# Patient Record
Sex: MALE | Race: WHITE | NOT HISPANIC OR LATINO | Employment: OTHER | URBAN - NONMETROPOLITAN AREA
[De-identification: names, ages, dates, MRNs, and addresses within clinical notes are randomized per-mention and may not be internally consistent; named-entity substitution may affect disease eponyms.]

---

## 2017-01-01 ENCOUNTER — HOSPITAL ENCOUNTER (EMERGENCY)
Facility: HOSPITAL | Age: 67
Discharge: HOME | End: 2017-03-30
Attending: EMERGENCY MEDICINE | Admitting: EMERGENCY MEDICINE

## 2017-01-01 ENCOUNTER — APPOINTMENT (OUTPATIENT)
Dept: GENERAL RADIOLOGY | Facility: HOSPITAL | Age: 67
End: 2017-01-01

## 2017-01-01 ENCOUNTER — APPOINTMENT (OUTPATIENT)
Dept: CT IMAGING | Facility: HOSPITAL | Age: 67
End: 2017-01-01

## 2017-01-01 ENCOUNTER — HOSPITAL ENCOUNTER (EMERGENCY)
Facility: HOSPITAL | Age: 67
Discharge: HOME | End: 2017-01-27
Attending: FAMILY MEDICINE | Admitting: FAMILY MEDICINE

## 2017-01-01 ENCOUNTER — APPOINTMENT (OUTPATIENT)
Dept: ULTRASOUND IMAGING | Facility: HOSPITAL | Age: 67
End: 2017-01-01

## 2017-01-01 ENCOUNTER — HOSPITAL ENCOUNTER (EMERGENCY)
Facility: HOSPITAL | Age: 67
Discharge: HOME | End: 2017-12-02
Attending: EMERGENCY MEDICINE | Admitting: EMERGENCY MEDICINE

## 2017-01-01 ENCOUNTER — HOSPITAL ENCOUNTER (INPATIENT)
Facility: HOSPITAL | Age: 67
LOS: 6 days | Discharge: HOME | End: 2017-12-12
Attending: EMERGENCY MEDICINE | Admitting: INTERNAL MEDICINE

## 2017-01-01 ENCOUNTER — OFFICE VISIT (OUTPATIENT)
Dept: PULMONOLOGY | Facility: CLINIC | Age: 67
End: 2017-01-01

## 2017-01-01 ENCOUNTER — HOSPITAL ENCOUNTER (EMERGENCY)
Facility: HOSPITAL | Age: 67
Discharge: HOME | End: 2017-03-21
Attending: EMERGENCY MEDICINE | Admitting: EMERGENCY MEDICINE

## 2017-01-01 ENCOUNTER — HOSPITAL ENCOUNTER (EMERGENCY)
Facility: HOSPITAL | Age: 67
Discharge: ANOTHER HOSPITAL | End: 2017-09-04
Attending: FAMILY MEDICINE | Admitting: FAMILY MEDICINE

## 2017-01-01 ENCOUNTER — HOSPITAL ENCOUNTER (EMERGENCY)
Facility: HOSPITAL | Age: 67
Discharge: HOME | End: 2017-05-30
Attending: EMERGENCY MEDICINE | Admitting: EMERGENCY MEDICINE

## 2017-01-01 ENCOUNTER — HOSPITAL ENCOUNTER (INPATIENT)
Facility: HOSPITAL | Age: 67
LOS: 24 days | Discharge: ANOTHER HOSPITAL | End: 2018-01-21
Attending: EMERGENCY MEDICINE | Admitting: INTERNAL MEDICINE

## 2017-01-01 ENCOUNTER — HOSPITAL ENCOUNTER (EMERGENCY)
Facility: HOSPITAL | Age: 67
Discharge: HOME | End: 2017-07-11
Attending: EMERGENCY MEDICINE | Admitting: EMERGENCY MEDICINE

## 2017-01-01 ENCOUNTER — HOSPITAL ENCOUNTER (EMERGENCY)
Facility: HOSPITAL | Age: 67
Discharge: HOME | End: 2017-11-15
Attending: EMERGENCY MEDICINE | Admitting: EMERGENCY MEDICINE

## 2017-01-01 ENCOUNTER — HOSPITAL ENCOUNTER (EMERGENCY)
Facility: HOSPITAL | Age: 67
Discharge: HOME | End: 2017-09-13
Attending: EMERGENCY MEDICINE | Admitting: EMERGENCY MEDICINE

## 2017-01-01 ENCOUNTER — APPOINTMENT (OUTPATIENT)
Dept: CARDIOLOGY | Facility: HOSPITAL | Age: 67
End: 2017-01-01

## 2017-01-01 ENCOUNTER — HOSPITAL ENCOUNTER (EMERGENCY)
Facility: HOSPITAL | Age: 67
Discharge: HOME | End: 2017-11-22
Attending: EMERGENCY MEDICINE | Admitting: EMERGENCY MEDICINE

## 2017-01-01 VITALS
HEART RATE: 65 BPM | RESPIRATION RATE: 20 BRPM | HEIGHT: 66 IN | DIASTOLIC BLOOD PRESSURE: 65 MMHG | BODY MASS INDEX: 43.33 KG/M2 | SYSTOLIC BLOOD PRESSURE: 158 MMHG | TEMPERATURE: 98.2 F | OXYGEN SATURATION: 97 % | WEIGHT: 269.6 LBS

## 2017-01-01 VITALS
WEIGHT: 249 LBS | OXYGEN SATURATION: 98 % | HEIGHT: 66 IN | DIASTOLIC BLOOD PRESSURE: 56 MMHG | HEART RATE: 55 BPM | BODY MASS INDEX: 40.02 KG/M2 | SYSTOLIC BLOOD PRESSURE: 130 MMHG | TEMPERATURE: 98 F | RESPIRATION RATE: 18 BRPM

## 2017-01-01 VITALS
TEMPERATURE: 98 F | WEIGHT: 242 LBS | DIASTOLIC BLOOD PRESSURE: 68 MMHG | HEIGHT: 66 IN | OXYGEN SATURATION: 91 % | SYSTOLIC BLOOD PRESSURE: 120 MMHG | BODY MASS INDEX: 38.89 KG/M2 | HEART RATE: 63 BPM

## 2017-01-01 VITALS
WEIGHT: 245 LBS | RESPIRATION RATE: 20 BRPM | DIASTOLIC BLOOD PRESSURE: 72 MMHG | BODY MASS INDEX: 39.37 KG/M2 | OXYGEN SATURATION: 100 % | SYSTOLIC BLOOD PRESSURE: 170 MMHG | HEIGHT: 66 IN | TEMPERATURE: 98.2 F | HEART RATE: 58 BPM

## 2017-01-01 VITALS
OXYGEN SATURATION: 99 % | DIASTOLIC BLOOD PRESSURE: 73 MMHG | BODY MASS INDEX: 41.46 KG/M2 | SYSTOLIC BLOOD PRESSURE: 151 MMHG | HEIGHT: 66 IN | WEIGHT: 258 LBS | HEART RATE: 64 BPM | RESPIRATION RATE: 19 BRPM | TEMPERATURE: 97.3 F

## 2017-01-01 VITALS
BODY MASS INDEX: 39.53 KG/M2 | TEMPERATURE: 97.9 F | HEIGHT: 66 IN | HEART RATE: 57 BPM | WEIGHT: 246 LBS | SYSTOLIC BLOOD PRESSURE: 183 MMHG | OXYGEN SATURATION: 99 % | RESPIRATION RATE: 22 BRPM | DIASTOLIC BLOOD PRESSURE: 68 MMHG

## 2017-01-01 VITALS
HEART RATE: 58 BPM | OXYGEN SATURATION: 94 % | SYSTOLIC BLOOD PRESSURE: 142 MMHG | HEIGHT: 67 IN | DIASTOLIC BLOOD PRESSURE: 52 MMHG | BODY MASS INDEX: 39.24 KG/M2 | TEMPERATURE: 97.9 F | WEIGHT: 250 LBS

## 2017-01-01 VITALS
HEART RATE: 64 BPM | DIASTOLIC BLOOD PRESSURE: 60 MMHG | WEIGHT: 246 LBS | HEIGHT: 66 IN | OXYGEN SATURATION: 90 % | SYSTOLIC BLOOD PRESSURE: 140 MMHG | BODY MASS INDEX: 39.53 KG/M2

## 2017-01-01 VITALS
HEART RATE: 52 BPM | BODY MASS INDEX: 41.59 KG/M2 | RESPIRATION RATE: 18 BRPM | HEIGHT: 67 IN | WEIGHT: 265 LBS | SYSTOLIC BLOOD PRESSURE: 148 MMHG | OXYGEN SATURATION: 96 % | TEMPERATURE: 98.6 F | DIASTOLIC BLOOD PRESSURE: 66 MMHG

## 2017-01-01 VITALS
RESPIRATION RATE: 20 BRPM | DIASTOLIC BLOOD PRESSURE: 68 MMHG | OXYGEN SATURATION: 91 % | WEIGHT: 268 LBS | HEART RATE: 67 BPM | TEMPERATURE: 98.1 F | SYSTOLIC BLOOD PRESSURE: 181 MMHG | BODY MASS INDEX: 42.06 KG/M2 | HEIGHT: 67 IN

## 2017-01-01 VITALS
DIASTOLIC BLOOD PRESSURE: 52 MMHG | SYSTOLIC BLOOD PRESSURE: 132 MMHG | HEART RATE: 75 BPM | BODY MASS INDEX: 40.18 KG/M2 | OXYGEN SATURATION: 98 % | HEIGHT: 66 IN | WEIGHT: 250 LBS | TEMPERATURE: 98 F | RESPIRATION RATE: 16 BRPM

## 2017-01-01 VITALS
HEIGHT: 67 IN | DIASTOLIC BLOOD PRESSURE: 44 MMHG | WEIGHT: 260 LBS | OXYGEN SATURATION: 100 % | BODY MASS INDEX: 40.81 KG/M2 | TEMPERATURE: 97.6 F | RESPIRATION RATE: 18 BRPM | HEART RATE: 68 BPM | SYSTOLIC BLOOD PRESSURE: 160 MMHG

## 2017-01-01 VITALS
BODY MASS INDEX: 41.95 KG/M2 | HEART RATE: 50 BPM | HEIGHT: 66 IN | OXYGEN SATURATION: 98 % | SYSTOLIC BLOOD PRESSURE: 132 MMHG | TEMPERATURE: 98.1 F | WEIGHT: 261 LBS | DIASTOLIC BLOOD PRESSURE: 74 MMHG | RESPIRATION RATE: 20 BRPM

## 2017-01-01 VITALS
HEART RATE: 50 BPM | SYSTOLIC BLOOD PRESSURE: 131 MMHG | BODY MASS INDEX: 40.18 KG/M2 | RESPIRATION RATE: 16 BRPM | TEMPERATURE: 97 F | WEIGHT: 250 LBS | DIASTOLIC BLOOD PRESSURE: 60 MMHG | HEIGHT: 66 IN | OXYGEN SATURATION: 97 %

## 2017-01-01 DIAGNOSIS — R73.9 HYPERGLYCEMIA: Primary | ICD-10-CM
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
## 2017-01-01 DIAGNOSIS — R09.02 HYPOXIA: ICD-10-CM

## 2017-01-01 DIAGNOSIS — J30.89 NON-SEASONAL ALLERGIC RHINITIS DUE TO OTHER ALLERGIC TRIGGER: ICD-10-CM

## 2017-01-01 DIAGNOSIS — E87.6 HYPOKALEMIA: ICD-10-CM

## 2017-01-01 DIAGNOSIS — A41.89 SEPSIS DUE TO OTHER ETIOLOGY (HCC): Primary | ICD-10-CM

## 2017-01-01 DIAGNOSIS — E66.01 MORBID OBESITY DUE TO EXCESS CALORIES (HCC): ICD-10-CM

## 2017-01-01 DIAGNOSIS — J96.22 ACUTE ON CHRONIC RESPIRATORY FAILURE WITH HYPOXIA AND HYPERCAPNIA (HCC): Primary | ICD-10-CM

## 2017-01-01 DIAGNOSIS — L97.821 VENOUS STASIS ULCER OF OTHER PART OF LEFT LOWER LEG LIMITED TO BREAKDOWN OF SKIN WITHOUT VARICOSE VEINS (HCC): Primary | ICD-10-CM

## 2017-01-01 DIAGNOSIS — J44.9 CHRONIC OBSTRUCTIVE PULMONARY DISEASE, UNSPECIFIED COPD TYPE (HCC): Primary | ICD-10-CM

## 2017-01-01 DIAGNOSIS — N17.9 ACUTE ON CHRONIC RENAL FAILURE (HCC): Primary | ICD-10-CM

## 2017-01-01 DIAGNOSIS — N18.9 ACUTE ON CHRONIC RENAL FAILURE (HCC): Primary | ICD-10-CM

## 2017-01-01 DIAGNOSIS — J18.9 PNEUMONIA OF RIGHT LOWER LOBE DUE TO INFECTIOUS ORGANISM: Primary | ICD-10-CM
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
## 2017-01-01 DIAGNOSIS — J44.9 CHRONIC OBSTRUCTIVE PULMONARY DISEASE, UNSPECIFIED COPD TYPE (HCC): ICD-10-CM

## 2017-01-01 DIAGNOSIS — R77.8 TROPONIN LEVEL ELEVATED: ICD-10-CM

## 2017-01-01 DIAGNOSIS — R60.0 EDEMA EXTREMITIES: ICD-10-CM

## 2017-01-01 DIAGNOSIS — S80.812A CAT SCRATCH OF LEFT LOWER LEG, INITIAL ENCOUNTER: Primary | ICD-10-CM

## 2017-01-01 DIAGNOSIS — G47.33 OBSTRUCTIVE SLEEP APNEA SYNDROME: ICD-10-CM

## 2017-01-01 DIAGNOSIS — E83.42 HYPOMAGNESEMIA: ICD-10-CM

## 2017-01-01 DIAGNOSIS — G47.33 OBSTRUCTIVE SLEEP APNEA HYPOPNEA, MODERATE: ICD-10-CM

## 2017-01-01 DIAGNOSIS — R53.1 GENERALIZED WEAKNESS: Primary | ICD-10-CM

## 2017-01-01 DIAGNOSIS — E66.01 MORBID OBESITY (HCC): ICD-10-CM

## 2017-01-01 DIAGNOSIS — R06.02 SHORTNESS OF BREATH: ICD-10-CM

## 2017-01-01 DIAGNOSIS — E87.3 ALKALOSIS, METABOLIC: Primary | ICD-10-CM

## 2017-01-01 DIAGNOSIS — I95.9 HYPOTENSION, UNSPECIFIED HYPOTENSION TYPE: Primary | ICD-10-CM

## 2017-01-01 DIAGNOSIS — J18.9 PNEUMONIA OF RIGHT MIDDLE LOBE DUE TO INFECTIOUS ORGANISM: Primary | ICD-10-CM

## 2017-01-01 DIAGNOSIS — I50.32 CHRONIC DIASTOLIC CONGESTIVE HEART FAILURE (HCC): Primary | ICD-10-CM

## 2017-01-01 DIAGNOSIS — J44.1 COPD EXACERBATION (HCC): ICD-10-CM

## 2017-01-01 DIAGNOSIS — L03.031 PARONYCHIA OF GREAT TOE, RIGHT: ICD-10-CM

## 2017-01-01 DIAGNOSIS — S80.811A CAT SCRATCH OF LOWER LEG, RIGHT, INITIAL ENCOUNTER: ICD-10-CM

## 2017-01-01 DIAGNOSIS — J96.21 ACUTE ON CHRONIC RESPIRATORY FAILURE WITH HYPOXIA AND HYPERCAPNIA (HCC): Primary | ICD-10-CM

## 2017-01-01 DIAGNOSIS — I87.2 VENOUS STASIS ULCER OF OTHER PART OF LEFT LOWER LEG LIMITED TO BREAKDOWN OF SKIN WITHOUT VARICOSE VEINS (HCC): Primary | ICD-10-CM

## 2017-01-01 DIAGNOSIS — R05.9 COUGH: ICD-10-CM

## 2017-01-01 LAB
25(OH)D3 SERPL-MCNC: 29 NG/ML
A-A DO2: 109.7 MMHG (ref 0–300)
A-A DO2: 128.4 MMHG (ref 0–300)
A-A DO2: 180.9 MMHG (ref 0–300)
A-A DO2: 187.9 MMHG (ref 0–300)
A-A DO2: 201.9 MMHG (ref 0–300)
A-A DO2: 203 MMHG (ref 0–300)
A-A DO2: 207 MMHG (ref 0–300)
A-A DO2: 221.9 MMHG (ref 0–300)
A-A DO2: 61.1 MMHG (ref 0–300)
A-A DO2: 72 MMHG (ref 0–300)
A-A DO2: 72.3 MMHG (ref 0–300)
A-A DO2: 76.5 MMHG (ref 0–300)
A-A DO2: 77.3 MMHG (ref 0–300)
A-A DO2: 80.8 MMHG (ref 0–300)
A-A DO2: 87.1 MMHG (ref 0–300)
A-A DO2: 89.3 MMHG (ref 0–300)
A-A DO2: 93.9 MMHG (ref 0–300)
ALBUMIN SERPL-MCNC: 3.4 G/DL (ref 3.4–4.8)
ALBUMIN SERPL-MCNC: 3.5 G/DL (ref 3.4–4.8)
ALBUMIN SERPL-MCNC: 3.6 G/DL (ref 3.4–4.8)
ALBUMIN SERPL-MCNC: 3.6 G/DL (ref 3.4–4.8)
ALBUMIN SERPL-MCNC: 3.7 G/DL (ref 3.4–4.8)
ALBUMIN SERPL-MCNC: 3.8 G/DL (ref 3.4–4.8)
ALBUMIN SERPL-MCNC: 3.8 G/DL (ref 3.4–4.8)
ALBUMIN SERPL-MCNC: 3.9 G/DL (ref 3.4–4.8)
ALBUMIN SERPL-MCNC: 3.9 G/DL (ref 3.4–4.8)
ALBUMIN SERPL-MCNC: 4 G/DL (ref 3.4–4.8)
ALBUMIN SERPL-MCNC: 4.2 G/DL (ref 3.4–4.8)
ALBUMIN SERPL-MCNC: 4.2 G/DL (ref 3.4–4.8)
ALBUMIN SERPL-MCNC: 4.3 G/DL (ref 3.4–4.8)
ALBUMIN SERPL-MCNC: 4.4 G/DL (ref 3.4–4.8)
ALBUMIN SERPL-MCNC: 4.5 G/DL (ref 3.4–4.8)
ALBUMIN SERPL-MCNC: 4.5 G/DL (ref 3.4–4.8)
ALBUMIN SERPL-MCNC: 4.7 G/DL (ref 3.4–4.8)
ALBUMIN SERPL-MCNC: 4.8 G/DL (ref 3.4–4.8)
ALBUMIN UR-MCNC: 17.3 MG/L
ALBUMIN/GLOB SERPL: 1.2 G/DL (ref 1.5–2.5)
ALBUMIN/GLOB SERPL: 1.3 G/DL (ref 1.5–2.5)
ALBUMIN/GLOB SERPL: 1.4 G/DL (ref 1.5–2.5)
ALBUMIN/GLOB SERPL: 1.5 G/DL (ref 1.5–2.5)
ALBUMIN/GLOB SERPL: 1.6 G/DL (ref 1.5–2.5)
ALBUMIN/GLOB SERPL: 1.6 G/DL (ref 1.5–2.5)
ALP SERPL-CCNC: 40 U/L (ref 40–129)
ALP SERPL-CCNC: 41 U/L (ref 40–129)
ALP SERPL-CCNC: 41 U/L (ref 40–129)
ALP SERPL-CCNC: 42 U/L (ref 40–129)
ALP SERPL-CCNC: 43 U/L (ref 40–129)
ALP SERPL-CCNC: 46 U/L (ref 40–129)
ALP SERPL-CCNC: 47 U/L (ref 40–129)
ALP SERPL-CCNC: 49 U/L (ref 40–129)
ALP SERPL-CCNC: 52 U/L (ref 40–129)
ALP SERPL-CCNC: 54 U/L (ref 46–116)
ALP SERPL-CCNC: 55 U/L (ref 40–129)
ALP SERPL-CCNC: 59 U/L (ref 40–129)
ALP SERPL-CCNC: 61 U/L (ref 40–129)
ALP SERPL-CCNC: 61 U/L (ref 40–129)
ALP SERPL-CCNC: 62 U/L (ref 40–129)
ALP SERPL-CCNC: 63 U/L (ref 40–129)
ALP SERPL-CCNC: 63 U/L (ref 40–129)
ALP SERPL-CCNC: 64 U/L (ref 40–129)
ALT SERPL W P-5'-P-CCNC: 14 U/L (ref 10–44)
ALT SERPL W P-5'-P-CCNC: 16 U/L (ref 10–44)
ALT SERPL W P-5'-P-CCNC: 17 U/L (ref 10–44)
ALT SERPL W P-5'-P-CCNC: 18 U/L (ref 10–44)
ALT SERPL W P-5'-P-CCNC: 19 U/L (ref 10–44)
ALT SERPL W P-5'-P-CCNC: 22 U/L (ref 10–44)
ALT SERPL W P-5'-P-CCNC: 23 U/L (ref 10–44)
ALT SERPL W P-5'-P-CCNC: 25 U/L (ref 10–44)
ALT SERPL W P-5'-P-CCNC: 28 U/L (ref 10–44)
ALT SERPL W P-5'-P-CCNC: 29 U/L (ref 10–44)
ALT SERPL W P-5'-P-CCNC: 51 U/L (ref 10–44)
AMPHET+METHAMPHET UR QL: NEGATIVE
AMYLASE SERPL-CCNC: 41 U/L (ref 28–100)
ANION GAP SERPL CALCULATED.3IONS-SCNC: 14.2 MMOL/L (ref 3.6–11.2)
ANION GAP SERPL CALCULATED.3IONS-SCNC: 2.6 MMOL/L (ref 3.6–11.2)
ANION GAP SERPL CALCULATED.3IONS-SCNC: 4.1 MMOL/L (ref 3.6–11.2)
ANION GAP SERPL CALCULATED.3IONS-SCNC: 5.1 MMOL/L (ref 3.6–11.2)
ANION GAP SERPL CALCULATED.3IONS-SCNC: 5.2 MMOL/L (ref 3.6–11.2)
ANION GAP SERPL CALCULATED.3IONS-SCNC: 6.6 MMOL/L (ref 3.6–11.2)
ANION GAP SERPL CALCULATED.3IONS-SCNC: 7 MMOL/L (ref 3.6–11.2)
ANION GAP SERPL CALCULATED.3IONS-SCNC: 7.2 MMOL/L (ref 3.6–11.2)
ANION GAP SERPL CALCULATED.3IONS-SCNC: 7.6 MMOL/L (ref 3.6–11.2)
ANION GAP SERPL CALCULATED.3IONS-SCNC: 7.9 MMOL/L (ref 3.6–11.2)
ANION GAP SERPL CALCULATED.3IONS-SCNC: 8 MMOL/L (ref 3.6–11.2)
ANION GAP SERPL CALCULATED.3IONS-SCNC: 8.1 MMOL/L (ref 3.6–11.2)
ANION GAP SERPL CALCULATED.3IONS-SCNC: 8.7 MMOL/L (ref 3.6–11.2)
ANION GAP SERPL CALCULATED.3IONS-SCNC: 9.1 MMOL/L (ref 3.6–11.2)
ANION GAP SERPL CALCULATED.3IONS-SCNC: 9.7 MMOL/L (ref 3.6–11.2)
ANION GAP SERPL CALCULATED.3IONS-SCNC: ABNORMAL MMOL/L (ref 3.6–11.2)
APTT PPP: 30.1 SECONDS (ref 24.4–31)
APTT PPP: 36.7 SECONDS (ref 23.8–36.1)
ARTERIAL PATENCY WRIST A: ABNORMAL
ARTERIAL PATENCY WRIST A: POSITIVE
AST SERPL-CCNC: 13 U/L (ref 10–34)
AST SERPL-CCNC: 15 U/L (ref 10–34)
AST SERPL-CCNC: 16 U/L (ref 10–34)
AST SERPL-CCNC: 17 U/L (ref 10–34)
AST SERPL-CCNC: 17 U/L (ref 10–34)
AST SERPL-CCNC: 18 U/L (ref 10–34)
AST SERPL-CCNC: 22 U/L (ref 10–34)
AST SERPL-CCNC: 22 U/L (ref 10–34)
AST SERPL-CCNC: 23 U/L (ref 10–34)
AST SERPL-CCNC: 25 U/L (ref 10–34)
AST SERPL-CCNC: 27 U/L (ref 10–34)
AST SERPL-CCNC: 27 U/L (ref 10–34)
AST SERPL-CCNC: 32 U/L (ref 10–34)
ATMOSPHERIC PRESS: 723 MMHG
ATMOSPHERIC PRESS: 725 MMHG
ATMOSPHERIC PRESS: 727 MMHG
ATMOSPHERIC PRESS: 727 MMHG
ATMOSPHERIC PRESS: 728 MMHG
ATMOSPHERIC PRESS: 729 MMHG
ATMOSPHERIC PRESS: 729 MMHG
ATMOSPHERIC PRESS: 730 MMHG
ATMOSPHERIC PRESS: 730 MMHG
ATMOSPHERIC PRESS: 735 MMHG
BACTERIA SPEC AEROBE CULT: ABNORMAL
BACTERIA SPEC AEROBE CULT: ABNORMAL
BACTERIA SPEC AEROBE CULT: NORMAL
BACTERIA UR QL AUTO: NORMAL /HPF
BARBITURATES UR QL SCN: NEGATIVE
BASE EXCESS BLDA CALC-SCNC: 11 MMOL/L
BASE EXCESS BLDA CALC-SCNC: 12.8 MMOL/L
BASE EXCESS BLDA CALC-SCNC: 13.3 MMOL/L
BASE EXCESS BLDA CALC-SCNC: 15.3 MMOL/L
BASE EXCESS BLDA CALC-SCNC: 15.3 MMOL/L
BASE EXCESS BLDA CALC-SCNC: 2.4 MMOL/L
BASE EXCESS BLDA CALC-SCNC: 3.3 MMOL/L
BASE EXCESS BLDA CALC-SCNC: 4.3 MMOL/L
BASE EXCESS BLDA CALC-SCNC: 4.3 MMOL/L
BASE EXCESS BLDA CALC-SCNC: 5.2 MMOL/L
BASE EXCESS BLDA CALC-SCNC: 5.9 MMOL/L
BASE EXCESS BLDA CALC-SCNC: 6.1 MMOL/L
BASE EXCESS BLDA CALC-SCNC: 6.9 MMOL/L
BASE EXCESS BLDA CALC-SCNC: 8.6 MMOL/L
BASE EXCESS BLDA CALC-SCNC: 8.9 MMOL/L
BASE EXCESS BLDA CALC-SCNC: 9.5 MMOL/L
BASE EXCESS BLDA CALC-SCNC: 9.7 MMOL/L
BASOPHILS # BLD AUTO: 0 10*3/MM3 (ref 0–0.3)
BASOPHILS # BLD AUTO: 0.01 10*3/MM3 (ref 0–0.3)
BASOPHILS # BLD AUTO: 0.02 10*3/MM3 (ref 0–0.3)
BASOPHILS # BLD AUTO: 0.03 10*3/MM3 (ref 0–0.3)
BASOPHILS # BLD AUTO: 0.04 10*3/MM3 (ref 0–0.3)
BASOPHILS NFR BLD AUTO: 0 % (ref 0–2)
BASOPHILS NFR BLD AUTO: 0.1 % (ref 0–2)
BASOPHILS NFR BLD AUTO: 0.2 % (ref 0–2)
BASOPHILS NFR BLD AUTO: 0.3 % (ref 0–2)
BASOPHILS NFR BLD AUTO: 0.3 % (ref 0–2)
BASOPHILS NFR BLD AUTO: 0.4 % (ref 0–2)
BASOPHILS NFR BLD AUTO: 0.5 % (ref 0–2)
BDY SITE: ABNORMAL
BENZODIAZ UR QL SCN: NEGATIVE
BILIRUB SERPL-MCNC: 0.1 MG/DL (ref 0.2–1.8)
BILIRUB SERPL-MCNC: 0.2 MG/DL (ref 0.2–1.8)
BILIRUB SERPL-MCNC: 0.2 MG/DL (ref 0.2–1.8)
BILIRUB SERPL-MCNC: 0.3 MG/DL (ref 0.2–1.8)
BILIRUB SERPL-MCNC: 0.4 MG/DL (ref 0.2–1.8)
BILIRUB SERPL-MCNC: 0.5 MG/DL (ref 0.2–1.8)
BILIRUB SERPL-MCNC: 0.6 MG/DL (ref 0.2–1.8)
BILIRUB SERPL-MCNC: 0.7 MG/DL (ref 0.2–1.8)
BILIRUB SERPL-MCNC: 0.9 MG/DL (ref 0.2–1.8)
BILIRUB SERPL-MCNC: 1 MG/DL (ref 0.2–1.8)
BILIRUB UR QL STRIP: NEGATIVE
BNP SERPL-MCNC: 127 PG/ML (ref 0–100)
BNP SERPL-MCNC: 150 PG/ML (ref 0–100)
BNP SERPL-MCNC: 307 PG/ML (ref 0–100)
BNP SERPL-MCNC: 33 PG/ML (ref 0–100)
BNP SERPL-MCNC: 37 PG/ML (ref 0–100)
BNP SERPL-MCNC: 42 PG/ML (ref 0–100)
BNP SERPL-MCNC: 58 PG/ML (ref 0–100)
BNP SERPL-MCNC: 97 PG/ML (ref 0–100)
BODY TEMPERATURE: 98.6 C
BUN BLD-MCNC: 15 MG/DL (ref 7–21)
BUN BLD-MCNC: 16 MG/DL (ref 7–21)
BUN BLD-MCNC: 20 MG/DL (ref 7–21)
BUN BLD-MCNC: 20 MG/DL (ref 7–21)
BUN BLD-MCNC: 21 MG/DL (ref 7–21)
BUN BLD-MCNC: 21 MG/DL (ref 7–21)
BUN BLD-MCNC: 22 MG/DL (ref 7–21)
BUN BLD-MCNC: 24 MG/DL (ref 7–21)
BUN BLD-MCNC: 25 MG/DL (ref 7–21)
BUN BLD-MCNC: 28 MG/DL (ref 7–21)
BUN BLD-MCNC: 30 MG/DL (ref 7–21)
BUN BLD-MCNC: 31 MG/DL (ref 7–21)
BUN BLD-MCNC: 31 MG/DL (ref 7–21)
BUN BLD-MCNC: 32 MG/DL (ref 7–21)
BUN BLD-MCNC: 33 MG/DL (ref 7–21)
BUN BLD-MCNC: 34 MG/DL (ref 7–21)
BUN BLD-MCNC: 39 MG/DL (ref 7–21)
BUN BLD-MCNC: 43 MG/DL (ref 7–21)
BUN BLD-MCNC: 44 MG/DL (ref 7–21)
BUN BLD-MCNC: 52 MG/DL (ref 7–21)
BUN/CREAT SERPL: 12 (ref 7–25)
BUN/CREAT SERPL: 12.1 (ref 7–25)
BUN/CREAT SERPL: 12.6 (ref 7–25)
BUN/CREAT SERPL: 12.8 (ref 7–25)
BUN/CREAT SERPL: 13.6 (ref 7–25)
BUN/CREAT SERPL: 13.7 (ref 7–25)
BUN/CREAT SERPL: 13.8 (ref 7–25)
BUN/CREAT SERPL: 14 (ref 7–25)
BUN/CREAT SERPL: 14.9 (ref 7–25)
BUN/CREAT SERPL: 15 (ref 7–25)
BUN/CREAT SERPL: 15 (ref 7–25)
BUN/CREAT SERPL: 15.2 (ref 7–25)
BUN/CREAT SERPL: 15.2 (ref 7–25)
BUN/CREAT SERPL: 16.7 (ref 7–25)
BUN/CREAT SERPL: 17.1 (ref 7–25)
BUN/CREAT SERPL: 17.1 (ref 7–25)
BUN/CREAT SERPL: 17.3 (ref 7–25)
BUN/CREAT SERPL: 17.5 (ref 7–25)
BUN/CREAT SERPL: 20.1 (ref 7–25)
BUN/CREAT SERPL: 22.4 (ref 7–25)
CALCIUM SPEC-SCNC: 10 MG/DL (ref 7.7–10)
CALCIUM SPEC-SCNC: 10.3 MG/DL (ref 7.7–10)
CALCIUM SPEC-SCNC: 11.3 MG/DL (ref 7.7–10)
CALCIUM SPEC-SCNC: 7.5 MG/DL (ref 7.7–10)
CALCIUM SPEC-SCNC: 7.6 MG/DL (ref 7.7–10)
CALCIUM SPEC-SCNC: 8.1 MG/DL (ref 7.7–10)
CALCIUM SPEC-SCNC: 8.2 MG/DL (ref 7.7–10)
CALCIUM SPEC-SCNC: 8.2 MG/DL (ref 7.7–10)
CALCIUM SPEC-SCNC: 8.4 MG/DL (ref 7.7–10)
CALCIUM SPEC-SCNC: 8.7 MG/DL (ref 7.7–10)
CALCIUM SPEC-SCNC: 8.8 MG/DL (ref 7.7–10)
CALCIUM SPEC-SCNC: 8.8 MG/DL (ref 7.7–10)
CALCIUM SPEC-SCNC: 9.1 MG/DL (ref 7.7–10)
CALCIUM SPEC-SCNC: 9.1 MG/DL (ref 7.7–10)
CALCIUM SPEC-SCNC: 9.2 MG/DL (ref 7.7–10)
CALCIUM SPEC-SCNC: 9.3 MG/DL (ref 7.7–10)
CALCIUM SPEC-SCNC: 9.4 MG/DL (ref 7.7–10)
CALCIUM SPEC-SCNC: 9.4 MG/DL (ref 7.7–10)
CANNABINOIDS SERPL QL: NEGATIVE
CHLORIDE SERPL-SCNC: 100 MMOL/L (ref 99–112)
CHLORIDE SERPL-SCNC: 103 MMOL/L (ref 99–112)
CHLORIDE SERPL-SCNC: 103 MMOL/L (ref 99–112)
CHLORIDE SERPL-SCNC: 104 MMOL/L (ref 99–112)
CHLORIDE SERPL-SCNC: 104 MMOL/L (ref 99–112)
CHLORIDE SERPL-SCNC: 105 MMOL/L (ref 99–112)
CHLORIDE SERPL-SCNC: 105 MMOL/L (ref 99–112)
CHLORIDE SERPL-SCNC: 108 MMOL/L (ref 99–112)
CHLORIDE SERPL-SCNC: 109 MMOL/L (ref 99–112)
CHLORIDE SERPL-SCNC: 109 MMOL/L (ref 99–112)
CHLORIDE SERPL-SCNC: 86 MMOL/L (ref 99–112)
CHLORIDE SERPL-SCNC: 88 MMOL/L (ref 99–112)
CHLORIDE SERPL-SCNC: 88 MMOL/L (ref 99–112)
CHLORIDE SERPL-SCNC: 89 MMOL/L (ref 99–112)
CHLORIDE SERPL-SCNC: 94 MMOL/L (ref 99–112)
CHLORIDE SERPL-SCNC: 94 MMOL/L (ref 99–112)
CHLORIDE SERPL-SCNC: 96 MMOL/L (ref 99–112)
CHLORIDE SERPL-SCNC: 97 MMOL/L (ref 99–112)
CHLORIDE SERPL-SCNC: 97 MMOL/L (ref 99–112)
CHLORIDE SERPL-SCNC: 98 MMOL/L (ref 99–112)
CK MB SERPL-CCNC: 0.53 NG/ML (ref 0–5)
CK MB SERPL-CCNC: 0.87 NG/ML (ref 0–5)
CK MB SERPL-CCNC: 0.97 NG/ML (ref 0–5)
CK MB SERPL-CCNC: 1.1 NG/ML (ref 0–5)
CK MB SERPL-CCNC: 1.68 NG/ML (ref 0–5)
CK MB SERPL-CCNC: 3.61 NG/ML (ref 0–5)
CK MB SERPL-CCNC: 3.88 NG/ML (ref 0–5)
CK MB SERPL-CCNC: <0.18 NG/ML (ref 0–5)
CK MB SERPL-RTO: 0.6 % (ref 0–3)
CK MB SERPL-RTO: 0.7 % (ref 0–3)
CK MB SERPL-RTO: 1.3 % (ref 0–3)
CK MB SERPL-RTO: 1.6 % (ref 0–3)
CK MB SERPL-RTO: 2.1 % (ref 0–3)
CK MB SERPL-RTO: 2.2 % (ref 0–3)
CK MB SERPL-RTO: 2.4 % (ref 0–3)
CK MB SERPL-RTO: NORMAL % (ref 0–3)
CK SERPL-CCNC: 101 U/L (ref 24–204)
CK SERPL-CCNC: 107 U/L (ref 24–204)
CK SERPL-CCNC: 130 U/L (ref 24–204)
CK SERPL-CCNC: 138 U/L (ref 24–204)
CK SERPL-CCNC: 173 U/L (ref 24–204)
CK SERPL-CCNC: 179 U/L (ref 24–204)
CK SERPL-CCNC: 39 U/L (ref 24–204)
CK SERPL-CCNC: 39 U/L (ref 24–204)
CK SERPL-CCNC: 40 U/L
CK SERPL-CCNC: 45 U/L (ref 24–204)
CLARITY UR: CLEAR
CO2 SERPL-SCNC: 27.3 MMOL/L (ref 24.3–31.9)
CO2 SERPL-SCNC: 28.3 MMOL/L (ref 24.3–31.9)
CO2 SERPL-SCNC: 28.9 MMOL/L (ref 24.3–31.9)
CO2 SERPL-SCNC: 29.1 MMOL/L (ref 24.3–31.9)
CO2 SERPL-SCNC: 29.8 MMOL/L (ref 24.3–31.9)
CO2 SERPL-SCNC: 29.8 MMOL/L (ref 24.3–31.9)
CO2 SERPL-SCNC: 29.9 MMOL/L (ref 24.3–31.9)
CO2 SERPL-SCNC: 31.9 MMOL/L (ref 24.3–31.9)
CO2 SERPL-SCNC: 32 MMOL/L (ref 24.3–31.9)
CO2 SERPL-SCNC: 32.4 MMOL/L (ref 24.3–31.9)
CO2 SERPL-SCNC: 34.4 MMOL/L (ref 24.3–31.9)
CO2 SERPL-SCNC: 35 MMOL/L (ref 24.3–31.9)
CO2 SERPL-SCNC: 35.4 MMOL/L (ref 24.3–31.9)
CO2 SERPL-SCNC: 37.8 MMOL/L (ref 24.3–31.9)
CO2 SERPL-SCNC: 39.9 MMOL/L (ref 24.3–31.9)
CO2 SERPL-SCNC: >40 MMOL/L (ref 24.3–31.9)
COCAINE UR QL: NEGATIVE
COHGB MFR BLD: 0.7 % (ref 0–5)
COHGB MFR BLD: 0.9 % (ref 0–5)
COHGB MFR BLD: 1 % (ref 0–5)
COHGB MFR BLD: 1.1 % (ref 0–5)
COHGB MFR BLD: 1.2 % (ref 0–5)
COHGB MFR BLD: 1.3 % (ref 0–5)
COHGB MFR BLD: 1.3 % (ref 0–5)
COHGB MFR BLD: 1.5 % (ref 0–5)
COHGB MFR BLD: 1.8 % (ref 0–5)
COHGB MFR BLD: 1.9 % (ref 0–5)
COHGB MFR BLD: 1.9 % (ref 0–5)
COHGB MFR BLD: 2.1 % (ref 0–5)
COHGB MFR BLD: 2.2 % (ref 0–5)
COLOR UR: YELLOW
CORTIS SERPL-MCNC: 25.1 MCG/DL
CREAT BLD-MCNC: 1.17 MG/DL (ref 0.43–1.29)
CREAT BLD-MCNC: 1.24 MG/DL (ref 0.43–1.29)
CREAT BLD-MCNC: 1.26 MG/DL (ref 0.43–1.29)
CREAT BLD-MCNC: 1.27 MG/DL (ref 0.43–1.29)
CREAT BLD-MCNC: 1.37 MG/DL (ref 0.43–1.29)
CREAT BLD-MCNC: 1.4 MG/DL (ref 0.43–1.29)
CREAT BLD-MCNC: 1.45 MG/DL (ref 0.43–1.29)
CREAT BLD-MCNC: 1.46 MG/DL (ref 0.43–1.29)
CREAT BLD-MCNC: 1.47 MG/DL (ref 0.43–1.29)
CREAT BLD-MCNC: 1.52 MG/DL (ref 0.43–1.29)
CREAT BLD-MCNC: 1.81 MG/DL (ref 0.43–1.29)
CREAT BLD-MCNC: 1.87 MG/DL (ref 0.43–1.29)
CREAT BLD-MCNC: 1.88 MG/DL (ref 0.43–1.29)
CREAT BLD-MCNC: 2.07 MG/DL (ref 0.43–1.29)
CREAT BLD-MCNC: 2.42 MG/DL (ref 0.43–1.29)
CREAT BLD-MCNC: 2.49 MG/DL (ref 0.43–1.29)
CREAT BLD-MCNC: 2.5 MG/DL (ref 0.43–1.29)
CREAT BLD-MCNC: 2.51 MG/DL (ref 0.43–1.29)
CREAT BLD-MCNC: 2.56 MG/DL (ref 0.43–1.29)
CREAT BLD-MCNC: 2.59 MG/DL (ref 0.43–1.29)
CREAT BLD-MCNC: 3.14 MG/DL (ref 0.43–1.29)
CREAT UR-MCNC: 152.6 MG/DL
CRP SERPL-MCNC: 0.55 MG/DL (ref 0–0.99)
CRP SERPL-MCNC: 1.61 MG/DL (ref 0–0.99)
CRP SERPL-MCNC: 10.08 MG/DL (ref 0–0.99)
CRP SERPL-MCNC: 11.77 MG/DL (ref 0–0.99)
CRP SERPL-MCNC: 2.16 MG/DL (ref 0–0.99)
CRP SERPL-MCNC: 3.57 MG/DL (ref 0–0.99)
CRP SERPL-MCNC: 3.6 MG/DL (ref 0–0.99)
CRP SERPL-MCNC: 3.78 MG/DL (ref 0–0.99)
CRP SERPL-MCNC: 3.94 MG/DL (ref 0–0.99)
CRP SERPL-MCNC: 5.63 MG/DL (ref 0–0.99)
CRP SERPL-MCNC: 6.38 MG/DL (ref 0–0.99)
D-LACTATE SERPL-SCNC: 1 MMOL/L (ref 0.5–2)
D-LACTATE SERPL-SCNC: 1.1 MMOL/L (ref 0.5–2)
D-LACTATE SERPL-SCNC: 1.2 MMOL/L (ref 0.5–2)
D-LACTATE SERPL-SCNC: 2.1 MMOL/L (ref 0.5–2)
D-LACTATE SERPL-SCNC: 3.5 MMOL/L (ref 0.5–2)
D-LACTATE SERPL-SCNC: 3.7 MMOL/L (ref 0.5–2)
D-LACTATE SERPL-SCNC: 4.4 MMOL/L (ref 0.5–2)
D-LACTATE SERPL-SCNC: 4.8 MMOL/L (ref 0.5–2)
D-LACTATE SERPL-SCNC: 4.9 MMOL/L (ref 0.5–2)
D-LACTATE SERPL-SCNC: 5.6 MMOL/L (ref 0.5–2)
D-LACTATE SERPL-SCNC: 6.1 MMOL/L (ref 0.5–2)
D-LACTATE SERPL-SCNC: 6.2 MMOL/L (ref 0.5–2)
D-LACTATE SERPL-SCNC: 6.8 MMOL/L (ref 0.5–2)
DEPRECATED RDW RBC AUTO: 41.8 FL (ref 37–54)
DEPRECATED RDW RBC AUTO: 42.7 FL (ref 37–54)
DEPRECATED RDW RBC AUTO: 42.8 FL (ref 37–54)
DEPRECATED RDW RBC AUTO: 43.9 FL (ref 37–54)
DEPRECATED RDW RBC AUTO: 45.2 FL (ref 37–54)
DEPRECATED RDW RBC AUTO: 46.5 FL (ref 37–54)
DEPRECATED RDW RBC AUTO: 46.7 FL (ref 37–54)
DEPRECATED RDW RBC AUTO: 47.4 FL (ref 37–54)
DEPRECATED RDW RBC AUTO: 47.5 FL (ref 37–54)
DEPRECATED RDW RBC AUTO: 48.7 FL (ref 37–54)
DEPRECATED RDW RBC AUTO: 49 FL (ref 37–54)
DEPRECATED RDW RBC AUTO: 49.2 FL (ref 37–54)
DEPRECATED RDW RBC AUTO: 50 FL (ref 37–54)
DEPRECATED RDW RBC AUTO: 50.7 FL (ref 37–54)
DEPRECATED RDW RBC AUTO: 51.5 FL (ref 37–54)
DEPRECATED RDW RBC AUTO: 51.9 FL (ref 37–54)
DEPRECATED RDW RBC AUTO: 52 FL (ref 37–54)
DEPRECATED RDW RBC AUTO: 52.6 FL (ref 37–54)
DEPRECATED RDW RBC AUTO: 53.2 FL (ref 37–54)
EOSINOPHIL # BLD AUTO: 0 10*3/MM3 (ref 0–0.7)
EOSINOPHIL # BLD AUTO: 0.01 10*3/MM3 (ref 0–0.7)
EOSINOPHIL # BLD AUTO: 0.01 10*3/MM3 (ref 0–0.7)
EOSINOPHIL # BLD AUTO: 0.03 10*3/MM3 (ref 0–0.7)
EOSINOPHIL # BLD AUTO: 0.06 10*3/MM3 (ref 0–0.7)
EOSINOPHIL # BLD AUTO: 0.08 10*3/MM3 (ref 0–0.7)
EOSINOPHIL # BLD AUTO: 0.09 10*3/MM3 (ref 0–0.7)
EOSINOPHIL # BLD AUTO: 0.13 10*3/MM3 (ref 0–0.7)
EOSINOPHIL # BLD AUTO: 0.17 10*3/MM3 (ref 0–0.7)
EOSINOPHIL # BLD AUTO: 0.2 10*3/MM3 (ref 0–0.7)
EOSINOPHIL # BLD AUTO: 0.25 10*3/MM3 (ref 0–0.7)
EOSINOPHIL # BLD AUTO: 0.26 10*3/MM3 (ref 0–0.7)
EOSINOPHIL # BLD AUTO: 0.27 10*3/MM3 (ref 0–0.7)
EOSINOPHIL # BLD AUTO: 0.28 10*3/MM3 (ref 0–0.7)
EOSINOPHIL # BLD AUTO: 0.43 10*3/MM3 (ref 0–0.7)
EOSINOPHIL # BLD AUTO: 0.45 10*3/MM3 (ref 0–0.7)
EOSINOPHIL NFR BLD AUTO: 0 % (ref 0–7)
EOSINOPHIL NFR BLD AUTO: 0.1 % (ref 0–7)
EOSINOPHIL NFR BLD AUTO: 0.1 % (ref 0–7)
EOSINOPHIL NFR BLD AUTO: 0.5 % (ref 0–7)
EOSINOPHIL NFR BLD AUTO: 0.6 % (ref 0–7)
EOSINOPHIL NFR BLD AUTO: 0.7 % (ref 0–7)
EOSINOPHIL NFR BLD AUTO: 1 % (ref 0–7)
EOSINOPHIL NFR BLD AUTO: 2 % (ref 0–7)
EOSINOPHIL NFR BLD AUTO: 2.1 % (ref 0–7)
EOSINOPHIL NFR BLD AUTO: 2.7 % (ref 0–7)
EOSINOPHIL NFR BLD AUTO: 3 % (ref 0–7)
EOSINOPHIL NFR BLD AUTO: 3.2 % (ref 0–7)
EOSINOPHIL NFR BLD AUTO: 3.5 % (ref 0–7)
EOSINOPHIL NFR BLD AUTO: 4.6 % (ref 0–7)
EOSINOPHIL NFR BLD AUTO: 5.3 % (ref 0–7)
EOSINOPHIL NFR BLD AUTO: 5.4 % (ref 0–7)
ERYTHROCYTE [DISTWIDTH] IN BLOOD BY AUTOMATED COUNT: 13.4 % (ref 11.5–14.5)
ERYTHROCYTE [DISTWIDTH] IN BLOOD BY AUTOMATED COUNT: 13.5 % (ref 11.5–14.5)
ERYTHROCYTE [DISTWIDTH] IN BLOOD BY AUTOMATED COUNT: 13.5 % (ref 11.5–14.5)
ERYTHROCYTE [DISTWIDTH] IN BLOOD BY AUTOMATED COUNT: 13.8 % (ref 11.5–14.5)
ERYTHROCYTE [DISTWIDTH] IN BLOOD BY AUTOMATED COUNT: 14.1 % (ref 11.5–14.5)
ERYTHROCYTE [DISTWIDTH] IN BLOOD BY AUTOMATED COUNT: 14.4 % (ref 11.5–14.5)
ERYTHROCYTE [DISTWIDTH] IN BLOOD BY AUTOMATED COUNT: 14.5 % (ref 11.5–14.5)
ERYTHROCYTE [DISTWIDTH] IN BLOOD BY AUTOMATED COUNT: 14.5 % (ref 11.5–14.5)
ERYTHROCYTE [DISTWIDTH] IN BLOOD BY AUTOMATED COUNT: 14.6 % (ref 11.5–14.5)
ERYTHROCYTE [DISTWIDTH] IN BLOOD BY AUTOMATED COUNT: 15.1 % (ref 11.5–14.5)
ERYTHROCYTE [DISTWIDTH] IN BLOOD BY AUTOMATED COUNT: 15.2 % (ref 11.5–14.5)
ERYTHROCYTE [DISTWIDTH] IN BLOOD BY AUTOMATED COUNT: 15.3 % (ref 11.5–14.5)
ERYTHROCYTE [DISTWIDTH] IN BLOOD BY AUTOMATED COUNT: 15.3 % (ref 11.5–14.5)
ERYTHROCYTE [DISTWIDTH] IN BLOOD BY AUTOMATED COUNT: 15.4 % (ref 11.5–14.5)
ERYTHROCYTE [DISTWIDTH] IN BLOOD BY AUTOMATED COUNT: 15.5 % (ref 11.5–14.5)
ERYTHROCYTE [DISTWIDTH] IN BLOOD BY AUTOMATED COUNT: 15.5 % (ref 11.5–14.5)
ERYTHROCYTE [DISTWIDTH] IN BLOOD BY AUTOMATED COUNT: 15.6 % (ref 11.5–14.5)
ERYTHROCYTE [SEDIMENTATION RATE] IN BLOOD: 54 MM/HR (ref 0–20)
FERRITIN SERPL-MCNC: 133 NG/ML (ref 21.9–321.7)
FERRITIN SERPL-MCNC: 74 NG/ML (ref 21.9–321.7)
FLUAV AG NPH QL: NEGATIVE
FLUBV AG NPH QL IA: NEGATIVE
FOLATE SERPL-MCNC: 16.98 NG/ML (ref 5.4–20)
FOLATE SERPL-MCNC: 9.46 NG/ML (ref 5.4–20)
GAS FLOW AIRWAY: 3 LPM
GAS FLOW AIRWAY: 45 LPM
GFR SERPL CREATININE-BSD FRML MDRD: 20 ML/MIN/1.73
GFR SERPL CREATININE-BSD FRML MDRD: 25 ML/MIN/1.73
GFR SERPL CREATININE-BSD FRML MDRD: 25 ML/MIN/1.73
GFR SERPL CREATININE-BSD FRML MDRD: 26 ML/MIN/1.73
GFR SERPL CREATININE-BSD FRML MDRD: 27 ML/MIN/1.73
GFR SERPL CREATININE-BSD FRML MDRD: 32 ML/MIN/1.73
GFR SERPL CREATININE-BSD FRML MDRD: 36 ML/MIN/1.73
GFR SERPL CREATININE-BSD FRML MDRD: 36 ML/MIN/1.73
GFR SERPL CREATININE-BSD FRML MDRD: 37 ML/MIN/1.73
GFR SERPL CREATININE-BSD FRML MDRD: 46 ML/MIN/1.73
GFR SERPL CREATININE-BSD FRML MDRD: 47 ML/MIN/1.73
GFR SERPL CREATININE-BSD FRML MDRD: 48 ML/MIN/1.73
GFR SERPL CREATININE-BSD FRML MDRD: 48 ML/MIN/1.73
GFR SERPL CREATININE-BSD FRML MDRD: 50 ML/MIN/1.73
GFR SERPL CREATININE-BSD FRML MDRD: 52 ML/MIN/1.73
GFR SERPL CREATININE-BSD FRML MDRD: 56 ML/MIN/1.73
GFR SERPL CREATININE-BSD FRML MDRD: 57 ML/MIN/1.73
GFR SERPL CREATININE-BSD FRML MDRD: 58 ML/MIN/1.73
GFR SERPL CREATININE-BSD FRML MDRD: 62 ML/MIN/1.73
GLOBULIN UR ELPH-MCNC: 2.4 GM/DL
GLOBULIN UR ELPH-MCNC: 2.5 GM/DL
GLOBULIN UR ELPH-MCNC: 2.7 GM/DL
GLOBULIN UR ELPH-MCNC: 2.7 GM/DL
GLOBULIN UR ELPH-MCNC: 2.8 GM/DL
GLOBULIN UR ELPH-MCNC: 2.9 GM/DL
GLOBULIN UR ELPH-MCNC: 2.9 GM/DL
GLOBULIN UR ELPH-MCNC: 3 GM/DL
GLOBULIN UR ELPH-MCNC: 3.1 GM/DL
GLOBULIN UR ELPH-MCNC: 3.1 GM/DL
GLOBULIN UR ELPH-MCNC: 3.3 GM/DL
GLOBULIN UR ELPH-MCNC: 3.4 GM/DL
GLOBULIN UR ELPH-MCNC: 3.7 GM/DL
GLUCOSE BLD-MCNC: 104 MG/DL (ref 70–110)
GLUCOSE BLD-MCNC: 115 MG/DL (ref 70–110)
GLUCOSE BLD-MCNC: 119 MG/DL (ref 70–110)
GLUCOSE BLD-MCNC: 124 MG/DL (ref 70–110)
GLUCOSE BLD-MCNC: 132 MG/DL (ref 70–110)
GLUCOSE BLD-MCNC: 133 MG/DL (ref 70–110)
GLUCOSE BLD-MCNC: 140 MG/DL (ref 70–110)
GLUCOSE BLD-MCNC: 178 MG/DL (ref 70–110)
GLUCOSE BLD-MCNC: 179 MG/DL (ref 70–110)
GLUCOSE BLD-MCNC: 186 MG/DL (ref 70–110)
GLUCOSE BLD-MCNC: 207 MG/DL (ref 70–110)
GLUCOSE BLD-MCNC: 236 MG/DL (ref 70–110)
GLUCOSE BLD-MCNC: 249 MG/DL (ref 70–110)
GLUCOSE BLD-MCNC: 261 MG/DL (ref 70–110)
GLUCOSE BLD-MCNC: 274 MG/DL (ref 70–110)
GLUCOSE BLD-MCNC: 276 MG/DL (ref 70–110)
GLUCOSE BLD-MCNC: 281 MG/DL (ref 70–110)
GLUCOSE BLD-MCNC: 296 MG/DL (ref 70–110)
GLUCOSE BLD-MCNC: 304 MG/DL (ref 70–110)
GLUCOSE BLD-MCNC: 457 MG/DL (ref 70–110)
GLUCOSE BLDC GLUCOMTR-MCNC: 176 MG/DL (ref 70–130)
GLUCOSE BLDC GLUCOMTR-MCNC: 176 MG/DL (ref 70–130)
GLUCOSE BLDC GLUCOMTR-MCNC: 187 MG/DL (ref 70–130)
GLUCOSE BLDC GLUCOMTR-MCNC: 190 MG/DL (ref 70–130)
GLUCOSE BLDC GLUCOMTR-MCNC: 192 MG/DL (ref 70–130)
GLUCOSE BLDC GLUCOMTR-MCNC: 195 MG/DL (ref 70–130)
GLUCOSE BLDC GLUCOMTR-MCNC: 199 MG/DL (ref 70–130)
GLUCOSE BLDC GLUCOMTR-MCNC: 206 MG/DL (ref 70–130)
GLUCOSE BLDC GLUCOMTR-MCNC: 221 MG/DL (ref 70–130)
GLUCOSE BLDC GLUCOMTR-MCNC: 227 MG/DL (ref 70–130)
GLUCOSE BLDC GLUCOMTR-MCNC: 231 MG/DL (ref 70–130)
GLUCOSE BLDC GLUCOMTR-MCNC: 234 MG/DL (ref 70–130)
GLUCOSE BLDC GLUCOMTR-MCNC: 236 MG/DL (ref 70–130)
GLUCOSE BLDC GLUCOMTR-MCNC: 236 MG/DL (ref 70–130)
GLUCOSE BLDC GLUCOMTR-MCNC: 244 MG/DL (ref 70–130)
GLUCOSE BLDC GLUCOMTR-MCNC: 247 MG/DL (ref 70–130)
GLUCOSE BLDC GLUCOMTR-MCNC: 252 MG/DL (ref 70–130)
GLUCOSE BLDC GLUCOMTR-MCNC: 254 MG/DL (ref 70–130)
GLUCOSE BLDC GLUCOMTR-MCNC: 257 MG/DL (ref 70–130)
GLUCOSE BLDC GLUCOMTR-MCNC: 261 MG/DL (ref 70–130)
GLUCOSE BLDC GLUCOMTR-MCNC: 263 MG/DL (ref 70–130)
GLUCOSE BLDC GLUCOMTR-MCNC: 267 MG/DL (ref 70–130)
GLUCOSE BLDC GLUCOMTR-MCNC: 268 MG/DL (ref 70–130)
GLUCOSE BLDC GLUCOMTR-MCNC: 272 MG/DL (ref 70–130)
GLUCOSE BLDC GLUCOMTR-MCNC: 277 MG/DL (ref 70–130)
GLUCOSE BLDC GLUCOMTR-MCNC: 282 MG/DL (ref 70–130)
GLUCOSE BLDC GLUCOMTR-MCNC: 283 MG/DL (ref 70–130)
GLUCOSE BLDC GLUCOMTR-MCNC: 288 MG/DL (ref 70–130)
GLUCOSE BLDC GLUCOMTR-MCNC: 292 MG/DL (ref 70–130)
GLUCOSE BLDC GLUCOMTR-MCNC: 294 MG/DL (ref 70–130)
GLUCOSE BLDC GLUCOMTR-MCNC: 305 MG/DL (ref 70–130)
GLUCOSE BLDC GLUCOMTR-MCNC: 313 MG/DL (ref 70–130)
GLUCOSE BLDC GLUCOMTR-MCNC: 315 MG/DL (ref 70–130)
GLUCOSE BLDC GLUCOMTR-MCNC: 315 MG/DL (ref 70–130)
GLUCOSE BLDC GLUCOMTR-MCNC: 318 MG/DL (ref 70–130)
GLUCOSE BLDC GLUCOMTR-MCNC: 320 MG/DL (ref 70–130)
GLUCOSE BLDC GLUCOMTR-MCNC: 323 MG/DL (ref 70–130)
GLUCOSE BLDC GLUCOMTR-MCNC: 328 MG/DL (ref 70–130)
GLUCOSE BLDC GLUCOMTR-MCNC: 353 MG/DL (ref 70–130)
GLUCOSE BLDC GLUCOMTR-MCNC: 367 MG/DL (ref 70–130)
GLUCOSE BLDC GLUCOMTR-MCNC: 387 MG/DL (ref 70–130)
GLUCOSE BLDC GLUCOMTR-MCNC: 395 MG/DL (ref 70–130)
GLUCOSE BLDC GLUCOMTR-MCNC: 92 MG/DL (ref 70–130)
GLUCOSE UR STRIP-MCNC: ABNORMAL MG/DL
GLUCOSE UR STRIP-MCNC: NEGATIVE MG/DL
GRAM STN SPEC: ABNORMAL
GRAM STN SPEC: ABNORMAL
HBA1C MFR BLD: 7.8 % (ref 4.5–5.7)
HCO3 BLDA-SCNC: 28.3 MMOL/L (ref 22–26)
HCO3 BLDA-SCNC: 28.7 MMOL/L (ref 22–26)
HCO3 BLDA-SCNC: 28.9 MMOL/L (ref 22–26)
HCO3 BLDA-SCNC: 30 MMOL/L (ref 22–26)
HCO3 BLDA-SCNC: 31.9 MMOL/L (ref 22–26)
HCO3 BLDA-SCNC: 32.6 MMOL/L (ref 22–26)
HCO3 BLDA-SCNC: 33 MMOL/L (ref 22–26)
HCO3 BLDA-SCNC: 33.8 MMOL/L (ref 22–26)
HCO3 BLDA-SCNC: 33.9 MMOL/L (ref 22–26)
HCO3 BLDA-SCNC: 34.3 MMOL/L (ref 22–26)
HCO3 BLDA-SCNC: 35.7 MMOL/L (ref 22–26)
HCO3 BLDA-SCNC: 36.4 MMOL/L (ref 22–26)
HCO3 BLDA-SCNC: 39.9 MMOL/L (ref 22–26)
HCO3 BLDA-SCNC: 40 MMOL/L (ref 22–26)
HCO3 BLDA-SCNC: 40.3 MMOL/L (ref 22–26)
HCT VFR BLD AUTO: 25.5 % (ref 42–52)
HCT VFR BLD AUTO: 26.3 % (ref 42–52)
HCT VFR BLD AUTO: 26.4 % (ref 42–52)
HCT VFR BLD AUTO: 26.9 % (ref 42–52)
HCT VFR BLD AUTO: 26.9 % (ref 42–52)
HCT VFR BLD AUTO: 29.6 % (ref 42–52)
HCT VFR BLD AUTO: 29.7 % (ref 42–52)
HCT VFR BLD AUTO: 30.1 % (ref 42–52)
HCT VFR BLD AUTO: 31 % (ref 42–52)
HCT VFR BLD AUTO: 31.1 % (ref 42–52)
HCT VFR BLD AUTO: 31.2 % (ref 42–52)
HCT VFR BLD AUTO: 31.6 % (ref 42–52)
HCT VFR BLD AUTO: 32.2 % (ref 42–52)
HCT VFR BLD AUTO: 33.3 % (ref 42–52)
HCT VFR BLD AUTO: 33.5 % (ref 42–52)
HCT VFR BLD AUTO: 33.8 % (ref 42–52)
HCT VFR BLD AUTO: 34.4 % (ref 42–52)
HCT VFR BLD AUTO: 35.4 % (ref 42–52)
HCT VFR BLD AUTO: 35.9 % (ref 42–52)
HCT VFR BLD CALC: 25 % (ref 42–52)
HCT VFR BLD CALC: 26 % (ref 42–52)
HCT VFR BLD CALC: 26 % (ref 42–52)
HCT VFR BLD CALC: 27 % (ref 42–52)
HCT VFR BLD CALC: 28 % (ref 42–52)
HCT VFR BLD CALC: 29 % (ref 42–52)
HCT VFR BLD CALC: 30 % (ref 42–52)
HCT VFR BLD CALC: 31 % (ref 42–52)
HCT VFR BLD CALC: 34 % (ref 42–52)
HCT VFR BLD CALC: 34 % (ref 42–52)
HCT VFR BLD CALC: 36 % (ref 42–52)
HCT VFR BLD CALC: 43 % (ref 42–52)
HGB BLD-MCNC: 10.2 G/DL (ref 14–18)
HGB BLD-MCNC: 10.2 G/DL (ref 14–18)
HGB BLD-MCNC: 10.5 G/DL (ref 14–18)
HGB BLD-MCNC: 10.5 G/DL (ref 14–18)
HGB BLD-MCNC: 10.9 G/DL (ref 14–18)
HGB BLD-MCNC: 11.1 G/DL (ref 14–18)
HGB BLD-MCNC: 11.8 G/DL (ref 14–18)
HGB BLD-MCNC: 12 G/DL (ref 14–18)
HGB BLD-MCNC: 7.6 G/DL (ref 14–18)
HGB BLD-MCNC: 8 G/DL (ref 14–18)
HGB BLD-MCNC: 8.2 G/DL (ref 14–18)
HGB BLD-MCNC: 8.2 G/DL (ref 14–18)
HGB BLD-MCNC: 8.5 G/DL (ref 14–18)
HGB BLD-MCNC: 8.7 G/DL (ref 14–18)
HGB BLD-MCNC: 9 G/DL (ref 14–18)
HGB BLD-MCNC: 9.1 G/DL (ref 14–18)
HGB BLD-MCNC: 9.6 G/DL (ref 14–18)
HGB BLD-MCNC: 9.6 G/DL (ref 14–18)
HGB BLD-MCNC: 9.8 G/DL (ref 14–18)
HGB BLDA-MCNC: 10.1 G/DL (ref 12–16)
HGB BLDA-MCNC: 10.2 G/DL (ref 12–16)
HGB BLDA-MCNC: 10.2 G/DL (ref 12–16)
HGB BLDA-MCNC: 10.3 G/DL (ref 12–16)
HGB BLDA-MCNC: 10.6 G/DL (ref 12–16)
HGB BLDA-MCNC: 11.4 G/DL (ref 12–16)
HGB BLDA-MCNC: 11.6 G/DL (ref 12–16)
HGB BLDA-MCNC: 12.2 G/DL (ref 12–16)
HGB BLDA-MCNC: 14.7 G/DL (ref 12–16)
HGB BLDA-MCNC: 8.5 G/DL (ref 12–16)
HGB BLDA-MCNC: 8.7 G/DL (ref 12–16)
HGB BLDA-MCNC: 8.8 G/DL (ref 12–16)
HGB BLDA-MCNC: 9.3 G/DL (ref 12–16)
HGB BLDA-MCNC: 9.5 G/DL (ref 12–16)
HGB BLDA-MCNC: 9.8 G/DL (ref 12–16)
HGB BLDA-MCNC: 9.8 G/DL (ref 12–16)
HGB BLDA-MCNC: 9.9 G/DL (ref 12–16)
HGB UR QL STRIP.AUTO: NEGATIVE
HOLD SPECIMEN: NORMAL
HOROWITZ INDEX BLD+IHG-RTO: 30 %
HOROWITZ INDEX BLD+IHG-RTO: 32 %
HOROWITZ INDEX BLD+IHG-RTO: 40 %
HOROWITZ INDEX BLD+IHG-RTO: 46 %
HOROWITZ INDEX BLD+IHG-RTO: 49 %
HOROWITZ INDEX BLD+IHG-RTO: 50 %
HYALINE CASTS UR QL AUTO: NORMAL /LPF
IMM GRANULOCYTES # BLD: 0.01 10*3/MM3 (ref 0–0.03)
IMM GRANULOCYTES # BLD: 0.01 10*3/MM3 (ref 0–0.03)
IMM GRANULOCYTES # BLD: 0.02 10*3/MM3 (ref 0–0.03)
IMM GRANULOCYTES # BLD: 0.03 10*3/MM3 (ref 0–0.03)
IMM GRANULOCYTES # BLD: 0.04 10*3/MM3 (ref 0–0.03)
IMM GRANULOCYTES # BLD: 0.05 10*3/MM3 (ref 0–0.03)
IMM GRANULOCYTES # BLD: 0.06 10*3/MM3 (ref 0–0.03)
IMM GRANULOCYTES # BLD: 0.07 10*3/MM3 (ref 0–0.03)
IMM GRANULOCYTES # BLD: 0.08 10*3/MM3 (ref 0–0.03)
IMM GRANULOCYTES # BLD: 0.13 10*3/MM3 (ref 0–0.03)
IMM GRANULOCYTES NFR BLD: 0.1 % (ref 0–0.5)
IMM GRANULOCYTES NFR BLD: 0.1 % (ref 0–0.5)
IMM GRANULOCYTES NFR BLD: 0.2 % (ref 0–0.5)
IMM GRANULOCYTES NFR BLD: 0.3 % (ref 0–0.5)
IMM GRANULOCYTES NFR BLD: 0.4 % (ref 0–0.5)
IMM GRANULOCYTES NFR BLD: 0.4 % (ref 0–0.5)
IMM GRANULOCYTES NFR BLD: 0.5 % (ref 0–0.5)
IMM GRANULOCYTES NFR BLD: 0.6 % (ref 0–0.5)
IMM GRANULOCYTES NFR BLD: 0.7 % (ref 0–0.5)
IMM GRANULOCYTES NFR BLD: 0.7 % (ref 0–0.5)
IMM GRANULOCYTES NFR BLD: 0.8 % (ref 0–0.5)
IMM GRANULOCYTES NFR BLD: 0.9 % (ref 0–0.5)
IMM GRANULOCYTES NFR BLD: 1.3 % (ref 0–0.5)
IMM GRANULOCYTES NFR BLD: 1.4 % (ref 0–0.5)
INR PPP: 1.01 (ref 0.8–1.1)
INR PPP: 1.12 (ref 0.9–1.1)
INR PPP: 1.18 (ref 0.9–1.1)
IRON 24H UR-MRATE: 20 MCG/DL (ref 53–167)
IRON 24H UR-MRATE: 43 MCG/DL (ref 53–167)
IRON SATN MFR SERPL: 14 % (ref 20–50)
IRON SATN MFR SERPL: 8 %
ISOLATED FROM: ABNORMAL
ISOLATED FROM: ABNORMAL
KETONES UR QL STRIP: NEGATIVE
L PNEUMO1 AG UR QL IA: NEGATIVE
L PNEUMO1 AG UR QL IA: NEGATIVE
LEUKOCYTE ESTERASE UR QL STRIP.AUTO: NEGATIVE
LIPASE SERPL-CCNC: 28 U/L (ref 13–60)
LIPASE SERPL-CCNC: 36 U/L (ref 13–60)
LYMPHOCYTES # BLD AUTO: 0.16 10*3/MM3 (ref 1–3)
LYMPHOCYTES # BLD AUTO: 0.33 10*3/MM3 (ref 1–3)
LYMPHOCYTES # BLD AUTO: 0.38 10*3/MM3 (ref 1–3)
LYMPHOCYTES # BLD AUTO: 0.44 10*3/MM3 (ref 1–3)
LYMPHOCYTES # BLD AUTO: 0.92 10*3/MM3 (ref 1–3)
LYMPHOCYTES # BLD AUTO: 0.95 10*3/MM3 (ref 1–3)
LYMPHOCYTES # BLD AUTO: 1.05 10*3/MM3 (ref 1–3)
LYMPHOCYTES # BLD AUTO: 1.17 10*3/MM3 (ref 1–3)
LYMPHOCYTES # BLD AUTO: 1.23 10*3/MM3 (ref 1–3)
LYMPHOCYTES # BLD AUTO: 1.27 10*3/MM3 (ref 1–3)
LYMPHOCYTES # BLD AUTO: 1.29 10*3/MM3 (ref 1–3)
LYMPHOCYTES # BLD AUTO: 1.33 10*3/MM3 (ref 1–3)
LYMPHOCYTES # BLD AUTO: 1.48 10*3/MM3 (ref 1–3)
LYMPHOCYTES # BLD AUTO: 1.64 10*3/MM3 (ref 1–3)
LYMPHOCYTES # BLD AUTO: 1.86 10*3/MM3 (ref 1–3)
LYMPHOCYTES # BLD AUTO: 2.07 10*3/MM3 (ref 1–3)
LYMPHOCYTES # BLD AUTO: 2.09 10*3/MM3 (ref 1–3)
LYMPHOCYTES # BLD AUTO: 2.26 10*3/MM3 (ref 1–3)
LYMPHOCYTES # BLD AUTO: 2.74 10*3/MM3 (ref 1–3)
LYMPHOCYTES NFR BLD AUTO: 1.7 % (ref 16–46)
LYMPHOCYTES NFR BLD AUTO: 11.2 % (ref 16–46)
LYMPHOCYTES NFR BLD AUTO: 14.2 % (ref 16–46)
LYMPHOCYTES NFR BLD AUTO: 14.3 % (ref 16–46)
LYMPHOCYTES NFR BLD AUTO: 15.7 % (ref 16–46)
LYMPHOCYTES NFR BLD AUTO: 15.8 % (ref 16–46)
LYMPHOCYTES NFR BLD AUTO: 17.6 % (ref 16–46)
LYMPHOCYTES NFR BLD AUTO: 19.1 % (ref 16–46)
LYMPHOCYTES NFR BLD AUTO: 22.4 % (ref 16–46)
LYMPHOCYTES NFR BLD AUTO: 22.9 % (ref 16–46)
LYMPHOCYTES NFR BLD AUTO: 22.9 % (ref 16–46)
LYMPHOCYTES NFR BLD AUTO: 25.5 % (ref 16–46)
LYMPHOCYTES NFR BLD AUTO: 27.2 % (ref 16–46)
LYMPHOCYTES NFR BLD AUTO: 27.7 % (ref 16–46)
LYMPHOCYTES NFR BLD AUTO: 29.8 % (ref 16–46)
LYMPHOCYTES NFR BLD AUTO: 3.7 % (ref 16–46)
LYMPHOCYTES NFR BLD AUTO: 4 % (ref 16–46)
LYMPHOCYTES NFR BLD AUTO: 5.1 % (ref 16–46)
LYMPHOCYTES NFR BLD AUTO: 6.6 % (ref 16–46)
M PNEUMO IGM SER QL: NEGATIVE
M PNEUMO IGM SER QL: NEGATIVE
MAGNESIUM SERPL-MCNC: 1.1 MG/DL (ref 1.7–2.6)
MAGNESIUM SERPL-MCNC: 1.1 MG/DL (ref 1.7–2.6)
MAGNESIUM SERPL-MCNC: 2.3 MG/DL (ref 1.7–2.6)
MAGNESIUM SERPL-MCNC: 2.6 MG/DL (ref 1.7–2.6)
MAGNESIUM SERPL-MCNC: 2.7 MG/DL (ref 1.7–2.6)
MAGNESIUM SERPL-MCNC: 2.7 MG/DL (ref 1.7–2.6)
MAXIMAL PREDICTED HEART RATE: 151 BPM
MCH RBC QN AUTO: 28.4 PG (ref 27–33)
MCH RBC QN AUTO: 28.7 PG (ref 27–33)
MCH RBC QN AUTO: 28.7 PG (ref 27–33)
MCH RBC QN AUTO: 28.8 PG (ref 27–33)
MCH RBC QN AUTO: 28.9 PG (ref 27–33)
MCH RBC QN AUTO: 28.9 PG (ref 27–33)
MCH RBC QN AUTO: 29 PG (ref 27–33)
MCH RBC QN AUTO: 29.1 PG (ref 27–33)
MCH RBC QN AUTO: 29.2 PG (ref 27–33)
MCH RBC QN AUTO: 29.4 PG (ref 27–33)
MCH RBC QN AUTO: 29.4 PG (ref 27–33)
MCH RBC QN AUTO: 29.6 PG (ref 27–33)
MCH RBC QN AUTO: 29.6 PG (ref 27–33)
MCH RBC QN AUTO: 29.7 PG (ref 27–33)
MCH RBC QN AUTO: 29.8 PG (ref 27–33)
MCH RBC QN AUTO: 30.4 PG (ref 27–33)
MCHC RBC AUTO-ENTMCNC: 29.4 G/DL (ref 33–37)
MCHC RBC AUTO-ENTMCNC: 29.8 G/DL (ref 33–37)
MCHC RBC AUTO-ENTMCNC: 29.9 G/DL (ref 33–37)
MCHC RBC AUTO-ENTMCNC: 30.4 G/DL (ref 33–37)
MCHC RBC AUTO-ENTMCNC: 30.5 G/DL (ref 33–37)
MCHC RBC AUTO-ENTMCNC: 30.6 G/DL (ref 33–37)
MCHC RBC AUTO-ENTMCNC: 30.8 G/DL (ref 33–37)
MCHC RBC AUTO-ENTMCNC: 31 G/DL (ref 33–37)
MCHC RBC AUTO-ENTMCNC: 31.1 G/DL (ref 33–37)
MCHC RBC AUTO-ENTMCNC: 31.1 G/DL (ref 33–37)
MCHC RBC AUTO-ENTMCNC: 31.6 G/DL (ref 33–37)
MCHC RBC AUTO-ENTMCNC: 31.7 G/DL (ref 33–37)
MCHC RBC AUTO-ENTMCNC: 32.8 G/DL (ref 33–37)
MCHC RBC AUTO-ENTMCNC: 33.2 G/DL (ref 33–37)
MCHC RBC AUTO-ENTMCNC: 33.3 G/DL (ref 33–37)
MCHC RBC AUTO-ENTMCNC: 33.3 G/DL (ref 33–37)
MCHC RBC AUTO-ENTMCNC: 33.4 G/DL (ref 33–37)
MCV RBC AUTO: 87.5 FL (ref 80–94)
MCV RBC AUTO: 87.6 FL (ref 80–94)
MCV RBC AUTO: 88.3 FL (ref 80–94)
MCV RBC AUTO: 89.1 FL (ref 80–94)
MCV RBC AUTO: 90.4 FL (ref 80–94)
MCV RBC AUTO: 91.4 FL (ref 80–94)
MCV RBC AUTO: 92.2 FL (ref 80–94)
MCV RBC AUTO: 93.7 FL (ref 80–94)
MCV RBC AUTO: 94 FL (ref 80–94)
MCV RBC AUTO: 94.2 FL (ref 80–94)
MCV RBC AUTO: 94.3 FL (ref 80–94)
MCV RBC AUTO: 95 FL (ref 80–94)
MCV RBC AUTO: 95.2 FL (ref 80–94)
MCV RBC AUTO: 96.4 FL (ref 80–94)
MCV RBC AUTO: 96.6 FL (ref 80–94)
MCV RBC AUTO: 97.1 FL (ref 80–94)
MCV RBC AUTO: 97.1 FL (ref 80–94)
MCV RBC AUTO: 97.7 FL (ref 80–94)
MCV RBC AUTO: 97.8 FL (ref 80–94)
METHADONE UR QL SCN: NEGATIVE
METHGB BLD QL: 0.2 % (ref 0–3)
METHGB BLD QL: 0.2 % (ref 0–3)
METHGB BLD QL: 0.3 % (ref 0–3)
METHGB BLD QL: 0.4 % (ref 0–3)
METHGB BLD QL: 0.5 % (ref 0–3)
METHGB BLD QL: 0.6 % (ref 0–3)
METHGB BLD QL: 0.7 % (ref 0–3)
MICROALBUMIN/CREAT UR: 11.3 MG/G
MODALITY: ABNORMAL
MONOCYTES # BLD AUTO: 0.19 10*3/MM3 (ref 0.1–0.9)
MONOCYTES # BLD AUTO: 0.36 10*3/MM3 (ref 0.1–0.9)
MONOCYTES # BLD AUTO: 0.43 10*3/MM3 (ref 0.1–0.9)
MONOCYTES # BLD AUTO: 0.54 10*3/MM3 (ref 0.1–0.9)
MONOCYTES # BLD AUTO: 0.56 10*3/MM3 (ref 0.1–0.9)
MONOCYTES # BLD AUTO: 0.57 10*3/MM3 (ref 0.1–0.9)
MONOCYTES # BLD AUTO: 0.6 10*3/MM3 (ref 0.1–0.9)
MONOCYTES # BLD AUTO: 0.6 10*3/MM3 (ref 0.1–0.9)
MONOCYTES # BLD AUTO: 0.61 10*3/MM3 (ref 0.1–0.9)
MONOCYTES # BLD AUTO: 0.64 10*3/MM3 (ref 0.1–0.9)
MONOCYTES # BLD AUTO: 0.65 10*3/MM3 (ref 0.1–0.9)
MONOCYTES # BLD AUTO: 0.66 10*3/MM3 (ref 0.1–0.9)
MONOCYTES # BLD AUTO: 0.69 10*3/MM3 (ref 0.1–0.9)
MONOCYTES # BLD AUTO: 0.71 10*3/MM3 (ref 0.1–0.9)
MONOCYTES # BLD AUTO: 0.72 10*3/MM3 (ref 0.1–0.9)
MONOCYTES # BLD AUTO: 0.73 10*3/MM3 (ref 0.1–0.9)
MONOCYTES # BLD AUTO: 0.79 10*3/MM3 (ref 0.1–0.9)
MONOCYTES # BLD AUTO: 0.8 10*3/MM3 (ref 0.1–0.9)
MONOCYTES # BLD AUTO: 0.81 10*3/MM3 (ref 0.1–0.9)
MONOCYTES NFR BLD AUTO: 1.8 % (ref 0–12)
MONOCYTES NFR BLD AUTO: 10.2 % (ref 0–12)
MONOCYTES NFR BLD AUTO: 10.9 % (ref 0–12)
MONOCYTES NFR BLD AUTO: 4.2 % (ref 0–12)
MONOCYTES NFR BLD AUTO: 4.6 % (ref 0–12)
MONOCYTES NFR BLD AUTO: 5.6 % (ref 0–12)
MONOCYTES NFR BLD AUTO: 6.5 % (ref 0–12)
MONOCYTES NFR BLD AUTO: 7.2 % (ref 0–12)
MONOCYTES NFR BLD AUTO: 7.3 % (ref 0–12)
MONOCYTES NFR BLD AUTO: 7.4 % (ref 0–12)
MONOCYTES NFR BLD AUTO: 7.5 % (ref 0–12)
MONOCYTES NFR BLD AUTO: 7.8 % (ref 0–12)
MONOCYTES NFR BLD AUTO: 8.1 % (ref 0–12)
MONOCYTES NFR BLD AUTO: 8.5 % (ref 0–12)
MONOCYTES NFR BLD AUTO: 8.7 % (ref 0–12)
MONOCYTES NFR BLD AUTO: 8.8 % (ref 0–12)
MONOCYTES NFR BLD AUTO: 8.8 % (ref 0–12)
MONOCYTES NFR BLD AUTO: 9.3 % (ref 0–12)
MONOCYTES NFR BLD AUTO: 9.6 % (ref 0–12)
MYOGLOBIN SERPL-MCNC: 212 NG/ML (ref 0–109)
MYOGLOBIN SERPL-MCNC: 262 NG/ML (ref 0–109)
NEUTROPHILS # BLD AUTO: 12.14 10*3/MM3 (ref 1.4–6.5)
NEUTROPHILS # BLD AUTO: 3.38 10*3/MM3 (ref 1.4–6.5)
NEUTROPHILS # BLD AUTO: 3.84 10*3/MM3 (ref 1.4–6.5)
NEUTROPHILS # BLD AUTO: 4.36 10*3/MM3 (ref 1.4–6.5)
NEUTROPHILS # BLD AUTO: 4.93 10*3/MM3 (ref 1.4–6.5)
NEUTROPHILS # BLD AUTO: 5.06 10*3/MM3 (ref 1.4–6.5)
NEUTROPHILS # BLD AUTO: 5.08 10*3/MM3 (ref 1.4–6.5)
NEUTROPHILS # BLD AUTO: 5.1 10*3/MM3 (ref 1.4–6.5)
NEUTROPHILS # BLD AUTO: 5.32 10*3/MM3 (ref 1.4–6.5)
NEUTROPHILS # BLD AUTO: 5.38 10*3/MM3 (ref 1.4–6.5)
NEUTROPHILS # BLD AUTO: 6.06 10*3/MM3 (ref 1.4–6.5)
NEUTROPHILS # BLD AUTO: 6.18 10*3/MM3 (ref 1.4–6.5)
NEUTROPHILS # BLD AUTO: 6.26 10*3/MM3 (ref 1.4–6.5)
NEUTROPHILS # BLD AUTO: 6.94 10*3/MM3 (ref 1.4–6.5)
NEUTROPHILS # BLD AUTO: 7.12 10*3/MM3 (ref 1.4–6.5)
NEUTROPHILS # BLD AUTO: 7.4 10*3/MM3 (ref 1.4–6.5)
NEUTROPHILS # BLD AUTO: 7.5 10*3/MM3 (ref 1.4–6.5)
NEUTROPHILS # BLD AUTO: 8.77 10*3/MM3 (ref 1.4–6.5)
NEUTROPHILS # BLD AUTO: 9.7 10*3/MM3 (ref 1.4–6.5)
NEUTROPHILS NFR BLD AUTO: 56.9 % (ref 40–75)
NEUTROPHILS NFR BLD AUTO: 57.9 % (ref 40–75)
NEUTROPHILS NFR BLD AUTO: 59.5 % (ref 40–75)
NEUTROPHILS NFR BLD AUTO: 62.2 % (ref 40–75)
NEUTROPHILS NFR BLD AUTO: 62.4 % (ref 40–75)
NEUTROPHILS NFR BLD AUTO: 64.7 % (ref 40–75)
NEUTROPHILS NFR BLD AUTO: 67.3 % (ref 40–75)
NEUTROPHILS NFR BLD AUTO: 67.7 % (ref 40–75)
NEUTROPHILS NFR BLD AUTO: 70.1 % (ref 40–75)
NEUTROPHILS NFR BLD AUTO: 72.9 % (ref 40–75)
NEUTROPHILS NFR BLD AUTO: 74.1 % (ref 40–75)
NEUTROPHILS NFR BLD AUTO: 75.5 % (ref 40–75)
NEUTROPHILS NFR BLD AUTO: 76 % (ref 40–75)
NEUTROPHILS NFR BLD AUTO: 83.6 % (ref 40–75)
NEUTROPHILS NFR BLD AUTO: 86.7 % (ref 40–75)
NEUTROPHILS NFR BLD AUTO: 86.9 % (ref 40–75)
NEUTROPHILS NFR BLD AUTO: 89.3 % (ref 40–75)
NEUTROPHILS NFR BLD AUTO: 93.5 % (ref 40–75)
NEUTROPHILS NFR BLD AUTO: 94 % (ref 40–75)
NITRITE UR QL STRIP: NEGATIVE
NRBC BLD MANUAL-RTO: 0 /100 WBC (ref 0–0)
OPIATES UR QL: POSITIVE
OSMOLALITY SERPL CALC.SUM OF ELEC: 284.3 MOSM/KG (ref 273–305)
OSMOLALITY SERPL CALC.SUM OF ELEC: 286.7 MOSM/KG (ref 273–305)
OSMOLALITY SERPL CALC.SUM OF ELEC: 287.5 MOSM/KG (ref 273–305)
OSMOLALITY SERPL CALC.SUM OF ELEC: 289.6 MOSM/KG (ref 273–305)
OSMOLALITY SERPL CALC.SUM OF ELEC: 290 MOSM/KG (ref 273–305)
OSMOLALITY SERPL CALC.SUM OF ELEC: 290.3 MOSM/KG (ref 273–305)
OSMOLALITY SERPL CALC.SUM OF ELEC: 292.7 MOSM/KG (ref 273–305)
OSMOLALITY SERPL CALC.SUM OF ELEC: 292.8 MOSM/KG (ref 273–305)
OSMOLALITY SERPL CALC.SUM OF ELEC: 293.1 MOSM/KG (ref 273–305)
OSMOLALITY SERPL CALC.SUM OF ELEC: 293.2 MOSM/KG (ref 273–305)
OSMOLALITY SERPL CALC.SUM OF ELEC: 293.6 MOSM/KG (ref 273–305)
OSMOLALITY SERPL CALC.SUM OF ELEC: 293.8 MOSM/KG (ref 273–305)
OSMOLALITY SERPL CALC.SUM OF ELEC: 294.3 MOSM/KG (ref 273–305)
OSMOLALITY SERPL CALC.SUM OF ELEC: 294.6 MOSM/KG (ref 273–305)
OSMOLALITY SERPL CALC.SUM OF ELEC: 295.2 MOSM/KG (ref 273–305)
OSMOLALITY SERPL CALC.SUM OF ELEC: 296 MOSM/KG (ref 273–305)
OSMOLALITY SERPL CALC.SUM OF ELEC: 298.4 MOSM/KG (ref 273–305)
OSMOLALITY SERPL CALC.SUM OF ELEC: 300.1 MOSM/KG (ref 273–305)
OSMOLALITY SERPL CALC.SUM OF ELEC: 301.1 MOSM/KG (ref 273–305)
OSMOLALITY SERPL CALC.SUM OF ELEC: 301.6 MOSM/KG (ref 273–305)
OXYCODONE UR QL SCN: NEGATIVE
OXYHGB MFR BLDV: 72.5 % (ref 85–100)
OXYHGB MFR BLDV: 82.5 % (ref 85–100)
OXYHGB MFR BLDV: 85.3 % (ref 85–100)
OXYHGB MFR BLDV: 88 % (ref 85–100)
OXYHGB MFR BLDV: 88.6 % (ref 85–100)
OXYHGB MFR BLDV: 88.8 % (ref 85–100)
OXYHGB MFR BLDV: 91.6 % (ref 85–100)
OXYHGB MFR BLDV: 91.7 % (ref 85–100)
OXYHGB MFR BLDV: 91.8 % (ref 85–100)
OXYHGB MFR BLDV: 92 % (ref 85–100)
OXYHGB MFR BLDV: 92.6 % (ref 85–100)
OXYHGB MFR BLDV: 93 % (ref 85–100)
OXYHGB MFR BLDV: 94 % (ref 85–100)
OXYHGB MFR BLDV: 94.2 % (ref 85–100)
OXYHGB MFR BLDV: 95.1 % (ref 85–100)
OXYHGB MFR BLDV: 95.8 % (ref 85–100)
OXYHGB MFR BLDV: 96.7 % (ref 85–100)
PCO2 BLDA: 32.1 MM HG (ref 35–45)
PCO2 BLDA: 43.3 MM HG (ref 35–45)
PCO2 BLDA: 43.5 MM HG (ref 35–45)
PCO2 BLDA: 43.9 MM HG (ref 35–45)
PCO2 BLDA: 47.7 MM HG (ref 35–45)
PCO2 BLDA: 49.7 MM HG (ref 35–45)
PCO2 BLDA: 51 MM HG (ref 35–45)
PCO2 BLDA: 51.3 MM HG (ref 35–45)
PCO2 BLDA: 53.4 MM HG (ref 35–45)
PCO2 BLDA: 53.6 MM HG (ref 35–45)
PCO2 BLDA: 63.2 MM HG (ref 35–45)
PCO2 BLDA: 63.3 MM HG (ref 35–45)
PCO2 BLDA: 64.3 MM HG (ref 35–45)
PCO2 BLDA: 65.5 MM HG (ref 35–45)
PCO2 BLDA: 66.2 MM HG (ref 35–45)
PCO2 BLDA: 68.8 MM HG (ref 35–45)
PCO2 BLDA: 70.2 MM HG (ref 35–45)
PCP UR QL SCN: NEGATIVE
PEEP RESPIRATORY: 5 CM[H2O]
PEEP RESPIRATORY: 8 CM[H2O]
PH BLDA: 7.3 PH UNITS (ref 7.35–7.45)
PH BLDA: 7.32 PH UNITS (ref 7.35–7.45)
PH BLDA: 7.33 PH UNITS (ref 7.35–7.45)
PH BLDA: 7.36 PH UNITS (ref 7.35–7.45)
PH BLDA: 7.36 PH UNITS (ref 7.35–7.45)
PH BLDA: 7.38 PH UNITS (ref 7.35–7.45)
PH BLDA: 7.38 PH UNITS (ref 7.35–7.45)
PH BLDA: 7.39 PH UNITS (ref 7.35–7.45)
PH BLDA: 7.39 PH UNITS (ref 7.35–7.45)
PH BLDA: 7.4 PH UNITS (ref 7.35–7.45)
PH BLDA: 7.42 PH UNITS (ref 7.35–7.45)
PH BLDA: 7.44 PH UNITS (ref 7.35–7.45)
PH BLDA: 7.45 PH UNITS (ref 7.35–7.45)
PH BLDA: 7.5 PH UNITS (ref 7.35–7.45)
PH BLDA: 7.51 PH UNITS (ref 7.35–7.45)
PH BLDA: 7.52 PH UNITS (ref 7.35–7.45)
PH BLDA: 7.67 PH UNITS (ref 7.35–7.45)
PH UR STRIP.AUTO: 5.5 [PH] (ref 5–8)
PH UR STRIP.AUTO: 6 [PH] (ref 5–8)
PH UR STRIP.AUTO: 6.5 [PH] (ref 5–8)
PH UR STRIP.AUTO: 7.5 [PH] (ref 5–8)
PH UR STRIP.AUTO: 7.5 [PH] (ref 5–8)
PH UR STRIP.AUTO: <=5 [PH] (ref 5–8)
PHOSPHATE SERPL-MCNC: 1.5 MG/DL (ref 2.7–4.5)
PHOSPHATE SERPL-MCNC: 2.4 MG/DL (ref 2.7–4.5)
PHOSPHATE SERPL-MCNC: 2.4 MG/DL (ref 2.7–4.5)
PHOSPHATE SERPL-MCNC: 3.3 MG/DL (ref 2.7–4.5)
PHOSPHATE SERPL-MCNC: 3.8 MG/DL (ref 2.7–4.5)
PLATELET # BLD AUTO: 103 10*3/MM3 (ref 130–400)
PLATELET # BLD AUTO: 134 10*3/MM3 (ref 130–400)
PLATELET # BLD AUTO: 163 10*3/MM3 (ref 130–400)
PLATELET # BLD AUTO: 187 10*3/MM3 (ref 130–400)
PLATELET # BLD AUTO: 188 10*3/MM3 (ref 130–400)
PLATELET # BLD AUTO: 199 10*3/MM3 (ref 130–400)
PLATELET # BLD AUTO: 206 10*3/MM3 (ref 130–400)
PLATELET # BLD AUTO: 207 10*3/MM3 (ref 130–400)
PLATELET # BLD AUTO: 213 10*3/MM3 (ref 130–400)
PLATELET # BLD AUTO: 227 10*3/MM3 (ref 130–400)
PLATELET # BLD AUTO: 227 10*3/MM3 (ref 130–400)
PLATELET # BLD AUTO: 230 10*3/MM3 (ref 130–400)
PLATELET # BLD AUTO: 247 10*3/MM3 (ref 130–400)
PLATELET # BLD AUTO: 248 10*3/MM3 (ref 130–400)
PLATELET # BLD AUTO: 249 10*3/MM3 (ref 130–400)
PLATELET # BLD AUTO: 258 10*3/MM3 (ref 130–400)
PLATELET # BLD AUTO: 261 10*3/MM3 (ref 130–400)
PLATELET # BLD AUTO: 263 10*3/MM3 (ref 130–400)
PLATELET # BLD AUTO: 268 10*3/MM3 (ref 130–400)
PMV BLD AUTO: 10 FL (ref 6–10)
PMV BLD AUTO: 8.6 FL (ref 6–10)
PMV BLD AUTO: 8.6 FL (ref 6–10)
PMV BLD AUTO: 8.7 FL (ref 6–10)
PMV BLD AUTO: 8.8 FL (ref 6–10)
PMV BLD AUTO: 8.8 FL (ref 6–10)
PMV BLD AUTO: 9 FL (ref 6–10)
PMV BLD AUTO: 9.2 FL (ref 6–10)
PMV BLD AUTO: 9.4 FL (ref 6–10)
PMV BLD AUTO: 9.5 FL (ref 6–10)
PMV BLD AUTO: 9.6 FL (ref 6–10)
PMV BLD AUTO: 9.8 FL (ref 6–10)
PO2 BLDA: 103.4 MM HG (ref 80–100)
PO2 BLDA: 45 MM HG (ref 80–100)
PO2 BLDA: 48 MM HG (ref 80–100)
PO2 BLDA: 59.1 MM HG (ref 80–100)
PO2 BLDA: 61.3 MM HG (ref 80–100)
PO2 BLDA: 62.5 MM HG (ref 80–100)
PO2 BLDA: 66.2 MM HG (ref 80–100)
PO2 BLDA: 71.1 MM HG (ref 80–100)
PO2 BLDA: 71.8 MM HG (ref 80–100)
PO2 BLDA: 72.2 MM HG (ref 80–100)
PO2 BLDA: 73.2 MM HG (ref 80–100)
PO2 BLDA: 76 MM HG (ref 80–100)
PO2 BLDA: 77 MM HG (ref 80–100)
PO2 BLDA: 80.9 MM HG (ref 80–100)
PO2 BLDA: 85 MM HG (ref 80–100)
PO2 BLDA: 85.8 MM HG (ref 80–100)
PO2 BLDA: 88.1 MM HG (ref 80–100)
POTASSIUM BLD-SCNC: 2.6 MMOL/L (ref 3.5–5.3)
POTASSIUM BLD-SCNC: 2.7 MMOL/L (ref 3.5–5.3)
POTASSIUM BLD-SCNC: 2.8 MMOL/L (ref 3.5–5.3)
POTASSIUM BLD-SCNC: 2.9 MMOL/L (ref 3.5–5.3)
POTASSIUM BLD-SCNC: 2.9 MMOL/L (ref 3.5–5.3)
POTASSIUM BLD-SCNC: 3.3 MMOL/L (ref 3.5–5.3)
POTASSIUM BLD-SCNC: 3.5 MMOL/L (ref 3.5–5.3)
POTASSIUM BLD-SCNC: 3.6 MMOL/L (ref 3.5–5.3)
POTASSIUM BLD-SCNC: 3.7 MMOL/L (ref 3.5–5.3)
POTASSIUM BLD-SCNC: 3.7 MMOL/L (ref 3.5–5.3)
POTASSIUM BLD-SCNC: 3.8 MMOL/L (ref 3.5–5.3)
POTASSIUM BLD-SCNC: 3.8 MMOL/L (ref 3.5–5.3)
POTASSIUM BLD-SCNC: 3.9 MMOL/L (ref 3.5–5.3)
POTASSIUM BLD-SCNC: 4 MMOL/L (ref 3.5–5.3)
POTASSIUM BLD-SCNC: 4 MMOL/L (ref 3.5–5.3)
POTASSIUM BLD-SCNC: 4.4 MMOL/L (ref 3.5–5.3)
POTASSIUM BLD-SCNC: 4.4 MMOL/L (ref 3.5–5.3)
POTASSIUM BLD-SCNC: 4.6 MMOL/L (ref 3.5–5.3)
POTASSIUM BLD-SCNC: 4.7 MMOL/L (ref 3.5–5.3)
POTASSIUM BLD-SCNC: 4.9 MMOL/L (ref 3.5–5.3)
PROPOXYPH UR QL: NEGATIVE
PROT SERPL-MCNC: 5.8 G/DL (ref 6–8)
PROT SERPL-MCNC: 6 G/DL (ref 6–8)
PROT SERPL-MCNC: 6.4 G/DL (ref 6–8)
PROT SERPL-MCNC: 6.5 G/DL (ref 6–8)
PROT SERPL-MCNC: 6.6 G/DL (ref 6–8)
PROT SERPL-MCNC: 6.6 G/DL (ref 6–8)
PROT SERPL-MCNC: 6.7 G/DL (ref 6–8)
PROT SERPL-MCNC: 6.9 G/DL (ref 6–8)
PROT SERPL-MCNC: 7 G/DL (ref 6–8)
PROT SERPL-MCNC: 7.1 G/DL (ref 6–8)
PROT SERPL-MCNC: 7.2 G/DL (ref 6–8)
PROT SERPL-MCNC: 7.3 G/DL (ref 6–8)
PROT SERPL-MCNC: 7.4 G/DL (ref 6–8)
PROT SERPL-MCNC: 7.5 G/DL (ref 6–8)
PROT SERPL-MCNC: 7.6 G/DL (ref 6–8)
PROT SERPL-MCNC: 7.7 G/DL (ref 6–8)
PROT SERPL-MCNC: 8.1 G/DL (ref 6–8)
PROT SERPL-MCNC: 8.4 G/DL (ref 6–8)
PROT UR QL STRIP: ABNORMAL
PROT UR QL STRIP: NEGATIVE
PROT UR-MCNC: 8.3 MG/DL
PROT/CREAT UR: 54.4 MG/G CREA (ref 0–200)
PROTHROMBIN TIME: 11.4 SECONDS (ref 9.8–11.9)
PROTHROMBIN TIME: 14.5 SECONDS (ref 11–15.4)
PROTHROMBIN TIME: 15.2 SECONDS (ref 11–15.4)
PSV: 10 CMH2O
RBC # BLD AUTO: 2.64 10*6/MM3 (ref 4.7–6.1)
RBC # BLD AUTO: 2.7 10*6/MM3 (ref 4.7–6.1)
RBC # BLD AUTO: 2.79 10*6/MM3 (ref 4.7–6.1)
RBC # BLD AUTO: 2.79 10*6/MM3 (ref 4.7–6.1)
RBC # BLD AUTO: 2.87 10*6/MM3 (ref 4.7–6.1)
RBC # BLD AUTO: 3.03 10*6/MM3 (ref 4.7–6.1)
RBC # BLD AUTO: 3.1 10*6/MM3 (ref 4.7–6.1)
RBC # BLD AUTO: 3.12 10*6/MM3 (ref 4.7–6.1)
RBC # BLD AUTO: 3.29 10*6/MM3 (ref 4.7–6.1)
RBC # BLD AUTO: 3.32 10*6/MM3 (ref 4.7–6.1)
RBC # BLD AUTO: 3.39 10*6/MM3 (ref 4.7–6.1)
RBC # BLD AUTO: 3.44 10*6/MM3 (ref 4.7–6.1)
RBC # BLD AUTO: 3.45 10*6/MM3 (ref 4.7–6.1)
RBC # BLD AUTO: 3.61 10*6/MM3 (ref 4.7–6.1)
RBC # BLD AUTO: 3.7 10*6/MM3 (ref 4.7–6.1)
RBC # BLD AUTO: 3.73 10*6/MM3 (ref 4.7–6.1)
RBC # BLD AUTO: 3.8 10*6/MM3 (ref 4.7–6.1)
RBC # BLD AUTO: 4.01 10*6/MM3 (ref 4.7–6.1)
RBC # BLD AUTO: 4.03 10*6/MM3 (ref 4.7–6.1)
RBC # UR: NORMAL /HPF
REF LAB TEST METHOD: NORMAL
RETICS #: 0.13 10*6/MM3 (ref 0.02–0.13)
RETICS/RBC NFR AUTO: 4.48 % (ref 0.5–2)
SAO2 % BLDCOA: 74.6 % (ref 90–100)
SAO2 % BLDCOA: 84.4 % (ref 90–100)
SAO2 % BLDCOA: 87.4 % (ref 90–100)
SAO2 % BLDCOA: 89.2 % (ref 90–100)
SAO2 % BLDCOA: 90.6 % (ref 90–100)
SAO2 % BLDCOA: 91.2 % (ref 90–100)
SAO2 % BLDCOA: 92.8 % (ref 90–100)
SAO2 % BLDCOA: 92.9 % (ref 90–100)
SAO2 % BLDCOA: 93.4 % (ref 90–100)
SAO2 % BLDCOA: 94.1 % (ref 90–100)
SAO2 % BLDCOA: 94.5 % (ref 90–100)
SAO2 % BLDCOA: 94.6 % (ref 90–100)
SAO2 % BLDCOA: 95.4 % (ref 90–100)
SAO2 % BLDCOA: 95.5 % (ref 90–100)
SAO2 % BLDCOA: 96.4 % (ref 90–100)
SAO2 % BLDCOA: 97.2 % (ref 90–100)
SAO2 % BLDCOA: 98.5 % (ref 90–100)
SET MECH RESP RATE: 16
SET MECH RESP RATE: 20
SODIUM BLD-SCNC: 136 MMOL/L (ref 135–153)
SODIUM BLD-SCNC: 136 MMOL/L (ref 135–153)
SODIUM BLD-SCNC: 138 MMOL/L (ref 135–153)
SODIUM BLD-SCNC: 139 MMOL/L (ref 135–153)
SODIUM BLD-SCNC: 140 MMOL/L (ref 135–153)
SODIUM BLD-SCNC: 140 MMOL/L (ref 135–153)
SODIUM BLD-SCNC: 141 MMOL/L (ref 135–153)
SODIUM BLD-SCNC: 142 MMOL/L (ref 135–153)
SODIUM BLD-SCNC: 143 MMOL/L (ref 135–153)
SODIUM BLD-SCNC: 144 MMOL/L (ref 135–153)
SODIUM BLD-SCNC: 144 MMOL/L (ref 135–153)
SODIUM BLD-SCNC: 145 MMOL/L (ref 135–153)
SODIUM BLD-SCNC: 146 MMOL/L (ref 135–153)
SODIUM UR-SCNC: <10 MMOL/L
SP GR UR STRIP: 1.01 (ref 1–1.03)
SP GR UR STRIP: 1.02 (ref 1–1.03)
SP GR UR STRIP: 1.02 (ref 1–1.03)
SQUAMOUS #/AREA URNS HPF: NORMAL /HPF
STRESS TARGET HR: 128 BPM
T3FREE SERPL-MCNC: 1.8 PG/ML (ref 2.3–4.2)
T4 FREE SERPL-MCNC: 0.65 NG/DL (ref 0.89–1.76)
T4 FREE SERPL-MCNC: 0.88 NG/DL (ref 0.89–1.76)
THEOPHYLLINE SERPL-MCNC: <0.4 MCG/ML (ref 10–20)
TIBC SERPL-MCNC: 246 MCG/DL (ref 241–421)
TIBC SERPL-MCNC: 298 MCG/DL (ref 241–421)
TROPONIN I SERPL-MCNC: 0.01 NG/ML
TROPONIN I SERPL-MCNC: 0.02 NG/ML
TROPONIN I SERPL-MCNC: 0.03 NG/ML
TROPONIN I SERPL-MCNC: 0.03 NG/ML
TROPONIN I SERPL-MCNC: 0.04 NG/ML
TROPONIN I SERPL-MCNC: 0.05 NG/ML
TROPONIN I SERPL-MCNC: 0.05 NG/ML
TROPONIN I SERPL-MCNC: 0.08 NG/ML
TROPONIN I SERPL-MCNC: 0.22 NG/ML
TROPONIN I SERPL-MCNC: 0.44 NG/ML
TSH SERPL DL<=0.05 MIU/L-ACNC: 0.35 MIU/ML (ref 0.55–4.78)
TSH SERPL DL<=0.05 MIU/L-ACNC: 0.81 MIU/ML (ref 0.55–4.78)
UROBILINOGEN UR QL STRIP: ABNORMAL
UROBILINOGEN UR QL STRIP: ABNORMAL
UROBILINOGEN UR QL STRIP: NORMAL
VANCOMYCIN TROUGH SERPL-MCNC: 16.5 MCG/ML (ref 5–15)
VENTILATOR MODE: ABNORMAL
VENTILATOR MODE: ABNORMAL
VENTILATOR MODE: AC
VIT B12 BLD-MCNC: 1263 PG/ML (ref 211–911)
VIT B12 BLD-MCNC: 902 PG/ML (ref 211–911)
VT ON VENT VENT: 450 ML
VT ON VENT VENT: 500 ML
WBC NRBC COR # BLD: 10.32 10*3/MM3 (ref 4.5–12.5)
WBC NRBC COR # BLD: 14.01 10*3/MM3 (ref 4.5–12.5)
WBC NRBC COR # BLD: 5.93 10*3/MM3 (ref 4.5–12.5)
WBC NRBC COR # BLD: 5.94 10*3/MM3 (ref 4.5–12.5)
WBC NRBC COR # BLD: 6.44 10*3/MM3 (ref 4.5–12.5)
WBC NRBC COR # BLD: 7.21 10*3/MM3 (ref 4.5–12.5)
WBC NRBC COR # BLD: 7.39 10*3/MM3 (ref 4.5–12.5)
WBC NRBC COR # BLD: 8.14 10*3/MM3 (ref 4.5–12.5)
WBC NRBC COR # BLD: 8.18 10*3/MM3 (ref 4.5–12.5)
WBC NRBC COR # BLD: 8.2 10*3/MM3 (ref 4.5–12.5)
WBC NRBC COR # BLD: 8.23 10*3/MM3 (ref 4.5–12.5)
WBC NRBC COR # BLD: 8.29 10*3/MM3 (ref 4.5–12.5)
WBC NRBC COR # BLD: 8.3 10*3/MM3 (ref 4.5–12.5)
WBC NRBC COR # BLD: 8.52 10*3/MM3 (ref 4.5–12.5)
WBC NRBC COR # BLD: 8.63 10*3/MM3 (ref 4.5–12.5)
WBC NRBC COR # BLD: 9.02 10*3/MM3 (ref 4.5–12.5)
WBC NRBC COR # BLD: 9.2 10*3/MM3 (ref 4.5–12.5)
WBC NRBC COR # BLD: 9.37 10*3/MM3 (ref 4.5–12.5)
WBC NRBC COR # BLD: 9.38 10*3/MM3 (ref 4.5–12.5)
WBC UR QL AUTO: NORMAL /HPF

## 2017-01-01 PROCEDURE — 94640 AIRWAY INHALATION TREATMENT: CPT

## 2017-01-01 PROCEDURE — 71010 XR CHEST 1 VW: CPT | Performed by: RADIOLOGY

## 2017-01-01 PROCEDURE — 36600 WITHDRAWAL OF ARTERIAL BLOOD: CPT | Performed by: NURSE PRACTITIONER

## 2017-01-01 PROCEDURE — 82375 ASSAY CARBOXYHB QUANT: CPT | Performed by: NURSE PRACTITIONER

## 2017-01-01 PROCEDURE — 80307 DRUG TEST PRSMV CHEM ANLYZR: CPT | Performed by: FAMILY MEDICINE

## 2017-01-01 PROCEDURE — 36600 WITHDRAWAL OF ARTERIAL BLOOD: CPT | Performed by: INTERNAL MEDICINE

## 2017-01-01 PROCEDURE — 63710000001 INSULIN ASPART PER 5 UNITS: Performed by: INTERNAL MEDICINE

## 2017-01-01 PROCEDURE — 87804 INFLUENZA ASSAY W/OPTIC: CPT | Performed by: PHYSICIAN ASSISTANT

## 2017-01-01 PROCEDURE — 93005 ELECTROCARDIOGRAM TRACING: CPT | Performed by: EMERGENCY MEDICINE

## 2017-01-01 PROCEDURE — 94760 N-INVAS EAR/PLS OXIMETRY 1: CPT

## 2017-01-01 PROCEDURE — 82550 ASSAY OF CK (CPK): CPT | Performed by: FAMILY MEDICINE

## 2017-01-01 PROCEDURE — 99233 SBSQ HOSP IP/OBS HIGH 50: CPT | Performed by: INTERNAL MEDICINE

## 2017-01-01 PROCEDURE — 87040 BLOOD CULTURE FOR BACTERIA: CPT | Performed by: EMERGENCY MEDICINE

## 2017-01-01 PROCEDURE — 83050 HGB METHEMOGLOBIN QUAN: CPT | Performed by: EMERGENCY MEDICINE

## 2017-01-01 PROCEDURE — 81001 URINALYSIS AUTO W/SCOPE: CPT | Performed by: PHYSICIAN ASSISTANT

## 2017-01-01 PROCEDURE — 85730 THROMBOPLASTIN TIME PARTIAL: CPT | Performed by: FAMILY MEDICINE

## 2017-01-01 PROCEDURE — 82375 ASSAY CARBOXYHB QUANT: CPT | Performed by: FAMILY MEDICINE

## 2017-01-01 PROCEDURE — 84484 ASSAY OF TROPONIN QUANT: CPT | Performed by: INTERNAL MEDICINE

## 2017-01-01 PROCEDURE — 94799 UNLISTED PULMONARY SVC/PX: CPT

## 2017-01-01 PROCEDURE — 84484 ASSAY OF TROPONIN QUANT: CPT | Performed by: EMERGENCY MEDICINE

## 2017-01-01 PROCEDURE — 86140 C-REACTIVE PROTEIN: CPT | Performed by: INTERNAL MEDICINE

## 2017-01-01 PROCEDURE — 85652 RBC SED RATE AUTOMATED: CPT | Performed by: FAMILY MEDICINE

## 2017-01-01 PROCEDURE — 25010000002 KETOROLAC TROMETHAMINE PER 15 MG: Performed by: EMERGENCY MEDICINE

## 2017-01-01 PROCEDURE — 63710000001 INSULIN DETEMIR PER 5 UNITS: Performed by: INTERNAL MEDICINE

## 2017-01-01 PROCEDURE — 83690 ASSAY OF LIPASE: CPT | Performed by: FAMILY MEDICINE

## 2017-01-01 PROCEDURE — 71010 HC CHEST PA OR AP: CPT

## 2017-01-01 PROCEDURE — 25010000002 ONDANSETRON PER 1 MG

## 2017-01-01 PROCEDURE — 82728 ASSAY OF FERRITIN: CPT | Performed by: INTERNAL MEDICINE

## 2017-01-01 PROCEDURE — 82962 GLUCOSE BLOOD TEST: CPT

## 2017-01-01 PROCEDURE — 63710000001 PREDNISONE PER 1 MG: Performed by: INTERNAL MEDICINE

## 2017-01-01 PROCEDURE — 25010000002 CEFTRIAXONE: Performed by: INTERNAL MEDICINE

## 2017-01-01 PROCEDURE — 83036 HEMOGLOBIN GLYCOSYLATED A1C: CPT | Performed by: INTERNAL MEDICINE

## 2017-01-01 PROCEDURE — 82570 ASSAY OF URINE CREATININE: CPT | Performed by: INTERNAL MEDICINE

## 2017-01-01 PROCEDURE — 83605 ASSAY OF LACTIC ACID: CPT | Performed by: FAMILY MEDICINE

## 2017-01-01 PROCEDURE — 82533 TOTAL CORTISOL: CPT | Performed by: INTERNAL MEDICINE

## 2017-01-01 PROCEDURE — 31500 INSERT EMERGENCY AIRWAY: CPT | Performed by: EMERGENCY MEDICINE

## 2017-01-01 PROCEDURE — 80198 ASSAY OF THEOPHYLLINE: CPT | Performed by: PHYSICIAN ASSISTANT

## 2017-01-01 PROCEDURE — 25010000002 MORPHINE PER 10 MG: Performed by: FAMILY MEDICINE

## 2017-01-01 PROCEDURE — 83735 ASSAY OF MAGNESIUM: CPT | Performed by: PHYSICIAN ASSISTANT

## 2017-01-01 PROCEDURE — 25010000002 METHYLPREDNISOLONE PER 40 MG: Performed by: INTERNAL MEDICINE

## 2017-01-01 PROCEDURE — 80053 COMPREHEN METABOLIC PANEL: CPT | Performed by: EMERGENCY MEDICINE

## 2017-01-01 PROCEDURE — 83050 HGB METHEMOGLOBIN QUAN: CPT | Performed by: INTERNAL MEDICINE

## 2017-01-01 PROCEDURE — 25010000002 CEFTRIAXONE: Performed by: PHYSICIAN ASSISTANT

## 2017-01-01 PROCEDURE — 82805 BLOOD GASES W/O2 SATURATION: CPT | Performed by: NURSE PRACTITIONER

## 2017-01-01 PROCEDURE — 81003 URINALYSIS AUTO W/O SCOPE: CPT | Performed by: EMERGENCY MEDICINE

## 2017-01-01 PROCEDURE — 87186 SC STD MICRODIL/AGAR DIL: CPT | Performed by: FAMILY MEDICINE

## 2017-01-01 PROCEDURE — 81003 URINALYSIS AUTO W/O SCOPE: CPT | Performed by: FAMILY MEDICINE

## 2017-01-01 PROCEDURE — 25010000002 PROPOFOL 10 MG/ML EMULSION: Performed by: EMERGENCY MEDICINE

## 2017-01-01 PROCEDURE — 83880 ASSAY OF NATRIURETIC PEPTIDE: CPT | Performed by: EMERGENCY MEDICINE

## 2017-01-01 PROCEDURE — 82607 VITAMIN B-12: CPT | Performed by: INTERNAL MEDICINE

## 2017-01-01 PROCEDURE — 25010000002 HEPARIN (PORCINE) PER 1000 UNITS: Performed by: PHYSICIAN ASSISTANT

## 2017-01-01 PROCEDURE — 80053 COMPREHEN METABOLIC PANEL: CPT | Performed by: INTERNAL MEDICINE

## 2017-01-01 PROCEDURE — 80202 ASSAY OF VANCOMYCIN: CPT | Performed by: INTERNAL MEDICINE

## 2017-01-01 PROCEDURE — 82805 BLOOD GASES W/O2 SATURATION: CPT | Performed by: INTERNAL MEDICINE

## 2017-01-01 PROCEDURE — 93005 ELECTROCARDIOGRAM TRACING: CPT | Performed by: INTERNAL MEDICINE

## 2017-01-01 PROCEDURE — 25010000002 METHYLPREDNISOLONE PER 40 MG: Performed by: PHYSICIAN ASSISTANT

## 2017-01-01 PROCEDURE — 85025 COMPLETE CBC W/AUTO DIFF WBC: CPT | Performed by: INTERNAL MEDICINE

## 2017-01-01 PROCEDURE — 96374 THER/PROPH/DIAG INJ IV PUSH: CPT

## 2017-01-01 PROCEDURE — 99232 SBSQ HOSP IP/OBS MODERATE 35: CPT | Performed by: INTERNAL MEDICINE

## 2017-01-01 PROCEDURE — 96375 TX/PRO/DX INJ NEW DRUG ADDON: CPT

## 2017-01-01 PROCEDURE — 83605 ASSAY OF LACTIC ACID: CPT | Performed by: PHYSICIAN ASSISTANT

## 2017-01-01 PROCEDURE — 83605 ASSAY OF LACTIC ACID: CPT | Performed by: HOSPITALIST

## 2017-01-01 PROCEDURE — 25010000002 HEPARIN (PORCINE) PER 1000 UNITS: Performed by: INTERNAL MEDICINE

## 2017-01-01 PROCEDURE — 96365 THER/PROPH/DIAG IV INF INIT: CPT

## 2017-01-01 PROCEDURE — 82550 ASSAY OF CK (CPK): CPT | Performed by: EMERGENCY MEDICINE

## 2017-01-01 PROCEDURE — 93005 ELECTROCARDIOGRAM TRACING: CPT | Performed by: FAMILY MEDICINE

## 2017-01-01 PROCEDURE — 96366 THER/PROPH/DIAG IV INF ADDON: CPT

## 2017-01-01 PROCEDURE — 71275 CT ANGIOGRAPHY CHEST: CPT | Performed by: RADIOLOGY

## 2017-01-01 PROCEDURE — 82565 ASSAY OF CREATININE: CPT | Performed by: INTERNAL MEDICINE

## 2017-01-01 PROCEDURE — 99285 EMERGENCY DEPT VISIT HI MDM: CPT

## 2017-01-01 PROCEDURE — 83874 ASSAY OF MYOGLOBIN: CPT | Performed by: EMERGENCY MEDICINE

## 2017-01-01 PROCEDURE — 85025 COMPLETE CBC W/AUTO DIFF WBC: CPT | Performed by: FAMILY MEDICINE

## 2017-01-01 PROCEDURE — 84100 ASSAY OF PHOSPHORUS: CPT | Performed by: INTERNAL MEDICINE

## 2017-01-01 PROCEDURE — 25010000002 ONDANSETRON PER 1 MG: Performed by: EMERGENCY MEDICINE

## 2017-01-01 PROCEDURE — 87077 CULTURE AEROBIC IDENTIFY: CPT | Performed by: FAMILY MEDICINE

## 2017-01-01 PROCEDURE — 25010000002 METHYLPREDNISOLONE PER 125 MG: Performed by: FAMILY MEDICINE

## 2017-01-01 PROCEDURE — 83050 HGB METHEMOGLOBIN QUAN: CPT | Performed by: PHYSICIAN ASSISTANT

## 2017-01-01 PROCEDURE — 87147 CULTURE TYPE IMMUNOLOGIC: CPT | Performed by: FAMILY MEDICINE

## 2017-01-01 PROCEDURE — 93010 ELECTROCARDIOGRAM REPORT: CPT | Performed by: INTERNAL MEDICINE

## 2017-01-01 PROCEDURE — 84300 ASSAY OF URINE SODIUM: CPT | Performed by: INTERNAL MEDICINE

## 2017-01-01 PROCEDURE — 25010000002 VANCOMYCIN PER 500 MG

## 2017-01-01 PROCEDURE — 84481 FREE ASSAY (FT-3): CPT | Performed by: INTERNAL MEDICINE

## 2017-01-01 PROCEDURE — 36600 WITHDRAWAL OF ARTERIAL BLOOD: CPT | Performed by: EMERGENCY MEDICINE

## 2017-01-01 PROCEDURE — 96360 HYDRATION IV INFUSION INIT: CPT

## 2017-01-01 PROCEDURE — 25010000002 PROPOFOL 1000 MG/ML EMULSION: Performed by: EMERGENCY MEDICINE

## 2017-01-01 PROCEDURE — 87804 INFLUENZA ASSAY W/OPTIC: CPT | Performed by: FAMILY MEDICINE

## 2017-01-01 PROCEDURE — 82805 BLOOD GASES W/O2 SATURATION: CPT | Performed by: EMERGENCY MEDICINE

## 2017-01-01 PROCEDURE — 99284 EMERGENCY DEPT VISIT MOD MDM: CPT

## 2017-01-01 PROCEDURE — 82746 ASSAY OF FOLIC ACID SERUM: CPT | Performed by: INTERNAL MEDICINE

## 2017-01-01 PROCEDURE — 97163 PT EVAL HIGH COMPLEX 45 MIN: CPT

## 2017-01-01 PROCEDURE — 94660 CPAP INITIATION&MGMT: CPT

## 2017-01-01 PROCEDURE — 3E0F8GC INTRODUCTION OF OTHER THERAPEUTIC SUBSTANCE INTO RESPIRATORY TRACT, VIA NATURAL OR ARTIFICIAL OPENING ENDOSCOPIC: ICD-10-PCS | Performed by: INTERNAL MEDICINE

## 2017-01-01 PROCEDURE — 99239 HOSP IP/OBS DSCHRG MGMT >30: CPT | Performed by: INTERNAL MEDICINE

## 2017-01-01 PROCEDURE — 99283 EMERGENCY DEPT VISIT LOW MDM: CPT

## 2017-01-01 PROCEDURE — 82375 ASSAY CARBOXYHB QUANT: CPT | Performed by: EMERGENCY MEDICINE

## 2017-01-01 PROCEDURE — 25010000002 HYDRALAZINE PER 20 MG: Performed by: INTERNAL MEDICINE

## 2017-01-01 PROCEDURE — 74176 CT ABD & PELVIS W/O CONTRAST: CPT

## 2017-01-01 PROCEDURE — 25010000002 PIPERACILLIN-TAZOBACTAM: Performed by: INTERNAL MEDICINE

## 2017-01-01 PROCEDURE — 85730 THROMBOPLASTIN TIME PARTIAL: CPT | Performed by: EMERGENCY MEDICINE

## 2017-01-01 PROCEDURE — 93005 ELECTROCARDIOGRAM TRACING: CPT | Performed by: PHYSICIAN ASSISTANT

## 2017-01-01 PROCEDURE — 84484 ASSAY OF TROPONIN QUANT: CPT | Performed by: PHYSICIAN ASSISTANT

## 2017-01-01 PROCEDURE — 36415 COLL VENOUS BLD VENIPUNCTURE: CPT

## 2017-01-01 PROCEDURE — 76775 US EXAM ABDO BACK WALL LIM: CPT | Performed by: RADIOLOGY

## 2017-01-01 PROCEDURE — 25010000003 POTASSIUM CHLORIDE 10 MEQ/100ML SOLUTION: Performed by: EMERGENCY MEDICINE

## 2017-01-01 PROCEDURE — 25010000002 METHYLPREDNISOLONE PER 125 MG: Performed by: INTERNAL MEDICINE

## 2017-01-01 PROCEDURE — 25010000002 MAGNESIUM SULFATE 2 GM/50ML SOLUTION: Performed by: INTERNAL MEDICINE

## 2017-01-01 PROCEDURE — 85025 COMPLETE CBC W/AUTO DIFF WBC: CPT | Performed by: EMERGENCY MEDICINE

## 2017-01-01 PROCEDURE — 84484 ASSAY OF TROPONIN QUANT: CPT | Performed by: FAMILY MEDICINE

## 2017-01-01 PROCEDURE — 36600 WITHDRAWAL OF ARTERIAL BLOOD: CPT | Performed by: PHYSICIAN ASSISTANT

## 2017-01-01 PROCEDURE — 83605 ASSAY OF LACTIC ACID: CPT | Performed by: EMERGENCY MEDICINE

## 2017-01-01 PROCEDURE — 63710000001 PREDNISONE PER 5 MG: Performed by: INTERNAL MEDICINE

## 2017-01-01 PROCEDURE — 84100 ASSAY OF PHOSPHORUS: CPT | Performed by: PHYSICIAN ASSISTANT

## 2017-01-01 PROCEDURE — 25010000002 CEFTRIAXONE: Performed by: EMERGENCY MEDICINE

## 2017-01-01 PROCEDURE — 0BH18EZ INSERTION OF ENDOTRACHEAL AIRWAY INTO TRACHEA, VIA NATURAL OR ARTIFICIAL OPENING ENDOSCOPIC: ICD-10-PCS | Performed by: INTERNAL MEDICINE

## 2017-01-01 PROCEDURE — G8979 MOBILITY GOAL STATUS: HCPCS

## 2017-01-01 PROCEDURE — 82043 UR ALBUMIN QUANTITATIVE: CPT | Performed by: INTERNAL MEDICINE

## 2017-01-01 PROCEDURE — 83735 ASSAY OF MAGNESIUM: CPT | Performed by: NURSE PRACTITIONER

## 2017-01-01 PROCEDURE — G8978 MOBILITY CURRENT STATUS: HCPCS

## 2017-01-01 PROCEDURE — 99291 CRITICAL CARE FIRST HOUR: CPT | Performed by: INTERNAL MEDICINE

## 2017-01-01 PROCEDURE — 86140 C-REACTIVE PROTEIN: CPT | Performed by: PHYSICIAN ASSISTANT

## 2017-01-01 PROCEDURE — 84439 ASSAY OF FREE THYROXINE: CPT | Performed by: INTERNAL MEDICINE

## 2017-01-01 PROCEDURE — 87040 BLOOD CULTURE FOR BACTERIA: CPT | Performed by: FAMILY MEDICINE

## 2017-01-01 PROCEDURE — 0B9F8ZX DRAINAGE OF RIGHT LOWER LUNG LOBE, VIA NATURAL OR ARTIFICIAL OPENING ENDOSCOPIC, DIAGNOSTIC: ICD-10-PCS | Performed by: INTERNAL MEDICINE

## 2017-01-01 PROCEDURE — 83605 ASSAY OF LACTIC ACID: CPT | Performed by: INTERNAL MEDICINE

## 2017-01-01 PROCEDURE — 25010000002 PROPOFOL 10 MG/ML EMULSION

## 2017-01-01 PROCEDURE — 84100 ASSAY OF PHOSPHORUS: CPT | Performed by: NURSE PRACTITIONER

## 2017-01-01 PROCEDURE — 87804 INFLUENZA ASSAY W/OPTIC: CPT | Performed by: EMERGENCY MEDICINE

## 2017-01-01 PROCEDURE — 84443 ASSAY THYROID STIM HORMONE: CPT | Performed by: INTERNAL MEDICINE

## 2017-01-01 PROCEDURE — 96367 TX/PROPH/DG ADDL SEQ IV INF: CPT

## 2017-01-01 PROCEDURE — 71010 XR CHEST AP: CPT | Performed by: RADIOLOGY

## 2017-01-01 PROCEDURE — 83550 IRON BINDING TEST: CPT | Performed by: INTERNAL MEDICINE

## 2017-01-01 PROCEDURE — 25010000002 ONDANSETRON PER 1 MG: Performed by: FAMILY MEDICINE

## 2017-01-01 PROCEDURE — 82805 BLOOD GASES W/O2 SATURATION: CPT | Performed by: FAMILY MEDICINE

## 2017-01-01 PROCEDURE — 83050 HGB METHEMOGLOBIN QUAN: CPT | Performed by: NURSE PRACTITIONER

## 2017-01-01 PROCEDURE — 82553 CREATINE MB FRACTION: CPT | Performed by: EMERGENCY MEDICINE

## 2017-01-01 PROCEDURE — 85045 AUTOMATED RETICULOCYTE COUNT: CPT | Performed by: INTERNAL MEDICINE

## 2017-01-01 PROCEDURE — 82375 ASSAY CARBOXYHB QUANT: CPT | Performed by: INTERNAL MEDICINE

## 2017-01-01 PROCEDURE — 0 IOPAMIDOL 61 % SOLUTION: Performed by: EMERGENCY MEDICINE

## 2017-01-01 PROCEDURE — 25010000002 FUROSEMIDE PER 20 MG: Performed by: EMERGENCY MEDICINE

## 2017-01-01 PROCEDURE — 82375 ASSAY CARBOXYHB QUANT: CPT | Performed by: PHYSICIAN ASSISTANT

## 2017-01-01 PROCEDURE — 5A1935Z RESPIRATORY VENTILATION, LESS THAN 24 CONSECUTIVE HOURS: ICD-10-PCS | Performed by: INTERNAL MEDICINE

## 2017-01-01 PROCEDURE — 87186 SC STD MICRODIL/AGAR DIL: CPT | Performed by: EMERGENCY MEDICINE

## 2017-01-01 PROCEDURE — 83690 ASSAY OF LIPASE: CPT | Performed by: EMERGENCY MEDICINE

## 2017-01-01 PROCEDURE — 97116 GAIT TRAINING THERAPY: CPT

## 2017-01-01 PROCEDURE — 36600 WITHDRAWAL OF ARTERIAL BLOOD: CPT | Performed by: FAMILY MEDICINE

## 2017-01-01 PROCEDURE — 63710000001 INSULIN REGULAR HUMAN PER 5 UNITS: Performed by: EMERGENCY MEDICINE

## 2017-01-01 PROCEDURE — 85610 PROTHROMBIN TIME: CPT | Performed by: FAMILY MEDICINE

## 2017-01-01 PROCEDURE — 83880 ASSAY OF NATRIURETIC PEPTIDE: CPT | Performed by: PHYSICIAN ASSISTANT

## 2017-01-01 PROCEDURE — 85025 COMPLETE CBC W/AUTO DIFF WBC: CPT | Performed by: PHYSICIAN ASSISTANT

## 2017-01-01 PROCEDURE — 94003 VENT MGMT INPAT SUBQ DAY: CPT

## 2017-01-01 PROCEDURE — 80053 COMPREHEN METABOLIC PANEL: CPT | Performed by: PHYSICIAN ASSISTANT

## 2017-01-01 PROCEDURE — 74176 CT ABD & PELVIS W/O CONTRAST: CPT | Performed by: RADIOLOGY

## 2017-01-01 PROCEDURE — 83050 HGB METHEMOGLOBIN QUAN: CPT | Performed by: FAMILY MEDICINE

## 2017-01-01 PROCEDURE — 82306 VITAMIN D 25 HYDROXY: CPT | Performed by: NURSE PRACTITIONER

## 2017-01-01 PROCEDURE — 82805 BLOOD GASES W/O2 SATURATION: CPT | Performed by: PHYSICIAN ASSISTANT

## 2017-01-01 PROCEDURE — 25010000002 PIPERACILLIN-TAZOBACTAM: Performed by: PHYSICIAN ASSISTANT

## 2017-01-01 PROCEDURE — 87040 BLOOD CULTURE FOR BACTERIA: CPT | Performed by: PHYSICIAN ASSISTANT

## 2017-01-01 PROCEDURE — 83735 ASSAY OF MAGNESIUM: CPT | Performed by: EMERGENCY MEDICINE

## 2017-01-01 PROCEDURE — 25010000002 FUROSEMIDE PER 20 MG: Performed by: NURSE PRACTITIONER

## 2017-01-01 PROCEDURE — 93306 TTE W/DOPPLER COMPLETE: CPT

## 2017-01-01 PROCEDURE — 99214 OFFICE O/P EST MOD 30 MIN: CPT | Performed by: NURSE PRACTITIONER

## 2017-01-01 PROCEDURE — 82150 ASSAY OF AMYLASE: CPT | Performed by: FAMILY MEDICINE

## 2017-01-01 PROCEDURE — 87899 AGENT NOS ASSAY W/OPTIC: CPT | Performed by: INTERNAL MEDICINE

## 2017-01-01 PROCEDURE — 83735 ASSAY OF MAGNESIUM: CPT | Performed by: INTERNAL MEDICINE

## 2017-01-01 PROCEDURE — 25010000002 SUCCINYLCHOLINE PER 20 MG: Performed by: EMERGENCY MEDICINE

## 2017-01-01 PROCEDURE — 25010000002 FONDAPARINUX PER 0.5 MG: Performed by: INTERNAL MEDICINE

## 2017-01-01 PROCEDURE — 99222 1ST HOSP IP/OBS MODERATE 55: CPT | Performed by: INTERNAL MEDICINE

## 2017-01-01 PROCEDURE — 82553 CREATINE MB FRACTION: CPT | Performed by: PHYSICIAN ASSISTANT

## 2017-01-01 PROCEDURE — 25010000002 LORAZEPAM PER 2 MG: Performed by: INTERNAL MEDICINE

## 2017-01-01 PROCEDURE — 87899 AGENT NOS ASSAY W/OPTIC: CPT | Performed by: PHYSICIAN ASSISTANT

## 2017-01-01 PROCEDURE — 25010000002 PIPERACILLIN-TAZOBACTAM: Performed by: FAMILY MEDICINE

## 2017-01-01 PROCEDURE — 0B9C8ZX DRAINAGE OF RIGHT UPPER LUNG LOBE, VIA NATURAL OR ARTIFICIAL OPENING ENDOSCOPIC, DIAGNOSTIC: ICD-10-PCS | Performed by: INTERNAL MEDICINE

## 2017-01-01 PROCEDURE — 83540 ASSAY OF IRON: CPT | Performed by: INTERNAL MEDICINE

## 2017-01-01 PROCEDURE — 80053 COMPREHEN METABOLIC PANEL: CPT | Performed by: FAMILY MEDICINE

## 2017-01-01 PROCEDURE — 83880 ASSAY OF NATRIURETIC PEPTIDE: CPT | Performed by: INTERNAL MEDICINE

## 2017-01-01 PROCEDURE — 25010000002 MAGNESIUM SULFATE 2 GM/50ML SOLUTION: Performed by: EMERGENCY MEDICINE

## 2017-01-01 PROCEDURE — 0B9D8ZX DRAINAGE OF RIGHT MIDDLE LUNG LOBE, VIA NATURAL OR ARTIFICIAL OPENING ENDOSCOPIC, DIAGNOSTIC: ICD-10-PCS | Performed by: INTERNAL MEDICINE

## 2017-01-01 PROCEDURE — 99214 OFFICE O/P EST MOD 30 MIN: CPT | Performed by: INTERNAL MEDICINE

## 2017-01-01 PROCEDURE — 80048 BASIC METABOLIC PNL TOTAL CA: CPT | Performed by: EMERGENCY MEDICINE

## 2017-01-01 PROCEDURE — 25010000002 FUROSEMIDE PER 20 MG: Performed by: FAMILY MEDICINE

## 2017-01-01 PROCEDURE — 86140 C-REACTIVE PROTEIN: CPT | Performed by: FAMILY MEDICINE

## 2017-01-01 PROCEDURE — 71275 CT ANGIOGRAPHY CHEST: CPT

## 2017-01-01 PROCEDURE — 82550 ASSAY OF CK (CPK): CPT | Performed by: PHYSICIAN ASSISTANT

## 2017-01-01 PROCEDURE — P9612 CATHETERIZE FOR URINE SPEC: HCPCS

## 2017-01-01 PROCEDURE — 84156 ASSAY OF PROTEIN URINE: CPT | Performed by: INTERNAL MEDICINE

## 2017-01-01 PROCEDURE — 63710000001 INSULIN ASPART PER 5 UNITS

## 2017-01-01 PROCEDURE — 96361 HYDRATE IV INFUSION ADD-ON: CPT

## 2017-01-01 PROCEDURE — 83880 ASSAY OF NATRIURETIC PEPTIDE: CPT | Performed by: FAMILY MEDICINE

## 2017-01-01 PROCEDURE — 25010000002 VANCOMYCIN PER 500 MG: Performed by: FAMILY MEDICINE

## 2017-01-01 PROCEDURE — 99223 1ST HOSP IP/OBS HIGH 75: CPT | Performed by: INTERNAL MEDICINE

## 2017-01-01 PROCEDURE — 25010000002 PROMETHAZINE PER 50 MG: Performed by: EMERGENCY MEDICINE

## 2017-01-01 PROCEDURE — 85610 PROTHROMBIN TIME: CPT | Performed by: PHYSICIAN ASSISTANT

## 2017-01-01 PROCEDURE — 87077 CULTURE AEROBIC IDENTIFY: CPT | Performed by: EMERGENCY MEDICINE

## 2017-01-01 PROCEDURE — 76775 US EXAM ABDO BACK WALL LIM: CPT

## 2017-01-01 PROCEDURE — 94002 VENT MGMT INPAT INIT DAY: CPT

## 2017-01-01 PROCEDURE — 71010 HC CHEST AP: CPT

## 2017-01-01 PROCEDURE — 81003 URINALYSIS AUTO W/O SCOPE: CPT | Performed by: PHYSICIAN ASSISTANT

## 2017-01-01 PROCEDURE — 87086 URINE CULTURE/COLONY COUNT: CPT | Performed by: FAMILY MEDICINE

## 2017-01-01 PROCEDURE — 82553 CREATINE MB FRACTION: CPT | Performed by: FAMILY MEDICINE

## 2017-01-01 PROCEDURE — 25010000002 VANCOMYCIN PER 500 MG: Performed by: PHYSICIAN ASSISTANT

## 2017-01-01 PROCEDURE — 85610 PROTHROMBIN TIME: CPT | Performed by: EMERGENCY MEDICINE

## 2017-01-01 PROCEDURE — 82550 ASSAY OF CK (CPK): CPT | Performed by: INTERNAL MEDICINE

## 2017-01-01 PROCEDURE — 80048 BASIC METABOLIC PNL TOTAL CA: CPT | Performed by: INTERNAL MEDICINE

## 2017-01-01 RX ORDER — ASPIRIN 81 MG/1
324 TABLET, CHEWABLE ORAL ONCE
Status: COMPLETED | OUTPATIENT
Start: 2017-01-01 | End: 2017-01-01

## 2017-01-01 RX ORDER — TRAMADOL HYDROCHLORIDE 50 MG/1
50 TABLET ORAL EVERY 12 HOURS PRN
Status: CANCELLED | OUTPATIENT
Start: 2017-01-01

## 2017-01-01 RX ORDER — LANOLIN ALCOHOL/MO/W.PET/CERES
2000 CREAM (GRAM) TOPICAL DAILY
Status: CANCELLED | OUTPATIENT
Start: 2017-01-01

## 2017-01-01 RX ORDER — DEXTROSE MONOHYDRATE 25 G/50ML
25 INJECTION, SOLUTION INTRAVENOUS
Status: DISCONTINUED | OUTPATIENT
Start: 2017-01-01 | End: 2017-01-01 | Stop reason: SDUPTHER

## 2017-01-01 RX ORDER — DEXTROSE MONOHYDRATE 25 G/50ML
25 INJECTION, SOLUTION INTRAVENOUS
Status: DISCONTINUED | OUTPATIENT
Start: 2017-01-01 | End: 2017-01-01 | Stop reason: HOSPADM

## 2017-01-01 RX ORDER — IPRATROPIUM BROMIDE AND ALBUTEROL SULFATE 2.5; .5 MG/3ML; MG/3ML
3 SOLUTION RESPIRATORY (INHALATION) ONCE
Status: COMPLETED | OUTPATIENT
Start: 2017-01-01 | End: 2017-01-01

## 2017-01-01 RX ORDER — NITROGLYCERIN 0.4 MG/1
0.4 TABLET SUBLINGUAL
Status: DISCONTINUED | OUTPATIENT
Start: 2017-01-01 | End: 2017-01-01 | Stop reason: HOSPADM

## 2017-01-01 RX ORDER — TRAMADOL HYDROCHLORIDE 50 MG/1
50 TABLET ORAL EVERY 6 HOURS PRN
Status: DISCONTINUED | OUTPATIENT
Start: 2017-01-01 | End: 2017-01-01 | Stop reason: HOSPADM

## 2017-01-01 RX ORDER — INSULIN ASPART 100 [IU]/ML
54 INJECTION, SUSPENSION SUBCUTANEOUS NIGHTLY
Status: CANCELLED | OUTPATIENT
Start: 2017-01-01

## 2017-01-01 RX ORDER — NITROGLYCERIN 0.4 MG/1
0.4 TABLET SUBLINGUAL
Status: CANCELLED | OUTPATIENT
Start: 2017-01-01

## 2017-01-01 RX ORDER — CETIRIZINE HYDROCHLORIDE 10 MG/1
10 TABLET ORAL DAILY
Status: CANCELLED | OUTPATIENT
Start: 2017-01-01

## 2017-01-01 RX ORDER — HYDRALAZINE HYDROCHLORIDE 25 MG/1
25 TABLET, FILM COATED ORAL 3 TIMES DAILY
Status: CANCELLED | OUTPATIENT
Start: 2017-01-01

## 2017-01-01 RX ORDER — SODIUM CHLORIDE 0.9 % (FLUSH) 0.9 %
1-10 SYRINGE (ML) INJECTION AS NEEDED
Status: DISCONTINUED | OUTPATIENT
Start: 2017-01-01 | End: 2018-01-01 | Stop reason: HOSPADM

## 2017-01-01 RX ORDER — ATORVASTATIN CALCIUM 40 MG/1
80 TABLET, FILM COATED ORAL DAILY
Status: CANCELLED | OUTPATIENT
Start: 2017-01-01

## 2017-01-01 RX ORDER — ASPIRIN 81 MG/1
81 TABLET ORAL DAILY
Status: CANCELLED | OUTPATIENT
Start: 2017-01-01

## 2017-01-01 RX ORDER — SODIUM CHLORIDE 0.9 % (FLUSH) 0.9 %
10 SYRINGE (ML) INJECTION AS NEEDED
Status: DISCONTINUED | OUTPATIENT
Start: 2017-01-01 | End: 2018-01-01 | Stop reason: HOSPADM

## 2017-01-01 RX ORDER — OMEGA-3S/DHA/EPA/FISH OIL/D3 300MG-1000
2000 CAPSULE ORAL DAILY
Status: CANCELLED | OUTPATIENT
Start: 2017-01-01

## 2017-01-01 RX ORDER — SODIUM CHLORIDE 0.9 % (FLUSH) 0.9 %
10 SYRINGE (ML) INJECTION AS NEEDED
Status: DISCONTINUED | OUTPATIENT
Start: 2017-01-01 | End: 2017-01-01 | Stop reason: HOSPADM

## 2017-01-01 RX ORDER — METOLAZONE 5 MG/1
5 TABLET ORAL DAILY PRN
COMMUNITY
End: 2017-01-01 | Stop reason: HOSPADM

## 2017-01-01 RX ORDER — ASPIRIN 81 MG/1
81 TABLET ORAL DAILY
Status: DISCONTINUED | OUTPATIENT
Start: 2017-01-01 | End: 2017-01-01

## 2017-01-01 RX ORDER — LANOLIN ALCOHOL/MO/W.PET/CERES
2000 CREAM (GRAM) TOPICAL DAILY
COMMUNITY
End: 2018-01-01 | Stop reason: HOSPADM

## 2017-01-01 RX ORDER — FUROSEMIDE 10 MG/ML
40 INJECTION INTRAMUSCULAR; INTRAVENOUS ONCE
Status: COMPLETED | OUTPATIENT
Start: 2017-01-01 | End: 2017-01-01

## 2017-01-01 RX ORDER — POTASSIUM CHLORIDE 750 MG/1
30 TABLET, FILM COATED, EXTENDED RELEASE ORAL DAILY
Status: CANCELLED | OUTPATIENT
Start: 2017-01-01

## 2017-01-01 RX ORDER — MAGNESIUM OXIDE 400 MG/1
400 TABLET ORAL 2 TIMES DAILY
Status: ON HOLD | COMMUNITY
End: 2017-01-01

## 2017-01-01 RX ORDER — CEFDINIR 300 MG/1
300 CAPSULE ORAL 2 TIMES DAILY
Qty: 10 CAPSULE | Refills: 0 | Status: SHIPPED | OUTPATIENT
Start: 2017-01-01 | End: 2017-01-01

## 2017-01-01 RX ORDER — BUDESONIDE AND FORMOTEROL FUMARATE DIHYDRATE 160; 4.5 UG/1; UG/1
2 AEROSOL RESPIRATORY (INHALATION)
COMMUNITY
End: 2018-01-01 | Stop reason: HOSPADM

## 2017-01-01 RX ORDER — METHYLPREDNISOLONE SODIUM SUCCINATE 125 MG/2ML
60 INJECTION, POWDER, LYOPHILIZED, FOR SOLUTION INTRAMUSCULAR; INTRAVENOUS EVERY 8 HOURS
Status: DISCONTINUED | OUTPATIENT
Start: 2017-01-01 | End: 2017-01-01

## 2017-01-01 RX ORDER — SODIUM CHLORIDE 9 MG/ML
50 INJECTION, SOLUTION INTRAVENOUS CONTINUOUS
Status: DISCONTINUED | OUTPATIENT
Start: 2017-01-01 | End: 2017-01-01

## 2017-01-01 RX ORDER — FERROUS SULFATE 325(65) MG
325 TABLET ORAL 2 TIMES DAILY
Status: ON HOLD | COMMUNITY
End: 2017-01-01

## 2017-01-01 RX ORDER — SODIUM CHLORIDE 450 MG/100ML
100 INJECTION, SOLUTION INTRAVENOUS CONTINUOUS
Status: DISCONTINUED | OUTPATIENT
Start: 2017-01-01 | End: 2017-01-01

## 2017-01-01 RX ORDER — CETIRIZINE HYDROCHLORIDE 10 MG/1
5 TABLET ORAL DAILY
Status: CANCELLED | OUTPATIENT
Start: 2017-01-01

## 2017-01-01 RX ORDER — ONDANSETRON 2 MG/ML
4 INJECTION INTRAMUSCULAR; INTRAVENOUS ONCE
Status: COMPLETED | OUTPATIENT
Start: 2017-01-01 | End: 2017-01-01

## 2017-01-01 RX ORDER — FAMOTIDINE 10 MG/ML
20 INJECTION, SOLUTION INTRAVENOUS DAILY
Status: DISCONTINUED | OUTPATIENT
Start: 2017-01-01 | End: 2017-01-01 | Stop reason: SDUPTHER

## 2017-01-01 RX ORDER — BUDESONIDE AND FORMOTEROL FUMARATE DIHYDRATE 160; 4.5 UG/1; UG/1
2 AEROSOL RESPIRATORY (INHALATION)
Status: CANCELLED | OUTPATIENT
Start: 2017-01-01

## 2017-01-01 RX ORDER — GABAPENTIN 100 MG/1
700 CAPSULE ORAL DAILY
COMMUNITY
End: 2018-01-01 | Stop reason: HOSPADM

## 2017-01-01 RX ORDER — CHOLECALCIFEROL (VITAMIN D3) 125 MCG
1 CAPSULE ORAL DAILY
COMMUNITY

## 2017-01-01 RX ORDER — LOSARTAN POTASSIUM 50 MG/1
100 TABLET ORAL DAILY
Status: ON HOLD | COMMUNITY
End: 2017-01-01

## 2017-01-01 RX ORDER — ETOMIDATE 2 MG/ML
20 INJECTION INTRAVENOUS ONCE
Status: COMPLETED | OUTPATIENT
Start: 2017-01-01 | End: 2017-01-01

## 2017-01-01 RX ORDER — CASTOR OIL AND BALSAM, PERU 788; 87 MG/G; MG/G
OINTMENT TOPICAL 2 TIMES DAILY
Status: DISCONTINUED | OUTPATIENT
Start: 2017-01-01 | End: 2017-01-01 | Stop reason: HOSPADM

## 2017-01-01 RX ORDER — AMLODIPINE BESYLATE 10 MG/1
10 TABLET ORAL DAILY
Status: CANCELLED | OUTPATIENT
Start: 2017-01-01

## 2017-01-01 RX ORDER — NICOTINE POLACRILEX 4 MG
15 LOZENGE BUCCAL
Status: DISCONTINUED | OUTPATIENT
Start: 2017-01-01 | End: 2017-01-01 | Stop reason: HOSPADM

## 2017-01-01 RX ORDER — MORPHINE SULFATE 2 MG/ML
2 INJECTION, SOLUTION INTRAMUSCULAR; INTRAVENOUS ONCE
Status: COMPLETED | OUTPATIENT
Start: 2017-01-01 | End: 2017-01-01

## 2017-01-01 RX ORDER — FERROUS SULFATE 325(65) MG
325 TABLET ORAL
Status: DISCONTINUED | OUTPATIENT
Start: 2017-01-01 | End: 2018-01-01

## 2017-01-01 RX ORDER — CETIRIZINE HYDROCHLORIDE 10 MG/1
10 TABLET ORAL DAILY
Status: DISCONTINUED | OUTPATIENT
Start: 2017-01-01 | End: 2018-01-01

## 2017-01-01 RX ORDER — CARVEDILOL 6.25 MG/1
12.5 TABLET ORAL 2 TIMES DAILY WITH MEALS
Status: DISCONTINUED | OUTPATIENT
Start: 2017-01-01 | End: 2017-01-01 | Stop reason: HOSPADM

## 2017-01-01 RX ORDER — POTASSIUM CHLORIDE 750 MG/1
40 CAPSULE, EXTENDED RELEASE ORAL AS NEEDED
Status: DISCONTINUED | OUTPATIENT
Start: 2017-01-01 | End: 2018-01-01 | Stop reason: HOSPADM

## 2017-01-01 RX ORDER — LEVOTHYROXINE SODIUM 0.03 MG/1
25 TABLET ORAL DAILY
Status: CANCELLED | OUTPATIENT
Start: 2017-01-01

## 2017-01-01 RX ORDER — DOXYCYCLINE HYCLATE 100 MG
100 TABLET ORAL 2 TIMES DAILY
Qty: 10 TABLET | Refills: 0 | Status: SHIPPED | OUTPATIENT
Start: 2017-01-01 | End: 2017-01-01

## 2017-01-01 RX ORDER — ALBUTEROL SULFATE 2.5 MG/3ML
2.5 SOLUTION RESPIRATORY (INHALATION) ONCE
Status: COMPLETED | OUTPATIENT
Start: 2017-01-01 | End: 2017-01-01

## 2017-01-01 RX ORDER — ALBUTEROL SULFATE 2.5 MG/3ML
2.5 SOLUTION RESPIRATORY (INHALATION) EVERY 4 HOURS PRN
COMMUNITY
End: 2018-01-01 | Stop reason: HOSPADM

## 2017-01-01 RX ORDER — POTASSIUM CHLORIDE 20 MEQ/1
40 TABLET, EXTENDED RELEASE ORAL ONCE
Status: COMPLETED | OUTPATIENT
Start: 2017-01-01 | End: 2017-01-01

## 2017-01-01 RX ORDER — PANTOPRAZOLE SODIUM 40 MG/1
40 TABLET, DELAYED RELEASE ORAL DAILY
COMMUNITY

## 2017-01-01 RX ORDER — HEPARIN SODIUM 5000 [USP'U]/ML
5000 INJECTION, SOLUTION INTRAVENOUS; SUBCUTANEOUS EVERY 12 HOURS SCHEDULED
Status: DISCONTINUED | OUTPATIENT
Start: 2017-01-01 | End: 2017-01-01 | Stop reason: HOSPADM

## 2017-01-01 RX ORDER — METOLAZONE 2.5 MG/1
5 TABLET ORAL DAILY PRN
Status: CANCELLED | OUTPATIENT
Start: 2017-01-01

## 2017-01-01 RX ORDER — NICOTINE POLACRILEX 4 MG
15 LOZENGE BUCCAL
Status: DISCONTINUED | OUTPATIENT
Start: 2017-01-01 | End: 2017-01-01 | Stop reason: SDUPTHER

## 2017-01-01 RX ORDER — ALBUTEROL SULFATE 2.5 MG/3ML
2.5 SOLUTION RESPIRATORY (INHALATION) EVERY 4 HOURS PRN
Status: CANCELLED | OUTPATIENT
Start: 2017-01-01

## 2017-01-01 RX ORDER — HEPARIN SODIUM 5000 [USP'U]/ML
5000 INJECTION, SOLUTION INTRAVENOUS; SUBCUTANEOUS EVERY 12 HOURS SCHEDULED
Status: DISCONTINUED | OUTPATIENT
Start: 2017-01-01 | End: 2017-01-01

## 2017-01-01 RX ORDER — ATORVASTATIN CALCIUM 40 MG/1
80 TABLET, FILM COATED ORAL DAILY
Status: DISCONTINUED | OUTPATIENT
Start: 2017-01-01 | End: 2018-01-01 | Stop reason: HOSPADM

## 2017-01-01 RX ORDER — FUROSEMIDE 10 MG/ML
20 INJECTION INTRAMUSCULAR; INTRAVENOUS ONCE
Status: COMPLETED | OUTPATIENT
Start: 2017-01-01 | End: 2017-01-01

## 2017-01-01 RX ORDER — PREDNISONE 10 MG/1
TABLET ORAL
Qty: 3 TABLET | Refills: 0 | Status: ON HOLD | OUTPATIENT
Start: 2017-01-01 | End: 2017-01-01

## 2017-01-01 RX ORDER — METHYLPREDNISOLONE SODIUM SUCCINATE 40 MG/ML
40 INJECTION, POWDER, LYOPHILIZED, FOR SOLUTION INTRAMUSCULAR; INTRAVENOUS EVERY 12 HOURS SCHEDULED
Status: DISCONTINUED | OUTPATIENT
Start: 2017-01-01 | End: 2017-01-01

## 2017-01-01 RX ORDER — FAMOTIDINE 20 MG/1
20 TABLET, FILM COATED ORAL DAILY
Status: DISCONTINUED | OUTPATIENT
Start: 2017-01-01 | End: 2017-01-01 | Stop reason: HOSPADM

## 2017-01-01 RX ORDER — MAGNESIUM SULFATE HEPTAHYDRATE 40 MG/ML
2 INJECTION, SOLUTION INTRAVENOUS AS NEEDED
Status: DISCONTINUED | OUTPATIENT
Start: 2017-01-01 | End: 2018-01-01 | Stop reason: HOSPADM

## 2017-01-01 RX ORDER — HYDRALAZINE HYDROCHLORIDE 25 MG/1
75 TABLET, FILM COATED ORAL 3 TIMES DAILY
Qty: 180 TABLET | Refills: 0 | Status: SHIPPED | OUTPATIENT
Start: 2017-01-01

## 2017-01-01 RX ORDER — ASPIRIN 81 MG/1
81 TABLET ORAL DAILY
COMMUNITY

## 2017-01-01 RX ORDER — DOXYCYCLINE 100 MG/1
100 CAPSULE ORAL 2 TIMES DAILY
Qty: 20 CAPSULE | Refills: 0 | Status: ON HOLD | OUTPATIENT
Start: 2017-01-01 | End: 2017-01-01

## 2017-01-01 RX ORDER — GABAPENTIN 300 MG/1
300 CAPSULE ORAL 2 TIMES DAILY
Status: ON HOLD | COMMUNITY
End: 2017-01-01

## 2017-01-01 RX ORDER — CARVEDILOL 6.25 MG/1
12.5 TABLET ORAL 2 TIMES DAILY WITH MEALS
Status: CANCELLED | OUTPATIENT
Start: 2017-01-01

## 2017-01-01 RX ORDER — POTASSIUM CHLORIDE 1.5 G/1.77G
40 POWDER, FOR SOLUTION ORAL AS NEEDED
Status: DISCONTINUED | OUTPATIENT
Start: 2017-01-01 | End: 2018-01-01 | Stop reason: HOSPADM

## 2017-01-01 RX ORDER — GABAPENTIN 300 MG/1
300 CAPSULE ORAL NIGHTLY
Status: DISCONTINUED | OUTPATIENT
Start: 2017-01-01 | End: 2018-01-01

## 2017-01-01 RX ORDER — METHYLPREDNISOLONE SODIUM SUCCINATE 125 MG/2ML
125 INJECTION, POWDER, LYOPHILIZED, FOR SOLUTION INTRAMUSCULAR; INTRAVENOUS ONCE
Status: COMPLETED | OUTPATIENT
Start: 2017-01-01 | End: 2017-01-01

## 2017-01-01 RX ORDER — ONDANSETRON 2 MG/ML
INJECTION INTRAMUSCULAR; INTRAVENOUS
Status: COMPLETED
Start: 2017-01-01 | End: 2017-01-01

## 2017-01-01 RX ORDER — LEVOTHYROXINE SODIUM 0.03 MG/1
25 TABLET ORAL DAILY
Status: DISCONTINUED | OUTPATIENT
Start: 2017-01-01 | End: 2017-01-01 | Stop reason: HOSPADM

## 2017-01-01 RX ORDER — PROPOFOL 10 MG/ML
70 VIAL (ML) INTRAVENOUS ONCE
Status: COMPLETED | OUTPATIENT
Start: 2017-01-01 | End: 2017-01-01

## 2017-01-01 RX ORDER — HYDRALAZINE HYDROCHLORIDE 25 MG/1
25 TABLET, FILM COATED ORAL 3 TIMES DAILY
Status: DISCONTINUED | OUTPATIENT
Start: 2017-01-01 | End: 2017-01-01

## 2017-01-01 RX ORDER — ALBUTEROL SULFATE 90 UG/1
2-4 AEROSOL, METERED RESPIRATORY (INHALATION) EVERY 4 HOURS PRN
Qty: 1 INHALER | Refills: 0 | Status: ON HOLD | OUTPATIENT
Start: 2017-01-01 | End: 2017-01-01

## 2017-01-01 RX ORDER — INSULIN ASPART 100 [IU]/ML
52 INJECTION, SUSPENSION SUBCUTANEOUS
Status: CANCELLED | OUTPATIENT
Start: 2017-01-01

## 2017-01-01 RX ORDER — PREDNISONE 20 MG/1
20 TABLET ORAL
Status: DISCONTINUED | OUTPATIENT
Start: 2017-01-01 | End: 2017-01-01

## 2017-01-01 RX ORDER — LORATADINE 10 MG/1
CAPSULE, LIQUID FILLED ORAL
Status: ON HOLD | COMMUNITY
End: 2017-01-01

## 2017-01-01 RX ORDER — POTASSIUM CHLORIDE 7.45 MG/ML
10 INJECTION INTRAVENOUS
Status: DISCONTINUED | OUTPATIENT
Start: 2017-01-01 | End: 2018-01-01 | Stop reason: HOSPADM

## 2017-01-01 RX ORDER — LANOLIN ALCOHOL/MO/W.PET/CERES
2000 CREAM (GRAM) TOPICAL DAILY
Status: DISCONTINUED | OUTPATIENT
Start: 2017-01-01 | End: 2018-01-01 | Stop reason: HOSPADM

## 2017-01-01 RX ORDER — BUMETANIDE 1 MG/1
1 TABLET ORAL DAILY
Qty: 30 TABLET | Refills: 0 | Status: SHIPPED | OUTPATIENT
Start: 2017-01-01 | End: 2018-01-01 | Stop reason: HOSPADM

## 2017-01-01 RX ORDER — LEVOTHYROXINE SODIUM 0.03 MG/1
25 TABLET ORAL DAILY
COMMUNITY

## 2017-01-01 RX ORDER — LORAZEPAM 2 MG/ML
1 INJECTION INTRAMUSCULAR ONCE
Status: COMPLETED | OUTPATIENT
Start: 2017-01-01 | End: 2017-01-01

## 2017-01-01 RX ORDER — POTASSIUM CHLORIDE 750 MG/1
10 TABLET, FILM COATED, EXTENDED RELEASE ORAL 2 TIMES DAILY
Qty: 15 TABLET | Refills: 0 | Status: SHIPPED | OUTPATIENT
Start: 2017-01-01 | End: 2017-01-01 | Stop reason: HOSPADM

## 2017-01-01 RX ORDER — POTASSIUM CHLORIDE 20 MEQ/1
40 TABLET, EXTENDED RELEASE ORAL EVERY 4 HOURS
Status: COMPLETED | OUTPATIENT
Start: 2017-01-01 | End: 2017-01-01

## 2017-01-01 RX ORDER — TRIAMCINOLONE ACETONIDE 1 MG/G
CREAM TOPICAL 2 TIMES DAILY
Status: ON HOLD | COMMUNITY
End: 2017-01-01

## 2017-01-01 RX ORDER — PREDNISONE 10 MG/1
10 TABLET ORAL
Status: DISCONTINUED | OUTPATIENT
Start: 2017-01-01 | End: 2017-01-01 | Stop reason: HOSPADM

## 2017-01-01 RX ORDER — HYDRALAZINE HYDROCHLORIDE 50 MG/1
100 TABLET, FILM COATED ORAL EVERY 8 HOURS SCHEDULED
Status: DISCONTINUED | OUTPATIENT
Start: 2017-01-01 | End: 2018-01-01 | Stop reason: HOSPADM

## 2017-01-01 RX ORDER — HYDRALAZINE HYDROCHLORIDE 50 MG/1
50 TABLET, FILM COATED ORAL 3 TIMES DAILY
Status: DISCONTINUED | OUTPATIENT
Start: 2017-01-01 | End: 2017-01-01

## 2017-01-01 RX ORDER — BUDESONIDE AND FORMOTEROL FUMARATE DIHYDRATE 160; 4.5 UG/1; UG/1
2 AEROSOL RESPIRATORY (INHALATION)
Status: DISCONTINUED | OUTPATIENT
Start: 2017-01-01 | End: 2018-01-01

## 2017-01-01 RX ORDER — METHYLPREDNISOLONE SODIUM SUCCINATE 40 MG/ML
20 INJECTION, POWDER, LYOPHILIZED, FOR SOLUTION INTRAMUSCULAR; INTRAVENOUS EVERY 12 HOURS SCHEDULED
Status: DISCONTINUED | OUTPATIENT
Start: 2017-01-01 | End: 2018-01-01

## 2017-01-01 RX ORDER — L.ACID,PARA/B.BIFIDUM/S.THERM 8B CELL
1 CAPSULE ORAL DAILY
Status: COMPLETED | OUTPATIENT
Start: 2017-01-01 | End: 2018-01-01

## 2017-01-01 RX ORDER — ASCORBIC ACID 500 MG
1000 TABLET ORAL 2 TIMES DAILY
Status: DISCONTINUED | OUTPATIENT
Start: 2017-01-01 | End: 2017-01-01

## 2017-01-01 RX ORDER — INSULIN ASPART 100 [IU]/ML
52 INJECTION, SUSPENSION SUBCUTANEOUS EVERY MORNING
Status: CANCELLED | OUTPATIENT
Start: 2017-01-01

## 2017-01-01 RX ORDER — ISOSORBIDE MONONITRATE 60 MG/1
180 TABLET, EXTENDED RELEASE ORAL DAILY
Status: DISCONTINUED | OUTPATIENT
Start: 2017-01-01 | End: 2018-01-01

## 2017-01-01 RX ORDER — SULFAMETHOXAZOLE AND TRIMETHOPRIM 800; 160 MG/1; MG/1
2 TABLET ORAL 2 TIMES DAILY
Qty: 56 TABLET | Refills: 0 | Status: ON HOLD | OUTPATIENT
Start: 2017-01-01 | End: 2017-01-01

## 2017-01-01 RX ORDER — PANTOPRAZOLE SODIUM 40 MG/1
40 TABLET, DELAYED RELEASE ORAL DAILY
Status: CANCELLED | OUTPATIENT
Start: 2017-01-01

## 2017-01-01 RX ORDER — AMLODIPINE BESYLATE 10 MG/1
10 TABLET ORAL DAILY
Status: DISCONTINUED | OUTPATIENT
Start: 2017-01-01 | End: 2018-01-01 | Stop reason: HOSPADM

## 2017-01-01 RX ORDER — ONDANSETRON 2 MG/ML
4 INJECTION INTRAMUSCULAR; INTRAVENOUS EVERY 6 HOURS PRN
Status: DISCONTINUED | OUTPATIENT
Start: 2017-01-01 | End: 2017-01-01 | Stop reason: HOSPADM

## 2017-01-01 RX ORDER — BUMETANIDE 1 MG/1
1 TABLET ORAL DAILY
Status: CANCELLED | OUTPATIENT
Start: 2017-01-01

## 2017-01-01 RX ORDER — PREDNISONE 20 MG/1
20 TABLET ORAL 3 TIMES DAILY
Qty: 15 TABLET | Refills: 0 | Status: ON HOLD | OUTPATIENT
Start: 2017-01-01 | End: 2017-01-01

## 2017-01-01 RX ORDER — FONDAPARINUX SODIUM 2.5 MG/.5ML
2.5 INJECTION SUBCUTANEOUS
Status: DISCONTINUED | OUTPATIENT
Start: 2017-01-01 | End: 2018-01-01

## 2017-01-01 RX ORDER — OMEGA-3S/DHA/EPA/FISH OIL/D3 300MG-1000
800 CAPSULE ORAL DAILY
Status: DISCONTINUED | OUTPATIENT
Start: 2017-01-01 | End: 2017-01-01 | Stop reason: HOSPADM

## 2017-01-01 RX ORDER — SODIUM CHLORIDE 450 MG/100ML
50 INJECTION, SOLUTION INTRAVENOUS CONTINUOUS
Status: DISCONTINUED | OUTPATIENT
Start: 2017-01-01 | End: 2017-01-01

## 2017-01-01 RX ORDER — HYDRALAZINE HYDROCHLORIDE 20 MG/ML
10 INJECTION INTRAMUSCULAR; INTRAVENOUS EVERY 6 HOURS PRN
Status: DISCONTINUED | OUTPATIENT
Start: 2017-01-01 | End: 2017-01-01 | Stop reason: HOSPADM

## 2017-01-01 RX ORDER — BUMETANIDE 1 MG/1
1 TABLET ORAL DAILY
Status: DISCONTINUED | OUTPATIENT
Start: 2017-01-01 | End: 2017-01-01

## 2017-01-01 RX ORDER — SERTRALINE HYDROCHLORIDE 100 MG/1
50 TABLET, FILM COATED ORAL EVERY EVENING
COMMUNITY

## 2017-01-01 RX ORDER — IPRATROPIUM BROMIDE AND ALBUTEROL SULFATE 2.5; .5 MG/3ML; MG/3ML
3 SOLUTION RESPIRATORY (INHALATION) EVERY 4 HOURS PRN
Status: DISCONTINUED | OUTPATIENT
Start: 2017-01-01 | End: 2017-01-01

## 2017-01-01 RX ORDER — POTASSIUM CHLORIDE 1.5 G/1.77G
40 POWDER, FOR SOLUTION ORAL ONCE
Status: COMPLETED | OUTPATIENT
Start: 2017-01-01 | End: 2017-01-01

## 2017-01-01 RX ORDER — ASCORBIC ACID 500 MG
500 TABLET ORAL DAILY
Status: DISCONTINUED | OUTPATIENT
Start: 2017-01-01 | End: 2017-01-01 | Stop reason: HOSPADM

## 2017-01-01 RX ORDER — MAGNESIUM SULFATE HEPTAHYDRATE 40 MG/ML
2 INJECTION, SOLUTION INTRAVENOUS ONCE
Status: COMPLETED | OUTPATIENT
Start: 2017-01-01 | End: 2017-01-01

## 2017-01-01 RX ORDER — ISOSORBIDE MONONITRATE 60 MG/1
180 TABLET, EXTENDED RELEASE ORAL DAILY
Status: CANCELLED | OUTPATIENT
Start: 2017-01-01

## 2017-01-01 RX ORDER — ISOSORBIDE MONONITRATE 60 MG/1
180 TABLET, EXTENDED RELEASE ORAL DAILY
Status: DISCONTINUED | OUTPATIENT
Start: 2017-01-01 | End: 2017-01-01 | Stop reason: HOSPADM

## 2017-01-01 RX ORDER — MAGNESIUM SULFATE HEPTAHYDRATE 40 MG/ML
2 INJECTION, SOLUTION INTRAVENOUS
Status: COMPLETED | OUTPATIENT
Start: 2017-01-01 | End: 2017-01-01

## 2017-01-01 RX ORDER — METHYLPREDNISOLONE SODIUM SUCCINATE 40 MG/ML
80 INJECTION, POWDER, LYOPHILIZED, FOR SOLUTION INTRAMUSCULAR; INTRAVENOUS ONCE
Status: COMPLETED | OUTPATIENT
Start: 2017-01-01 | End: 2017-01-01

## 2017-01-01 RX ORDER — NICOTINE POLACRILEX 4 MG
15 LOZENGE BUCCAL
Status: DISCONTINUED | OUTPATIENT
Start: 2017-01-01 | End: 2018-01-01 | Stop reason: HOSPADM

## 2017-01-01 RX ORDER — BUMETANIDE 1 MG/1
1 TABLET ORAL DAILY
Status: DISCONTINUED | OUTPATIENT
Start: 2017-01-01 | End: 2017-01-01 | Stop reason: HOSPADM

## 2017-01-01 RX ORDER — PROPOFOL 10 MG/ML
VIAL (ML) INTRAVENOUS
Status: COMPLETED
Start: 2017-01-01 | End: 2017-01-01

## 2017-01-01 RX ORDER — NITROGLYCERIN 0.4 MG/1
0.4 TABLET SUBLINGUAL
COMMUNITY

## 2017-01-01 RX ORDER — ASPIRIN 81 MG/1
81 TABLET ORAL DAILY
Status: DISCONTINUED | OUTPATIENT
Start: 2017-01-01 | End: 2018-01-01

## 2017-01-01 RX ORDER — OMEGA-3S/DHA/EPA/FISH OIL/D3 300MG-1000
2000 CAPSULE ORAL DAILY
Status: DISCONTINUED | OUTPATIENT
Start: 2017-01-01 | End: 2018-01-01 | Stop reason: HOSPADM

## 2017-01-01 RX ORDER — ISOSORBIDE MONONITRATE 60 MG/1
180 TABLET, EXTENDED RELEASE ORAL DAILY
Status: ON HOLD | COMMUNITY
End: 2018-01-01

## 2017-01-01 RX ORDER — CARVEDILOL 25 MG/1
12.5 TABLET ORAL 2 TIMES DAILY WITH MEALS
COMMUNITY
End: 2018-01-01 | Stop reason: HOSPADM

## 2017-01-01 RX ORDER — PROPOFOL 10 MG/ML
110 VIAL (ML) INTRAVENOUS ONCE
Status: COMPLETED | OUTPATIENT
Start: 2017-01-01 | End: 2017-01-01

## 2017-01-01 RX ORDER — SODIUM CHLORIDE 9 MG/ML
75 INJECTION, SOLUTION INTRAVENOUS CONTINUOUS
Status: DISCONTINUED | OUTPATIENT
Start: 2017-01-01 | End: 2017-01-01 | Stop reason: HOSPADM

## 2017-01-01 RX ORDER — KETOROLAC TROMETHAMINE 30 MG/ML
30 INJECTION, SOLUTION INTRAMUSCULAR; INTRAVENOUS ONCE
Status: COMPLETED | OUTPATIENT
Start: 2017-01-01 | End: 2017-01-01

## 2017-01-01 RX ORDER — TRAMADOL HYDROCHLORIDE 50 MG/1
50 TABLET ORAL EVERY 12 HOURS PRN
COMMUNITY
End: 2018-01-01 | Stop reason: HOSPADM

## 2017-01-01 RX ORDER — SULFAMETHOXAZOLE AND TRIMETHOPRIM 800; 160 MG/1; MG/1
2 TABLET ORAL ONCE
Status: COMPLETED | OUTPATIENT
Start: 2017-01-01 | End: 2017-01-01

## 2017-01-01 RX ORDER — AMOXICILLIN AND CLAVULANATE POTASSIUM 875; 125 MG/1; MG/1
1 TABLET, FILM COATED ORAL 2 TIMES DAILY
Qty: 20 TABLET | Refills: 0 | Status: ON HOLD | OUTPATIENT
Start: 2017-01-01 | End: 2017-01-01

## 2017-01-01 RX ORDER — MAGNESIUM SULFATE HEPTAHYDRATE 40 MG/ML
4 INJECTION, SOLUTION INTRAVENOUS AS NEEDED
Status: DISCONTINUED | OUTPATIENT
Start: 2017-01-01 | End: 2018-01-01 | Stop reason: HOSPADM

## 2017-01-01 RX ORDER — L.ACID,CASEI/B.ANIMAL/S.THERMO 16B CELL
1 CAPSULE ORAL DAILY
Status: DISCONTINUED | OUTPATIENT
Start: 2017-01-01 | End: 2017-01-01 | Stop reason: HOSPADM

## 2017-01-01 RX ORDER — INSULIN ASPART 100 [IU]/ML
18 INJECTION, SUSPENSION SUBCUTANEOUS
Status: CANCELLED | OUTPATIENT
Start: 2017-01-01

## 2017-01-01 RX ORDER — DEXTROSE MONOHYDRATE 25 G/50ML
25 INJECTION, SOLUTION INTRAVENOUS
Status: DISCONTINUED | OUTPATIENT
Start: 2017-01-01 | End: 2018-01-01 | Stop reason: HOSPADM

## 2017-01-01 RX ORDER — HYDRALAZINE HYDROCHLORIDE 20 MG/ML
10 INJECTION INTRAMUSCULAR; INTRAVENOUS ONCE
Status: COMPLETED | OUTPATIENT
Start: 2017-01-01 | End: 2017-01-01

## 2017-01-01 RX ORDER — TORSEMIDE 20 MG/1
20 TABLET ORAL 2 TIMES DAILY
Status: CANCELLED | OUTPATIENT
Start: 2017-01-01

## 2017-01-01 RX ORDER — PANTOPRAZOLE SODIUM 40 MG/1
40 TABLET, DELAYED RELEASE ORAL DAILY
Status: DISCONTINUED | OUTPATIENT
Start: 2017-01-01 | End: 2018-01-01 | Stop reason: CLARIF

## 2017-01-01 RX ORDER — FAMOTIDINE 10 MG/ML
20 INJECTION, SOLUTION INTRAVENOUS ONCE
Status: COMPLETED | OUTPATIENT
Start: 2017-01-01 | End: 2017-01-01

## 2017-01-01 RX ORDER — TORSEMIDE 20 MG/1
20 TABLET ORAL 2 TIMES DAILY
COMMUNITY
End: 2017-01-01 | Stop reason: HOSPADM

## 2017-01-01 RX ORDER — MAGNESIUM OXIDE 420 MG/1
420 TABLET ORAL 2 TIMES DAILY
COMMUNITY
End: 2017-01-01 | Stop reason: HOSPADM

## 2017-01-01 RX ORDER — HYDRALAZINE HYDROCHLORIDE 25 MG/1
25 TABLET, FILM COATED ORAL 3 TIMES DAILY
COMMUNITY
End: 2017-01-01 | Stop reason: HOSPADM

## 2017-01-01 RX ORDER — CARVEDILOL 6.25 MG/1
18.75 TABLET ORAL 2 TIMES DAILY WITH MEALS
Status: DISCONTINUED | OUTPATIENT
Start: 2017-01-01 | End: 2018-01-01

## 2017-01-01 RX ORDER — CARVEDILOL 6.25 MG/1
12.5 TABLET ORAL 2 TIMES DAILY WITH MEALS
Status: DISCONTINUED | OUTPATIENT
Start: 2017-01-01 | End: 2017-01-01

## 2017-01-01 RX ORDER — LORATADINE 10 MG/1
10 TABLET ORAL DAILY
COMMUNITY
End: 2018-01-01 | Stop reason: HOSPADM

## 2017-01-01 RX ORDER — POTASSIUM CHLORIDE 7.45 MG/ML
10 INJECTION INTRAVENOUS ONCE
Status: COMPLETED | OUTPATIENT
Start: 2017-01-01 | End: 2017-01-01

## 2017-01-01 RX ORDER — SUCCINYLCHOLINE CHLORIDE 20 MG/ML
180 INJECTION INTRAMUSCULAR; INTRAVENOUS ONCE
Status: COMPLETED | OUTPATIENT
Start: 2017-01-01 | End: 2017-01-01

## 2017-01-01 RX ORDER — NITROGLYCERIN 0.4 MG/1
0.4 TABLET SUBLINGUAL
Status: DISCONTINUED | OUTPATIENT
Start: 2017-01-01 | End: 2018-01-01 | Stop reason: HOSPADM

## 2017-01-01 RX ORDER — AMLODIPINE BESYLATE 10 MG/1
10 TABLET ORAL DAILY
Status: DISCONTINUED | OUTPATIENT
Start: 2017-01-01 | End: 2017-01-01 | Stop reason: HOSPADM

## 2017-01-01 RX ORDER — LEVOTHYROXINE SODIUM 0.03 MG/1
25 TABLET ORAL DAILY
Status: DISCONTINUED | OUTPATIENT
Start: 2017-01-01 | End: 2018-01-01 | Stop reason: HOSPADM

## 2017-01-01 RX ORDER — METHYLPREDNISOLONE SODIUM SUCCINATE 40 MG/ML
40 INJECTION, POWDER, LYOPHILIZED, FOR SOLUTION INTRAMUSCULAR; INTRAVENOUS EVERY 12 HOURS
Status: DISCONTINUED | OUTPATIENT
Start: 2017-01-01 | End: 2017-01-01

## 2017-01-01 RX ORDER — SODIUM CHLORIDE 0.9 % (FLUSH) 0.9 %
1-10 SYRINGE (ML) INJECTION AS NEEDED
Status: DISCONTINUED | OUTPATIENT
Start: 2017-01-01 | End: 2017-01-01 | Stop reason: HOSPADM

## 2017-01-01 RX ORDER — SULFAMETHOXAZOLE AND TRIMETHOPRIM 800; 160 MG/1; MG/1
1 TABLET ORAL 2 TIMES DAILY
Qty: 14 TABLET | Refills: 0 | Status: SHIPPED | OUTPATIENT
Start: 2017-01-01 | End: 2017-01-01

## 2017-01-01 RX ADMIN — PREDNISONE 20 MG: 20 TABLET ORAL at 08:57

## 2017-01-01 RX ADMIN — Medication 2000 MCG: at 19:33

## 2017-01-01 RX ADMIN — INSULIN ASPART 8 UNITS: 100 INJECTION, SOLUTION INTRAVENOUS; SUBCUTANEOUS at 17:47

## 2017-01-01 RX ADMIN — INSULIN DETEMIR 30 UNITS: 100 INJECTION, SOLUTION SUBCUTANEOUS at 09:10

## 2017-01-01 RX ADMIN — MAGNESIUM SULFATE IN WATER 2 G: 40 INJECTION, SOLUTION INTRAVENOUS at 06:42

## 2017-01-01 RX ADMIN — ISOSORBIDE MONONITRATE 180 MG: 60 TABLET, EXTENDED RELEASE ORAL at 19:34

## 2017-01-01 RX ADMIN — CHLORASEPTIC 2 SPRAY: 1.5 LIQUID ORAL at 17:35

## 2017-01-01 RX ADMIN — PREDNISONE 20 MG: 20 TABLET ORAL at 09:09

## 2017-01-01 RX ADMIN — IPRATROPIUM BROMIDE AND ALBUTEROL SULFATE 3 ML: .5; 3 SOLUTION RESPIRATORY (INHALATION) at 01:09

## 2017-01-01 RX ADMIN — CETIRIZINE HYDROCHLORIDE 10 MG: 10 TABLET ORAL at 08:15

## 2017-01-01 RX ADMIN — PROPOFOL 20 MCG/KG/MIN: 10 INJECTION, EMULSION INTRAVENOUS at 01:41

## 2017-01-01 RX ADMIN — INSULIN DETEMIR 30 UNITS: 100 INJECTION, SOLUTION SUBCUTANEOUS at 08:32

## 2017-01-01 RX ADMIN — SODIUM CHLORIDE 75 ML/HR: 9 INJECTION, SOLUTION INTRAVENOUS at 17:07

## 2017-01-01 RX ADMIN — OXYCODONE HYDROCHLORIDE AND ACETAMINOPHEN 1000 MG: 500 TABLET ORAL at 18:57

## 2017-01-01 RX ADMIN — POTASSIUM CHLORIDE 40 MEQ: 1500 TABLET, EXTENDED RELEASE ORAL at 19:33

## 2017-01-01 RX ADMIN — HUMAN INSULIN 12 UNITS: 100 INJECTION, SOLUTION SUBCUTANEOUS at 18:58

## 2017-01-01 RX ADMIN — FAMOTIDINE 20 MG: 10 INJECTION INTRAVENOUS at 08:19

## 2017-01-01 RX ADMIN — FUROSEMIDE 20 MG: 10 INJECTION, SOLUTION INTRAMUSCULAR; INTRAVENOUS at 10:47

## 2017-01-01 RX ADMIN — TAZOBACTAM SODIUM AND PIPERACILLIN SODIUM 4.5 G: .5; 4 INJECTION, POWDER, LYOPHILIZED, FOR SOLUTION INTRAVENOUS at 17:11

## 2017-01-01 RX ADMIN — SODIUM CHLORIDE 50 ML/HR: 4.5 INJECTION, SOLUTION INTRAVENOUS at 02:37

## 2017-01-01 RX ADMIN — CASTOR OIL AND BALSAM, PERU: 788; 87 OINTMENT TOPICAL at 11:41

## 2017-01-01 RX ADMIN — BUDESONIDE AND FORMOTEROL FUMARATE DIHYDRATE 2 PUFF: 160; 4.5 AEROSOL RESPIRATORY (INHALATION) at 06:49

## 2017-01-01 RX ADMIN — IPRATROPIUM BROMIDE 0.5 MG: 0.5 SOLUTION RESPIRATORY (INHALATION) at 12:18

## 2017-01-01 RX ADMIN — PROPOFOL 5 MCG/KG/MIN: 10 INJECTION, EMULSION INTRAVENOUS at 03:05

## 2017-01-01 RX ADMIN — ONDANSETRON 4 MG: 2 INJECTION, SOLUTION INTRAMUSCULAR; INTRAVENOUS at 14:39

## 2017-01-01 RX ADMIN — INSULIN ASPART 12 UNITS: 100 INJECTION, SOLUTION INTRAVENOUS; SUBCUTANEOUS at 10:07

## 2017-01-01 RX ADMIN — INSULIN DETEMIR 30 UNITS: 100 INJECTION, SOLUTION SUBCUTANEOUS at 09:23

## 2017-01-01 RX ADMIN — FAMOTIDINE 20 MG: 10 INJECTION INTRAVENOUS at 14:37

## 2017-01-01 RX ADMIN — SODIUM CHLORIDE 100 ML/HR: 4.5 INJECTION, SOLUTION INTRAVENOUS at 16:38

## 2017-01-01 RX ADMIN — CASTOR OIL AND BALSAM, PERU: 788; 87 OINTMENT TOPICAL at 17:46

## 2017-01-01 RX ADMIN — HYDRALAZINE HYDROCHLORIDE 75 MG: 50 TABLET ORAL at 22:21

## 2017-01-01 RX ADMIN — Medication 1 CAPSULE: at 09:09

## 2017-01-01 RX ADMIN — SODIUM CHLORIDE 100 ML/HR: 9 INJECTION, SOLUTION INTRAVENOUS at 05:23

## 2017-01-01 RX ADMIN — INSULIN ASPART 5 UNITS: 100 INJECTION, SOLUTION INTRAVENOUS; SUBCUTANEOUS at 12:54

## 2017-01-01 RX ADMIN — Medication 1 CAPSULE: at 09:26

## 2017-01-01 RX ADMIN — CASTOR OIL AND BALSAM, PERU: 788; 87 OINTMENT TOPICAL at 08:32

## 2017-01-01 RX ADMIN — ATORVASTATIN CALCIUM 80 MG: 40 TABLET, FILM COATED ORAL at 19:35

## 2017-01-01 RX ADMIN — INSULIN ASPART 10 UNITS: 100 INJECTION, SOLUTION INTRAVENOUS; SUBCUTANEOUS at 16:57

## 2017-01-01 RX ADMIN — INSULIN ASPART 10 UNITS: 100 INJECTION, SOLUTION INTRAVENOUS; SUBCUTANEOUS at 15:25

## 2017-01-01 RX ADMIN — HEPARIN SODIUM 5000 UNITS: 5000 INJECTION, SOLUTION INTRAVENOUS; SUBCUTANEOUS at 09:10

## 2017-01-01 RX ADMIN — BUDESONIDE AND FORMOTEROL FUMARATE DIHYDRATE 2 PUFF: 160; 4.5 AEROSOL RESPIRATORY (INHALATION) at 18:57

## 2017-01-01 RX ADMIN — VANCOMYCIN HYDROCHLORIDE 2250 MG: 5 INJECTION, POWDER, LYOPHILIZED, FOR SOLUTION INTRAVENOUS at 16:26

## 2017-01-01 RX ADMIN — DOXYCYCLINE 100 MG: 100 INJECTION, POWDER, LYOPHILIZED, FOR SOLUTION INTRAVENOUS at 05:25

## 2017-01-01 RX ADMIN — ASPIRIN 324 MG: 81 TABLET, CHEWABLE ORAL at 15:48

## 2017-01-01 RX ADMIN — TAZOBACTAM SODIUM AND PIPERACILLIN SODIUM 4.5 G: .5; 4 INJECTION, POWDER, LYOPHILIZED, FOR SOLUTION INTRAVENOUS at 05:14

## 2017-01-01 RX ADMIN — OXYCODONE HYDROCHLORIDE AND ACETAMINOPHEN 1000 MG: 500 TABLET ORAL at 08:31

## 2017-01-01 RX ADMIN — PROPOFOL 20 MCG/KG/MIN: 10 INJECTION, EMULSION INTRAVENOUS at 06:43

## 2017-01-01 RX ADMIN — TAZOBACTAM SODIUM AND PIPERACILLIN SODIUM 4.5 G: .5; 4 INJECTION, POWDER, LYOPHILIZED, FOR SOLUTION INTRAVENOUS at 23:44

## 2017-01-01 RX ADMIN — CETIRIZINE HYDROCHLORIDE 10 MG: 10 TABLET ORAL at 08:16

## 2017-01-01 RX ADMIN — PROPOFOL 50 MCG/KG/MIN: 10 INJECTION, EMULSION INTRAVENOUS at 05:47

## 2017-01-01 RX ADMIN — HYDRALAZINE HYDROCHLORIDE 75 MG: 50 TABLET ORAL at 05:14

## 2017-01-01 RX ADMIN — INSULIN ASPART 8 UNITS: 100 INJECTION, SOLUTION INTRAVENOUS; SUBCUTANEOUS at 20:59

## 2017-01-01 RX ADMIN — METHYLPREDNISOLONE SODIUM SUCCINATE 20 MG: 40 INJECTION, POWDER, FOR SOLUTION INTRAMUSCULAR; INTRAVENOUS at 21:53

## 2017-01-01 RX ADMIN — FUROSEMIDE 40 MG: 10 INJECTION, SOLUTION INTRAMUSCULAR; INTRAVENOUS at 15:19

## 2017-01-01 RX ADMIN — Medication: at 05:33

## 2017-01-01 RX ADMIN — CARVEDILOL 12.5 MG: 6.25 TABLET, FILM COATED ORAL at 18:57

## 2017-01-01 RX ADMIN — GABAPENTIN 300 MG: 300 CAPSULE ORAL at 22:38

## 2017-01-01 RX ADMIN — ASPIRIN 324 MG: 81 TABLET, CHEWABLE ORAL at 12:03

## 2017-01-01 RX ADMIN — SODIUM CHLORIDE 100 ML/HR: 9 INJECTION, SOLUTION INTRAVENOUS at 12:54

## 2017-01-01 RX ADMIN — POTASSIUM CHLORIDE 40 MEQ: 1.5 POWDER, FOR SOLUTION ORAL at 18:55

## 2017-01-01 RX ADMIN — OXYCODONE HYDROCHLORIDE AND ACETAMINOPHEN 1000 MG: 500 TABLET ORAL at 09:09

## 2017-01-01 RX ADMIN — ISOSORBIDE MONONITRATE 180 MG: 60 TABLET, EXTENDED RELEASE ORAL at 08:16

## 2017-01-01 RX ADMIN — BUDESONIDE AND FORMOTEROL FUMARATE DIHYDRATE 2 PUFF: 160; 4.5 AEROSOL RESPIRATORY (INHALATION) at 18:59

## 2017-01-01 RX ADMIN — AMLODIPINE BESYLATE 10 MG: 10 TABLET ORAL at 09:24

## 2017-01-01 RX ADMIN — INSULIN ASPART 5 UNITS: 100 INJECTION, SOLUTION INTRAVENOUS; SUBCUTANEOUS at 17:54

## 2017-01-01 RX ADMIN — Medication 1 CAPSULE: at 08:15

## 2017-01-01 RX ADMIN — DOXYCYCLINE 100 MG: 100 INJECTION, POWDER, LYOPHILIZED, FOR SOLUTION INTRAVENOUS at 05:38

## 2017-01-01 RX ADMIN — HEPARIN SODIUM 5000 UNITS: 5000 INJECTION, SOLUTION INTRAVENOUS; SUBCUTANEOUS at 08:22

## 2017-01-01 RX ADMIN — SODIUM CHLORIDE 100 ML/HR: 9 INJECTION, SOLUTION INTRAVENOUS at 01:45

## 2017-01-01 RX ADMIN — IPRATROPIUM BROMIDE 0.5 MG: 0.5 SOLUTION RESPIRATORY (INHALATION) at 13:00

## 2017-01-01 RX ADMIN — DOXYCYCLINE 100 MG: 100 INJECTION, POWDER, LYOPHILIZED, FOR SOLUTION INTRAVENOUS at 17:00

## 2017-01-01 RX ADMIN — SODIUM CHLORIDE 100 ML/HR: 9 INJECTION, SOLUTION INTRAVENOUS at 08:13

## 2017-01-01 RX ADMIN — KETOROLAC TROMETHAMINE 30 MG: 30 INJECTION, SOLUTION INTRAMUSCULAR; INTRAVENOUS at 18:20

## 2017-01-01 RX ADMIN — HEPARIN SODIUM 5000 UNITS: 5000 INJECTION, SOLUTION INTRAVENOUS; SUBCUTANEOUS at 20:28

## 2017-01-01 RX ADMIN — INSULIN ASPART 4 UNITS: 100 INJECTION, SOLUTION INTRAVENOUS; SUBCUTANEOUS at 12:21

## 2017-01-01 RX ADMIN — INSULIN ASPART 10 UNITS: 100 INJECTION, SOLUTION INTRAVENOUS; SUBCUTANEOUS at 21:47

## 2017-01-01 RX ADMIN — Medication 1 CAPSULE: at 08:50

## 2017-01-01 RX ADMIN — SODIUM CHLORIDE 100 ML/HR: 9 INJECTION, SOLUTION INTRAVENOUS at 09:25

## 2017-01-01 RX ADMIN — INSULIN ASPART 5 UNITS: 100 INJECTION, SOLUTION INTRAVENOUS; SUBCUTANEOUS at 17:37

## 2017-01-01 RX ADMIN — METHYLPREDNISOLONE SODIUM SUCCINATE 20 MG: 40 INJECTION, POWDER, FOR SOLUTION INTRAMUSCULAR; INTRAVENOUS at 22:37

## 2017-01-01 RX ADMIN — POTASSIUM CHLORIDE 40 MEQ: 1500 TABLET, EXTENDED RELEASE ORAL at 09:26

## 2017-01-01 RX ADMIN — CASTOR OIL AND BALSAM, PERU: 788; 87 OINTMENT TOPICAL at 17:35

## 2017-01-01 RX ADMIN — TAZOBACTAM SODIUM AND PIPERACILLIN SODIUM 4.5 G: .5; 4 INJECTION, POWDER, LYOPHILIZED, FOR SOLUTION INTRAVENOUS at 18:59

## 2017-01-01 RX ADMIN — TRAMADOL HYDROCHLORIDE 50 MG: 50 TABLET, COATED ORAL at 06:10

## 2017-01-01 RX ADMIN — INSULIN DETEMIR 30 UNITS: 100 INJECTION, SOLUTION SUBCUTANEOUS at 08:20

## 2017-01-01 RX ADMIN — HEPARIN SODIUM 5000 UNITS: 5000 INJECTION, SOLUTION INTRAVENOUS; SUBCUTANEOUS at 20:31

## 2017-01-01 RX ADMIN — INSULIN DETEMIR 20 UNITS: 100 INJECTION, SOLUTION SUBCUTANEOUS at 21:48

## 2017-01-01 RX ADMIN — IPRATROPIUM BROMIDE 0.5 MG: 0.5 SOLUTION RESPIRATORY (INHALATION) at 06:52

## 2017-01-01 RX ADMIN — PANTOPRAZOLE SODIUM 40 MG: 40 TABLET, DELAYED RELEASE ORAL at 08:15

## 2017-01-01 RX ADMIN — HYDRALAZINE HYDROCHLORIDE 100 MG: 50 TABLET ORAL at 13:34

## 2017-01-01 RX ADMIN — FAMOTIDINE 20 MG: 10 INJECTION INTRAVENOUS at 09:22

## 2017-01-01 RX ADMIN — SODIUM CHLORIDE 500 ML: 9 INJECTION, SOLUTION INTRAVENOUS at 04:03

## 2017-01-01 RX ADMIN — AMLODIPINE BESYLATE 10 MG: 10 TABLET ORAL at 08:14

## 2017-01-01 RX ADMIN — Medication 1 CAPSULE: at 09:22

## 2017-01-01 RX ADMIN — Medication 2000 MCG: at 08:15

## 2017-01-01 RX ADMIN — BUMETANIDE 1 MG: 1 TABLET ORAL at 09:23

## 2017-01-01 RX ADMIN — KETOROLAC TROMETHAMINE 30 MG: 30 INJECTION, SOLUTION INTRAMUSCULAR at 00:01

## 2017-01-01 RX ADMIN — NITROGLYCERIN 0.4 MG: 0.4 TABLET SUBLINGUAL at 12:08

## 2017-01-01 RX ADMIN — DOXYCYCLINE 100 MG: 100 INJECTION, POWDER, LYOPHILIZED, FOR SOLUTION INTRAVENOUS at 17:48

## 2017-01-01 RX ADMIN — MAGNESIUM SULFATE IN WATER 2 G: 40 INJECTION, SOLUTION INTRAVENOUS at 11:11

## 2017-01-01 RX ADMIN — HYDRALAZINE HYDROCHLORIDE 100 MG: 50 TABLET ORAL at 22:37

## 2017-01-01 RX ADMIN — SODIUM PHOSPHATE, MONOBASIC, MONOHYDRATE 15 MMOL: 276; 142 INJECTION, SOLUTION INTRAVENOUS at 12:25

## 2017-01-01 RX ADMIN — GABAPENTIN 300 MG: 300 CAPSULE ORAL at 21:47

## 2017-01-01 RX ADMIN — Medication: at 18:32

## 2017-01-01 RX ADMIN — LEVOTHYROXINE SODIUM 25 MCG: 25 TABLET ORAL at 19:36

## 2017-01-01 RX ADMIN — PROPOFOL 30 MCG/KG/MIN: 10 INJECTION, EMULSION INTRAVENOUS at 00:51

## 2017-01-01 RX ADMIN — CHOLECALCIFEROL TAB 10 MCG (400 UNIT) 800 UNITS: 10 TAB at 08:20

## 2017-01-01 RX ADMIN — HEPARIN SODIUM 5000 UNITS: 5000 INJECTION, SOLUTION INTRAVENOUS; SUBCUTANEOUS at 09:24

## 2017-01-01 RX ADMIN — SODIUM PHOSPHATE, MONOBASIC, MONOHYDRATE 15 MMOL: 276; 142 INJECTION, SOLUTION INTRAVENOUS at 13:32

## 2017-01-01 RX ADMIN — OXYCODONE HYDROCHLORIDE AND ACETAMINOPHEN 1000 MG: 500 TABLET ORAL at 08:04

## 2017-01-01 RX ADMIN — PANTOPRAZOLE SODIUM 40 MG: 40 TABLET, DELAYED RELEASE ORAL at 09:27

## 2017-01-01 RX ADMIN — CEFTRIAXONE 1 G: 1 INJECTION, POWDER, FOR SOLUTION INTRAMUSCULAR; INTRAVENOUS at 03:00

## 2017-01-01 RX ADMIN — TRAMADOL HYDROCHLORIDE 50 MG: 50 TABLET, COATED ORAL at 21:45

## 2017-01-01 RX ADMIN — TRAMADOL HYDROCHLORIDE 50 MG: 50 TABLET, COATED ORAL at 05:49

## 2017-01-01 RX ADMIN — SODIUM CHLORIDE 500 ML: 9 INJECTION, SOLUTION INTRAVENOUS at 18:20

## 2017-01-01 RX ADMIN — HEPARIN SODIUM 5000 UNITS: 5000 INJECTION, SOLUTION INTRAVENOUS; SUBCUTANEOUS at 21:45

## 2017-01-01 RX ADMIN — CASTOR OIL AND BALSAM, PERU: 788; 87 OINTMENT TOPICAL at 18:05

## 2017-01-01 RX ADMIN — INSULIN ASPART 6 UNITS: 100 INJECTION, SOLUTION INTRAVENOUS; SUBCUTANEOUS at 17:02

## 2017-01-01 RX ADMIN — LEVOTHYROXINE SODIUM 25 MCG: 25 TABLET ORAL at 08:16

## 2017-01-01 RX ADMIN — PROPOFOL 50 MCG/KG/MIN: 10 INJECTION, EMULSION INTRAVENOUS at 13:09

## 2017-01-01 RX ADMIN — METHYLPREDNISOLONE SODIUM SUCCINATE 40 MG: 40 INJECTION, POWDER, FOR SOLUTION INTRAMUSCULAR; INTRAVENOUS at 05:12

## 2017-01-01 RX ADMIN — SODIUM CHLORIDE 3630 ML: 9 INJECTION, SOLUTION INTRAVENOUS at 17:38

## 2017-01-01 RX ADMIN — SODIUM CHLORIDE 100 ML/HR: 9 INJECTION, SOLUTION INTRAVENOUS at 17:33

## 2017-01-01 RX ADMIN — VANCOMYCIN HYDROCHLORIDE 1000 MG: 5 INJECTION, POWDER, LYOPHILIZED, FOR SOLUTION INTRAVENOUS at 02:29

## 2017-01-01 RX ADMIN — OXYCODONE HYDROCHLORIDE AND ACETAMINOPHEN 1000 MG: 500 TABLET ORAL at 08:20

## 2017-01-01 RX ADMIN — IPRATROPIUM BROMIDE AND ALBUTEROL SULFATE 3 ML: .5; 3 SOLUTION RESPIRATORY (INHALATION) at 17:02

## 2017-01-01 RX ADMIN — ASPIRIN 324 MG: 81 TABLET, CHEWABLE ORAL at 01:57

## 2017-01-01 RX ADMIN — CASTOR OIL AND BALSAM, PERU: 788; 87 OINTMENT TOPICAL at 08:22

## 2017-01-01 RX ADMIN — CASTOR OIL AND BALSAM, PERU: 788; 87 OINTMENT TOPICAL at 17:48

## 2017-01-01 RX ADMIN — PROPOFOL 110 MG: 10 INJECTION, EMULSION INTRAVENOUS at 02:48

## 2017-01-01 RX ADMIN — METHYLPREDNISOLONE SODIUM SUCCINATE 80 MG: 40 INJECTION, POWDER, FOR SOLUTION INTRAMUSCULAR; INTRAVENOUS at 09:33

## 2017-01-01 RX ADMIN — Medication 2000 UNITS: at 08:15

## 2017-01-01 RX ADMIN — ETOMIDATE 20 MG: 2 INJECTION, SOLUTION INTRAVENOUS at 02:56

## 2017-01-01 RX ADMIN — IPRATROPIUM BROMIDE 0.5 MG: 0.5 SOLUTION RESPIRATORY (INHALATION) at 18:58

## 2017-01-01 RX ADMIN — CARVEDILOL 12.5 MG: 6.25 TABLET, FILM COATED ORAL at 17:54

## 2017-01-01 RX ADMIN — IPRATROPIUM BROMIDE 0.5 MG: 0.5 SOLUTION RESPIRATORY (INHALATION) at 06:49

## 2017-01-01 RX ADMIN — METHYLPREDNISOLONE SODIUM SUCCINATE 60 MG: 125 INJECTION, POWDER, FOR SOLUTION INTRAMUSCULAR; INTRAVENOUS at 05:23

## 2017-01-01 RX ADMIN — CEFTRIAXONE 1 G: 1 INJECTION, POWDER, FOR SOLUTION INTRAMUSCULAR; INTRAVENOUS at 04:28

## 2017-01-01 RX ADMIN — IPRATROPIUM BROMIDE AND ALBUTEROL SULFATE 3 ML: .5; 3 SOLUTION RESPIRATORY (INHALATION) at 09:34

## 2017-01-01 RX ADMIN — ONDANSETRON 4 MG: 2 INJECTION INTRAMUSCULAR; INTRAVENOUS at 16:02

## 2017-01-01 RX ADMIN — CEFTRIAXONE 1 G: 1 INJECTION, POWDER, FOR SOLUTION INTRAMUSCULAR; INTRAVENOUS at 03:25

## 2017-01-01 RX ADMIN — HYDRALAZINE HYDROCHLORIDE 50 MG: 50 TABLET ORAL at 15:33

## 2017-01-01 RX ADMIN — OXYCODONE HYDROCHLORIDE AND ACETAMINOPHEN 1000 MG: 500 TABLET ORAL at 18:19

## 2017-01-01 RX ADMIN — CASTOR OIL AND BALSAM, PERU: 788; 87 OINTMENT TOPICAL at 18:56

## 2017-01-01 RX ADMIN — POTASSIUM CHLORIDE 10 MEQ: 7.46 INJECTION, SOLUTION INTRAVENOUS at 18:52

## 2017-01-01 RX ADMIN — POTASSIUM CHLORIDE 40 MEQ: 1500 TABLET, EXTENDED RELEASE ORAL at 13:34

## 2017-01-01 RX ADMIN — PANTOPRAZOLE SODIUM 40 MG: 40 TABLET, DELAYED RELEASE ORAL at 19:34

## 2017-01-01 RX ADMIN — IPRATROPIUM BROMIDE 0.5 MG: 0.5 SOLUTION RESPIRATORY (INHALATION) at 00:35

## 2017-01-01 RX ADMIN — CARVEDILOL 12.5 MG: 6.25 TABLET, FILM COATED ORAL at 09:22

## 2017-01-01 RX ADMIN — FERROUS SULFATE TAB 325 MG (65 MG ELEMENTAL FE) 325 MG: 325 (65 FE) TAB at 09:29

## 2017-01-01 RX ADMIN — PROPOFOL 40 MCG/KG/MIN: 10 INJECTION, EMULSION INTRAVENOUS at 15:10

## 2017-01-01 RX ADMIN — INSULIN ASPART 5 UNITS: 100 INJECTION, SOLUTION INTRAVENOUS; SUBCUTANEOUS at 18:58

## 2017-01-01 RX ADMIN — METHYLPREDNISOLONE SODIUM SUCCINATE 125 MG: 125 INJECTION, POWDER, FOR SOLUTION INTRAMUSCULAR; INTRAVENOUS at 14:37

## 2017-01-01 RX ADMIN — INSULIN ASPART 10 UNITS: 100 INJECTION, SOLUTION INTRAVENOUS; SUBCUTANEOUS at 13:34

## 2017-01-01 RX ADMIN — HYDRALAZINE HYDROCHLORIDE 75 MG: 50 TABLET ORAL at 09:23

## 2017-01-01 RX ADMIN — VANCOMYCIN HYDROCHLORIDE 1000 MG: 5 INJECTION, POWDER, LYOPHILIZED, FOR SOLUTION INTRAVENOUS at 02:57

## 2017-01-01 RX ADMIN — INSULIN ASPART 12 UNITS: 100 INJECTION, SOLUTION INTRAVENOUS; SUBCUTANEOUS at 18:58

## 2017-01-01 RX ADMIN — TAZOBACTAM SODIUM AND PIPERACILLIN SODIUM 4.5 G: .5; 4 INJECTION, POWDER, LYOPHILIZED, FOR SOLUTION INTRAVENOUS at 15:49

## 2017-01-01 RX ADMIN — IPRATROPIUM BROMIDE 0.5 MG: 0.5 SOLUTION RESPIRATORY (INHALATION) at 00:52

## 2017-01-01 RX ADMIN — INSULIN ASPART 10 UNITS: 100 INJECTION, SOLUTION INTRAVENOUS; SUBCUTANEOUS at 08:14

## 2017-01-01 RX ADMIN — IPRATROPIUM BROMIDE AND ALBUTEROL SULFATE 3 ML: .5; 3 SOLUTION RESPIRATORY (INHALATION) at 16:00

## 2017-01-01 RX ADMIN — HEPARIN SODIUM 5000 UNITS: 5000 INJECTION, SOLUTION INTRAVENOUS; SUBCUTANEOUS at 21:47

## 2017-01-01 RX ADMIN — FAMOTIDINE 20 MG: 10 INJECTION INTRAVENOUS at 08:22

## 2017-01-01 RX ADMIN — ONDANSETRON 4 MG: 2 INJECTION, SOLUTION INTRAMUSCULAR; INTRAVENOUS at 02:12

## 2017-01-01 RX ADMIN — INSULIN ASPART 10 UNITS: 100 INJECTION, SOLUTION INTRAVENOUS; SUBCUTANEOUS at 17:10

## 2017-01-01 RX ADMIN — LEVOTHYROXINE SODIUM 25 MCG: 25 TABLET ORAL at 09:23

## 2017-01-01 RX ADMIN — HYDRALAZINE HYDROCHLORIDE 75 MG: 50 TABLET ORAL at 12:55

## 2017-01-01 RX ADMIN — POTASSIUM CHLORIDE 40 MEQ: 1500 TABLET, EXTENDED RELEASE ORAL at 10:49

## 2017-01-01 RX ADMIN — INSULIN ASPART 5 UNITS: 100 INJECTION, SOLUTION INTRAVENOUS; SUBCUTANEOUS at 02:30

## 2017-01-01 RX ADMIN — AMLODIPINE BESYLATE 10 MG: 10 TABLET ORAL at 08:15

## 2017-01-01 RX ADMIN — FAMOTIDINE 20 MG: 10 INJECTION INTRAVENOUS at 08:31

## 2017-01-01 RX ADMIN — HYDRALAZINE HYDROCHLORIDE 25 MG: 25 TABLET, FILM COATED ORAL at 09:09

## 2017-01-01 RX ADMIN — POTASSIUM CHLORIDE 40 MEQ: 1500 TABLET, EXTENDED RELEASE ORAL at 02:17

## 2017-01-01 RX ADMIN — HYDRALAZINE HYDROCHLORIDE 10 MG: 20 INJECTION INTRAMUSCULAR; INTRAVENOUS at 03:23

## 2017-01-01 RX ADMIN — DOXYCYCLINE 100 MG: 100 INJECTION, POWDER, LYOPHILIZED, FOR SOLUTION INTRAVENOUS at 09:27

## 2017-01-01 RX ADMIN — ATORVASTATIN CALCIUM 80 MG: 40 TABLET, FILM COATED ORAL at 08:15

## 2017-01-01 RX ADMIN — INSULIN ASPART 10 UNITS: 100 INJECTION, SOLUTION INTRAVENOUS; SUBCUTANEOUS at 13:33

## 2017-01-01 RX ADMIN — HYDRALAZINE HYDROCHLORIDE 50 MG: 50 TABLET ORAL at 08:20

## 2017-01-01 RX ADMIN — CASTOR OIL AND BALSAM, PERU: 788; 87 OINTMENT TOPICAL at 08:04

## 2017-01-01 RX ADMIN — HEPARIN SODIUM 5000 UNITS: 5000 INJECTION, SOLUTION INTRAVENOUS; SUBCUTANEOUS at 08:31

## 2017-01-01 RX ADMIN — Medication: at 13:08

## 2017-01-01 RX ADMIN — CASTOR OIL AND BALSAM, PERU: 788; 87 OINTMENT TOPICAL at 09:27

## 2017-01-01 RX ADMIN — INSULIN ASPART 3 UNITS: 100 INJECTION, SOLUTION INTRAVENOUS; SUBCUTANEOUS at 20:36

## 2017-01-01 RX ADMIN — FAMOTIDINE 20 MG: 10 INJECTION INTRAVENOUS at 09:10

## 2017-01-01 RX ADMIN — INSULIN ASPART 5 UNITS: 100 INJECTION, SOLUTION INTRAVENOUS; SUBCUTANEOUS at 12:25

## 2017-01-01 RX ADMIN — HEPARIN SODIUM 5000 UNITS: 5000 INJECTION, SOLUTION INTRAVENOUS; SUBCUTANEOUS at 08:49

## 2017-01-01 RX ADMIN — CEFTRIAXONE 1 G: 1 INJECTION, POWDER, FOR SOLUTION INTRAMUSCULAR; INTRAVENOUS at 03:29

## 2017-01-01 RX ADMIN — INSULIN ASPART 3 UNITS: 100 INJECTION, SOLUTION INTRAVENOUS; SUBCUTANEOUS at 08:17

## 2017-01-01 RX ADMIN — ASPIRIN 81 MG: 81 TABLET ORAL at 08:16

## 2017-01-01 RX ADMIN — INSULIN ASPART 8 UNITS: 100 INJECTION, SOLUTION INTRAVENOUS; SUBCUTANEOUS at 01:40

## 2017-01-01 RX ADMIN — HYDRALAZINE HYDROCHLORIDE 75 MG: 50 TABLET ORAL at 16:09

## 2017-01-01 RX ADMIN — BUDESONIDE AND FORMOTEROL FUMARATE DIHYDRATE 2 PUFF: 160; 4.5 AEROSOL RESPIRATORY (INHALATION) at 19:29

## 2017-01-01 RX ADMIN — DOXYCYCLINE 100 MG: 100 INJECTION, POWDER, LYOPHILIZED, FOR SOLUTION INTRAVENOUS at 17:35

## 2017-01-01 RX ADMIN — CETIRIZINE HYDROCHLORIDE 10 MG: 10 TABLET ORAL at 09:27

## 2017-01-01 RX ADMIN — Medication 1 CAPSULE: at 08:31

## 2017-01-01 RX ADMIN — LORAZEPAM 1 MG: 2 INJECTION, SOLUTION INTRAMUSCULAR; INTRAVENOUS at 02:24

## 2017-01-01 RX ADMIN — METHYLPREDNISOLONE SODIUM SUCCINATE 40 MG: 40 INJECTION, POWDER, FOR SOLUTION INTRAMUSCULAR; INTRAVENOUS at 08:14

## 2017-01-01 RX ADMIN — IPRATROPIUM BROMIDE AND ALBUTEROL SULFATE 3 ML: 2.5; .5 SOLUTION RESPIRATORY (INHALATION) at 14:40

## 2017-01-01 RX ADMIN — INSULIN ASPART 2 UNITS: 100 INJECTION, SOLUTION INTRAVENOUS; SUBCUTANEOUS at 10:47

## 2017-01-01 RX ADMIN — HYDRALAZINE HYDROCHLORIDE 10 MG: 20 INJECTION INTRAMUSCULAR; INTRAVENOUS at 13:09

## 2017-01-01 RX ADMIN — DOXYCYCLINE 100 MG: 100 INJECTION, POWDER, LYOPHILIZED, FOR SOLUTION INTRAVENOUS at 16:33

## 2017-01-01 RX ADMIN — HEPARIN SODIUM 5000 UNITS: 5000 INJECTION, SOLUTION INTRAVENOUS; SUBCUTANEOUS at 20:39

## 2017-01-01 RX ADMIN — HEPARIN SODIUM 5000 UNITS: 5000 INJECTION, SOLUTION INTRAVENOUS; SUBCUTANEOUS at 20:38

## 2017-01-01 RX ADMIN — CARVEDILOL 18.75 MG: 6.25 TABLET, FILM COATED ORAL at 17:44

## 2017-01-01 RX ADMIN — FAMOTIDINE 20 MG: 10 INJECTION INTRAVENOUS at 08:49

## 2017-01-01 RX ADMIN — INSULIN DETEMIR 25 UNITS: 100 INJECTION, SOLUTION SUBCUTANEOUS at 21:54

## 2017-01-01 RX ADMIN — INSULIN ASPART 3 UNITS: 100 INJECTION, SOLUTION INTRAVENOUS; SUBCUTANEOUS at 12:39

## 2017-01-01 RX ADMIN — HEPARIN SODIUM 5000 UNITS: 5000 INJECTION, SOLUTION INTRAVENOUS; SUBCUTANEOUS at 08:14

## 2017-01-01 RX ADMIN — INSULIN ASPART 8 UNITS: 100 INJECTION, SOLUTION INTRAVENOUS; SUBCUTANEOUS at 09:44

## 2017-01-01 RX ADMIN — IPRATROPIUM BROMIDE 0.5 MG: 0.5 SOLUTION RESPIRATORY (INHALATION) at 19:29

## 2017-01-01 RX ADMIN — OXYCODONE HYDROCHLORIDE AND ACETAMINOPHEN 500 MG: 500 TABLET ORAL at 09:22

## 2017-01-01 RX ADMIN — TRAMADOL HYDROCHLORIDE 50 MG: 50 TABLET, COATED ORAL at 22:23

## 2017-01-01 RX ADMIN — INSULIN ASPART 3 UNITS: 100 INJECTION, SOLUTION INTRAVENOUS; SUBCUTANEOUS at 09:23

## 2017-01-01 RX ADMIN — BUDESONIDE AND FORMOTEROL FUMARATE DIHYDRATE 2 PUFF: 160; 4.5 AEROSOL RESPIRATORY (INHALATION) at 07:20

## 2017-01-01 RX ADMIN — Medication 1 CAPSULE: at 15:38

## 2017-01-01 RX ADMIN — LEVOTHYROXINE SODIUM 25 MCG: 25 TABLET ORAL at 09:21

## 2017-01-01 RX ADMIN — PREDNISONE 10 MG: 10 TABLET ORAL at 09:23

## 2017-01-01 RX ADMIN — FONDAPARINUX SODIUM 2.5 MG: 2.5 INJECTION, SOLUTION SUBCUTANEOUS at 08:17

## 2017-01-01 RX ADMIN — OXYCODONE HYDROCHLORIDE AND ACETAMINOPHEN 1000 MG: 500 TABLET ORAL at 17:47

## 2017-01-01 RX ADMIN — SODIUM CHLORIDE 100 ML/HR: 9 INJECTION, SOLUTION INTRAVENOUS at 22:48

## 2017-01-01 RX ADMIN — SERTRALINE 50 MG: 50 TABLET, FILM COATED ORAL at 17:47

## 2017-01-01 RX ADMIN — INSULIN ASPART 10 UNITS: 100 INJECTION, SOLUTION INTRAVENOUS; SUBCUTANEOUS at 12:25

## 2017-01-01 RX ADMIN — HYDRALAZINE HYDROCHLORIDE 75 MG: 50 TABLET ORAL at 21:45

## 2017-01-01 RX ADMIN — Medication 2000 UNITS: at 19:34

## 2017-01-01 RX ADMIN — CEFTRIAXONE 1 G: 1 INJECTION, POWDER, FOR SOLUTION INTRAMUSCULAR; INTRAVENOUS at 02:35

## 2017-01-01 RX ADMIN — OXYCODONE HYDROCHLORIDE AND ACETAMINOPHEN 1000 MG: 500 TABLET ORAL at 08:50

## 2017-01-01 RX ADMIN — DOXYCYCLINE 100 MG: 100 INJECTION, POWDER, LYOPHILIZED, FOR SOLUTION INTRAVENOUS at 04:57

## 2017-01-01 RX ADMIN — CHOLECALCIFEROL TAB 10 MCG (400 UNIT) 800 UNITS: 10 TAB at 08:31

## 2017-01-01 RX ADMIN — BUDESONIDE AND FORMOTEROL FUMARATE DIHYDRATE 2 PUFF: 160; 4.5 AEROSOL RESPIRATORY (INHALATION) at 18:45

## 2017-01-01 RX ADMIN — INSULIN ASPART 5 UNITS: 100 INJECTION, SOLUTION INTRAVENOUS; SUBCUTANEOUS at 17:44

## 2017-01-01 RX ADMIN — AMLODIPINE BESYLATE 10 MG: 10 TABLET ORAL at 08:20

## 2017-01-01 RX ADMIN — VANCOMYCIN HYDROCHLORIDE 1000 MG: 5 INJECTION, POWDER, LYOPHILIZED, FOR SOLUTION INTRAVENOUS at 16:56

## 2017-01-01 RX ADMIN — CHOLECALCIFEROL TAB 10 MCG (400 UNIT) 800 UNITS: 10 TAB at 10:26

## 2017-01-01 RX ADMIN — CARVEDILOL 12.5 MG: 6.25 TABLET, FILM COATED ORAL at 09:09

## 2017-01-01 RX ADMIN — PROMETHAZINE HYDROCHLORIDE 12.5 MG: 25 INJECTION INTRAMUSCULAR; INTRAVENOUS at 02:42

## 2017-01-01 RX ADMIN — MORPHINE SULFATE 2 MG: 2 INJECTION, SOLUTION INTRAMUSCULAR; INTRAVENOUS at 14:39

## 2017-01-01 RX ADMIN — GABAPENTIN 300 MG: 300 CAPSULE ORAL at 21:31

## 2017-01-01 RX ADMIN — IPRATROPIUM BROMIDE 0.5 MG: 0.5 SOLUTION RESPIRATORY (INHALATION) at 18:59

## 2017-01-01 RX ADMIN — ATORVASTATIN CALCIUM 80 MG: 40 TABLET, FILM COATED ORAL at 08:16

## 2017-01-01 RX ADMIN — METHYLPREDNISOLONE SODIUM SUCCINATE 20 MG: 40 INJECTION, POWDER, FOR SOLUTION INTRAMUSCULAR; INTRAVENOUS at 21:28

## 2017-01-01 RX ADMIN — VANCOMYCIN HYDROCHLORIDE 2000 MG: 5 INJECTION, POWDER, LYOPHILIZED, FOR SOLUTION INTRAVENOUS at 14:11

## 2017-01-01 RX ADMIN — CARVEDILOL 12.5 MG: 6.25 TABLET, FILM COATED ORAL at 08:15

## 2017-01-01 RX ADMIN — IPRATROPIUM BROMIDE AND ALBUTEROL SULFATE 3 ML: .5; 3 SOLUTION RESPIRATORY (INHALATION) at 00:12

## 2017-01-01 RX ADMIN — Medication 1 CAPSULE: at 08:04

## 2017-01-01 RX ADMIN — CASTOR OIL AND BALSAM, PERU: 788; 87 OINTMENT TOPICAL at 08:50

## 2017-01-01 RX ADMIN — INSULIN ASPART 5 UNITS: 100 INJECTION, SOLUTION INTRAVENOUS; SUBCUTANEOUS at 15:08

## 2017-01-01 RX ADMIN — TAZOBACTAM SODIUM AND PIPERACILLIN SODIUM 4.5 G: .5; 4 INJECTION, POWDER, LYOPHILIZED, FOR SOLUTION INTRAVENOUS at 06:06

## 2017-01-01 RX ADMIN — INSULIN ASPART 7 UNITS: 100 INJECTION, SOLUTION INTRAVENOUS; SUBCUTANEOUS at 20:38

## 2017-01-01 RX ADMIN — SERTRALINE 50 MG: 50 TABLET, FILM COATED ORAL at 19:36

## 2017-01-01 RX ADMIN — INSULIN ASPART 8 UNITS: 100 INJECTION, SOLUTION INTRAVENOUS; SUBCUTANEOUS at 16:57

## 2017-01-01 RX ADMIN — INSULIN ASPART 8 UNITS: 100 INJECTION, SOLUTION INTRAVENOUS; SUBCUTANEOUS at 20:55

## 2017-01-01 RX ADMIN — SODIUM CHLORIDE 1000 ML: 9 INJECTION, SOLUTION INTRAVENOUS at 20:27

## 2017-01-01 RX ADMIN — INSULIN ASPART 4 UNITS: 100 INJECTION, SOLUTION INTRAVENOUS; SUBCUTANEOUS at 06:31

## 2017-01-01 RX ADMIN — ISOSORBIDE MONONITRATE 180 MG: 60 TABLET, EXTENDED RELEASE ORAL at 09:08

## 2017-01-01 RX ADMIN — SODIUM CHLORIDE 500 ML: 9 INJECTION, SOLUTION INTRAVENOUS at 02:42

## 2017-01-01 RX ADMIN — TAZOBACTAM SODIUM AND PIPERACILLIN SODIUM 4.5 G: .5; 4 INJECTION, POWDER, LYOPHILIZED, FOR SOLUTION INTRAVENOUS at 17:44

## 2017-01-01 RX ADMIN — CEFTRIAXONE 1 G: 1 INJECTION, POWDER, FOR SOLUTION INTRAMUSCULAR; INTRAVENOUS at 04:13

## 2017-01-01 RX ADMIN — METHYLPREDNISOLONE SODIUM SUCCINATE 40 MG: 40 INJECTION, POWDER, FOR SOLUTION INTRAMUSCULAR; INTRAVENOUS at 21:46

## 2017-01-01 RX ADMIN — FERROUS SULFATE TAB 325 MG (65 MG ELEMENTAL FE) 325 MG: 325 (65 FE) TAB at 08:16

## 2017-01-01 RX ADMIN — INSULIN ASPART 3 UNITS: 100 INJECTION, SOLUTION INTRAVENOUS; SUBCUTANEOUS at 21:31

## 2017-01-01 RX ADMIN — CARVEDILOL 12.5 MG: 6.25 TABLET, FILM COATED ORAL at 08:20

## 2017-01-01 RX ADMIN — ISOSORBIDE MONONITRATE 180 MG: 60 TABLET, EXTENDED RELEASE ORAL at 09:21

## 2017-01-01 RX ADMIN — HYDRALAZINE HYDROCHLORIDE 75 MG: 50 TABLET ORAL at 21:53

## 2017-01-01 RX ADMIN — Medication 2000 UNITS: at 09:26

## 2017-01-01 RX ADMIN — Medication 2000 MCG: at 09:26

## 2017-01-01 RX ADMIN — INSULIN ASPART 2 UNITS: 100 INJECTION, SOLUTION INTRAVENOUS; SUBCUTANEOUS at 16:34

## 2017-01-01 RX ADMIN — IPRATROPIUM BROMIDE 0.5 MG: 0.5 SOLUTION RESPIRATORY (INHALATION) at 18:44

## 2017-01-01 RX ADMIN — CARVEDILOL 12.5 MG: 6.25 TABLET, FILM COATED ORAL at 09:27

## 2017-01-01 RX ADMIN — DOXYCYCLINE 100 MG: 100 INJECTION, POWDER, LYOPHILIZED, FOR SOLUTION INTRAVENOUS at 18:52

## 2017-01-01 RX ADMIN — ISOSORBIDE MONONITRATE 180 MG: 60 TABLET, EXTENDED RELEASE ORAL at 08:20

## 2017-01-01 RX ADMIN — TAZOBACTAM SODIUM AND PIPERACILLIN SODIUM 4.5 G: .5; 4 INJECTION, POWDER, LYOPHILIZED, FOR SOLUTION INTRAVENOUS at 12:53

## 2017-01-01 RX ADMIN — AMLODIPINE BESYLATE 10 MG: 10 TABLET ORAL at 09:09

## 2017-01-01 RX ADMIN — HEPARIN SODIUM 5000 UNITS: 5000 INJECTION, SOLUTION INTRAVENOUS; SUBCUTANEOUS at 20:36

## 2017-01-01 RX ADMIN — INSULIN ASPART 6 UNITS: 100 INJECTION, SOLUTION INTRAVENOUS; SUBCUTANEOUS at 20:28

## 2017-01-01 RX ADMIN — CHOLECALCIFEROL TAB 10 MCG (400 UNIT) 800 UNITS: 10 TAB at 09:09

## 2017-01-01 RX ADMIN — AMLODIPINE BESYLATE 10 MG: 10 TABLET ORAL at 09:28

## 2017-01-01 RX ADMIN — TAZOBACTAM SODIUM AND PIPERACILLIN SODIUM 4.5 G: .5; 4 INJECTION, POWDER, LYOPHILIZED, FOR SOLUTION INTRAVENOUS at 23:17

## 2017-01-01 RX ADMIN — HEPARIN SODIUM 5000 UNITS: 5000 INJECTION, SOLUTION INTRAVENOUS; SUBCUTANEOUS at 08:03

## 2017-01-01 RX ADMIN — SODIUM CHLORIDE 500 ML: 9 INJECTION, SOLUTION INTRAVENOUS at 15:49

## 2017-01-01 RX ADMIN — HYDRALAZINE HYDROCHLORIDE 100 MG: 50 TABLET ORAL at 21:31

## 2017-01-01 RX ADMIN — PROPOFOL 20 MCG/KG/MIN: 10 INJECTION, EMULSION INTRAVENOUS at 19:32

## 2017-01-01 RX ADMIN — FERROUS SULFATE TAB 325 MG (65 MG ELEMENTAL FE) 325 MG: 325 (65 FE) TAB at 15:28

## 2017-01-01 RX ADMIN — VANCOMYCIN HYDROCHLORIDE 1000 MG: 5 INJECTION, POWDER, LYOPHILIZED, FOR SOLUTION INTRAVENOUS at 15:30

## 2017-01-01 RX ADMIN — IPRATROPIUM BROMIDE 0.5 MG: 0.5 SOLUTION RESPIRATORY (INHALATION) at 01:02

## 2017-01-01 RX ADMIN — PROPOFOL 70 MG: 10 INJECTION, EMULSION INTRAVENOUS at 02:52

## 2017-01-01 RX ADMIN — INSULIN DETEMIR 35 UNITS: 100 INJECTION, SOLUTION SUBCUTANEOUS at 21:58

## 2017-01-01 RX ADMIN — GABAPENTIN 300 MG: 300 CAPSULE ORAL at 21:53

## 2017-01-01 RX ADMIN — VANCOMYCIN HYDROCHLORIDE 1000 MG: 5 INJECTION, POWDER, LYOPHILIZED, FOR SOLUTION INTRAVENOUS at 15:38

## 2017-01-01 RX ADMIN — HYDRALAZINE HYDROCHLORIDE 100 MG: 50 TABLET ORAL at 06:07

## 2017-01-01 RX ADMIN — PROPOFOL 50 MCG/KG/MIN: 10 INJECTION, EMULSION INTRAVENOUS at 08:21

## 2017-01-01 RX ADMIN — PREDNISONE 20 MG: 20 TABLET ORAL at 08:31

## 2017-01-01 RX ADMIN — MAGNESIUM SULFATE IN WATER 2 G: 40 INJECTION, SOLUTION INTRAVENOUS at 03:22

## 2017-01-01 RX ADMIN — INSULIN ASPART 5 UNITS: 100 INJECTION, SOLUTION INTRAVENOUS; SUBCUTANEOUS at 22:38

## 2017-01-01 RX ADMIN — DOXYCYCLINE 100 MG: 100 INJECTION, POWDER, LYOPHILIZED, FOR SOLUTION INTRAVENOUS at 15:08

## 2017-01-01 RX ADMIN — TAZOBACTAM SODIUM AND PIPERACILLIN SODIUM 4.5 G: .5; 4 INJECTION, POWDER, LYOPHILIZED, FOR SOLUTION INTRAVENOUS at 13:00

## 2017-01-01 RX ADMIN — CASTOR OIL AND BALSAM, PERU: 788; 87 OINTMENT TOPICAL at 18:20

## 2017-01-01 RX ADMIN — CARVEDILOL 12.5 MG: 6.25 TABLET, FILM COATED ORAL at 17:10

## 2017-01-01 RX ADMIN — OXYCODONE HYDROCHLORIDE AND ACETAMINOPHEN 1000 MG: 500 TABLET ORAL at 17:35

## 2017-01-01 RX ADMIN — MAGNESIUM SULFATE IN WATER 2 G: 40 INJECTION, SOLUTION INTRAVENOUS at 08:13

## 2017-01-01 RX ADMIN — CARVEDILOL 12.5 MG: 6.25 TABLET, FILM COATED ORAL at 08:16

## 2017-01-01 RX ADMIN — INSULIN ASPART 8 UNITS: 100 INJECTION, SOLUTION INTRAVENOUS; SUBCUTANEOUS at 05:12

## 2017-01-01 RX ADMIN — IPRATROPIUM BROMIDE AND ALBUTEROL SULFATE 3 ML: .5; 3 SOLUTION RESPIRATORY (INHALATION) at 11:15

## 2017-01-01 RX ADMIN — BUDESONIDE AND FORMOTEROL FUMARATE DIHYDRATE 2 PUFF: 160; 4.5 AEROSOL RESPIRATORY (INHALATION) at 06:52

## 2017-01-01 RX ADMIN — CARVEDILOL 12.5 MG: 6.25 TABLET, FILM COATED ORAL at 19:35

## 2017-01-01 RX ADMIN — INSULIN ASPART 12 UNITS: 100 INJECTION, SOLUTION INTRAVENOUS; SUBCUTANEOUS at 21:53

## 2017-01-01 RX ADMIN — FONDAPARINUX SODIUM 2.5 MG: 2.5 INJECTION, SOLUTION SUBCUTANEOUS at 13:34

## 2017-01-01 RX ADMIN — BUMETANIDE 1 MG: 1 TABLET ORAL at 18:33

## 2017-01-01 RX ADMIN — CARVEDILOL 12.5 MG: 6.25 TABLET, FILM COATED ORAL at 18:19

## 2017-01-01 RX ADMIN — METHYLPREDNISOLONE SODIUM SUCCINATE 40 MG: 40 INJECTION, POWDER, FOR SOLUTION INTRAMUSCULAR; INTRAVENOUS at 17:47

## 2017-01-01 RX ADMIN — HYDRALAZINE HYDROCHLORIDE 75 MG: 50 TABLET ORAL at 15:08

## 2017-01-01 RX ADMIN — ATORVASTATIN CALCIUM 80 MG: 40 TABLET, FILM COATED ORAL at 09:28

## 2017-01-01 RX ADMIN — Medication 1 CAPSULE: at 10:07

## 2017-01-01 RX ADMIN — INSULIN ASPART 6 UNITS: 100 INJECTION, SOLUTION INTRAVENOUS; SUBCUTANEOUS at 11:20

## 2017-01-01 RX ADMIN — ISOSORBIDE MONONITRATE 180 MG: 60 TABLET, EXTENDED RELEASE ORAL at 09:22

## 2017-01-01 RX ADMIN — ISOSORBIDE MONONITRATE 180 MG: 60 TABLET, EXTENDED RELEASE ORAL at 09:29

## 2017-01-01 RX ADMIN — DOXYCYCLINE 100 MG: 100 INJECTION, POWDER, LYOPHILIZED, FOR SOLUTION INTRAVENOUS at 05:54

## 2017-01-01 RX ADMIN — ASPIRIN 81 MG: 81 TABLET ORAL at 09:27

## 2017-01-01 RX ADMIN — HYDRALAZINE HYDROCHLORIDE 50 MG: 50 TABLET ORAL at 20:38

## 2017-01-01 RX ADMIN — PROPOFOL 30 MCG/KG/MIN: 10 INJECTION, EMULSION INTRAVENOUS at 20:32

## 2017-01-01 RX ADMIN — AMLODIPINE BESYLATE 10 MG: 10 TABLET ORAL at 09:21

## 2017-01-01 RX ADMIN — INSULIN ASPART 8 UNITS: 100 INJECTION, SOLUTION INTRAVENOUS; SUBCUTANEOUS at 09:25

## 2017-01-01 RX ADMIN — INSULIN ASPART 5 UNITS: 100 INJECTION, SOLUTION INTRAVENOUS; SUBCUTANEOUS at 08:14

## 2017-01-01 RX ADMIN — CARVEDILOL 12.5 MG: 6.25 TABLET, FILM COATED ORAL at 17:00

## 2017-01-01 RX ADMIN — VANCOMYCIN HYDROCHLORIDE 2000 MG: 5 INJECTION, POWDER, LYOPHILIZED, FOR SOLUTION INTRAVENOUS at 14:15

## 2017-01-01 RX ADMIN — INSULIN ASPART 8 UNITS: 100 INJECTION, SOLUTION INTRAVENOUS; SUBCUTANEOUS at 15:00

## 2017-01-01 RX ADMIN — Medication 1 CAPSULE: at 08:20

## 2017-01-01 RX ADMIN — SUCCINYLCHOLINE CHLORIDE 180 MG: 20 INJECTION, SOLUTION INTRAMUSCULAR; INTRAVENOUS at 02:57

## 2017-01-01 RX ADMIN — PREDNISONE 20 MG: 20 TABLET ORAL at 08:20

## 2017-01-01 RX ADMIN — LEVOTHYROXINE SODIUM 25 MCG: 25 TABLET ORAL at 09:29

## 2017-01-01 RX ADMIN — INSULIN ASPART 7 UNITS: 100 INJECTION, SOLUTION INTRAVENOUS; SUBCUTANEOUS at 21:45

## 2017-01-01 RX ADMIN — CASTOR OIL AND BALSAM, PERU: 788; 87 OINTMENT TOPICAL at 09:10

## 2017-01-01 RX ADMIN — HYDRALAZINE HYDROCHLORIDE 75 MG: 50 TABLET ORAL at 05:39

## 2017-01-01 RX ADMIN — SODIUM CHLORIDE 1000 ML: 9 INJECTION, SOLUTION INTRAVENOUS at 19:18

## 2017-01-01 RX ADMIN — PROPOFOL 40 MCG/KG/MIN: 10 INJECTION, EMULSION INTRAVENOUS at 18:31

## 2017-01-01 RX ADMIN — INSULIN DETEMIR 30 UNITS: 100 INJECTION, SOLUTION SUBCUTANEOUS at 08:18

## 2017-01-01 RX ADMIN — SODIUM CHLORIDE 1000 ML: 9 INJECTION, SOLUTION INTRAVENOUS at 15:01

## 2017-01-01 RX ADMIN — HYDRALAZINE HYDROCHLORIDE 10 MG: 20 INJECTION INTRAMUSCULAR; INTRAVENOUS at 21:47

## 2017-01-01 RX ADMIN — TAZOBACTAM SODIUM AND PIPERACILLIN SODIUM 4.5 G: .5; 4 INJECTION, POWDER, LYOPHILIZED, FOR SOLUTION INTRAVENOUS at 01:55

## 2017-01-01 RX ADMIN — INSULIN ASPART 8 UNITS: 100 INJECTION, SOLUTION INTRAVENOUS; SUBCUTANEOUS at 05:35

## 2017-01-01 RX ADMIN — INSULIN ASPART 10 UNITS: 100 INJECTION, SOLUTION INTRAVENOUS; SUBCUTANEOUS at 08:31

## 2017-01-01 RX ADMIN — FUROSEMIDE 40 MG: 10 INJECTION, SOLUTION INTRAMUSCULAR; INTRAVENOUS at 14:05

## 2017-01-01 RX ADMIN — TAZOBACTAM SODIUM AND PIPERACILLIN SODIUM 4.5 G: .5; 4 INJECTION, POWDER, LYOPHILIZED, FOR SOLUTION INTRAVENOUS at 13:32

## 2017-01-01 RX ADMIN — INSULIN DETEMIR 10 UNITS: 100 INJECTION, SOLUTION SUBCUTANEOUS at 12:36

## 2017-01-01 RX ADMIN — INSULIN DETEMIR 15 UNITS: 100 INJECTION, SOLUTION SUBCUTANEOUS at 12:48

## 2017-01-01 RX ADMIN — ALBUTEROL SULFATE 2.5 MG: 2.5 SOLUTION RESPIRATORY (INHALATION) at 03:56

## 2017-01-01 RX ADMIN — VANCOMYCIN HYDROCHLORIDE 1000 MG: 5 INJECTION, POWDER, LYOPHILIZED, FOR SOLUTION INTRAVENOUS at 02:36

## 2017-01-01 RX ADMIN — FAMOTIDINE 20 MG: 10 INJECTION INTRAVENOUS at 08:03

## 2017-01-01 RX ADMIN — SODIUM CHLORIDE 1000 ML: 9 INJECTION, SOLUTION INTRAVENOUS at 13:49

## 2017-01-01 RX ADMIN — INSULIN ASPART 2 UNITS: 100 INJECTION, SOLUTION INTRAVENOUS; SUBCUTANEOUS at 08:19

## 2017-01-01 RX ADMIN — POTASSIUM CHLORIDE 40 MEQ: 1500 TABLET, EXTENDED RELEASE ORAL at 11:52

## 2017-01-01 RX ADMIN — PROPOFOL 50 MCG/KG/MIN: 10 INJECTION, EMULSION INTRAVENOUS at 11:41

## 2017-01-01 RX ADMIN — SODIUM CHLORIDE 100 ML/HR: 9 INJECTION, SOLUTION INTRAVENOUS at 03:19

## 2017-01-01 RX ADMIN — SERTRALINE 50 MG: 50 TABLET, FILM COATED ORAL at 18:57

## 2017-01-01 RX ADMIN — OXYCODONE HYDROCHLORIDE AND ACETAMINOPHEN 1000 MG: 500 TABLET ORAL at 10:07

## 2017-01-01 RX ADMIN — AMLODIPINE BESYLATE 10 MG: 10 TABLET ORAL at 19:35

## 2017-01-01 RX ADMIN — SODIUM PHOSPHATE, MONOBASIC, MONOHYDRATE 15 MMOL: 276; 142 INJECTION, SOLUTION INTRAVENOUS at 12:53

## 2017-01-01 RX ADMIN — HYDRALAZINE HYDROCHLORIDE 25 MG: 25 TABLET, FILM COATED ORAL at 16:33

## 2017-01-01 RX ADMIN — HEPARIN SODIUM 5000 UNITS: 5000 INJECTION, SOLUTION INTRAVENOUS; SUBCUTANEOUS at 08:21

## 2017-01-01 RX ADMIN — LEVOTHYROXINE SODIUM 25 MCG: 25 TABLET ORAL at 09:09

## 2017-01-01 RX ADMIN — CHOLECALCIFEROL TAB 10 MCG (400 UNIT) 800 UNITS: 10 TAB at 09:24

## 2017-01-01 RX ADMIN — INSULIN ASPART 5 UNITS: 100 INJECTION, SOLUTION INTRAVENOUS; SUBCUTANEOUS at 08:59

## 2017-01-01 RX ADMIN — INSULIN ASPART 12 UNITS: 100 INJECTION, SOLUTION INTRAVENOUS; SUBCUTANEOUS at 12:54

## 2017-01-01 RX ADMIN — IPRATROPIUM BROMIDE 0.5 MG: 0.5 SOLUTION RESPIRATORY (INHALATION) at 07:12

## 2017-01-01 RX ADMIN — IOPAMIDOL 100 ML: 612 INJECTION, SOLUTION INTRAVENOUS at 12:12

## 2017-01-01 RX ADMIN — PANTOPRAZOLE SODIUM 40 MG: 40 TABLET, DELAYED RELEASE ORAL at 08:16

## 2017-01-01 RX ADMIN — OXYCODONE HYDROCHLORIDE AND ACETAMINOPHEN 1000 MG: 500 TABLET ORAL at 17:48

## 2017-01-01 RX ADMIN — METHYLPREDNISOLONE SODIUM SUCCINATE 20 MG: 40 INJECTION, POWDER, FOR SOLUTION INTRAMUSCULAR; INTRAVENOUS at 08:16

## 2017-01-01 RX ADMIN — HYDRALAZINE HYDROCHLORIDE 100 MG: 50 TABLET ORAL at 15:58

## 2017-01-01 RX ADMIN — DOXYCYCLINE 100 MG: 100 INJECTION, POWDER, LYOPHILIZED, FOR SOLUTION INTRAVENOUS at 18:55

## 2017-01-01 RX ADMIN — LEVOTHYROXINE SODIUM 25 MCG: 25 TABLET ORAL at 08:20

## 2017-01-01 RX ADMIN — IPRATROPIUM BROMIDE AND ALBUTEROL SULFATE 3 ML: .5; 3 SOLUTION RESPIRATORY (INHALATION) at 00:50

## 2017-01-01 RX ADMIN — INSULIN DETEMIR 30 UNITS: 100 INJECTION, SOLUTION SUBCUTANEOUS at 10:26

## 2017-01-01 RX ADMIN — HYDRALAZINE HYDROCHLORIDE 25 MG: 25 TABLET, FILM COATED ORAL at 20:28

## 2017-01-01 RX ADMIN — SERTRALINE 50 MG: 50 TABLET, FILM COATED ORAL at 17:10

## 2017-01-01 RX ADMIN — HYDRALAZINE HYDROCHLORIDE 25 MG: 25 TABLET, FILM COATED ORAL at 09:21

## 2017-01-01 RX ADMIN — DOXYCYCLINE 100 MG: 100 INJECTION, POWDER, LYOPHILIZED, FOR SOLUTION INTRAVENOUS at 20:19

## 2017-01-01 RX ADMIN — INSULIN DETEMIR 30 UNITS: 100 INJECTION, SOLUTION SUBCUTANEOUS at 22:39

## 2017-01-01 RX ADMIN — IPRATROPIUM BROMIDE 0.5 MG: 0.5 SOLUTION RESPIRATORY (INHALATION) at 13:11

## 2017-01-01 RX ADMIN — SODIUM CHLORIDE 100 ML/HR: 9 INJECTION, SOLUTION INTRAVENOUS at 15:40

## 2017-01-01 RX ADMIN — METHYLPREDNISOLONE SODIUM SUCCINATE 20 MG: 40 INJECTION, POWDER, FOR SOLUTION INTRAMUSCULAR; INTRAVENOUS at 09:28

## 2017-01-01 RX ADMIN — CETIRIZINE HYDROCHLORIDE 10 MG: 10 TABLET ORAL at 19:36

## 2017-01-01 RX ADMIN — TAZOBACTAM SODIUM AND PIPERACILLIN SODIUM 4.5 G: .5; 4 INJECTION, POWDER, LYOPHILIZED, FOR SOLUTION INTRAVENOUS at 05:39

## 2017-01-01 RX ADMIN — TAZOBACTAM SODIUM AND PIPERACILLIN SODIUM 4.5 G: .5; 4 INJECTION, POWDER, LYOPHILIZED, FOR SOLUTION INTRAVENOUS at 12:24

## 2017-01-01 RX ADMIN — SULFAMETHOXAZOLE AND TRIMETHOPRIM 320 MG: 800; 160 TABLET ORAL at 01:23

## 2017-01-01 RX ADMIN — CEFTRIAXONE 1 G: 1 INJECTION, POWDER, FOR SOLUTION INTRAMUSCULAR; INTRAVENOUS at 02:24

## 2017-01-01 RX ADMIN — HYDRALAZINE HYDROCHLORIDE 10 MG: 20 INJECTION INTRAMUSCULAR; INTRAVENOUS at 22:24

## 2017-01-03 ENCOUNTER — HOSPITAL ENCOUNTER (OUTPATIENT)
Dept: RESPIRATORY THERAPY | Facility: HOSPITAL | Age: 67
Discharge: HOME | End: 2017-01-03
Attending: INTERNAL MEDICINE | Admitting: INTERNAL MEDICINE

## 2017-01-03 DIAGNOSIS — R06.02 SOB (SHORTNESS OF BREATH): ICD-10-CM

## 2017-01-03 PROCEDURE — 63710000001 ALBUTEROL PER 1 MG

## 2017-01-03 PROCEDURE — 94729 DIFFUSING CAPACITY: CPT

## 2017-01-03 PROCEDURE — 94060 EVALUATION OF WHEEZING: CPT

## 2017-01-03 PROCEDURE — A9270 NON-COVERED ITEM OR SERVICE: HCPCS

## 2017-01-03 PROCEDURE — 94727 GAS DIL/WSHOT DETER LNG VOL: CPT

## 2017-01-03 PROCEDURE — 94727 GAS DIL/WSHOT DETER LNG VOL: CPT | Performed by: INTERNAL MEDICINE

## 2017-01-03 PROCEDURE — 94729 DIFFUSING CAPACITY: CPT | Performed by: INTERNAL MEDICINE

## 2017-01-03 PROCEDURE — 94060 EVALUATION OF WHEEZING: CPT | Performed by: INTERNAL MEDICINE

## 2017-01-22 ENCOUNTER — HOSPITAL ENCOUNTER (EMERGENCY)
Facility: HOSPITAL | Age: 67
Discharge: HOME | End: 2017-01-22
Admitting: FAMILY MEDICINE

## 2017-01-22 ENCOUNTER — APPOINTMENT (OUTPATIENT)
Dept: GENERAL RADIOLOGY | Facility: HOSPITAL | Age: 67
End: 2017-01-22

## 2017-01-22 VITALS
SYSTOLIC BLOOD PRESSURE: 177 MMHG | TEMPERATURE: 98 F | RESPIRATION RATE: 20 BRPM | DIASTOLIC BLOOD PRESSURE: 77 MMHG | WEIGHT: 244 LBS | HEIGHT: 67 IN | BODY MASS INDEX: 38.3 KG/M2 | HEART RATE: 58 BPM | OXYGEN SATURATION: 95 %

## 2017-01-22 DIAGNOSIS — M79.675 GREAT TOE PAIN, LEFT: Primary | ICD-10-CM

## 2017-01-22 DIAGNOSIS — IMO0001 NAIL AVULSION, INITIAL ENCOUNTER: ICD-10-CM

## 2017-01-22 PROCEDURE — 73630 X-RAY EXAM OF FOOT: CPT | Performed by: RADIOLOGY

## 2017-01-22 PROCEDURE — 73630 X-RAY EXAM OF FOOT: CPT

## 2017-01-22 PROCEDURE — 99284 EMERGENCY DEPT VISIT MOD MDM: CPT

## 2017-01-22 RX ORDER — HYDROCODONE BITARTRATE AND ACETAMINOPHEN 5; 325 MG/1; MG/1
1 TABLET ORAL EVERY 6 HOURS PRN
Qty: 12 TABLET | Refills: 0 | Status: ON HOLD | OUTPATIENT
Start: 2017-01-22 | End: 2017-01-01

## 2017-01-22 RX ORDER — AMLODIPINE BESYLATE 5 MG/1
2.5 TABLET ORAL
Status: DISCONTINUED | OUTPATIENT
Start: 2017-01-22 | End: 2017-01-22 | Stop reason: HOSPADM

## 2017-01-22 RX ORDER — HYDROCODONE BITARTRATE AND ACETAMINOPHEN 7.5; 325 MG/1; MG/1
1 TABLET ORAL ONCE
Status: COMPLETED | OUTPATIENT
Start: 2017-01-22 | End: 2017-01-22

## 2017-01-22 RX ADMIN — HYDROCODONE BITARTRATE AND ACETAMINOPHEN 1 TABLET: 7.5; 325 TABLET ORAL at 17:08

## 2017-01-22 RX ADMIN — AMLODIPINE BESYLATE 2.5 MG: 5 TABLET ORAL at 18:01

## 2017-01-22 NOTE — ED PROVIDER NOTES
Subjective   Patient is a 66 y.o. male presenting with lower extremity pain.   History provided by:  Patient   used: No    Lower Extremity Issue   Location:  Toe  Time since incident:  3 days  Injury: yes    Mechanism of injury comment:  Patient stated he stubbed his toe on a base board   Toe location:  R great toe  Chronicity:  New  Dislocation: no    Foreign body present:  No foreign bodies  Prior injury to area:  No  Relieved by:  Nothing  Worsened by:  Nothing  Ineffective treatments:  None tried      Review of Systems   Constitutional: Negative.    HENT: Negative.    Eyes: Negative.    Respiratory: Negative.    Cardiovascular: Negative.    Gastrointestinal: Negative.    Endocrine: Negative.    Genitourinary: Negative.    Musculoskeletal: Negative.    Skin: Negative.    Allergic/Immunologic: Negative.    Neurological: Negative.    Hematological: Negative.    Psychiatric/Behavioral: Negative.    All other systems reviewed and are negative.      Past Medical History   Diagnosis Date   • Agent orange exposure    • Arthritis    • CHF (congestive heart failure)    • COPD (chronic obstructive pulmonary disease)    • Diabetes mellitus        Allergies   Allergen Reactions   • Iodine        Past Surgical History   Procedure Laterality Date   • Cataract extraction     • Knee surgery         Family History   Problem Relation Age of Onset   • Heart disease Mother    • Cancer Father    • Heart attack Sister    • Heart attack Brother    • Cancer Paternal Grandfather        Social History     Social History   • Marital status:      Spouse name: N/A   • Number of children: N/A   • Years of education: N/A     Social History Main Topics   • Smoking status: Former Smoker     Years: 40.00   • Smokeless tobacco: None   • Alcohol use No   • Drug use: No   • Sexual activity: Defer     Other Topics Concern   • None     Social History Narrative   • None           Objective   Physical Exam   Constitutional: He  is oriented to person, place, and time. He appears well-developed and well-nourished. No distress.   HENT:   Head: Normocephalic and atraumatic.   Right Ear: External ear normal.   Left Ear: External ear normal.   Nose: Nose normal.   Mouth/Throat: Oropharynx is clear and moist. No oropharyngeal exudate.   Eyes: Conjunctivae and EOM are normal. Pupils are equal, round, and reactive to light. Right eye exhibits no discharge. Left eye exhibits no discharge. No scleral icterus.   Neck: Normal range of motion. Neck supple. No JVD present. No tracheal deviation present. No thyromegaly present.   Cardiovascular: Normal rate, regular rhythm, normal heart sounds and intact distal pulses.  Exam reveals no gallop and no friction rub.    No murmur heard.  Pulmonary/Chest: Effort normal and breath sounds normal. No stridor. No respiratory distress. He has no wheezes. He has no rales. He exhibits no tenderness.   Abdominal: Soft. Bowel sounds are normal. He exhibits no distension and no mass. There is no tenderness. There is no rebound and no guarding. No hernia.   Musculoskeletal: Normal range of motion. He exhibits no edema, tenderness or deformity.        Lymphadenopathy:     He has no cervical adenopathy.   Neurological: He is alert and oriented to person, place, and time. No cranial nerve deficit. Coordination normal.   Skin: Skin is warm and dry. No rash noted. He is not diaphoretic. No erythema. No pallor.   Psychiatric: He has a normal mood and affect. His behavior is normal. Judgment and thought content normal.   Nursing note and vitals reviewed.      Procedures         ED Course  ED Course   Comment By Time   Discussed with Dr. Fields- Recommends f/u with Dr. Ac.   LASHAY Romero 01/22 1643                  [facility]    Final diagnoses:   Great toe pain, left   Nail avulsion, initial encounter            LSAHAY Romero  01/22/17 [id number]

## 2017-03-16 NOTE — PROGRESS NOTES
Interval history since last visit:    Recent hospitalizations: Yes     Investigations: PFT done, Echo not complete.     Have you had the Influenza Vaccine? yes    Would you like to receive this Vaccine today? no    Have you had the Pneumonia Vaccine?  yes   Would you like to receive this Vaccine today? no    Subjective    Se Anne presents for the following Shortness of Breath and COPD  .    History of Present Illness     Mr Anne is here today to follow up for shortness of breath and COPD.  He states that he has had a recent visit to the Emergency Departments but they were unrelated to his breathing.  He states that he is having to problems with his breathing and that it is at his baseline.  He is saturating 90% on 3 liters nasal cannula.  He states that he is very complaint with his CPAP machine.  Review of Systems   Constitutional: Negative for activity change, fatigue and unexpected weight change.   HENT: Negative for congestion, postnasal drip and rhinorrhea.    Respiratory: Positive for cough and shortness of breath. Negative for apnea, chest tightness and wheezing.    Cardiovascular: Negative for chest pain and palpitations.   Gastrointestinal: Negative for nausea.   Allergic/Immunologic: Negative for environmental allergies.   Psychiatric/Behavioral: Negative for agitation and confusion.       Active Problems:  Problem List Items Addressed This Visit     COPD (chronic obstructive pulmonary disease) - Primary    Shortness of breath    Morbid obesity    Hypoxia    Sleep apnea    Allergic rhinitis          Past Medical History:  Past Medical History:   Diagnosis Date   • Agent orange exposure    • Arthritis    • CHF (congestive heart failure)    • COPD (chronic obstructive pulmonary disease)    • Coronary artery disease    • Diabetes mellitus    • Hyperlipidemia    • Hypertension        Family History:  Family History   Problem Relation Age of Onset   • Heart disease Mother    • Cancer Father    • Heart  attack Sister    • Heart attack Brother    • Cancer Paternal Grandfather        Social History:  Social History   Substance Use Topics   • Smoking status: Former Smoker     Years: 40.00     Quit date: 3/21/2010   • Smokeless tobacco: Never Used   • Alcohol use No       Current Medications:  Current Outpatient Prescriptions   Medication Sig Dispense Refill   • albuterol (PROVENTIL HFA;VENTOLIN HFA) 108 (90 BASE) MCG/ACT inhaler Albuterol 90 MCG/ACT AERS; Patient Sig: Albuterol 90 MCG/ACT AERS ; 0; 16-Feb-2016; Active     • amLODIPine (NORVASC) 10 MG tablet Take  by mouth.     • aspirin 81 MG EC tablet Take 81 mg by mouth Daily.     • atorvastatin (LIPITOR) 80 MG tablet Take  by mouth.     • benazepril (LOTENSIN) 40 MG tablet Take  by mouth.     • budesonide (PULMICORT) 1 MG/2ML nebulizer solution Budesonide SUSP; Patient Sig: Budesonide SUSP ; 0; 16-Feb-2016; Active     • carvedilol (COREG) 25 MG tablet Take 12.5 mg by mouth 2 (Two) Times a Day With Meals.     • Cholecalciferol (VITAMIN D3) 2000 UNITS tablet Take  by mouth.     • colestipol (COLESTID) 1 G tablet Take  by mouth.     • ferrous sulfate 325 (65 FE) MG tablet Take 325 mg by mouth 2 (Two) Times a Day.     • fluticasone (FLONASE) 50 MCG/ACT nasal spray 2 sprays into each nostril daily. Administer 2 sprays in each nostril for each dose. 1 bottle 2   • furosemide (LASIX) 20 MG tablet Take 20 mg by mouth 2 (Two) Times a Day.     • gabapentin (NEURONTIN) 300 MG capsule Take 300 mg by mouth 2 (Two) Times a Day.     • HYDROcodone-acetaminophen (NORCO) 5-325 MG per tablet Take 1 tablet by mouth Every 6 (Six) Hours As Needed for mild pain (1-3). 12 tablet 0   • insulin NPH-insulin regular (novoLIN 70/30) (70-30) 100 UNIT/ML injection Inject 35 Units under the skin 2 (Two) Times a Day With Meals.     • isosorbide mononitrate (IMDUR) 60 MG 24 hr tablet Take 180 mg by mouth Daily.     • Loratadine 10 MG capsule Take  by mouth.     • losartan (COZAAR) 50 MG tablet Take  "100 mg by mouth Daily.     • magnesium oxide (MAG-OX) 400 MG tablet Take 400 mg by mouth 2 (Two) Times a Day.     • metFORMIN (GLUCOPHAGE) 1000 MG tablet Take 1,000 mg by mouth 2 (Two) Times a Day With Meals.     • metOLazone (ZAROXOLYN) 5 MG tablet Take 5 mg by mouth Daily.     • nitroglycerin (NITROSTAT) 0.4 MG SL tablet Place 0.4 mg under the tongue Every 5 (Five) Minutes As Needed for chest pain. Take no more than 3 doses in 15 minutes.     • pantoprazole (PROTONIX) 40 MG EC tablet Take 40 mg by mouth Daily.     • POLYETHYLENE GLYCOL 3350 PO Take  by mouth.     • predniSONE (DELTASONE) 10 MG tablet Take 4 tabs daily x 3 days, then take 3 tabs daily x 3 days, then take 2 tabs daily x 3 days, then take 1 tab daily x 3 days 31 tablet 0   • sertraline (ZOLOFT) 100 MG tablet Take 50 mg by mouth Daily.     • tiotropium (SPIRIVA) 18 MCG per inhalation capsule Place 1 capsule into inhaler and inhale Daily.     • torsemide (DEMADEX) 20 MG tablet Take 20 mg by mouth 2 (Two) Times a Day.     • traMADol (ULTRAM) 50 MG tablet Take 50 mg by mouth Every 8 (Eight) Hours As Needed for moderate pain (4-6).     • triamcinolone (KENALOG) 0.1 % cream Apply  topically 2 (Two) Times a Day.     • potassium chloride (K-DUR) 10 MEQ CR tablet Take 1 tablet by mouth 2 (Two) Times a Day. 15 tablet 0   • vitamin B-12 (CYANOCOBALAMIN) 1000 MCG tablet Take 2,000 mcg by mouth Daily.       No current facility-administered medications for this visit.        Allergies:  Allergies   Allergen Reactions   • Iodine        Vitals:  /60 (BP Location: Left arm, Patient Position: Sitting, Cuff Size: Adult)  Pulse 64  Ht 66\" (167.6 cm)  Wt 246 lb (112 kg)  SpO2 90%  BMI 39.71 kg/m2    Imaging:    Imaging Results (most recent)     None          Pulmonary Functions Testing Results:    No results found for: FEV1, FVC, EWJ4VGD, TLC, DLCO    Results for orders placed or performed during the hospital encounter of 01/27/17   Urine Culture   Result " Value Ref Range    Urine Culture <10,000 CFU/mL Normal Urogenital Faith    Blood Culture   Result Value Ref Range    Blood Culture No growth at 5 days    Blood Culture   Result Value Ref Range    Blood Culture No growth at 5 days    Influenza Antigen   Result Value Ref Range    Influenza A Ag, EIA Negative Negative    Influenza B Ag, EIA Negative Negative   Comprehensive Metabolic Panel   Result Value Ref Range    Glucose 104 70 - 110 mg/dL    BUN 15 7 - 21 mg/dL    Creatinine 1.24 0.43 - 1.29 mg/dL    Sodium 142 135 - 153 mmol/L    Potassium 2.9 (C) 3.5 - 5.3 mmol/L    Chloride 94 (L) 99 - 112 mmol/L    CO2 >40.0 (H) 24.3 - 31.9 mmol/L    Calcium 9.4 7.7 - 10.0 mg/dL    Total Protein 7.7 6.0 - 8.0 g/dL    Albumin 4.30 3.40 - 4.80 g/dL    ALT (SGPT) 16 10 - 44 U/L    AST (SGOT) 23 10 - 34 U/L    Alkaline Phosphatase 54 46 - 116 U/L    Total Bilirubin 0.9 0.2 - 1.8 mg/dL    eGFR Non African Amer 58 (L) >60 mL/min/1.73    Globulin 3.4 gm/dL    A/G Ratio 1.3 (L) 1.5 - 2.5 g/dL    BUN/Creatinine Ratio 12.1 7.0 - 25.0    Anion Gap  3.6 - 11.2 mmol/L   Protime-INR   Result Value Ref Range    Protime 11.4 9.8 - 11.9 Seconds    INR 1.01 0.80 - 1.10   aPTT   Result Value Ref Range    PTT 30.1 24.4 - 31.0 seconds   Amylase   Result Value Ref Range    Amylase 41 28 - 100 U/L   Lipase   Result Value Ref Range    Lipase 28 13 - 60 U/L   Urinalysis With / Culture If Indicated   Result Value Ref Range    Color, UA Yellow Yellow, Straw    Appearance, UA Clear Clear    pH, UA 5.5 5.0 - 8.0    Specific Gravity, UA 1.009 1.005 - 1.030    Glucose, UA Negative Negative    Ketones, UA Negative Negative    Bilirubin, UA Negative Negative    Blood, UA Negative Negative    Protein, UA Negative Negative    Leuk Esterase, UA Negative Negative    Nitrite, UA Negative Negative    Urobilinogen, UA 1.0 E.U./dL 0.2 - 1.0 E.U./dL   Urine Drug Screen   Result Value Ref Range    Amphetamine Screen, Urine Negative Negative    Barbiturates Screen,  Urine Negative Negative    Benzodiazepine Screen, Urine Negative Negative    Cocaine Screen, Urine Negative Negative    Methadone Screen, Urine Negative Negative    Opiate Screen Positive (A) Negative    Phencyclidine (PCP), Urine Negative Negative    Propoxyphene Screen Negative Negative    THC, Screen, Urine Negative Negative   Troponin   Result Value Ref Range    Troponin I 0.008 <=0.040 ng/mL   CK-MB   Result Value Ref Range    CKMB 1.68 0.00 - 5.00 ng/mL   CK   Result Value Ref Range    Creatine Kinase 107 24 - 204 U/L   Lactic Acid, Plasma   Result Value Ref Range    Lactate 1.1 0.5 - 2.0 mmol/L   Blood Gas, Arterial   Result Value Ref Range    Site Arterial: left brachial     Travis's Test N/A     pH, Arterial 7.386 7.350 - 7.450 pH units    pCO2, Arterial 68.8 (C) 35.0 - 45.0 mm Hg    pO2, Arterial 76.0 (L) 80.0 - 100.0 mm Hg    HCO3, Arterial 40.3 (C) 22.0 - 26.0 mmol/L    Base Excess, Arterial 12.8 mmol/L    O2 Saturation, Arterial 94.6 90.0 - 100.0 %    Hemoglobin, Blood Gas 11.4 (L) 12 - 16 g/dL    Hematocrit, Blood Gas 34.0 (L) 42.0 - 52.0 %    Oxyhemoglobin 92.6 85 - 100 %    Methemoglobin 0.3 0 - 3 %    Carboxyhemoglobin 1.8 0 - 5 %    A-a Gradiant 61.1 0.0 - 300.0 mmHg    Temperature 98.6 C    Barometric Pressure for Blood Gas 727 mmHg    Modality Cannula - Nasal     FIO2 32 %    Flow Rate 3 lpm   CBC Auto Differential   Result Value Ref Range    WBC 8.18 4.50 - 12.50 10*3/mm3    RBC 3.70 (L) 4.70 - 6.10 10*6/mm3    Hemoglobin 10.5 (L) 14.0 - 18.0 g/dL    Hematocrit 33.8 (L) 42.0 - 52.0 %    MCV 91.4 80.0 - 94.0 fL    MCH 28.4 27.0 - 33.0 pg    MCHC 31.1 (L) 33.0 - 37.0 g/dL    RDW 14.5 11.5 - 14.5 %    RDW-SD 46.7 37.0 - 54.0 fl    MPV 9.2 6.0 - 10.0 fL    Platelets 227 130 - 400 10*3/mm3    Neutrophil % 75.5 (H) 40.0 - 75.0 %    Lymphocyte % 14.3 (L) 16.0 - 46.0 %    Monocyte % 7.5 0.0 - 12.0 %    Eosinophil % 2.1 0.0 - 7.0 %    Basophil % 0.4 0.0 - 2.0 %    Immature Grans % 0.2 0.0 - 0.5 %     Neutrophils, Absolute 6.18 1.40 - 6.50 10*3/mm3    Lymphocytes, Absolute 1.17 1.00 - 3.00 10*3/mm3    Monocytes, Absolute 0.61 0.10 - 0.90 10*3/mm3    Eosinophils, Absolute 0.17 0.00 - 0.70 10*3/mm3    Basophils, Absolute 0.03 0.00 - 0.30 10*3/mm3    Immature Grans, Absolute 0.02 0.00 - 0.03 10*3/mm3   Oxycodone, Urine   Result Value Ref Range    Oxycodone Screen, Urine Negative Negative   Osmolality, Calculated   Result Value Ref Range    Osmolality Calc 284.3 273.0 - 305.0 mOsm/kg   CK-MB Index   Result Value Ref Range    CK-MB Index 1.6 0.0 - 3.0 %   POC Glucose Fingerstick   Result Value Ref Range    Glucose 92 70 - 130 mg/dL       Objective   Physical Exam  GENERAL APPEARANCE: Well developed, well nourished, alert and cooperative, and appears to be in no acute distress.    HEAD: normocephalic.    EYES: PERRL, EOMI. Fundi normal, vision is grossly intact.    NECK: Neck supple.     CARDIAC: Normal S1 and S2. No S3, S4 or murmurs. Rhythm is regular.     LUNGS: Clear to auscultation without rales, rhonchi, wheezing or diminished breath sounds.    ABDOMEN: Positive bowel sounds. Soft, nondistended, nontender.     EXTREMITIES: No significant deformity or joint abnormality. No edema. Peripheral pulses intact. No varicosities.    NEUROLOGICAL: Strength and sensation symmetric and intact throughout.     PSYCHIATRIC: The mental examination revealed the patient was oriented to person, place, and time.     Assessment/Plan      1) COPD:  PFT shows severe obstructive lung disease.  Will continue Symbicort and rescue inhaler.            2) Hypoxia: 90% on 3 liters nasal cannula.  Continue oxygen for hypoxia.  Educated the patient on the importance of wearing oxygen as prescribed, otherwise he could become hypoxic, faint, fall, hit his head, cause a brain hemmorhage and potentially die.        4) Allergic Rhinitis:  Continue Flonase.     5) Hypertensive: blood pressure is 140/60 today.  Continue home medications.        6)  Obesity: diet and exercise counseling done.  Educated him about how his weight contributes to his chronic medical problems including sleep apnea and shortness of breath.         7 ) obstructive sleep apnea:  uses CPAP machine every night.  Will check his compliance on next visit.    Educated the patient on the complications associated with untreated sleep apnea.  These include increased risk of MI, stroke, depression, hypertension. Heart failure, arrhythmias, coronary artery disease and potential death due to these complications.       8) lower extremity edema and hypertension: patient states that he saw a cardiologist in [place] about 3 months ago, he did not remember what they told him.  Patient did not have ECHO done.         ICD-10-CM ICD-9-CM   1. Chronic obstructive pulmonary disease, unspecified COPD type J44.9 496   2. Shortness of breath R06.02 786.05   3. Hypoxia R09.02 799.02   4. Non-seasonal allergic rhinitis due to other allergic trigger J30.89    5. Morbid obesity due to excess calories E66.01 278.01   6. Obstructive sleep apnea syndrome G47.33 327.23       Return in about 3 months (around 6/16/2017).

## 2017-03-21 NOTE — ED NOTES
"PT STATES THAT HE STARTED HAVE CHEST PAIN SOB AND DIZZINESS FOR THE PAST 3 NITRO PT DENIES DENIES TAKING NITRO PT STATES HE TOOK ASPRIN AT HOME AND EMS GAVE HIM 4 BABY ASPRIN ON THE WAY TO THE ER PT STATES THAT HE WAS SITTING UP IN HIS CHAIR AT HOME AND HE WHEN HE STARTED EXPERIENCING SYMPTOMS PT STATES THAT HE BEEN HAVING \"SPELLS\" OF ALMOST BLACKING OUT AND SHAKING FOR THE PAST COUPLE OF WEEKS PT STATES THAT HE HAS NOT SEEN HIS PRIMARY CARE DR WITH THIS NEW PROBLEM BECAUSE HE [facility] PT STATES THAT HE HAS NOT HAD NAUSEA OR VOMITING. PT STATES THAT HE FEELS A LITTLE SWEATY AND CLAMMY ON HIS FOREHEAD     Julia Johnson RN  03/21/17 2946    "

## 2017-03-21 NOTE — ED PROVIDER NOTES
Subjective   Patient is a 66 y.o. male presenting with chest pain.   Chest Pain   Pain location:  Unable to specify  Pain quality: sharp    Pain radiates to:  Does not radiate  Pain severity:  Mild  Onset quality:  Gradual  Timing:  Intermittent  Progression:  Resolved  Chronicity:  Recurrent  Context: breathing and movement    Relieved by:  Nothing  Worsened by:  Certain positions and deep breathing  Associated symptoms: anxiety and fatigue    Associated symptoms: no abdominal pain and no fever        Review of Systems   Constitutional: Positive for fatigue. Negative for fever.   HENT: Negative.    Respiratory: Negative.    Cardiovascular: Positive for chest pain and leg swelling.   Gastrointestinal: Negative.  Negative for abdominal pain.   Endocrine: Negative.    Genitourinary: Negative.  Negative for dysuria.   Skin: Negative.    Neurological: Negative.    Psychiatric/Behavioral: Negative.    All other systems reviewed and are negative.      Past Medical History   Diagnosis Date   • Agent orange exposure    • Arthritis    • CHF (congestive heart failure)    • COPD (chronic obstructive pulmonary disease)    • Coronary artery disease    • Diabetes mellitus    • Hyperlipidemia    • Hypertension        Allergies   Allergen Reactions   • Iodine        Past Surgical History   Procedure Laterality Date   • Cataract extraction     • Knee surgery     • Cardiac surgery       stent placement   • Joint replacement       right knee       Family History   Problem Relation Age of Onset   • Heart disease Mother    • Cancer Father    • Heart attack Sister    • Heart attack Brother    • Cancer Paternal Grandfather        Social History     Social History   • Marital status:      Spouse name: N/A   • Number of children: N/A   • Years of education: N/A     Social History Main Topics   • Smoking status: Former Smoker     Years: 40.00     Quit date: 3/21/2010   • Smokeless tobacco: Never Used   • Alcohol use No   • Drug use:  No   • Sexual activity: Defer     Other Topics Concern   • None     Social History Narrative   • None           Objective   Physical Exam   Constitutional: He is oriented to person, place, and time. He appears well-developed and well-nourished. No distress.   HENT:   Head: Normocephalic and atraumatic.   Right Ear: External ear normal.   Left Ear: External ear normal.   Nose: Nose normal.   Eyes: Conjunctivae and EOM are normal. Pupils are equal, round, and reactive to light.   Neck: Normal range of motion. Neck supple. No JVD present. No tracheal deviation present.   Cardiovascular: Normal rate, regular rhythm and normal heart sounds.    No murmur heard.  Pulmonary/Chest: Effort normal and breath sounds normal. No respiratory distress. He has no wheezes.   Abdominal: Soft. Bowel sounds are normal. There is no tenderness.   Musculoskeletal: Normal range of motion. He exhibits edema. He exhibits no deformity.   Some edema lower extremities   Neurological: He is alert and oriented to person, place, and time. No cranial nerve deficit.   Skin: Skin is warm and dry. No rash noted. He is not diaphoretic. No erythema. No pallor.   Psychiatric: He has a normal mood and affect. His behavior is normal. Thought content normal.   Nursing note and vitals reviewed.      Procedures         ED Course  ED Course                  MDM    Final diagnoses:   Hyperglycemia   Hypokalemia            Gil Houser MD  03/21/17 7885

## 2017-06-02 NOTE — ED PROVIDER NOTES
Subjective   Patient is a 66 y.o. male presenting with skin laceration.   History provided by:  Patient   used: No    Laceration   Location:  Leg  Leg laceration location:  L lower leg and R lower leg  Depth:  Cutaneous  Time since incident:  1 day  Pain details:     Severity:  Mild    Timing:  Constant    Progression:  Worsening  Relieved by:  Nothing  Worsened by:  Nothing  Ineffective treatments:  None tried  Tetanus status:  Out of date  Associated symptoms: redness, swelling and streaking    Associated symptoms: no fever        Review of Systems   Constitutional: Negative for chills and fever.   HENT: Negative for congestion, ear pain and sore throat.    Respiratory: Negative for cough, shortness of breath and wheezing.    Cardiovascular: Negative for chest pain.   Gastrointestinal: Negative for diarrhea, nausea and vomiting.   Genitourinary: Negative for dysuria and flank pain.   Skin: Positive for wound.   Neurological: Negative for headaches.   Psychiatric/Behavioral: The patient is not nervous/anxious.    All other systems reviewed and are negative.      Past Medical History:   Diagnosis Date   • Agent orange exposure    • Arthritis    • CHF (congestive heart failure)    • COPD (chronic obstructive pulmonary disease)    • Coronary artery disease    • Diabetes mellitus    • Hyperlipidemia    • Hypertension        Allergies   Allergen Reactions   • Iodine    • Other      Seafood       Past Surgical History:   Procedure Laterality Date   • CARDIAC SURGERY      stent placement   • CATARACT EXTRACTION     • JOINT REPLACEMENT      right knee   • KNEE SURGERY         Family History   Problem Relation Age of Onset   • Heart disease Mother    • Cancer Father    • Heart attack Sister    • Heart attack Brother    • Cancer Paternal Grandfather        Social History     Social History   • Marital status:      Spouse name: N/A   • Number of children: N/A   • Years of education: N/A     Social  History Main Topics   • Smoking status: Former Smoker     Years: 40.00     Quit date: 3/21/2010   • Smokeless tobacco: Never Used   • Alcohol use No   • Drug use: No   • Sexual activity: Defer     Other Topics Concern   • None     Social History Narrative           Objective   Physical Exam   Constitutional: He is oriented to person, place, and time. He appears well-developed and well-nourished.   HENT:   Head: Normocephalic.   Mouth/Throat: Oropharynx is clear and moist.   Neck: Neck supple.   Cardiovascular: Normal rate and regular rhythm.    Pulmonary/Chest: Effort normal and breath sounds normal.   Abdominal: Soft. Bowel sounds are normal. There is no tenderness.   Musculoskeletal: Normal range of motion.   Neurological: He is alert and oriented to person, place, and time.   Skin: Skin is warm and dry. Abrasion and laceration noted.        Psychiatric: He has a normal mood and affect. His behavior is normal. Judgment and thought content normal.   Nursing note and vitals reviewed.      Procedures         ED Course  ED Course                  MDM    Final diagnoses:   Cat scratch of left lower leg, initial encounter   Cat scratch of lower leg, right, initial encounter            LASHAY Perrin  06/02/17 [id number]

## 2017-06-23 NOTE — PROGRESS NOTES
Interval history since last visit: None    Recent hospitalizations: None    Investigations (imaging, PFT's, labs, sleep study, record requests, etc.) None    Have you had the Influenza Vaccine? yes    Would you like to receive this Vaccine today? no    Have you had the Pneumonia Vaccine?  yes   Would you like to receive this Vaccine today? no    Subjective    Se Anne presents for the following Shortness of Breath      History of Present Illness     Interval history since last visit:     Mr. Anne is doing well, he has not had any hospitalizations and reports he has not had any worsening of his breathing. He is alone today, oxygen is in use.     Recent hospitalizations: None    Investigations (imaging, PFT's, labs, sleep study, record requests, etc.) None    Have you had the Influenza Vaccine? yes    Would you like to receive this Vaccine today? no    Have you had the Pneumonia Vaccine?  yes   Would you like to receive this Vaccine today? no      Review of Systems   Constitutional: Negative for activity change, fatigue and unexpected weight change.   HENT: Negative for congestion, postnasal drip and rhinorrhea.    Respiratory: Positive for cough, shortness of breath and wheezing. Negative for apnea and chest tightness.    Cardiovascular: Negative for chest pain and palpitations.   Gastrointestinal: Negative for nausea.   Allergic/Immunologic: Negative for environmental allergies.   Psychiatric/Behavioral: Negative for agitation and confusion.       Active Problems:  Problem List Items Addressed This Visit     COPD (chronic obstructive pulmonary disease) - Primary    Morbid obesity    Hypoxia    Edema extremities    Sleep apnea    Allergic rhinitis    Cough          Past Medical History:  Past Medical History:   Diagnosis Date   • Agent orange exposure    • Arthritis    • CHF (congestive heart failure)    • COPD (chronic obstructive pulmonary disease)    • Coronary artery disease    • Diabetes mellitus    •  Hyperlipidemia    • Hypertension        Family History:  Family History   Problem Relation Age of Onset   • Heart disease Mother    • Cancer Father    • Heart attack Sister    • Heart attack Brother    • Cancer Paternal Grandfather        Social History:  Social History   Substance Use Topics   • Smoking status: Former Smoker     Years: 40.00     Quit date: 3/21/2010   • Smokeless tobacco: Never Used   • Alcohol use No       Current Medications:  Current Outpatient Prescriptions   Medication Sig Dispense Refill   • albuterol (PROVENTIL HFA;VENTOLIN HFA) 108 (90 BASE) MCG/ACT inhaler Albuterol 90 MCG/ACT AERS; Patient Sig: Albuterol 90 MCG/ACT AERS ; 0; 16-Feb-2016; Active     • amLODIPine (NORVASC) 10 MG tablet Take  by mouth.     • aspirin 81 MG EC tablet Take 81 mg by mouth Daily.     • atorvastatin (LIPITOR) 80 MG tablet Take  by mouth.     • benazepril (LOTENSIN) 40 MG tablet Take  by mouth.     • budesonide (PULMICORT) 1 MG/2ML nebulizer solution Budesonide SUSP; Patient Sig: Budesonide SUSP ; 0; 16-Feb-2016; Active     • carvedilol (COREG) 25 MG tablet Take 12.5 mg by mouth 2 (Two) Times a Day With Meals.     • Cholecalciferol (VITAMIN D3) 2000 UNITS tablet Take  by mouth.     • colestipol (COLESTID) 1 G tablet Take  by mouth.     • doxycycline (MONODOX) 100 MG capsule Take 1 capsule by mouth 2 (Two) Times a Day. 20 capsule 0   • ferrous sulfate 325 (65 FE) MG tablet Take 325 mg by mouth 2 (Two) Times a Day.     • fluticasone (FLONASE) 50 MCG/ACT nasal spray 2 sprays into each nostril daily. Administer 2 sprays in each nostril for each dose. 1 bottle 2   • furosemide (LASIX) 20 MG tablet Take 20 mg by mouth 2 (Two) Times a Day.     • gabapentin (NEURONTIN) 300 MG capsule Take 300 mg by mouth 2 (Two) Times a Day.     • HYDROcodone-acetaminophen (NORCO) 5-325 MG per tablet Take 1 tablet by mouth Every 6 (Six) Hours As Needed for mild pain (1-3). 12 tablet 0   • insulin NPH-insulin regular (novoLIN 70/30) (70-30)  "100 UNIT/ML injection Inject 35 Units under the skin 2 (Two) Times a Day With Meals.     • isosorbide mononitrate (IMDUR) 60 MG 24 hr tablet Take 180 mg by mouth Daily.     • Loratadine 10 MG capsule Take  by mouth.     • losartan (COZAAR) 50 MG tablet Take 100 mg by mouth Daily.     • magnesium oxide (MAG-OX) 400 MG tablet Take 400 mg by mouth 2 (Two) Times a Day.     • metFORMIN (GLUCOPHAGE) 1000 MG tablet Take 1,000 mg by mouth 2 (Two) Times a Day With Meals.     • metOLazone (ZAROXOLYN) 5 MG tablet Take 5 mg by mouth Daily.     • nitroglycerin (NITROSTAT) 0.4 MG SL tablet Place 0.4 mg under the tongue Every 5 (Five) Minutes As Needed for chest pain. Take no more than 3 doses in 15 minutes.     • pantoprazole (PROTONIX) 40 MG EC tablet Take 40 mg by mouth Daily.     • POLYETHYLENE GLYCOL 3350 PO Take  by mouth.     • potassium chloride (K-DUR) 10 MEQ CR tablet Take 1 tablet by mouth 2 (Two) Times a Day. 15 tablet 0   • predniSONE (DELTASONE) 10 MG tablet Take 4 tabs daily x 3 days, then take 3 tabs daily x 3 days, then take 2 tabs daily x 3 days, then take 1 tab daily x 3 days 31 tablet 0   • sertraline (ZOLOFT) 100 MG tablet Take 50 mg by mouth Daily.     • tiotropium (SPIRIVA) 18 MCG per inhalation capsule Place 1 capsule into inhaler and inhale Daily.     • torsemide (DEMADEX) 20 MG tablet Take 20 mg by mouth 2 (Two) Times a Day.     • traMADol (ULTRAM) 50 MG tablet Take 50 mg by mouth Every 8 (Eight) Hours As Needed for moderate pain (4-6).     • triamcinolone (KENALOG) 0.1 % cream Apply  topically 2 (Two) Times a Day.     • vitamin B-12 (CYANOCOBALAMIN) 1000 MCG tablet Take 2,000 mcg by mouth Daily.       No current facility-administered medications for this visit.        Allergies:  Allergies   Allergen Reactions   • Iodine    • Other      Seafood       Vitals:  /68 (BP Location: Left arm, Patient Position: Sitting, Cuff Size: Adult)  Pulse 63  Temp 98 °F (36.7 °C) (Oral)   Ht 66\" (167.6 cm)  Wt " 242 lb (110 kg)  SpO2 91%  BMI 39.06 kg/m2    Imaging:    Imaging Results (most recent)     None          Pulmonary Functions Testing Results:    No results found for: FEV1, FVC, RXD0YXC, TLC, DLCO    Results for orders placed or performed during the hospital encounter of 03/30/17   Comprehensive Metabolic Panel   Result Value Ref Range    Glucose 186 (H) 70 - 110 mg/dL    BUN 43 (H) 7 - 21 mg/dL    Creatinine 2.49 (H) 0.43 - 1.29 mg/dL    Sodium 139 135 - 153 mmol/L    Potassium 2.9 (C) 3.5 - 5.3 mmol/L    Chloride 88 (L) 99 - 112 mmol/L    CO2 >40.0 (H) 24.3 - 31.9 mmol/L    Calcium 9.3 7.7 - 10.0 mg/dL    Total Protein 7.5 6.0 - 8.0 g/dL    Albumin 4.20 3.40 - 4.80 g/dL    ALT (SGPT) 22 10 - 44 U/L    AST (SGOT) 22 10 - 34 U/L    Alkaline Phosphatase 63 40 - 129 U/L    Total Bilirubin 0.4 0.2 - 1.8 mg/dL    eGFR Non African Amer 26 (L) >60 mL/min/1.73    Globulin 3.3 gm/dL    A/G Ratio 1.3 (L) 1.5 - 2.5 g/dL    BUN/Creatinine Ratio 17.3 7.0 - 25.0    Anion Gap  3.6 - 11.2 mmol/L   Troponin   Result Value Ref Range    Troponin I 0.015 <=0.040 ng/mL   CBC Auto Differential   Result Value Ref Range    WBC 8.30 4.50 - 12.50 10*3/mm3    RBC 3.80 (L) 4.70 - 6.10 10*6/mm3    Hemoglobin 11.1 (L) 14.0 - 18.0 g/dL    Hematocrit 33.3 (L) 42.0 - 52.0 %    MCV 87.6 80.0 - 94.0 fL    MCH 29.2 27.0 - 33.0 pg    MCHC 33.3 33.0 - 37.0 g/dL    RDW 13.5 11.5 - 14.5 %    RDW-SD 41.8 37.0 - 54.0 fl    MPV 8.7 6.0 - 10.0 fL    Platelets 207 130 - 400 10*3/mm3    Neutrophil % 59.5 40.0 - 75.0 %    Lymphocyte % 27.2 16.0 - 46.0 %    Monocyte % 7.2 0.0 - 12.0 %    Eosinophil % 5.4 0.0 - 7.0 %    Basophil % 0.5 0.0 - 2.0 %    Immature Grans % 0.2 0.0 - 0.5 %    Neutrophils, Absolute 4.93 1.40 - 6.50 10*3/mm3    Lymphocytes, Absolute 2.26 1.00 - 3.00 10*3/mm3    Monocytes, Absolute 0.60 0.10 - 0.90 10*3/mm3    Eosinophils, Absolute 0.45 0.00 - 0.70 10*3/mm3    Basophils, Absolute 0.04 0.00 - 0.30 10*3/mm3    Immature Grans, Absolute 0.02  0.00 - 0.03 10*3/mm3   Osmolality, Calculated   Result Value Ref Range    Osmolality Calc 293.2 273.0 - 305.0 mOsm/kg       Objective   Physical Exam   Constitutional: He is oriented to person, place, and time. He appears well-developed and well-nourished.   HENT:   Head: Normocephalic and atraumatic.   Eyes: EOM are normal. Pupils are equal, round, and reactive to light.   Neck: Normal range of motion. Neck supple.   Cardiovascular: Normal rate, regular rhythm and normal pulses.    Bilateral lower extremity edema   Pulmonary/Chest: Effort normal and breath sounds normal.   Abdominal: Soft. Bowel sounds are normal.   Musculoskeletal: Normal range of motion.   Neurological: He is alert and oriented to person, place, and time.   Skin: Skin is warm and dry.   Psychiatric: He has a normal mood and affect. His behavior is normal. Judgment and thought content normal.   Vitals reviewed.      Assessment/Plan    COPD:  Well controlled, continue current inhalers, he is rinsing his mouth after use. Technique was not assessed today, we will plan for next visit to do so.  Inhalational technique was not checked today.    Hypoxia: Continue oxygen for hypoxia, in use today. Educated the patient on the importance of wearing oxygen as prescribed, otherwise he could become hypoxic, faint, fall, hit his head, cause a brain hemmorhage and potentially die.     Obesity: diet and exercise counseling done.  Educated him about how his weight contributes to his chronic medical problems including sleep apnea and shortness of breath.       Obstructive sleep apnea:  He is using his CPAP machine, he reports he is replacing his mask every 3-6 months.     Educated patient on the complications associated with untreated sleep apnea -- that it increases risk of MI, stroke, depression, hypertension, heart failure, arrhythmias, coronary artery disease, and potential death due to complication of that mentioned previously.    Also patient was educated   about the hazards of driving if there sleep deprived and has sleep apnea.  Was instructed not to involving driving or any activity which involves higher level of mental function- like operating a machine.      Lower extremity edema: he reports he is compliant with his lasix, daily weights discussed, weight is down since last visit, denies worsening shortness of breath.          ICD-10-CM ICD-9-CM   1. Chronic obstructive pulmonary disease, unspecified COPD type J44.9 496   2. Obstructive sleep apnea syndrome G47.33 327.23   3. Morbid obesity due to excess calories E66.01 278.01   4. Non-seasonal allergic rhinitis due to other allergic trigger J30.89    5. Cough R05 786.2   6. Edema extremities R60.0 782.3   7. Hypoxia R09.02 799.02       Return in about 6 months (around 12/23/2017) for COPD.             IJeremi M.D. attest that the above note accurately reflects the work and decisions made  by me.  Patient was seen and evaluated by Dr. Gomez, including history of present illness, physical exam, assessment, and treatment plan.  The above note was reviewed and edited by Dr. Gomez. The patient was  discussed with Miss Terrazas   and I agree with her history physical assessment and plan.  Scribed for Dr. Gomez by YANG Cassidy.

## 2017-07-11 NOTE — ED PROVIDER NOTES
Subjective   HPI Comments: Patient comes in with complaint of dry cough and shortness of breath.  He is also felt weak and dizzy when he stands up.  He has had swelling in his legs.  [facility]  Patient has CHF and COPD.    Patient is a 66 y.o. male presenting with shortness of breath.   History provided by:  Patient and relative  Shortness of Breath   Severity:  Moderate  Onset quality:  Unable to specify  Timing:  Intermittent  Chronicity:  Recurrent  Context: activity    Context: not animal exposure, not emotional upset, not fumes and not strong odors    Relieved by:  Nothing  Worsened by:  Coughing and activity  Ineffective treatments:  None tried  Associated symptoms: cough and wheezing    Associated symptoms: no abdominal pain, no chest pain, no claudication, no diaphoresis, no ear pain, no fever, no headaches, no hemoptysis, no neck pain, no PND, no rash, no sore throat, no sputum production, no syncope, no swollen glands and no vomiting    Risk factors: no recent alcohol use, no family hx of DVT, no hx of cancer, no hx of PE/DVT, no oral contraceptive use, no prolonged immobilization and no recent surgery        Review of Systems   Constitutional: Positive for activity change. Negative for chills, diaphoresis and fever.   HENT: Negative.  Negative for congestion, ear pain, nosebleeds, sore throat, trouble swallowing and voice change.    Eyes: Negative.    Respiratory: Positive for cough, shortness of breath and wheezing. Negative for hemoptysis and sputum production.    Cardiovascular: Positive for leg swelling. Negative for chest pain, claudication, syncope and PND.   Gastrointestinal: Negative.  Negative for abdominal pain and vomiting.   Endocrine: Negative.    Genitourinary: Negative.    Musculoskeletal: Positive for arthralgias and myalgias. Negative for gait problem and neck pain.   Skin: Negative.  Negative for rash.   Allergic/Immunologic: Negative.     Neurological: Negative.  Negative for headaches.   Hematological: Bruises/bleeds easily.   Psychiatric/Behavioral: Negative.        Past Medical History:   Diagnosis Date   • Agent orange exposure    • Arthritis    • CHF (congestive heart failure)    • COPD (chronic obstructive pulmonary disease)    • Coronary artery disease    • Diabetes mellitus    • Hyperlipidemia    • Hypertension        Allergies   Allergen Reactions   • Iodine    • Other      Seafood       Past Surgical History:   Procedure Laterality Date   • CARDIAC SURGERY      stent placement   • CATARACT EXTRACTION     • JOINT REPLACEMENT      right knee   • KNEE SURGERY         Family History   Problem Relation Age of Onset   • Heart disease Mother    • Cancer Father    • Heart attack Sister    • Heart attack Brother    • Cancer Paternal Grandfather        Social History     Social History   • Marital status:      Spouse name: N/A   • Number of children: N/A   • Years of education: N/A     Social History Main Topics   • Smoking status: Former Smoker     Years: 40.00     Quit date: 3/21/2010   • Smokeless tobacco: Never Used   • Alcohol use No   • Drug use: No   • Sexual activity: Defer     Other Topics Concern   • None     Social History Narrative           Objective   Physical Exam   Constitutional: He is oriented to person, place, and time. He appears well-developed and well-nourished. He appears distressed.   Short of breath, hyperventilating   HENT:   Head: Normocephalic and atraumatic.   Nose: Nose normal.   Mucous membranes tachycardia   Eyes: Conjunctivae and EOM are normal. Right eye exhibits no discharge. No scleral icterus.   Neck: Normal range of motion. Neck supple. No JVD present. No tracheal deviation present. No thyromegaly present.   Cardiovascular: Normal heart sounds and intact distal pulses.  Exam reveals no gallop and no friction rub.    No murmur heard.  Borderline bradycardia   Pulmonary/Chest: No stridor. He has wheezes.  He has no rales.   Tachypnea/hyperventilating.  Scattered faint wheezing.   Abdominal: Soft. He exhibits no distension and no mass. There is no tenderness. There is no guarding.   Musculoskeletal: Normal range of motion. He exhibits edema. He exhibits no tenderness.   Bilateral 2+ pitting pretibial edema   Neurological: He is alert and oriented to person, place, and time. He exhibits normal muscle tone. Coordination normal.   Skin: Skin is warm and dry.   Psychiatric: He has a normal mood and affect. His behavior is normal. Judgment and thought content normal.   Nursing note and vitals reviewed.      Procedures         ED Course  ED Course      Patient feeling better and gradually improving.  Gentle fluid resuscitation and respiratory care.  Electrolyte replacement.       XR Chest 1 View   ED Interpretation   Single portable AP chest   My read   No apparent focal  infiltrate        Labs Reviewed   BLOOD GAS, ARTERIAL - Abnormal; Notable for the following:        Result Value    pH, Arterial 7.673 (*)     pCO2, Arterial 32.1 (*)     pO2, Arterial 103.4 (*)     HCO3, Arterial 36.4 (*)     Hematocrit, Blood Gas 36.0 (*)     All other components within normal limits   COMPREHENSIVE METABOLIC PANEL - Abnormal; Notable for the following:     Glucose 207 (*)     BUN 28 (*)     Creatinine 1.88 (*)     Potassium 2.6 (*)     Chloride 89 (*)     CO2 >40.0 (*)     Calcium 10.3 (*)     Total Protein 8.1 (*)     eGFR Non  Amer 36 (*)     All other components within normal limits   MYOGLOBIN, SERUM - Abnormal; Notable for the following:     Myoglobin 212.0 (*)     All other components within normal limits   LACTIC ACID, PLASMA - Abnormal; Notable for the following:     Lactate 4.4 (*)     All other components within normal limits   CBC WITH AUTO DIFFERENTIAL - Abnormal; Notable for the following:     RBC 4.01 (*)     Hemoglobin 11.8 (*)     Hematocrit 35.4 (*)     All other components within normal limits   LACTATE ACID,  REFLEX - Abnormal; Notable for the following:     Lactate 2.1 (*)     All other components within normal limits   MAGNESIUM - Abnormal; Notable for the following:     Magnesium 1.1 (*)     All other components within normal limits   MYOGLOBIN, SERUM - Abnormal; Notable for the following:     Myoglobin 262.0 (*)     All other components within normal limits   LACTIC ACID, PLASMA - Abnormal; Notable for the following:     Lactate 3.7 (*)     All other components within normal limits   BLOOD GAS, ARTERIAL - Abnormal; Notable for the following:     pH, Arterial 7.524 (*)     pCO2, Arterial 49.7 (*)     HCO3, Arterial 40.0 (*)     Hemoglobin, Blood Gas 11.6 (*)     Hematocrit, Blood Gas 34.0 (*)     All other components within normal limits   URINALYSIS W/ CULTURE IF INDICATED - Normal    Narrative:     Urine microscopic not indicated.   BNP (IN-HOUSE) - Normal   CK - Normal   CK MB - Normal   TROPONIN (IN-HOUSE) - Normal    Narrative:     Ultra Troponin I Reference Range:         <=0.039 ng/mL: Negative    0.04-0.779 ng/mL: Indeterminate Range. Suspicious of MI.  Clinical correlation required.       >=0.78  ng/mL: Consistent with myocardial injury.  Clinical correlation required.   OSMOLALITY, CALCULATED - Normal   CKMB INDEX CALCULATION - Normal   CK - Normal   CK MB - Normal   TROPONIN (IN-HOUSE) - Normal    Narrative:     Ultra Troponin I Reference Range:         <=0.039 ng/mL: Negative    0.04-0.779 ng/mL: Indeterminate Range. Suspicious of MI.  Clinical correlation required.       >=0.78  ng/mL: Consistent with myocardial injury.  Clinical correlation required.   CKMB INDEX CALCULATION - Normal   BLOOD CULTURE   BLOOD CULTURE   CBC AND DIFFERENTIAL    Narrative:     The following orders were created for panel order CBC & Differential.  Procedure                               Abnormality         Status                     ---------                               -----------         ------                     CBC Auto  Differential[[id number]]        Abnormal            Final result                 Please view results for these tests on the individual orders.        Medication List      CONTINUE taking these medications          albuterol 108 (90 BASE) MCG/ACT inhaler   Commonly known as:  PROVENTIL HFA;VENTOLIN HFA       amLODIPine 10 MG tablet   Commonly known as:  NORVASC       aspirin 81 MG EC tablet       atorvastatin 80 MG tablet   Commonly known as:  LIPITOR       benazepril 40 MG tablet   Commonly known as:  LOTENSIN       budesonide 1 MG/2ML nebulizer solution   Commonly known as:  PULMICORT       carvedilol 25 MG tablet   Commonly known as:  COREG       colestipol 1 G tablet   Commonly known as:  COLESTID       doxycycline 100 MG capsule   Commonly known as:  MONODOX   Take 1 capsule by mouth 2 (Two) Times a Day.       ferrous sulfate 325 (65 FE) MG tablet       fluticasone 50 MCG/ACT nasal spray   Commonly known as:  FLONASE   2 sprays into each nostril daily. Administer 2 sprays in each nostril for   each dose.       furosemide 20 MG tablet   Commonly known as:  LASIX       gabapentin 300 MG capsule   Commonly known as:  NEURONTIN       HYDROcodone-acetaminophen 5-325 MG per tablet   Commonly known as:  NORCO   Take 1 tablet by mouth Every 6 (Six) Hours As Needed for mild pain (1-3).       insulin NPH-insulin regular (70-30) 100 UNIT/ML injection   Commonly known as:  novoLIN 70/30       isosorbide mononitrate 60 MG 24 hr tablet   Commonly known as:  IMDUR       Loratadine 10 MG capsule       losartan 50 MG tablet   Commonly known as:  COZAAR       magnesium oxide 400 MG tablet   Commonly known as:  MAG-OX       metFORMIN 1000 MG tablet   Commonly known as:  GLUCOPHAGE       metOLazone 5 MG tablet   Commonly known as:  ZAROXOLYN       nitroglycerin 0.4 MG SL tablet   Commonly known as:  NITROSTAT       pantoprazole 40 MG EC tablet   Commonly known as:  PROTONIX       POLYETHYLENE GLYCOL 3350 PO       potassium chloride  10 MEQ CR tablet   Commonly known as:  K-DUR   Take 1 tablet by mouth 2 (Two) Times a Day.       predniSONE 10 MG tablet   Commonly known as:  DELTASONE   Take 4 tabs daily x 3 days, then take 3 tabs daily x 3 days, then take 2   tabs daily x 3 days, then take 1 tab daily x 3 days       sertraline 100 MG tablet   Commonly known as:  ZOLOFT       tiotropium 18 MCG per inhalation capsule   Commonly known as:  SPIRIVA       torsemide 20 MG tablet   Commonly known as:  DEMADEX       traMADol 50 MG tablet   Commonly known as:  ULTRAM       triamcinolone 0.1 % cream   Commonly known as:  KENALOG       vitamin B-12 1000 MCG tablet   Commonly known as:  CYANOCOBALAMIN       Vitamin D3 2000 UNITS tablet             XR Chest 1 View   ED Interpretation   Single portable AP chest   My read   No apparent focal  infiltrate        Labs Reviewed   BLOOD GAS, ARTERIAL - Abnormal; Notable for the following:        Result Value    pH, Arterial 7.673 (*)     pCO2, Arterial 32.1 (*)     pO2, Arterial 103.4 (*)     HCO3, Arterial 36.4 (*)     Hematocrit, Blood Gas 36.0 (*)     All other components within normal limits   COMPREHENSIVE METABOLIC PANEL - Abnormal; Notable for the following:     Glucose 207 (*)     BUN 28 (*)     Creatinine 1.88 (*)     Potassium 2.6 (*)     Chloride 89 (*)     CO2 >40.0 (*)     Calcium 10.3 (*)     Total Protein 8.1 (*)     eGFR Non  Amer 36 (*)     All other components within normal limits   MYOGLOBIN, SERUM - Abnormal; Notable for the following:     Myoglobin 212.0 (*)     All other components within normal limits   LACTIC ACID, PLASMA - Abnormal; Notable for the following:     Lactate 4.4 (*)     All other components within normal limits   CBC WITH AUTO DIFFERENTIAL - Abnormal; Notable for the following:     RBC 4.01 (*)     Hemoglobin 11.8 (*)     Hematocrit 35.4 (*)     All other components within normal limits   LACTATE ACID, REFLEX - Abnormal; Notable for the following:     Lactate 2.1 (*)      All other components within normal limits   MAGNESIUM - Abnormal; Notable for the following:     Magnesium 1.1 (*)     All other components within normal limits   MYOGLOBIN, SERUM - Abnormal; Notable for the following:     Myoglobin 262.0 (*)     All other components within normal limits   LACTIC ACID, PLASMA - Abnormal; Notable for the following:     Lactate 3.7 (*)     All other components within normal limits   BLOOD GAS, ARTERIAL - Abnormal; Notable for the following:     pH, Arterial 7.524 (*)     pCO2, Arterial 49.7 (*)     HCO3, Arterial 40.0 (*)     Hemoglobin, Blood Gas 11.6 (*)     Hematocrit, Blood Gas 34.0 (*)     All other components within normal limits   URINALYSIS W/ CULTURE IF INDICATED - Normal    Narrative:     Urine microscopic not indicated.   BNP (IN-HOUSE) - Normal   CK - Normal   CK MB - Normal   TROPONIN (IN-HOUSE) - Normal    Narrative:     Ultra Troponin I Reference Range:         <=0.039 ng/mL: Negative    0.04-0.779 ng/mL: Indeterminate Range. Suspicious of MI.  Clinical correlation required.       >=0.78  ng/mL: Consistent with myocardial injury.  Clinical correlation required.   OSMOLALITY, CALCULATED - Normal   CKMB INDEX CALCULATION - Normal   CK - Normal   CK MB - Normal   TROPONIN (IN-HOUSE) - Normal    Narrative:     Ultra Troponin I Reference Range:         <=0.039 ng/mL: Negative    0.04-0.779 ng/mL: Indeterminate Range. Suspicious of MI.  Clinical correlation required.       >=0.78  ng/mL: Consistent with myocardial injury.  Clinical correlation required.   CKMB INDEX CALCULATION - Normal   BLOOD CULTURE   BLOOD CULTURE   CBC AND DIFFERENTIAL    Narrative:     The following orders were created for panel order CBC & Differential.  Procedure                               Abnormality         Status                     ---------                               -----------         ------                     CBC Auto Differential[[id number]]        Abnormal            Final result                  Please view results for these tests on the individual orders.     .  Discussed case with hospitalist Dr. Cisneros.  Upon further review of his records it appears that most of his problems have been chronic and waxing and waning in nature.  Many of his acute on chronic issues can be attributed to poorly matched medication choices.  These can be managed by his primary care provider.                 MDM  Number of Diagnoses or Management Options  Alkalosis, metabolic: new and requires workup  Hypokalemia: new and requires workup  Hypomagnesemia: new and requires workup     Amount and/or Complexity of Data Reviewed  Clinical lab tests: ordered and reviewed  Tests in the radiology section of CPT®: ordered and reviewed  Decide to obtain previous medical records or to obtain history from someone other than the patient: yes  Discuss the patient with other providers: yes  Independent visualization of images, tracings, or specimens: yes    Risk of Complications, Morbidity, and/or Mortality  Presenting problems: moderate  Diagnostic procedures: high  Management options: moderate    Patient Progress  Patient progress: improved      Final diagnoses:   Alkalosis, metabolic   Hypokalemia   Hypomagnesemia            Rajendra Durbin MD  07/11/17 0738

## 2017-09-03 NOTE — ED NOTES
Pt resting quietly on stretcher with no complaints.  Pt AAOx4 with no resp distress noted, respirations even and unlabored.  Pt denies any needs at this time.  Skin PWD.  Pt family at bedside. Will continue to monitor and follow plan of care.  Bed rails up x2, bed in lowest position, call light in reach.       Naomi Anne RN  09/03/17 3671

## 2017-09-03 NOTE — ED PROVIDER NOTES
Subjective   History of Present Illness  67 y/o M w/h/o COPD and CHF p/w 2 days of worsening cough and SOA w/ assoc malaise/fatigue and body aches. +dec appetite, +body aches, +malaise/fatigue. No fevers/chills. No emesis or diarrhea, +nausea. Pt denies any CP or h/o MI, has had previous stent. Pt states compliance with supplemental O2 and CPAP. Pt has h/o polypharmacy and has [facility] Pt is also c/o worsening of his chronic low back pain. No dysuria or frequency/urgency.  Review of Systems   Constitutional: Positive for appetite change and fatigue. Negative for activity change, chills and fever.   Respiratory: Positive for cough, shortness of breath and wheezing. Negative for choking and chest tightness.    Cardiovascular: Positive for leg swelling. Negative for chest pain and palpitations.   Gastrointestinal: Positive for nausea. Negative for abdominal distention, abdominal pain, diarrhea and vomiting.   Genitourinary: Negative for difficulty urinating, dysuria, frequency and urgency.   Musculoskeletal: Positive for arthralgias, back pain and myalgias.   Skin: Negative for color change and pallor.   Neurological: Negative for dizziness, syncope and headaches.   All other systems reviewed and are negative.      Past Medical History:   Diagnosis Date   • Agent orange exposure    • Arthritis    • CHF (congestive heart failure)    • COPD (chronic obstructive pulmonary disease)    • Coronary artery disease    • Diabetes mellitus    • Hyperlipidemia    • Hypertension        Allergies   Allergen Reactions   • Iodine    • Other      Seafood       Past Surgical History:   Procedure Laterality Date   • CARDIAC SURGERY      stent placement   • CATARACT EXTRACTION     • JOINT REPLACEMENT      right knee   • KNEE SURGERY         Family History   Problem Relation Age of Onset   • Heart disease Mother    • Cancer Father    • Heart attack Sister    • Heart attack Brother    • Cancer  Paternal Grandfather        Social History     Social History   • Marital status:      Spouse name: N/A   • Number of children: N/A   • Years of education: N/A     Social History Main Topics   • Smoking status: Former Smoker     Years: 40.00     Quit date: 3/21/2010   • Smokeless tobacco: Never Used   • Alcohol use No   • Drug use: No   • Sexual activity: Defer     Other Topics Concern   • None     Social History Narrative           Objective   Physical Exam   Constitutional: He is oriented to person, place, and time. He appears well-developed and well-nourished. He is active.   HENT:   Head: Normocephalic and atraumatic.   Right Ear: Hearing and external ear normal.   Left Ear: Hearing and external ear normal.   Nose: Nose normal.   Mouth/Throat: Uvula is midline, oropharynx is clear and moist and mucous membranes are normal.   Eyes: Conjunctivae, EOM and lids are normal. Pupils are equal, round, and reactive to light.   Neck: Trachea normal, normal range of motion, full passive range of motion without pain and phonation normal. Neck supple.   Cardiovascular: Normal rate, regular rhythm and normal heart sounds.    Pulmonary/Chest: Effort normal. No respiratory distress. He has decreased breath sounds. He has no wheezes. He has no rhonchi.   Abdominal: Soft. Normal appearance. He exhibits no distension. There is no tenderness. There is no CVA tenderness.   Musculoskeletal:        Thoracic back: Normal.        Lumbar back: Normal.   Neurological: He is alert and oriented to person, place, and time. GCS eye subscore is 4. GCS verbal subscore is 5. GCS motor subscore is 6.   Skin: Skin is warm, dry and intact.   Psychiatric: He has a normal mood and affect. His speech is normal and behavior is normal.   Nursing note and vitals reviewed.      Procedures         ED Course  ED Course   Value Comment By Time   ECG 12 Lead NSR, 70 bpm. QTc 468ms. No ST segment abnormalities concerning for STEMI. No dysrhythmias.  Nigel Augustin MD 09/03 8370     IVF began prior to transfer to [facility]. Pt able to give urine sample so catheter was not placed. Pt given abx after blood cx drawn.             MDM  Number of Diagnoses or Management Options  Chronic obstructive pulmonary disease, unspecified COPD type: established and worsening  Sepsis due to other etiology: new and requires workup  Troponin level elevated: new and requires workup     Amount and/or Complexity of Data Reviewed  Clinical lab tests: reviewed and ordered  Tests in the radiology section of CPT®: reviewed and ordered  Tests in the medicine section of CPT®: reviewed and ordered  Decide to obtain previous medical records or to obtain history from someone other than the patient: yes  Review and summarize past medical records: yes  Discuss the patient with other providers: yes  Independent visualization of images, tracings, or specimens: yes    Risk of Complications, Morbidity, and/or Mortality  Presenting problems: high  Diagnostic procedures: high  Management options: high    Patient Progress  Patient progress: stable      Final diagnoses:   Sepsis due to other etiology   Troponin level elevated   Chronic obstructive pulmonary disease, unspecified COPD type            Nigel Augustin MD  09/03/17 1575

## 2017-09-03 NOTE — ED NOTES
Pt resting quietly on stretcher with no complaints.  Pt AAOx4 with no resp distress noted, respirations even and unlabored.  Pt denies any needs at this time.  Skin PWD.  Pt family at bedside. Will continue to monitor and follow plan of care.  Bed rails up x2, bed in lowest position, call light in reach.       Naomi Anne RN  09/03/17 3305

## 2017-09-03 NOTE — ED NOTES
Contacted [facility] to see if they have a bed at this time      Lore Kidd  09/03/17 4350

## 2017-09-03 NOTE — ED NOTES
Pt reports that he has been having shortness of breath, swelling and not feeling well the past week or so.  Pt reports that he was recently admitted to the hospital in [place] for similar symptoms.  Pt family at bedside.     Naomi Anne RN  09/03/17 3512

## 2017-09-03 NOTE — ED NOTES
Pt resting quietly on stretcher with no complaints.  Pt AAOx4 with no resp distress noted, respirations even and unlabored.  Pt denies any needs at this time.  Skin PWD.  Pt family at bedside. Will continue to monitor and follow plan of care.  Bed rails up x2, bed in lowest position, call light in reach.       Naomi Anne RN  09/03/17 0875

## 2017-09-03 NOTE — ED NOTES
Upon walking back in room at this time, pt appears to be pallor in color and reports that he is having increased difficulty breathing.  Dr. Augustin notified.     Naomi Anne RN  09/03/17 5214

## 2017-09-03 NOTE — ED NOTES
Pt resting quietly on stretcher with no complaints.  Pt AAOx4 with no resp distress noted, respirations even and unlabored.  Pt denies any needs at this time.  Skin PWD.  Pt family at bedside. Will continue to monitor and follow plan of care.  Bed rails up x2, bed in lowest position, call light in reach.       Naomi Anne RN  09/03/17 5986

## 2017-09-03 NOTE — ED NOTES
Spoke with LINNEA from bed coordinator we have a bed , awaiting a accepting Dr would call back with one      Lore Kidd  09/03/17 6580

## 2017-09-04 NOTE — ED NOTES
Called [facility] [place]. No ETA.     Vicky Guerrero  09/03/17 2003

## 2017-09-13 NOTE — ED PROVIDER NOTES
Subjective   HPI Comments: [facility] acute on chronic renal injury and discharged week ago saw his primary care provider yesterday and had labs done and was called today and told come to the ED for his renal failure she denies symptoms    Patient is a 66 y.o. male presenting with general illness.   History provided by:  Patient   used: No    Illness   Location:  Kidneys  Severity:  Unable to specify  Onset quality:  Unable to specify  Duration:  1 day  Timing:  Unable to specify  Progression:  Worsening  Chronicity:  Recurrent  Context:  Had labs done yesterday called today to come to the ED  Relieved by:  Nothing  Worsened by:  Nothing  Ineffective treatments:  None tried  Associated symptoms: no abdominal pain, no chest pain and no fever    Associated symptoms comment:  No symptoms      Review of Systems   Constitutional: Negative.  Negative for fever.   HENT: Negative.    Respiratory: Negative.    Cardiovascular: Positive for leg swelling. Negative for chest pain.   Gastrointestinal: Negative.  Negative for abdominal pain.   Endocrine: Negative.    Genitourinary: Negative.  Negative for dysuria.   Skin: Negative.    Neurological: Negative.    Psychiatric/Behavioral: Negative.    All other systems reviewed and are negative.      Past Medical History:   Diagnosis Date   • Agent orange exposure    • Arthritis    • CHF (congestive heart failure)    • COPD (chronic obstructive pulmonary disease)    • Coronary artery disease    • Diabetes mellitus    • Hyperlipidemia    • Hypertension        Allergies   Allergen Reactions   • Iodine    • Other      Seafood       Past Surgical History:   Procedure Laterality Date   • CARDIAC SURGERY      stent placement   • CATARACT EXTRACTION     • JOINT REPLACEMENT      right knee   • KNEE SURGERY         Family History   Problem Relation Age of Onset   • Heart disease Mother    • Cancer Father    • Heart attack Sister     • Heart attack Brother    • Cancer Paternal Grandfather        Social History     Social History   • Marital status:      Spouse name: N/A   • Number of children: N/A   • Years of education: N/A     Social History Main Topics   • Smoking status: Former Smoker     Years: 40.00     Quit date: 3/21/2010   • Smokeless tobacco: Never Used   • Alcohol use No   • Drug use: No   • Sexual activity: Defer     Other Topics Concern   • None     Social History Narrative           Objective   Physical Exam   Constitutional: He is oriented to person, place, and time. He appears well-developed and well-nourished. No distress.   HENT:   Head: Normocephalic and atraumatic.   Right Ear: External ear normal.   Left Ear: External ear normal.   Nose: Nose normal.   Eyes: Conjunctivae and EOM are normal. Pupils are equal, round, and reactive to light.   Neck: Normal range of motion. Neck supple. No JVD present. No tracheal deviation present.   Cardiovascular: Normal rate, regular rhythm and normal heart sounds.    No murmur heard.  Pulmonary/Chest: Effort normal and breath sounds normal. No respiratory distress. He has no wheezes.   Abdominal: Soft. Bowel sounds are normal. There is no tenderness.   Musculoskeletal: Normal range of motion. He exhibits no edema or deformity.   Neurological: He is alert and oriented to person, place, and time. No cranial nerve deficit.   Skin: Skin is warm and dry. No rash noted. He is not diaphoretic. No erythema. No pallor.   Bilateral pedal edema   Psychiatric: He has a normal mood and affect. His behavior is normal. Thought content normal.   Nursing note and vitals reviewed.      Procedures        Lab Results (last 24 hours)     Procedure Component Value Units Date/Time    CBC & Differential [[id number]] Collected:  09/13/17 1210    Specimen:  Blood Updated:  09/13/17 1230    Narrative:       The following orders were created for panel order CBC & Differential.  Procedure                                Abnormality         Status                     ---------                               -----------         ------                     CBC Auto Differential[[id number]]        Abnormal            Final result                 Please view results for these tests on the individual orders.    Basic Metabolic Panel [[id number]]  (Abnormal) Collected:  09/13/17 1210    Specimen:  Blood from Arm, Right Updated:  09/13/17 1303     Glucose 296 (H) mg/dL      BUN 39 (H) mg/dL      Creatinine 2.56 (H) mg/dL      Sodium 141 mmol/L      Potassium 3.7 mmol/L      Chloride 104 mmol/L      CO2 29.8 mmol/L      Calcium 8.7 mg/dL      eGFR Non African Amer 25 (L) mL/min/1.73      BUN/Creatinine Ratio 15.2     Anion Gap 7.2 mmol/L     Narrative:       GFR Normal >60  Chronic Kidney Disease <60  Kidney Failure <15    CBC Auto Differential [[id number]]  (Abnormal) Collected:  09/13/17 1210    Specimen:  Blood from Arm, Right Updated:  09/13/17 1230     WBC 7.21 10*3/mm3      RBC 3.44 (L) 10*6/mm3      Hemoglobin 10.2 (L) g/dL      Hematocrit 31.1 (L) %      MCV 90.4 fL      MCH 29.7 pg      MCHC 32.8 (L) g/dL      RDW 14.1 %      RDW-SD 45.2 fl      MPV 8.6 fL      Platelets 268 10*3/mm3      Neutrophil % 70.1 %      Lymphocyte % 17.6 %      Monocyte % 7.8 %      Eosinophil % 3.5 %      Basophil % 0.3 %      Immature Grans % 0.7 (H) %      Neutrophils, Absolute 5.06 10*3/mm3      Lymphocytes, Absolute 1.27 10*3/mm3      Monocytes, Absolute 0.56 10*3/mm3      Eosinophils, Absolute 0.25 10*3/mm3      Basophils, Absolute 0.02 10*3/mm3      Immature Grans, Absolute 0.05 (H) 10*3/mm3         Yesterday's creatinine was approximately 3.5    ED Course  ED Course      [facility]            MDM    Final diagnoses:   Acute on chronic renal failure            Jeremie Bermudez MD  09/13/17 9159

## 2017-09-13 NOTE — DISCHARGE INSTRUCTIONS

## 2017-11-15 NOTE — ED PROVIDER NOTES
Subjective   Patient is a 67 y.o. male presenting with shortness of breath.   History provided by:  Patient  Shortness of Breath   Severity:  Mild  Onset quality:  Gradual  Timing:  Intermittent  Progression:  Waxing and waning  Chronicity:  Chronic  Context: activity and weather changes    Relieved by:  Diuretics  Worsened by:  Exertion and movement  Ineffective treatments:  None tried  Associated symptoms: no abdominal pain, no chest pain, no fever, no hemoptysis and no syncope        Review of Systems   Constitutional: Negative.  Negative for fever.   HENT: Negative.    Respiratory: Positive for shortness of breath. Negative for hemoptysis.    Cardiovascular: Negative.  Negative for chest pain and syncope.   Gastrointestinal: Negative.  Negative for abdominal pain.   Endocrine: Negative.    Genitourinary: Negative.  Negative for dysuria.   Skin: Negative.    Neurological: Negative.    Psychiatric/Behavioral: Negative.    All other systems reviewed and are negative.      Past Medical History:   Diagnosis Date   • Agent orange exposure    • Arthritis    • CHF (congestive heart failure)    • COPD (chronic obstructive pulmonary disease)    • Coronary artery disease    • Diabetes mellitus    • Hyperlipidemia    • Hypertension        Allergies   Allergen Reactions   • Iodine    • Other      Seafood       Past Surgical History:   Procedure Laterality Date   • CARDIAC SURGERY      stent placement   • CATARACT EXTRACTION     • JOINT REPLACEMENT      right knee   • KNEE SURGERY         Family History   Problem Relation Age of Onset   • Heart disease Mother    • Cancer Father    • Heart attack Sister    • Heart attack Brother    • Cancer Paternal Grandfather        Social History     Social History   • Marital status:      Spouse name: N/A   • Number of children: N/A   • Years of education: N/A     Social History Main Topics   • Smoking status: Former Smoker     Years: 40.00     Quit date: 3/21/2010   • Smokeless  tobacco: Never Used   • Alcohol use No   • Drug use: No   • Sexual activity: Defer     Other Topics Concern   • None     Social History Narrative           Objective   Physical Exam   Constitutional: He is oriented to person, place, and time. He appears well-developed and well-nourished. No distress.   HENT:   Head: Normocephalic and atraumatic.   Right Ear: External ear normal.   Left Ear: External ear normal.   Nose: Nose normal.   Eyes: Conjunctivae and EOM are normal. Pupils are equal, round, and reactive to light.   Neck: Normal range of motion. Neck supple. No JVD present. No tracheal deviation present.   Cardiovascular: Normal rate, regular rhythm and normal heart sounds.    No murmur heard.  Pulmonary/Chest: Effort normal and breath sounds normal. No respiratory distress. He has no wheezes.   Abdominal: Soft. Bowel sounds are normal. There is no tenderness.   Musculoskeletal: Normal range of motion. He exhibits no edema or deformity.   Neurological: He is alert and oriented to person, place, and time. No cranial nerve deficit.   Skin: Skin is warm and dry. No rash noted. He is not diaphoretic. No erythema. No pallor.   Psychiatric: He has a normal mood and affect. His behavior is normal. Thought content normal.   Nursing note and vitals reviewed.      Procedures         ED Course  ED Course                  MDM  Number of Diagnoses or Management Options  Chronic diastolic congestive heart failure:      Amount and/or Complexity of Data Reviewed  Clinical lab tests: ordered and reviewed  Tests in the radiology section of CPT®: ordered and reviewed  Tests in the medicine section of CPT®: ordered and reviewed    Risk of Complications, Morbidity, and/or Mortality  Presenting problems: high  Diagnostic procedures: high  Management options: high        Final diagnoses:   Chronic diastolic congestive heart failure            Gil Houser MD  11/15/17 4332

## 2017-11-22 NOTE — ED NOTES
Open blister on left leg cleansed with hibiclens and NS. Applied non-adhesive dressing and wrapped with gauze.     Shae Bose RN  11/22/17 0154

## 2017-11-22 NOTE — ED PROVIDER NOTES
Subjective   HPI Comments: Patient presents to the ED with blister to left lower extremity and ruptured blister to left lower extremity x3days.  He denies any fevers, no new swelling or edema.     Patient is a 67 y.o. male presenting with lower extremity pain.   History provided by:  Patient   used: No    Lower Extremity Issue   Location:  Leg  Time since incident:  3 days  Injury: no    Leg location:  L leg  Chronicity:  New  Dislocation: no    Foreign body present:  No foreign bodies  Tetanus status:  Up to date  Prior injury to area:  No      Review of Systems   Constitutional: Negative.    HENT: Negative.    Eyes: Negative.    Respiratory: Negative.    Cardiovascular: Negative.    Gastrointestinal: Negative.    Endocrine: Negative.    Genitourinary: Negative.    Musculoskeletal: Negative.    Skin: Negative.    Allergic/Immunologic: Negative.    Neurological: Negative.    Hematological: Negative.    Psychiatric/Behavioral: Negative.    All other systems reviewed and are negative.      Past Medical History:   Diagnosis Date   • Agent orange exposure    • Arthritis    • CHF (congestive heart failure)    • COPD (chronic obstructive pulmonary disease)    • Coronary artery disease    • Diabetes mellitus    • Hyperlipidemia    • Hypertension        Allergies   Allergen Reactions   • Iodine    • Other      Seafood       Past Surgical History:   Procedure Laterality Date   • CARDIAC SURGERY      stent placement   • CATARACT EXTRACTION     • JOINT REPLACEMENT      right knee   • KNEE SURGERY         Family History   Problem Relation Age of Onset   • Heart disease Mother    • Cancer Father    • Heart attack Sister    • Heart attack Brother    • Cancer Paternal Grandfather        Social History     Social History   • Marital status:      Spouse name: N/A   • Number of children: N/A   • Years of education: N/A     Social History Main Topics   • Smoking status: Former Smoker     Years: 40.00      Quit date: 3/21/2010   • Smokeless tobacco: Never Used   • Alcohol use No   • Drug use: No   • Sexual activity: Defer     Other Topics Concern   • Not on file     Social History Narrative           Objective   Physical Exam   Constitutional: He is oriented to person, place, and time. He appears well-developed and well-nourished.   HENT:   Head: Normocephalic and atraumatic.   Right Ear: External ear normal.   Left Ear: External ear normal.   Nose: Nose normal.   Mouth/Throat: Oropharynx is clear and moist.   Eyes: Conjunctivae and EOM are normal. Pupils are equal, round, and reactive to light.   Neck: Normal range of motion. Neck supple.   Cardiovascular: Normal rate, regular rhythm, normal heart sounds and intact distal pulses.    Pulmonary/Chest: Effort normal and breath sounds normal.   Abdominal: Soft. Bowel sounds are normal.   Musculoskeletal: Normal range of motion.   Neurological: He is alert and oriented to person, place, and time.   Skin: Skin is warm and dry.        Patient has chronic bilateral lymphedema.  Patient states his legs are not any bigger and redness is chronic.     Psychiatric: He has a normal mood and affect. His behavior is normal. Judgment and thought content normal.   Nursing note and vitals reviewed.      Procedures         ED Course  ED Course   Comment By Time   Discussed with Dr. Durbin, recommends dressing with dc home for wound care. LASHAY Romero 11/22 0115   Wound extensively cleaned and dressed. Patient given instructions on wound care and instructed to follow up with PCP for wound recheck tomorrow.  Discussed sx that would warrant return to the ED. LASHAY Romero 11/22 0116                  [facility]    Final diagnoses:   Venous stasis ulcer of other part of left lower leg limited to breakdown of skin without varicose veins            LASHAY Romero  11/22/17 0147

## 2017-12-02 NOTE — ED PROVIDER NOTES
Subjective   HPI Comments: Patient comes in with primary concern for aching all over.  He's been hurting for the last couple days.  He notes she's had worsening shortness of breath today.  He has had productive cough of white phlegm.  Patient denies fever.  He denies chest pain.  No nausea vomiting diarrhea.    Patient is a 67 y.o. male presenting with shortness of breath.   History provided by:  Patient and EMS personnel  Shortness of Breath   Severity:  Moderate  Onset quality:  Unable to specify  Duration:  2 days  Progression:  Worsening  Chronicity:  Chronic  Context: activity    Context: not animal exposure, not emotional upset, not fumes, not known allergens, not occupational exposure, not pollens, not smoke exposure and not strong odors    Relieved by:  Nothing  Worsened by:  Activity, coughing, exertion and movement  Ineffective treatments:  None tried  Associated symptoms: cough, sputum production and wheezing    Associated symptoms: no abdominal pain, no chest pain, no claudication, no diaphoresis, no ear pain, no fever, no headaches, no hemoptysis, no neck pain, no PND, no rash, no sore throat, no syncope, no swollen glands and no vomiting    Risk factors: obesity    Risk factors: no recent alcohol use, no family hx of DVT, no hx of cancer, no hx of PE/DVT, no oral contraceptive use, no prolonged immobilization and no recent surgery        Review of Systems   Constitutional: Negative.  Negative for diaphoresis and fever.   HENT: Positive for congestion. Negative for ear pain and sore throat.    Eyes: Negative.    Respiratory: Positive for cough, sputum production, shortness of breath and wheezing. Negative for hemoptysis and chest tightness.    Cardiovascular: Positive for leg swelling. Negative for chest pain, palpitations, claudication, syncope and PND.   Gastrointestinal: Negative.  Negative for abdominal pain, diarrhea, nausea and vomiting.   Endocrine: Negative.    Genitourinary: Negative.     Musculoskeletal: Positive for arthralgias and myalgias. Negative for neck pain.   Skin: Negative.  Negative for rash.   Allergic/Immunologic: Negative.    Neurological: Negative.  Negative for headaches.   Hematological: Negative.    Psychiatric/Behavioral: Negative.    All other systems reviewed and are negative.      Past Medical History:   Diagnosis Date   • Agent orange exposure    • Arthritis    • CHF (congestive heart failure)    • COPD (chronic obstructive pulmonary disease)    • Coronary artery disease    • Diabetes mellitus    • Hyperlipidemia    • Hypertension        Allergies   Allergen Reactions   • Iodine    • Other      Seafood       Past Surgical History:   Procedure Laterality Date   • CARDIAC SURGERY      stent placement   • CATARACT EXTRACTION     • JOINT REPLACEMENT      right knee   • KNEE SURGERY         Family History   Problem Relation Age of Onset   • Heart disease Mother    • Cancer Father    • Heart attack Sister    • Heart attack Brother    • Cancer Paternal Grandfather        Social History     Social History   • Marital status:      Spouse name: N/A   • Number of children: N/A   • Years of education: N/A     Social History Main Topics   • Smoking status: Former Smoker     Years: 40.00     Quit date: 3/21/2010   • Smokeless tobacco: Never Used   • Alcohol use No   • Drug use: No   • Sexual activity: Defer     Other Topics Concern   • None     Social History Narrative           Objective   Physical Exam   Constitutional: He appears well-developed and well-nourished. No distress.   HENT:   Head: Normocephalic.   Mouth/Throat: Oropharynx is clear and moist.   Nasal congestion   Eyes: Conjunctivae and EOM are normal. Pupils are equal, round, and reactive to light. Right eye exhibits no discharge. Left eye exhibits no discharge. No scleral icterus.   Neck: Normal range of motion. No JVD present. No tracheal deviation present.   Cardiovascular: Normal rate, regular rhythm, normal heart  sounds and intact distal pulses.  Exam reveals no gallop and no friction rub.    No murmur heard.  Pulmonary/Chest: Effort normal. No stridor. No respiratory distress. He has wheezes. He has rales. He exhibits no tenderness.   Diffuse bilateral expiratory wheezing  Bibasilar fines rales   Abdominal: Soft. He exhibits no distension and no mass. There is no tenderness. There is no guarding.   Musculoskeletal: Normal range of motion. He exhibits edema. He exhibits no deformity.   2+ Tibial edema, non-pitting   Skin: Skin is warm and dry.   Psychiatric: He has a normal mood and affect. His behavior is normal. Judgment and thought content normal.   Nursing note and vitals reviewed.      Procedures         ED Course  ED Course   Value Comment By Time   ECG 12 Lead 12-lead EKG performed at 2326 hrs.  Interpreted by me at 2328 hrs.  Normal sinus rhythm.  Normal EKG.  Ventricular rate 65.  MA interval 182.  QRS duration 88.  .  QTc 436.  No pathologic blocks.  No sustained ectopy or dysrhythmia.  No acute ischemic ST segment deviation.  No criteria for STEMI. Rajendra Durbin MD 12/02 0420     XR Chest 1 View   ED Interpretation   Single portable AP Chest   Cardiomegaly, possible early RLL consolidation   My Read      Final Result   Stable chest. No acute changes identified.       This report was finalized on 12/2/2017 8:55 AM by Dr. Kirk Kellogg MD.            Labs Reviewed   COMPREHENSIVE METABOLIC PANEL - Abnormal; Notable for the following:        Result Value    Glucose 119 (*)     BUN 34 (*)     Creatinine 2.50 (*)     Chloride 98 (*)     CO2 35.0 (*)     Total Bilirubin 0.1 (*)     eGFR Non  Amer 26 (*)     All other components within normal limits   PROTIME-INR - Abnormal; Notable for the following:     INR 1.12 (*)     All other components within normal limits    Narrative:     Suggested INR therapeutic range for stable oral anticoagulant therapy:    Low Intensity therapy:   1.5-2.0  Moderate  Intensity therapy:   2.0-3.0  High Intensity therapy:   2.5-4.0   APTT - Abnormal; Notable for the following:     PTT 36.7 (*)     All other components within normal limits    Narrative:     PTT Heparin Therapeutic Range:  59 - 95 seconds   BLOOD GAS, ARTERIAL - Abnormal; Notable for the following:     pCO2, Arterial 63.3 (*)     pO2, Arterial 72.2 (*)     HCO3, Arterial 36.4 (*)     Hemoglobin, Blood Gas 10.2 (*)     Hematocrit, Blood Gas 30.0 (*)     All other components within normal limits   CBC WITH AUTO DIFFERENTIAL - Abnormal; Notable for the following:     RBC 3.29 (*)     Hemoglobin 9.6 (*)     Hematocrit 31.0 (*)     MCV 94.2 (*)     MCHC 31.0 (*)     RDW 14.6 (*)     Immature Grans % 0.7 (*)     Immature Grans, Absolute 0.04 (*)     All other components within normal limits   BLOOD GAS, ARTERIAL - Abnormal; Notable for the following:     pCO2, Arterial 64.3 (*)     pO2, Arterial 62.5 (*)     HCO3, Arterial 35.7 (*)     O2 Saturation, Arterial 89.2 (*)     Hemoglobin, Blood Gas 10.2 (*)     Hematocrit, Blood Gas 30.0 (*)     All other components within normal limits   INFLUENZA ANTIGEN, RAPID - Normal   BLOOD CULTURE - Normal   BLOOD CULTURE - Normal   BNP (IN-HOUSE) - Normal   URINALYSIS W/ CULTURE IF INDICATED - Normal    Narrative:     Urine microscopic not indicated.   CK - Normal   CK MB - Normal   TROPONIN (IN-HOUSE) - Normal    Narrative:     Ultra Troponin I Reference Range:         <=0.039 ng/mL: Negative    0.04-0.779 ng/mL: Indeterminate Range. Suspicious of MI.  Clinical correlation required.       >=0.78  ng/mL: Consistent with myocardial injury.  Clinical correlation required.   OSMOLALITY, CALCULATED - Normal   CKMB INDEX CALCULATION - Normal   LACTIC ACID, PLASMA - Normal   CK - Normal   CK MB - Normal   TROPONIN (IN-HOUSE) - Normal    Narrative:     Ultra Troponin I Reference Range:         <=0.039 ng/mL: Negative    0.04-0.779 ng/mL: Indeterminate Range. Suspicious of MI.  Clinical  correlation required.       >=0.78  ng/mL: Consistent with myocardial injury.  Clinical correlation required.   CKMB INDEX CALCULATION - Normal   CBC AND DIFFERENTIAL    Narrative:     The following orders were created for panel order CBC & Differential.  Procedure                               Abnormality         Status                     ---------                               -----------         ------                     CBC Auto Differential[[id number]]        Abnormal            Final result                 Please view results for these tests on the individual orders.        Medication List      START taking these medications          amoxicillin-clavulanate 875-125 MG per tablet   Commonly known as:  AUGMENTIN   Take 1 tablet by mouth 2 (Two) Times a Day.         CHANGE how you take these medications          * albuterol 108 (90 Base) MCG/ACT inhaler   Commonly known as:  PROVENTIL HFA;VENTOLIN HFA   What changed:  Another medication with the same name was added. Make sure   you understand how and when to take each.       * albuterol 108 (90 Base) MCG/ACT inhaler   Commonly known as:  PROVENTIL HFA;VENTOLIN HFA   Inhale 2-4 puffs Every 4 (Four) Hours As Needed for Wheezing or Shortness   of Air.   What changed:  You were already taking a medication with the same name,   and this prescription was added. Make sure you understand how and when to   take each.       * predniSONE 10 MG tablet   Commonly known as:  DELTASONE   Take 4 tabs daily x 3 days, then take 3 tabs daily x 3 days, then take 2   tabs daily x 3 days, then take 1 tab daily x 3 days   What changed:  Another medication with the same name was added. Make sure   you understand how and when to take each.       * predniSONE 20 MG tablet   Commonly known as:  DELTASONE   Take 1 tablet by mouth 3 (Three) Times a Day for 5 days.   What changed:  You were already taking a medication with the same name,   and this prescription was added. Make sure you  understand how and when to   take each.       * Notice:  This list has 4 medication(s) that are the same as other   medications prescribed for you. Read the directions carefully, and ask   your doctor or other care provider to review them with you.      CONTINUE taking these medications          amLODIPine 10 MG tablet   Commonly known as:  NORVASC       aspirin 81 MG EC tablet       atorvastatin 80 MG tablet   Commonly known as:  LIPITOR       benazepril 40 MG tablet   Commonly known as:  LOTENSIN       budesonide 1 MG/2ML nebulizer solution   Commonly known as:  PULMICORT       carvedilol 25 MG tablet   Commonly known as:  COREG       colestipol 1 g tablet   Commonly known as:  COLESTID       doxycycline 100 MG capsule   Commonly known as:  MONODOX   Take 1 capsule by mouth 2 (Two) Times a Day.       ferrous sulfate 325 (65 FE) MG tablet       fluticasone 50 MCG/ACT nasal spray   Commonly known as:  FLONASE   2 sprays into each nostril daily. Administer 2 sprays in each nostril for   each dose.       furosemide 20 MG tablet   Commonly known as:  LASIX       gabapentin 300 MG capsule   Commonly known as:  NEURONTIN       HYDROcodone-acetaminophen 5-325 MG per tablet   Commonly known as:  NORCO   Take 1 tablet by mouth Every 6 (Six) Hours As Needed for mild pain (1-3).       insulin NPH-insulin regular (70-30) 100 UNIT/ML injection   Commonly known as:  humuLIN 70/30,novoLIN 70/30       isosorbide mononitrate 60 MG 24 hr tablet   Commonly known as:  IMDUR       Loratadine 10 MG capsule       losartan 50 MG tablet   Commonly known as:  COZAAR       magnesium oxide 400 MG tablet   Commonly known as:  MAG-OX       metFORMIN 1000 MG tablet   Commonly known as:  GLUCOPHAGE       metOLazone 5 MG tablet   Commonly known as:  ZAROXOLYN       nitroglycerin 0.4 MG SL tablet   Commonly known as:  NITROSTAT       pantoprazole 40 MG EC tablet   Commonly known as:  PROTONIX       POLYETHYLENE GLYCOL 3350 PO       potassium  chloride 10 MEQ CR tablet   Commonly known as:  K-DUR   Take 1 tablet by mouth 2 (Two) Times a Day.       sertraline 100 MG tablet   Commonly known as:  ZOLOFT       sulfamethoxazole-trimethoprim 800-160 MG per tablet   Commonly known as:  BACTRIM DS,SEPTRA DS   Take 2 tablets by mouth 2 (Two) Times a Day for 14 days.       tiotropium 18 MCG per inhalation capsule   Commonly known as:  SPIRIVA       torsemide 20 MG tablet   Commonly known as:  DEMADEX       traMADol 50 MG tablet   Commonly known as:  ULTRAM       triamcinolone 0.1 % cream   Commonly known as:  KENALOG       vitamin B-12 1000 MCG tablet   Commonly known as:  CYANOCOBALAMIN       Vitamin D3 2000 units tablet                        MDM  Number of Diagnoses or Management Options  COPD exacerbation: established and worsening  Obstructive sleep apnea hypopnea, moderate: established and worsening  Pneumonia of right lower lobe due to infectious organism: new and requires workup     Amount and/or Complexity of Data Reviewed  Clinical lab tests: ordered and reviewed  Tests in the radiology section of CPT®: ordered and reviewed  Decide to obtain previous medical records or to obtain history from someone other than the patient: yes  Independent visualization of images, tracings, or specimens: yes    Risk of Complications, Morbidity, and/or Mortality  Presenting problems: high  Diagnostic procedures: high  Management options: moderate    Patient Progress  Patient progress: stable      Final diagnoses:   Pneumonia of right lower lobe due to infectious organism   Obstructive sleep apnea hypopnea, moderate   COPD exacerbation            Rajendra Durbin MD  12/04/17 0251

## 2017-12-02 NOTE — ED NOTES
Patient has a urinal. Advised that we do need a urine. He states he will try now.      Heather Symes  12/02/17 0044

## 2017-12-02 NOTE — ED NOTES
EKG performed by [employer] at 9081 and was shown to Dr. Durbin.     Stacia Seals  12/01/17 6533

## 2017-12-03 NOTE — ED NOTES
Pt sleeping at this time when his O2 sat noticed to be decreasing. Woke pt up, pt awoke easily with verbal stimuli. After deep breathing and repositioning, pts O2 sat increased to 93%. Provider made aware     Stacey Burns RN  12/03/17 0448

## 2017-12-06 PROBLEM — J18.9 PNEUMONIA: Status: ACTIVE | Noted: 2017-01-01

## 2017-12-06 NOTE — SIGNIFICANT NOTE
Patient skin is dry and very flaky.  BLE are edematous.  There are two burst blisters on lower left shin.  Bases are pink and dry.  A scar is noted down right knee.  He has multiple scabbed areas including a large scab below right elbow.  He has a small purple area like a blood blister near the right corner of his lower lip.  Small scab noted in scalp above right ear.

## 2017-12-06 NOTE — ED NOTES
Patient's family called EMS because patient was complaining of chest pain. Upon arrival EMS reports that patient's O2 sats were in the 60s. Patient presents with a decreased level of consciousness. Patient does arouse to voice. Cyanosis present in patient's fingertips. Patient reports shortness of breath. Patient denies any chest pain at this time.     Shae Bose RN  12/06/17 041

## 2017-12-06 NOTE — CONSULTS
Referring Provider: Dr. Velez   Reason for Consultation: Critical care     Chief complaint respiratory failure       History of present illness:      Mr. Anne is a 67 year old male who was admitted on 12/6/17. Mr. Anne presented to the ER on the day of admission via EMS with shortness of breath and chest pain, EMS reported Sp02 to be 56%, increased to 92% after duoneb, medrol and placed on non-re breather mask. In the ER the patient was placed on BiPap, he failed bipap, continued to have worsening shortness of breath and no improvements in ABG, he was intubated in the ER at that time and admitted to CCU for further evaluation and treatment. Pulmonary/Critical care is consulted for his respiratory failure.     His medical history consist of COPD, agent orange exposure, arthritis, CAD, DM, hyperlipidemia, HTN and a former smoker, he quit smoking in 2010 and had smoked for 40 years. He is a full code. No family is present during rounds. History is obtained from notes and staff.     Review Of Systems:   Review of Systems   Reason unable to perform ROS: unable to assess due to intubation and sedation.          History  Past Medical History:   Diagnosis Date   • Agent orange exposure    • Arthritis    • CHF (congestive heart failure)    • COPD (chronic obstructive pulmonary disease)    • Coronary artery disease    • Diabetes mellitus    • Hyperlipidemia    • Hypertension    , Past Surgical History:   Procedure Laterality Date   • CARDIAC SURGERY      stent placement   • CATARACT EXTRACTION     • JOINT REPLACEMENT      right knee   • KNEE SURGERY     , Family History   Problem Relation Age of Onset   • Heart disease Mother    • Cancer Father    • Heart attack Sister    • Heart attack Brother    • Cancer Paternal Grandfather    , Social History   Substance Use Topics   • Smoking status: Former Smoker     Years: 40.00     Quit date: 3/21/2010   • Smokeless tobacco: Never Used   • Alcohol use No   , Prescriptions Prior  to Admission   Medication Sig Dispense Refill Last Dose   • albuterol (PROVENTIL HFA;VENTOLIN HFA) 108 (90 BASE) MCG/ACT inhaler Albuterol 90 MCG/ACT AERS; Patient Sig: Albuterol 90 MCG/ACT AERS ; 0; 16-Feb-2016; Active   Taking   • albuterol (PROVENTIL HFA;VENTOLIN HFA) 108 (90 Base) MCG/ACT inhaler Inhale 2-4 puffs Every 4 (Four) Hours As Needed for Wheezing or Shortness of Air. 1 inhaler 0    • amLODIPine (NORVASC) 10 MG tablet Take  by mouth.   Taking   • amoxicillin-clavulanate (AUGMENTIN) 875-125 MG per tablet Take 1 tablet by mouth 2 (Two) Times a Day. 20 tablet 0    • aspirin 81 MG EC tablet Take 81 mg by mouth Daily.   Taking   • atorvastatin (LIPITOR) 80 MG tablet Take  by mouth.   Taking   • benazepril (LOTENSIN) 40 MG tablet Take  by mouth.   Taking   • budesonide (PULMICORT) 1 MG/2ML nebulizer solution Budesonide SUSP; Patient Sig: Budesonide SUSP ; 0; 16-Feb-2016; Active   Taking   • carvedilol (COREG) 25 MG tablet Take 12.5 mg by mouth 2 (Two) Times a Day With Meals.   Taking   • Cholecalciferol (VITAMIN D3) 2000 UNITS tablet Take  by mouth.   Taking   • colestipol (COLESTID) 1 G tablet Take  by mouth.   Taking   • doxycycline (MONODOX) 100 MG capsule Take 1 capsule by mouth 2 (Two) Times a Day. 20 capsule 0 Taking   • ferrous sulfate 325 (65 FE) MG tablet Take 325 mg by mouth 2 (Two) Times a Day.   Taking   • fluticasone (FLONASE) 50 MCG/ACT nasal spray 2 sprays into each nostril daily. Administer 2 sprays in each nostril for each dose. 1 bottle 2 Taking   • furosemide (LASIX) 20 MG tablet Take 20 mg by mouth 2 (Two) Times a Day.   Taking   • gabapentin (NEURONTIN) 300 MG capsule Take 300 mg by mouth 2 (Two) Times a Day.   Taking   • HYDROcodone-acetaminophen (NORCO) 5-325 MG per tablet Take 1 tablet by mouth Every 6 (Six) Hours As Needed for mild pain (1-3). 12 tablet 0 Taking   • insulin NPH-insulin regular (novoLIN 70/30) (70-30) 100 UNIT/ML injection Inject 35 Units under the skin 2 (Two) Times a  Day With Meals.   Taking   • isosorbide mononitrate (IMDUR) 60 MG 24 hr tablet Take 180 mg by mouth Daily.   Taking   • Loratadine 10 MG capsule Take  by mouth.   Taking   • losartan (COZAAR) 50 MG tablet Take 100 mg by mouth Daily.   Taking   • magnesium oxide (MAG-OX) 400 MG tablet Take 400 mg by mouth 2 (Two) Times a Day.   Taking   • metFORMIN (GLUCOPHAGE) 1000 MG tablet Take 1,000 mg by mouth 2 (Two) Times a Day With Meals.   Taking   • metOLazone (ZAROXOLYN) 5 MG tablet Take 5 mg by mouth Daily.   Taking   • nitroglycerin (NITROSTAT) 0.4 MG SL tablet Place 0.4 mg under the tongue Every 5 (Five) Minutes As Needed for chest pain. Take no more than 3 doses in 15 minutes.   Taking   • pantoprazole (PROTONIX) 40 MG EC tablet Take 40 mg by mouth Daily.   Taking   • POLYETHYLENE GLYCOL 3350 PO Take  by mouth.   Taking   • potassium chloride (K-DUR) 10 MEQ CR tablet Take 1 tablet by mouth 2 (Two) Times a Day. 15 tablet 0 Taking   • predniSONE (DELTASONE) 10 MG tablet Take 4 tabs daily x 3 days, then take 3 tabs daily x 3 days, then take 2 tabs daily x 3 days, then take 1 tab daily x 3 days 31 tablet 0 Taking   • predniSONE (DELTASONE) 20 MG tablet Take 1 tablet by mouth 3 (Three) Times a Day for 5 days. 15 tablet 0    • sertraline (ZOLOFT) 100 MG tablet Take 50 mg by mouth Daily.   Taking   • sulfamethoxazole-trimethoprim (BACTRIM DS,SEPTRA DS) 800-160 MG per tablet Take 2 tablets by mouth 2 (Two) Times a Day for 14 days. 56 tablet 0    • tiotropium (SPIRIVA) 18 MCG per inhalation capsule Place 1 capsule into inhaler and inhale Daily.   Taking   • torsemide (DEMADEX) 20 MG tablet Take 20 mg by mouth 2 (Two) Times a Day.   Taking   • traMADol (ULTRAM) 50 MG tablet Take 50 mg by mouth Every 8 (Eight) Hours As Needed for moderate pain (4-6).   Taking   • triamcinolone (KENALOG) 0.1 % cream Apply  topically 2 (Two) Times a Day.   Taking   • vitamin B-12 (CYANOCOBALAMIN) 1000 MCG tablet Take 2,000 mcg by mouth Daily.       , Scheduled Meds:    [START ON 12/7/2017] ceftriaxone 1 g Intravenous Q24H   doxycycline 100 mg Intravenous Once   [START ON 12/7/2017] doxycycline 100 mg Intravenous Q12H   famotidine 20 mg Intravenous Daily   heparin (porcine) 5,000 Units Subcutaneous Q12H   insulin aspart 0-14 Units Subcutaneous Q4H   methylPREDNISolone sodium succinate 40 mg Intravenous Q12H   Risaquad-2 1 capsule Oral Daily   vitamin C 1,000 mg Oral BID   , Continuous Infusions:    fentaNYL (SUBLIMAZE) PCA 1500 mcg/30 mL syringe     propofol 5-50 mcg/kg/min Last Rate: 50 mcg/kg/min (12/06/17 0821)   sodium chloride 100 mL/hr Last Rate: 100 mL/hr (12/06/17 0523)    and Allergies:  Iodine and Other    Objective     Vital Signs   Vitals:    12/06/17 0836   BP:    Pulse: 56   Resp: 20   Temp:    SpO2: 96%       Physical Exam:               GENERAL APPEARANCE: intubated, sedated, appears to be in no distress.     HEAD: normocephalic.    EYES: PERRL, EOMI. Fundi normal, vision is grossly intact.    THROAT: Oral cavity and pharynx normal. No inflammation, swelling, exudate, or lesions.     NECK: Neck supple.     CARDIAC: Normal S1 and S2. No S3, S4 or murmurs. Rhythm is regular. There is no peripheral edema, cyanosis or pallor. Extremities are warm and well perfused. Capillary refill is less than 2 seconds. No carotid bruits.    Respiratory: Clear to auscultation without rales, rhonchi, wheezing or diminished breath sounds.    GI: Positive bowel sounds. Soft, nondistended, nontender.     Musculoskeletal: No significant deformity or joint abnormality. No edema. Peripheral pulses intact. No varicosities.    NEUROLOGICAL: unable to assess, intubated and sedated.     PSYCHIATRIC: unable to assess, intubated and sedated.                Results Review:    LABS:    Lab Results   Component Value Date    GLUCOSE 304 (H) 12/06/2017    BUN 30 (H) 12/06/2017    CREATININE 2.51 (H) 12/06/2017    EGFRIFNONA 26 (L) 12/06/2017    BCR 12.0 12/06/2017    CO2 32.4 (H)  12/06/2017    CALCIUM 8.4 12/06/2017    ALBUMIN 3.80 12/06/2017    LABIL2 1.2 (L) 12/06/2017    AST 15 12/06/2017    ALT 14 12/06/2017    WBC 10.32 12/06/2017    HGB 8.2 (L) 12/06/2017    HCT 26.9 (L) 12/06/2017    MCV 96.4 (H) 12/06/2017     12/06/2017     12/06/2017    K 4.9 12/06/2017    CL 97 (L) 12/06/2017    ANIONGAP 6.6 12/06/2017       Lab Results   Component Value Date    INR 1.18 (H) 12/06/2017    INR 1.12 (H) 12/02/2017    INR 1.01 01/27/2017    PROTIME 15.2 12/06/2017    PROTIME 14.5 12/02/2017    PROTIME 11.4 01/27/2017         Results from last 7 days  Lab Units 12/06/17  0121 12/02/17  0000   INR  1.18* 1.12*   APTT seconds  --  36.7*                     I reviewed the patient's new clinical results.  I reviewed the patient's new imaging results and agree with the interpretation.      Assessment/Plan      Neuro: patient is intubated and sedated per protocol.     Respiratory Failure-hypercarbic and hypoxic: likely related to underlying lung disease, COPD, pneumonia and fluid overload. Continue vent support. ABG reviewed, Respiratory acidosis noted, respiratory rate increased to 20 and TV decreased to 450, PEEP increased to 8. Continue scheduled inhalants and nebulizer's. ET tube advanced 2 cm, will repeat chest xray.  Chest xray shows fluid overload. Atypical's ordered. Started doxycyline for coverage. Solu-medrol decreased to 40mg IV BID.     Cardiac: HR 57, /46, continue continuous ECG and v/s monitoring, maintain MAP >65.     Low diastolic BP: will check echo for aortic regurgitation and cortisol level.     Acute on Chronic Renal Failure: Creatinine 2.51, 24 hour urine 445, continue strict I&O, electrolytes WNL. Magnesium and phosphorus ordered will replace accordingly.     Holding off on lasix for now until echo completed, renal function elevated, continue IV fluids.     Endo: monitor blood glucose, target range 120-140.     GI: continue GI prophylaxis, nutrition consulted to  start tube feedings.     Vitamin D: level ordered, will treat accordingly.     DVT prophylaxis: heparin BID, SCDs in place.     ID: WBC 10.32, decreased from admission which was 14.01, neutrophils are 94, CRP is 11.7.               Active Problems:    Pneumonia      I discussed the patients findings and my recommendations with patient, nursing staff and primary care team    YANG Mathews  12/06/17  10:04 AM    Scribed for Dr. Gomez by YANG Cassidy.       I, Jeremi Gomez M.D. attest that the above note accurately reflects the work and decisions made  by me.  Patient was seen and evaluated by Dr. Gomez, including history of present illness, physical exam, assessment, and treatment plan.  The above note was reviewed and edited by Dr. Gomez.  Critical Care time spent in direct patient care: 35 minutes (excluding procedure time, if applicable) including high complexity decision making to assess, manipulate, and support vital organ system failure in this individual who has impairment of one or more vital organ systems such that there is a high probability of imminent or life threatening deterioration in the patient’s condition.

## 2017-12-06 NOTE — PLAN OF CARE
Problem: Respiratory Insufficiency (Adult)  Goal: Identify Related Risk Factors and Signs and Symptoms  Outcome: Ongoing (interventions implemented as appropriate)  Goal: Acid/Base Balance  Outcome: Ongoing (interventions implemented as appropriate)  Goal: Effective Ventilation  Outcome: Ongoing (interventions implemented as appropriate)    Problem: Skin Integrity Impairment, Risk/Actual (Adult)  Goal: Identify Related Risk Factors and Signs and Symptoms  Outcome: Ongoing (interventions implemented as appropriate)  Goal: Skin Integrity/Wound Healing  Outcome: Ongoing (interventions implemented as appropriate)    Problem: Fall Risk (Adult)  Goal: Identify Related Risk Factors and Signs and Symptoms  Outcome: Ongoing (interventions implemented as appropriate)  Goal: Absence of Falls  Outcome: Ongoing (interventions implemented as appropriate)    Problem: Patient Care Overview (Adult)  Goal: Plan of Care Review  Outcome: Ongoing (interventions implemented as appropriate)  Goal: Adult Individualization and Mutuality  Outcome: Ongoing (interventions implemented as appropriate)  Goal: Discharge Needs Assessment  Outcome: Ongoing (interventions implemented as appropriate)    Problem: Pressure Ulcer Risk (Karthikeyan Scale) (Adult,Obstetrics,Pediatric)  Goal: Identify Related Risk Factors and Signs and Symptoms  Outcome: Ongoing (interventions implemented as appropriate)  Goal: Skin Integrity  Outcome: Ongoing (interventions implemented as appropriate)    Problem: Diabetes, Type 2 (Adult)  Goal: Signs and Symptoms of Listed Potential Problems Will be Absent or Manageable (Diabetes, Type 2)  Outcome: Ongoing (interventions implemented as appropriate)

## 2017-12-06 NOTE — NURSING NOTE
Ruptured blisters noted to left anterior calf  See flow sheet  Treatment ordered  Purple blood filled blister noted to right lower lip corner on admission to unit.

## 2017-12-06 NOTE — H&P
[facility]    Patient Identification:  Name:  Se Anne  Age:  67 y.o.  Sex:  male  :  1950  MRN:  [id number]   Visit Number:  [id number]  Primary Care Physician:  No Known Provider     Chief complaint: Shortness of breath.    History of presenting illness:  67 y.o. male was brought to the emergency room by EMS with shortness of breath and chest pain.  EMS reported that patient was saturating about 56% on room air upon arrival at his home.  Patient was started on DuoNeb, IV steroids and placed on non-rebreather.  Oxygen saturation improved to 92% and patient was brought to the emergency room.  In the ER patient appeared to be in obvious respiratory distress and was wheezing.  She was started on BiPAP and ABG at that time showed pH of 7.3 with PCO2 of 66.2.  Oxygen saturation was documented as 74%.  Repeat ABG after about 1 hour on BiPAP showed worsening hypercarbia of 70.6 and pH of 7.3.  It was decided that patient should be intubated and mechanically ventilated.  Meanwhile, chest x-ray shows possible right-sided infiltrates consistent with pneumonia.  Please note that I obtained history from emergency room documentation.  Patient is currently intubated and mechanically ventilated.  No family members around.  IV doxycycline and Rocephin have been initiated.    ---------------------------------------------------------------------------------------------------------------------   Review of Systems   Unable to perform ROS: Intubated      ---------------------------------------------------------------------------------------------------------------------   Past Medical History:   Diagnosis Date   • Agent orange exposure    • Arthritis    • CHF (congestive heart failure)    • COPD (chronic obstructive pulmonary disease)    • Coronary artery disease    • Diabetes mellitus    • Hyperlipidemia    • Hypertension      Past Surgical History:   Procedure Laterality  Date   • CARDIAC SURGERY      stent placement   • CATARACT EXTRACTION     • JOINT REPLACEMENT      right knee   • KNEE SURGERY       Family History   Problem Relation Age of Onset   • Heart disease Mother    • Cancer Father    • Heart attack Sister    • Heart attack Brother    • Cancer Paternal Grandfather      Social History     Social History   • Marital status:      Spouse name: N/A   • Number of children: N/A   • Years of education: N/A     Social History Main Topics   • Smoking status: Former Smoker     Years: 40.00     Quit date: 3/21/2010   • Smokeless tobacco: Never Used   • Alcohol use No   • Drug use: No   • Sexual activity: Defer     Other Topics Concern   • Not on file     Social History Narrative     ---------------------------------------------------------------------------------------------------------------------   Allergies:  Iodine and Other  ---------------------------------------------------------------------------------------------------------------------   Prior to Admission Medications     Prescriptions Last Dose Informant Patient Reported? Taking?    albuterol (PROVENTIL HFA;VENTOLIN HFA) 108 (90 Base) MCG/ACT inhaler   No No    Inhale 2-4 puffs Every 4 (Four) Hours As Needed for Wheezing or Shortness of Air.    albuterol (PROVENTIL HFA;VENTOLIN HFA) 108 (90 BASE) MCG/ACT inhaler   Yes No    Albuterol 90 MCG/ACT AERS; Patient Sig: Albuterol 90 MCG/ACT AERS ; 0; 16-Feb-2016; Active    amLODIPine (NORVASC) 10 MG tablet   Yes No    Take  by mouth.    amoxicillin-clavulanate (AUGMENTIN) 875-125 MG per tablet   No No    Take 1 tablet by mouth 2 (Two) Times a Day.    aspirin 81 MG EC tablet   Yes No    Take 81 mg by mouth Daily.    atorvastatin (LIPITOR) 80 MG tablet   Yes No    Take  by mouth.    benazepril (LOTENSIN) 40 MG tablet   Yes No    Take  by mouth.    budesonide (PULMICORT) 1 MG/2ML nebulizer solution   Yes No    Budesonide SUSP; Patient Sig: Budesonide SUSP ; 0; 16-Feb-2016;  Active    carvedilol (COREG) 25 MG tablet   Yes No    Take 12.5 mg by mouth 2 (Two) Times a Day With Meals.    Cholecalciferol (VITAMIN D3) 2000 UNITS tablet   Yes No    Take  by mouth.    colestipol (COLESTID) 1 G tablet   Yes No    Take  by mouth.    doxycycline (MONODOX) 100 MG capsule   No No    Take 1 capsule by mouth 2 (Two) Times a Day.    ferrous sulfate 325 (65 FE) MG tablet   Yes No    Take 325 mg by mouth 2 (Two) Times a Day.    fluticasone (FLONASE) 50 MCG/ACT nasal spray   No No    2 sprays into each nostril daily. Administer 2 sprays in each nostril for each dose.    furosemide (LASIX) 20 MG tablet   Yes No    Take 20 mg by mouth 2 (Two) Times a Day.    gabapentin (NEURONTIN) 300 MG capsule   Yes No    Take 300 mg by mouth 2 (Two) Times a Day.    HYDROcodone-acetaminophen (NORCO) 5-325 MG per tablet   No No    Take 1 tablet by mouth Every 6 (Six) Hours As Needed for mild pain (1-3).    insulin NPH-insulin regular (novoLIN 70/30) (70-30) 100 UNIT/ML injection   Yes No    Inject 35 Units under the skin 2 (Two) Times a Day With Meals.    isosorbide mononitrate (IMDUR) 60 MG 24 hr tablet   Yes No    Take 180 mg by mouth Daily.    Loratadine 10 MG capsule   Yes No    Take  by mouth.    losartan (COZAAR) 50 MG tablet   Yes No    Take 100 mg by mouth Daily.    magnesium oxide (MAG-OX) 400 MG tablet   Yes No    Take 400 mg by mouth 2 (Two) Times a Day.    metFORMIN (GLUCOPHAGE) 1000 MG tablet   Yes No    Take 1,000 mg by mouth 2 (Two) Times a Day With Meals.    metOLazone (ZAROXOLYN) 5 MG tablet   Yes No    Take 5 mg by mouth Daily.    nitroglycerin (NITROSTAT) 0.4 MG SL tablet   Yes No    Place 0.4 mg under the tongue Every 5 (Five) Minutes As Needed for chest pain. Take no more than 3 doses in 15 minutes.    pantoprazole (PROTONIX) 40 MG EC tablet   Yes No    Take 40 mg by mouth Daily.    POLYETHYLENE GLYCOL 3350 PO   Yes No    Take  by mouth.    potassium chloride (K-DUR) 10 MEQ CR tablet   No No    Take 1  tablet by mouth 2 (Two) Times a Day.    predniSONE (DELTASONE) 10 MG tablet   No No    Take 4 tabs daily x 3 days, then take 3 tabs daily x 3 days, then take 2 tabs daily x 3 days, then take 1 tab daily x 3 days    predniSONE (DELTASONE) 20 MG tablet   No No    Take 1 tablet by mouth 3 (Three) Times a Day for 5 days.    sertraline (ZOLOFT) 100 MG tablet   Yes No    Take 50 mg by mouth Daily.    sulfamethoxazole-trimethoprim (BACTRIM DS,SEPTRA DS) 800-160 MG per tablet   No No    Take 2 tablets by mouth 2 (Two) Times a Day for 14 days.    tiotropium (SPIRIVA) 18 MCG per inhalation capsule   Yes No    Place 1 capsule into inhaler and inhale Daily.    torsemide (DEMADEX) 20 MG tablet   Yes No    Take 20 mg by mouth 2 (Two) Times a Day.    traMADol (ULTRAM) 50 MG tablet   Yes No    Take 50 mg by mouth Every 8 (Eight) Hours As Needed for moderate pain (4-6).    triamcinolone (KENALOG) 0.1 % cream   Yes No    Apply  topically 2 (Two) Times a Day.    vitamin B-12 (CYANOCOBALAMIN) 1000 MCG tablet   Yes No    Take 2,000 mcg by mouth Daily.        Hospital Scheduled Meds:    ceftriaxone 1 g Intravenous Once   ceftriaxone 1 g Intravenous Q24H   doxycycline 100 mg Intravenous Once   doxycycline 100 mg Intravenous Q12H   famotidine 20 mg Intravenous Daily       propofol 5-50 mcg/kg/min Last Rate: 10 mcg/kg/min (12/06/17 0325)     ---------------------------------------------------------------------------------------------------------------------   Vital Signs:  Temp:  [98.6 °F (37 °C)] 98.6 °F (37 °C)  Heart Rate:  [80-86] 80  Resp:  [22-24] 22  BP: (173-174)/(69-81) 173/69  FiO2 (%):  [50 %] 50 %  Last 3 weights    12/06/17  0051   Weight: 121 kg (266 lb)     Body mass index is 42.29 kg/(m^2).  ---------------------------------------------------------------------------------------------------------------------   Physical Exam:  Constitutional:  Well-developed and well-nourished.  Intubated and mechanically ventilated.     HENT:   Head: Normocephalic and atraumatic.   Eyes:  Conjunctivae and EOM are normal.  Pupils are equal, round, and reactive to light.  No scleral icterus.  Neck:  No JVD present.  No masses.  Cardiovascular:  Normal rate, regular rhythm and normal heart sounds with no murmur.  Pulmonary/Chest:  No respiratory distress, no wheezes, no crackles, with normal breath sounds and good air movement.  Abdominal:  Soft.  Bowel sounds are normal.  No distension and no tenderness.   Musculoskeletal:  No edema, no tenderness, and no deformity.  No red or swollen joints anywhere.    Neurological:  Unable to assess.  Patient is currently intubated and mechanically ventilated.   Skin:  Skin is warm and dry.  No rash noted.  No pallor.   Psychiatric: Unable to assess.  Peripheral vascular:  No edema and strong pulses on all 4 extremities.  Genitourinary: Deferred.  ---------------------------------------------------------------------------------------------------------------------  EKG:   I have personally looked at both the EKG and the telemetry strips.  ---------------------------------------------------------------------------------------------------------------------     Results from last 7 days  Lab Units 12/06/17  0121 12/02/17  0138 12/02/17  0000   LACTATE mmol/L 1.0 1.2  --    WBC 10*3/mm3 14.01*  --  5.93   HEMOGLOBIN g/dL 9.1*  --  9.6*   HEMATOCRIT % 29.7*  --  31.0*   MCV fL 95.2*  --  94.2*   MCHC g/dL 30.6*  --  31.0*   PLATELETS 10*3/mm3 230  --  261   INR  1.18*  --  1.12*       Results from last 7 days  Lab Units 12/06/17  0204   PH, ARTERIAL pH units 7.301*   PO2 ART mm Hg 71.1*   PCO2, ARTERIAL mm Hg 70.2*   HCO3 ART mmol/L 33.8*       Results from last 7 days  Lab Units 12/06/17  0121 12/02/17  0000   SODIUM mmol/L 139 141   POTASSIUM mmol/L 4.6 4.4   CHLORIDE mmol/L 96* 98*   CO2 mmol/L 35.4* 35.0*   BUN mg/dL 31* 34*   CREATININE mg/dL 2.42* 2.50*   EGFR IF NONAFRICN AM mL/min/1.73 27* 26*   CALCIUM mg/dL 9.2 9.1    GLUCOSE mg/dL 261* 119*   ALBUMIN g/dL 4.50 4.20   BILIRUBIN mg/dL 0.7 0.1*   ALK PHOS U/L 61 59   AST (SGOT) U/L 16 22   ALT (SGPT) U/L 17 23   Estimated Creatinine Clearance: 35.6 mL/min (by C-G formula based on Cr of 2.42).  No results found for: AMMONIA    Results from last 7 days  Lab Units 12/06/17  0121 12/02/17  0347 12/02/17  0000   CK TOTAL U/L  --  173 179   TROPONIN I ng/mL 0.078* 0.023 0.014   CK MB INDEX %  --  2.1 2.2         Lab Results   Component Value Date    HGBA1C 6.7 (H) 05/07/2016     Lab Results   Component Value Date    TSH 1.040 08/06/2015    FREET4 0.95 08/06/2015     No results found for: PREGTESTUR, PREGSERUM, HCG, HCGQUANT  Pain Management Panel     Pain Management Panel Latest Ref Rng & Units 1/27/2017 11/21/2016    CREATININE UR mg/dL - -    AMPHETAMINES SCREEN, URINE Negative Negative Negative    BARBITURATES SCREEN Negative Negative Negative    BENZODIAZEPINE SCREEN, URINE Negative Negative Negative    COCAINE SCREEN, URINE Negative Negative Negative    METHADONE SCREEN, URINE Negative Negative Negative        Blood Culture   Date Value Ref Range Status   12/02/2017 No growth at 4 days  Preliminary   12/02/2017 No growth at 4 days  Preliminary                  ---------------------------------------------------------------------------------------------------------------------  Imaging Results (last 7 days)     Procedure Component Value Units Date/Time    XR Chest 1 View [[id number]] Resulted:  12/06/17 0204     Updated:  12/06/17 0205    XR Chest 1 View [[id number]] Updated:  12/06/17 0318          I have personally reviewed the radiology images and read the final radiology report.  ---------------------------------------------------------------------------------------------------------------------  Assessment and Plan:  1.  Acute hypercapnic and hypoxemic respiratory failure.  2.  Right sided pneumonia.  3.  History of COPD.  4.  Elevated troponin.  5.  Chronic kidney disease stage  III.  6.  Chronic anemia.  7.  Hypertension.  8.  Diabetes mellitus type 2.  9.  Possible congestive heart failure.    Patient is admitted to critical care unit and will continue with mechanical ventilation.  We'll monitor blood gases and adjust vent setting appropriately.  We'll continue IV antibiotics as well as IV Solu-Medrol.  We'll trend cardiac enzymes and consult cardiology for further recommendations.  We will requests 2-D echocardiogram for evaluation of cardiac structures and function.  We will renally dose his antibiotics and appropriate medications.  We'll continue finger stick glucose monitoring and sliding scale insulin protocol.  Patient is started on subcutaneous heparin for DVT prophylaxis and also on IV famotidine for gastric protection.  We will monitor hemoglobin and hematocrit closely.  We will obtain more history from family when they're available.      Jerrell Velez MD  12/06/17  3:54 AM

## 2017-12-06 NOTE — PROGRESS NOTES
[facility] CCU Note    Patient Identification:  Name:  Se Anne  Age:  67 y.o.  Sex:  male  :  1950  MRN:  [id number]  Visit number:  [id number]  Primary Care Provider:  No Known Provider    0    Ventilator Day:  Ventilator Settings:  Ventilator/Non-Invasive Ventilation Settings     Start     Ordered    17 0737  Ventilator - AC/VC; 20; 50; 90%; 8; 450  Continuous     Question Answer Comment   Vent Mode AC/VC    Breath rate 20    FiO2 50    FiO2 titrate for Sp02% =/> 90%    PEEP 8    Tidal Volume 450        17 0737    17 0413  Ventilator - AC/VC; (16); 50; 90%; 5; 500  Continuous,   Status:  Canceled     Comments:  A/C per Dr. Bermudez   Question Answer Comment   Vent Mode AC/VC    Breath rate  16   FiO2 50    FiO2 titrate for Sp02% =/> 90%    PEEP 5    Tidal Volume 500        17 0413    17 0330  Ventilator - SIMV/VC; (16); 50; 90%; 5; 500; 8  Continuous,   Status:  Canceled     Question Answer Comment   Vent Mode SIMV/VC    Breath rate  16   FiO2 50    FiO2 titrate for Sp02% =/> 90%    PEEP 5    Tidal Volume 500    Pressure Support 8        17 0329    17 0126  . BIPAP  Until Discontinued,   Status:  Canceled     Question:  .  Answer:  BIPAP    17          Drips: Propofol, fentanyl  Antibiotics: Rocephin, doxycycline  Lines: Peripheral ×2  Worley catheter: Yes    Procedures:  -Intubation in the emergency department    HPI:  The patient is a 67-year-old male who presented with shortness of breath and chest pain    Subjective      The patient is seen this morning and is sedated and on mechanical ventilation.  He had some agitation overnight requiring the addition of fentanyl.  Otherwise, he has had no acute events overnight.  Ventilator adjustments have already been made by pulmonology.  He is noted to have a negative fluid balance.    History taken from: chart RN   Patient unable to give history due to: Sedated on mechanical  ventilation  Present during visit: REGGIE Cruz    Objective     Vital Signs  Temp:  [98.6 °F (37 °C)-98.9 °F (37.2 °C)] 98.9 °F (37.2 °C)  Heart Rate:  [56-86] 56  Resp:  [16-24] 20  BP: (124-174)/(49-81) 124/49  FiO2 (%):  [50 %] 50 %  Body mass index is 41.95 kg/(m^2).    Intake/Output Summary (Last 24 hours) at 12/06/17 0902  Last data filed at 12/06/17 0600   Gross per 24 hour   Intake               71 ml   Output              445 ml   Net             -374 ml       Physical Exam:    Physical Exam   Constitutional: He appears well-developed and well-nourished. No distress. He is sedated and intubated.   Obese   HENT:   Head: Normocephalic and atraumatic.   Nose: Nose normal.   Mouth/Throat: Oropharynx is clear and moist and mucous membranes are normal.   ET tube and OG tube in place   Eyes: Conjunctivae and EOM are normal. Pupils are equal, round, and reactive to light. No scleral icterus.   Neck: Trachea normal. Neck supple. No JVD present. Carotid bruit is not present. No thyromegaly present.   Cardiovascular: Normal rate, regular rhythm and normal pulses.  Exam reveals no gallop and no friction rub.    Murmur heard.   Systolic murmur is present with a grade of 1/6   1+ pitting edema bilateral lower extremities   Pulmonary/Chest: Effort normal. He is intubated. No respiratory distress. He has decreased breath sounds in the right lower field. He has wheezes. He has no rales.   Occasional end expiratory wheezes   Abdominal: Soft. Bowel sounds are normal. He exhibits no distension. There is no tenderness. There is no guarding.   Genitourinary:   Genitourinary Comments: Worley catheter is in place   Neurological:   Unable to assess as patient is sedated and on the ventilator   Skin: Skin is warm and dry. Abrasion and ecchymosis noted. No rash noted. There is erythema.   Some mild erythema and rounded skin tears are noted on the left lower extremity; the patient has a few scattered ecchymoses      Results Review:      Telemetry: Sinus bradycardia, 56 bpm    I reviewed the patient's new imaging results and agree with the interpretation.  I reviewed the patient's other test results and agree with the interpretation.      Results from last 7 days  Lab Units 12/06/17  0541 12/06/17  0121 12/02/17  0000   WBC 10*3/mm3 10.32 14.01* 5.93   HEMOGLOBIN g/dL 8.2* 9.1* 9.6*   PLATELETS 10*3/mm3 199 230 261       Results from last 7 days  Lab Units 12/06/17  0541 12/06/17  0121 12/02/17  0000   SODIUM mmol/L 136 139 141   POTASSIUM mmol/L 4.9 4.6 4.4   CHLORIDE mmol/L 97* 96* 98*   CO2 mmol/L 32.4* 35.4* 35.0*   BUN mg/dL 30* 31* 34*   CREATININE mg/dL 2.51* 2.42* 2.50*   CALCIUM mg/dL 8.4 9.2 9.1   GLUCOSE mg/dL 304* 261* 119*       Results from last 7 days  Lab Units 12/06/17  0541 12/06/17  0121 12/02/17  0000   BILIRUBIN mg/dL 0.5 0.7 0.1*   ALK PHOS U/L 52 61 59   AST (SGOT) U/L 15 16 22   ALT (SGPT) U/L 14 17 23       Results from last 7 days  Lab Units 12/06/17  0121 12/02/17  0000   INR  1.18* 1.12*       Cultures:  Blood Culture   Date Value Ref Range Status   12/02/2017 No growth at 4 days  Preliminary   12/02/2017 No growth at 4 days  Preliminary        Current Hospital Medications:    [START ON 12/7/2017] ceftriaxone 1 g Intravenous Q24H   doxycycline 100 mg Intravenous Once   [START ON 12/7/2017] doxycycline 100 mg Intravenous Q12H   famotidine 20 mg Intravenous Daily   heparin (porcine) 5,000 Units Subcutaneous Q12H   insulin aspart 0-9 Units Subcutaneous 4x Daily AC & at Bedtime   methylPREDNISolone sodium succinate 40 mg Intravenous Q12H   Risaquad-2 1 capsule Oral Daily   vitamin C 1,000 mg Oral BID       fentaNYL (SUBLIMAZE) PCA 1500 mcg/30 mL syringe     propofol 5-50 mcg/kg/min Last Rate: 50 mcg/kg/min (12/06/17 0821)   sodium chloride 100 mL/hr Last Rate: 100 mL/hr (12/06/17 0523)       Assessment/Plan      -Acute hypercapnic and hypoxic respiratory failure secondary to a bibasilar community-acquired  pneumonia  -Bibasilar community-acquired pneumonia  -Acute kidney injury in the setting of stage III chronic kidney disease  -Oxygen dependent COPD in an acute exacerbation  -Insulin-dependent diabetes mellitus type 2 with hyperglycemia that is likely exacerbated by IV steroids  -Mild hypochloremia  -Mildly elevated troponin level in the setting of acute on chronic kidney disease; may also be related to respiratory failure   -Chronic anemia with macrocytosis  -Mild ST depression in lead II, V5 and V6    Continue with mechanical ventilation.  A repeat ABG has been ordered for in the morning.  I will also update his chest x-ray at that time as well.  Continue with IV antibiotics, IV Solu-Medrol and scheduled inhalants.  Continue with gentle IV fluid hydration and follow his urine output closely.  I will repeat his CMP in the morning and if his worsening renal dysfunction persists, then we will consult nephrology.  In the meantime, I have ordered for a renal ultrasound.  I have increased his sliding scale insulin coverage for tighter glycemic control.    DVT/GI ppx: Subcutaneous heparin/H2 blocker    Patient is considered high risk due to: respiratory failure, kidney disease, mildly elevated troponin     I discussed the patients findings and my recommendations with nursing staff.    Teresa Sandhu DO  12/06/17  9:02 AM

## 2017-12-06 NOTE — PLAN OF CARE
Problem: Mechanical Ventilation, Invasive (Adult)  Goal: Signs and Symptoms of Listed Potential Problems Will be Absent or Manageable (Mechanical Ventilation, Invasive)    12/06/17 1544   Mechanical Ventilation, Invasive   Problems Assessed (Mechanical Ventilation, Invasive) artificial airway induced skin/tissue breakdown;mechanical dysfunction;situational response;ventilator- induced lung injury   Problems Present (Mechanical Ventilation, Invasive) none

## 2017-12-06 NOTE — PROGRESS NOTES
Discharge Planning Assessment   [facility] [place]     Patient Name: Se Anne  MRN: [id number]  Today's Date: 12/6/2017    Admit Date: 12/6/2017          Discharge Needs Assessment       12/06/17 1340    Living Environment    Lives With spouse;child(esther), adult   Ss spoke with pt's daughter Malka on this date. Pt lives at home with spouse and daughter. Pt plans to return home at discharge.     Transportation Available car;family or friend will provide    Living Environment    Quality Of Family Relationships supportive    Able to Return to Prior Living Arrangements yes    Discharge Needs Assessment    Equipment Currently Used at Home oxygen;power chair, (recliner lift);wheelchair;nebulizer;bipap/ cpap;respiratory supplies   [facility]             Discharge Plan       12/06/17 1351    Case Management/Social Work Plan    Patient/Family In Agreement With Plan yes      12/06/17 1348    Case Management/Social Work Plan    Plan SS spoke with pt's daughter on this date. Pt lives at home and plans to return home at discharge. Pt does not have home health services at this time. Pt has had [facility] from [facility] come out in the past. [facility] utilizes [facility] for Medic[facility]. Pt does not have issues at this time.         Discharge Placement     No information found                Demographic Summary     None            Functional Status     None            Psychosocial     None            Abuse/Neglect     None            Legal       12/06/17 1350    Legal    Legal Comments Pt's daughter states that pt has a living will. Pt's living will is not on file at this time.             Substance Abuse     None            Patient Forms     None          Jaylene Gracia

## 2017-12-06 NOTE — NURSING NOTE
Patient heart rate dropping into the upper 40's.  Diprivan was decreased from 50 to 40.  Dr. Sandhu notified and made aware of having decreased sedation related to heart rate.  She stated to continue monitoring that possibly some other meds such as coreg may still be in his system and affecting his heart rate.  Renal function is impaired, and patient may not be eliminating the med from his system easily.

## 2017-12-06 NOTE — ED PROVIDER NOTES
Subjective   HPI Comments: Patient presents to the ED by EMS with SOB and chest pain. EMS reported pt O2 sat 56%, patient given duoneb, solumedrol and placed on 15% nonrebreather.  Patient O2 sat up to 92%.      Patient is a 67 y.o. male presenting with shortness of breath.   History provided by:  Patient   used: No    Shortness of Breath   Severity:  Severe  Relieved by:  Nothing  Worsened by:  Nothing  Ineffective treatments:  None tried  Associated symptoms: chest pain and wheezing    Risk factors: obesity    Risk factors: no recent alcohol use, no family hx of DVT, no hx of cancer, no hx of PE/DVT, no oral contraceptive use, no prolonged immobilization, no recent surgery and no tobacco use        Review of Systems   Constitutional: Negative.    HENT: Negative.    Eyes: Negative.    Respiratory: Positive for shortness of breath and wheezing.    Cardiovascular: Positive for chest pain.   Gastrointestinal: Negative.    Endocrine: Negative.    Genitourinary: Negative.    Musculoskeletal: Negative.    Skin: Negative.    Allergic/Immunologic: Negative.    Neurological: Negative.    Hematological: Negative.    Psychiatric/Behavioral: Negative.    All other systems reviewed and are negative.      Past Medical History:   Diagnosis Date   • Agent orange exposure    • Arthritis    • CHF (congestive heart failure)    • COPD (chronic obstructive pulmonary disease)    • Coronary artery disease    • Diabetes mellitus    • Hyperlipidemia    • Hypertension        Allergies   Allergen Reactions   • Iodine    • Other      Seafood       Past Surgical History:   Procedure Laterality Date   • CARDIAC SURGERY      stent placement   • CATARACT EXTRACTION     • JOINT REPLACEMENT      right knee   • KNEE SURGERY         Family History   Problem Relation Age of Onset   • Heart disease Mother    • Cancer Father    • Heart attack Sister    • Heart attack Brother    • Cancer Paternal Grandfather        Social History      Social History   • Marital status:      Spouse name: N/A   • Number of children: N/A   • Years of education: N/A     Social History Main Topics   • Smoking status: Former Smoker     Years: 40.00     Quit date: 3/21/2010   • Smokeless tobacco: Never Used   • Alcohol use No   • Drug use: No   • Sexual activity: Defer     Other Topics Concern   • Not on file     Social History Narrative           Objective   Physical Exam   Constitutional: He is oriented to person, place, and time. He appears well-developed and well-nourished. He appears distressed.   HENT:   Head: Normocephalic and atraumatic.   Right Ear: External ear normal.   Left Ear: External ear normal.   Nose: Nose normal.   Mouth/Throat: Oropharynx is clear and moist.   Eyes: Conjunctivae are normal. Pupils are equal, round, and reactive to light.   Neck: Normal range of motion. Neck supple.   Cardiovascular: Normal rate, regular rhythm, normal heart sounds and intact distal pulses.    Pulmonary/Chest: He is in respiratory distress. He has wheezes.   Abdominal: Soft. Bowel sounds are normal.   Musculoskeletal: Normal range of motion.   Neurological: He is alert and oriented to person, place, and time.   Skin: Skin is warm and dry.   Nursing note and vitals reviewed.      Intubation  Date/Time: 12/6/2017 3:02 AM  Performed by: CHITO SIMS  Authorized by: CHITO SIMS     Consent:     Consent obtained:  Verbal    Consent given by:  Patient    Risks discussed:  Aspiration, hypoxia and pneumothorax    Alternatives discussed:  No treatment and delayed treatment  Pre-procedure details:     Patient status:  Awake    Mallampati score:  III    Pretreatment medications:  Midazolam    Paralytics:  Succinylcholine  Procedure details:     Preoxygenation:  Bag valve mask    CPR in progress: no      Intubation method:  Oral    Oral intubation technique:  Video-assisted    Laryngoscope blade:  Mac 3 and Mac 4    Tube size (mm):  7.5    Tube type:   Cuffed    Number of attempts:  2    Ventilation between attempts: yes      Cricoid pressure: yes      Tube visualized through cords: yes    Placement assessment:     ETT to lip:  23    Tube secured with:  ETT apple    Breath sounds:  Equal    Placement verification: chest rise and ETCO2 detector      CXR findings:  ETT in proper place  Post-procedure details:     Patient tolerance of procedure:  Tolerated well, no immediate complications  Comments:      First attempted with a no ET tube and was unsuccessful and switched 7.5             ED Course  ED Course   Value Comment By Time    Pt received 125 Solumedrol and duoneb in route per EMS LASHAY Romero 12/06 0100    Pt seen in ED 4 days ago- given dose of Rocephin in ED and sent home with Augmentin. LASHAY Romero 12/06 0206   ECG 12 Lead 0051- Reviewed by Dr. Bermudez, rate 85, NSR, no ischemia LASHAY Romero 12/06 0216    D/W Aliu- will admit to CCU; rocephin and doxy LASHAY Romero 12/06 0239    Intubation per LASHAY Mcneill 12/06 0303                  MDM    Final diagnoses:   Pneumonia of right middle lobe due to infectious organism            LASHAY Romero  12/06/17 0344       LASHAY Romero  12/06/17 0177

## 2017-12-07 NOTE — PROGRESS NOTES
LOS: 1 day   Patient Care Team:  No Known Provider as PCP - General  No Known Provider as PCP - Family Medicine    Chief Complaint:  Pulmonology is following for respiratory failure     Subjective     Interval History: patient is intubated and sedated.        History taken from: chart RN Patient unable to give history due to patient intubated.    Review of Systems:   Review of Systems - History obtained from chart review and unobtainable from patient due to mental status and Intubated      Objective     Vital Signs  Temp:  [98.1 °F (36.7 °C)-98.9 °F (37.2 °C)] 98.9 °F (37.2 °C)  Heart Rate:  [43-58] 52  Resp:  [19-21] 20  BP: (103-139)/(40-89) 109/89  FiO2 (%):  [40 %-50 %] 40 %  Body mass index is 41.95 kg/(m^2).    Intake/Output Summary (Last 24 hours) at 12/07/17 0922  Last data filed at 12/07/17 0517   Gross per 24 hour   Intake          2562.52 ml   Output              535 ml   Net          2027.52 ml          Vent Settings  Vent Mode: VC/AC    Vt (Set, L): 0.45 L  Resp Rate (Set): 20     FiO2 (%): 40 %  PEEP/CPAP (cm H2O): 8 cm H20    Minute Ventilation (L/min) (Obs): 9.38 L/min  Resp Rate (Observed) Vent: 20     I:E Ratio (Obs): 1:2.70    PIP Observed (cm H2O): 39 cm H2O     Physical Exam:  GENERAL APPEARANCE: Intubated and sedated.  Appears to be resting comfortably in bed.     HEAD: normocephalic.    EYES: PERRLA.    THROAT: ET tube in place. Oral cavity and pharynx normal. No inflammation, swelling, exudate, or lesions.     NECK: Neck supple.     CARDIAC: Normal S1 and S2. No S3, S4 or murmurs. Rhythm is regular. There is no peripheral edema, cyanosis or pallor. Extremities are warm and well perfused. Capillary refill is less than 2 seconds. No carotid bruits.    Respiratory: Clear to auscultation and percussion without rales, rhonchi, wheezing or diminished breath sounds.    GI: Positive bowel sounds. Soft, nondistended, nontender.     Musculoskeletal: No significant deformity or joint abnormality. No  edema. Peripheral pulses intact.    NEUROLOGICAL: Unable to assess due to sedation status.     PSYCHIATRIC: Unable to assess due to sedation status.     Results Review:     I reviewed the patient's new clinical results.  I reviewed the patient's new imaging results and agree with the interpretation.    Results from last 7 days  Lab Units 12/07/17 0459 12/06/17  0541 12/06/17 0121   WBC 10*3/mm3 8.52 10.32 14.01*   HEMOGLOBIN g/dL 8.2* 8.2* 9.1*   PLATELETS 10*3/mm3 206 199 230       Results from last 7 days  Lab Units 12/07/17 0459 12/06/17  0853 12/06/17  0541 12/06/17 0121   SODIUM mmol/L 138  --  136 139   POTASSIUM mmol/L 4.7  --  4.9 4.6   CHLORIDE mmol/L 100  --  97* 96*   CO2 mmol/L 28.9  --  32.4* 35.4*   BUN mg/dL 44*  --  30* 31*   CREATININE mg/dL 3.14*  --  2.51* 2.42*   CALCIUM mg/dL 8.2  --  8.4 9.2   GLUCOSE mg/dL 249*  --  304* 261*   MAGNESIUM mg/dL  --  2.3  --   --      Lab Results   Component Value Date    INR 1.18 (H) 12/06/2017    INR 1.12 (H) 12/02/2017    INR 1.01 01/27/2017    PROTIME 15.2 12/06/2017    PROTIME 14.5 12/02/2017    PROTIME 11.4 01/27/2017       Results from last 7 days  Lab Units 12/07/17  0459 12/06/17 0541 12/06/17 0121   ALK PHOS U/L 46 52 61   BILIRUBIN mg/dL 0.2 0.5 0.7   ALT (SGPT) U/L 16 14 17   AST (SGOT) U/L 13 15 16       Results from last 7 days  Lab Units 12/07/17 0457   PH, ARTERIAL pH units 7.385   PO2 ART mm Hg 80.9   PCO2, ARTERIAL mm Hg 51.3*   HCO3 ART mmol/L 30.0*     Imaging Results (last 24 hours)     Procedure Component Value Units Date/Time    XR Chest 1 View [[id number]] Collected:  12/07/17 0709     Updated:  12/07/17 0711    Narrative:       EXAMINATION: XR CHEST 1 VW-      CLINICAL INDICATION:     follow up pneumonia; line placement;  J18.1-Lobar pneumonia, unspecified organism     TECHNIQUE: Single AP view of chest.      COMPARISON: NONE      FINDINGS:   There is bibasilar atelectasis.   Lungs are otherwise aerated.  Support tube and lines are  in unchanged position.   Heart size is stable.  No pneumothorax.   Tiny left pleural effusion.        Impression:       Stable appearance of the chest.     This report was finalized on 12/7/2017 7:09 AM by Dr. Miko Coto MD.                Medication Review:   Scheduled Medications:    castor oil-balsam peru  Topical BID   ceftriaxone 1 g Intravenous Q24H   doxycycline 100 mg Intravenous Once   doxycycline 100 mg Intravenous Q12H   famotidine 20 mg Intravenous Daily   heparin (porcine) 5,000 Units Subcutaneous Q12H   insulin aspart 0-14 Units Subcutaneous Q4H   [START ON 12/8/2017] predniSONE 20 mg Oral Daily With Breakfast   Risaquad-2 1 capsule Oral Daily   vitamin C 1,000 mg Oral BID     Continuous infusions:    fentaNYL (SUBLIMAZE) PCA 1500 mcg/30 mL syringe     propofol 5-50 mcg/kg/min Last Rate: 20 mcg/kg/min (12/07/17 0141)   sodium chloride 100 mL/hr Last Rate: 100 mL/hr (12/07/17 0145)       Assessment/Plan     Neuro: patient is intubated and sedated per protocol. Sedation vacation today.      Respiratory Failure-hypercarbic and hypoxic: likely related to underlying lung disease, COPD, pneumonia and fluid overload. Continue vent support. ABG reviewed, Fi02 decreased to 40%. Chest xray reviewed improvement noted after increasing PEEP to 8 yesterday. Continue scheduled inhalants and nebulizer's. ET tube advanced 1 cm.  Atypical's are negative.  Started doxycyline for coverage. Solu-medrol changed to prednisone PO, continue to taper as tolerated.      Cardiac: HR 51, /55, continue continuous ECG and v/s monitoring, maintain MAP >65.          Low diastolic BP: cortisol level was 25.10, no aortic regurgitation noted on echo. Will continue to monitor, diastolic BP was 55 today on exam.      Acute on Chronic Renal Failure: Creatinine worsened today to  3.14, 24 hour urine 535, continue strict I&O, electrolytes WNL. Nephrology consulted.       Endo: monitor blood glucose, target range 120-140.      GI:  continue GI prophylaxis, continue tube feedings.       Vitamin D:  level ordered, will treat accordingly.      DVT prophylaxis: heparin BID, SCDs in place.      ID: CBC trending down, no fevers, WBC 8.52,  neutrophils are 83.6, CRP is 10.08, continue rocephin, doxy, continue to monitor lab trends and clinical changes.     Patient Active Problem List   Diagnosis Code   • COPD (chronic obstructive pulmonary disease) J44.9   • Shortness of breath R06.02   • Morbid obesity E66.01   • Hypoxia R09.02   • Edema extremities R60.0   • Sleep apnea G47.30   • Wheezing R06.2   • Allergic rhinitis J30.9   • Essential hypertension I10   • Sore throat J02.9   • Cough R05   • Pneumonia J18.9     Bedside rounds were completed with RN and RRT, discussed case and plan in great detail.    Spoke with family member this morning.  Discussed the case in detail- explained all the labs results and images. Discussed plan for the upcoming days and answered questions to their satisfaction.    YANG Mathews  12/07/17  9:22 AM    Scribed for Dr. Gomez by YANG Cassidy.       IJeremi M.D. attest that the above note accurately reflects the work and decisions made  by me.  Patient was seen and evaluated by Dr. Gomez, including history of present illness, physical exam, assessment, and treatment plan.  The above note was reviewed and edited by Dr. Gomez.  Critical Care time spent in direct patient care: 33 minutes (excluding procedure time, if applicable) including high complexity decision making to assess, manipulate, and support vital organ system failure in this individual who has impairment of one or more vital organ systems such that there is a high probability of imminent or life threatening deterioration in the patient’s condition.

## 2017-12-07 NOTE — CONSULTS
Nephrology Consult Note    Referring Provider: Dr Gomez  Reason for Consultation: SHEELA    Subjective       History of present illness:  Se Anne is a 67 y.o. male who was brought by EMS to [facility] emergency department with chief complaint of shortness of breath . He was hypoxic and oxygen saturations were 56% on room air. He failed BIPAP tria and  was intubated for respiratory failure. ABG shows improvement in pco2 from 66 to 51 and hypoxia has resolved post intubation. He is seen in ICU. He is intubated and sedated and can not provide any history. No family is present at bedside. He had 270 cc urine output in night shift and had 200 cc so far in last 3-4 hours. His baseline creatinine is 1.8 and it was 2.5 on 12/6/17 and is worse at 3.1 today. He was on diuretics as mentioned below.     Acute Kidney Injury Risk Factors :    Medications : torsemide and metolazone  Contrast : no  NSAIDS : no  Volume depletion : no  Hemodynamic Instability : no    History  Past Medical History:   Diagnosis Date   • Agent orange exposure    • Arthritis    • CHF (congestive heart failure)    • COPD (chronic obstructive pulmonary disease)    • Coronary artery disease    • Diabetes mellitus    • Hyperlipidemia    • Hypertension    , Past Surgical History:   Procedure Laterality Date   • CARDIAC SURGERY      stent placement   • CATARACT EXTRACTION     • JOINT REPLACEMENT      right knee   • KNEE SURGERY     , Family History   Problem Relation Age of Onset   • Heart disease Mother    • Cancer Father    • Heart attack Sister    • Heart attack Brother    • Cancer Paternal Grandfather    , Social History   Substance Use Topics   • Smoking status: Former Smoker     Years: 40.00     Quit date: 3/21/2010   • Smokeless tobacco: Never Used   • Alcohol use No   , Prescriptions Prior to Admission   Medication Sig Dispense Refill Last Dose   • albuterol (PROVENTIL) (2.5 MG/3ML) 0.083% nebulizer solution Take 2.5 mg by nebulization  Every 4 (Four) Hours As Needed for Wheezing or Shortness of Air.   Unknown at Unknown time   • amLODIPine (NORVASC) 10 MG tablet Take 10 mg by mouth Daily.   Unknown at Unknown time   • aspirin 81 MG EC tablet Take 81 mg by mouth Daily.   Unknown at Unknown time   • atorvastatin (LIPITOR) 80 MG tablet Take 80 mg by mouth Daily.   Unknown at Unknown time   • budesonide-formoterol (SYMBICORT) 160-4.5 MCG/ACT inhaler Inhale 2 puffs 2 (Two) Times a Day.   Unknown at Unknown time   • carvedilol (COREG) 25 MG tablet Take 12.5 mg by mouth 2 (Two) Times a Day With Meals.   Unknown at Unknown time   • Cholecalciferol (VITAMIN D3) 2000 UNITS tablet Take 1 tablet by mouth Daily.   Unknown at Unknown time   • gabapentin (NEURONTIN) 100 MG capsule Take 700 mg by mouth Daily.   Unknown at Unknown time   • hydrALAZINE (APRESOLINE) 25 MG tablet Take 25 mg by mouth 3 (Three) Times a Day.   Unknown at Unknown time   • insulin NPH-insulin regular (humuLIN 70/30,novoLIN 70/30) (70-30) 100 UNIT/ML injection Inject 52 Units under the skin Every Morning.   Unknown at Unknown time   • insulin NPH-insulin regular (humuLIN 70/30,novoLIN 70/30) (70-30) 100 UNIT/ML injection Inject 54 Units under the skin Every Night.   Unknown at Unknown time   • isosorbide mononitrate (IMDUR) 60 MG 24 hr tablet Take 180 mg by mouth Daily.   Unknown at Unknown time   • levothyroxine (SYNTHROID, LEVOTHROID) 25 MCG tablet Take 25 mcg by mouth Daily.   Unknown at Unknown time   • loratadine (CLARITIN) 10 MG tablet Take 10 mg by mouth Daily.   Unknown at Unknown time   • Magnesium Oxide 420 MG tablet Take 420 mg by mouth 2 (Two) Times a Day.   Unknown at Unknown time   • metFORMIN (GLUCOPHAGE) 500 MG tablet Take 500 mg by mouth 2 (Two) Times a Day With Meals.   Unknown at Unknown time   • metOLazone (ZAROXOLYN) 5 MG tablet Take 5 mg by mouth Daily As Needed (swelling).   Unknown at Unknown time   • nitroglycerin (NITROSTAT) 0.4 MG SL tablet Place 0.4 mg under the  tongue Every 5 (Five) Minutes As Needed for chest pain. Take no more than 3 doses in 15 minutes.   Unknown at Unknown time   • pantoprazole (PROTONIX) 40 MG EC tablet Take 40 mg by mouth Daily.   Unknown at Unknown time   • potassium chloride (K-DUR) 10 MEQ CR tablet Take 1 tablet by mouth 2 (Two) Times a Day. (Patient taking differently: Take 30 mEq by mouth Daily.) 15 tablet 0 Unknown at Unknown time   • sertraline (ZOLOFT) 100 MG tablet Take 50 mg by mouth Every Evening.   Unknown at Unknown time   • tiotropium (SPIRIVA) 18 MCG per inhalation capsule Place 1 capsule into inhaler and inhale Daily.   Unknown at Unknown time   • torsemide (DEMADEX) 20 MG tablet Take 20 mg by mouth 2 (Two) Times a Day.   Unknown at Unknown time   • traMADol (ULTRAM) 50 MG tablet Take 50 mg by mouth Every 12 (Twelve) Hours As Needed for Moderate Pain .   Unknown at Unknown time   • vitamin B-12 (CYANOCOBALAMIN) 1000 MCG tablet Take 2,000 mcg by mouth Daily.   Unknown at Unknown time   , Scheduled Meds:    castor oil-balsam peru  Topical BID   ceftriaxone 1 g Intravenous Q24H   doxycycline 100 mg Intravenous Once   doxycycline 100 mg Intravenous Q12H   famotidine 20 mg Intravenous Daily   heparin (porcine) 5,000 Units Subcutaneous Q12H   insulin aspart 0-14 Units Subcutaneous Q4H   [START ON 12/8/2017] predniSONE 20 mg Oral Daily With Breakfast   Risaquad-2 1 capsule Oral Daily   vitamin C 1,000 mg Oral BID   , Continuous Infusions:    fentaNYL (SUBLIMAZE) PCA 1500 mcg/30 mL syringe     propofol 5-50 mcg/kg/min Last Rate: 20 mcg/kg/min (12/07/17 0141)   sodium chloride 100 mL/hr Last Rate: 100 mL/hr (12/07/17 0145)   , PRN Meds:  dextrose  •  dextrose  •  glucagon (human recombinant)  •  nitroglycerin  •  sodium chloride  •  Insert peripheral IV **AND** sodium chloride and Allergies:  Iodine; Lisinopril; and Other    Review of Systems  Unable to obtain    Objective     Vital Signs  Temp:  [98.1 °F (36.7 °C)-98.9 °F (37.2 °C)] 98.9 °F  (37.2 °C)  Heart Rate:  [43-58] 52  Resp:  [19-21] 20  BP: (103-139)/(40-89) 109/89  FiO2 (%):  [40 %-50 %] 40 %       I/O last 3 completed shifts:  In: 2633.5 [I.V.:2390.5; Other:24; NG/GT:119; IV Piggyback:100]  Out: 980 [Urine:980]    Physical Examination:    General Appearance : intubated and sedtaed  Head : normocephalic, without obvious abnormality and atraumatic  Eyes : conjunctivae and sclerae normal, no icterus, + pallor and PERRLA  Throat :ETT  Neck: no adenopathy, suppple, no carotid bruit and no JVD  Lungs : clear to auscultation, respirations regular and unlabored  Heart : regular rhythm & normal rate, normal S1, S2, no murmur, no yovani, no rub   Abdomen :  normal bowel sounds, no masses and soft non-tender  Rectal : Deferred  Extremities : no redness and 1+ edema  Pulses :  palpable and equal bilaterally  Skin : few ecchymosis on lower egs  Neurologic : sedated    Laboratory Data :      WBC WBC   Date Value Ref Range Status   12/07/2017 8.52 4.50 - 12.50 10*3/mm3 Final   12/06/2017 10.32 4.50 - 12.50 10*3/mm3 Final   12/06/2017 14.01 (H) 4.50 - 12.50 10*3/mm3 Final      HGB Hemoglobin   Date Value Ref Range Status   12/07/2017 8.2 (L) 14.0 - 18.0 g/dL Final   12/06/2017 8.2 (L) 14.0 - 18.0 g/dL Final   12/06/2017 9.1 (L) 14.0 - 18.0 g/dL Final      HCT Hematocrit   Date Value Ref Range Status   12/07/2017 26.4 (L) 42.0 - 52.0 % Final   12/06/2017 26.9 (L) 42.0 - 52.0 % Final   12/06/2017 29.7 (L) 42.0 - 52.0 % Final      Platlets No results found for: LABPLAT   MCV MCV   Date Value Ref Range Status   12/07/2017 97.8 (H) 80.0 - 94.0 fL Final   12/06/2017 96.4 (H) 80.0 - 94.0 fL Final   12/06/2017 95.2 (H) 80.0 - 94.0 fL Final          Sodium Sodium   Date Value Ref Range Status   12/07/2017 138 135 - 153 mmol/L Final   12/06/2017 136 135 - 153 mmol/L Final   12/06/2017 139 135 - 153 mmol/L Final      Potassium Potassium   Date Value Ref Range Status   12/07/2017 4.7 3.5 - 5.3 mmol/L Final   12/06/2017  4.9 3.5 - 5.3 mmol/L Final   12/06/2017 4.6 3.5 - 5.3 mmol/L Final      Chloride Chloride   Date Value Ref Range Status   12/07/2017 100 99 - 112 mmol/L Final   12/06/2017 97 (L) 99 - 112 mmol/L Final   12/06/2017 96 (L) 99 - 112 mmol/L Final      CO2 CO2   Date Value Ref Range Status   12/07/2017 28.9 24.3 - 31.9 mmol/L Final   12/06/2017 32.4 (H) 24.3 - 31.9 mmol/L Final   12/06/2017 35.4 (H) 24.3 - 31.9 mmol/L Final      BUN BUN   Date Value Ref Range Status   12/07/2017 44 (H) 7 - 21 mg/dL Final   12/06/2017 30 (H) 7 - 21 mg/dL Final   12/06/2017 31 (H) 7 - 21 mg/dL Final      Creatinine Creatinine   Date Value Ref Range Status   12/07/2017 3.14 (H) 0.43 - 1.29 mg/dL Final   12/06/2017 2.51 (H) 0.43 - 1.29 mg/dL Final   12/06/2017 2.42 (H) 0.43 - 1.29 mg/dL Final      Calcium Calcium   Date Value Ref Range Status   12/07/2017 8.2 7.7 - 10.0 mg/dL Final   12/06/2017 8.4 7.7 - 10.0 mg/dL Final   12/06/2017 9.2 7.7 - 10.0 mg/dL Final      PO4 No results found for: CAPO4   Albumin Albumin   Date Value Ref Range Status   12/07/2017 3.70 3.40 - 4.80 g/dL Final   12/06/2017 3.80 3.40 - 4.80 g/dL Final   12/06/2017 4.50 3.40 - 4.80 g/dL Final      Magnesium Magnesium   Date Value Ref Range Status   12/06/2017 2.3 1.7 - 2.6 mg/dL Final      Uric Acid No results found for: URICACID     Radiology results :     Imaging Results (last 72 hours)     Procedure Component Value Units Date/Time    XR Chest 1 View [[id number]] Collected:  12/06/17 0649     Updated:  12/06/17 0652    Narrative:       EXAMINATION:  XR CHEST 1 VW-      CLINICAL INDICATION:     placement     TECHNIQUE:  XR CHEST 1 VW-       COMPARISON: 12/6/2017      FINDINGS:   Patchy bilateral airspace opacities are again noted. ET tube below level  of clavicles. NG tube in the stomach.   Heart size is stable.   No pneumothorax.   No pleural effusion.   Bony and soft tissue structures are unremarkable.            Impression:       As above.     This report was  finalized on 12/6/2017 6:50 AM by Dr. Miko Coto MD.       XR Chest 1 View [[id number]] Collected:  12/06/17 0650     Updated:  12/06/17 0652    Narrative:       EXAMINATION: XR CHEST 1 VW-      CLINICAL INDICATION:     SOB     TECHNIQUE:  XR CHEST 1 VW-      COMPARISON: NONE      FINDINGS:   CARDIOMEGALY AND COARSENED INTERSTITIAL MARKINGS NOTED THROUGHOUT BOTH  LUNGS.       No pleural effusion.  No pneumothorax.   Bony and soft tissue structures are unremarkable.       Impression:       AS ABOVE.     This report was finalized on 12/6/2017 6:50 AM by Dr. Miko Coto MD.       XR Chest 1 View [[id number]] Collected:  12/07/17 0709     Updated:  12/07/17 0711    Narrative:       EXAMINATION: XR CHEST 1 VW-      CLINICAL INDICATION:     follow up pneumonia; line placement;  J18.1-Lobar pneumonia, unspecified organism     TECHNIQUE: Single AP view of chest.      COMPARISON: NONE      FINDINGS:   There is bibasilar atelectasis.   Lungs are otherwise aerated.  Support tube and lines are in unchanged position.   Heart size is stable.  No pneumothorax.   Tiny left pleural effusion.        Impression:       Stable appearance of the chest.     This report was finalized on 12/7/2017 7:09 AM by Dr. Miko Coto MD.               Medications:        castor oil-balsam peru  Topical BID   ceftriaxone 1 g Intravenous Q24H   doxycycline 100 mg Intravenous Once   doxycycline 100 mg Intravenous Q12H   famotidine 20 mg Intravenous Daily   heparin (porcine) 5,000 Units Subcutaneous Q12H   insulin aspart 0-14 Units Subcutaneous Q4H   [START ON 12/8/2017] predniSONE 20 mg Oral Daily With Breakfast   Risaquad-2 1 capsule Oral Daily   vitamin C 1,000 mg Oral BID       fentaNYL (SUBLIMAZE) PCA 1500 mcg/30 mL syringe     propofol 5-50 mcg/kg/min Last Rate: 20 mcg/kg/min (12/07/17 0141)   sodium chloride 100 mL/hr Last Rate: 100 mL/hr (12/07/17 0145)       Assessment/Plan     Active Problems:    Pneumonia      1. SHEELA on CKD 3 : SHEELA likely  from torsemide, metolazone and due to severe hypoxia. Obtained and reviewed urine sodium < 10 and urine protein/creatinine is 54 mg/gm. UA is bland. Renal US is pending. Since his urine output is marginal I will give him 1 L NS bolus and continue NS at 100 cc/hr. He is also on tube feeds. Diuretics have been held .     2. Hypoxic hypercarbic respiratory failure : Dr Gomez managing    3. Type 2 DM     4. Pneumonia    5. Oxygen dependant COPD    Prognosis is guarded  Thanks Dr Gomez for the consult. We will follow with you.  I discussed the patients findings and my recommendations with nursing staff    Fahad Card MD  12/07/17  8:28 AM

## 2017-12-07 NOTE — PROGRESS NOTES
[facility] CCU Note    Patient Identification:  Name:  Se Anne  Age:  67 y.o.  Sex:  male  :  1950  MRN:  [id number]  Visit number:  [id number]  Primary Care Provider:  No Known Provider    1    Ventilator Day:  Ventilator Settings:  Ventilator/Non-Invasive Ventilation Settings     Start     Ordered    17 0737  Ventilator - AC/VC; 20; 50; 90%; 8; 450  Continuous     Question Answer Comment   Vent Mode AC/VC    Breath rate 20    FiO2 50    FiO2 titrate for Sp02% =/> 90%    PEEP 8    Tidal Volume 450        17 0737    17 0413  Ventilator - AC/VC; (16); 50; 90%; 5; 500  Continuous,   Status:  Canceled     Comments:  A/C per Dr. Bermudez   Question Answer Comment   Vent Mode AC/VC    Breath rate  16   FiO2 50    FiO2 titrate for Sp02% =/> 90%    PEEP 5    Tidal Volume 500        17 0413    17 0330  Ventilator - SIMV/VC; (16); 50; 90%; 5; 500; 8  Continuous,   Status:  Canceled     Question Answer Comment   Vent Mode SIMV/VC    Breath rate  16   FiO2 50    FiO2 titrate for Sp02% =/> 90%    PEEP 5    Tidal Volume 500    Pressure Support 8        17 0329    17 0126  . BIPAP  Until Discontinued,   Status:  Canceled     Question:  .  Answer:  BIPAP    17          Drips: Propofol, fentanyl  Antibiotics: Rocephin, doxycycline  Lines: Peripheral ×2  Worley catheter: Yes    Procedures:  -Intubation in the emergency department    HPI:  The patient is a 67-year-old male who presented with shortness of breath and chest pain    Subjective      The patient continued to have some bradycardia overnight.  His sedation has been decreased and his heart rate is currently in the 50s.  The patient awakens to verbal and tactile stimuli.  He does follow commands.  We are planning for a sedation vacation today.    History taken from: chart RN   Patient unable to give history due to: Sedated on mechanical ventilation  Present during visit: Nancy  RN    Objective     Vital Signs  Temp:  [98.1 °F (36.7 °C)-98.9 °F (37.2 °C)] 98.9 °F (37.2 °C)  Heart Rate:  [43-59] 52  Resp:  [19-21] 20  BP: (103-139)/(40-89) 109/89  FiO2 (%):  [40 %-50 %] 40 %  Body mass index is 41.95 kg/(m^2).    Intake/Output Summary (Last 24 hours) at 12/07/17 0816  Last data filed at 12/07/17 0517   Gross per 24 hour   Intake          2562.52 ml   Output              535 ml   Net          2027.52 ml       Physical Exam:    Physical Exam   Constitutional: He appears well-developed and well-nourished. No distress. He is sedated and intubated.   Obese; awakens easily today and does follow commands   HENT:   Head: Normocephalic and atraumatic.   Nose: Nose normal.   Mouth/Throat: Oropharynx is clear and moist and mucous membranes are normal.   ET tube and OG tube in place   Eyes: Conjunctivae and EOM are normal. Pupils are equal, round, and reactive to light. No scleral icterus.   Neck: Trachea normal. Neck supple. No JVD present. Carotid bruit is not present. No thyromegaly present.   Cardiovascular: Regular rhythm and normal pulses.  Bradycardia present.  Exam reveals no gallop and no friction rub.    Murmur heard.   Systolic murmur is present with a grade of 1/6   1-2+ pitting edema bilateral lower extremities   Pulmonary/Chest: Effort normal. He is intubated. No respiratory distress. He has decreased breath sounds in the right lower field and the left lower field. He has no rales.   Occasional end expiratory wheezes   Abdominal: Soft. Bowel sounds are normal. He exhibits no distension. There is no tenderness. There is no guarding.   Genitourinary:   Genitourinary Comments: Worley catheter is in place   Neurological:   Sedated but easily arousable; will squeeze hands and move feet upon command   Skin: Skin is warm and dry. Abrasion and ecchymosis noted. No rash noted. There is erythema.   Some mild erythema and rounded skin tears are noted on the left lower extremity; the patient has a few  scattered ecchymoses      Results Review:     Telemetry: Sinus bradycardia    I reviewed the patient's new imaging results and agree with the interpretation.  I reviewed the patient's other test results and agree with the interpretation.      Results from last 7 days  Lab Units 12/07/17  0459 12/06/17  0541 12/06/17  0121 12/02/17  0000   WBC 10*3/mm3 8.52 10.32 14.01* 5.93   HEMOGLOBIN g/dL 8.2* 8.2* 9.1* 9.6*   PLATELETS 10*3/mm3 206 199 230 261       Results from last 7 days  Lab Units 12/07/17  0459 12/06/17  0541 12/06/17  0121 12/02/17  0000   SODIUM mmol/L 138 136 139 141   POTASSIUM mmol/L 4.7 4.9 4.6 4.4   CHLORIDE mmol/L 100 97* 96* 98*   CO2 mmol/L 28.9 32.4* 35.4* 35.0*   BUN mg/dL 44* 30* 31* 34*   CREATININE mg/dL 3.14* 2.51* 2.42* 2.50*   CALCIUM mg/dL 8.2 8.4 9.2 9.1   GLUCOSE mg/dL 249* 304* 261* 119*       Results from last 7 days  Lab Units 12/07/17  0459 12/06/17  0541 12/06/17  0121 12/02/17  0000   BILIRUBIN mg/dL 0.2 0.5 0.7 0.1*   ALK PHOS U/L 46 52 61 59   AST (SGOT) U/L 13 15 16 22   ALT (SGPT) U/L 16 14 17 23       Results from last 7 days  Lab Units 12/06/17  0121 12/02/17  0000   INR  1.18* 1.12*       Portable CXR, 12/7/17:  FINDINGS:   There is bibasilar atelectasis.   Lungs are otherwise aerated.  Support tube and lines are in unchanged position.   Heart size is stable.  No pneumothorax.   Tiny left pleural effusion.       IMPRESSION:  Stable appearance of the chest.    Cultures:  Blood Culture   Date Value Ref Range Status   12/02/2017 No growth at 4 days  Preliminary   12/02/2017 No growth at 4 days  Preliminary        Current Hospital Medications:    castor oil-balsam peru  Topical BID   ceftriaxone 1 g Intravenous Q24H   doxycycline 100 mg Intravenous Once   doxycycline 100 mg Intravenous Q12H   famotidine 20 mg Intravenous Daily   heparin (porcine) 5,000 Units Subcutaneous Q12H   insulin aspart 0-14 Units Subcutaneous Q4H   [START ON 12/8/2017] predniSONE 20 mg Oral Daily With  Breakfast   Risaquad-2 1 capsule Oral Daily   vitamin C 1,000 mg Oral BID       fentaNYL (SUBLIMAZE) PCA 1500 mcg/30 mL syringe     propofol 5-50 mcg/kg/min Last Rate: 20 mcg/kg/min (12/07/17 0141)   sodium chloride 100 mL/hr Last Rate: 100 mL/hr (12/07/17 0145)       Assessment/Plan      -Acute hypercapnic and hypoxic respiratory failure secondary to a bibasilar community-acquired pneumonia  -Bibasilar community-acquired pneumonia  -Acute kidney injury in the setting of stage III chronic kidney disease  -Oxygen dependent COPD in an acute exacerbation  -Insulin-dependent diabetes mellitus type 2 with hyperglycemia that is likely exacerbated by IV steroids  -Mild hypochloremia; resolved  -Mildly elevated troponin level in the setting of acute on chronic kidney disease; may also be related to respiratory failure   -Chronic anemia with macrocytosis  -Mild ST depression in lead II, V5 and V6    Continue with mechanical ventilation for now; pulmonology has requested a sedation vacation today.  A repeat chest x-ray reveals some bibasilar-sided atelectasis but otherwise is stable. Continue with IV antibiotics, IV Solu-Medrol and scheduled inhalants.  Continue with gentle IV fluid hydration and follow his urine output closely.  A renal ultrasound was performed and was overall unremarkable.  Due to his worsening renal function, a nephrology consultation has been placed.  I have added Levemir in addition to his sliding scale insulin.  Continue to adjust based on his blood glucose levels. Check a B12 and Folate level in the morning due to macrocytosis. Repeat his CBC, CMP and CRP in the moring.    DVT/GI ppx: Subcutaneous heparin/H2 blocker    Patient is considered high risk due to: respiratory failure, kidney disease, mildly elevated troponin     I discussed the patients findings and my recommendations with nursing staff.    Teresa Sandhu,   12/07/17  8:16 AM

## 2017-12-07 NOTE — PLAN OF CARE
Problem: Pressure Ulcer Risk (Karthikeyan Scale) (Adult,Obstetrics,Pediatric)  Goal: Identify Related Risk Factors and Signs and Symptoms  Outcome: Ongoing (interventions implemented as appropriate)  Goal: Skin Integrity  Outcome: Ongoing (interventions implemented as appropriate)

## 2017-12-07 NOTE — PLAN OF CARE
Problem: Respiratory Insufficiency (Adult)  Goal: Identify Related Risk Factors and Signs and Symptoms  Outcome: Ongoing (interventions implemented as appropriate)  Goal: Acid/Base Balance  Outcome: Ongoing (interventions implemented as appropriate)  Goal: Effective Ventilation  Outcome: Ongoing (interventions implemented as appropriate)    Problem: Skin Integrity Impairment, Risk/Actual (Adult)  Goal: Identify Related Risk Factors and Signs and Symptoms  Outcome: Ongoing (interventions implemented as appropriate)  Goal: Skin Integrity/Wound Healing  Outcome: Ongoing (interventions implemented as appropriate)    Problem: Fall Risk (Adult)  Goal: Identify Related Risk Factors and Signs and Symptoms  Outcome: Ongoing (interventions implemented as appropriate)  Goal: Absence of Falls  Outcome: Ongoing (interventions implemented as appropriate)    Problem: Mechanical Ventilation, Invasive (Adult)  Goal: Signs and Symptoms of Listed Potential Problems Will be Absent or Manageable (Mechanical Ventilation, Invasive)  Outcome: Ongoing (interventions implemented as appropriate)    Problem: Patient Care Overview (Adult)  Goal: Plan of Care Review  Outcome: Ongoing (interventions implemented as appropriate)  Goal: Adult Individualization and Mutuality  Outcome: Ongoing (interventions implemented as appropriate)  Goal: Discharge Needs Assessment  Outcome: Ongoing (interventions implemented as appropriate)    Problem: Pressure Ulcer Risk (Karthikeyan Scale) (Adult,Obstetrics,Pediatric)  Goal: Identify Related Risk Factors and Signs and Symptoms  Outcome: Ongoing (interventions implemented as appropriate)  Goal: Skin Integrity  Outcome: Ongoing (interventions implemented as appropriate)    Problem: Diabetes, Type 2 (Adult)  Goal: Signs and Symptoms of Listed Potential Problems Will be Absent or Manageable (Diabetes, Type 2)  Outcome: Ongoing (interventions implemented as appropriate)

## 2017-12-08 NOTE — NURSING NOTE
"Patient appeared to be growing tired.  RN asked if he was tired and wanted to go back on ventilator support; patient nodded head \"yes.\"  RN asked if patient wanted sedation restarted due to wanting to rest while on ventilator and patient shook head \"yes.\"    "

## 2017-12-08 NOTE — CONSULTS
"Diabetes Education  Assessment/Teaching    Patient Name:  Se Anne  YOB: 1950  MRN: [id number]  Admit Date:  12/6/2017      Assessment Date:  12/8/2017       Most Recent Value    General Information      Height  168.9 cm (66.5\")    Height Method  Stated    Weight  122 kg (269 lb 11.2 oz)    Weight Method  Bed scale    Pregnancy Assessment     Diabetes History     Education Preferences     Nutrition Information     Assessment Topics     DM Goals                 Other Comments:  Ventilator A1c 7.8 will see closer to discharge        Electronically signed by:  Keyona Skaggs RN  12/08/17 8:52 AM  "

## 2017-12-08 NOTE — PROGRESS NOTES
LOS: 2 days   Patient Care Team:  No Known Provider as PCP - General  No Known Provider as PCP - Family Medicine    Chief Complaint:  Pulmonology is following for respiratory failure     Subjective     Interval History: patient is intubated and sedated.       History taken from: chart RN Patient unable to give history due to patient intubated.    Review of Systems:   Review of Systems - History obtained from chart review and unobtainable from patient due to mental status and Intubated      Objective     Vital Signs  Temp:  [98.1 °F (36.7 °C)-99.1 °F (37.3 °C)] 98.4 °F (36.9 °C)  Heart Rate:  [39-74] 47  Resp:  [13-21] 13  BP: (114-166)/(42-76) 138/70  FiO2 (%):  [30 %-40 %] 30 %  Body mass index is 42.88 kg/(m^2).    Intake/Output Summary (Last 24 hours) at 12/08/17 0937  Last data filed at 12/08/17 0641   Gross per 24 hour   Intake          3902.35 ml   Output             1694 ml   Net          2208.35 ml          Vent Settings  Vent Mode: VC/AC    Vt (Set, L): 0.45 L  Resp Rate (Set): 20  Pressure Support (cm H2O): 10 cm H20  FiO2 (%): 30 %  PEEP/CPAP (cm H2O): 8 cm H20    Minute Ventilation (L/min) (Obs): 9.78 L/min  Resp Rate (Observed) Vent: 12     I:E Ratio (Obs): 1:2.20    PIP Observed (cm H2O): 18 cm H2O     Physical Exam:  GENERAL APPEARANCE: Intubated and sedated.  Appears to be resting comfortably in bed.     HEAD: normocephalic.    EYES: PERRLA.    THROAT: ET tube in place. Oral cavity and pharynx normal. No inflammation, swelling, exudate, or lesions.     NECK: Neck supple.     CARDIAC: Normal S1 and S2. No S3, S4 or murmurs. Rhythm is regular. There is no peripheral edema, cyanosis or pallor. Extremities are warm and well perfused. Capillary refill is less than 2 seconds. No carotid bruits.    Respiratory: Clear to auscultation and percussion without rales, rhonchi, wheezing or diminished breath sounds.    GI: Positive bowel sounds. Soft, nondistended, nontender.     Musculoskeletal: No significant  deformity or joint abnormality. No edema. Peripheral pulses intact.    NEUROLOGICAL: Unable to assess due to sedation status.     PSYCHIATRIC: Unable to assess due to sedation status.     Results Review:     I reviewed the patient's new clinical results.  I reviewed the patient's new imaging results and agree with the interpretation.    Results from last 7 days  Lab Units 12/08/17 0052 12/07/17 0459 12/06/17  0541   WBC 10*3/mm3 8.23 8.52 10.32   HEMOGLOBIN g/dL 7.6* 8.2* 8.2*   PLATELETS 10*3/mm3 227 206 199       Results from last 7 days  Lab Units 12/08/17 0052 12/07/17 0459 12/06/17 0853 12/06/17  0541   SODIUM mmol/L 140 138  --  136   POTASSIUM mmol/L 4.4 4.7  --  4.9   CHLORIDE mmol/L 105 100  --  97*   CO2 mmol/L 29.8 28.9  --  32.4*   BUN mg/dL 52* 44*  --  30*   CREATININE mg/dL 2.59* 3.14*  --  2.51*   CALCIUM mg/dL 8.1 8.2  --  8.4   GLUCOSE mg/dL 236* 249*  --  304*   MAGNESIUM mg/dL 2.7*  --  2.3  --      Lab Results   Component Value Date    INR 1.18 (H) 12/06/2017    INR 1.12 (H) 12/02/2017    INR 1.01 01/27/2017    PROTIME 15.2 12/06/2017    PROTIME 14.5 12/02/2017    PROTIME 11.4 01/27/2017       Results from last 7 days  Lab Units 12/08/17 0052 12/07/17 0459 12/06/17  0541   ALK PHOS U/L 42 46 52   BILIRUBIN mg/dL 0.2 0.2 0.5   ALT (SGPT) U/L 17 16 14   AST (SGOT) U/L 18 13 15       Results from last 7 days  Lab Units 12/08/17  0516   PH, ARTERIAL pH units 7.362   PO2 ART mm Hg 85.0   PCO2, ARTERIAL mm Hg 51.0*   HCO3 ART mmol/L 28.3*     Imaging Results (last 24 hours)     Procedure Component Value Units Date/Time    US Renal Limited [[id number]] Collected:  12/07/17 1630     Updated:  12/07/17 1638    Narrative:       EXAMINATION: US RENAL LIMITED-      CLINICAL INDICATION:     acute on chronic renal failure; J18.1-Lobar  pneumonia, unspecified organism     TECHNIQUE: Multiplanar gray scale sonographic imaging of the kidneys.      COMPARISON: NONE      FINDINGS: Both kidneys are normal in  size and echogenicity. There is no  renal mass, stone, or hydronephrosis seen in either kidney. The right  kidney measures 9.8    in length; the left kidney measures 10.1 cm      in length.        Impression:       Unremarkable bilateral renal ultrasound.      This report was finalized on 12/7/2017 4:32 PM by Dr. Miko Coto MD.                Medication Review:   Scheduled Medications:    castor oil-balsam peru  Topical BID   ceftriaxone 1 g Intravenous Q24H   doxycycline 100 mg Intravenous Once   doxycycline 100 mg Intravenous Q12H   famotidine 20 mg Intravenous Daily   heparin (porcine) 5,000 Units Subcutaneous Q12H   insulin aspart 0-14 Units Subcutaneous Q4H   insulin detemir 30 Units Subcutaneous Daily   predniSONE 20 mg Oral Daily With Breakfast   Risaquad-2 1 capsule Oral Daily   vitamin C 1,000 mg Oral BID     Continuous infusions:    fentaNYL (SUBLIMAZE) PCA 1500 mcg/30 mL syringe     propofol 5-50 mcg/kg/min Last Rate: 20 mcg/kg/min (12/08/17 0643)   sodium chloride 100 mL/hr Last Rate: 100 mL/hr (12/08/17 0319)       Assessment/Plan     Neuro: patient is intubated and sedated per protocol. Sedation vacation today, did well off sedation yesterday.       Respiratory Failure-hypercarbic and hypoxic: likely related to underlying lung disease, COPD, pneumonia and fluid overload. Continue vent support. ABG reviewed, Fi02 decreased to 30%. Continue scheduled inhalants and nebulizer's.  Atypical's are negative. Continue to taper prednisone po as tolerated. Will do SBT today and extubate if appropriate.       Cardiac: HR 46, /57, continue continuous ECG and v/s monitoring, maintain MAP >65.     Low diastolic BP: cortisol level was 25.10, no aortic regurgitation noted on echo. Will continue to monitor, diastolic BP was 57 today on exam.       Acute on Chronic Renal Failure: Creatinine down to 2.59 today, 24 hour urine 1.7, continue strict I&O, electrolytes WNL. Nephrology on board they gave 1 liter bolus  yesterday and started NS @ 100ml/hr, input appreciated.       Endo: monitor blood glucose, target range 120-140.       GI: continue GI prophylaxis, continue tube feedings.        Vitamin D Defiency:  level 29, will replace with  800 units a day.       DVT prophylaxis: heparin BID, SCDs in place.       ID: No fevers, WBC 8.23,  neutrophils are 76, CRP is 5.6, continue rocephin, doxy, continue to monitor lab trends and clinical changes.             Patient Active Problem List   Diagnosis Code   • COPD (chronic obstructive pulmonary disease) J44.9   • Shortness of breath R06.02   • Morbid obesity E66.01   • Hypoxia R09.02   • Edema extremities R60.0   • Sleep apnea G47.30   • Wheezing R06.2   • Allergic rhinitis J30.9   • Essential hypertension I10   • Sore throat J02.9   • Cough R05   • Pneumonia J18.9     Bedside rounds were completed with RN and RRT, discussed case and plan in great detail.      YANG Mathews  12/08/17  9:37 AM    Scribed for Dr. Gomez by YANG Cassidy.       I, Jeremi Gomez M.D. attest that the above note accurately reflects the work and decisions made  by me.  Patient was seen and evaluated by Dr. Gomez, including history of present illness, physical exam, assessment, and treatment plan.  The above note was reviewed and edited by Dr. Gomez.  Critical Care time spent in direct patient care: 35 minutes (excluding procedure time, if applicable) including high complexity decision making to assess, manipulate, and support vital organ system failure in this individual who has impairment of one or more vital organ systems such that there is a high probability of imminent or life threatening deterioration in the patient’s condition.

## 2017-12-08 NOTE — PLAN OF CARE
Problem: Pressure Ulcer Risk (Karthikeyan Scale) (Adult,Obstetrics,Pediatric)  Goal: Skin Integrity  Outcome: Ongoing (interventions implemented as appropriate)

## 2017-12-08 NOTE — PROGRESS NOTES
Discharge Planning Assessment   Roberto     Patient Name: Se Anne  MRN: [id number]  Today's Date: 12/8/2017    Admit Date: 12/6/2017       Discharge Plan       12/08/17 1129    Case Management/Social Work Plan    Plan Pt remains on the vent. Pt is on a weaning trial on this date.  Pt lives at home and plans to return home at discharge. Pt does not have home health services at this time. [facility]. [facility]. Pt does not have issues at this time.         Discharge Placement     No information found                Demographic Summary     None            Functional Status     None            Psychosocial     None            Abuse/Neglect     None            Legal     None            Substance Abuse     None            Patient Forms     None          Daniella Wilburn

## 2017-12-08 NOTE — PROGRESS NOTES
Nephrology  Note      Subjective       He is improving . Urine output significantly better. He is off sedation and following commands  Objective     Vital Signs  Temp:  [98.1 °F (36.7 °C)-99.1 °F (37.3 °C)] 98.4 °F (36.9 °C)  Heart Rate:  [39-74] 44  Resp:  [12-21] 12  BP: (114-166)/(42-70) 153/69  FiO2 (%):  [30 %-40 %] 30 %    I/O this shift:  In: -   Out: 500 [Urine:500]  I/O last 3 completed shifts:  In: 5012.4 [I.V.:3101.4; Other:115; NG/GT:396; IV Piggyback:1400]  Out: 2129 [Urine:2010; Other:119]    Physical Examination:    General Appearance : intubated, awake  Head : normocephalic  Eyes :  + pallor   Throat :ETT  Neck:  no JVD  Lungs : clear to auscultation, respirations regular and unlabored  Heart : regular rhythm & normal rate, normal S1, S2, no murmur  Abdomen :  normal bowel sounds,  soft non-tender  Extremities : trace edema  Pulses :  palpable and equal bilaterally  Neurologic : following commands    Laboratory Data :      WBC WBC   Date Value Ref Range Status   12/08/2017 8.23 4.50 - 12.50 10*3/mm3 Final   12/07/2017 8.52 4.50 - 12.50 10*3/mm3 Final   12/06/2017 10.32 4.50 - 12.50 10*3/mm3 Final   12/06/2017 14.01 (H) 4.50 - 12.50 10*3/mm3 Final      HGB Hemoglobin   Date Value Ref Range Status   12/08/2017 7.6 (L) 14.0 - 18.0 g/dL Final   12/07/2017 8.2 (L) 14.0 - 18.0 g/dL Final   12/06/2017 8.2 (L) 14.0 - 18.0 g/dL Final   12/06/2017 9.1 (L) 14.0 - 18.0 g/dL Final      HCT Hematocrit   Date Value Ref Range Status   12/08/2017 25.5 (L) 42.0 - 52.0 % Final   12/07/2017 26.4 (L) 42.0 - 52.0 % Final   12/06/2017 26.9 (L) 42.0 - 52.0 % Final   12/06/2017 29.7 (L) 42.0 - 52.0 % Final      Platlets No results found for: LABPLAT   MCV MCV   Date Value Ref Range Status   12/08/2017 96.6 (H) 80.0 - 94.0 fL Final   12/07/2017 97.8 (H) 80.0 - 94.0 fL Final   12/06/2017 96.4 (H) 80.0 - 94.0 fL Final   12/06/2017 95.2 (H) 80.0 - 94.0 fL Final          Sodium Sodium   Date Value Ref Range Status   12/08/2017  140 135 - 153 mmol/L Final   12/07/2017 138 135 - 153 mmol/L Final   12/06/2017 136 135 - 153 mmol/L Final   12/06/2017 139 135 - 153 mmol/L Final      Potassium Potassium   Date Value Ref Range Status   12/08/2017 4.4 3.5 - 5.3 mmol/L Final   12/07/2017 4.7 3.5 - 5.3 mmol/L Final   12/06/2017 4.9 3.5 - 5.3 mmol/L Final   12/06/2017 4.6 3.5 - 5.3 mmol/L Final      Chloride Chloride   Date Value Ref Range Status   12/08/2017 105 99 - 112 mmol/L Final   12/07/2017 100 99 - 112 mmol/L Final   12/06/2017 97 (L) 99 - 112 mmol/L Final   12/06/2017 96 (L) 99 - 112 mmol/L Final      CO2 CO2   Date Value Ref Range Status   12/08/2017 29.8 24.3 - 31.9 mmol/L Final   12/07/2017 28.9 24.3 - 31.9 mmol/L Final   12/06/2017 32.4 (H) 24.3 - 31.9 mmol/L Final   12/06/2017 35.4 (H) 24.3 - 31.9 mmol/L Final      BUN BUN   Date Value Ref Range Status   12/08/2017 52 (H) 7 - 21 mg/dL Final   12/07/2017 44 (H) 7 - 21 mg/dL Final   12/06/2017 30 (H) 7 - 21 mg/dL Final   12/06/2017 31 (H) 7 - 21 mg/dL Final      Creatinine Creatinine   Date Value Ref Range Status   12/08/2017 2.59 (H) 0.43 - 1.29 mg/dL Final   12/07/2017 3.14 (H) 0.43 - 1.29 mg/dL Final   12/06/2017 2.51 (H) 0.43 - 1.29 mg/dL Final   12/06/2017 2.42 (H) 0.43 - 1.29 mg/dL Final      Calcium Calcium   Date Value Ref Range Status   12/08/2017 8.1 7.7 - 10.0 mg/dL Final   12/07/2017 8.2 7.7 - 10.0 mg/dL Final   12/06/2017 8.4 7.7 - 10.0 mg/dL Final   12/06/2017 9.2 7.7 - 10.0 mg/dL Final      PO4 No results found for: CAPO4   Albumin Albumin   Date Value Ref Range Status   12/08/2017 3.60 3.40 - 4.80 g/dL Final   12/07/2017 3.70 3.40 - 4.80 g/dL Final   12/06/2017 3.80 3.40 - 4.80 g/dL Final   12/06/2017 4.50 3.40 - 4.80 g/dL Final      Magnesium Magnesium   Date Value Ref Range Status   12/08/2017 2.7 (H) 1.7 - 2.6 mg/dL Final   12/06/2017 2.3 1.7 - 2.6 mg/dL Final      Uric Acid No results found for: URICACID     Radiology results :   Imaging Results (last 24 hours)      Procedure Component Value Units Date/Time    US Renal Limited [[id number]] Collected:  12/07/17 1630     Updated:  12/07/17 1638    Narrative:       EXAMINATION: US RENAL LIMITED-      CLINICAL INDICATION:     acute on chronic renal failure; J18.1-Lobar  pneumonia, unspecified organism     TECHNIQUE: Multiplanar gray scale sonographic imaging of the kidneys.      COMPARISON: NONE      FINDINGS: Both kidneys are normal in size and echogenicity. There is no  renal mass, stone, or hydronephrosis seen in either kidney. The right  kidney measures 9.8    in length; the left kidney measures 10.1 cm      in length.        Impression:       Unremarkable bilateral renal ultrasound.      This report was finalized on 12/7/2017 4:32 PM by Dr. Miko Coto MD.                 Medications:        castor oil-balsam peru  Topical BID   ceftriaxone 1 g Intravenous Q24H   cholecalciferol 800 Units Oral Daily   doxycycline 100 mg Intravenous Once   doxycycline 100 mg Intravenous Q12H   famotidine 20 mg Intravenous Daily   heparin (porcine) 5,000 Units Subcutaneous Q12H   insulin aspart 0-14 Units Subcutaneous Q4H   insulin detemir 30 Units Subcutaneous Daily   predniSONE 20 mg Oral Daily With Breakfast   Risaquad-2 1 capsule Oral Daily   vitamin C 1,000 mg Oral BID       fentaNYL (SUBLIMAZE) PCA 1500 mcg/30 mL syringe  Last Rate: Stopped (12/08/17 0730)   propofol 5-50 mcg/kg/min Last Rate: Stopped (12/08/17 1108)   sodium chloride 100 mL/hr Last Rate: 100 mL/hr (12/08/17 0319)       Assessment/Plan     Active Problems:    Pneumonia      1. SHEELA on CKD 3 : His baseline creatinine is 1.8. It is better at 2.5 from 3.1 today  SHEELA likely from torsemide, metolazone and  severe hypoxia.   urine sodium < 10 and urine protein/creatinine is 54 mg/gm. UA is bland. Renal US is normal  continue NS at 100 cc/hr    2. Hypoxic hypercarbic respiratory failure : Dr Gomez managing    3. Type 2 DM     4. Pneumonia    5. Oxygen dependant COPD    6. Anemia  : check iron profile    Prognosis is guarded  I discussed the patients findings and my recommendations with nursing staff, Dr Phoebe Card MD  12/08/17  11:43 AM

## 2017-12-08 NOTE — PLAN OF CARE
Detail Level: Zone Problem: Diabetes, Type 2 (Adult)  Goal: Signs and Symptoms of Listed Potential Problems Will be Absent or Manageable (Diabetes, Type 2)  Outcome: Ongoing (interventions implemented as appropriate)

## 2017-12-08 NOTE — PROGRESS NOTES
[facility] CCU Note    Patient Identification:  Name:  Se Anne  Age:  67 y.o.  Sex:  male  :  1950  MRN:  [id number]  Visit number:  [id number]  Primary Care Provider:  No Known Provider    2    Ventilator Day:  Ventilator Settings:  Ventilator/Non-Invasive Ventilation Settings     Start     Ordered    17 0737  Ventilator - AC/VC; 20; 50; 90%; 8; 450  Continuous     Question Answer Comment   Vent Mode AC/VC    Breath rate 20    FiO2 50    FiO2 titrate for Sp02% =/> 90%    PEEP 8    Tidal Volume 450        17 0737    17 0413  Ventilator - AC/VC; (16); 50; 90%; 5; 500  Continuous,   Status:  Canceled     Comments:  A/C per Dr. Bermudez   Question Answer Comment   Vent Mode AC/VC    Breath rate  16   FiO2 50    FiO2 titrate for Sp02% =/> 90%    PEEP 5    Tidal Volume 500        17 0413    17 0330  Ventilator - SIMV/VC; (16); 50; 90%; 5; 500; 8  Continuous,   Status:  Canceled     Question Answer Comment   Vent Mode SIMV/VC    Breath rate  16   FiO2 50    FiO2 titrate for Sp02% =/> 90%    PEEP 5    Tidal Volume 500    Pressure Support 8        17 0329    17 0126  . BIPAP  Until Discontinued,   Status:  Canceled     Question:  .  Answer:  BIPAP    17          Drips: Propofol, fentanyl  Antibiotics: Rocephin, doxycycline  Lines: Peripheral ×2  Worley catheter: Yes    Procedures:  -Intubation in the emergency department    HPI:  The patient is a 67-year-old male who presented with shortness of breath and chest pain    Subjective      The patient is currently undergoing a sedation vacation and spontaneous breathing trial.  His fentanyl was initially discontinued yesterday evening due to ongoing bradycardia that had to be restarted sometime last night.  Otherwise the patient has been doing very well and his urine output has picked up.  He is awake on mechanical ventilation and following all commands.    History taken from: chart RN    Patient unable to give history due to: Sedated on mechanical ventilation  Present during visit: REGGIE Freeman    Objective     Vital Signs  Temp:  [98.1 °F (36.7 °C)-99.1 °F (37.3 °C)] 98.6 °F (37 °C)  Heart Rate:  [39-74] 39  Resp:  [19-21] 20  BP: (111-166)/(42-81) 147/57  FiO2 (%):  [30 %-40 %] 30 %  Body mass index is 42.88 kg/(m^2).    Intake/Output Summary (Last 24 hours) at 12/08/17 0820  Last data filed at 12/08/17 0641   Gross per 24 hour   Intake          3902.35 ml   Output             1819 ml   Net          2083.35 ml       Physical Exam:    Physical Exam   Constitutional: He appears well-developed and well-nourished. No distress. He is intubated.   Obese; awakens easily today and does follow commands   HENT:   Head: Normocephalic and atraumatic.   Nose: Nose normal.   Mouth/Throat: Oropharynx is clear and moist and mucous membranes are normal.   ET tube and OG tube in place   Eyes: Conjunctivae and EOM are normal. Pupils are equal, round, and reactive to light. No scleral icterus.   Neck: Trachea normal. Neck supple. No JVD present. Carotid bruit is not present. No thyromegaly present.   Cardiovascular: Regular rhythm and normal pulses.  Bradycardia present.  Exam reveals no gallop and no friction rub.    Murmur heard.   Systolic murmur is present with a grade of 1/6   1+ pitting edema bilateral lower extremities   Pulmonary/Chest: Effort normal. He is intubated. No respiratory distress. He has decreased breath sounds in the right lower field. He has rhonchi in the right upper field and the left upper field. He has no rales.   Abdominal: Soft. Bowel sounds are normal. He exhibits no distension. There is no tenderness. There is no guarding.   Genitourinary:   Genitourinary Comments: Worely catheter is in place   Neurological:   Sedated but easily arousable; will squeeze hands and move feet upon command   Skin: Skin is warm and dry. Abrasion and ecchymosis noted. No rash noted. There is erythema.   Mepiplex  noted on lower extremity       Results Review:     Telemetry: Sinus bradycardia With occasional PACs    I reviewed the patient's new imaging results and agree with the interpretation.  I reviewed the patient's other test results and agree with the interpretation.      Results from last 7 days  Lab Units 12/08/17 0052 12/07/17 0459 12/06/17  0541 12/06/17  0121 12/02/17  0000   WBC 10*3/mm3 8.23 8.52 10.32 14.01* 5.93   HEMOGLOBIN g/dL 7.6* 8.2* 8.2* 9.1* 9.6*   PLATELETS 10*3/mm3 227 206 199 230 261       Results from last 7 days  Lab Units 12/08/17 0052 12/07/17 0459 12/06/17  0541 12/06/17  0121 12/02/17  0000   SODIUM mmol/L 140 138 136 139 141   POTASSIUM mmol/L 4.4 4.7 4.9 4.6 4.4   CHLORIDE mmol/L 105 100 97* 96* 98*   CO2 mmol/L 29.8 28.9 32.4* 35.4* 35.0*   BUN mg/dL 52* 44* 30* 31* 34*   CREATININE mg/dL 2.59* 3.14* 2.51* 2.42* 2.50*   CALCIUM mg/dL 8.1 8.2 8.4 9.2 9.1   GLUCOSE mg/dL 236* 249* 304* 261* 119*       Results from last 7 days  Lab Units 12/08/17 0052 12/07/17 0459 12/06/17  0541 12/06/17  0121 12/02/17  0000   BILIRUBIN mg/dL 0.2 0.2 0.5 0.7 0.1*   ALK PHOS U/L 42 46 52 61 59   AST (SGOT) U/L 18 13 15 16 22   ALT (SGPT) U/L 17 16 14 17 23       Results from last 7 days  Lab Units 12/06/17  0121 12/02/17  0000   INR  1.18* 1.12*             Renal US:      FINDINGS: Both kidneys are normal in size and echogenicity. There is no  renal mass, stone, or hydronephrosis seen in either kidney. The right  kidney measures 9.8    in length; the left kidney measures 10.1 cm      in length.       IMPRESSION:  Unremarkable bilateral renal ultrasound.     Portable CXR, 12/7/17:  FINDINGS:   There is bibasilar atelectasis.   Lungs are otherwise aerated.  Support tube and lines are in unchanged position.   Heart size is stable.  No pneumothorax.   Tiny left pleural effusion.       IMPRESSION:  Stable appearance of the chest.    Cultures:  Blood Culture   Date Value Ref Range Status   12/02/2017 No growth  at 4 days  Preliminary   12/02/2017 No growth at 4 days  Preliminary        Current Hospital Medications:    castor oil-balsam peru  Topical BID   ceftriaxone 1 g Intravenous Q24H   doxycycline 100 mg Intravenous Once   doxycycline 100 mg Intravenous Q12H   famotidine 20 mg Intravenous Daily   heparin (porcine) 5,000 Units Subcutaneous Q12H   insulin aspart 0-14 Units Subcutaneous Q4H   insulin detemir 10 Units Subcutaneous Daily   predniSONE 20 mg Oral Daily With Breakfast   Risaquad-2 1 capsule Oral Daily   vitamin C 1,000 mg Oral BID       fentaNYL (SUBLIMAZE) PCA 1500 mcg/30 mL syringe     propofol 5-50 mcg/kg/min Last Rate: 20 mcg/kg/min (12/08/17 0643)   sodium chloride 100 mL/hr Last Rate: 100 mL/hr (12/08/17 0319)       Assessment/Plan      -Acute hypercapnic and hypoxic respiratory failure secondary to a bibasilar community-acquired pneumonia  -Bibasilar community-acquired pneumonia  -Acute kidney injury in the setting of stage III chronic kidney disease; improving  -Oxygen dependent COPD in an acute exacerbation  -Insulin-dependent diabetes mellitus type 2 with hyperglycemia that is likely exacerbated by IV steroids  -Mild hypochloremia; resolved  -Thyroid abnormalities; low TSH and low FT4  -Mildly elevated troponin level in the setting of acute on chronic kidney disease; may also be related to respiratory failure   -Acute on chronic anemia with macrocytosis  -Mild ST depression in lead II, V5 and V6; resolved - repeat EKG with Sinus Bradycardia  -Obese; probably obesity hypoventilation syndrome    Hopefully the patient will be extubated today. Continue with IV antibiotics. Steroids have been converted to PO prednisone. CRP improving. Continue with gentle IV fluid hydration and follow his urine output closely.  A renal ultrasound was performed and was overall unremarkable.  Nephrology is now following; creatinine is improved. He received a 1L bolus yesterday.    Increase his Levemir today. Continue SSI as  needed. B12 and Folate levels are WNL.     Will check a T3 level. Repeat his CBC, CMP and CRP in the moring.    DVT/GI ppx: Subcutaneous heparin/H2 blocker    Patient is considered high risk due to: respiratory failure, kidney disease, mildly elevated troponin     I discussed the patients findings and my recommendations with nursing staff.    Teresa Sandhu,   12/08/17  8:20 AM

## 2017-12-09 NOTE — PROGRESS NOTES
[facility] CCU Note    Patient Identification:  Name:  Se Anne  Age:  67 y.o.  Sex:  male  :  1950  MRN:  [id number]  Visit number:  [id number]  Primary Care Provider:  No Known Provider    3    Drips: None  Antibiotics: Rocephin, doxycycline  Lines: Peripheral ×2  Worley catheter: Yes    Procedures:  -Intubation in the emergency department  -Extubation 17    HPI:  The patient is a 67-year-old male who presented with shortness of breath and chest pain    Subjective      The patient is currently on BiPAP. He was able to come off BIPAP earlier this morning to eat and take his medications. He denies any complaints. He denies pain and denies dyspnea. History is difficult as patient is on BIPAP.    History taken from: chart RN   Patient unable to give history due to: Sedated on mechanical ventilation  Present during visit: REGGIE Freeman    Objective     Vital Signs  Temp:  [98 °F (36.7 °C)-98.7 °F (37.1 °C)] 98.6 °F (37 °C)  Heart Rate:  [] 82  Resp:  [16-30] 22  BP: (143-220)/() 164/63  Body mass index is 42.88 kg/(m^2).    Intake/Output Summary (Last 24 hours) at 17 0912  Last data filed at 17 0554   Gross per 24 hour   Intake             3585 ml   Output             5600 ml   Net            -2015 ml       Physical Exam:    Physical Exam   Constitutional: He appears well-developed and well-nourished. No distress. Face mask in place.   Obese   HENT:   Head: Normocephalic and atraumatic.   Nose: Nose normal.   Mouth/Throat: Oropharynx is clear and moist and mucous membranes are normal.   ET tube and OG tube in place   Eyes: Conjunctivae and EOM are normal. Pupils are equal, round, and reactive to light. No scleral icterus.   Neck: Trachea normal. Neck supple. No JVD present. Carotid bruit is not present. No thyromegaly present.   Cardiovascular: Normal rate, regular rhythm and normal pulses.  Exam reveals no gallop and no friction rub.    Murmur heard.    Systolic murmur is present with a grade of 1/6   Trace-1+ pitting edema bilateral lower extremities   Pulmonary/Chest: Effort normal. No respiratory distress. He has decreased breath sounds in the right lower field and the left lower field. He has no rhonchi. He has no rales.   Abdominal: Soft. Bowel sounds are normal. He exhibits no distension. There is no tenderness. There is no guarding.   Genitourinary:   Genitourinary Comments: Worley catheter is in place   Neurological: He is alert.   Follows commands   Skin: Skin is warm and dry. Abrasion and ecchymosis noted. No rash noted. There is erythema.   Mepiplex noted on his left lower extremity; wound is covered      Results Review:     Telemetry: Sinus 71    I reviewed the patient's new imaging results and agree with the interpretation.  I reviewed the patient's other test results and agree with the interpretation.      Results from last 7 days  Lab Units 12/09/17 0029 12/08/17 0052 12/07/17 0459 12/06/17 0541 12/06/17  0121   WBC 10*3/mm3 9.37 8.23 8.52 10.32 14.01*   HEMOGLOBIN g/dL 10.2* 7.6* 8.2* 8.2* 9.1*   PLATELETS 10*3/mm3 258 227 206 199 230       Results from last 7 days  Lab Units 12/09/17  0029 12/08/17 0052 12/07/17 0459 12/06/17  0541 12/06/17  0121   SODIUM mmol/L 146 140 138 136 139   POTASSIUM mmol/L 4.0 4.4 4.7 4.9 4.6   CHLORIDE mmol/L 109 105 100 97* 96*   CO2 mmol/L 29.1 29.8 28.9 32.4* 35.4*   BUN mg/dL 33* 52* 44* 30* 31*   CREATININE mg/dL 1.47* 2.59* 3.14* 2.51* 2.42*   CALCIUM mg/dL 8.8 8.1 8.2 8.4 9.2   GLUCOSE mg/dL 140* 236* 249* 304* 261*       Results from last 7 days  Lab Units 12/09/17  0029 12/08/17 0052 12/07/17 0459 12/06/17  0541 12/06/17  0121   BILIRUBIN mg/dL 0.4 0.2 0.2 0.5 0.7   ALK PHOS U/L 49 42 46 52 61   AST (SGOT) U/L 17 18 13 15 16   ALT (SGPT) U/L 16 17 16 14 17       Results from last 7 days  Lab Units 12/06/17  0121   INR  1.18*             Renal US:      FINDINGS: Both kidneys are normal in size and  echogenicity. There is no  renal mass, stone, or hydronephrosis seen in either kidney. The right  kidney measures 9.8    in length; the left kidney measures 10.1 cm      in length.       IMPRESSION:  Unremarkable bilateral renal ultrasound.     Portable CXR, 12/7/17:  FINDINGS:   There is bibasilar atelectasis.   Lungs are otherwise aerated.  Support tube and lines are in unchanged position.   Heart size is stable.  No pneumothorax.   Tiny left pleural effusion.       IMPRESSION:  Stable appearance of the chest.    Cultures:  Blood Culture   Date Value Ref Range Status   12/02/2017 No growth at 4 days  Preliminary   12/02/2017 No growth at 4 days  Preliminary        Current Hospital Medications:    amLODIPine 10 mg Oral Daily   carvedilol 12.5 mg Oral BID With Meals   castor oil-balsam peru  Topical BID   ceftriaxone 1 g Intravenous Q24H   cholecalciferol 800 Units Oral Daily   doxycycline 100 mg Intravenous Once   doxycycline 100 mg Intravenous Q12H   famotidine 20 mg Intravenous Daily   heparin (porcine) 5,000 Units Subcutaneous Q12H   hydrALAZINE 25 mg Oral TID   insulin aspart 0-9 Units Subcutaneous 4x Daily AC & at Bedtime   insulin detemir 30 Units Subcutaneous Daily   isosorbide mononitrate 180 mg Oral Daily   levothyroxine 25 mcg Oral Daily   predniSONE 20 mg Oral Daily With Breakfast   Risaquad-2 1 capsule Oral Daily   vitamin C 1,000 mg Oral BID       fentaNYL (SUBLIMAZE) PCA 1500 mcg/30 mL syringe  Last Rate: Stopped (12/08/17 0730)   niCARdipine 5-15 mg/hr    propofol 5-50 mcg/kg/min Last Rate: Stopped (12/08/17 1108)   sodium chloride 100 mL/hr Last Rate: 100 mL/hr (12/08/17 1733)       Assessment/Plan      -Uncontrolled essential hypertension  -Acute hypercapnic and hypoxic respiratory failure secondary to a bibasilar community-acquired pneumonia status post extubation  -Bibasilar community-acquired pneumonia  -Acute kidney injury in the setting of stage III chronic kidney disease; improving  -Oxygen  dependent COPD in an acute exacerbation  -Insulin-dependent diabetes mellitus type 2 with hyperglycemia that is likely exacerbated by IV steroids; improving  -Mild hypochloremia; resolved  -Thyroid abnormalities; low TSH and low FT4  -Mildly elevated troponin level in the setting of acute on chronic kidney disease; may also be related to respiratory failure   -Acute on chronic anemia with macrocytosis; improved  -Mild ST depression in lead II, V5 and V6; resolved - repeat EKG with Sinus Bradycardia  -Obese; probably obesity hypoventilation syndrome    Continue with BIPAP as needed. Continue with IV antibiotics and oral steroids. CRP improving. Continue with gentle IV fluid hydration but decrease the rate of infusion.  A renal ultrasound was performed and was overall unremarkable. Nephrology is following. Will resume his home blood pressure medications with holding parameters.    Continue his current dose of Levemir. Continue SSI as needed. B12 and Folate levels are WNL.     Will check a T3 level. Repeat his CBC, CMP and CRP in the moring.    DVT/GI ppx: Subcutaneous heparin/H2 blocker    Patient is considered high risk due to: respiratory failure, kidney disease, mildly elevated troponin     I discussed the patients findings and my recommendations with patient and nursing staff.    Teresa Sandhu DO  12/09/17  9:12 AM

## 2017-12-09 NOTE — PROGRESS NOTES
Interval History:     Patient Complaints: No complaints.  Somnolent with BiPAP.  Tolerating feeds.  Hemodynamically stable.  Still on normal saline at 50 mL per hour.  Good urine output.  Improving labs.  No diarrhea.  Nurses report no acute issues.          Vital Signs  Temp:  [98.3 °F (36.8 °C)-98.9 °F (37.2 °C)] 98.9 °F (37.2 °C)  Heart Rate:  [] 68  Resp:  [18-30] 20  BP: (164-220)/() 214/75    Physical Exam:    General:             Obese under BiPAP      HEENT:  No pallor                Neck:   No JVD        Lungs:     CTA     Heart:   RRR,  no rub       Abdomen:     Normal bowel sounds, soft non-tender, non-distended, no guarding       Extremities:   O edema        Skin:   No petechiae, no rash       Neurologic:  Cr Ns grossly intact, sensation N, moves all extremities.        Results Review:     I reviewed the patient's new clinical results.    Lab Results (last 24 hours)     Procedure Component Value Units Date/Time    POC Glucose Fingerstick [[id number]]  (Abnormal) Collected:  12/08/17 2027    Specimen:  Blood Updated:  12/08/17 2041     Glucose 195 (H) mg/dL     Narrative:       Meter: [id number] : [id number] CHINA HEADLEY    CBC & Differential [[id number]] Collected:  12/09/17 0029    Specimen:  Blood Updated:  12/09/17 0110    Narrative:       The following orders were created for panel order CBC & Differential.  Procedure                               Abnormality         Status                     ---------                               -----------         ------                     CBC Auto Differential[[id number]]        Abnormal            Final result                 Please view results for these tests on the individual orders.    CBC Auto Differential [[id number]]  (Abnormal) Collected:  12/09/17 0029    Specimen:  Blood Updated:  12/09/17 0110     WBC 9.37 10*3/mm3      RBC 3.45 (L) 10*6/mm3      Hemoglobin 10.2 (L) g/dL      Hematocrit 33.5 (L) %      MCV 97.1 (H) fL      MCH  29.6 pg      MCHC 30.4 (L) g/dL      RDW 15.6 (H) %      RDW-SD 50.7 fl      MPV 8.8 fL      Platelets 258 10*3/mm3      Neutrophil % 74.1 %      Lymphocyte % 15.8 (L) %      Monocyte % 8.5 %      Eosinophil % 0.1 %      Basophil % 0.1 %      Immature Grans % 1.4 (H) %      Neutrophils, Absolute 6.94 (H) 10*3/mm3      Lymphocytes, Absolute 1.48 10*3/mm3      Monocytes, Absolute 0.80 10*3/mm3      Eosinophils, Absolute 0.01 10*3/mm3      Basophils, Absolute 0.01 10*3/mm3      Immature Grans, Absolute 0.13 (H) 10*3/mm3     Comprehensive Metabolic Panel [[id number]]  (Abnormal) Collected:  12/09/17 0029    Specimen:  Blood Updated:  12/09/17 0123     Glucose 140 (H) mg/dL      BUN 33 (H) mg/dL      Creatinine 1.47 (H) mg/dL      Sodium 146 mmol/L      Potassium 4.0 mmol/L      Chloride 109 mmol/L      CO2 29.1 mmol/L      Calcium 8.8 mg/dL      Total Protein 7.3 g/dL      Albumin 4.00 g/dL      ALT (SGPT) 16 U/L      AST (SGOT) 17 U/L      Alkaline Phosphatase 49 U/L       Note New Reference Ranges        Total Bilirubin 0.4 mg/dL      eGFR Non African Amer 47 (L) mL/min/1.73      Globulin 3.3 gm/dL      A/G Ratio 1.2 (L) g/dL      BUN/Creatinine Ratio 22.4     Anion Gap 7.9 mmol/L     Osmolality, Calculated [[id number]]  (Normal) Collected:  12/09/17 0029    Specimen:  Blood Updated:  12/09/17 0123     Osmolality Calc 300.1 mOsm/kg     Iron Profile [[id number]]  (Abnormal) Collected:  12/09/17 0029    Specimen:  Blood Updated:  12/09/17 0124     Iron 43 (L) mcg/dL      TIBC 298 mcg/dL      Iron Saturation 14 (L) %     C-reactive Protein [[id number]]  (Abnormal) Collected:  12/09/17 0029    Specimen:  Blood Updated:  12/09/17 0125     C-Reactive Protein 3.57 (H) mg/dL     Blood Culture - Blood, [[id number]]  (Normal) Collected:  12/06/17 0121    Specimen:  Blood from Arm, Left Updated:  12/09/17 0131     Blood Culture No growth at 3 days    Ferritin [[id number]]  (Normal) Collected:  12/09/17 0029    Specimen:  Blood  Updated:  12/09/17 0132     Ferritin 133.00 ng/mL     Blood Culture - Blood, [[id number]]  (Normal) Collected:  12/06/17 0212    Specimen:  Blood from Arm, Left Updated:  12/09/17 0316     Blood Culture No growth at 3 days    POC Glucose Fingerstick [[id number]]  (Abnormal) Collected:  12/09/17 0829    Specimen:  Blood Updated:  12/09/17 0853     Glucose 176 (H) mg/dL     Narrative:       Meter: [id number] : [id number] YAÑEZ CANDY    POC Glucose Fingerstick [[id number]]  (Abnormal) Collected:  12/09/17 1118    Specimen:  Blood Updated:  12/09/17 1144     Glucose 252 (H) mg/dL     Narrative:       Meter: [id number] : [id number] YAÑEZ CANDY    T3, Free [[id number]]  (Abnormal) Collected:  12/09/17 0032    Specimen:  Blood Updated:  12/09/17 1231     T3, Free 1.80 (L) pg/mL     POC Glucose Fingerstick [[id number]]  (Abnormal) Collected:  12/09/17 1632    Specimen:  Blood Updated:  12/09/17 1701     Glucose 192 (H) mg/dL     Narrative:       Meter: [id number] : [id number] YAÑEZ CANDY          Imaging Results (last 24 hours)     ** No results found for the last 24 hours. **          Assessment and Plan:    1. SHEELA on CKD 3 : His baseline creatinine is 1.8 and his creatinine is better than his best estimated baseline  SHEELA likely from torsemide, metolazone   urine sodium < 10 and urine protein/creatinine is 54 mg/gm. UA is bland. Renal US is normal    2. Hypoxic hypercarbic respiratory failure : Dr Gomez managing     3. Type 2 DM      4. Pneumonia     5. Oxygen dependant COPD     6. Anemia : check iron profile and it suggests iron deficiency    7.  Hypernatremia: Change IV fluids from normal saline to half normal saline    Coleman Lai MD  12/09/17  6:48 PM

## 2017-12-09 NOTE — PLAN OF CARE
Problem: Respiratory Insufficiency (Adult)  Goal: Identify Related Risk Factors and Signs and Symptoms  Outcome: Ongoing (interventions implemented as appropriate)    12/09/17 0758   Respiratory Insufficiency   Related Risk Factors (Respiratory Insufficiency) activity intolerance   Signs and Symptoms (Respiratory Insufficiency) abnormal breath sounds       Goal: Acid/Base Balance  Outcome: Ongoing (interventions implemented as appropriate)    12/09/17 0758   Respiratory Insufficiency (Adult)   Acid/Base Balance making progress toward outcome       Goal: Effective Ventilation  Outcome: Ongoing (interventions implemented as appropriate)    Problem: Skin Integrity Impairment, Risk/Actual (Adult)  Goal: Identify Related Risk Factors and Signs and Symptoms  Outcome: Ongoing (interventions implemented as appropriate)    12/09/17 0758   Skin Integrity Impairment, Risk/Actual   Skin Integrity Impairment, Risk/Actual: Related Risk Factors medication effects   Signs and Symptoms (Skin Integrity Impairment) edema       Goal: Skin Integrity/Wound Healing  Outcome: Ongoing (interventions implemented as appropriate)    12/09/17 0758   Skin Integrity Impairment, Risk/Actual (Adult)   Skin Integrity/Wound Healing making progress toward outcome         Problem: Fall Risk (Adult)  Goal: Identify Related Risk Factors and Signs and Symptoms  Outcome: Ongoing (interventions implemented as appropriate)    12/09/17 0758   Fall Risk   Fall Risk: Related Risk Factors fear of falling;inadequate lighting;history of falls   Fall Risk: Signs and Symptoms presence of risk factors       Goal: Absence of Falls  Outcome: Ongoing (interventions implemented as appropriate)    12/09/17 0758   Fall Risk (Adult)   Absence of Falls making progress toward outcome         Problem: Patient Care Overview (Adult)  Goal: Plan of Care Review  Outcome: Ongoing (interventions implemented as appropriate)    12/09/17 0758   Coping/Psychosocial Response  Interventions   Plan Of Care Reviewed With patient   Patient Care Overview   Progress progress toward functional goals as expected       Goal: Discharge Needs Assessment  Outcome: Ongoing (interventions implemented as appropriate)    12/09/17 0758   Discharge Needs Assessment   Discharge Disposition still a patient         Problem: Pressure Ulcer Risk (Karthikeyan Scale) (Adult,Obstetrics,Pediatric)  Goal: Skin Integrity  Outcome: Ongoing (interventions implemented as appropriate)    12/09/17 0758   Pressure Ulcer Risk (Karthikeyan Scale) (Adult,Obstetrics,Pediatric)   Skin Integrity making progress toward outcome         Problem: Diabetes, Type 2 (Adult)  Goal: Signs and Symptoms of Listed Potential Problems Will be Absent or Manageable (Diabetes, Type 2)  Outcome: Ongoing (interventions implemented as appropriate)    12/09/17 0758   Diabetes, Type 2   Problems Assessed (Type 2 Diabetes) all   Problems Present (Type 2 Diabetes) none         Problem: NPPV/CPAP (Adult)  Goal: Signs and Symptoms of Listed Potential Problems Will be Absent or Manageable (NPPV/CPAP)  Outcome: Ongoing (interventions implemented as appropriate)    12/09/17 0758   NPPV/CPAP   Problems Assessed (NPPV/CPAP) all   Problems Present (NPPV/CPAP) none

## 2017-12-09 NOTE — PLAN OF CARE
Problem: Respiratory Insufficiency (Adult)  Goal: Identify Related Risk Factors and Signs and Symptoms  Outcome: Ongoing (interventions implemented as appropriate)  Goal: Acid/Base Balance  Outcome: Ongoing (interventions implemented as appropriate)  Goal: Effective Ventilation  Outcome: Ongoing (interventions implemented as appropriate)    Problem: Skin Integrity Impairment, Risk/Actual (Adult)  Goal: Identify Related Risk Factors and Signs and Symptoms  Outcome: Ongoing (interventions implemented as appropriate)  Goal: Skin Integrity/Wound Healing  Outcome: Ongoing (interventions implemented as appropriate)    Problem: Fall Risk (Adult)  Goal: Identify Related Risk Factors and Signs and Symptoms  Outcome: Ongoing (interventions implemented as appropriate)  Goal: Absence of Falls  Outcome: Ongoing (interventions implemented as appropriate)    Problem: Mechanical Ventilation, Invasive (Adult)  Goal: Signs and Symptoms of Listed Potential Problems Will be Absent or Manageable (Mechanical Ventilation, Invasive)  Outcome: Outcome(s) achieved Date Met:  12/09/17    Problem: Patient Care Overview (Adult)  Goal: Plan of Care Review  Outcome: Ongoing (interventions implemented as appropriate)    12/08/17 0247 12/08/17 2000 12/09/17 0610   Coping/Psychosocial Response Interventions   Plan Of Care Reviewed With --  patient --    Patient Care Overview   Progress progress toward functional goals as expected --  --    Outcome Evaluation   Outcome Summary/Follow up Plan --  --  Patient remains off ventilator. Afebrile. Patient hypertensive through shift.          Problem: Pressure Ulcer Risk (Karthikeyan Scale) (Adult,Obstetrics,Pediatric)  Goal: Identify Related Risk Factors and Signs and Symptoms  Outcome: Outcome(s) achieved Date Met:  12/09/17  Goal: Skin Integrity  Outcome: Ongoing (interventions implemented as appropriate)    Problem: Diabetes, Type 2 (Adult)  Goal: Signs and Symptoms of Listed Potential Problems Will be Absent  or Manageable (Diabetes, Type 2)  Outcome: Ongoing (interventions implemented as appropriate)    Problem: NPPV/CPAP (Adult)  Goal: Signs and Symptoms of Listed Potential Problems Will be Absent or Manageable (NPPV/CPAP)  Outcome: Ongoing (interventions implemented as appropriate)

## 2017-12-10 NOTE — PROGRESS NOTES
Interval History:     Patient Complaints: Doing better.  Off BiPAP.  Sitting in a chair.  Says he is drinking a lot.  Still has Worley's.  No chest pain.  No abdominal pain or nausea or vomiting.        Vital Signs  Temp:  [98.3 °F (36.8 °C)-98.5 °F (36.9 °C)] 98.4 °F (36.9 °C)  Heart Rate:  [58-79] 58  Resp:  [15-26] 20  BP: (122-214)/() 196/108    Physical Exam:    General:             No distress on nasal cannula      HEENT:  No pallor                Neck:   No JVD        Lungs:     cTA     Heart:   RRR,  no rub       Abdomen:     Normal bowel sounds, soft non-tender, non-distended, no guarding       Extremities:   Trace edema       Skin:   No petechiae, no rash       Neurologic:  Cr Ns grossly intact, sensation N, moves all extremities.        Results Review:     I reviewed the patient's new clinical results.    Lab Results (last 24 hours)     Procedure Component Value Units Date/Time    POC Glucose Fingerstick [[id number]]  (Abnormal) Collected:  12/09/17 1943    Specimen:  Blood Updated:  12/09/17 1958     Glucose 254 (H) mg/dL     Narrative:       Meter: [id number] : [id number] CHINA HEADLEY    CBC & Differential [[id number]] Collected:  12/10/17 0032    Specimen:  Blood Updated:  12/10/17 0104    Narrative:       The following orders were created for panel order CBC & Differential.  Procedure                               Abnormality         Status                     ---------                               -----------         ------                     CBC Auto Differential[[id number]]        Abnormal            Final result                 Please view results for these tests on the individual orders.    CBC Auto Differential [[id number]]  (Abnormal) Collected:  12/10/17 0032    Specimen:  Blood Updated:  12/10/17 0104     WBC 5.94 10*3/mm3      RBC 3.03 (L) 10*6/mm3      Hemoglobin 8.7 (L) g/dL      Hematocrit 29.6 (L) %      MCV 97.7 (H) fL      MCH 28.7 pg      MCHC 29.4 (L) g/dL      RDW 15.2  (H) %      RDW-SD 49.0 fl      MPV 8.6 fL      Platelets 249 10*3/mm3      Neutrophil % 64.7 %      Lymphocyte % 22.4 %      Monocyte % 10.9 %      Eosinophil % 0.5 %      Basophil % 0.2 %      Immature Grans % 1.3 (H) %      Neutrophils, Absolute 3.84 10*3/mm3      Lymphocytes, Absolute 1.33 10*3/mm3      Monocytes, Absolute 0.65 10*3/mm3      Eosinophils, Absolute 0.03 10*3/mm3      Basophils, Absolute 0.01 10*3/mm3      Immature Grans, Absolute 0.08 (H) 10*3/mm3     Comprehensive Metabolic Panel [[id number]]  (Abnormal) Collected:  12/10/17 0032    Specimen:  Blood Updated:  12/10/17 0122     Glucose 132 (H) mg/dL      BUN 24 (H) mg/dL      Creatinine 1.37 (H) mg/dL      Sodium 144 mmol/L      Potassium 3.8 mmol/L      Chloride 109 mmol/L      CO2 29.9 mmol/L      Calcium 8.7 mg/dL      Total Protein 6.6 g/dL      Albumin 3.60 g/dL      ALT (SGPT) 16 U/L      AST (SGOT) 16 U/L      Alkaline Phosphatase 43 U/L       Note New Reference Ranges        Total Bilirubin 0.5 mg/dL      eGFR Non African Amer 52 (L) mL/min/1.73      Globulin 3.0 gm/dL      A/G Ratio 1.2 (L) g/dL      BUN/Creatinine Ratio 17.5     Anion Gap 5.1 mmol/L     C-reactive Protein [[id number]]  (Abnormal) Collected:  12/10/17 0032    Specimen:  Blood Updated:  12/10/17 0122     C-Reactive Protein 3.60 (H) mg/dL     Osmolality, Calculated [[id number]]  (Normal) Collected:  12/10/17 0032    Specimen:  Blood Updated:  12/10/17 0122     Osmolality Calc 292.7 mOsm/kg     Blood Culture - Blood, [[id number]]  (Normal) Collected:  12/06/17 0121    Specimen:  Blood from Arm, Left Updated:  12/10/17 0131     Blood Culture No growth at 4 days    Blood Culture - Blood, [[id number]]  (Normal) Collected:  12/06/17 0212    Specimen:  Blood from Arm, Left Updated:  12/10/17 0316     Blood Culture No growth at 4 days    POC Glucose Fingerstick [[id number]]  (Abnormal) Collected:  12/10/17 0907    Specimen:  Blood Updated:  12/10/17 0931     Glucose 192 (H) mg/dL      Narrative:       Meter: [id number] : [id number] YAÑEZ CANDY    POC Glucose Fingerstick [[id number]]  (Abnormal) Collected:  12/10/17 1220    Specimen:  Blood Updated:  12/10/17 1239     Glucose 234 (H) mg/dL     Narrative:       Meter: [id number] : [id number] PARI STALLINGS          Imaging Results (last 24 hours)     Procedure Component Value Units Date/Time    XR Chest 1 View [[id number]] Collected:  12/10/17 0917     Updated:  12/10/17 0920    Narrative:       Technique: Frontal view the chest.     COMPARISON:  12/7/2017     INDICATION:     cough; J18.1-Lobar pneumonia, unspecified organism      FINDINGS:    Bibasilar atelectasis noted. Cardiomegaly is noted.  Prominent interstitial markings are noted. There are small bilateral  pleural effusions.        Impression:       As above.     This report was finalized on 12/10/2017 9:17 AM by Dr. Miko Coto MD.             Assessment and Plan:    1. SHEELA on CKD 3 : His baseline creatinine is 1.8 and his creatinine is better than his best estimated baseline  SHEELA likely from torsemide, metolazone   urine sodium < 10 and urine protein/creatinine is 54 mg/gm. UA is bland. Renal US is normal  Agree with stopping IV fluids.  We will discontinue his Worley's.     2. Hypoxic hypercarbic respiratory failure : Dr Gomez managing      3. Type 2 DM       4. Pneumonia      5. Oxygen dependant COPD      6. Anemia : check iron profile and it suggests iron deficiency     7.  Hypernatremia: Encourage by mouth liquids    Coleman Lai MD  12/10/17  5:13 PM

## 2017-12-10 NOTE — PROGRESS NOTES
[facility] CCU Note    Patient Identification:  Name:  Se Anne  Age:  67 y.o.  Sex:  male  :  1950  MRN:  [id number]  Visit number:  [id number]  Primary Care Provider:  No Known Provider    4    Drips: None  Antibiotics: Rocephin, doxycycline  Lines: Peripheral ×2  Worley catheter: Yes    Procedures:  -Intubation in the emergency department  -Extubation 17    HPI:  The patient is a 67-year-old male who presented with shortness of breath and chest pain    Subjective      The patient is currently on 3L nasal cannula and is eating breakfast this morning. He reports cough but denies shortness of breath. He denies chest pain. He denies abdominal pain and/or vomiting.    History taken from: chart RN   Patient unable to give history due to: Sedated on mechanical ventilation  Present during visit: REGGIE Freeman    Objective     Vital Signs  Temp:  [98.3 °F (36.8 °C)-98.9 °F (37.2 °C)] 98.4 °F (36.9 °C)  Heart Rate:  [58-79] 67  Resp:  [15-26] 21  BP: (147-214)/(53-91) 183/83  Body mass index is 42.88 kg/(m^2).    Intake/Output Summary (Last 24 hours) at 12/10/17 0814  Last data filed at 12/10/17 0600   Gross per 24 hour   Intake          2969.17 ml   Output             3825 ml   Net          -855.83 ml       Physical Exam:    Physical Exam   Constitutional: He appears well-developed and well-nourished. No distress. Nasal cannula in place.   Obese   HENT:   Head: Normocephalic and atraumatic.   Nose: Nose normal.   Mouth/Throat: Oropharynx is clear and moist and mucous membranes are normal.   ET tube and OG tube in place   Eyes: Conjunctivae and EOM are normal. Pupils are equal, round, and reactive to light. No scleral icterus.   Neck: Trachea normal. Neck supple. No JVD present. Carotid bruit is not present. No thyromegaly present.   Cardiovascular: Normal rate, regular rhythm and normal pulses.  Exam reveals no gallop and no friction rub.    Murmur heard.   Systolic murmur is present  with a grade of 1/6   Trace edema bilateral lower extremities   Pulmonary/Chest: Effort normal. No respiratory distress. He has decreased breath sounds. He has no rhonchi. He has no rales.   Abdominal: Soft. Bowel sounds are normal. He exhibits no distension. There is no tenderness. There is no guarding.   Genitourinary:   Genitourinary Comments: Worley catheter is in place   Neurological: He is alert.   Follows commands   Skin: Skin is warm and dry. Abrasion and ecchymosis noted. No rash noted. There is erythema.   Mepiplex noted on his left lower extremity; wound is noted to have a thick amount of ointment; appear consistent with ruptured blisters      Results Review:     Telemetry: Sinus rhty    I reviewed the patient's new imaging results and agree with the interpretation.  I reviewed the patient's other test results and agree with the interpretation.      Results from last 7 days  Lab Units 12/10/17  0032 12/09/17  0029 12/08/17  0052 12/07/17  0459 12/06/17  0541 12/06/17  0121   WBC 10*3/mm3 5.94 9.37 8.23 8.52 10.32 14.01*   HEMOGLOBIN g/dL 8.7* 10.2* 7.6* 8.2* 8.2* 9.1*   PLATELETS 10*3/mm3 249 258 227 206 199 230       Results from last 7 days  Lab Units 12/10/17  0032 12/09/17  0029 12/08/17  0052 12/07/17  0459 12/06/17  0541 12/06/17  0121   SODIUM mmol/L 144 146 140 138 136 139   POTASSIUM mmol/L 3.8 4.0 4.4 4.7 4.9 4.6   CHLORIDE mmol/L 109 109 105 100 97* 96*   CO2 mmol/L 29.9 29.1 29.8 28.9 32.4* 35.4*   BUN mg/dL 24* 33* 52* 44* 30* 31*   CREATININE mg/dL 1.37* 1.47* 2.59* 3.14* 2.51* 2.42*   CALCIUM mg/dL 8.7 8.8 8.1 8.2 8.4 9.2   GLUCOSE mg/dL 132* 140* 236* 249* 304* 261*       Results from last 7 days  Lab Units 12/10/17  0032 12/09/17  0029 12/08/17  0052 12/07/17  0459 12/06/17  0541 12/06/17  0121   BILIRUBIN mg/dL 0.5 0.4 0.2 0.2 0.5 0.7   ALK PHOS U/L 43 49 42 46 52 61   AST (SGOT) U/L 16 17 18 13 15 16   ALT (SGPT) U/L 16 16 17 16 14 17       Results from last 7 days  Lab Units  12/06/17  0121   INR  1.18*                 Renal US:      FINDINGS: Both kidneys are normal in size and echogenicity. There is no  renal mass, stone, or hydronephrosis seen in either kidney. The right  kidney measures 9.8    in length; the left kidney measures 10.1 cm      in length.       IMPRESSION:  Unremarkable bilateral renal ultrasound.     Portable CXR, 12/10/17:  FINDINGS:    Bibasilar atelectasis noted. Cardiomegaly is noted.  Prominent interstitial markings are noted. There are small bilateral  pleural effusions.       IMPRESSION:  As above.    Cultures:  Blood Culture   Date Value Ref Range Status   12/02/2017 No growth at 4 days  Preliminary   12/02/2017 No growth at 4 days  Preliminary        Current Hospital Medications:    amLODIPine 10 mg Oral Daily   carvedilol 12.5 mg Oral BID With Meals   castor oil-balsam peru  Topical BID   ceftriaxone 1 g Intravenous Q24H   cholecalciferol 800 Units Oral Daily   doxycycline 100 mg Intravenous Once   doxycycline 100 mg Intravenous Q12H   famotidine 20 mg Intravenous Daily   heparin (porcine) 5,000 Units Subcutaneous Q12H   hydrALAZINE 25 mg Oral TID   insulin aspart 0-9 Units Subcutaneous 4x Daily AC & at Bedtime   insulin detemir 30 Units Subcutaneous Daily   isosorbide mononitrate 180 mg Oral Daily   levothyroxine 25 mcg Oral Daily   predniSONE 20 mg Oral Daily With Breakfast   Risaquad-2 1 capsule Oral Daily   vitamin C 1,000 mg Oral BID       sodium chloride 50 mL/hr Last Rate: 50 mL/hr (12/10/17 0237)       Assessment/Plan      -Uncontrolled essential hypertension  -Acute hypercapnic and hypoxic respiratory failure secondary to a bibasilar community-acquired pneumonia status post extubation  -Bibasilar community-acquired pneumonia; resolving   -Acute kidney injury in the setting of stage III chronic kidney disease; improving  -Oxygen dependent COPD in an acute exacerbation  -Insulin-dependent diabetes mellitus type 2 with  -Mild hypochloremia;  resolved  -Thyroid abnormalities; low TSH, low FT4 and low FT3 in the setting of hypothyroidism  -Mildly elevated troponin level in the setting of acute on chronic kidney disease; may also be related to respiratory failure   -Acute on chronic anemia with macrocytosis; iron deficient  -Mild ST depression in lead II, V5 and V6; resolved - repeat EKG with Sinus Bradycardia  -Obese; probably obesity hypoventilation syndrome    Continue with BIPAP as needed; titrate O2 as tolerated. Continue with IV antibiotics and oral steroids. CRP continues to improve. Continue with gentle IV fluid hydration but decrease the rate of infusion; nephrology changed to 1/2 NS.  Blood pressure remains elevated; will increase the dose of his Hydralazine and continue to monitor closely. Blood glucose levels much improved. Continue SSI as needed.    TSH/FT4 and FT3 levels all low - patient on low dose synthroid. May suggest central hypothyroidism v subclinical hyperthyroidism. May consider repeating levels in 4-6 weeks.      Repeat his CBC, CMP and CRP in the moring.    DVT/GI ppx: Subcutaneous heparin/H2 blocker    Patient is considered high risk due to: uncontrolled hypertension, respiratory failure, kidney disease, mildly elevated troponin     I discussed the patients findings and my recommendations with patient and nursing staff.    Disposition:  Would consider transfer to PCU if bed available    Teresa Sandhu DO  12/10/17  8:14 AM

## 2017-12-11 NOTE — PROGRESS NOTES
Nephrology  Note      Subjective       He is sitting up in chair . Breathing well on nasal canula. Hydralazine increased by primary team    He is drinking well    Objective     Vital Signs  Temp:  [98.2 °F (36.8 °C)-98.9 °F (37.2 °C)] 98.4 °F (36.9 °C)  Heart Rate:  [54-73] 63  Resp:  [14-21] 14  BP: (122-212)/() 173/66    I/O this shift:  In: -   Out: 150 [Urine:150]  I/O last 3 completed shifts:  In: 1719.2 [P.O.:1050; I.V.:169.2; IV Piggyback:500]  Out: 2900 [Urine:2900]    Physical Examination:    General Appearance : awake, no distress  Head : normocephalic  Eyes :  + pallor   Throat : mm moist  Neck:  no JVD  Lungs : few wheezes at bases  Heart : regular rhythm & normal rate, normal S1, S2, no murmur  Abdomen :  normal bowel sounds,  soft non-tender  Extremities : 1+ edema  Pulses :  palpable and equal bilaterally  Neurologic : following commands    Laboratory Data :      WBC WBC   Date Value Ref Range Status   12/10/2017 5.94 4.50 - 12.50 10*3/mm3 Final   12/09/2017 9.37 4.50 - 12.50 10*3/mm3 Final      HGB Hemoglobin   Date Value Ref Range Status   12/10/2017 8.7 (L) 14.0 - 18.0 g/dL Final   12/09/2017 10.2 (L) 14.0 - 18.0 g/dL Final      HCT Hematocrit   Date Value Ref Range Status   12/10/2017 29.6 (L) 42.0 - 52.0 % Final   12/09/2017 33.5 (L) 42.0 - 52.0 % Final      Platlets No results found for: LABPLAT   MCV MCV   Date Value Ref Range Status   12/10/2017 97.7 (H) 80.0 - 94.0 fL Final   12/09/2017 97.1 (H) 80.0 - 94.0 fL Final          Sodium Sodium   Date Value Ref Range Status   12/11/2017 142 135 - 153 mmol/L Final   12/10/2017 144 135 - 153 mmol/L Final   12/09/2017 146 135 - 153 mmol/L Final      Potassium Potassium   Date Value Ref Range Status   12/11/2017 3.7 3.5 - 5.3 mmol/L Final   12/10/2017 3.8 3.5 - 5.3 mmol/L Final   12/09/2017 4.0 3.5 - 5.3 mmol/L Final      Chloride Chloride   Date Value Ref Range Status   12/11/2017 105 99 - 112 mmol/L Final   12/10/2017 109 99 - 112 mmol/L Final    12/09/2017 109 99 - 112 mmol/L Final      CO2 CO2   Date Value Ref Range Status   12/11/2017 34.4 (H) 24.3 - 31.9 mmol/L Final   12/10/2017 29.9 24.3 - 31.9 mmol/L Final   12/09/2017 29.1 24.3 - 31.9 mmol/L Final      BUN BUN   Date Value Ref Range Status   12/11/2017 20 7 - 21 mg/dL Final   12/10/2017 24 (H) 7 - 21 mg/dL Final   12/09/2017 33 (H) 7 - 21 mg/dL Final      Creatinine Creatinine   Date Value Ref Range Status   12/11/2017 1.17 0.43 - 1.29 mg/dL Final   12/10/2017 1.37 (H) 0.43 - 1.29 mg/dL Final   12/09/2017 1.47 (H) 0.43 - 1.29 mg/dL Final      Calcium Calcium   Date Value Ref Range Status   12/11/2017 9.1 7.7 - 10.0 mg/dL Final   12/10/2017 8.7 7.7 - 10.0 mg/dL Final   12/09/2017 8.8 7.7 - 10.0 mg/dL Final      PO4 No results found for: CAPO4   Albumin Albumin   Date Value Ref Range Status   12/10/2017 3.60 3.40 - 4.80 g/dL Final   12/09/2017 4.00 3.40 - 4.80 g/dL Final      Magnesium No results found for: MG   Uric Acid No results found for: URICACID     Radiology results :   Imaging Results (last 24 hours)     ** No results found for the last 24 hours. **        Imaging Results (last 24 hours)     ** No results found for the last 24 hours. **              Medications:        amLODIPine 10 mg Oral Daily   carvedilol 12.5 mg Oral BID With Meals   castor oil-balsam peru  Topical BID   ceftriaxone 1 g Intravenous Q24H   cholecalciferol 800 Units Oral Daily   doxycycline 100 mg Intravenous Once   doxycycline 100 mg Intravenous Q12H   famotidine 20 mg Intravenous Daily   heparin (porcine) 5,000 Units Subcutaneous Q12H   hydrALAZINE 75 mg Oral TID   insulin aspart 0-9 Units Subcutaneous 4x Daily AC & at Bedtime   insulin detemir 30 Units Subcutaneous Daily   isosorbide mononitrate 180 mg Oral Daily   levothyroxine 25 mcg Oral Daily   potassium chloride 40 mEq Oral Once   [START ON 12/12/2017] predniSONE 10 mg Oral Daily With Breakfast   Risaquad-2 1 capsule Oral Daily   [START ON 12/12/2017] vitamin C  500 mg Oral Daily          Assessment/Plan     Principal Problem:    Pneumonia      1. SHEELA on CKD 3 : His baseline creatinine was estimated to be around 1.8 but it is better today at 1.1. Elevated creatinine for past several months could be from torsemide, metolazone  urine sodium < 10 and urine protein/creatinine is 54 mg/gm. UA is bland. Renal US is normal    2. HTN : BP elevated and hydralazine increased by Dr Gamez. Continue to monitor    3. Chronic edema : he may have underlying pulmonary HTN . 2 d echo was not of good quality. He has no proteinuria and creatinine is much better so not renal in origin. Will start bumex today    4. Pneumonia    5. Oxygen dependant COPD    6. Iron deficiency Anemia : check stool occult    If stable will sign off in AM  I discussed the patients findings and my recommendations with nursing staff, patient    Fahad Card MD  12/11/17  11:40 AM

## 2017-12-11 NOTE — PLAN OF CARE
Problem: Respiratory Insufficiency (Adult)  Goal: Identify Related Risk Factors and Signs and Symptoms  Outcome: Ongoing (interventions implemented as appropriate)  Goal: Acid/Base Balance  Outcome: Ongoing (interventions implemented as appropriate)    Problem: Skin Integrity Impairment, Risk/Actual (Adult)  Goal: Identify Related Risk Factors and Signs and Symptoms  Outcome: Ongoing (interventions implemented as appropriate)  Goal: Skin Integrity/Wound Healing  Outcome: Ongoing (interventions implemented as appropriate)    Problem: Fall Risk (Adult)  Goal: Identify Related Risk Factors and Signs and Symptoms  Outcome: Ongoing (interventions implemented as appropriate)  Goal: Absence of Falls  Outcome: Ongoing (interventions implemented as appropriate)    Problem: Patient Care Overview (Adult)  Goal: Plan of Care Review  Outcome: Ongoing (interventions implemented as appropriate)    12/09/17 0758 12/10/17 1400 12/11/17 0637   Coping/Psychosocial Response Interventions   Plan Of Care Reviewed With --  patient --    Patient Care Overview   Progress progress toward functional goals as expected --  --    Outcome Evaluation   Outcome Summary/Follow up Plan --  --  Patient respiratory status stable through shift. Afebrile. Good I&O.         Problem: Pressure Ulcer Risk (Karthikeyan Scale) (Adult,Obstetrics,Pediatric)  Goal: Skin Integrity  Outcome: Ongoing (interventions implemented as appropriate)    Problem: Diabetes, Type 2 (Adult)  Goal: Signs and Symptoms of Listed Potential Problems Will be Absent or Manageable (Diabetes, Type 2)  Outcome: Ongoing (interventions implemented as appropriate)    Problem: NPPV/CPAP (Adult)  Goal: Signs and Symptoms of Listed Potential Problems Will be Absent or Manageable (NPPV/CPAP)  Outcome: Ongoing (interventions implemented as appropriate)

## 2017-12-11 NOTE — PROGRESS NOTES
LOS: 5 days     Chief Complaint:  Pulmonology is following for respiratory failure    Subjective     Interval History: Mr. Anne is resting in a chair this morning. He states that he is feeling much better.  He can be transferred to the medical floor today.    History taken from: patient chart RN    Review of Systems:     Review of Systems - History obtained from chart review and the patient  General ROS: negative for - chills, fatigue or fever  Psychological ROS: negative for - anxiety or depression  ENT ROS: negative for - headaches, visual changes or vocal changes  Allergy and Immunology ROS: negative for - nasal congestion, postnasal drip or seasonal allergies  Endocrine ROS: negative for - polydipsia/polyuria  Respiratory ROS: no cough, shortness of breath, or wheezing  Cardiovascular ROS: no chest pain or dyspnea on exertion  Gastrointestinal ROS: no abdominal pain, change in bowel habits, or black or bloody stools  Musculoskeletal ROS: negative for - joint pain, joint stiffness or joint swelling  Neurological ROS: no TIA or stroke symptoms                      Objective     Vital Signs  Temp:  [98.2 °F (36.8 °C)-98.9 °F (37.2 °C)] 98.4 °F (36.9 °C)  Heart Rate:  [54-73] 63  Resp:  [14-21] 14  BP: (122-212)/() 173/66  Body mass index is 42.88 kg/(m^2).    Intake/Output Summary (Last 24 hours) at 12/11/17 1046  Last data filed at 12/11/17 0900   Gross per 24 hour   Intake              850 ml   Output             1250 ml   Net             -400 ml     I/O this shift:  In: -   Out: 150 [Urine:150]    Physical Exam:  GENERAL APPEARANCE: Well developed, well nourished, alert and cooperative, and appears to be in no acute distress.    HEAD: normocephalic.    EYES: PERRL    NECK: Neck supple.     CARDIAC: Normal S1 and S2. No S3, S4 or murmurs. Rhythm is regular. There is no peripheral edema, cyanosis or pallor. Extremities are warm and well perfused. Capillary refill is less than 2 seconds. No carotid  bruits.  No jvd pulses equal bilaterally   RESPIRATORY: diminished breath sounds.    GI: Positive bowel sounds. Soft, nondistended, nontender.     MUSCULOSKELTAL: No significant deformity or joint abnormality. No edema. Peripheral pulses intact.     NEUROLOGICAL: Strength and sensation symmetric and intact throughout.     PSYCHIATRIC: The mental examination revealed the patient was oriented to person, place, and time.                 Results Review:                I reviewed the patient's new clinical results.  I reviewed the patient's new imaging results and agree with the interpretation.    Results from last 7 days  Lab Units 12/10/17  0032 12/09/17 0029 12/08/17 0052   WBC 10*3/mm3 5.94 9.37 8.23   HEMOGLOBIN g/dL 8.7* 10.2* 7.6*   PLATELETS 10*3/mm3 249 258 227       Results from last 7 days  Lab Units 12/11/17  0716 12/10/17  0032 12/09/17 0029 12/08/17 0052 12/06/17  0853   SODIUM mmol/L 142 144 146 140  < >  --    POTASSIUM mmol/L 3.7 3.8 4.0 4.4  < >  --    CHLORIDE mmol/L 105 109 109 105  < >  --    CO2 mmol/L 34.4* 29.9 29.1 29.8  < >  --    BUN mg/dL 20 24* 33* 52*  < >  --    CREATININE mg/dL 1.17 1.37* 1.47* 2.59*  < >  --    CALCIUM mg/dL 9.1 8.7 8.8 8.1  < >  --    GLUCOSE mg/dL 115* 132* 140* 236*  < >  --    MAGNESIUM mg/dL  --   --   --  2.7*  --  2.3   < > = values in this interval not displayed.  Lab Results   Component Value Date    INR 1.18 (H) 12/06/2017    INR 1.12 (H) 12/02/2017    INR 1.01 01/27/2017    PROTIME 15.2 12/06/2017    PROTIME 14.5 12/02/2017    PROTIME 11.4 01/27/2017       Results from last 7 days  Lab Units 12/10/17  0032 12/09/17 0029 12/08/17 0052   ALK PHOS U/L 43 49 42   BILIRUBIN mg/dL 0.5 0.4 0.2   ALT (SGPT) U/L 16 16 17   AST (SGOT) U/L 16 17 18       Results from last 7 days  Lab Units 12/08/17  1014   PH, ARTERIAL pH units 7.397   PO2 ART mm Hg 71.8*   PCO2, ARTERIAL mm Hg 47.7*   HCO3 ART mmol/L 28.7*     Imaging Results (last 24 hours)     ** No results  found for the last 24 hours. **             Medication Review:   Scheduled Medications:    amLODIPine 10 mg Oral Daily   carvedilol 12.5 mg Oral BID With Meals   castor oil-balsam peru  Topical BID   ceftriaxone 1 g Intravenous Q24H   cholecalciferol 800 Units Oral Daily   doxycycline 100 mg Intravenous Once   doxycycline 100 mg Intravenous Q12H   famotidine 20 mg Intravenous Daily   furosemide 20 mg Intravenous Once   heparin (porcine) 5,000 Units Subcutaneous Q12H   hydrALAZINE 75 mg Oral TID   insulin aspart 0-9 Units Subcutaneous 4x Daily AC & at Bedtime   insulin detemir 30 Units Subcutaneous Daily   isosorbide mononitrate 180 mg Oral Daily   levothyroxine 25 mcg Oral Daily   potassium chloride 40 mEq Oral Once   [START ON 12/12/2017] predniSONE 10 mg Oral Daily With Breakfast   Risaquad-2 1 capsule Oral Daily   [START ON 12/12/2017] vitamin C 500 mg Oral Daily     Continuous infusions:       Assessment/Plan      Neuro: Patient is alert and awake. No acute mental status changes.  Will continue to monitor mental status.      Respiratory Failure-hypercarbic and hypoxic: likely related to underlying lung disease, COPD and pneumonia and volume overload. Patient was extubated over the weekend.  He is saturating 97% on 3 liters which he wears at home.  Decreased oxygen to 2 liters. Decreased prednisone to 10 mg PO.    Continue BIPAP at night and PRN for shortness of breath.    Will give lasix 20 mg IV and potassium 40 mEq PO for volume overload.    Decreased vitamin C to 500 mg.    Continue scheduled inhalants and PRN neb treatments.    Cardiac: Hypertensive.  Continue antihypertensives. NSR.  Continuous ECG, BP and pulse ox monitoring. Maintain MAP > 65.     Low diastolic BP: cortisol level was 25.10, no aortic regurgitation noted on echo. Diastolic BP 73, continue to monitor.      Acute on Chronic Renal Failure: Creatinine is back to normal at 1.17.  Urine output is good.  Will give diuretics and monitor renal  function.  Nephrology is following.    Endo: monitor glucose targeting 140-180.      GI:  Will have speech and swallow evaluation today.        Vitamin D Defiency:  continue replacement.      DVT prophylaxis: heparin BID and SCDs.      ID: Continue current IV antibiotics.    Will consult PT/OT.  Patient can be transferred to the medical floor.    Patient Active Problem List   Diagnosis Code   • COPD (chronic obstructive pulmonary disease) J44.9   • Shortness of breath R06.02   • Morbid obesity E66.01   • Hypoxia R09.02   • Edema extremities R60.0   • Sleep apnea G47.30   • Wheezing R06.2   • Allergic rhinitis J30.9   • Essential hypertension I10   • Sore throat J02.9   • Cough R05   • Pneumonia J18.9             YANG Doss  12/11/17  10:46 AM        Attestation: Scribed for Dr. Gomez, by YANG Martin      I, Jeremi Gomez M.D. attest that the above note accurately reflects the work and decisions made  by me.  Patient was seen and evaluated by Dr. Gomez, including history of present illness, physical exam, assessment, and treatment plan.  The above note was reviewed and edited by Dr. Gomez.

## 2017-12-11 NOTE — PROGRESS NOTES
Subjective     History:   Se Anne is a 67 y.o. male admitted on 12/6/2017 secondary to Pneumonia     Procedures:   12/6/17: Intubation in the ED  12/8/17: Extubation    Patient seen and examined with REGGIE Nathan. Awake and alert, currently sitting in bedside chair. States he feels overall improved from upon admission with improvement in his dyspnea. Reports cough with white sputum production. Denies CP. Tolerated BiPAP overnight. No acute events overnight per RN.     History taken from: patient, chart, and RN.      Objective     Vital Signs  Temp:  [98.2 °F (36.8 °C)-98.9 °F (37.2 °C)] 98.2 °F (36.8 °C)  Heart Rate:  [54-73] 70  Resp:  [14-21] 14  BP: (122-212)/() 212/73    Intake/Output Summary (Last 24 hours) at 12/11/17 0830  Last data filed at 12/11/17 0600   Gross per 24 hour   Intake              850 ml   Output             1100 ml   Net             -250 ml         Physical Exam:  General:    Awake, alert, in no acute distress   Heart:      Normal S1 and S2. Regular rate and rhythm. No significant murmur, rubs or gallops appreciated.   Lungs:     Respirations regular, even and unlabored. Decreased bibasilar breath sounds. No wheezes, rales or rhonchi.   Abdomen:   Soft and nontender. No guarding, rebound tenderness or  organomegaly noted. Bowel sounds present x 4.   Extremities:  Tr LE edema noted. Moves UE and LE equally B/L.     Results Review:      Results from last 7 days  Lab Units 12/10/17  0032 12/09/17  0029 12/08/17  0052 12/07/17  0459 12/06/17  0541 12/06/17  0121   WBC 10*3/mm3 5.94 9.37 8.23 8.52 10.32 14.01*   HEMOGLOBIN g/dL 8.7* 10.2* 7.6* 8.2* 8.2* 9.1*   PLATELETS 10*3/mm3 249 258 227 206 199 230       Results from last 7 days  Lab Units 12/11/17  0716 12/10/17  0032 12/09/17  0029 12/08/17  0052 12/07/17  0459 12/06/17  0541 12/06/17  0121   SODIUM mmol/L 142 144 146 140 138 136 139   POTASSIUM mmol/L 3.7 3.8 4.0 4.4 4.7 4.9 4.6   CHLORIDE mmol/L 105 109 109 105 100 97* 96*   CO2  mmol/L 34.4* 29.9 29.1 29.8 28.9 32.4* 35.4*   BUN mg/dL 20 24* 33* 52* 44* 30* 31*   CREATININE mg/dL 1.17 1.37* 1.47* 2.59* 3.14* 2.51* 2.42*   CALCIUM mg/dL 9.1 8.7 8.8 8.1 8.2 8.4 9.2   GLUCOSE mg/dL 115* 132* 140* 236* 249* 304* 261*       Results from last 7 days  Lab Units 12/10/17  0032 12/09/17  0029 12/08/17  0052 12/07/17  0459 12/06/17  0541 12/06/17  0121   BILIRUBIN mg/dL 0.5 0.4 0.2 0.2 0.5 0.7   ALK PHOS U/L 43 49 42 46 52 61   AST (SGOT) U/L 16 17 18 13 15 16   ALT (SGPT) U/L 16 16 17 16 14 17       Results from last 7 days  Lab Units 12/08/17  0052 12/06/17  0853   MAGNESIUM mg/dL 2.7* 2.3       Results from last 7 days  Lab Units 12/06/17  0121   INR  1.18*       Results from last 7 days  Lab Units 12/07/17  0459 12/06/17  0541 12/06/17  0121   CK TOTAL U/L  --  101  --    TROPONIN I ng/mL 0.439* 0.220* 0.078*       Imaging Results (last 24 hours)     Procedure Component Value Units Date/Time    XR Chest 1 View [[id number]] Collected:  12/10/17 0917     Updated:  12/10/17 0920    Narrative:       Technique: Frontal view the chest.     COMPARISON:  12/7/2017     INDICATION:     cough; J18.1-Lobar pneumonia, unspecified organism      FINDINGS:    Bibasilar atelectasis noted. Cardiomegaly is noted.  Prominent interstitial markings are noted. There are small bilateral  pleural effusions.        Impression:       As above.     This report was finalized on 12/10/2017 9:17 AM by Dr. Miko Coto MD.               Medications:    amLODIPine 10 mg Oral Daily   carvedilol 12.5 mg Oral BID With Meals   castor oil-balsam peru  Topical BID   ceftriaxone 1 g Intravenous Q24H   cholecalciferol 800 Units Oral Daily   doxycycline 100 mg Intravenous Once   doxycycline 100 mg Intravenous Q12H   famotidine 20 mg Intravenous Daily   heparin (porcine) 5,000 Units Subcutaneous Q12H   hydrALAZINE 50 mg Oral TID   insulin aspart 0-9 Units Subcutaneous 4x Daily AC & at Bedtime   insulin detemir 30 Units Subcutaneous Daily    isosorbide mononitrate 180 mg Oral Daily   levothyroxine 25 mcg Oral Daily   predniSONE 20 mg Oral Daily With Breakfast   Risaquad-2 1 capsule Oral Daily   vitamin C 1,000 mg Oral BID              Assessment/Plan   Acute on chronic hypoxic and acute hypercapnic respiratory failure: Likely 2/2 bibasilar pneumonia and COPD exacerbation. S/P extubation. Cont antibiotics, steroid taper, nebs and supplemental O2. Cont BiPAP PRN and at HS. Pulm following with input appreciated.     Severe sepsis: Likely 2/2 bibasilar pneumonia. Leukocytosis has resolved. CRP improving. Cultures remain negative. Clinically improving. Cont Doxycycline and Rocephin. Repeat labs in the AM.     Acute exacerbation of COPD: Cont steroid taper and treatment as outlined above.     SHEELA on CKD III: Cr improved. Nephrology has stopped IVF's and D/C'd [place]. Repeat labs in the AM. Nephrology input appreciated.     Essential HTN: BP has been uncontrolled and medication regimen has been adjusted. Will increase hydralazine as BP remains elevated. Cont to monitor.     Macrocytic anemia: B12 and folate are normal. Appears iron deficient. Cont to monitor. Repeat labs in the AM.     DM II, insulin dependent: Pt has been intermittently hyperglycemic. Will cont current insulin regimen today. May need to make further Rx adjustments.     Hypothyroidism: TSH, free T4 and free T3 are low. Cont current dose of Synthroid for now. Will need repeat thyroid studies once acute illness has resolved.     DVT PPX: SQ heparin    Discussed with Dr. Gomez.    Pt is at high risk 2/2 respiratory failure, pneumonia, COPD exacerbation, SHEELA on CKD III, uncontrolled HTN, DM and multiple comorbidities.       Kirill Gamez,   12/11/17  8:30 AM

## 2017-12-11 NOTE — PROGRESS NOTES
Discharge Planning Assessment   Roberto     Patient Name: Se Anne  MRN: [id number]  Today's Date: 12/11/2017    Admit Date: 12/6/2017          Discharge Needs Assessment     None            Discharge Plan       12/11/17 1510    Case Management/Social Work Plan    Plan Pt lives at home with his spouse and child and plans to return home at discharge. Pt does not have home health at this time. Pt may need home health at discharge. Pt has a powerchair and [facility] oxygen [facility].  SS will continue to follow and will assist as needed.     Patient/Family In Agreement With Plan yes        Discharge Placement     No information found        Expected Discharge Date and Time     Expected Discharge Date Expected Discharge Time    Dec 13, 2017               Demographic Summary     None            Functional Status     None            Psychosocial     None            Abuse/Neglect     None            Legal     None            Substance Abuse     None            Patient Forms     None          Daniella Wilburn

## 2017-12-11 NOTE — CONSULTS
"Diabetes Education  Assessment/Teaching    Patient Name:  Se Anne  YOB: 1950  MRN: [id number]  Admit Date:  12/6/2017      Assessment Date:  12/11/2017       Most Recent Value    General Information      Height  168.9 cm (66.5\")    Height Method  Stated    Weight  122 kg (269 lb 11.2 oz)    Weight Method  Bed scale    Pregnancy Assessment     Diabetes History     What type of diabetes do you have?  Type 2    Length of Diabetes Diagnosis  -- [\"long time\"]    Have you had diabetes education/teaching in the past?  yes    When and where was your diabetes education?  have a nurse and housekeeping that come to my house and [facility] time    What makes it difficult for you to take care of your diabetes or yourself?  recently on vent    Education Preferences     Nutrition Information     Assessment Topics     Healthy Eating - Assessment  Competent    Being Active - Assessment  Competent    Taking Medication - Assessment  Competent    Problem Solving - Assessment  Competent    Reducing Risk - Assessment  Competent    Healthy Coping - Assessment  Competent    Monitoring - Assessment  Competent    DM Goals                Most Recent Value    DM Education Needs     Meter  Has own    Frequency of Testing  Daily    Reducing Risks  -- [7.80]    Healthy Coping  Appropriate    Discharge Plan  Home    Motivation  Moderate    Teaching Method  Explanation, Discussion, Teach back    Patient Response  Verbalized understanding            Other Comments:          Electronically signed by:  Keyona Skaggs RN  12/11/17 4:25 PM  "

## 2017-12-12 NOTE — PROGRESS NOTES
Nephrology  Note      Subjective       He is sitting up in chair comfortably. No distress or SOB    Objective     Vital Signs  Temp:  [97.9 °F (36.6 °C)-98.9 °F (37.2 °C)] 97.9 °F (36.6 °C)  Heart Rate:  [] 63  Resp:  [16-26] 20  BP: (127-202)/(52-87) 142/70    I/O this shift:  In: 400 [P.O.:400]  Out: -   I/O last 3 completed shifts:  In: 1210 [P.O.:910; IV Piggyback:300]  Out: 2250 [Urine:2250]    Physical Examination:    General Appearance : awake, no distress  Head : normocephalic  Eyes :  + pallor   Throat : mm moist  Neck:  no JVD  Lungs : reduced breath sounds all lung fields  Heart : regular rhythm & normal rate, normal S1, S2, no murmur  Abdomen :  normal bowel sounds,  soft non-tender  Extremities : 1+ edema  Pulses :  palpable and equal bilaterally  Neurologic : following commands    Laboratory Data :      WBC WBC   Date Value Ref Range Status   12/12/2017 6.44 4.50 - 12.50 10*3/mm3 Final   12/10/2017 5.94 4.50 - 12.50 10*3/mm3 Final      HGB Hemoglobin   Date Value Ref Range Status   12/12/2017 9.6 (L) 14.0 - 18.0 g/dL Final   12/10/2017 8.7 (L) 14.0 - 18.0 g/dL Final      HCT Hematocrit   Date Value Ref Range Status   12/12/2017 31.2 (L) 42.0 - 52.0 % Final   12/10/2017 29.6 (L) 42.0 - 52.0 % Final      Platlets No results found for: LABPLAT   MCV MCV   Date Value Ref Range Status   12/12/2017 94.0 80.0 - 94.0 fL Final   12/10/2017 97.7 (H) 80.0 - 94.0 fL Final          Sodium Sodium   Date Value Ref Range Status   12/12/2017 142 135 - 153 mmol/L Final   12/11/2017 142 135 - 153 mmol/L Final   12/10/2017 144 135 - 153 mmol/L Final      Potassium Potassium   Date Value Ref Range Status   12/12/2017 3.9 3.5 - 5.3 mmol/L Final   12/11/2017 3.7 3.5 - 5.3 mmol/L Final   12/10/2017 3.8 3.5 - 5.3 mmol/L Final      Chloride Chloride   Date Value Ref Range Status   12/12/2017 104 99 - 112 mmol/L Final   12/11/2017 105 99 - 112 mmol/L Final   12/10/2017 109 99 - 112 mmol/L Final      CO2 CO2   Date Value  Ref Range Status   12/12/2017 28.3 24.3 - 31.9 mmol/L Final   12/11/2017 34.4 (H) 24.3 - 31.9 mmol/L Final   12/10/2017 29.9 24.3 - 31.9 mmol/L Final      BUN BUN   Date Value Ref Range Status   12/12/2017 21 7 - 21 mg/dL Final   12/11/2017 20 7 - 21 mg/dL Final   12/10/2017 24 (H) 7 - 21 mg/dL Final      Creatinine Creatinine   Date Value Ref Range Status   12/12/2017 1.26 0.43 - 1.29 mg/dL Final   12/11/2017 1.17 0.43 - 1.29 mg/dL Final   12/10/2017 1.37 (H) 0.43 - 1.29 mg/dL Final      Calcium Calcium   Date Value Ref Range Status   12/12/2017 9.4 7.7 - 10.0 mg/dL Final   12/11/2017 9.1 7.7 - 10.0 mg/dL Final   12/10/2017 8.7 7.7 - 10.0 mg/dL Final      PO4 No results found for: CAPO4   Albumin Albumin   Date Value Ref Range Status   12/12/2017 3.80 3.40 - 4.80 g/dL Final   12/10/2017 3.60 3.40 - 4.80 g/dL Final      Magnesium No results found for: MG   Uric Acid No results found for: URICACID     Radiology results :   Imaging Results (last 24 hours)     ** No results found for the last 24 hours. **        Imaging Results (last 24 hours)     ** No results found for the last 24 hours. **              Medications:        amLODIPine 10 mg Oral Daily   bumetanide 1 mg Oral Daily   carvedilol 12.5 mg Oral BID With Meals   castor oil-balsam peru  Topical BID   ceftriaxone 1 g Intravenous Q24H   cholecalciferol 800 Units Oral Daily   doxycycline 100 mg Intravenous Q12H   famotidine 20 mg Intravenous Daily   heparin (porcine) 5,000 Units Subcutaneous Q12H   hydrALAZINE 75 mg Oral TID   insulin aspart 0-9 Units Subcutaneous 4x Daily AC & at Bedtime   insulin detemir 30 Units Subcutaneous Daily   isosorbide mononitrate 180 mg Oral Daily   levothyroxine 25 mcg Oral Daily   predniSONE 10 mg Oral Daily With Breakfast   Risaquad-2 1 capsule Oral Daily   vitamin C 500 mg Oral Daily          Assessment/Plan     Principal Problem:    Pneumonia      1. SHEELA on CKD 3 : His baseline creatinine was estimated to be around 1.8 but it is  around 1.1-1.2 now  Elevated creatinine for past several months could be from torsemide, metolazone  urine sodium < 10 and urine protein/creatinine is 54 mg/gm. UA is bland. Renal US is normal    2. HTN : BP good today    3. Chronic edema : he may have underlying pulmonary HTN . 2 d echo was not of good quality. He has no proteinuria and creatinine is much better so not renal in origin. continue bumex 1 mg po QD    4. Pneumonia    5. Oxygen dependant COPD    6. Iron deficiency Anemia : stool occult pending and primary team to follow on it     will sign off . Fu 6 weeks  I discussed the patients findings and my recommendations with nursing staff, patient, Dr Vera Card MD  12/12/17  11:17 AM

## 2017-12-12 NOTE — PROGRESS NOTES
LOS: 6 days     Chief Complaint:  Pulmonology is following for respiratory failure     Subjective     Interval History:     Mr. Anne is doing well today, sitting up in the chair for breakfast, no acute distress noted on exam.     History taken from: patient chart RN    Review of Systems:   Review of Systems   Constitutional: Negative for chills, fatigue and fever.   HENT: Negative for congestion and rhinorrhea.    Eyes: Negative for photophobia and visual disturbance.   Respiratory: Negative for cough, shortness of breath and wheezing.    Cardiovascular: Negative for chest pain and leg swelling.   Gastrointestinal: Negative for abdominal distention and abdominal pain.   Endocrine: Negative for cold intolerance and heat intolerance.   Genitourinary: Negative for difficulty urinating.   Musculoskeletal: Negative for arthralgias and myalgias.   Skin: Negative for color change and pallor.   Allergic/Immunologic: Negative for environmental allergies.   Neurological: Negative for dizziness, weakness and light-headedness.   Psychiatric/Behavioral: Negative for agitation, behavioral problems and confusion.                     Objective     Vital Signs  Temp:  [97.9 °F (36.6 °C)-98.9 °F (37.2 °C)] 97.9 °F (36.6 °C)  Heart Rate:  [] 66  Resp:  [16-26] 18  BP: (127-202)/(52-87) 147/66  Body mass index is 42.87 kg/(m^2).    Intake/Output Summary (Last 24 hours) at 12/12/17 0952  Last data filed at 12/12/17 0929   Gross per 24 hour   Intake              860 ml   Output             1000 ml   Net             -140 ml     I/O this shift:  In: 400 [P.O.:400]  Out: -     Physical Exam:  GENERAL APPEARANCE: Well developed, well nourished, alert and cooperative, and appears to be in no acute distress.    HEAD: normocephalic.    EYES: PERRL    NECK: Neck supple.     CARDIAC: Normal S1 and S2. No S3, S4 or murmurs. Rhythm is regular. There is no peripheral edema, cyanosis or pallor. Extremities are warm and well perfused.  Capillary refill is less than 2 seconds. No carotid bruits.    Respiratory: Clear to auscultation and percussion without rales, rhonchi, wheezing or diminished breath sounds.    GI: Positive bowel sounds. Soft, nondistended, nontender.     Musculoskeletal: No significant deformity or joint abnormality. No edema. Peripheral pulses intact.     NEUROLOGICAL: Strength and sensation symmetric and intact throughout.     PSYCHIATRIC: The mental examination revealed the patient was oriented to person, place, and time.                 Results Review:                I reviewed the patient's new clinical results.  I reviewed the patient's new imaging results and agree with the interpretation.    Results from last 7 days  Lab Units 12/12/17  0101 12/10/17  0032 12/09/17  0029   WBC 10*3/mm3 6.44 5.94 9.37   HEMOGLOBIN g/dL 9.6* 8.7* 10.2*   PLATELETS 10*3/mm3 263 249 258       Results from last 7 days  Lab Units 12/12/17  0102 12/11/17  0716 12/10/17  0032  12/08/17  0052  12/06/17  0853   SODIUM mmol/L 142 142 144  < > 140  < >  --    POTASSIUM mmol/L 3.9 3.7 3.8  < > 4.4  < >  --    CHLORIDE mmol/L 104 105 109  < > 105  < >  --    CO2 mmol/L 28.3 34.4* 29.9  < > 29.8  < >  --    BUN mg/dL 21 20 24*  < > 52*  < >  --    CREATININE mg/dL 1.26 1.17 1.37*  < > 2.59*  < >  --    CALCIUM mg/dL 9.4 9.1 8.7  < > 8.1  < >  --    GLUCOSE mg/dL 124* 115* 132*  < > 236*  < >  --    MAGNESIUM mg/dL  --   --   --   --  2.7*  --  2.3   < > = values in this interval not displayed.  Lab Results   Component Value Date    INR 1.18 (H) 12/06/2017    INR 1.12 (H) 12/02/2017    INR 1.01 01/27/2017    PROTIME 15.2 12/06/2017    PROTIME 14.5 12/02/2017    PROTIME 11.4 01/27/2017       Results from last 7 days  Lab Units 12/12/17  0102 12/10/17  0032 12/09/17  0029   ALK PHOS U/L 40 43 49   BILIRUBIN mg/dL 0.4 0.5 0.4   ALT (SGPT) U/L 17 16 16   AST (SGOT) U/L 16 16 17       Results from last 7 days  Lab Units 12/08/17  1014   PH, ARTERIAL pH units  7.397   PO2 ART mm Hg 71.8*   PCO2, ARTERIAL mm Hg 47.7*   HCO3 ART mmol/L 28.7*     Imaging Results (last 24 hours)     ** No results found for the last 24 hours. **             Medication Review:   Scheduled Medications:    amLODIPine 10 mg Oral Daily   bumetanide 1 mg Oral Daily   carvedilol 12.5 mg Oral BID With Meals   castor oil-balsam peru  Topical BID   ceftriaxone 1 g Intravenous Q24H   cholecalciferol 800 Units Oral Daily   doxycycline 100 mg Intravenous Q12H   famotidine 20 mg Intravenous Daily   heparin (porcine) 5,000 Units Subcutaneous Q12H   hydrALAZINE 75 mg Oral TID   insulin aspart 0-9 Units Subcutaneous 4x Daily AC & at Bedtime   insulin detemir 30 Units Subcutaneous Daily   isosorbide mononitrate 180 mg Oral Daily   levothyroxine 25 mcg Oral Daily   predniSONE 10 mg Oral Daily With Breakfast   Risaquad-2 1 capsule Oral Daily   vitamin C 500 mg Oral Daily     Continuous infusions:       Assessment/Plan      Respiratory Failure-hypercarbic and hypoxic: likely related to underlying lung disease, COPD and pneumonia and volume overload. Continue scheduled inhalants and nebulizer's. Continue supplemental oxygen to maintain Sp02 >92-94%. Incentive spirometer 10 times a hour while awake. Continue BiPap at HS, he did use for a portion of the night last night.     Acute on Chronic Renal Failure: Creatinine is back to normal at 1.26. nephrology on board and managing.     PT is following.     ID: WBC is 6.44, neutrophils are 67.7, no fevers, continue to monitor lab trends and clinical changes.       Patient Active Problem List   Diagnosis Code   • COPD (chronic obstructive pulmonary disease) J44.9   • Shortness of breath R06.02   • Morbid obesity E66.01   • Hypoxia R09.02   • Edema extremities R60.0   • Sleep apnea G47.30   • Wheezing R06.2   • Allergic rhinitis J30.9   • Essential hypertension I10   • Sore throat J02.9   • Cough R05   • Pneumonia J18.9     Case discussed with patient and nurse, all  questions answered to their satisfaction.     YANG Mathews  12/12/17  9:52 AM      Scribed for Dr. Gomez by YANG Cassidy.       I, Jeremi Gomez M.D. attest that the above note accurately reflects the work and decisions made  by me.  Patient was seen and evaluated by Dr. Gomez, including history of present illness, physical exam, assessment, and treatment plan.  The above note was reviewed and edited by Dr. Gomez.

## 2017-12-12 NOTE — PROGRESS NOTES
Se Anne is a 67 y.o. male who qualifies for IV to PO therapy conversion.    Pepcid has been changed from IV to PO per System Policy.       Mariela Oliver [facility]

## 2017-12-12 NOTE — PLAN OF CARE
Problem: NPPV/CPAP (Adult)  Goal: Signs and Symptoms of Listed Potential Problems Will be Absent or Manageable (NPPV/CPAP)  Outcome: Ongoing (interventions implemented as appropriate)    12/11/17 2359   NPPV/CPAP   Problems Assessed (NPPV/CPAP) all

## 2017-12-12 NOTE — DISCHARGE SUMMARY
Date of Admission: 12/6/2017    Date of Discharge:  12/12/2017    PCP: No Known Provider    Admission Diagnosis:   Please see admission H&P    Discharge Diagnosis:   Acute on chronic hypoxic and acute hypercapnic respiratory failure  Severe sepsis  Bibasilar pneumonia  Acute exacerbation of COPD  SHEELA on CKD III  Indeterminate range troponin elevation   Essential HTN  Macrocytic anemia  DM II, insulin dependent  Hypothyroidism    Procedures Performed:  12/6/17: Intubation in the ED  12/8/17: Extubation     Consults:   Consults     Date and Time Order Name Status Description    12/7/2017 0736 Inpatient Consult to Nephrology Completed     12/6/2017 0405 Inpatient Consult to Pulmonology Completed     12/6/2017 0240 Hospitalist (on-call MD unless specified)              History of Present Illness:  Se Anne is a 67 y.o. male who presented to [facility] ED with CC of chest pain and shortness of breath. Please see admission H&P for complete details.     In the ED, he was in respiratory distress and hypoxic. He was placed on BiPAP but his respiratory status did not improve with worsening hypercapnia on repeat ABG. He was then intubated and placed on mechanical ventilation. CXR revealed pneumonia and he was given IV antibiotics once cultures were obtained. He was given systemic steroids and nebs as well.        Hospital Course  Se Anne was admitted to the CCU for further evaluation and treatment. He was continued on IV antibiotics, systemic steroids, nebs and vent support. Pulm was consulted for further input.    Serial CE's were followed and remained in the indeterminate range but was thought to be related to his hypoxia and SHEELA. Nephrology was consulted as his renal function worsened. Any potential nephrotoxic agents were held and IVF's were initiated. A villalobos catheter had been inserted once he was intubated.     His respiratory status improved with improvement in his hypoxia and hypercapnia. His sepsis began resolving  as well and he was extubated on 12/8/17. BiPAP was ordered for PRN and at HS. He tolerated this well and remained stable off the vent. His steroids were tapered and antibiotics deescalated as he clinically improved. His SHEELA resolved and nephrology D/C'd his IVF's while also D/C'ing his villalobos catheter. They later added PO daily Bumex and he tolerated this well.     He was transferred out of the CCU to the telemetry floor. PT was consulted and he was able to ambulate with them with minimal difficulty. He did experience some dyspnea on exertion but stated this was chronic for him. Mr. Anne was seen and examined on 12/12 with REGGIE Kaye. His BP had improved with medication adjustments and routine AM labs were stable. He felt much improved and near his baseline. He was deemed medically stable for discharge after discussion with nephrology and pulmonology. He was prescribed a course of antibiotics and steroid taper. [facility] was [facility] in addition to pulmonology and nephrology in 6 weeks.     Condition on Discharge:  Stable    Vital Signs  Vitals:    12/12/17 1430   BP: 158/65   Pulse: 65   Resp: 20   Temp: 98.2 °F (36.8 °C)   SpO2: 97%       Physical Exam:  General:    Awake, alert, in no acute distress   Heart:      Normal S1 and S2. Regular rate and rhythm. No significant murmur, rubs or gallops appreciated.   Lungs:     Respirations regular, even and unlabored. Lungs clear to auscultation B/L. No wheezes, rales or rhonchi.   Abdomen:   Soft and nontender. No guarding, rebound tenderness or  organomegaly noted. Bowel sounds present x 4.   Extremities:  Tr edema noted. Moves UE and LE equally B/L.     Discharge Disposition:   home      Discharge Medications:   Se Anne   Home Medication Instructions SANDHYA:[id number]    Printed on:12/12/17 8965   Medication Information                      albuterol (PROVENTIL) (2.5 MG/3ML) 0.083% nebulizer solution  Take 2.5 mg by  nebulization Every 4 (Four) Hours As Needed for Wheezing or Shortness of Air.             amLODIPine (NORVASC) 10 MG tablet  Take 10 mg by mouth Daily.             aspirin 81 MG EC tablet  Take 81 mg by mouth Daily.             atorvastatin (LIPITOR) 80 MG tablet  Take 80 mg by mouth Daily.             budesonide-formoterol (SYMBICORT) 160-4.5 MCG/ACT inhaler  Inhale 2 puffs 2 (Two) Times a Day.             bumetanide (BUMEX) 1 MG tablet  Take 1 tablet by mouth Daily.             carvedilol (COREG) 25 MG tablet  Take 12.5 mg by mouth 2 (Two) Times a Day With Meals.             cefdinir (OMNICEF) 300 MG capsule  Take 1 capsule by mouth 2 (Two) Times a Day for 5 days.             Cholecalciferol (VITAMIN D3) 2000 UNITS tablet  Take 1 tablet by mouth Daily.             doxycycline (VIBRAMYICN) 100 MG tablet  Take 1 tablet by mouth 2 (Two) Times a Day for 5 days.             gabapentin (NEURONTIN) 100 MG capsule  Take 700 mg by mouth Daily.             hydrALAZINE (APRESOLINE) 25 MG tablet  Take 3 tablets by mouth 3 (Three) Times a Day.             insulin NPH-insulin regular (humuLIN 70/30,novoLIN 70/30) (70-30) 100 UNIT/ML injection  Inject 52 Units under the skin Every Morning.             insulin NPH-insulin regular (humuLIN 70/30,novoLIN 70/30) (70-30) 100 UNIT/ML injection  Inject 54 Units under the skin Every Night.             isosorbide mononitrate (IMDUR) 60 MG 24 hr tablet  Take 180 mg by mouth Daily.             levothyroxine (SYNTHROID, LEVOTHROID) 25 MCG tablet  Take 25 mcg by mouth Daily.             loratadine (CLARITIN) 10 MG tablet  Take 10 mg by mouth Daily.             metFORMIN (GLUCOPHAGE) 500 MG tablet  Take 500 mg by mouth 2 (Two) Times a Day With Meals.             nitroglycerin (NITROSTAT) 0.4 MG SL tablet  Place 0.4 mg under the tongue Every 5 (Five) Minutes As Needed for chest pain. Take no more than 3 doses in 15 minutes.             pantoprazole (PROTONIX) 40 MG EC tablet  Take 40 mg by  mouth Daily.             predniSONE (DELTASONE) 10 MG tablet  10mg x 2 days, 5mg x 2 days, then stop.             sertraline (ZOLOFT) 100 MG tablet  Take 50 mg by mouth Every Evening.             tiotropium (SPIRIVA) 18 MCG per inhalation capsule  Place 1 capsule into inhaler and inhale Daily.             traMADol (ULTRAM) 50 MG tablet  Take 50 mg by mouth Every 12 (Twelve) Hours As Needed for Moderate Pain .             vitamin B-12 (CYANOCOBALAMIN) 1000 MCG tablet  Take 2,000 mcg by mouth Daily.                   Discharge Diet:        Dietary Orders            Start     Ordered    12/08/17 1423  Diet Soft Texture; Chopped; Thin; GI Soft/[place], Consistent Carbohydrate  Diet Effective Now     Question Answer Comment   Diet Texture / Consistency Soft Texture    Select Texture: Chopped    Fluid Consistency Thin    Common Modifiers GI Soft/[place]    Common Modifiers Consistent Carbohydrate        12/08/17 1423          Activity at Discharge:  activity as tolerated    Follow-up Appointments:  Additional Instructions for the Follow-ups that You Need to Schedule     Discharge Follow-up with PCP    As directed    Follow [facility] Details:  [facility]           Discharge Follow-up with Specified Provider: Follow up with Dr. Card in 6 weeks.    As directed    To:  Follow up with Dr. Card in 6 weeks.           Discharge Follow-up with Specified Provider: Follow up with Dr. Gomez in 6 weeks.    As directed    To:  Follow up with Dr. Gomez in 6 weeks.                 Follow-up Information     Follow up with No Known Provider .    Why:  [facility].    Contact information:    [facility] [id number]          Follow up with No Known Provider .    Why:  [facility].    Contact information:    [place] [id number] [phone]          Your Scheduled Appointments     Dec 13, 2017  2:00 PM EST   Follow Up with Jeremi Gomez MD    [facility] PULMONOLOG[facility] (--)    [address]  [place] [id number] [phone]           Arrive 15 minutes prior to appointment.                    Test Results Pending at Discharge:  None     Kirill Gamez DO  12/12/17  4:43 PM      Time: Greater than 30 minutes spent on this discharge.

## 2017-12-12 NOTE — PLAN OF CARE
Problem: Respiratory Insufficiency (Adult)  Goal: Acid/Base Balance  Outcome: Ongoing (interventions implemented as appropriate)  Goal: Effective Ventilation  Outcome: Ongoing (interventions implemented as appropriate)    Problem: Skin Integrity Impairment, Risk/Actual (Adult)  Goal: Skin Integrity/Wound Healing  Outcome: Ongoing (interventions implemented as appropriate)    Problem: Fall Risk (Adult)  Goal: Absence of Falls  Outcome: Ongoing (interventions implemented as appropriate)    Problem: Patient Care Overview (Adult)  Goal: Plan of Care Review  Outcome: Ongoing (interventions implemented as appropriate)    Problem: Pressure Ulcer Risk (Karthikeyan Scale) (Adult,Obstetrics,Pediatric)  Goal: Skin Integrity  Outcome: Ongoing (interventions implemented as appropriate)    Problem: Diabetes, Type 2 (Adult)  Goal: Signs and Symptoms of Listed Potential Problems Will be Absent or Manageable (Diabetes, Type 2)  Outcome: Ongoing (interventions implemented as appropriate)

## 2017-12-12 NOTE — PLAN OF CARE
Problem: Inpatient Physical Therapy  Goal: Transfer Training Goal 1 LTG- PT    12/12/17 1550   Transfer Training PT LTG   Transfer Training PT LTG, Date Established 12/12/17   Transfer Training PT LTG, Time to Achieve by discharge   Transfer Training PT LTG, Activity Type bed to chair /chair to bed;sit to stand/stand to sit   Transfer Training PT LTG, [place] Level conditional independence;supervision required   Transfer Training PT LTG, Assist Device other (see comments)  (with appropriate AD)       Goal: Gait Training Goal LTG- PT    12/12/17 1550   Gait Training PT LTG   Gait Training Goal PT LTG, Date Established 12/12/17   Gait Training Goal PT LTG, Time to Achieve by discharge   Gait Training Goal PT LTG, [place] Level conditional independence;supervision required   Gait Training Goal PT LTG, Assist Device other (see comments)  (with appropriate AD)   Gait Training Goal PT LTG, Distance to Achieve 150

## 2017-12-12 NOTE — PLAN OF CARE
Problem: Respiratory Insufficiency (Adult)  Goal: Identify Related Risk Factors and Signs and Symptoms  Outcome: Outcome(s) achieved Date Met:  12/12/17  Goal: Acid/Base Balance  Outcome: Ongoing (interventions implemented as appropriate)  Goal: Effective Ventilation  Outcome: Ongoing (interventions implemented as appropriate)    Problem: Skin Integrity Impairment, Risk/Actual (Adult)  Goal: Identify Related Risk Factors and Signs and Symptoms  Outcome: Outcome(s) achieved Date Met:  12/12/17  Goal: Skin Integrity/Wound Healing  Outcome: Ongoing (interventions implemented as appropriate)    Problem: Fall Risk (Adult)  Goal: Identify Related Risk Factors and Signs and Symptoms  Outcome: Outcome(s) achieved Date Met:  12/12/17  Goal: Absence of Falls  Outcome: Ongoing (interventions implemented as appropriate)    Problem: Patient Care Overview (Adult)  Goal: Plan of Care Review  Outcome: Ongoing (interventions implemented as appropriate)    Problem: Pressure Ulcer Risk (Karthikeyan Scale) (Adult,Obstetrics,Pediatric)  Goal: Skin Integrity  Outcome: Ongoing (interventions implemented as appropriate)    Problem: Diabetes, Type 2 (Adult)  Goal: Signs and Symptoms of Listed Potential Problems Will be Absent or Manageable (Diabetes, Type 2)  Outcome: Ongoing (interventions implemented as appropriate)

## 2017-12-12 NOTE — THERAPY EVALUATION
Acute Care - Physical Therapy Initial Evaluation/Treatment Note  SHAHZAD Mejia     Patient Name: Se Anne  : 1950  MRN: [id number]  Today's Date: 2017   Onset of Illness/Injury or Date of Surgery Date: 17  Date of Referral to PT: 17  Referring Physician: Vera      Admit Date: 2017     Visit Dx:    ICD-10-CM ICD-9-CM   1. Pneumonia of right middle lobe due to infectious organism J18.1 486     Patient Active Problem List   Diagnosis   • COPD (chronic obstructive pulmonary disease)   • Shortness of breath   • Morbid obesity   • Hypoxia   • Edema extremities   • Sleep apnea   • Wheezing   • Allergic rhinitis   • Essential hypertension   • Sore throat   • Cough   • Pneumonia     Past Medical History:   Diagnosis Date   • Agent orange exposure    • Arthritis    • CHF (congestive heart failure)    • COPD (chronic obstructive pulmonary disease)    • Coronary artery disease    • Diabetes mellitus    • Hyperlipidemia    • Hypertension      Past Surgical History:   Procedure Laterality Date   • CARDIAC SURGERY      stent placement   • CATARACT EXTRACTION     • JOINT REPLACEMENT      right knee   • KNEE SURGERY            PT ASSESSMENT (last 72 hours)      PT Evaluation       17 1539 17 0929    Rehab Evaluation    Document Type evaluation;therapy note (daily note)  -CT     Subjective Information agree to therapy;complains of;dyspnea  -CT     Patient Effort, Rehab Treatment good  -CT     Symptoms Noted During/After Treatment shortness of breath  -CT     Symptoms Noted Comment Pt tolerated todays evaluation and treatment session fair with rest breaks provided as needed. Pt able to ambulate today with little assist.   -CT     General Information    Patient Profile Review yes  -CT     Onset of Illness/Injury or Date of Surgery Date 17  -CT     Referring Physician Vera  -CT     Precautions/Limitations fall precautions;oxygen therapy device and L/min  -CT     Prior Level of  Function independent:;all household mobility;community mobility  -CT     Equipment Currently Used at Home oxygen;power chair, (recliner lift);wheelchair;nebulizer;bipap/ cpap;respiratory supplies  -CT     Plans/Goals Discussed With patient;agreed upon  -CT     Risks Reviewed patient:;LOB;nausea/vomiting;dizziness;increased discomfort;change in vital signs;increased drainage;lines disloged  -CT     Benefits Reviewed patient:;improve function;increase independence;increase strength;increase balance;decrease pain;decrease risk of DVT;improve skin integrity;increase knowledge  -CT     Barriers to Rehab medically complex  -CT     Living Environment    Lives With spouse;child(esther), adult   dependent spouse  -CT     Living Arrangements mobile home  -CT     Home Accessibility ramps present at home  -CT     Clinical Impression    Date of Referral to PT 12/11/17  -CT     Functional Level At Time Of Evaluation CGA   -CT     Patient/Family Goals Statement Pt goals are to return to PLOF  -CT     Criteria for Skilled Therapeutic Interventions Met yes;treatment indicated  -CT     Pathology/Pathophysiology Noted (Describe Specifically for Each System) musculoskeletal;neuromuscular  -CT     Impairments Found (describe specific impairments) aerobic capacity/endurance;gait, locomotion, and balance  -CT     Functional Limitations in Following Categories (Describe Specific Limitations) self-care;home management  -CT     Rehab Potential good, to achieve stated therapy goals  -CT     Predicted Duration of Therapy Intervention (days/wks) length of stay  -CT     Vital Signs    Pre SpO2 (%) 94  -CT     O2 Delivery Pre Treatment supplemental O2  -CT     Intra SpO2 (%) 86  -CT     O2 Delivery Intra Treatment supplemental O2  -CT     Post SpO2 (%) 95  -CT     O2 Delivery Post Treatment supplemental O2  -CT     Cognitive Assessment/Intervention    Current Cognitive/Communication Assessment functional  -CT     Orientation Status oriented x 4  -CT      Follows Commands/Answers Questions able to follow multi-step instructions;100% of the time  -CT     Personal Safety WNL/WFL  -CT     Personal Safety Interventions fall prevention program maintained;gait belt;muscle strengthening facilitated;nonskid shoes/slippers when out of bed  -CT     ROM (Range of Motion)    General ROM Detail BLE grossly WFL  -CT     MMT (Manual Muscle Testing)    General MMT Assessment Detail BLE grossly 4/5  -CT     Muscle Tone Assessment    Muscle Tone Assessment  Bilateral Upper Extremities;Bilateral Lower Extremities  -EB    Bilateral Upper Extremities Muscle Tone Assessment  mildly decreased tone  -EB    Bilateral Lower Extremities Muscle Tone Assessment  mildly decreased tone  -EB    Bed Mobility, Assessment/Treatment    Bed Mobility, Comment deferred pt up in chair  -CT     Transfer Assessment/Treatment    Transfers, Sit-Stand [place] contact guard assist  -CT     Transfers, Stand-Sit [place] contact guard assist  -CT     Transfers, Sit-Stand-Sit, Assist Device rolling walker  -CT     Gait Assessment/Treatment    Gait, [place] Level contact guard assist  -CT     Gait, Assistive Device rolling walker  -CT     Gait, Distance (Feet) 100  -CT     Gait, Gait Pattern Analysis swing-through gait  -CT     Gait, Gait Deviations anupama decreased  -CT     Therapy Exercises    Bilateral Lower Extremities AROM:;15 reps;sitting  -CT     Positioning and Restraints    Pre-Treatment Position sitting in chair/recliner  -CT     Post Treatment Position chair  -CT     In Chair sitting;call light within reach;encouraged to call for assist  -CT       12/11/17 1610 12/11/17 1400    Muscle Tone Assessment    Muscle Tone Assessment Bilateral Upper Extremities;Bilateral Lower Extremities  -BT Bilateral Upper Extremities;Bilateral Lower Extremities  -DJ    Bilateral Upper Extremities Muscle Tone Assessment mildly decreased tone  -BT mildly decreased tone  -DJ    Bilateral Lower Extremities  Muscle Tone Assessment mildly decreased tone  -BT mildly decreased tone  -DJ      12/11/17 0800       Muscle Tone Assessment    Muscle Tone Assessment Bilateral Upper Extremities;Bilateral Lower Extremities  -DJ     Bilateral Upper Extremities Muscle Tone Assessment mildly decreased tone  -DJ     Bilateral Lower Extremities Muscle Tone Assessment mildly decreased tone  -DJ       User Key  (r) = Recorded By, (t) = Taken By, (c) = Cosigned By    Initials Name Provider Type    DJ Venita Saez, RN Registered Nurse    EB Leslie Sinclair, RN Registered Nurse    CT Listete Dowell, PT Physical Therapist    PITO Navarro RN Registered Nurse          Physical Therapy Education     Title: PT OT SLP Therapies (Done)     Topic: Physical Therapy (Done)     Point: Mobility training (Done)    Learning Progress Summary    Learner Readiness Method Response Comment Documented by Status   Patient Acceptance E [facility] 12/12/17 1552 Done               Point: Home exercise program (Done)    Learning Progress Summary    Learner Readiness Method Response Comment Documented by Status   Patient Acceptance E [facility] 12/12/17 1552 Done               Point: Body mechanics (Done)    Learning Progress Summary    Learner Readiness Method Response Comment Documented by Status   Patient Acceptance E [facility] 12/12/17 1552 Done               Point: Precautions (Done)    Learning Progress Summary    Learner Readiness Method Response Comment Documented by Status   Patient Acceptance E [facility] 12/12/17 1552 Done                      User Key     Initials Effective Dates Name Provider Type Discipline    CT 03/14/16 -  Lisette Dowell PT Physical Therapist PT                PT Recommendation and Plan  Anticipated Equipment Needs At Discharge:  (tbd)  Anticipated Discharge Disposition: home with assist  Planned Therapy Interventions: balance training, bed mobility training, gait training, home exercise program, manual therapy techniques,  motor coordination training, neuromuscular re-education, patient/family education, stair training, strengthening, transfer training  PT Frequency: 3-5 times/wk, per priority policy             IP PT Goals       12/12/17 1550          Transfer Training PT LTG    Transfer Training PT LTG, Date Established 12/12/17  -CT      Transfer Training PT LTG, Time to Achieve by discharge  -CT      Transfer Training PT LTG, Activity Type bed to chair /chair to bed;sit to stand/stand to sit  -CT      Transfer Training PT LTG, [place] Level conditional independence;supervision required  -CT      Transfer Training PT LTG, Assist Device other (see comments)   with appropriate AD  -CT      Gait Training PT LTG    Gait Training Goal PT LTG, Date Established 12/12/17  -CT      Gait Training Goal PT LTG, Time to Achieve by discharge  -CT      Gait Training Goal PT LTG, [place] Level conditional independence;supervision required  -CT      Gait Training Goal PT LTG, Assist Device other (see comments)   with appropriate AD  -CT      Gait Training Goal PT LTG, Distance to Achieve 150  -CT        User Key  (r) = Recorded By, (t) = Taken By, (c) = Cosigned By    Initials Name Provider Type    CT Lisette Dowell PT Physical Therapist                Outcome Measures       12/12/17 1500          How much help from another person do you currently need...    Turning from your back to your side while in flat bed without using bedrails? 4  -CT      Moving from lying on back to sitting on the side of a flat bed without bedrails? 3  -CT      Moving to and from a bed to a chair (including a wheelchair)? 3  -CT      Standing up from a chair using your arms (e.g., wheelchair, bedside chair)? 3  -CT      Climbing 3-5 steps with a railing? 3  -CT      To walk in hospital room? 3  -CT      AM-PAC 6 Clicks Score 19  -CT      Functional Assessment    Outcome Measure Options AM-PAC 6 Clicks Basic Mobility (PT)  -CT        User Key  (r) = Recorded By,  (t) = Taken By, (c) = Cosigned By    Initials Name Provider Type    CT Lisette Dowell, PT Physical Therapist           Time Calculation:         PT Charges       12/12/17 1553          Time Calculation    PT Received On 12/12/17  -CT      PT - Next Appointment 12/13/17  -CT      PT Goal Re-Cert Due Date 12/26/17  -CT      Time Calculation- PT    Total Timed Code Minutes- PT 45 minute(s)  -CT        User Key  (r) = Recorded By, (t) = Taken By, (c) = Cosigned By    Initials Name Provider Type    CT Lisette Dowell, PT Physical Therapist          Therapy Charges for Today     Code Description Service Date Service Provider Modifiers Qty    70945790158 HC PT MOBILITY CURRENT 12/12/2017 Lisette Dowell, PT GP, CJ 1    28227004080 HC PT MOBILITY PROJECTED 12/12/2017 Lisette Dowell, PT GP, CK 1    23814057955 HC PT EVAL HIGH COMPLEXITY 2 12/12/2017 Lisette Dowell, PT GP 1    82799671288 HC GAIT TRAINING EA 15 MIN 12/12/2017 Lisette Dowell, PT GP 1    13360155870 HC PT THER SUPP EA 15 MIN 12/12/2017 Lisette Dowell, PT GP 3          PT G-Codes  Outcome Measure Options: AM-PAC 6 Clicks Basic Mobility (PT)  Score: 19  Functional Limitation: Mobility: Walking and moving around  Mobility: Walking and Moving Around Current Status (): At least 20 percent but less than 40 percent impaired, limited or restricted  Mobility: Walking and Moving Around Goal Status (): At least 40 percent but less than 60 percent impaired, limited or restricted      Lisette Dowell PT  12/12/2017

## 2017-12-13 NOTE — PROGRESS NOTES
Subjective    Se Anne presents for the following COPD    History of Present Illness     Interval history since last visit:    Mr. Anne is a 67 year old male who is a known patient to the clinic who is here today to follow up on his recent hospitalization and COPD. He was admitted for respiratory failure due to fluid overload and COPD. He was on the ventilator for several days and had done well coming off and staying off. He is accompianed by dave today. He is in no distress on exam.     Recent hospitalizations: Yes    Investigations (imaging, PFT's, labs, sleep study, record requests, etc.) None    Have you had the Influenza Vaccine? yes    Would you like to receive this Vaccine today? no    Have you had the Pneumonia Vaccine?  yes   Would you like to receive this Vaccine today? no      Review of Systems   Constitutional: Negative for activity change, fatigue and unexpected weight change.   HENT: Negative for congestion, postnasal drip and rhinorrhea.    Respiratory: Positive for shortness of breath. Negative for apnea, cough, chest tightness and wheezing.    Cardiovascular: Negative for chest pain and palpitations.   Gastrointestinal: Negative for nausea.   Allergic/Immunologic: Negative for environmental allergies.   Psychiatric/Behavioral: Negative for agitation and confusion.       Active Problems:  Problem List Items Addressed This Visit     COPD (chronic obstructive pulmonary disease) - Primary    Shortness of breath    Morbid obesity    Hypoxia    Edema extremities    Sleep apnea          Past Medical History:  Past Medical History:   Diagnosis Date   • Agent orange exposure    • Arthritis    • CHF (congestive heart failure)    • COPD (chronic obstructive pulmonary disease)    • Coronary artery disease    • Diabetes mellitus    • Hyperlipidemia    • Hypertension        Family History:  Family History   Problem Relation Age of Onset   • Heart disease Mother    • Cancer Father    • Heart attack Sister     • Heart attack Brother    • Cancer Paternal Grandfather        Social History:  Social History   Substance Use Topics   • Smoking status: Former Smoker     Years: 40.00     Quit date: 3/21/2010   • Smokeless tobacco: Never Used   • Alcohol use No       Current Medications:  Current Outpatient Prescriptions   Medication Sig Dispense Refill   • albuterol (PROVENTIL) (2.5 MG/3ML) 0.083% nebulizer solution Take 2.5 mg by nebulization Every 4 (Four) Hours As Needed for Wheezing or Shortness of Air.     • amLODIPine (NORVASC) 10 MG tablet Take 10 mg by mouth Daily.     • aspirin 81 MG EC tablet Take 81 mg by mouth Daily.     • atorvastatin (LIPITOR) 80 MG tablet Take 80 mg by mouth Daily.     • budesonide-formoterol (SYMBICORT) 160-4.5 MCG/ACT inhaler Inhale 2 puffs 2 (Two) Times a Day.     • bumetanide (BUMEX) 1 MG tablet Take 1 tablet by mouth Daily. 30 tablet 0   • carvedilol (COREG) 25 MG tablet Take 12.5 mg by mouth 2 (Two) Times a Day With Meals.     • cefdinir (OMNICEF) 300 MG capsule Take 1 capsule by mouth 2 (Two) Times a Day for 5 days. 10 capsule 0   • Cholecalciferol (VITAMIN D3) 2000 UNITS tablet Take 1 tablet by mouth Daily.     • doxycycline (VIBRAMYICN) 100 MG tablet Take 1 tablet by mouth 2 (Two) Times a Day for 5 days. 10 tablet 0   • gabapentin (NEURONTIN) 100 MG capsule Take 700 mg by mouth Daily.     • hydrALAZINE (APRESOLINE) 25 MG tablet Take 3 tablets by mouth 3 (Three) Times a Day. 180 tablet 0   • insulin NPH-insulin regular (humuLIN 70/30,novoLIN 70/30) (70-30) 100 UNIT/ML injection Inject 52 Units under the skin Every Morning.     • insulin NPH-insulin regular (humuLIN 70/30,novoLIN 70/30) (70-30) 100 UNIT/ML injection Inject 54 Units under the skin Every Night.     • isosorbide mononitrate (IMDUR) 60 MG 24 hr tablet Take 180 mg by mouth Daily.     • levothyroxine (SYNTHROID, LEVOTHROID) 25 MCG tablet Take 25 mcg by mouth Daily.     • loratadine (CLARITIN) 10 MG tablet Take 10 mg by mouth  "Daily.     • metFORMIN (GLUCOPHAGE) 500 MG tablet Take 500 mg by mouth 2 (Two) Times a Day With Meals.     • nitroglycerin (NITROSTAT) 0.4 MG SL tablet Place 0.4 mg under the tongue Every 5 (Five) Minutes As Needed for chest pain. Take no more than 3 doses in 15 minutes.     • pantoprazole (PROTONIX) 40 MG EC tablet Take 40 mg by mouth Daily.     • predniSONE (DELTASONE) 10 MG tablet 10mg x 2 days, 5mg x 2 days, then stop. 3 tablet 0   • sertraline (ZOLOFT) 100 MG tablet Take 50 mg by mouth Every Evening.     • tiotropium (SPIRIVA) 18 MCG per inhalation capsule Place 1 capsule into inhaler and inhale Daily.     • traMADol (ULTRAM) 50 MG tablet Take 50 mg by mouth Every 12 (Twelve) Hours As Needed for Moderate Pain .     • vitamin B-12 (CYANOCOBALAMIN) 1000 MCG tablet Take 2,000 mcg by mouth Daily.       No current facility-administered medications for this visit.        Allergies:  Allergies   Allergen Reactions   • Iodine    • Lisinopril    • Other      Seafood       Vitals:  /52  Pulse 58  Temp 97.9 °F (36.6 °C) (Oral)   Ht 168.9 cm (66.5\")  Wt 113 kg (250 lb)  SpO2 94%  BMI 39.75 kg/m2    Imaging:    Imaging Results (most recent)     None          Pulmonary Functions Testing Results:    No results found for: FEV1, FVC, LPQ8TFG, TLC, DLCO    Results for orders placed or performed during the hospital encounter of 12/06/17   Blood Culture - Blood,   Result Value Ref Range    Blood Culture No growth at 5 days    Blood Culture - Blood,   Result Value Ref Range    Blood Culture No growth at 5 days    Influenza Antigen, Rapid - Swab, Nasopharynx   Result Value Ref Range    Influenza A Ag, EIA Negative Negative    Influenza B Ag, EIA Negative Negative   Comprehensive Metabolic Panel   Result Value Ref Range    Glucose 261 (H) 70 - 110 mg/dL    BUN 31 (H) 7 - 21 mg/dL    Creatinine 2.42 (H) 0.43 - 1.29 mg/dL    Sodium 139 135 - 153 mmol/L    Potassium 4.6 3.5 - 5.3 mmol/L    Chloride 96 (L) 99 - 112 mmol/L    " CO2 35.4 (H) 24.3 - 31.9 mmol/L    Calcium 9.2 7.7 - 10.0 mg/dL    Total Protein 7.6 6.0 - 8.0 g/dL    Albumin 4.50 3.40 - 4.80 g/dL    ALT (SGPT) 17 10 - 44 U/L    AST (SGOT) 16 10 - 34 U/L    Alkaline Phosphatase 61 40 - 129 U/L    Total Bilirubin 0.7 0.2 - 1.8 mg/dL    eGFR Non African Amer 27 (L) >60 mL/min/1.73    Globulin 3.1 gm/dL    A/G Ratio 1.5 1.5 - 2.5 g/dL    BUN/Creatinine Ratio 12.8 7.0 - 25.0    Anion Gap 7.6 3.6 - 11.2 mmol/L   Lactic Acid, Plasma   Result Value Ref Range    Lactate 1.0 0.5 - 2.0 mmol/L   Troponin   Result Value Ref Range    Troponin I 0.078 (H) <=0.040 ng/mL   BNP   Result Value Ref Range    .0 (H) 0.0 - 100.0 pg/mL   Blood Gas, Arterial   Result Value Ref Range    Site Arterial: left radial     Travis's Test Positive     pH, Arterial 7.332 (L) 7.350 - 7.450 pH units    pCO2, Arterial 66.2 (C) 35.0 - 45.0 mm Hg    pO2, Arterial 45.0 (C) 80.0 - 100.0 mm Hg    HCO3, Arterial 34.3 (C) 22.0 - 26.0 mmol/L    Base Excess, Arterial 6.9 mmol/L    O2 Saturation, Arterial 74.6 (C) 90.0 - 100.0 %    Hemoglobin, Blood Gas 9.8 (L) 12 - 16 g/dL    Hematocrit, Blood Gas 29.0 (L) 42.0 - 52.0 %    Oxyhemoglobin 72.5 (L) 85 - 100 %    Methemoglobin 0.60 0.00 - 3.00 %    Carboxyhemoglobin 2.2 0 - 5 %    A-a Gradiant 93.9 0.0 - 300.0 mmHg    Temperature 98.6 C    Barometric Pressure for Blood Gas 723 mmHg    Modality Nasal Cannula     FIO2 32 %   Urinalysis With / Culture If Indicated - Urine, Catheter   Result Value Ref Range    Color, UA Yellow Yellow, Straw    Appearance, UA Clear Clear    pH, UA <=5.0 5.0 - 8.0    Specific Gravity, UA 1.016 1.005 - 1.030    Glucose, UA Negative Negative    Ketones, UA Negative Negative    Bilirubin, UA Negative Negative    Blood, UA Negative Negative    Protein, UA Negative Negative    Leuk Esterase, UA Negative Negative    Nitrite, UA Negative Negative    Urobilinogen, UA 0.2 E.U./dL 0.2 - 1.0 E.U./dL   Protime-INR   Result Value Ref Range    Protime 15.2  11.0 - 15.4 Seconds    INR 1.18 (H) 0.90 - 1.10   CBC Auto Differential   Result Value Ref Range    WBC 14.01 (H) 4.50 - 12.50 10*3/mm3    RBC 3.12 (L) 4.70 - 6.10 10*6/mm3    Hemoglobin 9.1 (L) 14.0 - 18.0 g/dL    Hematocrit 29.7 (L) 42.0 - 52.0 %    MCV 95.2 (H) 80.0 - 94.0 fL    MCH 29.2 27.0 - 33.0 pg    MCHC 30.6 (L) 33.0 - 37.0 g/dL    RDW 15.3 (H) 11.5 - 14.5 %    RDW-SD 48.7 37.0 - 54.0 fl    MPV 8.7 6.0 - 10.0 fL    Platelets 230 130 - 400 10*3/mm3    Neutrophil % 86.7 (H) 40.0 - 75.0 %    Lymphocyte % 6.6 (L) 16.0 - 46.0 %    Monocyte % 5.6 0.0 - 12.0 %    Eosinophil % 0.6 0.0 - 7.0 %    Basophil % 0.1 0.0 - 2.0 %    Immature Grans % 0.4 0.0 - 0.5 %    Neutrophils, Absolute 12.14 (H) 1.40 - 6.50 10*3/mm3    Lymphocytes, Absolute 0.92 (L) 1.00 - 3.00 10*3/mm3    Monocytes, Absolute 0.79 0.10 - 0.90 10*3/mm3    Eosinophils, Absolute 0.08 0.00 - 0.70 10*3/mm3    Basophils, Absolute 0.02 0.00 - 0.30 10*3/mm3    Immature Grans, Absolute 0.06 (H) 0.00 - 0.03 10*3/mm3   Blood Gas, Arterial   Result Value Ref Range    Site Arterial: right radial     Travis's Test Positive     pH, Arterial 7.301 (L) 7.350 - 7.450 pH units    pCO2, Arterial 70.2 (C) 35.0 - 45.0 mm Hg    pO2, Arterial 71.1 (L) 80.0 - 100.0 mm Hg    HCO3, Arterial 33.8 (C) 22.0 - 26.0 mmol/L    Base Excess, Arterial 5.9 mmol/L    O2 Saturation, Arterial 91.2 90.0 - 100.0 %    Hemoglobin, Blood Gas 9.9 (L) 12 - 16 g/dL    Hematocrit, Blood Gas 29.0 (L) 42.0 - 52.0 %    Oxyhemoglobin 88.8 85 - 100 %    Methemoglobin 0.50 0.00 - 3.00 %    Carboxyhemoglobin 2.1 0 - 5 %    A-a Gradiant 187.9 0.0 - 300.0 mmHg    Temperature 98.6 C    Barometric Pressure for Blood Gas 723 mmHg    Modality BiPap     FIO2 50 %   Osmolality, Calculated   Result Value Ref Range    Osmolality Calc 293.1 273.0 - 305.0 mOsm/kg   CK   Result Value Ref Range    Creatine Kinase 101 24 - 204 U/L   Blood Gas, Arterial   Result Value Ref Range    Site Arterial: right radial     Travis's  Test Positive     pH, Arterial 7.315 (L) 7.350 - 7.450 pH units    pCO2, Arterial 65.5 (C) 35.0 - 45.0 mm Hg    pO2, Arterial 61.3 (L) 80.0 - 100.0 mm Hg    HCO3, Arterial 32.6 (C) 22.0 - 26.0 mmol/L    Base Excess, Arterial 5.2 mmol/L    O2 Saturation, Arterial 87.4 (L) 90.0 - 100.0 %    Hemoglobin, Blood Gas 9.5 (L) 12 - 16 g/dL    Hematocrit, Blood Gas 28.0 (L) 42.0 - 52.0 %    Oxyhemoglobin 85.3 85 - 100 %    Methemoglobin 0.50 0.00 - 3.00 %    Carboxyhemoglobin 1.9 0 - 5 %    A-a Gradiant 203.0 0.0 - 300.0 mmHg    Temperature 98.6 C    Barometric Pressure for Blood Gas 723 mmHg    Modality Adult Vent     FIO2 50 %    Ventilator Mode ac     Set Tidal Volume 500     Set Mech Resp Rate 16     PEEP 5    CBC Auto Differential   Result Value Ref Range    WBC 10.32 4.50 - 12.50 10*3/mm3    RBC 2.79 (L) 4.70 - 6.10 10*6/mm3    Hemoglobin 8.2 (L) 14.0 - 18.0 g/dL    Hematocrit 26.9 (L) 42.0 - 52.0 %    MCV 96.4 (H) 80.0 - 94.0 fL    MCH 29.4 27.0 - 33.0 pg    MCHC 30.5 (L) 33.0 - 37.0 g/dL    RDW 15.1 (H) 11.5 - 14.5 %    RDW-SD 49.2 37.0 - 54.0 fl    MPV 9.0 6.0 - 10.0 fL    Platelets 199 130 - 400 10*3/mm3    Neutrophil % 94.0 (H) 40.0 - 75.0 %    Lymphocyte % 3.7 (L) 16.0 - 46.0 %    Monocyte % 1.8 0.0 - 12.0 %    Eosinophil % 0.1 0.0 - 7.0 %    Basophil % 0.1 0.0 - 2.0 %    Immature Grans % 0.3 0.0 - 0.5 %    Neutrophils, Absolute 9.70 (H) 1.40 - 6.50 10*3/mm3    Lymphocytes, Absolute 0.38 (L) 1.00 - 3.00 10*3/mm3    Monocytes, Absolute 0.19 0.10 - 0.90 10*3/mm3    Eosinophils, Absolute 0.01 0.00 - 0.70 10*3/mm3    Basophils, Absolute 0.01 0.00 - 0.30 10*3/mm3    Immature Grans, Absolute 0.03 0.00 - 0.03 10*3/mm3   Comprehensive Metabolic Panel   Result Value Ref Range    Glucose 304 (H) 70 - 110 mg/dL    BUN 30 (H) 7 - 21 mg/dL    Creatinine 2.51 (H) 0.43 - 1.29 mg/dL    Sodium 136 135 - 153 mmol/L    Potassium 4.9 3.5 - 5.3 mmol/L    Chloride 97 (L) 99 - 112 mmol/L    CO2 32.4 (H) 24.3 - 31.9 mmol/L    Calcium 8.4  7.7 - 10.0 mg/dL    Total Protein 6.9 6.0 - 8.0 g/dL    Albumin 3.80 3.40 - 4.80 g/dL    ALT (SGPT) 14 10 - 44 U/L    AST (SGOT) 15 10 - 34 U/L    Alkaline Phosphatase 52 40 - 129 U/L    Total Bilirubin 0.5 0.2 - 1.8 mg/dL    eGFR Non African Amer 26 (L) >60 mL/min/1.73    Globulin 3.1 gm/dL    A/G Ratio 1.2 (L) 1.5 - 2.5 g/dL    BUN/Creatinine Ratio 12.0 7.0 - 25.0    Anion Gap 6.6 3.6 - 11.2 mmol/L   Troponin   Result Value Ref Range    Troponin I 0.220 (H) <=0.040 ng/mL   Hemoglobin A1c   Result Value Ref Range    Hemoglobin A1C 7.80 (H) 4.50 - 5.70 %   Osmolality, Calculated   Result Value Ref Range    Osmolality Calc 289.6 273.0 - 305.0 mOsm/kg   Legionella Antigen, Urine - Urine, Clean Catch   Result Value Ref Range    LEGIONELLA ANTIGEN, URINE Negative Negative   Mycoplasma Pneumoniae Antibody, IgM   Result Value Ref Range    Mycoplasma pneumo IgM Negative Negative   Cortisol   Result Value Ref Range    Cortisol 25.10 mcg/dL   C-reactive Protein   Result Value Ref Range    C-Reactive Protein 11.77 (H) 0.00 - 0.99 mg/dL   Magnesium   Result Value Ref Range    Magnesium 2.3 1.7 - 2.6 mg/dL   Phosphorus   Result Value Ref Range    Phosphorus 3.3 2.7 - 4.5 mg/dL   Blood Gas, Arterial   Result Value Ref Range    Site Arterial: right radial     Travis's Test N/A     pH, Arterial 7.392 7.350 - 7.450 pH units    pCO2, Arterial 53.6 (C) 35.0 - 45.0 mm Hg    pO2, Arterial 73.2 (L) 80.0 - 100.0 mm Hg    HCO3, Arterial 31.9 (C) 22.0 - 26.0 mmol/L    Base Excess, Arterial 6.1 mmol/L    O2 Saturation, Arterial 93.4 90.0 - 100.0 %    Hemoglobin, Blood Gas 8.8 (L) 12 - 16 g/dL    Hematocrit, Blood Gas 26.0 (L) 42.0 - 52.0 %    Oxyhemoglobin 91.7 85 - 100 %    Methemoglobin 0.30 0.00 - 3.00 %    Carboxyhemoglobin 1.5 0 - 5 %    A-a Gradiant 207.0 0.0 - 300.0 mmHg    Temperature 98.6 C    Barometric Pressure for Blood Gas 728 mmHg    Modality Ventilator     FIO2 50 %    Ventilator Mode AC     Set Tidal Volume 450     Set Mech  Resp Rate 20     PEEP 8    C-reactive Protein   Result Value Ref Range    C-Reactive Protein 10.08 (H) 0.00 - 0.99 mg/dL   Blood Gas, Arterial   Result Value Ref Range    Site Arterial: right radial     Travis's Test N/A     pH, Arterial 7.385 7.350 - 7.450 pH units    pCO2, Arterial 51.3 (C) 35.0 - 45.0 mm Hg    pO2, Arterial 80.9 80.0 - 100.0 mm Hg    HCO3, Arterial 30.0 (H) 22.0 - 26.0 mmol/L    Base Excess, Arterial 4.3 mmol/L    O2 Saturation, Arterial 94.5 90.0 - 100.0 %    Hemoglobin, Blood Gas 8.5 (L) 12 - 16 g/dL    Hematocrit, Blood Gas 25.0 (L) 42.0 - 52.0 %    Oxyhemoglobin 93.0 85 - 100 %    Methemoglobin 0.40 0.00 - 3.00 %    Carboxyhemoglobin 1.2 0 - 5 %    A-a Gradiant 201.9 0.0 - 300.0 mmHg    Temperature 98.6 C    Barometric Pressure for Blood Gas 728 mmHg    Modality Ventilator     FIO2 50 %    Ventilator Mode a/c     Set Tidal Volume 450     Set Mech Resp Rate 20     PEEP 8    Comprehensive Metabolic Panel   Result Value Ref Range    Glucose 249 (H) 70 - 110 mg/dL    BUN 44 (H) 7 - 21 mg/dL    Creatinine 3.14 (H) 0.43 - 1.29 mg/dL    Sodium 138 135 - 153 mmol/L    Potassium 4.7 3.5 - 5.3 mmol/L    Chloride 100 99 - 112 mmol/L    CO2 28.9 24.3 - 31.9 mmol/L    Calcium 8.2 7.7 - 10.0 mg/dL    Total Protein 6.5 6.0 - 8.0 g/dL    Albumin 3.70 3.40 - 4.80 g/dL    ALT (SGPT) 16 10 - 44 U/L    AST (SGOT) 13 10 - 34 U/L    Alkaline Phosphatase 46 40 - 129 U/L    Total Bilirubin 0.2 0.2 - 1.8 mg/dL    eGFR Non African Amer 20 (L) >60 mL/min/1.73    Globulin 2.8 gm/dL    A/G Ratio 1.3 (L) 1.5 - 2.5 g/dL    BUN/Creatinine Ratio 14.0 7.0 - 25.0    Anion Gap 9.1 3.6 - 11.2 mmol/L   Troponin   Result Value Ref Range    Troponin I 0.439 (H) <=0.040 ng/mL   CBC Auto Differential   Result Value Ref Range    WBC 8.52 4.50 - 12.50 10*3/mm3    RBC 2.70 (L) 4.70 - 6.10 10*6/mm3    Hemoglobin 8.2 (L) 14.0 - 18.0 g/dL    Hematocrit 26.4 (L) 42.0 - 52.0 %    MCV 97.8 (H) 80.0 - 94.0 fL    MCH 30.4 27.0 - 33.0 pg    MCHC  31.1 (L) 33.0 - 37.0 g/dL    RDW 14.5 11.5 - 14.5 %    RDW-SD 47.5 37.0 - 54.0 fl    MPV 9.6 6.0 - 10.0 fL    Platelets 206 130 - 400 10*3/mm3    Neutrophil % 83.6 (H) 40.0 - 75.0 %    Lymphocyte % 11.2 (L) 16.0 - 46.0 %    Monocyte % 4.2 0.0 - 12.0 %    Eosinophil % 0.7 0.0 - 7.0 %    Basophil % 0.1 0.0 - 2.0 %    Immature Grans % 0.2 0.0 - 0.5 %    Neutrophils, Absolute 7.12 (H) 1.40 - 6.50 10*3/mm3    Lymphocytes, Absolute 0.95 (L) 1.00 - 3.00 10*3/mm3    Monocytes, Absolute 0.36 0.10 - 0.90 10*3/mm3    Eosinophils, Absolute 0.06 0.00 - 0.70 10*3/mm3    Basophils, Absolute 0.01 0.00 - 0.30 10*3/mm3    Immature Grans, Absolute 0.02 0.00 - 0.03 10*3/mm3   Osmolality, Calculated   Result Value Ref Range    Osmolality Calc 295.2 273.0 - 305.0 mOsm/kg   TSH   Result Value Ref Range    TSH 0.350 (L) 0.550 - 4.780 mIU/mL   Protein / Creatinine Ratio, Urine - Urine, Clean Catch   Result Value Ref Range    Protein/Creatinine Ratio, Urine 54.4 0.0 - 200.0 mg/G Crea    Creatinine, Urine 152.6 mg/dL    Total Protein, Urine 8.3 mg/dL   Sodium, Urine, Random - Urine, Clean Catch   Result Value Ref Range    Sodium, Urine <10 mmol/L   Microalbumin / Creatinine Urine Ratio - Urine, Clean Catch   Result Value Ref Range    Microalbumin/Creatinine Ratio 11.3 mg/g    Creatinine, Urine 152.6 mg/dL    Microalbumin, Urine 17.3 mg/L   Creatinine, Urine, Random - Urine, Clean Catch   Result Value Ref Range    Creatinine, Urine 152.6 mg/dL   Vitamin D 25 Hydroxy   Result Value Ref Range    25 Hydroxy, Vitamin D 29.0 ng/ml   Comprehensive Metabolic Panel   Result Value Ref Range    Glucose 236 (H) 70 - 110 mg/dL    BUN 52 (H) 7 - 21 mg/dL    Creatinine 2.59 (H) 0.43 - 1.29 mg/dL    Sodium 140 135 - 153 mmol/L    Potassium 4.4 3.5 - 5.3 mmol/L    Chloride 105 99 - 112 mmol/L    CO2 29.8 24.3 - 31.9 mmol/L    Calcium 8.1 7.7 - 10.0 mg/dL    Total Protein 6.4 6.0 - 8.0 g/dL    Albumin 3.60 3.40 - 4.80 g/dL    ALT (SGPT) 17 10 - 44 U/L    AST  (SGOT) 18 10 - 34 U/L    Alkaline Phosphatase 42 40 - 129 U/L    Total Bilirubin 0.2 0.2 - 1.8 mg/dL    eGFR Non African Amer 25 (L) >60 mL/min/1.73    Globulin 2.8 gm/dL    A/G Ratio 1.3 (L) 1.5 - 2.5 g/dL    BUN/Creatinine Ratio 20.1 7.0 - 25.0    Anion Gap 5.2 3.6 - 11.2 mmol/L   C-reactive Protein   Result Value Ref Range    C-Reactive Protein 5.63 (H) 0.00 - 0.99 mg/dL   Magnesium   Result Value Ref Range    Magnesium 2.7 (H) 1.7 - 2.6 mg/dL   Phosphorus   Result Value Ref Range    Phosphorus 3.8 2.7 - 4.5 mg/dL   Blood Gas, Arterial   Result Value Ref Range    Site Arterial: right radial     Travis's Test Positive     pH, Arterial 7.362 7.350 - 7.450 pH units    pCO2, Arterial 51.0 (C) 35.0 - 45.0 mm Hg    pO2, Arterial 85.0 80.0 - 100.0 mm Hg    HCO3, Arterial 28.3 (H) 22.0 - 26.0 mmol/L    Base Excess, Arterial 2.4 mmol/L    O2 Saturation, Arterial 95.4 90.0 - 100.0 %    Hemoglobin, Blood Gas 8.7 (L) 12 - 16 g/dL    Hematocrit, Blood Gas 26.0 (L) 42.0 - 52.0 %    Oxyhemoglobin 94.0 85 - 100 %    Methemoglobin 0.20 0.00 - 3.00 %    Carboxyhemoglobin 1.3 0 - 5 %    A-a Gradiant 128.4 0.0 - 300.0 mmHg    Temperature 98.6 C    Barometric Pressure for Blood Gas 727 mmHg    Modality Ventilator     FIO2 40 %    Ventilator Mode AC     Set Tidal Volume 450     Set Mech Resp Rate 20     PEEP 8    Vitamin B12   Result Value Ref Range    Vitamin B-12 902 211 - 911 pg/mL   Folate   Result Value Ref Range    Folate 16.98 5.40 - 20.00 ng/mL   T4, Free   Result Value Ref Range    Free T4 0.65 (L) 0.89 - 1.76 ng/dL   CBC Auto Differential   Result Value Ref Range    WBC 8.23 4.50 - 12.50 10*3/mm3    RBC 2.64 (L) 4.70 - 6.10 10*6/mm3    Hemoglobin 7.6 (L) 14.0 - 18.0 g/dL    Hematocrit 25.5 (L) 42.0 - 52.0 %    MCV 96.6 (H) 80.0 - 94.0 fL    MCH 28.8 27.0 - 33.0 pg    MCHC 29.8 (L) 33.0 - 37.0 g/dL    RDW 15.5 (H) 11.5 - 14.5 %    RDW-SD 53.2 37.0 - 54.0 fl    MPV 9.6 6.0 - 10.0 fL    Platelets 227 130 - 400 10*3/mm3     Neutrophil % 76.0 (H) 40.0 - 75.0 %    Lymphocyte % 15.7 (L) 16.0 - 46.0 %    Monocyte % 7.3 0.0 - 12.0 %    Eosinophil % 0.0 0.0 - 7.0 %    Basophil % 0.1 0.0 - 2.0 %    Immature Grans % 0.9 (H) 0.0 - 0.5 %    Neutrophils, Absolute 6.26 1.40 - 6.50 10*3/mm3    Lymphocytes, Absolute 1.29 1.00 - 3.00 10*3/mm3    Monocytes, Absolute 0.60 0.10 - 0.90 10*3/mm3    Eosinophils, Absolute 0.00 0.00 - 0.70 10*3/mm3    Basophils, Absolute 0.01 0.00 - 0.30 10*3/mm3    Immature Grans, Absolute 0.07 (H) 0.00 - 0.03 10*3/mm3   Osmolality, Calculated   Result Value Ref Range    Osmolality Calc 301.1 273.0 - 305.0 mOsm/kg   Blood Gas, Arterial   Result Value Ref Range    Site Arterial: right radial     Travis's Test Positive     pH, Arterial 7.397 7.350 - 7.450 pH units    pCO2, Arterial 47.7 (H) 35.0 - 45.0 mm Hg    pO2, Arterial 71.8 (L) 80.0 - 100.0 mm Hg    HCO3, Arterial 28.7 (H) 22.0 - 26.0 mmol/L    Base Excess, Arterial 3.3 mmol/L    O2 Saturation, Arterial 92.8 90.0 - 100.0 %    Hemoglobin, Blood Gas 9.8 (L) 12 - 16 g/dL    Hematocrit, Blood Gas 29.0 (L) 42.0 - 52.0 %    Oxyhemoglobin 91.8 85 - 100 %    Methemoglobin 0.20 0.00 - 3.00 %    Carboxyhemoglobin 0.9 0 - 5 %    A-a Gradiant 76.5 0.0 - 300.0 mmHg    Temperature 98.6 C    Barometric Pressure for Blood Gas 728 mmHg    Modality Ventilator     FIO2 30 %    Ventilator Mode ps     PEEP 8     PSV 10 cmH2O   Comprehensive Metabolic Panel   Result Value Ref Range    Glucose 140 (H) 70 - 110 mg/dL    BUN 33 (H) 7 - 21 mg/dL    Creatinine 1.47 (H) 0.43 - 1.29 mg/dL    Sodium 146 135 - 153 mmol/L    Potassium 4.0 3.5 - 5.3 mmol/L    Chloride 109 99 - 112 mmol/L    CO2 29.1 24.3 - 31.9 mmol/L    Calcium 8.8 7.7 - 10.0 mg/dL    Total Protein 7.3 6.0 - 8.0 g/dL    Albumin 4.00 3.40 - 4.80 g/dL    ALT (SGPT) 16 10 - 44 U/L    AST (SGOT) 17 10 - 34 U/L    Alkaline Phosphatase 49 40 - 129 U/L    Total Bilirubin 0.4 0.2 - 1.8 mg/dL    eGFR Non African Amer 47 (L) >60 mL/min/1.73     Globulin 3.3 gm/dL    A/G Ratio 1.2 (L) 1.5 - 2.5 g/dL    BUN/Creatinine Ratio 22.4 7.0 - 25.0    Anion Gap 7.9 3.6 - 11.2 mmol/L   C-reactive Protein   Result Value Ref Range    C-Reactive Protein 3.57 (H) 0.00 - 0.99 mg/dL   Iron Profile   Result Value Ref Range    Iron 43 (L) 53 - 167 mcg/dL    TIBC 298 241 - 421 mcg/dL    Iron Saturation 14 (L) 20 - 50 %   Ferritin   Result Value Ref Range    Ferritin 133.00 21.90 - 321.70 ng/mL   CBC Auto Differential   Result Value Ref Range    WBC 9.37 4.50 - 12.50 10*3/mm3    RBC 3.45 (L) 4.70 - 6.10 10*6/mm3    Hemoglobin 10.2 (L) 14.0 - 18.0 g/dL    Hematocrit 33.5 (L) 42.0 - 52.0 %    MCV 97.1 (H) 80.0 - 94.0 fL    MCH 29.6 27.0 - 33.0 pg    MCHC 30.4 (L) 33.0 - 37.0 g/dL    RDW 15.6 (H) 11.5 - 14.5 %    RDW-SD 50.7 37.0 - 54.0 fl    MPV 8.8 6.0 - 10.0 fL    Platelets 258 130 - 400 10*3/mm3    Neutrophil % 74.1 40.0 - 75.0 %    Lymphocyte % 15.8 (L) 16.0 - 46.0 %    Monocyte % 8.5 0.0 - 12.0 %    Eosinophil % 0.1 0.0 - 7.0 %    Basophil % 0.1 0.0 - 2.0 %    Immature Grans % 1.4 (H) 0.0 - 0.5 %    Neutrophils, Absolute 6.94 (H) 1.40 - 6.50 10*3/mm3    Lymphocytes, Absolute 1.48 1.00 - 3.00 10*3/mm3    Monocytes, Absolute 0.80 0.10 - 0.90 10*3/mm3    Eosinophils, Absolute 0.01 0.00 - 0.70 10*3/mm3    Basophils, Absolute 0.01 0.00 - 0.30 10*3/mm3    Immature Grans, Absolute 0.13 (H) 0.00 - 0.03 10*3/mm3   Osmolality, Calculated   Result Value Ref Range    Osmolality Calc 300.1 273.0 - 305.0 mOsm/kg   T3, Free   Result Value Ref Range    T3, Free 1.80 (L) 2.30 - 4.20 pg/mL   Comprehensive Metabolic Panel   Result Value Ref Range    Glucose 132 (H) 70 - 110 mg/dL    BUN 24 (H) 7 - 21 mg/dL    Creatinine 1.37 (H) 0.43 - 1.29 mg/dL    Sodium 144 135 - 153 mmol/L    Potassium 3.8 3.5 - 5.3 mmol/L    Chloride 109 99 - 112 mmol/L    CO2 29.9 24.3 - 31.9 mmol/L    Calcium 8.7 7.7 - 10.0 mg/dL    Total Protein 6.6 6.0 - 8.0 g/dL    Albumin 3.60 3.40 - 4.80 g/dL    ALT (SGPT) 16 10 -  44 U/L    AST (SGOT) 16 10 - 34 U/L    Alkaline Phosphatase 43 40 - 129 U/L    Total Bilirubin 0.5 0.2 - 1.8 mg/dL    eGFR Non African Amer 52 (L) >60 mL/min/1.73    Globulin 3.0 gm/dL    A/G Ratio 1.2 (L) 1.5 - 2.5 g/dL    BUN/Creatinine Ratio 17.5 7.0 - 25.0    Anion Gap 5.1 3.6 - 11.2 mmol/L   C-reactive Protein   Result Value Ref Range    C-Reactive Protein 3.60 (H) 0.00 - 0.99 mg/dL   CBC Auto Differential   Result Value Ref Range    WBC 5.94 4.50 - 12.50 10*3/mm3    RBC 3.03 (L) 4.70 - 6.10 10*6/mm3    Hemoglobin 8.7 (L) 14.0 - 18.0 g/dL    Hematocrit 29.6 (L) 42.0 - 52.0 %    MCV 97.7 (H) 80.0 - 94.0 fL    MCH 28.7 27.0 - 33.0 pg    MCHC 29.4 (L) 33.0 - 37.0 g/dL    RDW 15.2 (H) 11.5 - 14.5 %    RDW-SD 49.0 37.0 - 54.0 fl    MPV 8.6 6.0 - 10.0 fL    Platelets 249 130 - 400 10*3/mm3    Neutrophil % 64.7 40.0 - 75.0 %    Lymphocyte % 22.4 16.0 - 46.0 %    Monocyte % 10.9 0.0 - 12.0 %    Eosinophil % 0.5 0.0 - 7.0 %    Basophil % 0.2 0.0 - 2.0 %    Immature Grans % 1.3 (H) 0.0 - 0.5 %    Neutrophils, Absolute 3.84 1.40 - 6.50 10*3/mm3    Lymphocytes, Absolute 1.33 1.00 - 3.00 10*3/mm3    Monocytes, Absolute 0.65 0.10 - 0.90 10*3/mm3    Eosinophils, Absolute 0.03 0.00 - 0.70 10*3/mm3    Basophils, Absolute 0.01 0.00 - 0.30 10*3/mm3    Immature Grans, Absolute 0.08 (H) 0.00 - 0.03 10*3/mm3   Osmolality, Calculated   Result Value Ref Range    Osmolality Calc 292.7 273.0 - 305.0 mOsm/kg   Basic Metabolic Panel   Result Value Ref Range    Glucose 115 (H) 70 - 110 mg/dL    BUN 20 7 - 21 mg/dL    Creatinine 1.17 0.43 - 1.29 mg/dL    Sodium 142 135 - 153 mmol/L    Potassium 3.7 3.5 - 5.3 mmol/L    Chloride 105 99 - 112 mmol/L    CO2 34.4 (H) 24.3 - 31.9 mmol/L    Calcium 9.1 7.7 - 10.0 mg/dL    eGFR Non African Amer 62 >60 mL/min/1.73    BUN/Creatinine Ratio 17.1 7.0 - 25.0    Anion Gap 2.6 (L) 3.6 - 11.2 mmol/L   Osmolality, Calculated   Result Value Ref Range    Osmolality Calc 286.7 273.0 - 305.0 mOsm/kg    Comprehensive Metabolic Panel   Result Value Ref Range    Glucose 124 (H) 70 - 110 mg/dL    BUN 21 7 - 21 mg/dL    Creatinine 1.26 0.43 - 1.29 mg/dL    Sodium 142 135 - 153 mmol/L    Potassium 3.9 3.5 - 5.3 mmol/L    Chloride 104 99 - 112 mmol/L    CO2 28.3 24.3 - 31.9 mmol/L    Calcium 9.4 7.7 - 10.0 mg/dL    Total Protein 6.7 6.0 - 8.0 g/dL    Albumin 3.80 3.40 - 4.80 g/dL    ALT (SGPT) 17 10 - 44 U/L    AST (SGOT) 16 10 - 34 U/L    Alkaline Phosphatase 40 40 - 129 U/L    Total Bilirubin 0.4 0.2 - 1.8 mg/dL    eGFR Non African Amer 57 (L) >60 mL/min/1.73    Globulin 2.9 gm/dL    A/G Ratio 1.3 (L) 1.5 - 2.5 g/dL    BUN/Creatinine Ratio 16.7 7.0 - 25.0    Anion Gap 9.7 3.6 - 11.2 mmol/L   C-reactive Protein   Result Value Ref Range    C-Reactive Protein 1.61 (H) 0.00 - 0.99 mg/dL   CBC Auto Differential   Result Value Ref Range    WBC 6.44 4.50 - 12.50 10*3/mm3    RBC 3.32 (L) 4.70 - 6.10 10*6/mm3    Hemoglobin 9.6 (L) 14.0 - 18.0 g/dL    Hematocrit 31.2 (L) 42.0 - 52.0 %    MCV 94.0 80.0 - 94.0 fL    MCH 28.9 27.0 - 33.0 pg    MCHC 30.8 (L) 33.0 - 37.0 g/dL    RDW 15.5 (H) 11.5 - 14.5 %    RDW-SD 50.0 37.0 - 54.0 fl    MPV 8.7 6.0 - 10.0 fL    Platelets 263 130 - 400 10*3/mm3    Neutrophil % 67.7 40.0 - 75.0 %    Lymphocyte % 19.1 16.0 - 46.0 %    Monocyte % 10.2 0.0 - 12.0 %    Eosinophil % 2.0 0.0 - 7.0 %    Basophil % 0.2 0.0 - 2.0 %    Immature Grans % 0.8 (H) 0.0 - 0.5 %    Neutrophils, Absolute 4.36 1.40 - 6.50 10*3/mm3    Lymphocytes, Absolute 1.23 1.00 - 3.00 10*3/mm3    Monocytes, Absolute 0.66 0.10 - 0.90 10*3/mm3    Eosinophils, Absolute 0.13 0.00 - 0.70 10*3/mm3    Basophils, Absolute 0.01 0.00 - 0.30 10*3/mm3    Immature Grans, Absolute 0.05 (H) 0.00 - 0.03 10*3/mm3   Osmolality, Calculated   Result Value Ref Range    Osmolality Calc 287.5 273.0 - 305.0 mOsm/kg   POC Glucose Fingerstick   Result Value Ref Range    Glucose 305 (H) 70 - 130 mg/dL   POC Glucose Fingerstick   Result Value Ref Range     Glucose 353 (H) 70 - 130 mg/dL   POC Glucose Fingerstick   Result Value Ref Range    Glucose 323 (H) 70 - 130 mg/dL   POC Glucose Fingerstick   Result Value Ref Range    Glucose 292 (H) 70 - 130 mg/dL   POC Glucose Fingerstick   Result Value Ref Range    Glucose 268 (H) 70 - 130 mg/dL   POC Glucose Fingerstick   Result Value Ref Range    Glucose 257 (H) 70 - 130 mg/dL   POC Glucose Fingerstick   Result Value Ref Range    Glucose 267 (H) 70 - 130 mg/dL   POC Glucose Fingerstick   Result Value Ref Range    Glucose 288 (H) 70 - 130 mg/dL   POC Glucose Fingerstick   Result Value Ref Range    Glucose 283 (H) 70 - 130 mg/dL   POC Glucose Fingerstick   Result Value Ref Range    Glucose 277 (H) 70 - 130 mg/dL   POC Glucose Fingerstick   Result Value Ref Range    Glucose 236 (H) 70 - 130 mg/dL   POC Glucose Fingerstick   Result Value Ref Range    Glucose 231 (H) 70 - 130 mg/dL   POC Glucose Fingerstick   Result Value Ref Range    Glucose 261 (H) 70 - 130 mg/dL   POC Glucose Fingerstick   Result Value Ref Range    Glucose 221 (H) 70 - 130 mg/dL   POC Glucose Fingerstick   Result Value Ref Range    Glucose 190 (H) 70 - 130 mg/dL   POC Glucose Fingerstick   Result Value Ref Range    Glucose 206 (H) 70 - 130 mg/dL   POC Glucose Fingerstick   Result Value Ref Range    Glucose 195 (H) 70 - 130 mg/dL   POC Glucose Fingerstick   Result Value Ref Range    Glucose 176 (H) 70 - 130 mg/dL   POC Glucose Fingerstick   Result Value Ref Range    Glucose 252 (H) 70 - 130 mg/dL   POC Glucose Fingerstick   Result Value Ref Range    Glucose 192 (H) 70 - 130 mg/dL   POC Glucose Fingerstick   Result Value Ref Range    Glucose 254 (H) 70 - 130 mg/dL   POC Glucose Fingerstick   Result Value Ref Range    Glucose 192 (H) 70 - 130 mg/dL   POC Glucose Fingerstick   Result Value Ref Range    Glucose 234 (H) 70 - 130 mg/dL   POC Glucose Fingerstick   Result Value Ref Range    Glucose 320 (H) 70 - 130 mg/dL   POC Glucose Fingerstick   Result Value Ref  Range    Glucose 199 (H) 70 - 130 mg/dL   POC Glucose Fingerstick   Result Value Ref Range    Glucose 176 (H) 70 - 130 mg/dL   POC Glucose Fingerstick   Result Value Ref Range    Glucose 263 (H) 70 - 130 mg/dL   POC Glucose Fingerstick   Result Value Ref Range    Glucose 318 (H) 70 - 130 mg/dL   POC Glucose Fingerstick   Result Value Ref Range    Glucose 187 (H) 70 - 130 mg/dL   POC Glucose Once   Result Value Ref Range    Glucose 227 (H) 70 - 130 mg/dL   POC Glucose Once   Result Value Ref Range    Glucose 282 (H) 70 - 130 mg/dL   Adult Transthoracic Echo Complete W/ Cont if Necessary Per Protocol   Result Value Ref Range    Target HR (85%) 128 bpm    Max. Pred. HR (100%) 151 bpm       Objective   Physical Exam   Constitutional: He is oriented to person, place, and time. He appears well-developed and well-nourished.   HENT:   Head: Normocephalic.   Eyes: Pupils are equal, round, and reactive to light.   Neck: Normal range of motion.   Cardiovascular: Normal rate, regular rhythm and normal heart sounds.    Pulmonary/Chest: Effort normal and breath sounds normal.   Abdominal: Soft.   Musculoskeletal: Normal range of motion.   Neurological: He is alert and oriented to person, place, and time.   Skin: Skin is warm.   Psychiatric: He has a normal mood and affect. His behavior is normal. Judgment and thought content normal.   Vitals reviewed.      Assessment/Plan      COPD:  Breathing is back at baseline after hospital stay, encouraged him to continue current inhalers and monitor fluid status closely. He is doing pulmonary rehab at [facility], continue that.     Hypoxia: compliant with home oxygen, no new issues.     Educated the patient on the importance of wearing oxygen as prescribed, otherwise he could become hypoxic, faint, fall, hit his head, cause a brain hemmorhage and potentially die.     Obesity: educated patient on weight loss with diet and exercise, >5 minutes spent.     Obstructive sleep apnea:  Compliant  with CPAP.     Educated patient on the complications associated with untreated sleep apnea -- that it increases risk of MI, stroke, depression, hypertension, heart failure, arrhythmias, coronary artery disease, and potential death due to complication of that mentioned previously.    Also patient was educated  about the hazards of driving if there sleep deprived and has sleep apnea.  Was instructed not to involving driving or any activity which involves higher level of mental function- like operating a machine.    Lower extremity edema: daily weights discussed, continue following with nephrology.       ICD-10-CM ICD-9-CM   1. Chronic obstructive pulmonary disease, unspecified COPD type J44.9 496   2. Shortness of breath R06.02 786.05   3. Hypoxia R09.02 799.02   4. Morbid obesity E66.01 278.01   5. Obstructive sleep apnea syndrome G47.33 327.23   6. Edema extremities R60.0 782.3       Return in about 3 months (around 3/13/2018).      Scribed for Dr. Gomez by YANG Cassidy.     I, Jeremi Gomez M.D. attest that the above note accurately reflects the work and decisions made  by me.  Patient was seen and evaluated by Dr. Gomez, including history of present illness, physical exam, assessment, and treatment plan.  The above note was reviewed and edited by Dr. Gomez.

## 2017-12-28 PROBLEM — J96.90 RESPIRATORY FAILURE (HCC): Status: ACTIVE | Noted: 2017-01-01

## 2018-01-01 ENCOUNTER — APPOINTMENT (OUTPATIENT)
Dept: GENERAL RADIOLOGY | Facility: HOSPITAL | Age: 68
End: 2018-01-01

## 2018-01-01 ENCOUNTER — APPOINTMENT (OUTPATIENT)
Dept: CT IMAGING | Facility: HOSPITAL | Age: 68
End: 2018-01-01

## 2018-01-01 ENCOUNTER — APPOINTMENT (OUTPATIENT)
Dept: CARDIOLOGY | Facility: HOSPITAL | Age: 68
End: 2018-01-01
Attending: INTERNAL MEDICINE

## 2018-01-01 VITALS
TEMPERATURE: 99.8 F | WEIGHT: 295.4 LBS | DIASTOLIC BLOOD PRESSURE: 85 MMHG | SYSTOLIC BLOOD PRESSURE: 183 MMHG | RESPIRATION RATE: 24 BRPM | OXYGEN SATURATION: 100 % | BODY MASS INDEX: 47.47 KG/M2 | HEIGHT: 66 IN | HEART RATE: 106 BPM

## 2018-01-01 LAB
A-A DO2: 143.4 MMHG (ref 0–300)
A-A DO2: 167.2 MMHG (ref 0–300)
A-A DO2: 177.9 MMHG (ref 0–300)
A-A DO2: 266.1 MMHG (ref 0–300)
A-A DO2: >300 MMHG (ref 0–300)
ABO + RH BLD: NORMAL
ABO GROUP BLD: NORMAL
ALBUMIN SERPL-MCNC: 3.1 G/DL (ref 3.4–4.8)
ALBUMIN SERPL-MCNC: 3.2 G/DL (ref 3.4–4.8)
ALBUMIN SERPL-MCNC: 3.6 G/DL (ref 3.4–4.8)
ALBUMIN SERPL-MCNC: 3.7 G/DL (ref 3.4–4.8)
ALBUMIN SERPL-MCNC: 3.7 G/DL (ref 3.4–4.8)
ALBUMIN SERPL-MCNC: 3.8 G/DL (ref 3.4–4.8)
ALBUMIN SERPL-MCNC: 3.9 G/DL (ref 3.4–4.8)
ALBUMIN SERPL-MCNC: 3.9 G/DL (ref 3.4–4.8)
ALBUMIN SERPL-MCNC: 4 G/DL (ref 2.9–4.4)
ALBUMIN SERPL-MCNC: 4 G/DL (ref 3.4–4.8)
ALBUMIN SERPL-MCNC: 4 G/DL (ref 3.4–4.8)
ALBUMIN SERPL-MCNC: 4.1 G/DL (ref 3.4–4.8)
ALBUMIN SERPL-MCNC: 4.1 G/DL (ref 3.4–4.8)
ALBUMIN UR-MCNC: 26.7 MG/L
ALBUMIN/GLOB SERPL: 1.5 G/DL (ref 1.5–2.5)
ALBUMIN/GLOB SERPL: 1.6 G/DL (ref 1.5–2.5)
ALBUMIN/GLOB SERPL: 1.6 G/DL (ref 1.5–2.5)
ALBUMIN/GLOB SERPL: 1.7 G/DL (ref 1.5–2.5)
ALBUMIN/GLOB SERPL: 1.8 G/DL (ref 1.5–2.5)
ALBUMIN/GLOB SERPL: 1.8 G/DL (ref 1.5–2.5)
ALBUMIN/GLOB SERPL: 1.9 G/DL (ref 1.5–2.5)
ALBUMIN/GLOB SERPL: 1.9 {RATIO} (ref 0.7–1.7)
ALBUMIN/GLOB SERPL: 2.1 G/DL (ref 1.5–2.5)
ALBUMIN/GLOB SERPL: 2.2 G/DL (ref 1.5–2.5)
ALP SERPL-CCNC: 27 U/L (ref 40–129)
ALP SERPL-CCNC: 28 U/L (ref 40–129)
ALP SERPL-CCNC: 29 U/L (ref 40–129)
ALP SERPL-CCNC: 30 U/L (ref 40–129)
ALP SERPL-CCNC: 32 U/L (ref 40–129)
ALP SERPL-CCNC: 33 U/L (ref 40–129)
ALP SERPL-CCNC: 35 U/L (ref 40–129)
ALP SERPL-CCNC: 35 U/L (ref 40–129)
ALP SERPL-CCNC: 38 U/L (ref 40–129)
ALP SERPL-CCNC: 41 U/L (ref 40–129)
ALP SERPL-CCNC: 42 U/L (ref 40–129)
ALP SERPL-CCNC: 45 U/L (ref 40–129)
ALPHA1 GLOB FLD ELPH-MCNC: 0.3 G/DL (ref 0–0.4)
ALPHA2 GLOB SERPL ELPH-MCNC: 0.7 G/DL (ref 0.4–1)
ALT SERPL W P-5'-P-CCNC: 13 U/L (ref 10–44)
ALT SERPL W P-5'-P-CCNC: 16 U/L (ref 10–44)
ALT SERPL W P-5'-P-CCNC: 16 U/L (ref 10–44)
ALT SERPL W P-5'-P-CCNC: 17 U/L (ref 10–44)
ALT SERPL W P-5'-P-CCNC: 17 U/L (ref 10–44)
ALT SERPL W P-5'-P-CCNC: 18 U/L (ref 10–44)
ALT SERPL W P-5'-P-CCNC: 19 U/L (ref 10–44)
ALT SERPL W P-5'-P-CCNC: 21 U/L (ref 10–44)
ALT SERPL W P-5'-P-CCNC: 23 U/L (ref 10–44)
ALT SERPL W P-5'-P-CCNC: 23 U/L (ref 10–44)
ALT SERPL W P-5'-P-CCNC: 32 U/L (ref 10–44)
ALT SERPL W P-5'-P-CCNC: 52 U/L (ref 10–44)
ANA SER QL: NEGATIVE
ANION GAP SERPL CALCULATED.3IONS-SCNC: 10.1 MMOL/L (ref 3.6–11.2)
ANION GAP SERPL CALCULATED.3IONS-SCNC: 10.3 MMOL/L (ref 3.6–11.2)
ANION GAP SERPL CALCULATED.3IONS-SCNC: 2.9 MMOL/L (ref 3.6–11.2)
ANION GAP SERPL CALCULATED.3IONS-SCNC: 3.7 MMOL/L (ref 3.6–11.2)
ANION GAP SERPL CALCULATED.3IONS-SCNC: 3.8 MMOL/L (ref 3.6–11.2)
ANION GAP SERPL CALCULATED.3IONS-SCNC: 4.8 MMOL/L (ref 3.6–11.2)
ANION GAP SERPL CALCULATED.3IONS-SCNC: 5.3 MMOL/L (ref 3.6–11.2)
ANION GAP SERPL CALCULATED.3IONS-SCNC: 5.9 MMOL/L (ref 3.6–11.2)
ANION GAP SERPL CALCULATED.3IONS-SCNC: 6 MMOL/L (ref 3.6–11.2)
ANION GAP SERPL CALCULATED.3IONS-SCNC: 6.1 MMOL/L (ref 3.6–11.2)
ANION GAP SERPL CALCULATED.3IONS-SCNC: 6.2 MMOL/L (ref 3.6–11.2)
ANION GAP SERPL CALCULATED.3IONS-SCNC: 6.5 MMOL/L (ref 3.6–11.2)
ANION GAP SERPL CALCULATED.3IONS-SCNC: 7.1 MMOL/L (ref 3.6–11.2)
ANION GAP SERPL CALCULATED.3IONS-SCNC: 7.2 MMOL/L (ref 3.6–11.2)
ANION GAP SERPL CALCULATED.3IONS-SCNC: 7.4 MMOL/L (ref 3.6–11.2)
ANION GAP SERPL CALCULATED.3IONS-SCNC: 8.1 MMOL/L (ref 3.6–11.2)
ANION GAP SERPL CALCULATED.3IONS-SCNC: 8.6 MMOL/L (ref 3.6–11.2)
ANION GAP SERPL CALCULATED.3IONS-SCNC: 8.7 MMOL/L (ref 3.6–11.2)
ANION GAP SERPL CALCULATED.3IONS-SCNC: 8.8 MMOL/L (ref 3.6–11.2)
ANION GAP SERPL CALCULATED.3IONS-SCNC: 8.9 MMOL/L (ref 3.6–11.2)
ANION GAP SERPL CALCULATED.3IONS-SCNC: ABNORMAL MMOL/L (ref 3.6–11.2)
APTT PPP: 28.8 SECONDS (ref 23.8–36.1)
APTT PPP: 31.2 SECONDS (ref 23.8–36.1)
ARTERIAL PATENCY WRIST A: ABNORMAL
ARTERIAL PATENCY WRIST A: POSITIVE
AST SERPL-CCNC: 13 U/L (ref 10–34)
AST SERPL-CCNC: 14 U/L (ref 10–34)
AST SERPL-CCNC: 15 U/L (ref 10–34)
AST SERPL-CCNC: 15 U/L (ref 10–34)
AST SERPL-CCNC: 17 U/L (ref 10–34)
AST SERPL-CCNC: 17 U/L (ref 10–34)
AST SERPL-CCNC: 19 U/L (ref 10–34)
AST SERPL-CCNC: 19 U/L (ref 10–34)
AST SERPL-CCNC: 21 U/L (ref 10–34)
AST SERPL-CCNC: 24 U/L (ref 10–34)
AST SERPL-CCNC: 25 U/L (ref 10–34)
AST SERPL-CCNC: 28 U/L (ref 10–34)
AST SERPL-CCNC: 31 U/L (ref 10–34)
AST SERPL-CCNC: 33 U/L (ref 10–34)
ATMOSPHERIC PRESS: 716 MMHG
ATMOSPHERIC PRESS: 726 MMHG
ATMOSPHERIC PRESS: 728 MMHG
ATMOSPHERIC PRESS: 728 MMHG
ATMOSPHERIC PRESS: 729 MMHG
ATMOSPHERIC PRESS: 730 MMHG
ATMOSPHERIC PRESS: 730 MMHG
ATMOSPHERIC PRESS: 732 MMHG
ATMOSPHERIC PRESS: 735 MMHG
ATMOSPHERIC PRESS: 737 MMHG
B-GLOBULIN SERPL ELPH-MCNC: 0.7 G/DL (ref 0.7–1.3)
BACTERIA SPEC AEROBE CULT: NORMAL
BACTERIA SPEC RESP CULT: ABNORMAL
BACTERIA SPEC RESP CULT: NO GROWTH
BASE EXCESS BLDA CALC-SCNC: 1.8 MMOL/L
BASE EXCESS BLDA CALC-SCNC: 10 MMOL/L
BASE EXCESS BLDA CALC-SCNC: 14.7 MMOL/L
BASE EXCESS BLDA CALC-SCNC: 16.5 MMOL/L
BASE EXCESS BLDA CALC-SCNC: 2.1 MMOL/L
BASE EXCESS BLDA CALC-SCNC: 2.4 MMOL/L
BASE EXCESS BLDA CALC-SCNC: 3.7 MMOL/L
BASE EXCESS BLDA CALC-SCNC: 4.4 MMOL/L
BASE EXCESS BLDA CALC-SCNC: 5.7 MMOL/L
BASE EXCESS BLDA CALC-SCNC: 5.9 MMOL/L
BASE EXCESS BLDA CALC-SCNC: 6.1 MMOL/L
BASE EXCESS BLDA CALC-SCNC: 6.3 MMOL/L
BASE EXCESS BLDA CALC-SCNC: 6.7 MMOL/L
BASE EXCESS BLDA CALC-SCNC: 7.3 MMOL/L
BASE EXCESS BLDA CALC-SCNC: 7.4 MMOL/L
BASE EXCESS BLDA CALC-SCNC: 8.6 MMOL/L
BASE EXCESS BLDA CALC-SCNC: 9.8 MMOL/L
BASOPHILS # BLD AUTO: 0 10*3/MM3 (ref 0–0.3)
BASOPHILS # BLD AUTO: 0.01 10*3/MM3 (ref 0–0.3)
BASOPHILS NFR BLD AUTO: 0 % (ref 0–2)
BASOPHILS NFR BLD AUTO: 0.1 % (ref 0–2)
BASOPHILS NFR BLD AUTO: 0.2 % (ref 0–2)
BASOPHILS NFR BLD AUTO: 0.2 % (ref 0–2)
BDY SITE: ABNORMAL
BH BB BLOOD EXPIRATION DATE: NORMAL
BH BB BLOOD TYPE BARCODE: 6200
BH BB DISPENSE STATUS: NORMAL
BH BB PRODUCT CODE: NORMAL
BH BB UNIT NUMBER: NORMAL
BH CV ECHO MEAS - % IVS THICK: 48.1 %
BH CV ECHO MEAS - % LVPW THICK: 64 %
BH CV ECHO MEAS - ACS: 1.5 CM
BH CV ECHO MEAS - AO MAX PG (FULL): 22.8 MMHG
BH CV ECHO MEAS - AO MAX PG: 43.8 MMHG
BH CV ECHO MEAS - AO MEAN PG (FULL): 8.3 MMHG
BH CV ECHO MEAS - AO MEAN PG: 18 MMHG
BH CV ECHO MEAS - AO ROOT AREA (BSA CORRECTED): 1.5
BH CV ECHO MEAS - AO ROOT AREA: 8.6 CM^2
BH CV ECHO MEAS - AO ROOT DIAM: 3.3 CM
BH CV ECHO MEAS - AO V2 MAX: 330.8 CM/SEC
BH CV ECHO MEAS - AO V2 MEAN: 193.1 CM/SEC
BH CV ECHO MEAS - AO V2 VTI: 74.1 CM
BH CV ECHO MEAS - AVA(I,A): 2.1 CM^2
BH CV ECHO MEAS - AVA(I,D): 2.1 CM^2
BH CV ECHO MEAS - AVA(V,A): 2.1 CM^2
BH CV ECHO MEAS - AVA(V,D): 2.1 CM^2
BH CV ECHO MEAS - BSA(HAYCOCK): 2.4 M^2
BH CV ECHO MEAS - BSA: 2.2 M^2
BH CV ECHO MEAS - BZI_BMI: 43.3 KILOGRAMS/M^2
BH CV ECHO MEAS - BZI_METRIC_HEIGHT: 165.1 CM
BH CV ECHO MEAS - BZI_METRIC_WEIGHT: 117.9 KG
BH CV ECHO MEAS - CONTRAST EF 4CH: 77.2 ML/M^2
BH CV ECHO MEAS - EDV(CUBED): 216.5 ML
BH CV ECHO MEAS - EDV(MOD-SP4): 79 ML
BH CV ECHO MEAS - EDV(TEICH): 180.3 ML
BH CV ECHO MEAS - EF(CUBED): 83.8 %
BH CV ECHO MEAS - EF(TEICH): 76 %
BH CV ECHO MEAS - ESV(CUBED): 35 ML
BH CV ECHO MEAS - ESV(MOD-SP4): 18 ML
BH CV ECHO MEAS - ESV(TEICH): 43.2 ML
BH CV ECHO MEAS - FS: 45.5 %
BH CV ECHO MEAS - IVS/LVPW: 1.1
BH CV ECHO MEAS - IVSD: 1.2 CM
BH CV ECHO MEAS - IVSS: 1.8 CM
BH CV ECHO MEAS - LA DIMENSION: 4 CM
BH CV ECHO MEAS - LA/AO: 1.2
BH CV ECHO MEAS - LV DIASTOLIC VOL/BSA (35-75): 35.7 ML/M^2
BH CV ECHO MEAS - LV MASS(C)D: 302.2 GRAMS
BH CV ECHO MEAS - LV MASS(C)DI: 136.7 GRAMS/M^2
BH CV ECHO MEAS - LV MASS(C)S: 244.9 GRAMS
BH CV ECHO MEAS - LV MASS(C)SI: 110.8 GRAMS/M^2
BH CV ECHO MEAS - LV MAX PG: 21 MMHG
BH CV ECHO MEAS - LV MEAN PG: 9.7 MMHG
BH CV ECHO MEAS - LV SYSTOLIC VOL/BSA (12-30): 8.1 ML/M^2
BH CV ECHO MEAS - LV V1 MAX: 225.2 CM/SEC
BH CV ECHO MEAS - LV V1 MEAN: 140.5 CM/SEC
BH CV ECHO MEAS - LV V1 VTI: 49.8 CM
BH CV ECHO MEAS - LVIDD: 5.7 CM
BH CV ECHO MEAS - LVIDS: 3.3 CM
BH CV ECHO MEAS - LVLD AP4: 6.9 CM
BH CV ECHO MEAS - LVLS AP4: 6.6 CM
BH CV ECHO MEAS - LVOT AREA (M): 3.1 CM^2
BH CV ECHO MEAS - LVOT AREA: 3.2 CM^2
BH CV ECHO MEAS - LVOT DIAM: 2 CM
BH CV ECHO MEAS - LVPWD: 1.1 CM
BH CV ECHO MEAS - LVPWS: 1.8 CM
BH CV ECHO MEAS - MV A MAX VEL: 58 CM/SEC
BH CV ECHO MEAS - MV E MAX VEL: 111.1 CM/SEC
BH CV ECHO MEAS - MV E/A: 1.9
BH CV ECHO MEAS - RAP SYSTOLE: 10 MMHG
BH CV ECHO MEAS - RVDD: 1.1 CM
BH CV ECHO MEAS - RVSP: 35.1 MMHG
BH CV ECHO MEAS - SI(AO): 289.4 ML/M^2
BH CV ECHO MEAS - SI(CUBED): 82.1 ML/M^2
BH CV ECHO MEAS - SI(LVOT): 71 ML/M^2
BH CV ECHO MEAS - SI(MOD-SP4): 27.6 ML/M^2
BH CV ECHO MEAS - SI(TEICH): 62 ML/M^2
BH CV ECHO MEAS - SV(AO): 640 ML
BH CV ECHO MEAS - SV(CUBED): 181.5 ML
BH CV ECHO MEAS - SV(LVOT): 157 ML
BH CV ECHO MEAS - SV(MOD-SP4): 61 ML
BH CV ECHO MEAS - SV(TEICH): 137.1 ML
BH CV ECHO MEAS - TR MAX VEL: 250.6 CM/SEC
BILIRUB SERPL-MCNC: 0.6 MG/DL (ref 0.2–1.8)
BILIRUB SERPL-MCNC: 0.7 MG/DL (ref 0.2–1.8)
BILIRUB SERPL-MCNC: 0.8 MG/DL (ref 0.2–1.8)
BILIRUB SERPL-MCNC: 1.1 MG/DL (ref 0.2–1.8)
BILIRUB SERPL-MCNC: 1.2 MG/DL (ref 0.2–1.8)
BILIRUB SERPL-MCNC: 1.5 MG/DL (ref 0.2–1.8)
BILIRUB SERPL-MCNC: 1.7 MG/DL (ref 0.2–1.8)
BILIRUB UR QL STRIP: NEGATIVE
BLD GP AB SCN SERPL QL: NEGATIVE
BNP SERPL-MCNC: 186 PG/ML (ref 0–100)
BODY TEMPERATURE: 98.6 C
BUN BLD-MCNC: 103 MG/DL (ref 7–21)
BUN BLD-MCNC: 22 MG/DL (ref 7–21)
BUN BLD-MCNC: 24 MG/DL (ref 7–21)
BUN BLD-MCNC: 24 MG/DL (ref 7–21)
BUN BLD-MCNC: 26 MG/DL (ref 7–21)
BUN BLD-MCNC: 28 MG/DL (ref 7–21)
BUN BLD-MCNC: 29 MG/DL (ref 7–21)
BUN BLD-MCNC: 30 MG/DL (ref 7–21)
BUN BLD-MCNC: 30 MG/DL (ref 7–21)
BUN BLD-MCNC: 35 MG/DL (ref 7–21)
BUN BLD-MCNC: 41 MG/DL (ref 7–21)
BUN BLD-MCNC: 49 MG/DL (ref 7–21)
BUN BLD-MCNC: 72 MG/DL (ref 7–21)
BUN BLD-MCNC: 76 MG/DL (ref 7–21)
BUN BLD-MCNC: 78 MG/DL (ref 7–21)
BUN BLD-MCNC: 79 MG/DL (ref 7–21)
BUN BLD-MCNC: 85 MG/DL (ref 7–21)
BUN BLD-MCNC: 88 MG/DL (ref 7–21)
BUN BLD-MCNC: 89 MG/DL (ref 7–21)
BUN BLD-MCNC: 95 MG/DL (ref 7–21)
BUN BLD-MCNC: 97 MG/DL (ref 7–21)
BUN/CREAT SERPL: 16.7 (ref 7–25)
BUN/CREAT SERPL: 17.4 (ref 7–25)
BUN/CREAT SERPL: 18.3 (ref 7–25)
BUN/CREAT SERPL: 18.8 (ref 7–25)
BUN/CREAT SERPL: 20.3 (ref 7–25)
BUN/CREAT SERPL: 21 (ref 7–25)
BUN/CREAT SERPL: 21.3 (ref 7–25)
BUN/CREAT SERPL: 22.4 (ref 7–25)
BUN/CREAT SERPL: 23 (ref 7–25)
BUN/CREAT SERPL: 23.6 (ref 7–25)
BUN/CREAT SERPL: 25 (ref 7–25)
BUN/CREAT SERPL: 32.1 (ref 7–25)
BUN/CREAT SERPL: 39.2 (ref 7–25)
BUN/CREAT SERPL: 44.3 (ref 7–25)
BUN/CREAT SERPL: 48.4 (ref 7–25)
BUN/CREAT SERPL: 50 (ref 7–25)
BUN/CREAT SERPL: 54.1 (ref 7–25)
BUN/CREAT SERPL: 55.5 (ref 7–25)
BUN/CREAT SERPL: 56.3 (ref 7–25)
BUN/CREAT SERPL: 56.5 (ref 7–25)
BUN/CREAT SERPL: 60.3 (ref 7–25)
C-ANCA TITR SER IF: NORMAL TITER
C-ANCA TITR SER IF: NORMAL TITER
C3 SERPL-MCNC: 86 MG/DL (ref 82–167)
C3 SERPL-MCNC: 90.2 MG/DL (ref 90–170)
C3 SERPL-MCNC: 94.6 MG/DL (ref 90–170)
C4 SERPL-MCNC: 14 MG/DL (ref 14–44)
C4 SERPL-MCNC: 17.8 MG/DL (ref 12–36)
C4 SERPL-MCNC: 22.6 MG/DL (ref 12–36)
CALCIUM SPEC-SCNC: 7.9 MG/DL (ref 7.7–10)
CALCIUM SPEC-SCNC: 8 MG/DL (ref 7.7–10)
CALCIUM SPEC-SCNC: 8.1 MG/DL (ref 7.7–10)
CALCIUM SPEC-SCNC: 8.2 MG/DL (ref 7.7–10)
CALCIUM SPEC-SCNC: 8.3 MG/DL (ref 7.7–10)
CALCIUM SPEC-SCNC: 8.3 MG/DL (ref 7.7–10)
CALCIUM SPEC-SCNC: 8.4 MG/DL (ref 7.7–10)
CALCIUM SPEC-SCNC: 8.6 MG/DL (ref 7.7–10)
CALCIUM SPEC-SCNC: 8.7 MG/DL (ref 7.7–10)
CALCIUM SPEC-SCNC: 8.7 MG/DL (ref 7.7–10)
CALCIUM SPEC-SCNC: 8.8 MG/DL (ref 7.7–10)
CALCIUM SPEC-SCNC: 8.8 MG/DL (ref 7.7–10)
CALCIUM SPEC-SCNC: 9 MG/DL (ref 7.7–10)
CALCIUM SPEC-SCNC: 9 MG/DL (ref 7.7–10)
CALCIUM SPEC-SCNC: 9.1 MG/DL (ref 7.7–10)
CALCIUM SPEC-SCNC: 9.2 MG/DL (ref 7.7–10)
CHLORIDE SERPL-SCNC: 100 MMOL/L (ref 99–112)
CHLORIDE SERPL-SCNC: 100 MMOL/L (ref 99–112)
CHLORIDE SERPL-SCNC: 102 MMOL/L (ref 99–112)
CHLORIDE SERPL-SCNC: 102 MMOL/L (ref 99–112)
CHLORIDE SERPL-SCNC: 103 MMOL/L (ref 99–112)
CHLORIDE SERPL-SCNC: 103 MMOL/L (ref 99–112)
CHLORIDE SERPL-SCNC: 104 MMOL/L (ref 99–112)
CHLORIDE SERPL-SCNC: 105 MMOL/L (ref 99–112)
CHLORIDE SERPL-SCNC: 105 MMOL/L (ref 99–112)
CHLORIDE SERPL-SCNC: 106 MMOL/L (ref 99–112)
CHLORIDE SERPL-SCNC: 107 MMOL/L (ref 99–112)
CHLORIDE SERPL-SCNC: 108 MMOL/L (ref 99–112)
CHLORIDE SERPL-SCNC: 109 MMOL/L (ref 99–112)
CHLORIDE SERPL-SCNC: 109 MMOL/L (ref 99–112)
CHLORIDE SERPL-SCNC: 110 MMOL/L (ref 99–112)
CHLORIDE SERPL-SCNC: 110 MMOL/L (ref 99–112)
CHROMATIN AB SERPL-ACNC: 9 IU/ML (ref 0–14)
CHROMATIN AB SERPL-ACNC: <0.2 AI (ref 0–0.9)
CHROMATIN AB SERPL-ACNC: <0.2 AI (ref 0–0.9)
CLARITY UR: ABNORMAL
CO2 SERPL-SCNC: 27.7 MMOL/L (ref 24.3–31.9)
CO2 SERPL-SCNC: 28.9 MMOL/L (ref 24.3–31.9)
CO2 SERPL-SCNC: 30 MMOL/L (ref 24.3–31.9)
CO2 SERPL-SCNC: 30.8 MMOL/L (ref 24.3–31.9)
CO2 SERPL-SCNC: 31.1 MMOL/L (ref 24.3–31.9)
CO2 SERPL-SCNC: 31.3 MMOL/L (ref 24.3–31.9)
CO2 SERPL-SCNC: 31.5 MMOL/L (ref 24.3–31.9)
CO2 SERPL-SCNC: 31.6 MMOL/L (ref 24.3–31.9)
CO2 SERPL-SCNC: 31.8 MMOL/L (ref 24.3–31.9)
CO2 SERPL-SCNC: 31.9 MMOL/L (ref 24.3–31.9)
CO2 SERPL-SCNC: 32.2 MMOL/L (ref 24.3–31.9)
CO2 SERPL-SCNC: 32.9 MMOL/L (ref 24.3–31.9)
CO2 SERPL-SCNC: 33.1 MMOL/L (ref 24.3–31.9)
CO2 SERPL-SCNC: 33.7 MMOL/L (ref 24.3–31.9)
CO2 SERPL-SCNC: 34.1 MMOL/L (ref 24.3–31.9)
CO2 SERPL-SCNC: 34.2 MMOL/L (ref 24.3–31.9)
CO2 SERPL-SCNC: 34.3 MMOL/L (ref 24.3–31.9)
CO2 SERPL-SCNC: 34.4 MMOL/L (ref 24.3–31.9)
CO2 SERPL-SCNC: 37.9 MMOL/L (ref 24.3–31.9)
CO2 SERPL-SCNC: 39.2 MMOL/L (ref 24.3–31.9)
CO2 SERPL-SCNC: >40 MMOL/L (ref 24.3–31.9)
COHGB MFR BLD: 0.9 % (ref 0–5)
COHGB MFR BLD: 1.3 % (ref 0–5)
COHGB MFR BLD: 1.3 % (ref 0–5)
COHGB MFR BLD: 1.4 % (ref 0–5)
COHGB MFR BLD: 1.7 % (ref 0–5)
COHGB MFR BLD: 1.7 % (ref 0–5)
COHGB MFR BLD: 1.8 % (ref 0–5)
COHGB MFR BLD: 1.9 % (ref 0–5)
COHGB MFR BLD: 2 % (ref 0–5)
COHGB MFR BLD: 2 % (ref 0–5)
COHGB MFR BLD: 2.1 % (ref 0–5)
COHGB MFR BLD: 2.1 % (ref 0–5)
COHGB MFR BLD: 2.2 % (ref 0–5)
COHGB MFR BLD: 2.4 % (ref 0–5)
COHGB MFR BLD: 2.5 % (ref 0–5)
COLOR UR: YELLOW
CREAT BLD-MCNC: 1.28 MG/DL (ref 0.43–1.29)
CREAT BLD-MCNC: 1.32 MG/DL (ref 0.43–1.29)
CREAT BLD-MCNC: 1.34 MG/DL (ref 0.43–1.29)
CREAT BLD-MCNC: 1.37 MG/DL (ref 0.43–1.29)
CREAT BLD-MCNC: 1.38 MG/DL (ref 0.43–1.29)
CREAT BLD-MCNC: 1.4 MG/DL (ref 0.43–1.29)
CREAT BLD-MCNC: 1.41 MG/DL (ref 0.43–1.29)
CREAT BLD-MCNC: 1.42 MG/DL (ref 0.43–1.29)
CREAT BLD-MCNC: 1.46 MG/DL (ref 0.43–1.29)
CREAT BLD-MCNC: 1.46 MG/DL (ref 0.43–1.29)
CREAT BLD-MCNC: 1.51 MG/DL (ref 0.43–1.29)
CREAT BLD-MCNC: 1.78 MG/DL (ref 0.43–1.29)
CREAT BLD-MCNC: 1.9 MG/DL (ref 0.43–1.29)
CREAT BLD-MCNC: 2.08 MG/DL (ref 0.43–1.29)
CREAT BLD-MCNC: 2.13 MG/DL (ref 0.43–1.29)
CREAT BLD-MCNC: 2.19 MG/DL (ref 0.43–1.29)
CREAT BLD-MCNC: 2.24 MG/DL (ref 0.43–1.29)
CREAT BLD-MCNC: 2.27 MG/DL (ref 0.43–1.29)
CREAT UR-MCNC: 44.6 MG/DL
CREAT UR-MCNC: 44.6 MG/DL
CREAT UR-MCNC: 45 MG/DL
CROSSMATCH INTERPRETATION: NORMAL
CRP SERPL-MCNC: 0.88 MG/DL (ref 0–0.99)
CRP SERPL-MCNC: 1.32 MG/DL (ref 0–0.99)
CRP SERPL-MCNC: 1.37 MG/DL (ref 0–0.99)
CRP SERPL-MCNC: 2.06 MG/DL (ref 0–0.99)
CRP SERPL-MCNC: 3.11 MG/DL (ref 0–0.99)
CRP SERPL-MCNC: 3.72 MG/DL (ref 0–0.99)
CRP SERPL-MCNC: 4.03 MG/DL (ref 0–0.99)
CRP SERPL-MCNC: 4.52 MG/DL (ref 0–0.99)
CRP SERPL-MCNC: 4.6 MG/DL (ref 0–0.99)
CRP SERPL-MCNC: 6.15 MG/DL (ref 0–0.99)
CRP SERPL-MCNC: 6.62 MG/DL (ref 0–0.99)
CRP SERPL-MCNC: 7.13 MG/DL (ref 0–0.99)
CRP SERPL-MCNC: 7.61 MG/DL (ref 0–0.99)
CRP SERPL-MCNC: 8.18 MG/DL (ref 0–0.99)
CRP SERPL-MCNC: <0.5 MG/DL (ref 0–0.99)
CYTOLOGIST CVX/VAG CYTO: NORMAL
D-LACTATE SERPL-SCNC: 1.1 MMOL/L (ref 0.5–2)
DEPRECATED RDW RBC AUTO: 47.5 FL (ref 37–54)
DEPRECATED RDW RBC AUTO: 47.7 FL (ref 37–54)
DEPRECATED RDW RBC AUTO: 47.8 FL (ref 37–54)
DEPRECATED RDW RBC AUTO: 48 FL (ref 37–54)
DEPRECATED RDW RBC AUTO: 48.2 FL (ref 37–54)
DEPRECATED RDW RBC AUTO: 48.2 FL (ref 37–54)
DEPRECATED RDW RBC AUTO: 48.4 FL (ref 37–54)
DEPRECATED RDW RBC AUTO: 48.5 FL (ref 37–54)
DEPRECATED RDW RBC AUTO: 48.8 FL (ref 37–54)
DEPRECATED RDW RBC AUTO: 49.1 FL (ref 37–54)
DEPRECATED RDW RBC AUTO: 49.2 FL (ref 37–54)
DEPRECATED RDW RBC AUTO: 49.8 FL (ref 37–54)
DEPRECATED RDW RBC AUTO: 50 FL (ref 37–54)
DEPRECATED RDW RBC AUTO: 50.4 FL (ref 37–54)
DEPRECATED RDW RBC AUTO: 51.1 FL (ref 37–54)
DEPRECATED RDW RBC AUTO: 51.2 FL (ref 37–54)
DEPRECATED RDW RBC AUTO: 51.2 FL (ref 37–54)
DEPRECATED RDW RBC AUTO: 52 FL (ref 37–54)
DEPRECATED RDW RBC AUTO: 52.8 FL (ref 37–54)
DEPRECATED RDW RBC AUTO: 53.9 FL (ref 37–54)
DEPRECATED RDW RBC AUTO: 54.8 FL (ref 37–54)
DEPRECATED RDW RBC AUTO: 54.9 FL (ref 37–54)
DSDNA AB SER-ACNC: <1 IU/ML (ref 0–9)
ENA RNP AB SER-ACNC: 0.2 AI (ref 0–0.9)
ENA SCL70 AB SER-ACNC: <0.2 AI (ref 0–0.9)
ENA SM AB SER-ACNC: <0.2 AI (ref 0–0.9)
ENA SS-A AB SER-ACNC: <0.2 AI (ref 0–0.9)
ENA SS-B AB SER-ACNC: <0.2 AI (ref 0–0.9)
EOSINOPHIL # BLD AUTO: 0 10*3/MM3 (ref 0–0.7)
EOSINOPHIL # BLD AUTO: 0.01 10*3/MM3 (ref 0–0.7)
EOSINOPHIL # BLD AUTO: 0.03 10*3/MM3 (ref 0–0.7)
EOSINOPHIL # BLD AUTO: 0.1 10*3/MM3 (ref 0–0.7)
EOSINOPHIL NFR BLD AUTO: 0 % (ref 0–7)
EOSINOPHIL NFR BLD AUTO: 0.1 % (ref 0–7)
EOSINOPHIL NFR BLD AUTO: 0.2 % (ref 0–7)
EOSINOPHIL NFR BLD AUTO: 0.2 % (ref 0–7)
EOSINOPHIL NFR BLD AUTO: 0.3 % (ref 0–7)
EOSINOPHIL NFR BLD AUTO: 2 % (ref 0–7)
EPAP: 8
ERYTHROCYTE [DISTWIDTH] IN BLOOD BY AUTOMATED COUNT: 14.7 % (ref 11.5–14.5)
ERYTHROCYTE [DISTWIDTH] IN BLOOD BY AUTOMATED COUNT: 14.7 % (ref 11.5–14.5)
ERYTHROCYTE [DISTWIDTH] IN BLOOD BY AUTOMATED COUNT: 14.8 % (ref 11.5–14.5)
ERYTHROCYTE [DISTWIDTH] IN BLOOD BY AUTOMATED COUNT: 14.9 % (ref 11.5–14.5)
ERYTHROCYTE [DISTWIDTH] IN BLOOD BY AUTOMATED COUNT: 15.1 % (ref 11.5–14.5)
ERYTHROCYTE [DISTWIDTH] IN BLOOD BY AUTOMATED COUNT: 15.2 % (ref 11.5–14.5)
ERYTHROCYTE [DISTWIDTH] IN BLOOD BY AUTOMATED COUNT: 15.2 % (ref 11.5–14.5)
ERYTHROCYTE [DISTWIDTH] IN BLOOD BY AUTOMATED COUNT: 15.3 % (ref 11.5–14.5)
ERYTHROCYTE [DISTWIDTH] IN BLOOD BY AUTOMATED COUNT: 15.4 % (ref 11.5–14.5)
ERYTHROCYTE [DISTWIDTH] IN BLOOD BY AUTOMATED COUNT: 15.4 % (ref 11.5–14.5)
ERYTHROCYTE [DISTWIDTH] IN BLOOD BY AUTOMATED COUNT: 15.6 % (ref 11.5–14.5)
ERYTHROCYTE [DISTWIDTH] IN BLOOD BY AUTOMATED COUNT: 15.7 % (ref 11.5–14.5)
ERYTHROCYTE [DISTWIDTH] IN BLOOD BY AUTOMATED COUNT: 16.3 % (ref 11.5–14.5)
ERYTHROCYTE [DISTWIDTH] IN BLOOD BY AUTOMATED COUNT: 16.7 % (ref 11.5–14.5)
ERYTHROCYTE [DISTWIDTH] IN BLOOD BY AUTOMATED COUNT: 16.9 % (ref 11.5–14.5)
FERRITIN SERPL-MCNC: 190 NG/ML (ref 21.9–321.7)
FIBRINOGEN PPP-MCNC: 303 MG/DL (ref 173–524)
FIBRINOGEN PPP-MCNC: 364 MG/DL (ref 173–524)
FOLATE SERPL-MCNC: 7.02 NG/ML (ref 5.4–20)
GAMMA GLOB SERPL ELPH-MCNC: 0.6 G/DL (ref 0.4–1.8)
GBM IGG SER-ACNC: 4 UNITS (ref 0–20)
GBM IGG SER-ACNC: 4 UNITS (ref 0–20)
GFR SERPL CREATININE-BSD FRML MDRD: 29 ML/MIN/1.73
GFR SERPL CREATININE-BSD FRML MDRD: 29 ML/MIN/1.73
GFR SERPL CREATININE-BSD FRML MDRD: 30 ML/MIN/1.73
GFR SERPL CREATININE-BSD FRML MDRD: 31 ML/MIN/1.73
GFR SERPL CREATININE-BSD FRML MDRD: 32 ML/MIN/1.73
GFR SERPL CREATININE-BSD FRML MDRD: 35 ML/MIN/1.73
GFR SERPL CREATININE-BSD FRML MDRD: 38 ML/MIN/1.73
GFR SERPL CREATININE-BSD FRML MDRD: 46 ML/MIN/1.73
GFR SERPL CREATININE-BSD FRML MDRD: 48 ML/MIN/1.73
GFR SERPL CREATININE-BSD FRML MDRD: 48 ML/MIN/1.73
GFR SERPL CREATININE-BSD FRML MDRD: 49 ML/MIN/1.73
GFR SERPL CREATININE-BSD FRML MDRD: 50 ML/MIN/1.73
GFR SERPL CREATININE-BSD FRML MDRD: 50 ML/MIN/1.73
GFR SERPL CREATININE-BSD FRML MDRD: 51 ML/MIN/1.73
GFR SERPL CREATININE-BSD FRML MDRD: 52 ML/MIN/1.73
GFR SERPL CREATININE-BSD FRML MDRD: 53 ML/MIN/1.73
GFR SERPL CREATININE-BSD FRML MDRD: 54 ML/MIN/1.73
GFR SERPL CREATININE-BSD FRML MDRD: 56 ML/MIN/1.73
GLOBULIN SER CALC-MCNC: 2.2 G/DL (ref 2.2–3.9)
GLOBULIN UR ELPH-MCNC: 1.9 GM/DL
GLOBULIN UR ELPH-MCNC: 2 GM/DL
GLOBULIN UR ELPH-MCNC: 2.1 GM/DL
GLOBULIN UR ELPH-MCNC: 2.2 GM/DL
GLOBULIN UR ELPH-MCNC: 2.6 GM/DL
GLUCOSE BLD-MCNC: 102 MG/DL (ref 70–110)
GLUCOSE BLD-MCNC: 106 MG/DL (ref 70–110)
GLUCOSE BLD-MCNC: 116 MG/DL (ref 70–110)
GLUCOSE BLD-MCNC: 118 MG/DL (ref 70–110)
GLUCOSE BLD-MCNC: 131 MG/DL (ref 70–110)
GLUCOSE BLD-MCNC: 136 MG/DL (ref 70–110)
GLUCOSE BLD-MCNC: 138 MG/DL (ref 70–110)
GLUCOSE BLD-MCNC: 139 MG/DL (ref 70–110)
GLUCOSE BLD-MCNC: 141 MG/DL (ref 70–110)
GLUCOSE BLD-MCNC: 145 MG/DL (ref 70–110)
GLUCOSE BLD-MCNC: 146 MG/DL (ref 70–110)
GLUCOSE BLD-MCNC: 160 MG/DL (ref 70–110)
GLUCOSE BLD-MCNC: 182 MG/DL (ref 70–110)
GLUCOSE BLD-MCNC: 184 MG/DL (ref 70–110)
GLUCOSE BLD-MCNC: 187 MG/DL (ref 70–110)
GLUCOSE BLD-MCNC: 202 MG/DL (ref 70–110)
GLUCOSE BLD-MCNC: 271 MG/DL (ref 70–110)
GLUCOSE BLD-MCNC: 307 MG/DL (ref 70–110)
GLUCOSE BLD-MCNC: 95 MG/DL (ref 70–110)
GLUCOSE BLDC GLUCOMTR-MCNC: 105 MG/DL (ref 70–130)
GLUCOSE BLDC GLUCOMTR-MCNC: 105 MG/DL (ref 70–130)
GLUCOSE BLDC GLUCOMTR-MCNC: 106 MG/DL (ref 70–130)
GLUCOSE BLDC GLUCOMTR-MCNC: 106 MG/DL (ref 70–130)
GLUCOSE BLDC GLUCOMTR-MCNC: 107 MG/DL (ref 70–130)
GLUCOSE BLDC GLUCOMTR-MCNC: 108 MG/DL (ref 70–130)
GLUCOSE BLDC GLUCOMTR-MCNC: 108 MG/DL (ref 70–130)
GLUCOSE BLDC GLUCOMTR-MCNC: 109 MG/DL (ref 70–130)
GLUCOSE BLDC GLUCOMTR-MCNC: 110 MG/DL (ref 70–130)
GLUCOSE BLDC GLUCOMTR-MCNC: 110 MG/DL (ref 70–130)
GLUCOSE BLDC GLUCOMTR-MCNC: 112 MG/DL (ref 70–130)
GLUCOSE BLDC GLUCOMTR-MCNC: 113 MG/DL (ref 70–130)
GLUCOSE BLDC GLUCOMTR-MCNC: 113 MG/DL (ref 70–130)
GLUCOSE BLDC GLUCOMTR-MCNC: 118 MG/DL (ref 70–130)
GLUCOSE BLDC GLUCOMTR-MCNC: 119 MG/DL (ref 70–130)
GLUCOSE BLDC GLUCOMTR-MCNC: 120 MG/DL (ref 70–130)
GLUCOSE BLDC GLUCOMTR-MCNC: 121 MG/DL (ref 70–130)
GLUCOSE BLDC GLUCOMTR-MCNC: 122 MG/DL (ref 70–130)
GLUCOSE BLDC GLUCOMTR-MCNC: 122 MG/DL (ref 70–130)
GLUCOSE BLDC GLUCOMTR-MCNC: 124 MG/DL (ref 70–130)
GLUCOSE BLDC GLUCOMTR-MCNC: 125 MG/DL (ref 70–130)
GLUCOSE BLDC GLUCOMTR-MCNC: 126 MG/DL (ref 70–130)
GLUCOSE BLDC GLUCOMTR-MCNC: 127 MG/DL (ref 70–130)
GLUCOSE BLDC GLUCOMTR-MCNC: 129 MG/DL (ref 70–130)
GLUCOSE BLDC GLUCOMTR-MCNC: 130 MG/DL (ref 70–130)
GLUCOSE BLDC GLUCOMTR-MCNC: 131 MG/DL (ref 70–130)
GLUCOSE BLDC GLUCOMTR-MCNC: 132 MG/DL (ref 70–130)
GLUCOSE BLDC GLUCOMTR-MCNC: 134 MG/DL (ref 70–130)
GLUCOSE BLDC GLUCOMTR-MCNC: 134 MG/DL (ref 70–130)
GLUCOSE BLDC GLUCOMTR-MCNC: 135 MG/DL (ref 70–130)
GLUCOSE BLDC GLUCOMTR-MCNC: 136 MG/DL (ref 70–130)
GLUCOSE BLDC GLUCOMTR-MCNC: 137 MG/DL (ref 70–130)
GLUCOSE BLDC GLUCOMTR-MCNC: 138 MG/DL (ref 70–130)
GLUCOSE BLDC GLUCOMTR-MCNC: 139 MG/DL (ref 70–130)
GLUCOSE BLDC GLUCOMTR-MCNC: 139 MG/DL (ref 70–130)
GLUCOSE BLDC GLUCOMTR-MCNC: 140 MG/DL (ref 70–130)
GLUCOSE BLDC GLUCOMTR-MCNC: 140 MG/DL (ref 70–130)
GLUCOSE BLDC GLUCOMTR-MCNC: 142 MG/DL (ref 70–130)
GLUCOSE BLDC GLUCOMTR-MCNC: 144 MG/DL (ref 70–130)
GLUCOSE BLDC GLUCOMTR-MCNC: 145 MG/DL (ref 70–130)
GLUCOSE BLDC GLUCOMTR-MCNC: 145 MG/DL (ref 70–130)
GLUCOSE BLDC GLUCOMTR-MCNC: 148 MG/DL (ref 70–130)
GLUCOSE BLDC GLUCOMTR-MCNC: 149 MG/DL (ref 70–130)
GLUCOSE BLDC GLUCOMTR-MCNC: 149 MG/DL (ref 70–130)
GLUCOSE BLDC GLUCOMTR-MCNC: 150 MG/DL (ref 70–130)
GLUCOSE BLDC GLUCOMTR-MCNC: 151 MG/DL (ref 70–130)
GLUCOSE BLDC GLUCOMTR-MCNC: 151 MG/DL (ref 70–130)
GLUCOSE BLDC GLUCOMTR-MCNC: 152 MG/DL (ref 70–130)
GLUCOSE BLDC GLUCOMTR-MCNC: 152 MG/DL (ref 70–130)
GLUCOSE BLDC GLUCOMTR-MCNC: 153 MG/DL (ref 70–130)
GLUCOSE BLDC GLUCOMTR-MCNC: 155 MG/DL (ref 70–130)
GLUCOSE BLDC GLUCOMTR-MCNC: 156 MG/DL (ref 70–130)
GLUCOSE BLDC GLUCOMTR-MCNC: 156 MG/DL (ref 70–130)
GLUCOSE BLDC GLUCOMTR-MCNC: 157 MG/DL (ref 70–130)
GLUCOSE BLDC GLUCOMTR-MCNC: 158 MG/DL (ref 70–130)
GLUCOSE BLDC GLUCOMTR-MCNC: 158 MG/DL (ref 70–130)
GLUCOSE BLDC GLUCOMTR-MCNC: 159 MG/DL (ref 70–130)
GLUCOSE BLDC GLUCOMTR-MCNC: 161 MG/DL (ref 70–130)
GLUCOSE BLDC GLUCOMTR-MCNC: 162 MG/DL (ref 70–130)
GLUCOSE BLDC GLUCOMTR-MCNC: 163 MG/DL (ref 70–130)
GLUCOSE BLDC GLUCOMTR-MCNC: 163 MG/DL (ref 70–130)
GLUCOSE BLDC GLUCOMTR-MCNC: 164 MG/DL (ref 70–130)
GLUCOSE BLDC GLUCOMTR-MCNC: 165 MG/DL (ref 70–130)
GLUCOSE BLDC GLUCOMTR-MCNC: 167 MG/DL (ref 70–130)
GLUCOSE BLDC GLUCOMTR-MCNC: 169 MG/DL (ref 70–130)
GLUCOSE BLDC GLUCOMTR-MCNC: 170 MG/DL (ref 70–130)
GLUCOSE BLDC GLUCOMTR-MCNC: 171 MG/DL (ref 70–130)
GLUCOSE BLDC GLUCOMTR-MCNC: 172 MG/DL (ref 70–130)
GLUCOSE BLDC GLUCOMTR-MCNC: 172 MG/DL (ref 70–130)
GLUCOSE BLDC GLUCOMTR-MCNC: 173 MG/DL (ref 70–130)
GLUCOSE BLDC GLUCOMTR-MCNC: 173 MG/DL (ref 70–130)
GLUCOSE BLDC GLUCOMTR-MCNC: 174 MG/DL (ref 70–130)
GLUCOSE BLDC GLUCOMTR-MCNC: 175 MG/DL (ref 70–130)
GLUCOSE BLDC GLUCOMTR-MCNC: 176 MG/DL (ref 70–130)
GLUCOSE BLDC GLUCOMTR-MCNC: 176 MG/DL (ref 70–130)
GLUCOSE BLDC GLUCOMTR-MCNC: 177 MG/DL (ref 70–130)
GLUCOSE BLDC GLUCOMTR-MCNC: 178 MG/DL (ref 70–130)
GLUCOSE BLDC GLUCOMTR-MCNC: 179 MG/DL (ref 70–130)
GLUCOSE BLDC GLUCOMTR-MCNC: 179 MG/DL (ref 70–130)
GLUCOSE BLDC GLUCOMTR-MCNC: 180 MG/DL (ref 70–130)
GLUCOSE BLDC GLUCOMTR-MCNC: 180 MG/DL (ref 70–130)
GLUCOSE BLDC GLUCOMTR-MCNC: 181 MG/DL (ref 70–130)
GLUCOSE BLDC GLUCOMTR-MCNC: 181 MG/DL (ref 70–130)
GLUCOSE BLDC GLUCOMTR-MCNC: 183 MG/DL (ref 70–130)
GLUCOSE BLDC GLUCOMTR-MCNC: 184 MG/DL (ref 70–130)
GLUCOSE BLDC GLUCOMTR-MCNC: 185 MG/DL (ref 70–130)
GLUCOSE BLDC GLUCOMTR-MCNC: 186 MG/DL (ref 70–130)
GLUCOSE BLDC GLUCOMTR-MCNC: 186 MG/DL (ref 70–130)
GLUCOSE BLDC GLUCOMTR-MCNC: 187 MG/DL (ref 70–130)
GLUCOSE BLDC GLUCOMTR-MCNC: 189 MG/DL (ref 70–130)
GLUCOSE BLDC GLUCOMTR-MCNC: 189 MG/DL (ref 70–130)
GLUCOSE BLDC GLUCOMTR-MCNC: 190 MG/DL (ref 70–130)
GLUCOSE BLDC GLUCOMTR-MCNC: 192 MG/DL (ref 70–130)
GLUCOSE BLDC GLUCOMTR-MCNC: 193 MG/DL (ref 70–130)
GLUCOSE BLDC GLUCOMTR-MCNC: 193 MG/DL (ref 70–130)
GLUCOSE BLDC GLUCOMTR-MCNC: 194 MG/DL (ref 70–130)
GLUCOSE BLDC GLUCOMTR-MCNC: 197 MG/DL (ref 70–130)
GLUCOSE BLDC GLUCOMTR-MCNC: 199 MG/DL (ref 70–130)
GLUCOSE BLDC GLUCOMTR-MCNC: 200 MG/DL (ref 70–130)
GLUCOSE BLDC GLUCOMTR-MCNC: 200 MG/DL (ref 70–130)
GLUCOSE BLDC GLUCOMTR-MCNC: 202 MG/DL (ref 70–130)
GLUCOSE BLDC GLUCOMTR-MCNC: 203 MG/DL (ref 70–130)
GLUCOSE BLDC GLUCOMTR-MCNC: 208 MG/DL (ref 70–130)
GLUCOSE BLDC GLUCOMTR-MCNC: 210 MG/DL (ref 70–130)
GLUCOSE BLDC GLUCOMTR-MCNC: 213 MG/DL (ref 70–130)
GLUCOSE BLDC GLUCOMTR-MCNC: 216 MG/DL (ref 70–130)
GLUCOSE BLDC GLUCOMTR-MCNC: 218 MG/DL (ref 70–130)
GLUCOSE BLDC GLUCOMTR-MCNC: 221 MG/DL (ref 70–130)
GLUCOSE BLDC GLUCOMTR-MCNC: 225 MG/DL (ref 70–130)
GLUCOSE BLDC GLUCOMTR-MCNC: 226 MG/DL (ref 70–130)
GLUCOSE BLDC GLUCOMTR-MCNC: 229 MG/DL (ref 70–130)
GLUCOSE BLDC GLUCOMTR-MCNC: 232 MG/DL (ref 70–130)
GLUCOSE BLDC GLUCOMTR-MCNC: 240 MG/DL (ref 70–130)
GLUCOSE BLDC GLUCOMTR-MCNC: 241 MG/DL (ref 70–130)
GLUCOSE BLDC GLUCOMTR-MCNC: 244 MG/DL (ref 70–130)
GLUCOSE BLDC GLUCOMTR-MCNC: 245 MG/DL (ref 70–130)
GLUCOSE BLDC GLUCOMTR-MCNC: 245 MG/DL (ref 70–130)
GLUCOSE BLDC GLUCOMTR-MCNC: 249 MG/DL (ref 70–130)
GLUCOSE BLDC GLUCOMTR-MCNC: 250 MG/DL (ref 70–130)
GLUCOSE BLDC GLUCOMTR-MCNC: 263 MG/DL (ref 70–130)
GLUCOSE BLDC GLUCOMTR-MCNC: 263 MG/DL (ref 70–130)
GLUCOSE BLDC GLUCOMTR-MCNC: 272 MG/DL (ref 70–130)
GLUCOSE BLDC GLUCOMTR-MCNC: 274 MG/DL (ref 70–130)
GLUCOSE BLDC GLUCOMTR-MCNC: 275 MG/DL (ref 70–130)
GLUCOSE BLDC GLUCOMTR-MCNC: 297 MG/DL (ref 70–130)
GLUCOSE BLDC GLUCOMTR-MCNC: 314 MG/DL (ref 70–130)
GLUCOSE BLDC GLUCOMTR-MCNC: 331 MG/DL (ref 70–130)
GLUCOSE BLDC GLUCOMTR-MCNC: 337 MG/DL (ref 70–130)
GLUCOSE BLDC GLUCOMTR-MCNC: 337 MG/DL (ref 70–130)
GLUCOSE BLDC GLUCOMTR-MCNC: 345 MG/DL (ref 70–130)
GLUCOSE BLDC GLUCOMTR-MCNC: 355 MG/DL (ref 70–130)
GLUCOSE BLDC GLUCOMTR-MCNC: 355 MG/DL (ref 70–130)
GLUCOSE BLDC GLUCOMTR-MCNC: 364 MG/DL (ref 70–130)
GLUCOSE BLDC GLUCOMTR-MCNC: 369 MG/DL (ref 70–130)
GLUCOSE BLDC GLUCOMTR-MCNC: 374 MG/DL (ref 70–130)
GLUCOSE BLDC GLUCOMTR-MCNC: 379 MG/DL (ref 70–130)
GLUCOSE BLDC GLUCOMTR-MCNC: 388 MG/DL (ref 70–130)
GLUCOSE BLDC GLUCOMTR-MCNC: 50 MG/DL (ref 70–130)
GLUCOSE BLDC GLUCOMTR-MCNC: 52 MG/DL (ref 70–130)
GLUCOSE BLDC GLUCOMTR-MCNC: 63 MG/DL (ref 70–130)
GLUCOSE BLDC GLUCOMTR-MCNC: 63 MG/DL (ref 70–130)
GLUCOSE BLDC GLUCOMTR-MCNC: 73 MG/DL (ref 70–130)
GLUCOSE BLDC GLUCOMTR-MCNC: 80 MG/DL (ref 70–130)
GLUCOSE BLDC GLUCOMTR-MCNC: 80 MG/DL (ref 70–130)
GLUCOSE BLDC GLUCOMTR-MCNC: 95 MG/DL (ref 70–130)
GLUCOSE BLDC GLUCOMTR-MCNC: 98 MG/DL (ref 70–130)
GLUCOSE BLDC GLUCOMTR-MCNC: 99 MG/DL (ref 70–130)
GLUCOSE UR STRIP-MCNC: NEGATIVE MG/DL
GRAM STN SPEC: ABNORMAL
GRAM STN SPEC: NORMAL
HAV IGM SERPL QL IA: NORMAL
HBV CORE IGM SERPL QL IA: NORMAL
HBV SURFACE AG SERPL QL IA: NORMAL
HCO3 BLDA-SCNC: 28.1 MMOL/L (ref 22–26)
HCO3 BLDA-SCNC: 28.3 MMOL/L (ref 22–26)
HCO3 BLDA-SCNC: 28.4 MMOL/L (ref 22–26)
HCO3 BLDA-SCNC: 30.4 MMOL/L (ref 22–26)
HCO3 BLDA-SCNC: 30.8 MMOL/L (ref 22–26)
HCO3 BLDA-SCNC: 31.4 MMOL/L (ref 22–26)
HCO3 BLDA-SCNC: 31.6 MMOL/L (ref 22–26)
HCO3 BLDA-SCNC: 31.7 MMOL/L (ref 22–26)
HCO3 BLDA-SCNC: 31.9 MMOL/L (ref 22–26)
HCO3 BLDA-SCNC: 32.2 MMOL/L (ref 22–26)
HCO3 BLDA-SCNC: 32.9 MMOL/L (ref 22–26)
HCO3 BLDA-SCNC: 33.1 MMOL/L (ref 22–26)
HCO3 BLDA-SCNC: 33.5 MMOL/L (ref 22–26)
HCO3 BLDA-SCNC: 33.7 MMOL/L (ref 22–26)
HCO3 BLDA-SCNC: 35.4 MMOL/L (ref 22–26)
HCO3 BLDA-SCNC: 35.4 MMOL/L (ref 22–26)
HCO3 BLDA-SCNC: 35.8 MMOL/L (ref 22–26)
HCO3 BLDA-SCNC: 41.2 MMOL/L (ref 22–26)
HCO3 BLDA-SCNC: 42.8 MMOL/L (ref 22–26)
HCT VFR BLD AUTO: 21.6 % (ref 42–52)
HCT VFR BLD AUTO: 21.9 % (ref 42–52)
HCT VFR BLD AUTO: 22.2 % (ref 42–52)
HCT VFR BLD AUTO: 22.5 % (ref 42–52)
HCT VFR BLD AUTO: 23 % (ref 42–52)
HCT VFR BLD AUTO: 23.1 % (ref 42–52)
HCT VFR BLD AUTO: 23.2 % (ref 42–52)
HCT VFR BLD AUTO: 23.3 % (ref 42–52)
HCT VFR BLD AUTO: 23.7 % (ref 42–52)
HCT VFR BLD AUTO: 23.9 % (ref 42–52)
HCT VFR BLD AUTO: 23.9 % (ref 42–52)
HCT VFR BLD AUTO: 24 % (ref 42–52)
HCT VFR BLD AUTO: 24 % (ref 42–52)
HCT VFR BLD AUTO: 24.1 % (ref 42–52)
HCT VFR BLD AUTO: 24.2 % (ref 42–52)
HCT VFR BLD AUTO: 24.3 % (ref 42–52)
HCT VFR BLD AUTO: 24.3 % (ref 42–52)
HCT VFR BLD AUTO: 24.8 % (ref 42–52)
HCT VFR BLD AUTO: 24.8 % (ref 42–52)
HCT VFR BLD AUTO: 25.4 % (ref 42–52)
HCT VFR BLD AUTO: 25.4 % (ref 42–52)
HCT VFR BLD AUTO: 25.7 % (ref 42–52)
HCT VFR BLD AUTO: 26.2 % (ref 42–52)
HCT VFR BLD AUTO: 26.3 % (ref 42–52)
HCT VFR BLD AUTO: 26.4 % (ref 42–52)
HCT VFR BLD AUTO: 27 % (ref 42–52)
HCT VFR BLD AUTO: 27.1 % (ref 42–52)
HCT VFR BLD AUTO: 28.7 % (ref 42–52)
HCT VFR BLD CALC: 21 % (ref 42–52)
HCT VFR BLD CALC: 22 % (ref 42–52)
HCT VFR BLD CALC: 22 % (ref 42–52)
HCT VFR BLD CALC: 23 % (ref 42–52)
HCT VFR BLD CALC: 24 % (ref 42–52)
HCT VFR BLD CALC: 25 % (ref 42–52)
HCT VFR BLD CALC: 25 % (ref 42–52)
HCT VFR BLD CALC: 26 % (ref 42–52)
HCT VFR BLD CALC: 28 % (ref 42–52)
HCV AB SER DONR QL: NORMAL
HGB BLD-MCNC: 6.4 G/DL (ref 14–18)
HGB BLD-MCNC: 6.6 G/DL (ref 14–18)
HGB BLD-MCNC: 6.7 G/DL (ref 14–18)
HGB BLD-MCNC: 6.8 G/DL (ref 14–18)
HGB BLD-MCNC: 7 G/DL (ref 14–18)
HGB BLD-MCNC: 7.2 G/DL (ref 14–18)
HGB BLD-MCNC: 7.2 G/DL (ref 14–18)
HGB BLD-MCNC: 7.3 G/DL (ref 14–18)
HGB BLD-MCNC: 7.5 G/DL (ref 14–18)
HGB BLD-MCNC: 7.6 G/DL (ref 14–18)
HGB BLD-MCNC: 7.8 G/DL (ref 14–18)
HGB BLD-MCNC: 8 G/DL (ref 14–18)
HGB BLD-MCNC: 8.1 G/DL (ref 14–18)
HGB BLD-MCNC: 8.2 G/DL (ref 14–18)
HGB BLD-MCNC: 8.3 G/DL (ref 14–18)
HGB BLD-MCNC: 8.4 G/DL (ref 14–18)
HGB BLD-MCNC: 8.4 G/DL (ref 14–18)
HGB BLD-MCNC: 8.8 G/DL (ref 14–18)
HGB BLDA-MCNC: 7.2 G/DL (ref 12–16)
HGB BLDA-MCNC: 7.4 G/DL (ref 12–16)
HGB BLDA-MCNC: 7.6 G/DL (ref 12–16)
HGB BLDA-MCNC: 7.7 G/DL (ref 12–16)
HGB BLDA-MCNC: 7.7 G/DL (ref 12–16)
HGB BLDA-MCNC: 7.8 G/DL (ref 12–16)
HGB BLDA-MCNC: 7.9 G/DL (ref 12–16)
HGB BLDA-MCNC: 8 G/DL (ref 12–16)
HGB BLDA-MCNC: 8.1 G/DL (ref 12–16)
HGB BLDA-MCNC: 8.2 G/DL (ref 12–16)
HGB BLDA-MCNC: 8.3 G/DL (ref 12–16)
HGB BLDA-MCNC: 8.5 G/DL (ref 12–16)
HGB BLDA-MCNC: 8.5 G/DL (ref 12–16)
HGB BLDA-MCNC: 8.7 G/DL (ref 12–16)
HGB BLDA-MCNC: 9.6 G/DL (ref 12–16)
HGB UR QL STRIP.AUTO: NEGATIVE
HISTONE IGG SER IA-ACNC: 0.2 UNITS (ref 0–0.9)
HOROWITZ INDEX BLD+IHG-RTO: 100 %
HOROWITZ INDEX BLD+IHG-RTO: 40 %
HOROWITZ INDEX BLD+IHG-RTO: 45 %
HOROWITZ INDEX BLD+IHG-RTO: 50 %
HOROWITZ INDEX BLD+IHG-RTO: 60 %
HOROWITZ INDEX BLD+IHG-RTO: 65 %
HOROWITZ INDEX BLD+IHG-RTO: 70 %
HOROWITZ INDEX BLD+IHG-RTO: 70 %
HOROWITZ INDEX BLD+IHG-RTO: 75 %
HOROWITZ INDEX BLD+IHG-RTO: 80 %
HOROWITZ INDEX BLD+IHG-RTO: 85 %
HOROWITZ INDEX BLD+IHG-RTO: 90 %
IGA SERPL-MCNC: 214 MG/DL (ref 61–437)
IGG SERPL-MCNC: 480 MG/DL (ref 700–1600)
IGM SERPL-MCNC: 134 MG/DL (ref 20–172)
IMM GRANULOCYTES # BLD: 0.01 10*3/MM3 (ref 0–0.03)
IMM GRANULOCYTES # BLD: 0.02 10*3/MM3 (ref 0–0.03)
IMM GRANULOCYTES # BLD: 0.03 10*3/MM3 (ref 0–0.03)
IMM GRANULOCYTES # BLD: 0.03 10*3/MM3 (ref 0–0.03)
IMM GRANULOCYTES # BLD: 0.04 10*3/MM3 (ref 0–0.03)
IMM GRANULOCYTES # BLD: 0.04 10*3/MM3 (ref 0–0.03)
IMM GRANULOCYTES # BLD: 0.06 10*3/MM3 (ref 0–0.03)
IMM GRANULOCYTES # BLD: 0.07 10*3/MM3 (ref 0–0.03)
IMM GRANULOCYTES # BLD: 0.07 10*3/MM3 (ref 0–0.03)
IMM GRANULOCYTES # BLD: 0.1 10*3/MM3 (ref 0–0.03)
IMM GRANULOCYTES # BLD: 0.11 10*3/MM3 (ref 0–0.03)
IMM GRANULOCYTES # BLD: 0.12 10*3/MM3 (ref 0–0.03)
IMM GRANULOCYTES # BLD: 0.12 10*3/MM3 (ref 0–0.03)
IMM GRANULOCYTES # BLD: 0.2 10*3/MM3 (ref 0–0.03)
IMM GRANULOCYTES NFR BLD: 0.1 % (ref 0–0.5)
IMM GRANULOCYTES NFR BLD: 0.2 % (ref 0–0.5)
IMM GRANULOCYTES NFR BLD: 0.3 % (ref 0–0.5)
IMM GRANULOCYTES NFR BLD: 0.5 % (ref 0–0.5)
IMM GRANULOCYTES NFR BLD: 0.5 % (ref 0–0.5)
IMM GRANULOCYTES NFR BLD: 0.8 % (ref 0–0.5)
IMM GRANULOCYTES NFR BLD: 1.1 % (ref 0–0.5)
IMM GRANULOCYTES NFR BLD: 1.2 % (ref 0–0.5)
IMM GRANULOCYTES NFR BLD: 1.4 % (ref 0–0.5)
IMM GRANULOCYTES NFR BLD: 1.5 % (ref 0–0.5)
IMM GRANULOCYTES NFR BLD: 1.6 % (ref 0–0.5)
IMM GRANULOCYTES NFR BLD: 1.8 % (ref 0–0.5)
IMM GRANULOCYTES NFR BLD: 2.2 % (ref 0–0.5)
INR PPP: 1.11 (ref 0.9–1.1)
INR PPP: 1.11 (ref 0.9–1.1)
INR PPP: 1.18 (ref 0.9–1.1)
INR PPP: 1.2 (ref 0.9–1.1)
INR PPP: 1.25 (ref 0.9–1.1)
INR PPP: 1.27 (ref 0.9–1.1)
INTERPRETATION SERPL IEP-IMP: ABNORMAL
IPAP: 20
IRON 24H UR-MRATE: 28 MCG/DL (ref 53–167)
IRON SATN MFR SERPL: 12 % (ref 20–50)
KAPPA LC SERPL-MCNC: 32.7 MG/L (ref 3.3–19.4)
KAPPA LC/LAMBDA SER: 0.87 {RATIO} (ref 0.26–1.65)
KETONES UR QL STRIP: NEGATIVE
KOH PREP NAIL: NORMAL
LAMBDA LC FREE SERPL-MCNC: 37.7 MG/L (ref 5.7–26.3)
LEUKOCYTE ESTERASE UR QL STRIP.AUTO: NEGATIVE
LYMPHOCYTES # BLD AUTO: 0.11 10*3/MM3 (ref 1–3)
LYMPHOCYTES # BLD AUTO: 0.12 10*3/MM3 (ref 1–3)
LYMPHOCYTES # BLD AUTO: 0.13 10*3/MM3 (ref 1–3)
LYMPHOCYTES # BLD AUTO: 0.15 10*3/MM3 (ref 1–3)
LYMPHOCYTES # BLD AUTO: 0.17 10*3/MM3 (ref 1–3)
LYMPHOCYTES # BLD AUTO: 0.2 10*3/MM3 (ref 1–3)
LYMPHOCYTES # BLD AUTO: 0.21 10*3/MM3 (ref 1–3)
LYMPHOCYTES # BLD AUTO: 0.23 10*3/MM3 (ref 1–3)
LYMPHOCYTES # BLD AUTO: 0.24 10*3/MM3 (ref 1–3)
LYMPHOCYTES # BLD AUTO: 0.26 10*3/MM3 (ref 1–3)
LYMPHOCYTES # BLD AUTO: 0.36 10*3/MM3 (ref 1–3)
LYMPHOCYTES # BLD AUTO: 0.43 10*3/MM3 (ref 1–3)
LYMPHOCYTES # BLD AUTO: 0.44 10*3/MM3 (ref 1–3)
LYMPHOCYTES # BLD AUTO: 0.48 10*3/MM3 (ref 1–3)
LYMPHOCYTES # BLD AUTO: 0.51 10*3/MM3 (ref 1–3)
LYMPHOCYTES # BLD AUTO: 0.76 10*3/MM3 (ref 1–3)
LYMPHOCYTES # BLD AUTO: 0.93 10*3/MM3 (ref 1–3)
LYMPHOCYTES # BLD AUTO: 0.97 10*3/MM3 (ref 1–3)
LYMPHOCYTES # BLD AUTO: 0.99 10*3/MM3 (ref 1–3)
LYMPHOCYTES # BLD AUTO: 1.08 10*3/MM3 (ref 1–3)
LYMPHOCYTES # BLD AUTO: 1.26 10*3/MM3 (ref 1–3)
LYMPHOCYTES NFR BLD AUTO: 0.8 % (ref 16–46)
LYMPHOCYTES NFR BLD AUTO: 10.4 % (ref 16–46)
LYMPHOCYTES NFR BLD AUTO: 13.9 % (ref 16–46)
LYMPHOCYTES NFR BLD AUTO: 14.6 % (ref 16–46)
LYMPHOCYTES NFR BLD AUTO: 14.8 % (ref 16–46)
LYMPHOCYTES NFR BLD AUTO: 15.3 % (ref 16–46)
LYMPHOCYTES NFR BLD AUTO: 2.5 % (ref 16–46)
LYMPHOCYTES NFR BLD AUTO: 2.7 % (ref 16–46)
LYMPHOCYTES NFR BLD AUTO: 2.8 % (ref 16–46)
LYMPHOCYTES NFR BLD AUTO: 2.9 % (ref 16–46)
LYMPHOCYTES NFR BLD AUTO: 3 % (ref 16–46)
LYMPHOCYTES NFR BLD AUTO: 3.1 % (ref 16–46)
LYMPHOCYTES NFR BLD AUTO: 3.5 % (ref 16–46)
LYMPHOCYTES NFR BLD AUTO: 3.5 % (ref 16–46)
LYMPHOCYTES NFR BLD AUTO: 3.6 % (ref 16–46)
LYMPHOCYTES NFR BLD AUTO: 5.6 % (ref 16–46)
LYMPHOCYTES NFR BLD AUTO: 6.3 % (ref 16–46)
LYMPHOCYTES NFR BLD AUTO: 7.1 % (ref 16–46)
LYMPHOCYTES NFR BLD AUTO: 7.3 % (ref 16–46)
LYMPHOCYTES NFR BLD AUTO: 8.9 % (ref 16–46)
LYMPHOCYTES NFR BLD AUTO: 9.4 % (ref 16–46)
Lab: ABNORMAL
Lab: NORMAL
Lab: NORMAL
M-SPIKE: ABNORMAL G/DL
MAGNESIUM SERPL-MCNC: 2.2 MG/DL (ref 1.7–2.6)
MAGNESIUM SERPL-MCNC: 2.5 MG/DL (ref 1.7–2.6)
MAGNESIUM SERPL-MCNC: 2.7 MG/DL (ref 1.7–2.6)
MAGNESIUM SERPL-MCNC: 2.9 MG/DL (ref 1.7–2.6)
MAGNESIUM SERPL-MCNC: 3 MG/DL (ref 1.7–2.6)
MAGNESIUM SERPL-MCNC: 3 MG/DL (ref 1.7–2.6)
MAGNESIUM SERPL-MCNC: 3.1 MG/DL (ref 1.7–2.6)
MAGNESIUM SERPL-MCNC: 3.2 MG/DL (ref 1.7–2.6)
MAGNESIUM SERPL-MCNC: 3.2 MG/DL (ref 1.7–2.6)
MAGNESIUM SERPL-MCNC: 3.3 MG/DL (ref 1.7–2.6)
MAGNESIUM SERPL-MCNC: 3.4 MG/DL (ref 1.7–2.6)
MAXIMAL PREDICTED HEART RATE: 151 BPM
MCH RBC QN AUTO: 28.3 PG (ref 27–33)
MCH RBC QN AUTO: 28.4 PG (ref 27–33)
MCH RBC QN AUTO: 28.6 PG (ref 27–33)
MCH RBC QN AUTO: 28.6 PG (ref 27–33)
MCH RBC QN AUTO: 28.7 PG (ref 27–33)
MCH RBC QN AUTO: 28.8 PG (ref 27–33)
MCH RBC QN AUTO: 28.8 PG (ref 27–33)
MCH RBC QN AUTO: 28.9 PG (ref 27–33)
MCH RBC QN AUTO: 29 PG (ref 27–33)
MCH RBC QN AUTO: 29.1 PG (ref 27–33)
MCH RBC QN AUTO: 29.2 PG (ref 27–33)
MCH RBC QN AUTO: 29.3 PG (ref 27–33)
MCH RBC QN AUTO: 29.5 PG (ref 27–33)
MCH RBC QN AUTO: 29.6 PG (ref 27–33)
MCHC RBC AUTO-ENTMCNC: 29.5 G/DL (ref 33–37)
MCHC RBC AUTO-ENTMCNC: 29.6 G/DL (ref 33–37)
MCHC RBC AUTO-ENTMCNC: 29.9 G/DL (ref 33–37)
MCHC RBC AUTO-ENTMCNC: 30 G/DL (ref 33–37)
MCHC RBC AUTO-ENTMCNC: 30 G/DL (ref 33–37)
MCHC RBC AUTO-ENTMCNC: 30.1 G/DL (ref 33–37)
MCHC RBC AUTO-ENTMCNC: 30.2 G/DL (ref 33–37)
MCHC RBC AUTO-ENTMCNC: 30.3 G/DL (ref 33–37)
MCHC RBC AUTO-ENTMCNC: 30.3 G/DL (ref 33–37)
MCHC RBC AUTO-ENTMCNC: 30.4 G/DL (ref 33–37)
MCHC RBC AUTO-ENTMCNC: 30.5 G/DL (ref 33–37)
MCHC RBC AUTO-ENTMCNC: 30.7 G/DL (ref 33–37)
MCHC RBC AUTO-ENTMCNC: 30.7 G/DL (ref 33–37)
MCHC RBC AUTO-ENTMCNC: 30.9 G/DL (ref 33–37)
MCHC RBC AUTO-ENTMCNC: 31 G/DL (ref 33–37)
MCHC RBC AUTO-ENTMCNC: 31.1 G/DL (ref 33–37)
MCHC RBC AUTO-ENTMCNC: 31.1 G/DL (ref 33–37)
MCHC RBC AUTO-ENTMCNC: 31.3 G/DL (ref 33–37)
MCHC RBC AUTO-ENTMCNC: 31.3 G/DL (ref 33–37)
MCHC RBC AUTO-ENTMCNC: 31.5 G/DL (ref 33–37)
MCV RBC AUTO: 92.5 FL (ref 80–94)
MCV RBC AUTO: 92.9 FL (ref 80–94)
MCV RBC AUTO: 93.3 FL (ref 80–94)
MCV RBC AUTO: 93.8 FL (ref 80–94)
MCV RBC AUTO: 94.1 FL (ref 80–94)
MCV RBC AUTO: 94.1 FL (ref 80–94)
MCV RBC AUTO: 94.5 FL (ref 80–94)
MCV RBC AUTO: 94.7 FL (ref 80–94)
MCV RBC AUTO: 94.7 FL (ref 80–94)
MCV RBC AUTO: 94.8 FL (ref 80–94)
MCV RBC AUTO: 94.8 FL (ref 80–94)
MCV RBC AUTO: 95 FL (ref 80–94)
MCV RBC AUTO: 95.7 FL (ref 80–94)
MCV RBC AUTO: 95.9 FL (ref 80–94)
MCV RBC AUTO: 96 FL (ref 80–94)
MCV RBC AUTO: 96 FL (ref 80–94)
MCV RBC AUTO: 96.1 FL (ref 80–94)
MCV RBC AUTO: 96.1 FL (ref 80–94)
MCV RBC AUTO: 96.9 FL (ref 80–94)
MCV RBC AUTO: 97.1 FL (ref 80–94)
METHGB BLD QL: 0.2 % (ref 0–3)
METHGB BLD QL: 0.2 % (ref 0–3)
METHGB BLD QL: 0.3 % (ref 0–3)
METHGB BLD QL: 0.3 % (ref 0–3)
METHGB BLD QL: 0.4 % (ref 0–3)
METHGB BLD QL: 0.5 % (ref 0–3)
METHGB BLD QL: 0.6 % (ref 0–3)
METHGB BLD QL: 0.6 % (ref 0–3)
METHGB BLD QL: 0.7 % (ref 0–3)
METHGB BLD QL: 0.8 % (ref 0–3)
MICROALBUMIN/CREAT UR: 59.3 MG/G
MODALITY: ABNORMAL
MONOCYTES # BLD AUTO: 0.05 10*3/MM3 (ref 0.1–0.9)
MONOCYTES # BLD AUTO: 0.12 10*3/MM3 (ref 0.1–0.9)
MONOCYTES # BLD AUTO: 0.16 10*3/MM3 (ref 0.1–0.9)
MONOCYTES # BLD AUTO: 0.21 10*3/MM3 (ref 0.1–0.9)
MONOCYTES # BLD AUTO: 0.26 10*3/MM3 (ref 0.1–0.9)
MONOCYTES # BLD AUTO: 0.27 10*3/MM3 (ref 0.1–0.9)
MONOCYTES # BLD AUTO: 0.27 10*3/MM3 (ref 0.1–0.9)
MONOCYTES # BLD AUTO: 0.28 10*3/MM3 (ref 0.1–0.9)
MONOCYTES # BLD AUTO: 0.28 10*3/MM3 (ref 0.1–0.9)
MONOCYTES # BLD AUTO: 0.29 10*3/MM3 (ref 0.1–0.9)
MONOCYTES # BLD AUTO: 0.33 10*3/MM3 (ref 0.1–0.9)
MONOCYTES # BLD AUTO: 0.38 10*3/MM3 (ref 0.1–0.9)
MONOCYTES # BLD AUTO: 0.44 10*3/MM3 (ref 0.1–0.9)
MONOCYTES # BLD AUTO: 0.52 10*3/MM3 (ref 0.1–0.9)
MONOCYTES # BLD AUTO: 0.53 10*3/MM3 (ref 0.1–0.9)
MONOCYTES # BLD AUTO: 0.58 10*3/MM3 (ref 0.1–0.9)
MONOCYTES # BLD AUTO: 0.68 10*3/MM3 (ref 0.1–0.9)
MONOCYTES # BLD AUTO: 0.75 10*3/MM3 (ref 0.1–0.9)
MONOCYTES # BLD AUTO: 0.76 10*3/MM3 (ref 0.1–0.9)
MONOCYTES # BLD AUTO: 0.78 10*3/MM3 (ref 0.1–0.9)
MONOCYTES # BLD AUTO: 0.88 10*3/MM3 (ref 0.1–0.9)
MONOCYTES NFR BLD AUTO: 0.9 % (ref 0–12)
MONOCYTES NFR BLD AUTO: 11.3 % (ref 0–12)
MONOCYTES NFR BLD AUTO: 2 % (ref 0–12)
MONOCYTES NFR BLD AUTO: 2 % (ref 0–12)
MONOCYTES NFR BLD AUTO: 2.7 % (ref 0–12)
MONOCYTES NFR BLD AUTO: 3.2 % (ref 0–12)
MONOCYTES NFR BLD AUTO: 3.4 % (ref 0–12)
MONOCYTES NFR BLD AUTO: 3.8 % (ref 0–12)
MONOCYTES NFR BLD AUTO: 5 % (ref 0–12)
MONOCYTES NFR BLD AUTO: 5.3 % (ref 0–12)
MONOCYTES NFR BLD AUTO: 5.3 % (ref 0–12)
MONOCYTES NFR BLD AUTO: 5.6 % (ref 0–12)
MONOCYTES NFR BLD AUTO: 6 % (ref 0–12)
MONOCYTES NFR BLD AUTO: 6.6 % (ref 0–12)
MONOCYTES NFR BLD AUTO: 7.5 % (ref 0–12)
MONOCYTES NFR BLD AUTO: 8.7 % (ref 0–12)
MONOCYTES NFR BLD AUTO: 8.9 % (ref 0–12)
MONOCYTES NFR BLD AUTO: 9 % (ref 0–12)
MONOCYTES NFR BLD AUTO: 9.4 % (ref 0–12)
MONOCYTES NFR BLD AUTO: 9.4 % (ref 0–12)
MONOCYTES NFR BLD AUTO: 9.7 % (ref 0–12)
MYELOPEROXIDASE AB SER-ACNC: <9 U/ML (ref 0–9)
NEUTROPHILS # BLD AUTO: 14.06 10*3/MM3 (ref 1.4–6.5)
NEUTROPHILS # BLD AUTO: 3.96 10*3/MM3 (ref 1.4–6.5)
NEUTROPHILS # BLD AUTO: 4.19 10*3/MM3 (ref 1.4–6.5)
NEUTROPHILS # BLD AUTO: 4.37 10*3/MM3 (ref 1.4–6.5)
NEUTROPHILS # BLD AUTO: 4.66 10*3/MM3 (ref 1.4–6.5)
NEUTROPHILS # BLD AUTO: 4.78 10*3/MM3 (ref 1.4–6.5)
NEUTROPHILS # BLD AUTO: 4.84 10*3/MM3 (ref 1.4–6.5)
NEUTROPHILS # BLD AUTO: 5 10*3/MM3 (ref 1.4–6.5)
NEUTROPHILS # BLD AUTO: 5.09 10*3/MM3 (ref 1.4–6.5)
NEUTROPHILS # BLD AUTO: 5.16 10*3/MM3 (ref 1.4–6.5)
NEUTROPHILS # BLD AUTO: 5.25 10*3/MM3 (ref 1.4–6.5)
NEUTROPHILS # BLD AUTO: 5.33 10*3/MM3 (ref 1.4–6.5)
NEUTROPHILS # BLD AUTO: 5.64 10*3/MM3 (ref 1.4–6.5)
NEUTROPHILS # BLD AUTO: 6.44 10*3/MM3 (ref 1.4–6.5)
NEUTROPHILS # BLD AUTO: 6.5 10*3/MM3 (ref 1.4–6.5)
NEUTROPHILS # BLD AUTO: 6.69 10*3/MM3 (ref 1.4–6.5)
NEUTROPHILS # BLD AUTO: 6.79 10*3/MM3 (ref 1.4–6.5)
NEUTROPHILS # BLD AUTO: 7.2 10*3/MM3 (ref 1.4–6.5)
NEUTROPHILS # BLD AUTO: 7.3 10*3/MM3 (ref 1.4–6.5)
NEUTROPHILS # BLD AUTO: 7.76 10*3/MM3 (ref 1.4–6.5)
NEUTROPHILS # BLD AUTO: 7.79 10*3/MM3 (ref 1.4–6.5)
NEUTROPHILS NFR BLD AUTO: 71.9 % (ref 40–75)
NEUTROPHILS NFR BLD AUTO: 73.8 % (ref 40–75)
NEUTROPHILS NFR BLD AUTO: 75.6 % (ref 40–75)
NEUTROPHILS NFR BLD AUTO: 76.9 % (ref 40–75)
NEUTROPHILS NFR BLD AUTO: 79.7 % (ref 40–75)
NEUTROPHILS NFR BLD AUTO: 80.5 % (ref 40–75)
NEUTROPHILS NFR BLD AUTO: 80.9 % (ref 40–75)
NEUTROPHILS NFR BLD AUTO: 82.2 % (ref 40–75)
NEUTROPHILS NFR BLD AUTO: 84.6 % (ref 40–75)
NEUTROPHILS NFR BLD AUTO: 84.6 % (ref 40–75)
NEUTROPHILS NFR BLD AUTO: 88.4 % (ref 40–75)
NEUTROPHILS NFR BLD AUTO: 90.2 % (ref 40–75)
NEUTROPHILS NFR BLD AUTO: 90.7 % (ref 40–75)
NEUTROPHILS NFR BLD AUTO: 91.4 % (ref 40–75)
NEUTROPHILS NFR BLD AUTO: 92.9 % (ref 40–75)
NEUTROPHILS NFR BLD AUTO: 93.5 % (ref 40–75)
NEUTROPHILS NFR BLD AUTO: 93.5 % (ref 40–75)
NEUTROPHILS NFR BLD AUTO: 94 % (ref 40–75)
NEUTROPHILS NFR BLD AUTO: 94.2 % (ref 40–75)
NEUTROPHILS NFR BLD AUTO: 95.7 % (ref 40–75)
NEUTROPHILS NFR BLD AUTO: 97 % (ref 40–75)
NITRITE UR QL STRIP: NEGATIVE
OSMOLALITY SERPL CALC.SUM OF ELEC: 290 MOSM/KG (ref 273–305)
OSMOLALITY SERPL CALC.SUM OF ELEC: 292.5 MOSM/KG (ref 273–305)
OSMOLALITY SERPL CALC.SUM OF ELEC: 293.9 MOSM/KG (ref 273–305)
OSMOLALITY SERPL CALC.SUM OF ELEC: 294.3 MOSM/KG (ref 273–305)
OSMOLALITY SERPL CALC.SUM OF ELEC: 295.1 MOSM/KG (ref 273–305)
OSMOLALITY SERPL CALC.SUM OF ELEC: 296.9 MOSM/KG (ref 273–305)
OSMOLALITY SERPL CALC.SUM OF ELEC: 297.9 MOSM/KG (ref 273–305)
OSMOLALITY SERPL CALC.SUM OF ELEC: 301.8 MOSM/KG (ref 273–305)
OSMOLALITY SERPL CALC.SUM OF ELEC: 304.6 MOSM/KG (ref 273–305)
OSMOLALITY SERPL CALC.SUM OF ELEC: 305.6 MOSM/KG (ref 273–305)
OSMOLALITY SERPL CALC.SUM OF ELEC: 311.3 MOSM/KG (ref 273–305)
OSMOLALITY SERPL CALC.SUM OF ELEC: 314 MOSM/KG (ref 273–305)
OSMOLALITY SERPL CALC.SUM OF ELEC: 315.4 MOSM/KG (ref 273–305)
OSMOLALITY SERPL CALC.SUM OF ELEC: 316.9 MOSM/KG (ref 273–305)
OSMOLALITY SERPL CALC.SUM OF ELEC: 318.1 MOSM/KG (ref 273–305)
OSMOLALITY SERPL CALC.SUM OF ELEC: 318.3 MOSM/KG (ref 273–305)
OSMOLALITY SERPL CALC.SUM OF ELEC: 318.5 MOSM/KG (ref 273–305)
OSMOLALITY SERPL CALC.SUM OF ELEC: 320.5 MOSM/KG (ref 273–305)
OSMOLALITY SERPL CALC.SUM OF ELEC: 322.9 MOSM/KG (ref 273–305)
OSMOLALITY SERPL CALC.SUM OF ELEC: 326.6 MOSM/KG (ref 273–305)
OSMOLALITY SERPL CALC.SUM OF ELEC: 326.9 MOSM/KG (ref 273–305)
OXYHGB MFR BLDV: 81.5 % (ref 85–100)
OXYHGB MFR BLDV: 84.5 % (ref 85–100)
OXYHGB MFR BLDV: 84.9 % (ref 85–100)
OXYHGB MFR BLDV: 86.9 % (ref 85–100)
OXYHGB MFR BLDV: 87.8 % (ref 85–100)
OXYHGB MFR BLDV: 88.4 % (ref 85–100)
OXYHGB MFR BLDV: 88.8 % (ref 85–100)
OXYHGB MFR BLDV: 91.4 % (ref 85–100)
OXYHGB MFR BLDV: 91.6 % (ref 85–100)
OXYHGB MFR BLDV: 91.7 % (ref 85–100)
OXYHGB MFR BLDV: 91.8 % (ref 85–100)
OXYHGB MFR BLDV: 92.4 % (ref 85–100)
OXYHGB MFR BLDV: 92.9 % (ref 85–100)
OXYHGB MFR BLDV: 93 % (ref 85–100)
OXYHGB MFR BLDV: 93.8 % (ref 85–100)
OXYHGB MFR BLDV: 94 % (ref 85–100)
OXYHGB MFR BLDV: 94.4 % (ref 85–100)
OXYHGB MFR BLDV: 96.9 % (ref 85–100)
OXYHGB MFR BLDV: 97.4 % (ref 85–100)
P-ANCA ATYPICAL TITR SER IF: NORMAL TITER
P-ANCA ATYPICAL TITR SER IF: NORMAL TITER
P-ANCA TITR SER IF: NORMAL TITER
P-ANCA TITR SER IF: NORMAL TITER
PATH INTERP BLD-IMP: NORMAL
PCO2 BLDA: 51.3 MM HG (ref 35–45)
PCO2 BLDA: 52.9 MM HG (ref 35–45)
PCO2 BLDA: 53 MM HG (ref 35–45)
PCO2 BLDA: 53.2 MM HG (ref 35–45)
PCO2 BLDA: 53.5 MM HG (ref 35–45)
PCO2 BLDA: 53.7 MM HG (ref 35–45)
PCO2 BLDA: 53.9 MM HG (ref 35–45)
PCO2 BLDA: 54.6 MM HG (ref 35–45)
PCO2 BLDA: 54.9 MM HG (ref 35–45)
PCO2 BLDA: 56 MM HG (ref 35–45)
PCO2 BLDA: 57.2 MM HG (ref 35–45)
PCO2 BLDA: 57.6 MM HG (ref 35–45)
PCO2 BLDA: 57.7 MM HG (ref 35–45)
PCO2 BLDA: 58.4 MM HG (ref 35–45)
PCO2 BLDA: 59.5 MM HG (ref 35–45)
PCO2 BLDA: 64.7 MM HG (ref 35–45)
PCO2 BLDA: 66.3 MM HG (ref 35–45)
PCO2 BLDA: 67.4 MM HG (ref 35–45)
PCO2 BLDA: 71.7 MM HG (ref 35–45)
PEEP RESPIRATORY: 10 CM[H2O]
PEEP RESPIRATORY: 12 CM[H2O]
PEEP RESPIRATORY: 12 CM[H2O]
PEEP RESPIRATORY: 13 CM[H2O]
PH BLDA: 7.29 PH UNITS (ref 7.35–7.45)
PH BLDA: 7.33 PH UNITS (ref 7.35–7.45)
PH BLDA: 7.34 PH UNITS (ref 7.35–7.45)
PH BLDA: 7.34 PH UNITS (ref 7.35–7.45)
PH BLDA: 7.35 PH UNITS (ref 7.35–7.45)
PH BLDA: 7.36 PH UNITS (ref 7.35–7.45)
PH BLDA: 7.36 PH UNITS (ref 7.35–7.45)
PH BLDA: 7.37 PH UNITS (ref 7.35–7.45)
PH BLDA: 7.38 PH UNITS (ref 7.35–7.45)
PH BLDA: 7.38 PH UNITS (ref 7.35–7.45)
PH BLDA: 7.39 PH UNITS (ref 7.35–7.45)
PH BLDA: 7.4 PH UNITS (ref 7.35–7.45)
PH BLDA: 7.4 PH UNITS (ref 7.35–7.45)
PH BLDA: 7.41 PH UNITS (ref 7.35–7.45)
PH BLDA: 7.41 PH UNITS (ref 7.35–7.45)
PH BLDA: 7.42 PH UNITS (ref 7.35–7.45)
PH BLDA: 7.43 PH UNITS (ref 7.35–7.45)
PH UR STRIP.AUTO: <=5 [PH] (ref 5–8)
PHOSPHATE SERPL-MCNC: 3.2 MG/DL (ref 2.7–4.5)
PHOSPHATE SERPL-MCNC: 3.5 MG/DL (ref 2.7–4.5)
PHOSPHATE SERPL-MCNC: 3.8 MG/DL (ref 2.7–4.5)
PHOSPHATE SERPL-MCNC: 4 MG/DL (ref 2.7–4.5)
PHOSPHATE SERPL-MCNC: 4.1 MG/DL (ref 2.7–4.5)
PHOSPHATE SERPL-MCNC: 4.2 MG/DL (ref 2.7–4.5)
PHOSPHATE SERPL-MCNC: 4.3 MG/DL (ref 2.7–4.5)
PHOSPHATE SERPL-MCNC: 4.3 MG/DL (ref 2.7–4.5)
PHOSPHATE SERPL-MCNC: 4.5 MG/DL (ref 2.7–4.5)
PHOSPHATE SERPL-MCNC: 5 MG/DL (ref 2.7–4.5)
PHOSPHATE SERPL-MCNC: 5.5 MG/DL (ref 2.7–4.5)
PLATELET # BLD AUTO: 105 10*3/MM3 (ref 130–400)
PLATELET # BLD AUTO: 123 10*3/MM3 (ref 130–400)
PLATELET # BLD AUTO: 142 10*3/MM3 (ref 130–400)
PLATELET # BLD AUTO: 144 10*3/MM3 (ref 130–400)
PLATELET # BLD AUTO: 146 10*3/MM3 (ref 130–400)
PLATELET # BLD AUTO: 148 10*3/MM3 (ref 130–400)
PLATELET # BLD AUTO: 149 10*3/MM3 (ref 130–400)
PLATELET # BLD AUTO: 153 10*3/MM3 (ref 130–400)
PLATELET # BLD AUTO: 163 10*3/MM3 (ref 130–400)
PLATELET # BLD AUTO: 166 10*3/MM3 (ref 130–400)
PLATELET # BLD AUTO: 167 10*3/MM3 (ref 130–400)
PLATELET # BLD AUTO: 168 10*3/MM3 (ref 130–400)
PLATELET # BLD AUTO: 172 10*3/MM3 (ref 130–400)
PLATELET # BLD AUTO: 173 10*3/MM3 (ref 130–400)
PLATELET # BLD AUTO: 182 10*3/MM3 (ref 130–400)
PLATELET # BLD AUTO: 203 10*3/MM3 (ref 130–400)
PLATELET # BLD AUTO: 220 10*3/MM3 (ref 130–400)
PLATELET # BLD AUTO: 72 10*3/MM3 (ref 130–400)
PLATELET # BLD AUTO: 84 10*3/MM3 (ref 130–400)
PLATELET # BLD AUTO: 85 10*3/MM3 (ref 130–400)
PLATELET # BLD AUTO: 87 10*3/MM3 (ref 130–400)
PLATELET # BLD AUTO: 88 10*3/MM3 (ref 130–400)
PMV BLD AUTO: 10 FL (ref 6–10)
PMV BLD AUTO: 10.6 FL (ref 6–10)
PMV BLD AUTO: 10.8 FL (ref 6–10)
PMV BLD AUTO: 10.9 FL (ref 6–10)
PMV BLD AUTO: 11.1 FL (ref 6–10)
PMV BLD AUTO: 11.4 FL (ref 6–10)
PMV BLD AUTO: 11.5 FL (ref 6–10)
PMV BLD AUTO: 11.7 FL (ref 6–10)
PMV BLD AUTO: 11.9 FL (ref 6–10)
PMV BLD AUTO: 12.2 FL (ref 6–10)
PMV BLD AUTO: 8.3 FL (ref 6–10)
PMV BLD AUTO: 8.9 FL (ref 6–10)
PMV BLD AUTO: 8.9 FL (ref 6–10)
PMV BLD AUTO: 9 FL (ref 6–10)
PMV BLD AUTO: 9.1 FL (ref 6–10)
PMV BLD AUTO: 9.2 FL (ref 6–10)
PMV BLD AUTO: 9.2 FL (ref 6–10)
PMV BLD AUTO: 9.5 FL (ref 6–10)
PO2 BLDA: 127.2 MM HG (ref 80–100)
PO2 BLDA: 247.3 MM HG (ref 80–100)
PO2 BLDA: 53.1 MM HG (ref 80–100)
PO2 BLDA: 58.1 MM HG (ref 80–100)
PO2 BLDA: 60.7 MM HG (ref 80–100)
PO2 BLDA: 61.5 MM HG (ref 80–100)
PO2 BLDA: 64.4 MM HG (ref 80–100)
PO2 BLDA: 65 MM HG (ref 80–100)
PO2 BLDA: 68.9 MM HG (ref 80–100)
PO2 BLDA: 73.9 MM HG (ref 80–100)
PO2 BLDA: 74.2 MM HG (ref 80–100)
PO2 BLDA: 74.9 MM HG (ref 80–100)
PO2 BLDA: 76.7 MM HG (ref 80–100)
PO2 BLDA: 81.5 MM HG (ref 80–100)
PO2 BLDA: 83.7 MM HG (ref 80–100)
PO2 BLDA: 85.7 MM HG (ref 80–100)
PO2 BLDA: 88 MM HG (ref 80–100)
PO2 BLDA: 89.7 MM HG (ref 80–100)
PO2 BLDA: 96.1 MM HG (ref 80–100)
POTASSIUM BLD-SCNC: 3.4 MMOL/L (ref 3.5–5.3)
POTASSIUM BLD-SCNC: 3.5 MMOL/L (ref 3.5–5.3)
POTASSIUM BLD-SCNC: 3.5 MMOL/L (ref 3.5–5.3)
POTASSIUM BLD-SCNC: 3.6 MMOL/L (ref 3.5–5.3)
POTASSIUM BLD-SCNC: 3.8 MMOL/L (ref 3.5–5.3)
POTASSIUM BLD-SCNC: 3.9 MMOL/L (ref 3.5–5.3)
POTASSIUM BLD-SCNC: 4 MMOL/L (ref 3.5–5.3)
POTASSIUM BLD-SCNC: 4.1 MMOL/L (ref 3.5–5.3)
POTASSIUM BLD-SCNC: 4.1 MMOL/L (ref 3.5–5.3)
POTASSIUM BLD-SCNC: 4.2 MMOL/L (ref 3.5–5.3)
POTASSIUM BLD-SCNC: 4.4 MMOL/L (ref 3.5–5.3)
POTASSIUM BLD-SCNC: 4.5 MMOL/L (ref 3.5–5.3)
PROT SERPL-MCNC: 5.1 G/DL (ref 6–8)
PROT SERPL-MCNC: 5.1 G/DL (ref 6–8)
PROT SERPL-MCNC: 5.2 G/DL (ref 6–8)
PROT SERPL-MCNC: 5.4 G/DL (ref 6–8)
PROT SERPL-MCNC: 5.5 G/DL (ref 6–8)
PROT SERPL-MCNC: 5.8 G/DL (ref 6–8)
PROT SERPL-MCNC: 5.9 G/DL (ref 6–8)
PROT SERPL-MCNC: 6 G/DL (ref 6–8)
PROT SERPL-MCNC: 6 G/DL (ref 6–8)
PROT SERPL-MCNC: 6.2 G/DL (ref 6–8)
PROT SERPL-MCNC: 6.2 G/DL (ref 6–8.5)
PROT SERPL-MCNC: 6.3 G/DL (ref 6–8)
PROT SERPL-MCNC: 6.6 G/DL (ref 6–8)
PROT UR QL STRIP: NEGATIVE
PROT UR-MCNC: 10 MG/DL
PROT/CREAT UR: 224.2 MG/G CREA (ref 0–200)
PROTEINASE3 AB SER IA-ACNC: <3.5 U/ML (ref 0–3.5)
PROTHROMBIN TIME: 14.4 SECONDS (ref 11–15.4)
PROTHROMBIN TIME: 14.4 SECONDS (ref 11–15.4)
PROTHROMBIN TIME: 15.2 SECONDS (ref 11–15.4)
PROTHROMBIN TIME: 15.3 SECONDS (ref 11–15.4)
PROTHROMBIN TIME: 15.8 SECONDS (ref 11–15.4)
PROTHROMBIN TIME: 16.1 SECONDS (ref 11–15.4)
RA LATEX TURBID: 11.5 IU/ML (ref 0–13.9)
RBC # BLD AUTO: 2.23 10*6/MM3 (ref 4.7–6.1)
RBC # BLD AUTO: 2.28 10*6/MM3 (ref 4.7–6.1)
RBC # BLD AUTO: 2.32 10*6/MM3 (ref 4.7–6.1)
RBC # BLD AUTO: 2.41 10*6/MM3 (ref 4.7–6.1)
RBC # BLD AUTO: 2.43 10*6/MM3 (ref 4.7–6.1)
RBC # BLD AUTO: 2.44 10*6/MM3 (ref 4.7–6.1)
RBC # BLD AUTO: 2.5 10*6/MM3 (ref 4.7–6.1)
RBC # BLD AUTO: 2.52 10*6/MM3 (ref 4.7–6.1)
RBC # BLD AUTO: 2.53 10*6/MM3 (ref 4.7–6.1)
RBC # BLD AUTO: 2.54 10*6/MM3 (ref 4.7–6.1)
RBC # BLD AUTO: 2.54 10*6/MM3 (ref 4.7–6.1)
RBC # BLD AUTO: 2.56 10*6/MM3 (ref 4.7–6.1)
RBC # BLD AUTO: 2.58 10*6/MM3 (ref 4.7–6.1)
RBC # BLD AUTO: 2.58 10*6/MM3 (ref 4.7–6.1)
RBC # BLD AUTO: 2.62 10*6/MM3 (ref 4.7–6.1)
RBC # BLD AUTO: 2.68 10*6/MM3 (ref 4.7–6.1)
RBC # BLD AUTO: 2.68 10*6/MM3 (ref 4.7–6.1)
RBC # BLD AUTO: 2.74 10*6/MM3 (ref 4.7–6.1)
RBC # BLD AUTO: 2.82 10*6/MM3 (ref 4.7–6.1)
RBC # BLD AUTO: 2.83 10*6/MM3 (ref 4.7–6.1)
RBC # BLD AUTO: 2.88 10*6/MM3 (ref 4.7–6.1)
RBC # BLD AUTO: 3.02 10*6/MM3 (ref 4.7–6.1)
RH BLD: POSITIVE
SAO2 % BLDCOA: 83.4 % (ref 90–100)
SAO2 % BLDCOA: 86.8 % (ref 90–100)
SAO2 % BLDCOA: 87.1 % (ref 90–100)
SAO2 % BLDCOA: 88.4 % (ref 90–100)
SAO2 % BLDCOA: 89.9 % (ref 90–100)
SAO2 % BLDCOA: 90.5 % (ref 90–100)
SAO2 % BLDCOA: 90.6 % (ref 90–100)
SAO2 % BLDCOA: 93.2 % (ref 90–100)
SAO2 % BLDCOA: 93.2 % (ref 90–100)
SAO2 % BLDCOA: 93.9 % (ref 90–100)
SAO2 % BLDCOA: 94.1 % (ref 90–100)
SAO2 % BLDCOA: 95.1 % (ref 90–100)
SAO2 % BLDCOA: 95.1 % (ref 90–100)
SAO2 % BLDCOA: 95.8 % (ref 90–100)
SAO2 % BLDCOA: 95.9 % (ref 90–100)
SAO2 % BLDCOA: 95.9 % (ref 90–100)
SAO2 % BLDCOA: 96 % (ref 90–100)
SAO2 % BLDCOA: 98.9 % (ref 90–100)
SAO2 % BLDCOA: 99.5 % (ref 90–100)
SET MECH RESP RATE: 16
SET MECH RESP RATE: 20
SET MECH RESP RATE: 22
SET MECH RESP RATE: 22
SET MECH RESP RATE: 24
SET MECH RESP RATE: 26
SODIUM BLD-SCNC: 141 MMOL/L (ref 135–153)
SODIUM BLD-SCNC: 142 MMOL/L (ref 135–153)
SODIUM BLD-SCNC: 143 MMOL/L (ref 135–153)
SODIUM BLD-SCNC: 143 MMOL/L (ref 135–153)
SODIUM BLD-SCNC: 144 MMOL/L (ref 135–153)
SODIUM BLD-SCNC: 145 MMOL/L (ref 135–153)
SODIUM BLD-SCNC: 146 MMOL/L (ref 135–153)
SODIUM BLD-SCNC: 147 MMOL/L (ref 135–153)
SODIUM BLD-SCNC: 148 MMOL/L (ref 135–153)
SODIUM BLD-SCNC: 148 MMOL/L (ref 135–153)
SODIUM BLD-SCNC: 149 MMOL/L (ref 135–153)
SODIUM BLD-SCNC: 150 MMOL/L (ref 135–153)
SODIUM UR-SCNC: 16 MMOL/L
SP GR UR STRIP: 1.02 (ref 1–1.03)
SPECIMEN STATUS: NORMAL
STRESS TARGET HR: 128 BPM
TIBC SERPL-MCNC: 229 MCG/DL (ref 241–421)
TOTAL RATE: 24 BREATHS/MINUTE
UNIT  ABO: NORMAL
UNIT  RH: NORMAL
UROBILINOGEN UR QL STRIP: ABNORMAL
VANCOMYCIN TROUGH SERPL-MCNC: 21.4 MCG/ML (ref 5–15)
VANCOMYCIN TROUGH SERPL-MCNC: 22.4 MCG/ML (ref 5–15)
VENTILATOR MODE: ABNORMAL
VENTILATOR MODE: AC
VIT B12 BLD-MCNC: >2000 PG/ML (ref 211–911)
VT ON VENT VENT: 430 ML
VT ON VENT VENT: 450 ML
WBC NRBC COR # BLD: 14.48 10*3/MM3 (ref 4.5–12.5)
WBC NRBC COR # BLD: 4.26 10*3/MM3 (ref 4.5–12.5)
WBC NRBC COR # BLD: 4.82 10*3/MM3 (ref 4.5–12.5)
WBC NRBC COR # BLD: 4.95 10*3/MM3 (ref 4.5–12.5)
WBC NRBC COR # BLD: 5.29 10*3/MM3 (ref 4.5–12.5)
WBC NRBC COR # BLD: 5.49 10*3/MM3 (ref 4.5–12.5)
WBC NRBC COR # BLD: 5.54 10*3/MM3 (ref 4.5–12.5)
WBC NRBC COR # BLD: 5.76 10*3/MM3 (ref 4.5–12.5)
WBC NRBC COR # BLD: 5.99 10*3/MM3 (ref 4.5–12.5)
WBC NRBC COR # BLD: 6.19 10*3/MM3 (ref 4.5–12.5)
WBC NRBC COR # BLD: 6.64 10*3/MM3 (ref 4.5–12.5)
WBC NRBC COR # BLD: 6.71 10*3/MM3 (ref 4.5–12.5)
WBC NRBC COR # BLD: 7.06 10*3/MM3 (ref 4.5–12.5)
WBC NRBC COR # BLD: 7.59 10*3/MM3 (ref 4.5–12.5)
WBC NRBC COR # BLD: 7.67 10*3/MM3 (ref 4.5–12.5)
WBC NRBC COR # BLD: 7.68 10*3/MM3 (ref 4.5–12.5)
WBC NRBC COR # BLD: 7.76 10*3/MM3 (ref 4.5–12.5)
WBC NRBC COR # BLD: 8.08 10*3/MM3 (ref 4.5–12.5)
WBC NRBC COR # BLD: 8.3 10*3/MM3 (ref 4.5–12.5)
WBC NRBC COR # BLD: 8.34 10*3/MM3 (ref 4.5–12.5)
WBC NRBC COR # BLD: 8.95 10*3/MM3 (ref 4.5–12.5)
WBC NRBC COR # BLD: 9.07 10*3/MM3 (ref 4.5–12.5)

## 2018-01-01 PROCEDURE — 63710000001 INSULIN DETEMIR PER 5 UNITS: Performed by: INTERNAL MEDICINE

## 2018-01-01 PROCEDURE — 63710000001 PREDNISONE PER 1 MG: Performed by: INTERNAL MEDICINE

## 2018-01-01 PROCEDURE — 80053 COMPREHEN METABOLIC PANEL: CPT | Performed by: INTERNAL MEDICINE

## 2018-01-01 PROCEDURE — 99291 CRITICAL CARE FIRST HOUR: CPT | Performed by: INTERNAL MEDICINE

## 2018-01-01 PROCEDURE — 99233 SBSQ HOSP IP/OBS HIGH 50: CPT | Performed by: INTERNAL MEDICINE

## 2018-01-01 PROCEDURE — 94660 CPAP INITIATION&MGMT: CPT

## 2018-01-01 PROCEDURE — 25010000002 ALBUMIN HUMAN 25% PER 50 ML: Performed by: INTERNAL MEDICINE

## 2018-01-01 PROCEDURE — 25010000002 PROPOFOL 1000 MG/ML EMULSION: Performed by: INTERNAL MEDICINE

## 2018-01-01 PROCEDURE — 94799 UNLISTED PULMONARY SVC/PX: CPT

## 2018-01-01 PROCEDURE — 86256 FLUORESCENT ANTIBODY TITER: CPT | Performed by: INTERNAL MEDICINE

## 2018-01-01 PROCEDURE — 86901 BLOOD TYPING SEROLOGIC RH(D): CPT | Performed by: INTERNAL MEDICINE

## 2018-01-01 PROCEDURE — 83050 HGB METHEMOGLOBIN QUAN: CPT | Performed by: INTERNAL MEDICINE

## 2018-01-01 PROCEDURE — 82962 GLUCOSE BLOOD TEST: CPT

## 2018-01-01 PROCEDURE — 82375 ASSAY CARBOXYHB QUANT: CPT | Performed by: INTERNAL MEDICINE

## 2018-01-01 PROCEDURE — 87205 SMEAR GRAM STAIN: CPT | Performed by: INTERNAL MEDICINE

## 2018-01-01 PROCEDURE — 25010000002 METHYLPREDNISOLONE PER 40 MG: Performed by: INTERNAL MEDICINE

## 2018-01-01 PROCEDURE — 3E0F8GC INTRODUCTION OF OTHER THERAPEUTIC SUBSTANCE INTO RESPIRATORY TRACT, VIA NATURAL OR ARTIFICIAL OPENING ENDOSCOPIC: ICD-10-PCS | Performed by: INTERNAL MEDICINE

## 2018-01-01 PROCEDURE — P9017 PLASMA 1 DONOR FRZ W/IN 8 HR: HCPCS

## 2018-01-01 PROCEDURE — 25010000003 METHYLPREDNISOLONE PER 125 MG: Performed by: INTERNAL MEDICINE

## 2018-01-01 PROCEDURE — 84132 ASSAY OF SERUM POTASSIUM: CPT | Performed by: INTERNAL MEDICINE

## 2018-01-01 PROCEDURE — 85018 HEMOGLOBIN: CPT | Performed by: INTERNAL MEDICINE

## 2018-01-01 PROCEDURE — 81003 URINALYSIS AUTO W/O SCOPE: CPT | Performed by: INTERNAL MEDICINE

## 2018-01-01 PROCEDURE — 85025 COMPLETE CBC W/AUTO DIFF WBC: CPT | Performed by: INTERNAL MEDICINE

## 2018-01-01 PROCEDURE — 76937 US GUIDE VASCULAR ACCESS: CPT | Performed by: INTERNAL MEDICINE

## 2018-01-01 PROCEDURE — 31500 INSERT EMERGENCY AIRWAY: CPT

## 2018-01-01 PROCEDURE — P9046 ALBUMIN (HUMAN), 25%, 20 ML: HCPCS | Performed by: NURSE PRACTITIONER

## 2018-01-01 PROCEDURE — 86225 DNA ANTIBODY NATIVE: CPT | Performed by: NURSE PRACTITIONER

## 2018-01-01 PROCEDURE — 0B9F8ZX DRAINAGE OF RIGHT LOWER LUNG LOBE, VIA NATURAL OR ARTIFICIAL OPENING ENDOSCOPIC, DIAGNOSTIC: ICD-10-PCS | Performed by: INTERNAL MEDICINE

## 2018-01-01 PROCEDURE — 83735 ASSAY OF MAGNESIUM: CPT | Performed by: NURSE PRACTITIONER

## 2018-01-01 PROCEDURE — 71045 X-RAY EXAM CHEST 1 VIEW: CPT

## 2018-01-01 PROCEDURE — 86225 DNA ANTIBODY NATIVE: CPT | Performed by: INTERNAL MEDICINE

## 2018-01-01 PROCEDURE — 86140 C-REACTIVE PROTEIN: CPT | Performed by: INTERNAL MEDICINE

## 2018-01-01 PROCEDURE — 82805 BLOOD GASES W/O2 SATURATION: CPT | Performed by: NURSE PRACTITIONER

## 2018-01-01 PROCEDURE — 86038 ANTINUCLEAR ANTIBODIES: CPT | Performed by: NURSE PRACTITIONER

## 2018-01-01 PROCEDURE — 86920 COMPATIBILITY TEST SPIN: CPT

## 2018-01-01 PROCEDURE — 80048 BASIC METABOLIC PNL TOTAL CA: CPT | Performed by: INTERNAL MEDICINE

## 2018-01-01 PROCEDURE — 85014 HEMATOCRIT: CPT | Performed by: INTERNAL MEDICINE

## 2018-01-01 PROCEDURE — 36600 WITHDRAWAL OF ARTERIAL BLOOD: CPT | Performed by: INTERNAL MEDICINE

## 2018-01-01 PROCEDURE — 63710000001 INSULIN ASPART PER 5 UNITS: Performed by: INTERNAL MEDICINE

## 2018-01-01 PROCEDURE — 99239 HOSP IP/OBS DSCHRG MGMT >30: CPT | Performed by: INTERNAL MEDICINE

## 2018-01-01 PROCEDURE — 25010000002 FUROSEMIDE PER 20 MG: Performed by: INTERNAL MEDICINE

## 2018-01-01 PROCEDURE — 31622 DX BRONCHOSCOPE/WASH: CPT | Performed by: INTERNAL MEDICINE

## 2018-01-01 PROCEDURE — 83883 ASSAY NEPHELOMETRY NOT SPEC: CPT | Performed by: INTERNAL MEDICINE

## 2018-01-01 PROCEDURE — 71045 X-RAY EXAM CHEST 1 VIEW: CPT | Performed by: RADIOLOGY

## 2018-01-01 PROCEDURE — 86160 COMPLEMENT ANTIGEN: CPT | Performed by: INTERNAL MEDICINE

## 2018-01-01 PROCEDURE — 82805 BLOOD GASES W/O2 SATURATION: CPT | Performed by: INTERNAL MEDICINE

## 2018-01-01 PROCEDURE — 97165 OT EVAL LOW COMPLEX 30 MIN: CPT

## 2018-01-01 PROCEDURE — 87186 SC STD MICRODIL/AGAR DIL: CPT | Performed by: INTERNAL MEDICINE

## 2018-01-01 PROCEDURE — 86850 RBC ANTIBODY SCREEN: CPT | Performed by: INTERNAL MEDICINE

## 2018-01-01 PROCEDURE — 25010000002 FONDAPARINUX PER 0.5 MG: Performed by: INTERNAL MEDICINE

## 2018-01-01 PROCEDURE — 84100 ASSAY OF PHOSPHORUS: CPT | Performed by: INTERNAL MEDICINE

## 2018-01-01 PROCEDURE — 83520 IMMUNOASSAY QUANT NOS NONAB: CPT | Performed by: INTERNAL MEDICINE

## 2018-01-01 PROCEDURE — P9046 ALBUMIN (HUMAN), 25%, 20 ML: HCPCS | Performed by: INTERNAL MEDICINE

## 2018-01-01 PROCEDURE — 86160 COMPLEMENT ANTIGEN: CPT | Performed by: NURSE PRACTITIONER

## 2018-01-01 PROCEDURE — 25010000002 PIPERACILLIN-TAZOBACTAM: Performed by: INTERNAL MEDICINE

## 2018-01-01 PROCEDURE — 63710000001 PREDNISONE PER 5 MG: Performed by: INTERNAL MEDICINE

## 2018-01-01 PROCEDURE — 85384 FIBRINOGEN ACTIVITY: CPT | Performed by: INTERNAL MEDICINE

## 2018-01-01 PROCEDURE — P9016 RBC LEUKOCYTES REDUCED: HCPCS

## 2018-01-01 PROCEDURE — 85610 PROTHROMBIN TIME: CPT | Performed by: NURSE PRACTITIONER

## 2018-01-01 PROCEDURE — 25010000002 METHYLPREDNISOLONE PER 125 MG

## 2018-01-01 PROCEDURE — 94003 VENT MGMT INPAT SUBQ DAY: CPT

## 2018-01-01 PROCEDURE — 63710000001 PREDNISONE PER 1 MG: Performed by: NURSE PRACTITIONER

## 2018-01-01 PROCEDURE — B548ZZA ULTRASONOGRAPHY OF SUPERIOR VENA CAVA, GUIDANCE: ICD-10-PCS | Performed by: INTERNAL MEDICINE

## 2018-01-01 PROCEDURE — 87070 CULTURE OTHR SPECIMN AEROBIC: CPT | Performed by: INTERNAL MEDICINE

## 2018-01-01 PROCEDURE — 83735 ASSAY OF MAGNESIUM: CPT | Performed by: INTERNAL MEDICINE

## 2018-01-01 PROCEDURE — 25010000002 VANCOMYCIN PER 500 MG

## 2018-01-01 PROCEDURE — P9035 PLATELET PHERES LEUKOREDUCED: HCPCS

## 2018-01-01 PROCEDURE — 83520 IMMUNOASSAY QUANT NOS NONAB: CPT | Performed by: NURSE PRACTITIONER

## 2018-01-01 PROCEDURE — 36600 WITHDRAWAL OF ARTERIAL BLOOD: CPT | Performed by: NURSE PRACTITIONER

## 2018-01-01 PROCEDURE — 25010000002 MIDAZOLAM PER 1 MG

## 2018-01-01 PROCEDURE — 87040 BLOOD CULTURE FOR BACTERIA: CPT | Performed by: INTERNAL MEDICINE

## 2018-01-01 PROCEDURE — 86235 NUCLEAR ANTIGEN ANTIBODY: CPT | Performed by: NURSE PRACTITIONER

## 2018-01-01 PROCEDURE — 83516 IMMUNOASSAY NONANTIBODY: CPT | Performed by: INTERNAL MEDICINE

## 2018-01-01 PROCEDURE — 25010000002 CHLOROTHIAZIDE PER 500 MG: Performed by: INTERNAL MEDICINE

## 2018-01-01 PROCEDURE — G8978 MOBILITY CURRENT STATUS: HCPCS

## 2018-01-01 PROCEDURE — 86334 IMMUNOFIX E-PHORESIS SERUM: CPT | Performed by: INTERNAL MEDICINE

## 2018-01-01 PROCEDURE — 74018 RADEX ABDOMEN 1 VIEW: CPT

## 2018-01-01 PROCEDURE — 0BH17EZ INSERTION OF ENDOTRACHEAL AIRWAY INTO TRACHEA, VIA NATURAL OR ARTIFICIAL OPENING: ICD-10-PCS | Performed by: INTERNAL MEDICINE

## 2018-01-01 PROCEDURE — 99223 1ST HOSP IP/OBS HIGH 75: CPT | Performed by: INTERNAL MEDICINE

## 2018-01-01 PROCEDURE — 25010000002 EPINEPHRINE PF 1 MG/10ML SOLUTION PREFILLED SYRINGE

## 2018-01-01 PROCEDURE — 83050 HGB METHEMOGLOBIN QUAN: CPT | Performed by: NURSE PRACTITIONER

## 2018-01-01 PROCEDURE — 97110 THERAPEUTIC EXERCISES: CPT

## 2018-01-01 PROCEDURE — 25010000002 METHYLPREDNISOLONE PER 125 MG: Performed by: NURSE PRACTITIONER

## 2018-01-01 PROCEDURE — 36430 TRANSFUSION BLD/BLD COMPNT: CPT

## 2018-01-01 PROCEDURE — 86900 BLOOD TYPING SEROLOGIC ABO: CPT

## 2018-01-01 PROCEDURE — 25010000002 VANCOMYCIN PER 500 MG: Performed by: INTERNAL MEDICINE

## 2018-01-01 PROCEDURE — 25010000002 HYDRALAZINE PER 20 MG: Performed by: INTERNAL MEDICINE

## 2018-01-01 PROCEDURE — 86431 RHEUMATOID FACTOR QUANT: CPT | Performed by: NURSE PRACTITIONER

## 2018-01-01 PROCEDURE — 85730 THROMBOPLASTIN TIME PARTIAL: CPT | Performed by: INTERNAL MEDICINE

## 2018-01-01 PROCEDURE — 25010000002 INSULIN REGULAR HUMAN PER 5 UNITS: Performed by: INTERNAL MEDICINE

## 2018-01-01 PROCEDURE — 31624 DX BRONCHOSCOPE/LAVAGE: CPT | Performed by: INTERNAL MEDICINE

## 2018-01-01 PROCEDURE — 87040 BLOOD CULTURE FOR BACTERIA: CPT | Performed by: HOSPITALIST

## 2018-01-01 PROCEDURE — 0B9J8ZX DRAINAGE OF LEFT LOWER LUNG LOBE, VIA NATURAL OR ARTIFICIAL OPENING ENDOSCOPIC, DIAGNOSTIC: ICD-10-PCS | Performed by: INTERNAL MEDICINE

## 2018-01-01 PROCEDURE — C1751 CATH, INF, PER/CENT/MIDLINE: HCPCS

## 2018-01-01 PROCEDURE — 5A1955Z RESPIRATORY VENTILATION, GREATER THAN 96 CONSECUTIVE HOURS: ICD-10-PCS | Performed by: INTERNAL MEDICINE

## 2018-01-01 PROCEDURE — 87077 CULTURE AEROBIC IDENTIFY: CPT | Performed by: INTERNAL MEDICINE

## 2018-01-01 PROCEDURE — 85610 PROTHROMBIN TIME: CPT | Performed by: INTERNAL MEDICINE

## 2018-01-01 PROCEDURE — 99231 SBSQ HOSP IP/OBS SF/LOW 25: CPT | Performed by: UROLOGY

## 2018-01-01 PROCEDURE — 82784 ASSAY IGA/IGD/IGG/IGM EACH: CPT | Performed by: INTERNAL MEDICINE

## 2018-01-01 PROCEDURE — 80202 ASSAY OF VANCOMYCIN: CPT | Performed by: INTERNAL MEDICINE

## 2018-01-01 PROCEDURE — P9037 PLATE PHERES LEUKOREDU IRRAD: HCPCS

## 2018-01-01 PROCEDURE — 31500 INSERT EMERGENCY AIRWAY: CPT | Performed by: INTERNAL MEDICINE

## 2018-01-01 PROCEDURE — 25010000002 DESMOPRESSIN PER 1 MCG: Performed by: INTERNAL MEDICINE

## 2018-01-01 PROCEDURE — 85060 BLOOD SMEAR INTERPRETATION: CPT | Performed by: INTERNAL MEDICINE

## 2018-01-01 PROCEDURE — 25010000002 ALBUMIN HUMAN 25% PER 50 ML: Performed by: NURSE PRACTITIONER

## 2018-01-01 PROCEDURE — 83880 ASSAY OF NATRIURETIC PEPTIDE: CPT | Performed by: INTERNAL MEDICINE

## 2018-01-01 PROCEDURE — G8979 MOBILITY GOAL STATUS: HCPCS

## 2018-01-01 PROCEDURE — 02HV33Z INSERTION OF INFUSION DEVICE INTO SUPERIOR VENA CAVA, PERCUTANEOUS APPROACH: ICD-10-PCS | Performed by: INTERNAL MEDICINE

## 2018-01-01 PROCEDURE — 82375 ASSAY CARBOXYHB QUANT: CPT | Performed by: NURSE PRACTITIONER

## 2018-01-01 PROCEDURE — 25010000002 MIDAZOLAM PER 1 MG: Performed by: INTERNAL MEDICINE

## 2018-01-01 PROCEDURE — 84100 ASSAY OF PHOSPHORUS: CPT | Performed by: NURSE PRACTITIONER

## 2018-01-01 PROCEDURE — 85027 COMPLETE CBC AUTOMATED: CPT | Performed by: INTERNAL MEDICINE

## 2018-01-01 PROCEDURE — 25010000002 FLUCONAZOLE PER 200 MG: Performed by: INTERNAL MEDICINE

## 2018-01-01 PROCEDURE — 25010000002 METHYLPREDNISOLONE PER 40 MG: Performed by: NURSE PRACTITIONER

## 2018-01-01 PROCEDURE — 97116 GAIT TRAINING THERAPY: CPT

## 2018-01-01 PROCEDURE — 36556 INSERT NON-TUNNEL CV CATH: CPT | Performed by: INTERNAL MEDICINE

## 2018-01-01 PROCEDURE — 86900 BLOOD TYPING SEROLOGIC ABO: CPT | Performed by: INTERNAL MEDICINE

## 2018-01-01 PROCEDURE — 86927 PLASMA FRESH FROZEN: CPT

## 2018-01-01 PROCEDURE — 97530 THERAPEUTIC ACTIVITIES: CPT

## 2018-01-01 PROCEDURE — 99221 1ST HOSP IP/OBS SF/LOW 40: CPT | Performed by: UROLOGY

## 2018-01-01 PROCEDURE — 93005 ELECTROCARDIOGRAM TRACING: CPT | Performed by: INTERNAL MEDICINE

## 2018-01-01 PROCEDURE — 84165 PROTEIN E-PHORESIS SERUM: CPT | Performed by: INTERNAL MEDICINE

## 2018-01-01 PROCEDURE — 82570 ASSAY OF URINE CREATININE: CPT | Performed by: INTERNAL MEDICINE

## 2018-01-01 PROCEDURE — 83550 IRON BINDING TEST: CPT | Performed by: INTERNAL MEDICINE

## 2018-01-01 PROCEDURE — 25010000002 FUROSEMIDE PER 20 MG

## 2018-01-01 PROCEDURE — 97163 PT EVAL HIGH COMPLEX 45 MIN: CPT

## 2018-01-01 PROCEDURE — 82746 ASSAY OF FOLIC ACID SERUM: CPT | Performed by: INTERNAL MEDICINE

## 2018-01-01 PROCEDURE — 82607 VITAMIN B-12: CPT | Performed by: INTERNAL MEDICINE

## 2018-01-01 PROCEDURE — 93306 TTE W/DOPPLER COMPLETE: CPT | Performed by: INTERNAL MEDICINE

## 2018-01-01 PROCEDURE — 99232 SBSQ HOSP IP/OBS MODERATE 35: CPT | Performed by: INTERNAL MEDICINE

## 2018-01-01 PROCEDURE — 82728 ASSAY OF FERRITIN: CPT | Performed by: INTERNAL MEDICINE

## 2018-01-01 PROCEDURE — 86038 ANTINUCLEAR ANTIBODIES: CPT | Performed by: INTERNAL MEDICINE

## 2018-01-01 PROCEDURE — 94002 VENT MGMT INPAT INIT DAY: CPT

## 2018-01-01 PROCEDURE — 84300 ASSAY OF URINE SODIUM: CPT | Performed by: INTERNAL MEDICINE

## 2018-01-01 PROCEDURE — 85014 HEMATOCRIT: CPT | Performed by: NURSE PRACTITIONER

## 2018-01-01 PROCEDURE — 25010000002 VITAMIN K1 PER 1 MG: Performed by: INTERNAL MEDICINE

## 2018-01-01 PROCEDURE — 25010000002 EPINEPHRINE (ANAPHYLAXIS) 1 MG/ML SOLUTION: Performed by: INTERNAL MEDICINE

## 2018-01-01 PROCEDURE — 83540 ASSAY OF IRON: CPT | Performed by: INTERNAL MEDICINE

## 2018-01-01 PROCEDURE — 93010 ELECTROCARDIOGRAM REPORT: CPT | Performed by: INTERNAL MEDICINE

## 2018-01-01 PROCEDURE — 87220 TISSUE EXAM FOR FUNGI: CPT | Performed by: INTERNAL MEDICINE

## 2018-01-01 PROCEDURE — 85018 HEMOGLOBIN: CPT | Performed by: NURSE PRACTITIONER

## 2018-01-01 PROCEDURE — 25010000002 PROPOFOL 10 MG/ML EMULSION

## 2018-01-01 PROCEDURE — 93306 TTE W/DOPPLER COMPLETE: CPT

## 2018-01-01 PROCEDURE — 74018 RADEX ABDOMEN 1 VIEW: CPT | Performed by: RADIOLOGY

## 2018-01-01 PROCEDURE — 74176 CT ABD & PELVIS W/O CONTRAST: CPT | Performed by: RADIOLOGY

## 2018-01-01 PROCEDURE — 25010000002 HYDRALAZINE PER 20 MG

## 2018-01-01 PROCEDURE — 74176 CT ABD & PELVIS W/O CONTRAST: CPT

## 2018-01-01 PROCEDURE — 80074 ACUTE HEPATITIS PANEL: CPT | Performed by: INTERNAL MEDICINE

## 2018-01-01 PROCEDURE — 86235 NUCLEAR ANTIGEN ANTIBODY: CPT | Performed by: INTERNAL MEDICINE

## 2018-01-01 PROCEDURE — 84156 ASSAY OF PROTEIN URINE: CPT | Performed by: INTERNAL MEDICINE

## 2018-01-01 PROCEDURE — 25010000003 POTASSIUM CHLORIDE 10 MEQ/100ML SOLUTION: Performed by: INTERNAL MEDICINE

## 2018-01-01 PROCEDURE — 83605 ASSAY OF LACTIC ACID: CPT | Performed by: INTERNAL MEDICINE

## 2018-01-01 PROCEDURE — 82043 UR ALBUMIN QUANTITATIVE: CPT | Performed by: INTERNAL MEDICINE

## 2018-01-01 RX ORDER — MAGNESIUM HYDROXIDE 1200 MG/15ML
LIQUID ORAL
Status: DISPENSED
Start: 2018-01-01 | End: 2018-01-01

## 2018-01-01 RX ORDER — FUROSEMIDE 10 MG/ML
20 INJECTION INTRAMUSCULAR; INTRAVENOUS ONCE
Status: COMPLETED | OUTPATIENT
Start: 2018-01-01 | End: 2018-01-01

## 2018-01-01 RX ORDER — FUROSEMIDE 10 MG/ML
60 INJECTION INTRAMUSCULAR; INTRAVENOUS ONCE
Status: COMPLETED | OUTPATIENT
Start: 2018-01-01 | End: 2018-01-01

## 2018-01-01 RX ORDER — LACTULOSE 10 G/15ML
20 SOLUTION ORAL ONCE
Status: COMPLETED | OUTPATIENT
Start: 2018-01-01 | End: 2018-01-01

## 2018-01-01 RX ORDER — FUROSEMIDE 10 MG/ML
40 INJECTION INTRAMUSCULAR; INTRAVENOUS ONCE
Status: COMPLETED | OUTPATIENT
Start: 2018-01-01 | End: 2018-01-01

## 2018-01-01 RX ORDER — CLONIDINE HYDROCHLORIDE 0.1 MG/1
TABLET ORAL
Status: COMPLETED
Start: 2018-01-01 | End: 2018-01-01

## 2018-01-01 RX ORDER — BUMETANIDE 1 MG/1
1 TABLET ORAL ONCE
Status: COMPLETED | OUTPATIENT
Start: 2018-01-01 | End: 2018-01-01

## 2018-01-01 RX ORDER — PREDNISONE 20 MG/1
20 TABLET ORAL
Status: DISCONTINUED | OUTPATIENT
Start: 2018-01-01 | End: 2018-01-01

## 2018-01-01 RX ORDER — PREDNISONE 20 MG/1
20 TABLET ORAL
Status: DISCONTINUED | OUTPATIENT
Start: 2018-01-01 | End: 2018-01-01 | Stop reason: SDUPTHER

## 2018-01-01 RX ORDER — VECURONIUM BROMIDE 1 MG/ML
5 INJECTION, POWDER, LYOPHILIZED, FOR SOLUTION INTRAVENOUS ONCE
Status: DISCONTINUED | OUTPATIENT
Start: 2018-01-01 | End: 2018-01-01

## 2018-01-01 RX ORDER — LACTULOSE 10 G/15ML
20 SOLUTION ORAL ONCE
Status: DISCONTINUED | OUTPATIENT
Start: 2018-01-01 | End: 2018-01-01

## 2018-01-01 RX ORDER — CLONIDINE HYDROCHLORIDE 0.2 MG/1
0.2 TABLET ORAL EVERY 8 HOURS SCHEDULED
Status: DISCONTINUED | OUTPATIENT
Start: 2018-01-01 | End: 2018-01-01 | Stop reason: HOSPADM

## 2018-01-01 RX ORDER — VECURONIUM BROMIDE 1 MG/ML
INJECTION, POWDER, LYOPHILIZED, FOR SOLUTION INTRAVENOUS
Status: COMPLETED
Start: 2018-01-01 | End: 2018-01-01

## 2018-01-01 RX ORDER — SODIUM CHLORIDE 0.9 % (FLUSH) 0.9 %
10 SYRINGE (ML) INJECTION AS NEEDED
Status: DISCONTINUED | OUTPATIENT
Start: 2018-01-01 | End: 2018-01-01 | Stop reason: HOSPADM

## 2018-01-01 RX ORDER — MIDAZOLAM HYDROCHLORIDE 1 MG/ML
INJECTION INTRAMUSCULAR; INTRAVENOUS
Status: COMPLETED
Start: 2018-01-01 | End: 2018-01-01

## 2018-01-01 RX ORDER — LACTULOSE 10 G/15ML
20 SOLUTION ORAL 2 TIMES DAILY
Status: COMPLETED | OUTPATIENT
Start: 2018-01-01 | End: 2018-01-01

## 2018-01-01 RX ORDER — MIDAZOLAM HYDROCHLORIDE 1 MG/ML
2 INJECTION INTRAMUSCULAR; INTRAVENOUS ONCE
Status: COMPLETED | OUTPATIENT
Start: 2018-01-01 | End: 2018-01-01

## 2018-01-01 RX ORDER — POTASSIUM CHLORIDE 20 MEQ/1
40 TABLET, EXTENDED RELEASE ORAL EVERY 4 HOURS
Status: DISPENSED | OUTPATIENT
Start: 2018-01-01 | End: 2018-01-01

## 2018-01-01 RX ORDER — METHYLPREDNISOLONE SODIUM SUCCINATE 125 MG/2ML
60 INJECTION, POWDER, LYOPHILIZED, FOR SOLUTION INTRAMUSCULAR; INTRAVENOUS EVERY 8 HOURS SCHEDULED
Status: DISCONTINUED | OUTPATIENT
Start: 2018-01-01 | End: 2018-01-01

## 2018-01-01 RX ORDER — HYDRALAZINE HYDROCHLORIDE 20 MG/ML
10 INJECTION INTRAMUSCULAR; INTRAVENOUS EVERY 6 HOURS PRN
Status: DISCONTINUED | OUTPATIENT
Start: 2018-01-01 | End: 2018-01-01 | Stop reason: HOSPADM

## 2018-01-01 RX ORDER — IPRATROPIUM BROMIDE AND ALBUTEROL SULFATE 2.5; .5 MG/3ML; MG/3ML
3 SOLUTION RESPIRATORY (INHALATION)
Qty: 360 ML | Refills: 0
Start: 2018-01-01

## 2018-01-01 RX ORDER — IPRATROPIUM BROMIDE AND ALBUTEROL SULFATE 2.5; .5 MG/3ML; MG/3ML
3 SOLUTION RESPIRATORY (INHALATION)
Status: DISCONTINUED | OUTPATIENT
Start: 2018-01-01 | End: 2018-01-01 | Stop reason: HOSPADM

## 2018-01-01 RX ORDER — WATER 1000 ML/1000ML
INJECTION, SOLUTION INTRAVENOUS
Status: COMPLETED
Start: 2018-01-01 | End: 2018-01-01

## 2018-01-01 RX ORDER — ALBUMIN (HUMAN) 12.5 G/50ML
25 SOLUTION INTRAVENOUS ONCE
Status: COMPLETED | OUTPATIENT
Start: 2018-01-01 | End: 2018-01-01

## 2018-01-01 RX ORDER — POTASSIUM CHLORIDE 1.5 G/1.77G
40 POWDER, FOR SOLUTION ORAL ONCE
Status: COMPLETED | OUTPATIENT
Start: 2018-01-01 | End: 2018-01-01

## 2018-01-01 RX ORDER — FUROSEMIDE 10 MG/ML
60 INJECTION INTRAMUSCULAR; INTRAVENOUS ONCE
Status: DISCONTINUED | OUTPATIENT
Start: 2018-01-01 | End: 2018-01-01

## 2018-01-01 RX ORDER — MAGNESIUM HYDROXIDE 1200 MG/15ML
LIQUID ORAL
Status: COMPLETED
Start: 2018-01-01 | End: 2018-01-01

## 2018-01-01 RX ORDER — PROPOFOL 10 MG/ML
VIAL (ML) INTRAVENOUS
Status: COMPLETED
Start: 2018-01-01 | End: 2018-01-01

## 2018-01-01 RX ORDER — LABETALOL 200 MG/1
200 TABLET, FILM COATED ORAL EVERY 12 HOURS SCHEDULED
Status: DISCONTINUED | OUTPATIENT
Start: 2018-01-01 | End: 2018-01-01

## 2018-01-01 RX ORDER — FLUCONAZOLE 2 MG/ML
100 INJECTION, SOLUTION INTRAVENOUS DAILY
Qty: 200 ML | Refills: 0 | Status: SHIPPED | OUTPATIENT
Start: 2018-01-01 | End: 2018-01-01

## 2018-01-01 RX ORDER — FUROSEMIDE 10 MG/ML
INJECTION INTRAMUSCULAR; INTRAVENOUS
Status: COMPLETED
Start: 2018-01-01 | End: 2018-01-01

## 2018-01-01 RX ORDER — FUROSEMIDE 10 MG/ML
20 INJECTION INTRAMUSCULAR; INTRAVENOUS EVERY 6 HOURS
Status: COMPLETED | OUTPATIENT
Start: 2018-01-01 | End: 2018-01-01

## 2018-01-01 RX ORDER — GUAIFENESIN 200 MG/1
200 TABLET ORAL
Status: DISCONTINUED | OUTPATIENT
Start: 2018-01-01 | End: 2018-01-01 | Stop reason: HOSPADM

## 2018-01-01 RX ORDER — CARVEDILOL 25 MG/1
25 TABLET ORAL 2 TIMES DAILY WITH MEALS
Status: DISCONTINUED | OUTPATIENT
Start: 2018-01-01 | End: 2018-01-01

## 2018-01-01 RX ORDER — DEXTROSE MONOHYDRATE 25 G/50ML
25-50 INJECTION, SOLUTION INTRAVENOUS
Status: DISCONTINUED | OUTPATIENT
Start: 2018-01-01 | End: 2018-01-01 | Stop reason: SDUPTHER

## 2018-01-01 RX ORDER — SODIUM CHLORIDE 0.9 % (FLUSH) 0.9 %
10 SYRINGE (ML) INJECTION EVERY 12 HOURS SCHEDULED
Status: DISCONTINUED | OUTPATIENT
Start: 2018-01-01 | End: 2018-01-01 | Stop reason: HOSPADM

## 2018-01-01 RX ORDER — IPRATROPIUM BROMIDE AND ALBUTEROL SULFATE 2.5; .5 MG/3ML; MG/3ML
3 SOLUTION RESPIRATORY (INHALATION)
Status: DISCONTINUED | OUTPATIENT
Start: 2018-01-01 | End: 2018-01-01

## 2018-01-01 RX ORDER — VECURONIUM BROMIDE 1 MG/ML
10 INJECTION, POWDER, LYOPHILIZED, FOR SOLUTION INTRAVENOUS ONCE
Status: COMPLETED | OUTPATIENT
Start: 2018-01-01 | End: 2018-01-01

## 2018-01-01 RX ORDER — SODIUM CHLORIDE 9 MG/ML
INJECTION, SOLUTION INTRAVENOUS
Status: COMPLETED
Start: 2018-01-01 | End: 2018-01-01

## 2018-01-01 RX ORDER — VECURONIUM BROMIDE 1 MG/ML
5 INJECTION, POWDER, LYOPHILIZED, FOR SOLUTION INTRAVENOUS 3 TIMES DAILY
Status: DISCONTINUED | OUTPATIENT
Start: 2018-01-01 | End: 2018-01-01 | Stop reason: HOSPADM

## 2018-01-01 RX ORDER — DESMOPRESSIN ACETATE 4 UG/ML
5 INJECTION, SOLUTION INTRAVENOUS; SUBCUTANEOUS ONCE
Status: COMPLETED | OUTPATIENT
Start: 2018-01-01 | End: 2018-01-01

## 2018-01-01 RX ORDER — FUROSEMIDE 10 MG/ML
40 INJECTION INTRAMUSCULAR; INTRAVENOUS ONCE
Status: DISCONTINUED | OUTPATIENT
Start: 2018-01-01 | End: 2018-01-01

## 2018-01-01 RX ORDER — CARVEDILOL 6.25 MG/1
12.5 TABLET ORAL 2 TIMES DAILY WITH MEALS
Status: DISCONTINUED | OUTPATIENT
Start: 2018-01-01 | End: 2018-01-01

## 2018-01-01 RX ORDER — ALBUMIN (HUMAN) 12.5 G/50ML
25 SOLUTION INTRAVENOUS 2 TIMES DAILY
Status: COMPLETED | OUTPATIENT
Start: 2018-01-01 | End: 2018-01-01

## 2018-01-01 RX ORDER — MIDAZOLAM HYDROCHLORIDE 1 MG/ML
4 INJECTION INTRAMUSCULAR; INTRAVENOUS
Status: DISCONTINUED | OUTPATIENT
Start: 2018-01-01 | End: 2018-01-01 | Stop reason: HOSPADM

## 2018-01-01 RX ORDER — POTASSIUM CHLORIDE 1.5 G/1.77G
40 POWDER, FOR SOLUTION ORAL EVERY 4 HOURS
Status: COMPLETED | OUTPATIENT
Start: 2018-01-01 | End: 2018-01-01

## 2018-01-01 RX ORDER — DOXYCYCLINE 100 MG/1
100 CAPSULE ORAL EVERY 12 HOURS SCHEDULED
Status: COMPLETED | OUTPATIENT
Start: 2018-01-01 | End: 2018-01-01

## 2018-01-01 RX ORDER — ISOSORBIDE MONONITRATE 60 MG/1
240 TABLET, EXTENDED RELEASE ORAL DAILY
Start: 2018-01-01

## 2018-01-01 RX ORDER — ALUMINA, MAGNESIA, AND SIMETHICONE 2400; 2400; 240 MG/30ML; MG/30ML; MG/30ML
15 SUSPENSION ORAL EVERY 6 HOURS PRN
Status: DISCONTINUED | OUTPATIENT
Start: 2018-01-01 | End: 2018-01-01 | Stop reason: HOSPADM

## 2018-01-01 RX ORDER — HYDRALAZINE HYDROCHLORIDE 20 MG/ML
10 INJECTION INTRAMUSCULAR; INTRAVENOUS ONCE
Status: COMPLETED | OUTPATIENT
Start: 2018-01-01 | End: 2018-01-01

## 2018-01-01 RX ORDER — CARVEDILOL 6.25 MG/1
6.25 TABLET ORAL 2 TIMES DAILY WITH MEALS
Status: DISCONTINUED | OUTPATIENT
Start: 2018-01-01 | End: 2018-01-01

## 2018-01-01 RX ORDER — CLONIDINE HYDROCHLORIDE 0.3 MG/1
0.3 TABLET ORAL EVERY 8 HOURS SCHEDULED
Status: DISCONTINUED | OUTPATIENT
Start: 2018-01-01 | End: 2018-01-01

## 2018-01-01 RX ORDER — BUDESONIDE AND FORMOTEROL FUMARATE DIHYDRATE 160; 4.5 UG/1; UG/1
2 AEROSOL RESPIRATORY (INHALATION)
Status: DISCONTINUED | OUTPATIENT
Start: 2018-01-01 | End: 2018-01-01

## 2018-01-01 RX ORDER — LABETALOL 200 MG/1
200 TABLET, FILM COATED ORAL EVERY 12 HOURS SCHEDULED
Status: DISCONTINUED | OUTPATIENT
Start: 2018-01-01 | End: 2018-01-01 | Stop reason: HOSPADM

## 2018-01-01 RX ORDER — SODIUM CHLORIDE 450 MG/100ML
75 INJECTION, SOLUTION INTRAVENOUS CONTINUOUS
Status: DISCONTINUED | OUTPATIENT
Start: 2018-01-01 | End: 2018-01-01

## 2018-01-01 RX ORDER — VECURONIUM BROMIDE 1 MG/ML
5 INJECTION, POWDER, LYOPHILIZED, FOR SOLUTION INTRAVENOUS 3 TIMES DAILY
Start: 2018-01-01

## 2018-01-01 RX ORDER — PREDNISONE 10 MG/1
10 TABLET ORAL
Status: DISCONTINUED | OUTPATIENT
Start: 2018-01-01 | End: 2018-01-01

## 2018-01-01 RX ORDER — FUROSEMIDE 10 MG/ML
40 INJECTION INTRAMUSCULAR; INTRAVENOUS EVERY 12 HOURS
Status: COMPLETED | OUTPATIENT
Start: 2018-01-01 | End: 2018-01-01

## 2018-01-01 RX ORDER — SENNA AND DOCUSATE SODIUM 50; 8.6 MG/1; MG/1
1 TABLET, FILM COATED ORAL 2 TIMES DAILY
Status: DISCONTINUED | OUTPATIENT
Start: 2018-01-01 | End: 2018-01-01

## 2018-01-01 RX ORDER — FLUCONAZOLE 2 MG/ML
100 INJECTION, SOLUTION INTRAVENOUS DAILY
Status: DISCONTINUED | OUTPATIENT
Start: 2018-01-01 | End: 2018-01-01 | Stop reason: HOSPADM

## 2018-01-01 RX ORDER — METHYLPREDNISOLONE SODIUM SUCCINATE 40 MG/ML
40 INJECTION, POWDER, LYOPHILIZED, FOR SOLUTION INTRAMUSCULAR; INTRAVENOUS EVERY 12 HOURS
Status: DISCONTINUED | OUTPATIENT
Start: 2018-01-01 | End: 2018-01-01

## 2018-01-01 RX ORDER — TERAZOSIN 5 MG/1
5 CAPSULE ORAL DAILY
Status: DISCONTINUED | OUTPATIENT
Start: 2018-01-01 | End: 2018-01-01

## 2018-01-01 RX ORDER — FUROSEMIDE 10 MG/ML
80 INJECTION INTRAMUSCULAR; INTRAVENOUS ONCE
Status: DISCONTINUED | OUTPATIENT
Start: 2018-01-01 | End: 2018-01-01 | Stop reason: ALTCHOICE

## 2018-01-01 RX ORDER — FERROUS SULFATE 300 MG/5ML
300 LIQUID (ML) ORAL DAILY
Status: DISCONTINUED | OUTPATIENT
Start: 2018-01-01 | End: 2018-01-01 | Stop reason: HOSPADM

## 2018-01-01 RX ORDER — TERAZOSIN 5 MG/1
10 CAPSULE ORAL DAILY
Status: DISCONTINUED | OUTPATIENT
Start: 2018-01-01 | End: 2018-01-01 | Stop reason: HOSPADM

## 2018-01-01 RX ORDER — TERAZOSIN 10 MG/1
10 CAPSULE ORAL DAILY
Start: 2018-01-01

## 2018-01-01 RX ORDER — HYDRALAZINE HYDROCHLORIDE 20 MG/ML
5 INJECTION INTRAMUSCULAR; INTRAVENOUS ONCE
Status: COMPLETED | OUTPATIENT
Start: 2018-01-01 | End: 2018-01-01

## 2018-01-01 RX ORDER — CLONIDINE HYDROCHLORIDE 0.1 MG/1
0.1 TABLET ORAL EVERY 8 HOURS SCHEDULED
Status: DISCONTINUED | OUTPATIENT
Start: 2018-01-01 | End: 2018-01-01

## 2018-01-01 RX ORDER — LANSOPRAZOLE
30 KIT EVERY MORNING
Status: DISCONTINUED | OUTPATIENT
Start: 2018-01-01 | End: 2018-01-01

## 2018-01-01 RX ORDER — POLYETHYLENE GLYCOL 3350 17 G/17G
17 POWDER, FOR SOLUTION ORAL 2 TIMES DAILY
Status: DISCONTINUED | OUTPATIENT
Start: 2018-01-01 | End: 2018-01-01 | Stop reason: HOSPADM

## 2018-01-01 RX ORDER — DOCUSATE SODIUM 100 MG/1
100 CAPSULE, LIQUID FILLED ORAL 2 TIMES DAILY
Status: DISCONTINUED | OUTPATIENT
Start: 2018-01-01 | End: 2018-01-01 | Stop reason: CLARIF

## 2018-01-01 RX ORDER — CLONIDINE HYDROCHLORIDE 0.2 MG/1
0.2 TABLET ORAL EVERY 8 HOURS SCHEDULED
Start: 2018-01-01

## 2018-01-01 RX ORDER — LANSOPRAZOLE
30 KIT 2 TIMES DAILY
Status: DISCONTINUED | OUTPATIENT
Start: 2018-01-01 | End: 2018-01-01 | Stop reason: HOSPADM

## 2018-01-01 RX ORDER — ISOSORBIDE MONONITRATE 60 MG/1
240 TABLET, EXTENDED RELEASE ORAL DAILY
Status: DISCONTINUED | OUTPATIENT
Start: 2018-01-01 | End: 2018-01-01 | Stop reason: HOSPADM

## 2018-01-01 RX ORDER — LABETALOL 200 MG/1
200 TABLET, FILM COATED ORAL EVERY 12 HOURS SCHEDULED
Start: 2018-01-01

## 2018-01-01 RX ORDER — POTASSIUM CHLORIDE 7.45 MG/ML
10 INJECTION INTRAVENOUS
Status: COMPLETED | OUTPATIENT
Start: 2018-01-01 | End: 2018-01-01

## 2018-01-01 RX ORDER — EPINEPHRINE 1 MG/ML
1 INJECTION, SOLUTION INTRAMUSCULAR; SUBCUTANEOUS
Status: COMPLETED | OUTPATIENT
Start: 2018-01-01 | End: 2018-01-01

## 2018-01-01 RX ORDER — CLONIDINE HYDROCHLORIDE 0.2 MG/1
0.2 TABLET ORAL EVERY 8 HOURS SCHEDULED
Status: DISCONTINUED | OUTPATIENT
Start: 2018-01-01 | End: 2018-01-01

## 2018-01-01 RX ORDER — L.ACID,PARA/B.BIFIDUM/S.THERM 8B CELL
1 CAPSULE ORAL DAILY
Start: 2018-01-01

## 2018-01-01 RX ORDER — METHYLPREDNISOLONE SODIUM SUCCINATE 40 MG/ML
40 INJECTION, POWDER, LYOPHILIZED, FOR SOLUTION INTRAMUSCULAR; INTRAVENOUS EVERY 8 HOURS SCHEDULED
Status: DISCONTINUED | OUTPATIENT
Start: 2018-01-01 | End: 2018-01-01

## 2018-01-01 RX ORDER — L.ACID,PARA/B.BIFIDUM/S.THERM 8B CELL
1 CAPSULE ORAL DAILY
Status: DISCONTINUED | OUTPATIENT
Start: 2018-01-01 | End: 2018-01-01

## 2018-01-01 RX ORDER — L.ACID,PARA/B.BIFIDUM/S.THERM 8B CELL
1 CAPSULE ORAL DAILY
Status: DISCONTINUED | OUTPATIENT
Start: 2018-01-01 | End: 2018-01-01 | Stop reason: HOSPADM

## 2018-01-01 RX ORDER — POTASSIUM CHLORIDE 20 MEQ/1
40 TABLET, EXTENDED RELEASE ORAL EVERY 4 HOURS
Status: COMPLETED | OUTPATIENT
Start: 2018-01-01 | End: 2018-01-01

## 2018-01-01 RX ORDER — POLYETHYLENE GLYCOL 3350 17 G/17G
17 POWDER, FOR SOLUTION ORAL 2 TIMES DAILY
Start: 2018-01-01

## 2018-01-01 RX ORDER — MIDAZOLAM HYDROCHLORIDE 1 MG/ML
4 INJECTION INTRAMUSCULAR; INTRAVENOUS ONCE
Status: COMPLETED | OUTPATIENT
Start: 2018-01-01 | End: 2018-01-01

## 2018-01-01 RX ORDER — ACETAMINOPHEN 325 MG/1
650 TABLET ORAL EVERY 6 HOURS PRN
Status: DISCONTINUED | OUTPATIENT
Start: 2018-01-01 | End: 2018-01-01 | Stop reason: HOSPADM

## 2018-01-01 RX ORDER — POLYETHYLENE GLYCOL 3350 17 G/17G
17 POWDER, FOR SOLUTION ORAL DAILY
Status: DISCONTINUED | OUTPATIENT
Start: 2018-01-01 | End: 2018-01-01

## 2018-01-01 RX ORDER — GUAIFENESIN 600 MG/1
600 TABLET, EXTENDED RELEASE ORAL EVERY 12 HOURS SCHEDULED
Status: DISCONTINUED | OUTPATIENT
Start: 2018-01-01 | End: 2018-01-01

## 2018-01-01 RX ORDER — FERROUS SULFATE 300 MG/5ML
300 LIQUID (ML) ORAL DAILY
Start: 2018-01-01

## 2018-01-01 RX ORDER — HYDRALAZINE HYDROCHLORIDE 20 MG/ML
INJECTION INTRAMUSCULAR; INTRAVENOUS
Status: COMPLETED
Start: 2018-01-01 | End: 2018-01-01

## 2018-01-01 RX ORDER — NICOTINE POLACRILEX 4 MG
15 LOZENGE BUCCAL
Start: 2018-01-01

## 2018-01-01 RX ORDER — METHYLPREDNISOLONE SODIUM SUCCINATE 125 MG/2ML
INJECTION, POWDER, LYOPHILIZED, FOR SOLUTION INTRAMUSCULAR; INTRAVENOUS
Status: COMPLETED
Start: 2018-01-01 | End: 2018-01-01

## 2018-01-01 RX ADMIN — ATORVASTATIN CALCIUM 80 MG: 40 TABLET, FILM COATED ORAL at 08:55

## 2018-01-01 RX ADMIN — FONDAPARINUX SODIUM 2.5 MG: 2.5 INJECTION, SOLUTION SUBCUTANEOUS at 10:11

## 2018-01-01 RX ADMIN — AMLODIPINE BESYLATE 10 MG: 10 TABLET ORAL at 08:41

## 2018-01-01 RX ADMIN — DOXYCYCLINE 100 MG: 100 CAPSULE ORAL at 05:06

## 2018-01-01 RX ADMIN — SODIUM CHLORIDE 7 UNITS/HR: 9 INJECTION, SOLUTION INTRAVENOUS at 01:08

## 2018-01-01 RX ADMIN — IPRATROPIUM BROMIDE AND ALBUTEROL SULFATE 3 ML: .5; 3 SOLUTION RESPIRATORY (INHALATION) at 06:30

## 2018-01-01 RX ADMIN — GABAPENTIN 300 MG: 300 CAPSULE ORAL at 21:33

## 2018-01-01 RX ADMIN — LACTULOSE 20 G: 20 SOLUTION ORAL at 11:00

## 2018-01-01 RX ADMIN — PROPOFOL 35 MCG/KG/MIN: 10 INJECTION, EMULSION INTRAVENOUS at 18:09

## 2018-01-01 RX ADMIN — METHYLPREDNISOLONE SODIUM SUCCINATE 60 MG: 125 INJECTION, POWDER, FOR SOLUTION INTRAMUSCULAR; INTRAVENOUS at 05:57

## 2018-01-01 RX ADMIN — FERROUS SULFATE TAB 325 MG (65 MG ELEMENTAL FE) 325 MG: 325 (65 FE) TAB at 09:07

## 2018-01-01 RX ADMIN — PROPOFOL 50 MCG/KG/MIN: 10 INJECTION, EMULSION INTRAVENOUS at 08:01

## 2018-01-01 RX ADMIN — Medication 2000 UNITS: at 09:44

## 2018-01-01 RX ADMIN — GUAIFENESIN 200 MG: 200 TABLET ORAL at 15:01

## 2018-01-01 RX ADMIN — ISOSORBIDE MONONITRATE 180 MG: 60 TABLET, EXTENDED RELEASE ORAL at 10:54

## 2018-01-01 RX ADMIN — SODIUM CHLORIDE 1000 ML: 900 IRRIGANT IRRIGATION at 12:20

## 2018-01-01 RX ADMIN — PROPOFOL 40 MCG/KG/MIN: 10 INJECTION, EMULSION INTRAVENOUS at 23:41

## 2018-01-01 RX ADMIN — CLONIDINE HYDROCHLORIDE 0.2 MG: 0.2 TABLET ORAL at 06:50

## 2018-01-01 RX ADMIN — CLONIDINE HYDROCHLORIDE 0.2 MG: 0.3 TABLET ORAL at 13:13

## 2018-01-01 RX ADMIN — HYDRALAZINE HYDROCHLORIDE 100 MG: 50 TABLET ORAL at 20:19

## 2018-01-01 RX ADMIN — CETIRIZINE HYDROCHLORIDE 10 MG: 10 TABLET ORAL at 10:57

## 2018-01-01 RX ADMIN — GUAIFENESIN 200 MG: 200 TABLET ORAL at 05:46

## 2018-01-01 RX ADMIN — INSULIN ASPART 5 UNITS: 100 INJECTION, SOLUTION INTRAVENOUS; SUBCUTANEOUS at 21:18

## 2018-01-01 RX ADMIN — PANTOPRAZOLE SODIUM 40 MG: 40 TABLET, DELAYED RELEASE ORAL at 09:19

## 2018-01-01 RX ADMIN — TAZOBACTAM SODIUM AND PIPERACILLIN SODIUM 4.5 G: .5; 4 INJECTION, POWDER, LYOPHILIZED, FOR SOLUTION INTRAVENOUS at 06:30

## 2018-01-01 RX ADMIN — DOCUSATE SODIUM 100 MG: 100 CAPSULE, LIQUID FILLED ORAL at 09:56

## 2018-01-01 RX ADMIN — AMLODIPINE BESYLATE 10 MG: 10 TABLET ORAL at 09:26

## 2018-01-01 RX ADMIN — ALBUMIN HUMAN 25 G: 0.25 SOLUTION INTRAVENOUS at 09:59

## 2018-01-01 RX ADMIN — POTASSIUM CHLORIDE 40 MEQ: 1.5 POWDER, FOR SOLUTION ORAL at 10:26

## 2018-01-01 RX ADMIN — HYDRALAZINE HYDROCHLORIDE 100 MG: 50 TABLET ORAL at 13:13

## 2018-01-01 RX ADMIN — IPRATROPIUM BROMIDE AND ALBUTEROL SULFATE 3 ML: .5; 3 SOLUTION RESPIRATORY (INHALATION) at 13:09

## 2018-01-01 RX ADMIN — INSULIN ASPART 10 UNITS: 100 INJECTION, SOLUTION INTRAVENOUS; SUBCUTANEOUS at 12:02

## 2018-01-01 RX ADMIN — Medication: at 04:03

## 2018-01-01 RX ADMIN — ATORVASTATIN CALCIUM 80 MG: 40 TABLET, FILM COATED ORAL at 08:37

## 2018-01-01 RX ADMIN — IPRATROPIUM BROMIDE AND ALBUTEROL SULFATE 3 ML: .5; 3 SOLUTION RESPIRATORY (INHALATION) at 06:26

## 2018-01-01 RX ADMIN — Medication 2000 UNITS: at 09:06

## 2018-01-01 RX ADMIN — LEVOTHYROXINE SODIUM 25 MCG: 25 TABLET ORAL at 08:12

## 2018-01-01 RX ADMIN — HYDRALAZINE HYDROCHLORIDE 100 MG: 50 TABLET ORAL at 13:59

## 2018-01-01 RX ADMIN — ALBUMIN HUMAN 25 G: 0.25 SOLUTION INTRAVENOUS at 08:43

## 2018-01-01 RX ADMIN — CETIRIZINE HYDROCHLORIDE 10 MG: 10 TABLET ORAL at 08:54

## 2018-01-01 RX ADMIN — Medication 2000 UNITS: at 08:11

## 2018-01-01 RX ADMIN — Medication 2000 UNITS: at 10:58

## 2018-01-01 RX ADMIN — CLONIDINE HYDROCHLORIDE 0.2 MG: 0.3 TABLET ORAL at 05:38

## 2018-01-01 RX ADMIN — FONDAPARINUX SODIUM 2.5 MG: 2.5 INJECTION, SOLUTION SUBCUTANEOUS at 08:23

## 2018-01-01 RX ADMIN — ATORVASTATIN CALCIUM 80 MG: 40 TABLET, FILM COATED ORAL at 08:12

## 2018-01-01 RX ADMIN — ASPIRIN 81 MG: 81 TABLET ORAL at 08:28

## 2018-01-01 RX ADMIN — DEXTROSE MONOHYDRATE 0.5 MCG/KG/MIN: 50 INJECTION, SOLUTION INTRAVENOUS at 03:17

## 2018-01-01 RX ADMIN — CLONIDINE HYDROCHLORIDE 0.3 MG: 0.3 TABLET ORAL at 21:21

## 2018-01-01 RX ADMIN — HYDRALAZINE HYDROCHLORIDE 100 MG: 50 TABLET ORAL at 05:46

## 2018-01-01 RX ADMIN — ISOSORBIDE MONONITRATE 240 MG: 60 TABLET, EXTENDED RELEASE ORAL at 08:17

## 2018-01-01 RX ADMIN — ALBUMIN HUMAN 25 G: 0.25 SOLUTION INTRAVENOUS at 09:19

## 2018-01-01 RX ADMIN — PROPOFOL 50 MCG/KG/MIN: 10 INJECTION, EMULSION INTRAVENOUS at 00:07

## 2018-01-01 RX ADMIN — HYDRALAZINE HYDROCHLORIDE 100 MG: 50 TABLET ORAL at 14:08

## 2018-01-01 RX ADMIN — PANTOPRAZOLE SODIUM 40 MG: 40 TABLET, DELAYED RELEASE ORAL at 10:02

## 2018-01-01 RX ADMIN — ASPIRIN 81 MG: 81 TABLET ORAL at 10:57

## 2018-01-01 RX ADMIN — INSULIN DETEMIR 35 UNITS: 100 INJECTION, SOLUTION SUBCUTANEOUS at 20:48

## 2018-01-01 RX ADMIN — PROPOFOL 30 MCG/KG/MIN: 10 INJECTION, EMULSION INTRAVENOUS at 09:42

## 2018-01-01 RX ADMIN — GUAIFENESIN 600 MG: 600 TABLET, EXTENDED RELEASE ORAL at 20:12

## 2018-01-01 RX ADMIN — HYDRALAZINE HYDROCHLORIDE 100 MG: 50 TABLET ORAL at 20:34

## 2018-01-01 RX ADMIN — INSULIN DETEMIR 35 UNITS: 100 INJECTION, SOLUTION SUBCUTANEOUS at 21:33

## 2018-01-01 RX ADMIN — FUROSEMIDE: 10 INJECTION, SOLUTION INTRAMUSCULAR; INTRAVENOUS at 13:52

## 2018-01-01 RX ADMIN — GABAPENTIN 300 MG: 300 CAPSULE ORAL at 21:06

## 2018-01-01 RX ADMIN — ISOSORBIDE MONONITRATE 240 MG: 60 TABLET, EXTENDED RELEASE ORAL at 08:52

## 2018-01-01 RX ADMIN — Medication 10 ML: at 20:39

## 2018-01-01 RX ADMIN — Medication 1 CAPSULE: at 08:28

## 2018-01-01 RX ADMIN — LACTULOSE 20 G: 20 SOLUTION ORAL at 09:55

## 2018-01-01 RX ADMIN — ALBUMIN HUMAN 25 G: 0.25 SOLUTION INTRAVENOUS at 08:13

## 2018-01-01 RX ADMIN — IPRATROPIUM BROMIDE AND ALBUTEROL SULFATE 3 ML: .5; 3 SOLUTION RESPIRATORY (INHALATION) at 13:19

## 2018-01-01 RX ADMIN — PROPOFOL 50 MCG/KG/MIN: 10 INJECTION, EMULSION INTRAVENOUS at 09:38

## 2018-01-01 RX ADMIN — CLONIDINE HYDROCHLORIDE 0.3 MG: 0.3 TABLET ORAL at 22:00

## 2018-01-01 RX ADMIN — CLONIDINE HYDROCHLORIDE 0.2 MG: 0.2 TABLET ORAL at 12:38

## 2018-01-01 RX ADMIN — IPRATROPIUM BROMIDE AND ALBUTEROL SULFATE 3 ML: .5; 3 SOLUTION RESPIRATORY (INHALATION) at 12:31

## 2018-01-01 RX ADMIN — TAZOBACTAM SODIUM AND PIPERACILLIN SODIUM 4.5 G: .5; 4 INJECTION, POWDER, LYOPHILIZED, FOR SOLUTION INTRAVENOUS at 00:18

## 2018-01-01 RX ADMIN — GUAIFENESIN 200 MG: 200 TABLET ORAL at 04:00

## 2018-01-01 RX ADMIN — PROPOFOL 35 MCG/KG/MIN: 10 INJECTION, EMULSION INTRAVENOUS at 10:34

## 2018-01-01 RX ADMIN — CARVEDILOL 18.75 MG: 6.25 TABLET, FILM COATED ORAL at 08:31

## 2018-01-01 RX ADMIN — GUAIFENESIN 200 MG: 200 TABLET ORAL at 11:33

## 2018-01-01 RX ADMIN — GUAIFENESIN 200 MG: 200 TABLET ORAL at 23:53

## 2018-01-01 RX ADMIN — MIDAZOLAM HYDROCHLORIDE 2 MG: 1 INJECTION INTRAMUSCULAR; INTRAVENOUS at 12:20

## 2018-01-01 RX ADMIN — IPRATROPIUM BROMIDE AND ALBUTEROL SULFATE 3 ML: .5; 3 SOLUTION RESPIRATORY (INHALATION) at 12:42

## 2018-01-01 RX ADMIN — FOLIC ACID 1 MG: 5 INJECTION, SOLUTION INTRAMUSCULAR; INTRAVENOUS; SUBCUTANEOUS at 08:39

## 2018-01-01 RX ADMIN — FUROSEMIDE 40 MG: 10 INJECTION, SOLUTION INTRAMUSCULAR; INTRAVENOUS at 07:20

## 2018-01-01 RX ADMIN — PROPOFOL 35 MCG/KG/MIN: 10 INJECTION, EMULSION INTRAVENOUS at 02:36

## 2018-01-01 RX ADMIN — Medication 10 ML: at 11:00

## 2018-01-01 RX ADMIN — Medication 10 ML: at 21:07

## 2018-01-01 RX ADMIN — Medication 10 ML: at 21:06

## 2018-01-01 RX ADMIN — CETIRIZINE HYDROCHLORIDE 10 MG: 10 TABLET ORAL at 08:31

## 2018-01-01 RX ADMIN — FERROUS SULFATE TAB 325 MG (65 MG ELEMENTAL FE) 325 MG: 325 (65 FE) TAB at 08:56

## 2018-01-01 RX ADMIN — PIPERACILLIN SODIUM,TAZOBACTAM SODIUM 3.38 G: 3; .375 INJECTION, POWDER, FOR SOLUTION INTRAVENOUS at 11:38

## 2018-01-01 RX ADMIN — IPRATROPIUM BROMIDE 0.5 MG: 0.5 SOLUTION RESPIRATORY (INHALATION) at 06:30

## 2018-01-01 RX ADMIN — ISOSORBIDE MONONITRATE 180 MG: 60 TABLET, EXTENDED RELEASE ORAL at 09:18

## 2018-01-01 RX ADMIN — PROPOFOL 35 MCG/KG/MIN: 10 INJECTION, EMULSION INTRAVENOUS at 14:01

## 2018-01-01 RX ADMIN — TERAZOSIN HYDROCHLORIDE 5 MG: 5 CAPSULE ORAL at 08:36

## 2018-01-01 RX ADMIN — POTASSIUM CHLORIDE 40 MEQ: 1.5 POWDER, FOR SOLUTION ORAL at 03:01

## 2018-01-01 RX ADMIN — GUAIFENESIN 200 MG: 200 TABLET ORAL at 12:27

## 2018-01-01 RX ADMIN — CLONIDINE HYDROCHLORIDE 0.2 MG: 0.3 TABLET ORAL at 05:43

## 2018-01-01 RX ADMIN — ASPIRIN 81 MG: 81 TABLET ORAL at 10:12

## 2018-01-01 RX ADMIN — LANSOPRAZOLE 30 MG: KIT at 09:27

## 2018-01-01 RX ADMIN — TERAZOSIN HYDROCHLORIDE 10 MG: 5 CAPSULE ORAL at 08:00

## 2018-01-01 RX ADMIN — SODIUM CHLORIDE 13 UNITS/HR: 9 INJECTION, SOLUTION INTRAVENOUS at 10:39

## 2018-01-01 RX ADMIN — SODIUM CHLORIDE 75 ML/HR: 4.5 INJECTION, SOLUTION INTRAVENOUS at 07:05

## 2018-01-01 RX ADMIN — PROPOFOL 30 MCG/KG/MIN: 10 INJECTION, EMULSION INTRAVENOUS at 00:31

## 2018-01-01 RX ADMIN — LANSOPRAZOLE 30 MG: KIT at 15:13

## 2018-01-01 RX ADMIN — FUROSEMIDE 20 MG: 10 INJECTION, SOLUTION INTRAMUSCULAR; INTRAVENOUS at 21:25

## 2018-01-01 RX ADMIN — FOLIC ACID 1 MG: 5 INJECTION, SOLUTION INTRAMUSCULAR; INTRAVENOUS; SUBCUTANEOUS at 08:23

## 2018-01-01 RX ADMIN — HYDRALAZINE HYDROCHLORIDE 100 MG: 50 TABLET ORAL at 21:32

## 2018-01-01 RX ADMIN — CLONIDINE HYDROCHLORIDE 0.3 MG: 0.3 TABLET ORAL at 21:06

## 2018-01-01 RX ADMIN — TAZOBACTAM SODIUM AND PIPERACILLIN SODIUM 4.5 G: .5; 4 INJECTION, POWDER, LYOPHILIZED, FOR SOLUTION INTRAVENOUS at 16:30

## 2018-01-01 RX ADMIN — Medication 2000 MCG: at 09:58

## 2018-01-01 RX ADMIN — CLONIDINE HYDROCHLORIDE 0.1 MG: 0.1 TABLET ORAL at 05:24

## 2018-01-01 RX ADMIN — PREDNISONE 20 MG: 20 TABLET ORAL at 09:46

## 2018-01-01 RX ADMIN — PROPOFOL 50 MCG/KG/MIN: 10 INJECTION, EMULSION INTRAVENOUS at 09:26

## 2018-01-01 RX ADMIN — BUDESONIDE AND FORMOTEROL FUMARATE DIHYDRATE 2 PUFF: 160; 4.5 AEROSOL RESPIRATORY (INHALATION) at 18:52

## 2018-01-01 RX ADMIN — CETIRIZINE HYDROCHLORIDE 10 MG: 10 TABLET ORAL at 08:38

## 2018-01-01 RX ADMIN — BUDESONIDE AND FORMOTEROL FUMARATE DIHYDRATE 2 PUFF: 160; 4.5 AEROSOL RESPIRATORY (INHALATION) at 19:13

## 2018-01-01 RX ADMIN — CETIRIZINE HYDROCHLORIDE 10 MG: 10 TABLET ORAL at 09:55

## 2018-01-01 RX ADMIN — Medication 2000 MCG: at 08:27

## 2018-01-01 RX ADMIN — GUAIFENESIN 200 MG: 200 TABLET ORAL at 03:50

## 2018-01-01 RX ADMIN — TERAZOSIN HYDROCHLORIDE 10 MG: 5 CAPSULE ORAL at 09:58

## 2018-01-01 RX ADMIN — CLONIDINE HYDROCHLORIDE 0.3 MG: 0.3 TABLET ORAL at 21:23

## 2018-01-01 RX ADMIN — CLONIDINE HYDROCHLORIDE 0.2 MG: 0.3 TABLET ORAL at 20:20

## 2018-01-01 RX ADMIN — Medication 10 ML: at 08:19

## 2018-01-01 RX ADMIN — INSULIN DETEMIR 35 UNITS: 100 INJECTION, SOLUTION SUBCUTANEOUS at 22:12

## 2018-01-01 RX ADMIN — PROPOFOL 40 MCG/KG/MIN: 10 INJECTION, EMULSION INTRAVENOUS at 19:40

## 2018-01-01 RX ADMIN — INSULIN ASPART 8 UNITS: 100 INJECTION, SOLUTION INTRAVENOUS; SUBCUTANEOUS at 03:00

## 2018-01-01 RX ADMIN — FUROSEMIDE 20 MG: 10 INJECTION, SOLUTION INTRAMUSCULAR; INTRAVENOUS at 13:52

## 2018-01-01 RX ADMIN — CARVEDILOL 6.25 MG: 6.25 TABLET, FILM COATED ORAL at 17:17

## 2018-01-01 RX ADMIN — Medication 10 ML: at 08:32

## 2018-01-01 RX ADMIN — GABAPENTIN 300 MG: 300 CAPSULE ORAL at 20:12

## 2018-01-01 RX ADMIN — GUAIFENESIN 200 MG: 200 TABLET ORAL at 11:27

## 2018-01-01 RX ADMIN — IPRATROPIUM BROMIDE AND ALBUTEROL SULFATE 3 ML: .5; 3 SOLUTION RESPIRATORY (INHALATION) at 18:50

## 2018-01-01 RX ADMIN — SODIUM CHLORIDE 10 MG/HR: 9 INJECTION, SOLUTION INTRAVENOUS at 02:32

## 2018-01-01 RX ADMIN — LABETALOL HCL 200 MG: 200 TABLET, FILM COATED ORAL at 21:24

## 2018-01-01 RX ADMIN — SODIUM CHLORIDE 5.5 UNITS/HR: 9 INJECTION, SOLUTION INTRAVENOUS at 20:38

## 2018-01-01 RX ADMIN — POTASSIUM CHLORIDE 40 MEQ: 1.5 POWDER, FOR SOLUTION ORAL at 08:33

## 2018-01-01 RX ADMIN — HYDRALAZINE HYDROCHLORIDE 100 MG: 50 TABLET ORAL at 21:24

## 2018-01-01 RX ADMIN — Medication 2000 UNITS: at 08:16

## 2018-01-01 RX ADMIN — LABETALOL HCL 200 MG: 200 TABLET, FILM COATED ORAL at 14:12

## 2018-01-01 RX ADMIN — Medication 2000 MCG: at 08:34

## 2018-01-01 RX ADMIN — Medication 10 ML: at 20:51

## 2018-01-01 RX ADMIN — Medication 2000 MCG: at 08:20

## 2018-01-01 RX ADMIN — ASPIRIN 81 MG: 81 TABLET ORAL at 09:45

## 2018-01-01 RX ADMIN — CLONIDINE HYDROCHLORIDE 0.3 MG: 0.3 TABLET ORAL at 13:27

## 2018-01-01 RX ADMIN — IPRATROPIUM BROMIDE AND ALBUTEROL SULFATE 3 ML: .5; 3 SOLUTION RESPIRATORY (INHALATION) at 06:24

## 2018-01-01 RX ADMIN — GUAIFENESIN 200 MG: 200 TABLET ORAL at 16:16

## 2018-01-01 RX ADMIN — INSULIN ASPART 5 UNITS: 100 INJECTION, SOLUTION INTRAVENOUS; SUBCUTANEOUS at 06:38

## 2018-01-01 RX ADMIN — PROPOFOL 35 MCG/KG/MIN: 10 INJECTION, EMULSION INTRAVENOUS at 11:09

## 2018-01-01 RX ADMIN — CLONIDINE HYDROCHLORIDE 0.2 MG: 0.3 TABLET ORAL at 20:41

## 2018-01-01 RX ADMIN — SERTRALINE 50 MG: 50 TABLET, FILM COATED ORAL at 16:00

## 2018-01-01 RX ADMIN — SODIUM CHLORIDE 1000 MG: 9 INJECTION, SOLUTION INTRAVENOUS at 09:02

## 2018-01-01 RX ADMIN — DOCUSATE SODIUM AND SENNA 1 TABLET: 50; 8.6 TABLET, FILM COATED ORAL at 21:53

## 2018-01-01 RX ADMIN — PANTOPRAZOLE SODIUM 40 MG: 40 TABLET, DELAYED RELEASE ORAL at 08:38

## 2018-01-01 RX ADMIN — IPRATROPIUM BROMIDE AND ALBUTEROL SULFATE 3 ML: .5; 3 SOLUTION RESPIRATORY (INHALATION) at 06:53

## 2018-01-01 RX ADMIN — POTASSIUM CHLORIDE 40 MEQ: 1500 TABLET, EXTENDED RELEASE ORAL at 09:07

## 2018-01-01 RX ADMIN — TAZOBACTAM SODIUM AND PIPERACILLIN SODIUM 4.5 G: .5; 4 INJECTION, POWDER, LYOPHILIZED, FOR SOLUTION INTRAVENOUS at 06:38

## 2018-01-01 RX ADMIN — FERROUS SULFATE TAB 325 MG (65 MG ELEMENTAL FE) 325 MG: 325 (65 FE) TAB at 08:49

## 2018-01-01 RX ADMIN — SODIUM CHLORIDE 1000 MG: 9 INJECTION, SOLUTION INTRAVENOUS at 13:18

## 2018-01-01 RX ADMIN — SODIUM CHLORIDE 1000 MG: 9 INJECTION, SOLUTION INTRAVENOUS at 09:59

## 2018-01-01 RX ADMIN — PROPOFOL 50 MCG/KG/MIN: 10 INJECTION, EMULSION INTRAVENOUS at 14:57

## 2018-01-01 RX ADMIN — SODIUM CHLORIDE 5 MG/HR: 9 INJECTION, SOLUTION INTRAVENOUS at 09:19

## 2018-01-01 RX ADMIN — SODIUM CHLORIDE 18 UNITS/HR: 9 INJECTION, SOLUTION INTRAVENOUS at 03:39

## 2018-01-01 RX ADMIN — HYDRALAZINE HYDROCHLORIDE 100 MG: 50 TABLET ORAL at 05:22

## 2018-01-01 RX ADMIN — FUROSEMIDE 20 MG: 10 INJECTION, SOLUTION INTRAMUSCULAR; INTRAVENOUS at 14:06

## 2018-01-01 RX ADMIN — IPRATROPIUM BROMIDE AND ALBUTEROL SULFATE 3 ML: .5; 3 SOLUTION RESPIRATORY (INHALATION) at 01:02

## 2018-01-01 RX ADMIN — SODIUM CHLORIDE 4 UNITS/HR: 9 INJECTION, SOLUTION INTRAVENOUS at 17:51

## 2018-01-01 RX ADMIN — PROPOFOL 40 MCG/KG/MIN: 10 INJECTION, EMULSION INTRAVENOUS at 23:45

## 2018-01-01 RX ADMIN — CLONIDINE HYDROCHLORIDE 0.2 MG: 0.3 TABLET ORAL at 17:29

## 2018-01-01 RX ADMIN — BUDESONIDE AND FORMOTEROL FUMARATE DIHYDRATE 2 PUFF: 160; 4.5 AEROSOL RESPIRATORY (INHALATION) at 06:32

## 2018-01-01 RX ADMIN — PROPOFOL 50 MCG/KG/MIN: 10 INJECTION, EMULSION INTRAVENOUS at 21:54

## 2018-01-01 RX ADMIN — SERTRALINE 50 MG: 50 TABLET, FILM COATED ORAL at 17:06

## 2018-01-01 RX ADMIN — SODIUM CHLORIDE 7.5 MG/HR: 9 INJECTION, SOLUTION INTRAVENOUS at 09:47

## 2018-01-01 RX ADMIN — CARVEDILOL 6.25 MG: 6.25 TABLET, FILM COATED ORAL at 08:39

## 2018-01-01 RX ADMIN — VECURONIUM BROMIDE 5 MG: 1 INJECTION, POWDER, LYOPHILIZED, FOR SOLUTION INTRAVENOUS at 20:29

## 2018-01-01 RX ADMIN — POTASSIUM CHLORIDE 10 MEQ: 7.46 INJECTION, SOLUTION INTRAVENOUS at 07:26

## 2018-01-01 RX ADMIN — PROPOFOL 40 MCG/KG/MIN: 10 INJECTION, EMULSION INTRAVENOUS at 07:03

## 2018-01-01 RX ADMIN — VANCOMYCIN HYDROCHLORIDE 1000 MG: 5 INJECTION, POWDER, LYOPHILIZED, FOR SOLUTION INTRAVENOUS at 01:58

## 2018-01-01 RX ADMIN — PROPOFOL 35 MCG/KG/MIN: 10 INJECTION, EMULSION INTRAVENOUS at 22:23

## 2018-01-01 RX ADMIN — Medication 10 ML: at 20:40

## 2018-01-01 RX ADMIN — HYDRALAZINE HYDROCHLORIDE 100 MG: 50 TABLET ORAL at 13:49

## 2018-01-01 RX ADMIN — VECURONIUM BROMIDE 10 MG: 1 INJECTION, POWDER, LYOPHILIZED, FOR SOLUTION INTRAVENOUS at 11:14

## 2018-01-01 RX ADMIN — PROPOFOL 50 MCG/KG/MIN: 10 INJECTION, EMULSION INTRAVENOUS at 20:22

## 2018-01-01 RX ADMIN — LEVOTHYROXINE SODIUM 25 MCG: 25 TABLET ORAL at 09:27

## 2018-01-01 RX ADMIN — ASPIRIN 81 MG: 81 TABLET ORAL at 08:58

## 2018-01-01 RX ADMIN — DOXYCYCLINE 100 MG: 100 CAPSULE ORAL at 05:24

## 2018-01-01 RX ADMIN — METHYLPREDNISOLONE SODIUM SUCCINATE 20 MG: 40 INJECTION, POWDER, FOR SOLUTION INTRAMUSCULAR; INTRAVENOUS at 21:45

## 2018-01-01 RX ADMIN — BUDESONIDE AND FORMOTEROL FUMARATE DIHYDRATE 2 PUFF: 160; 4.5 AEROSOL RESPIRATORY (INHALATION) at 06:21

## 2018-01-01 RX ADMIN — PROPOFOL 20 MCG/KG/MIN: 10 INJECTION, EMULSION INTRAVENOUS at 16:32

## 2018-01-01 RX ADMIN — TAZOBACTAM SODIUM AND PIPERACILLIN SODIUM 4.5 G: .5; 4 INJECTION, POWDER, LYOPHILIZED, FOR SOLUTION INTRAVENOUS at 23:34

## 2018-01-01 RX ADMIN — TAZOBACTAM SODIUM AND PIPERACILLIN SODIUM 4.5 G: .5; 4 INJECTION, POWDER, LYOPHILIZED, FOR SOLUTION INTRAVENOUS at 14:37

## 2018-01-01 RX ADMIN — Medication: at 01:20

## 2018-01-01 RX ADMIN — PHYTONADIONE 10 MG: 10 INJECTION, EMULSION INTRAMUSCULAR; INTRAVENOUS; SUBCUTANEOUS at 08:50

## 2018-01-01 RX ADMIN — HYDRALAZINE HYDROCHLORIDE 100 MG: 50 TABLET ORAL at 06:50

## 2018-01-01 RX ADMIN — SERTRALINE 50 MG: 50 TABLET, FILM COATED ORAL at 17:30

## 2018-01-01 RX ADMIN — TAZOBACTAM SODIUM AND PIPERACILLIN SODIUM 4.5 G: .5; 4 INJECTION, POWDER, LYOPHILIZED, FOR SOLUTION INTRAVENOUS at 00:38

## 2018-01-01 RX ADMIN — GUAIFENESIN 200 MG: 200 TABLET ORAL at 00:22

## 2018-01-01 RX ADMIN — Medication 10 ML: at 21:39

## 2018-01-01 RX ADMIN — PROPOFOL 40 MCG/KG/MIN: 10 INJECTION, EMULSION INTRAVENOUS at 20:35

## 2018-01-01 RX ADMIN — MIDAZOLAM HYDROCHLORIDE 4 MG: 1 INJECTION, SOLUTION INTRAMUSCULAR; INTRAVENOUS at 21:53

## 2018-01-01 RX ADMIN — HYDRALAZINE HYDROCHLORIDE 100 MG: 50 TABLET ORAL at 13:55

## 2018-01-01 RX ADMIN — INSULIN ASPART 10 UNITS: 100 INJECTION, SOLUTION INTRAVENOUS; SUBCUTANEOUS at 08:56

## 2018-01-01 RX ADMIN — HYDRALAZINE HYDROCHLORIDE 100 MG: 50 TABLET ORAL at 21:22

## 2018-01-01 RX ADMIN — HYDRALAZINE HYDROCHLORIDE 100 MG: 50 TABLET ORAL at 21:44

## 2018-01-01 RX ADMIN — CLONIDINE HYDROCHLORIDE 0.3 MG: 0.3 TABLET ORAL at 21:14

## 2018-01-01 RX ADMIN — FUROSEMIDE 40 MG: 10 INJECTION, SOLUTION INTRAMUSCULAR; INTRAVENOUS at 09:54

## 2018-01-01 RX ADMIN — TERAZOSIN HYDROCHLORIDE 10 MG: 5 CAPSULE ORAL at 08:13

## 2018-01-01 RX ADMIN — IPRATROPIUM BROMIDE AND ALBUTEROL SULFATE 3 ML: .5; 3 SOLUTION RESPIRATORY (INHALATION) at 17:10

## 2018-01-01 RX ADMIN — GABAPENTIN 300 MG: 300 CAPSULE ORAL at 21:32

## 2018-01-01 RX ADMIN — INSULIN ASPART 3 UNITS: 100 INJECTION, SOLUTION INTRAVENOUS; SUBCUTANEOUS at 21:43

## 2018-01-01 RX ADMIN — PROPOFOL 30 MCG/KG/MIN: 10 INJECTION, EMULSION INTRAVENOUS at 22:51

## 2018-01-01 RX ADMIN — CLONIDINE HYDROCHLORIDE 0.1 MG: 0.1 TABLET ORAL at 21:33

## 2018-01-01 RX ADMIN — CLONIDINE HYDROCHLORIDE 0.3 MG: 0.3 TABLET ORAL at 05:47

## 2018-01-01 RX ADMIN — INSULIN ASPART 3 UNITS: 100 INJECTION, SOLUTION INTRAVENOUS; SUBCUTANEOUS at 21:31

## 2018-01-01 RX ADMIN — PROPOFOL 40 MCG/KG/MIN: 10 INJECTION, EMULSION INTRAVENOUS at 03:13

## 2018-01-01 RX ADMIN — Medication 10 ML: at 21:20

## 2018-01-01 RX ADMIN — ACETAMINOPHEN 650 MG: 325 TABLET ORAL at 00:19

## 2018-01-01 RX ADMIN — SODIUM CHLORIDE 13 UNITS/HR: 9 INJECTION, SOLUTION INTRAVENOUS at 19:36

## 2018-01-01 RX ADMIN — PROPOFOL 50 MCG/KG/MIN: 10 INJECTION, EMULSION INTRAVENOUS at 11:04

## 2018-01-01 RX ADMIN — SODIUM CHLORIDE 1000 MG: 9 INJECTION, SOLUTION INTRAVENOUS at 08:39

## 2018-01-01 RX ADMIN — PROPOFOL 35 MCG/KG/MIN: 10 INJECTION, EMULSION INTRAVENOUS at 21:45

## 2018-01-01 RX ADMIN — PROPOFOL 50 MCG/KG/MIN: 10 INJECTION, EMULSION INTRAVENOUS at 01:43

## 2018-01-01 RX ADMIN — GUAIFENESIN 200 MG: 200 TABLET ORAL at 20:34

## 2018-01-01 RX ADMIN — CLONIDINE HYDROCHLORIDE 0.1 MG: 0.1 TABLET ORAL at 13:47

## 2018-01-01 RX ADMIN — GUAIFENESIN 200 MG: 200 TABLET ORAL at 11:42

## 2018-01-01 RX ADMIN — GUAIFENESIN 600 MG: 600 TABLET, EXTENDED RELEASE ORAL at 21:49

## 2018-01-01 RX ADMIN — ISOSORBIDE MONONITRATE 180 MG: 60 TABLET, EXTENDED RELEASE ORAL at 09:42

## 2018-01-01 RX ADMIN — PANTOPRAZOLE SODIUM 40 MG: 40 TABLET, DELAYED RELEASE ORAL at 10:57

## 2018-01-01 RX ADMIN — INSULIN ASPART 3 UNITS: 100 INJECTION, SOLUTION INTRAVENOUS; SUBCUTANEOUS at 14:55

## 2018-01-01 RX ADMIN — FUROSEMIDE 20 MG: 10 INJECTION, SOLUTION INTRAMUSCULAR; INTRAVENOUS at 21:31

## 2018-01-01 RX ADMIN — ALBUMIN HUMAN 25 G: 0.25 SOLUTION INTRAVENOUS at 13:13

## 2018-01-01 RX ADMIN — SERTRALINE 50 MG: 50 TABLET, FILM COATED ORAL at 17:21

## 2018-01-01 RX ADMIN — PROPOFOL 35 MCG/KG/MIN: 10 INJECTION, EMULSION INTRAVENOUS at 00:22

## 2018-01-01 RX ADMIN — HYDRALAZINE HYDROCHLORIDE 100 MG: 50 TABLET ORAL at 20:41

## 2018-01-01 RX ADMIN — ISOSORBIDE MONONITRATE 180 MG: 60 TABLET, EXTENDED RELEASE ORAL at 08:26

## 2018-01-01 RX ADMIN — PROPOFOL 30 MCG/KG/MIN: 10 INJECTION, EMULSION INTRAVENOUS at 19:45

## 2018-01-01 RX ADMIN — METHYLPREDNISOLONE SODIUM SUCCINATE 60 MG: 125 INJECTION, POWDER, FOR SOLUTION INTRAMUSCULAR; INTRAVENOUS at 15:00

## 2018-01-01 RX ADMIN — IPRATROPIUM BROMIDE AND ALBUTEROL SULFATE 3 ML: .5; 3 SOLUTION RESPIRATORY (INHALATION) at 18:41

## 2018-01-01 RX ADMIN — Medication: at 03:15

## 2018-01-01 RX ADMIN — Medication 2000 UNITS: at 08:48

## 2018-01-01 RX ADMIN — Medication 10 ML: at 08:48

## 2018-01-01 RX ADMIN — Medication 2000 MCG: at 10:58

## 2018-01-01 RX ADMIN — FERROUS SULFATE TAB 325 MG (65 MG ELEMENTAL FE) 325 MG: 325 (65 FE) TAB at 09:43

## 2018-01-01 RX ADMIN — TAZOBACTAM SODIUM AND PIPERACILLIN SODIUM 4.5 G: .5; 4 INJECTION, POWDER, LYOPHILIZED, FOR SOLUTION INTRAVENOUS at 23:18

## 2018-01-01 RX ADMIN — POTASSIUM CHLORIDE 40 MEQ: 1.5 POWDER, FOR SOLUTION ORAL at 10:54

## 2018-01-01 RX ADMIN — ATORVASTATIN CALCIUM 80 MG: 40 TABLET, FILM COATED ORAL at 08:01

## 2018-01-01 RX ADMIN — SODIUM CHLORIDE 50 ML: 9 INJECTION, SOLUTION INTRAVENOUS at 07:41

## 2018-01-01 RX ADMIN — INSULIN ASPART 5 UNITS: 100 INJECTION, SOLUTION INTRAVENOUS; SUBCUTANEOUS at 17:02

## 2018-01-01 RX ADMIN — INSULIN DETEMIR 35 UNITS: 100 INJECTION, SOLUTION SUBCUTANEOUS at 09:00

## 2018-01-01 RX ADMIN — IPRATROPIUM BROMIDE 0.5 MG: 0.5 SOLUTION RESPIRATORY (INHALATION) at 19:36

## 2018-01-01 RX ADMIN — TAZOBACTAM SODIUM AND PIPERACILLIN SODIUM 4.5 G: .5; 4 INJECTION, POWDER, LYOPHILIZED, FOR SOLUTION INTRAVENOUS at 06:26

## 2018-01-01 RX ADMIN — PROPOFOL 30 MCG/KG/MIN: 10 INJECTION, EMULSION INTRAVENOUS at 04:29

## 2018-01-01 RX ADMIN — GUAIFENESIN 200 MG: 200 TABLET ORAL at 15:15

## 2018-01-01 RX ADMIN — ISOSORBIDE MONONITRATE 180 MG: 60 TABLET, EXTENDED RELEASE ORAL at 10:10

## 2018-01-01 RX ADMIN — FUROSEMIDE 60 MG: 10 INJECTION, SOLUTION INTRAMUSCULAR; INTRAVENOUS at 11:09

## 2018-01-01 RX ADMIN — POTASSIUM CHLORIDE 40 MEQ: 1.5 POWDER, FOR SOLUTION ORAL at 13:20

## 2018-01-01 RX ADMIN — DEXTROSE MONOHYDRATE 0.3 MCG/KG/MIN: 50 INJECTION, SOLUTION INTRAVENOUS at 14:30

## 2018-01-01 RX ADMIN — TERAZOSIN HYDROCHLORIDE 10 MG: 5 CAPSULE ORAL at 09:27

## 2018-01-01 RX ADMIN — CLONIDINE HYDROCHLORIDE 0.3 MG: 0.3 TABLET ORAL at 05:37

## 2018-01-01 RX ADMIN — VANCOMYCIN HYDROCHLORIDE 750 MG: 5 INJECTION, POWDER, LYOPHILIZED, FOR SOLUTION INTRAVENOUS at 02:37

## 2018-01-01 RX ADMIN — HYDRALAZINE HYDROCHLORIDE 100 MG: 50 TABLET ORAL at 21:06

## 2018-01-01 RX ADMIN — DOXYCYCLINE 100 MG: 100 CAPSULE ORAL at 17:46

## 2018-01-01 RX ADMIN — PROPOFOL 35 MCG/KG/MIN: 10 INJECTION, EMULSION INTRAVENOUS at 00:18

## 2018-01-01 RX ADMIN — Medication 10 ML: at 08:18

## 2018-01-01 RX ADMIN — INSULIN DETEMIR 35 UNITS: 100 INJECTION, SOLUTION SUBCUTANEOUS at 08:13

## 2018-01-01 RX ADMIN — VANCOMYCIN HYDROCHLORIDE 1000 MG: 5 INJECTION, POWDER, LYOPHILIZED, FOR SOLUTION INTRAVENOUS at 10:03

## 2018-01-01 RX ADMIN — HYDRALAZINE HYDROCHLORIDE 100 MG: 50 TABLET ORAL at 21:33

## 2018-01-01 RX ADMIN — FUROSEMIDE 20 MG: 10 INJECTION, SOLUTION INTRAMUSCULAR; INTRAVENOUS at 12:32

## 2018-01-01 RX ADMIN — PROPOFOL 40 MCG/KG/MIN: 10 INJECTION, EMULSION INTRAVENOUS at 10:05

## 2018-01-01 RX ADMIN — TERAZOSIN HYDROCHLORIDE 10 MG: 5 CAPSULE ORAL at 09:26

## 2018-01-01 RX ADMIN — Medication 10 ML: at 21:28

## 2018-01-01 RX ADMIN — CLONIDINE HYDROCHLORIDE 0.1 MG: 0.1 TABLET ORAL at 06:50

## 2018-01-01 RX ADMIN — INSULIN DETEMIR 30 UNITS: 100 INJECTION, SOLUTION SUBCUTANEOUS at 21:24

## 2018-01-01 RX ADMIN — LEVOTHYROXINE SODIUM 25 MCG: 25 TABLET ORAL at 08:37

## 2018-01-01 RX ADMIN — MINERAL SUPPLEMENT IRON 300 MG / 5 ML STRENGTH LIQUID 100 PER BOX UNFLAVORED 300 MG: at 17:48

## 2018-01-01 RX ADMIN — PROPOFOL 40 MCG/KG/MIN: 10 INJECTION, EMULSION INTRAVENOUS at 14:29

## 2018-01-01 RX ADMIN — FONDAPARINUX SODIUM 2.5 MG: 2.5 INJECTION, SOLUTION SUBCUTANEOUS at 09:45

## 2018-01-01 RX ADMIN — VECURONIUM BROMIDE 10 MG: 1 INJECTION, POWDER, LYOPHILIZED, FOR SOLUTION INTRAVENOUS at 09:18

## 2018-01-01 RX ADMIN — PROPOFOL 50 MCG/KG/MIN: 10 INJECTION, EMULSION INTRAVENOUS at 11:59

## 2018-01-01 RX ADMIN — Medication 10 ML: at 08:21

## 2018-01-01 RX ADMIN — ATORVASTATIN CALCIUM 80 MG: 40 TABLET, FILM COATED ORAL at 10:12

## 2018-01-01 RX ADMIN — IPRATROPIUM BROMIDE 0.5 MG: 0.5 SOLUTION RESPIRATORY (INHALATION) at 07:04

## 2018-01-01 RX ADMIN — Medication 2000 MCG: at 08:42

## 2018-01-01 RX ADMIN — Medication 10 ML: at 08:35

## 2018-01-01 RX ADMIN — FERROUS SULFATE TAB 325 MG (65 MG ELEMENTAL FE) 325 MG: 325 (65 FE) TAB at 07:56

## 2018-01-01 RX ADMIN — VANCOMYCIN HYDROCHLORIDE 1000 MG: 5 INJECTION, POWDER, LYOPHILIZED, FOR SOLUTION INTRAVENOUS at 13:13

## 2018-01-01 RX ADMIN — POTASSIUM CHLORIDE 40 MEQ: 1.5 POWDER, FOR SOLUTION ORAL at 08:56

## 2018-01-01 RX ADMIN — CARVEDILOL 25 MG: 25 TABLET, FILM COATED ORAL at 08:55

## 2018-01-01 RX ADMIN — BUDESONIDE AND FORMOTEROL FUMARATE DIHYDRATE 2 PUFF: 160; 4.5 AEROSOL RESPIRATORY (INHALATION) at 06:38

## 2018-01-01 RX ADMIN — AMLODIPINE BESYLATE 10 MG: 10 TABLET ORAL at 08:18

## 2018-01-01 RX ADMIN — Medication 1 CAPSULE: at 11:10

## 2018-01-01 RX ADMIN — Medication 10 ML: at 10:07

## 2018-01-01 RX ADMIN — AMLODIPINE BESYLATE 10 MG: 10 TABLET ORAL at 08:13

## 2018-01-01 RX ADMIN — AMLODIPINE BESYLATE 10 MG: 10 TABLET ORAL at 09:44

## 2018-01-01 RX ADMIN — GUAIFENESIN 200 MG: 200 TABLET ORAL at 05:31

## 2018-01-01 RX ADMIN — VECURONIUM BROMIDE 10 MG: 1 INJECTION, POWDER, LYOPHILIZED, FOR SOLUTION INTRAVENOUS at 07:07

## 2018-01-01 RX ADMIN — SODIUM CHLORIDE 5 MG/HR: 9 INJECTION, SOLUTION INTRAVENOUS at 13:32

## 2018-01-01 RX ADMIN — PROPOFOL 50 MCG/KG/MIN: 10 INJECTION, EMULSION INTRAVENOUS at 13:50

## 2018-01-01 RX ADMIN — ISOSORBIDE MONONITRATE 240 MG: 60 TABLET, EXTENDED RELEASE ORAL at 08:35

## 2018-01-01 RX ADMIN — SERTRALINE 50 MG: 50 TABLET, FILM COATED ORAL at 16:07

## 2018-01-01 RX ADMIN — CLONIDINE HYDROCHLORIDE 0.2 MG: 0.3 TABLET ORAL at 22:07

## 2018-01-01 RX ADMIN — METHYLPREDNISOLONE SODIUM SUCCINATE 40 MG: 40 INJECTION, POWDER, FOR SOLUTION INTRAMUSCULAR; INTRAVENOUS at 11:07

## 2018-01-01 RX ADMIN — FERROUS SULFATE TAB 325 MG (65 MG ELEMENTAL FE) 325 MG: 325 (65 FE) TAB at 10:03

## 2018-01-01 RX ADMIN — PANTOPRAZOLE SODIUM 40 MG: 40 TABLET, DELAYED RELEASE ORAL at 09:54

## 2018-01-01 RX ADMIN — ALBUMIN HUMAN 25 G: 0.25 SOLUTION INTRAVENOUS at 08:18

## 2018-01-01 RX ADMIN — CLONIDINE HYDROCHLORIDE 0.1 MG: 0.1 TABLET ORAL at 14:55

## 2018-01-01 RX ADMIN — PROPOFOL 40 MCG/KG/MIN: 10 INJECTION, EMULSION INTRAVENOUS at 16:36

## 2018-01-01 RX ADMIN — DEXTROSE MONOHYDRATE 0.5 MCG/KG/MIN: 50 INJECTION, SOLUTION INTRAVENOUS at 18:51

## 2018-01-01 RX ADMIN — Medication: at 20:37

## 2018-01-01 RX ADMIN — BUDESONIDE AND FORMOTEROL FUMARATE DIHYDRATE 2 PUFF: 160; 4.5 AEROSOL RESPIRATORY (INHALATION) at 07:41

## 2018-01-01 RX ADMIN — IPRATROPIUM BROMIDE AND ALBUTEROL SULFATE 3 ML: .5; 3 SOLUTION RESPIRATORY (INHALATION) at 00:52

## 2018-01-01 RX ADMIN — Medication 2000 MCG: at 08:17

## 2018-01-01 RX ADMIN — IPRATROPIUM BROMIDE 0.5 MG: 0.5 SOLUTION RESPIRATORY (INHALATION) at 19:06

## 2018-01-01 RX ADMIN — TAZOBACTAM SODIUM AND PIPERACILLIN SODIUM 4.5 G: .5; 4 INJECTION, POWDER, LYOPHILIZED, FOR SOLUTION INTRAVENOUS at 05:41

## 2018-01-01 RX ADMIN — INSULIN ASPART 3 UNITS: 100 INJECTION, SOLUTION INTRAVENOUS; SUBCUTANEOUS at 17:31

## 2018-01-01 RX ADMIN — INSULIN ASPART 8 UNITS: 100 INJECTION, SOLUTION INTRAVENOUS; SUBCUTANEOUS at 21:05

## 2018-01-01 RX ADMIN — SODIUM CHLORIDE 4 UNITS/HR: 9 INJECTION, SOLUTION INTRAVENOUS at 10:35

## 2018-01-01 RX ADMIN — SODIUM CHLORIDE 1000 MG: 9 INJECTION, SOLUTION INTRAVENOUS at 08:23

## 2018-01-01 RX ADMIN — Medication 2000 MCG: at 08:16

## 2018-01-01 RX ADMIN — Medication 10 ML: at 20:22

## 2018-01-01 RX ADMIN — TAZOBACTAM SODIUM AND PIPERACILLIN SODIUM 4.5 G: .5; 4 INJECTION, POWDER, LYOPHILIZED, FOR SOLUTION INTRAVENOUS at 23:13

## 2018-01-01 RX ADMIN — HYDRALAZINE HYDROCHLORIDE 100 MG: 50 TABLET ORAL at 21:21

## 2018-01-01 RX ADMIN — ISOSORBIDE MONONITRATE 180 MG: 60 TABLET, EXTENDED RELEASE ORAL at 08:37

## 2018-01-01 RX ADMIN — Medication 1 CAPSULE: at 08:36

## 2018-01-01 RX ADMIN — ATORVASTATIN CALCIUM 80 MG: 40 TABLET, FILM COATED ORAL at 10:58

## 2018-01-01 RX ADMIN — LEVOTHYROXINE SODIUM 25 MCG: 25 TABLET ORAL at 09:52

## 2018-01-01 RX ADMIN — LABETALOL HCL 200 MG: 200 TABLET, FILM COATED ORAL at 08:17

## 2018-01-01 RX ADMIN — IPRATROPIUM BROMIDE AND ALBUTEROL SULFATE 3 ML: .5; 3 SOLUTION RESPIRATORY (INHALATION) at 07:02

## 2018-01-01 RX ADMIN — IPRATROPIUM BROMIDE 0.5 MG: 0.5 SOLUTION RESPIRATORY (INHALATION) at 12:25

## 2018-01-01 RX ADMIN — HYDRALAZINE HYDROCHLORIDE 100 MG: 50 TABLET ORAL at 22:11

## 2018-01-01 RX ADMIN — IPRATROPIUM BROMIDE 0.5 MG: 0.5 SOLUTION RESPIRATORY (INHALATION) at 06:38

## 2018-01-01 RX ADMIN — SODIUM CHLORIDE 6 UNITS/HR: 9 INJECTION, SOLUTION INTRAVENOUS at 21:23

## 2018-01-01 RX ADMIN — INSULIN DETEMIR 30 UNITS: 100 INJECTION, SOLUTION SUBCUTANEOUS at 21:33

## 2018-01-01 RX ADMIN — TAZOBACTAM SODIUM AND PIPERACILLIN SODIUM 4.5 G: .5; 4 INJECTION, POWDER, LYOPHILIZED, FOR SOLUTION INTRAVENOUS at 13:04

## 2018-01-01 RX ADMIN — INSULIN ASPART 10 UNITS: 100 INJECTION, SOLUTION INTRAVENOUS; SUBCUTANEOUS at 17:31

## 2018-01-01 RX ADMIN — BUDESONIDE AND FORMOTEROL FUMARATE DIHYDRATE 2 PUFF: 160; 4.5 AEROSOL RESPIRATORY (INHALATION) at 19:06

## 2018-01-01 RX ADMIN — POLYETHYLENE GLYCOL 3350 17 G: 17 POWDER, FOR SOLUTION ORAL at 09:54

## 2018-01-01 RX ADMIN — LEVOTHYROXINE SODIUM 25 MCG: 25 TABLET ORAL at 09:08

## 2018-01-01 RX ADMIN — Medication 10 ML: at 21:17

## 2018-01-01 RX ADMIN — BUDESONIDE AND FORMOTEROL FUMARATE DIHYDRATE 2 PUFF: 160; 4.5 AEROSOL RESPIRATORY (INHALATION) at 19:01

## 2018-01-01 RX ADMIN — Medication: at 07:50

## 2018-01-01 RX ADMIN — HYDRALAZINE HYDROCHLORIDE 10 MG: 20 INJECTION INTRAMUSCULAR; INTRAVENOUS at 03:16

## 2018-01-01 RX ADMIN — DOXYCYCLINE 100 MG: 100 CAPSULE ORAL at 18:11

## 2018-01-01 RX ADMIN — AMLODIPINE BESYLATE 10 MG: 10 TABLET ORAL at 10:57

## 2018-01-01 RX ADMIN — HYDRALAZINE HYDROCHLORIDE 100 MG: 50 TABLET ORAL at 14:56

## 2018-01-01 RX ADMIN — PROPOFOL 35 MCG/KG/MIN: 10 INJECTION, EMULSION INTRAVENOUS at 19:37

## 2018-01-01 RX ADMIN — Medication: at 06:26

## 2018-01-01 RX ADMIN — EPINEPHRINE 1 MG: 0.1 INJECTION, SOLUTION ENDOTRACHEAL; INTRACARDIAC; INTRAVENOUS at 12:30

## 2018-01-01 RX ADMIN — AMINOCAPROIC ACID: 250 INJECTION, SOLUTION INTRAVENOUS at 09:18

## 2018-01-01 RX ADMIN — LEVOTHYROXINE SODIUM 25 MCG: 25 TABLET ORAL at 08:34

## 2018-01-01 RX ADMIN — VECURONIUM BROMIDE 5 MG: 1 INJECTION, POWDER, LYOPHILIZED, FOR SOLUTION INTRAVENOUS at 17:57

## 2018-01-01 RX ADMIN — Medication 2000 UNITS: at 09:56

## 2018-01-01 RX ADMIN — GUAIFENESIN 200 MG: 200 TABLET ORAL at 21:23

## 2018-01-01 RX ADMIN — INSULIN DETEMIR 32 UNITS: 100 INJECTION, SOLUTION SUBCUTANEOUS at 21:41

## 2018-01-01 RX ADMIN — INSULIN DETEMIR 25 UNITS: 100 INJECTION, SOLUTION SUBCUTANEOUS at 11:14

## 2018-01-01 RX ADMIN — INSULIN ASPART 3 UNITS: 100 INJECTION, SOLUTION INTRAVENOUS; SUBCUTANEOUS at 23:25

## 2018-01-01 RX ADMIN — CARVEDILOL 6.25 MG: 6.25 TABLET, FILM COATED ORAL at 10:05

## 2018-01-01 RX ADMIN — HYDRALAZINE HYDROCHLORIDE 100 MG: 50 TABLET ORAL at 05:29

## 2018-01-01 RX ADMIN — PROPOFOL 20 MCG/KG/MIN: 10 INJECTION, EMULSION INTRAVENOUS at 20:32

## 2018-01-01 RX ADMIN — PROPOFOL 35 MCG/KG/MIN: 10 INJECTION, EMULSION INTRAVENOUS at 05:46

## 2018-01-01 RX ADMIN — BUDESONIDE AND FORMOTEROL FUMARATE DIHYDRATE 2 PUFF: 160; 4.5 AEROSOL RESPIRATORY (INHALATION) at 06:30

## 2018-01-01 RX ADMIN — HYDRALAZINE HYDROCHLORIDE 100 MG: 50 TABLET ORAL at 05:31

## 2018-01-01 RX ADMIN — HYDRALAZINE HYDROCHLORIDE 100 MG: 50 TABLET ORAL at 14:30

## 2018-01-01 RX ADMIN — INSULIN ASPART 8 UNITS: 100 INJECTION, SOLUTION INTRAVENOUS; SUBCUTANEOUS at 21:42

## 2018-01-01 RX ADMIN — BUDESONIDE AND FORMOTEROL FUMARATE DIHYDRATE 2 PUFF: 160; 4.5 AEROSOL RESPIRATORY (INHALATION) at 18:42

## 2018-01-01 RX ADMIN — ATORVASTATIN CALCIUM 80 MG: 40 TABLET, FILM COATED ORAL at 08:17

## 2018-01-01 RX ADMIN — Medication 1 CAPSULE: at 10:08

## 2018-01-01 RX ADMIN — IPRATROPIUM BROMIDE AND ALBUTEROL SULFATE 3 ML: .5; 3 SOLUTION RESPIRATORY (INHALATION) at 07:06

## 2018-01-01 RX ADMIN — AMLODIPINE BESYLATE 10 MG: 10 TABLET ORAL at 09:28

## 2018-01-01 RX ADMIN — LEVOTHYROXINE SODIUM 25 MCG: 25 TABLET ORAL at 08:24

## 2018-01-01 RX ADMIN — Medication 1 CAPSULE: at 08:13

## 2018-01-01 RX ADMIN — ATORVASTATIN CALCIUM 80 MG: 40 TABLET, FILM COATED ORAL at 08:34

## 2018-01-01 RX ADMIN — FERROUS SULFATE TAB 325 MG (65 MG ELEMENTAL FE) 325 MG: 325 (65 FE) TAB at 08:01

## 2018-01-01 RX ADMIN — FUROSEMIDE 60 MG: 10 INJECTION, SOLUTION INTRAMUSCULAR; INTRAVENOUS at 10:54

## 2018-01-01 RX ADMIN — FOLIC ACID 1 MG: 5 INJECTION, SOLUTION INTRAMUSCULAR; INTRAVENOUS; SUBCUTANEOUS at 17:48

## 2018-01-01 RX ADMIN — IPRATROPIUM BROMIDE 0.5 MG: 0.5 SOLUTION RESPIRATORY (INHALATION) at 06:24

## 2018-01-01 RX ADMIN — IPRATROPIUM BROMIDE AND ALBUTEROL SULFATE 3 ML: .5; 3 SOLUTION RESPIRATORY (INHALATION) at 06:27

## 2018-01-01 RX ADMIN — TAZOBACTAM SODIUM AND PIPERACILLIN SODIUM 4.5 G: .5; 4 INJECTION, POWDER, LYOPHILIZED, FOR SOLUTION INTRAVENOUS at 02:51

## 2018-01-01 RX ADMIN — CLONIDINE HYDROCHLORIDE 0.3 MG: 0.3 TABLET ORAL at 13:51

## 2018-01-01 RX ADMIN — CLONIDINE HYDROCHLORIDE 0.3 MG: 0.3 TABLET ORAL at 13:33

## 2018-01-01 RX ADMIN — SODIUM CHLORIDE 1000 MG: 9 INJECTION, SOLUTION INTRAVENOUS at 09:26

## 2018-01-01 RX ADMIN — IPRATROPIUM BROMIDE AND ALBUTEROL SULFATE 3 ML: .5; 3 SOLUTION RESPIRATORY (INHALATION) at 18:59

## 2018-01-01 RX ADMIN — PIPERACILLIN SODIUM,TAZOBACTAM SODIUM 3.38 G: 3; .375 INJECTION, POWDER, FOR SOLUTION INTRAVENOUS at 01:18

## 2018-01-01 RX ADMIN — IPRATROPIUM BROMIDE 0.5 MG: 0.5 SOLUTION RESPIRATORY (INHALATION) at 13:18

## 2018-01-01 RX ADMIN — PREDNISONE 10 MG: 20 TABLET ORAL at 09:14

## 2018-01-01 RX ADMIN — Medication 10 ML: at 09:46

## 2018-01-01 RX ADMIN — GUAIFENESIN 200 MG: 200 TABLET ORAL at 21:33

## 2018-01-01 RX ADMIN — VECURONIUM BROMIDE 5 MG: 1 INJECTION, POWDER, LYOPHILIZED, FOR SOLUTION INTRAVENOUS at 06:38

## 2018-01-01 RX ADMIN — Medication 2000 MCG: at 09:08

## 2018-01-01 RX ADMIN — Medication 2000 UNITS: at 09:52

## 2018-01-01 RX ADMIN — HYDRALAZINE HYDROCHLORIDE 100 MG: 50 TABLET ORAL at 22:08

## 2018-01-01 RX ADMIN — Medication 10 ML: at 21:18

## 2018-01-01 RX ADMIN — GUAIFENESIN 200 MG: 200 TABLET ORAL at 12:00

## 2018-01-01 RX ADMIN — DOCUSATE SODIUM AND SENNA 1 TABLET: 50; 8.6 TABLET, FILM COATED ORAL at 08:34

## 2018-01-01 RX ADMIN — FONDAPARINUX SODIUM 2.5 MG: 2.5 INJECTION, SOLUTION SUBCUTANEOUS at 08:43

## 2018-01-01 RX ADMIN — AMLODIPINE BESYLATE 10 MG: 10 TABLET ORAL at 08:55

## 2018-01-01 RX ADMIN — BUMETANIDE 1 MG: 1 TABLET ORAL at 13:21

## 2018-01-01 RX ADMIN — INSULIN ASPART 3 UNITS: 100 INJECTION, SOLUTION INTRAVENOUS; SUBCUTANEOUS at 18:02

## 2018-01-01 RX ADMIN — BUDESONIDE AND FORMOTEROL FUMARATE DIHYDRATE 2 PUFF: 160; 4.5 AEROSOL RESPIRATORY (INHALATION) at 06:53

## 2018-01-01 RX ADMIN — DESMOPRESSIN ACETATE 5 MCG: 4 INJECTION INTRAVENOUS at 09:52

## 2018-01-01 RX ADMIN — CLONIDINE HYDROCHLORIDE 0.1 MG: 0.1 TABLET ORAL at 13:56

## 2018-01-01 RX ADMIN — SODIUM CHLORIDE 1000 MG: 9 INJECTION, SOLUTION INTRAVENOUS at 08:37

## 2018-01-01 RX ADMIN — HYDRALAZINE HYDROCHLORIDE 100 MG: 50 TABLET ORAL at 22:13

## 2018-01-01 RX ADMIN — PREDNISONE 20 MG: 20 TABLET ORAL at 08:17

## 2018-01-01 RX ADMIN — POTASSIUM CHLORIDE 40 MEQ: 1500 TABLET, EXTENDED RELEASE ORAL at 14:56

## 2018-01-01 RX ADMIN — GUAIFENESIN 200 MG: 200 TABLET ORAL at 08:24

## 2018-01-01 RX ADMIN — AMLODIPINE BESYLATE 10 MG: 10 TABLET ORAL at 08:16

## 2018-01-01 RX ADMIN — GUAIFENESIN 200 MG: 200 TABLET ORAL at 16:05

## 2018-01-01 RX ADMIN — GUAIFENESIN 200 MG: 200 TABLET ORAL at 11:11

## 2018-01-01 RX ADMIN — METHYLPREDNISOLONE SODIUM SUCCINATE 40 MG: 40 INJECTION, POWDER, FOR SOLUTION INTRAMUSCULAR; INTRAVENOUS at 00:56

## 2018-01-01 RX ADMIN — Medication 10 ML: at 09:56

## 2018-01-01 RX ADMIN — INSULIN ASPART 10 UNITS: 100 INJECTION, SOLUTION INTRAVENOUS; SUBCUTANEOUS at 08:14

## 2018-01-01 RX ADMIN — GUAIFENESIN 200 MG: 200 TABLET ORAL at 16:26

## 2018-01-01 RX ADMIN — SODIUM CHLORIDE 7 UNITS/HR: 9 INJECTION, SOLUTION INTRAVENOUS at 08:51

## 2018-01-01 RX ADMIN — POLYETHYLENE GLYCOL 3350 17 G: 17 POWDER, FOR SOLUTION ORAL at 08:18

## 2018-01-01 RX ADMIN — INSULIN DETEMIR 30 UNITS: 100 INJECTION, SOLUTION SUBCUTANEOUS at 21:41

## 2018-01-01 RX ADMIN — INSULIN ASPART 3 UNITS: 100 INJECTION, SOLUTION INTRAVENOUS; SUBCUTANEOUS at 13:07

## 2018-01-01 RX ADMIN — TAZOBACTAM SODIUM AND PIPERACILLIN SODIUM 4.5 G: .5; 4 INJECTION, POWDER, LYOPHILIZED, FOR SOLUTION INTRAVENOUS at 08:35

## 2018-01-01 RX ADMIN — TAZOBACTAM SODIUM AND PIPERACILLIN SODIUM 4.5 G: .5; 4 INJECTION, POWDER, LYOPHILIZED, FOR SOLUTION INTRAVENOUS at 07:53

## 2018-01-01 RX ADMIN — PREDNISONE 20 MG: 20 TABLET ORAL at 09:51

## 2018-01-01 RX ADMIN — GUAIFENESIN 200 MG: 200 TABLET ORAL at 08:39

## 2018-01-01 RX ADMIN — METHYLPREDNISOLONE SODIUM SUCCINATE 60 MG: 125 INJECTION, POWDER, FOR SOLUTION INTRAMUSCULAR; INTRAVENOUS at 13:59

## 2018-01-01 RX ADMIN — INSULIN DETEMIR 25 UNITS: 100 INJECTION, SOLUTION SUBCUTANEOUS at 09:10

## 2018-01-01 RX ADMIN — PROPOFOL 50 MCG/KG/MIN: 10 INJECTION, EMULSION INTRAVENOUS at 12:38

## 2018-01-01 RX ADMIN — HYDRALAZINE HYDROCHLORIDE 100 MG: 50 TABLET ORAL at 13:51

## 2018-01-01 RX ADMIN — MIDAZOLAM HYDROCHLORIDE 2 MG: 1 INJECTION, SOLUTION INTRAMUSCULAR; INTRAVENOUS at 09:38

## 2018-01-01 RX ADMIN — ALBUMIN HUMAN 25 G: 0.25 SOLUTION INTRAVENOUS at 10:00

## 2018-01-01 RX ADMIN — TAZOBACTAM SODIUM AND PIPERACILLIN SODIUM 4.5 G: .5; 4 INJECTION, POWDER, LYOPHILIZED, FOR SOLUTION INTRAVENOUS at 15:15

## 2018-01-01 RX ADMIN — GUAIFENESIN 200 MG: 200 TABLET ORAL at 15:02

## 2018-01-01 RX ADMIN — SODIUM CHLORIDE 1000 ML: 900 IRRIGANT IRRIGATION at 07:41

## 2018-01-01 RX ADMIN — DOXYCYCLINE 100 MG: 100 CAPSULE ORAL at 18:10

## 2018-01-01 RX ADMIN — GUAIFENESIN 200 MG: 200 TABLET ORAL at 11:04

## 2018-01-01 RX ADMIN — GABAPENTIN 300 MG: 300 CAPSULE ORAL at 21:38

## 2018-01-01 RX ADMIN — CLONIDINE HYDROCHLORIDE 0.3 MG: 0.3 TABLET ORAL at 14:00

## 2018-01-01 RX ADMIN — Medication 1 CAPSULE: at 08:55

## 2018-01-01 RX ADMIN — Medication 10 ML: at 22:16

## 2018-01-01 RX ADMIN — CLONIDINE HYDROCHLORIDE 0.2 MG: 0.3 TABLET ORAL at 05:29

## 2018-01-01 RX ADMIN — SODIUM CHLORIDE 10 MG/HR: 9 INJECTION, SOLUTION INTRAVENOUS at 18:56

## 2018-01-01 RX ADMIN — PROPOFOL 50 MCG/KG/MIN: 10 INJECTION, EMULSION INTRAVENOUS at 09:10

## 2018-01-01 RX ADMIN — CLONIDINE HYDROCHLORIDE 0.2 MG: 0.3 TABLET ORAL at 21:24

## 2018-01-01 RX ADMIN — PROPOFOL 20 MCG/KG/MIN: 10 INJECTION, EMULSION INTRAVENOUS at 00:26

## 2018-01-01 RX ADMIN — Medication 10 ML: at 10:59

## 2018-01-01 RX ADMIN — SERTRALINE 50 MG: 50 TABLET, FILM COATED ORAL at 17:25

## 2018-01-01 RX ADMIN — PROPOFOL 50 MCG/KG/MIN: 10 INJECTION, EMULSION INTRAVENOUS at 14:37

## 2018-01-01 RX ADMIN — PROPOFOL 35 MCG/KG/MIN: 10 INJECTION, EMULSION INTRAVENOUS at 14:14

## 2018-01-01 RX ADMIN — PROPOFOL 40 MCG/KG/MIN: 10 INJECTION, EMULSION INTRAVENOUS at 20:37

## 2018-01-01 RX ADMIN — Medication: at 02:19

## 2018-01-01 RX ADMIN — Medication 2000 UNITS: at 08:01

## 2018-01-01 RX ADMIN — HYDRALAZINE HYDROCHLORIDE 100 MG: 50 TABLET ORAL at 05:37

## 2018-01-01 RX ADMIN — Medication 1 CAPSULE: at 08:01

## 2018-01-01 RX ADMIN — GUAIFENESIN 200 MG: 200 TABLET ORAL at 12:47

## 2018-01-01 RX ADMIN — Medication 2000 UNITS: at 08:38

## 2018-01-01 RX ADMIN — Medication 10 ML: at 10:34

## 2018-01-01 RX ADMIN — LEVOTHYROXINE SODIUM 25 MCG: 25 TABLET ORAL at 08:54

## 2018-01-01 RX ADMIN — SODIUM CHLORIDE 6 UNITS/HR: 9 INJECTION, SOLUTION INTRAVENOUS at 17:03

## 2018-01-01 RX ADMIN — Medication 10 ML: at 09:32

## 2018-01-01 RX ADMIN — IPRATROPIUM BROMIDE AND ALBUTEROL SULFATE 3 ML: .5; 3 SOLUTION RESPIRATORY (INHALATION) at 19:49

## 2018-01-01 RX ADMIN — Medication 2000 MCG: at 10:08

## 2018-01-01 RX ADMIN — PANTOPRAZOLE SODIUM 40 MG: 40 TABLET, DELAYED RELEASE ORAL at 08:28

## 2018-01-01 RX ADMIN — Medication 2000 UNITS: at 08:19

## 2018-01-01 RX ADMIN — ATORVASTATIN CALCIUM 80 MG: 40 TABLET, FILM COATED ORAL at 08:52

## 2018-01-01 RX ADMIN — INSULIN ASPART 8 UNITS: 100 INJECTION, SOLUTION INTRAVENOUS; SUBCUTANEOUS at 07:25

## 2018-01-01 RX ADMIN — FUROSEMIDE 40 MG: 10 INJECTION, SOLUTION INTRAMUSCULAR; INTRAVENOUS at 15:13

## 2018-01-01 RX ADMIN — IPRATROPIUM BROMIDE AND ALBUTEROL SULFATE 3 ML: .5; 3 SOLUTION RESPIRATORY (INHALATION) at 00:32

## 2018-01-01 RX ADMIN — TAZOBACTAM SODIUM AND PIPERACILLIN SODIUM 4.5 G: .5; 4 INJECTION, POWDER, LYOPHILIZED, FOR SOLUTION INTRAVENOUS at 22:47

## 2018-01-01 RX ADMIN — Medication 10 ML: at 21:33

## 2018-01-01 RX ADMIN — IPRATROPIUM BROMIDE 0.5 MG: 0.5 SOLUTION RESPIRATORY (INHALATION) at 00:43

## 2018-01-01 RX ADMIN — PHYTONADIONE 10 MG: 10 INJECTION, EMULSION INTRAMUSCULAR; INTRAVENOUS; SUBCUTANEOUS at 09:56

## 2018-01-01 RX ADMIN — IPRATROPIUM BROMIDE AND ALBUTEROL SULFATE 3 ML: .5; 3 SOLUTION RESPIRATORY (INHALATION) at 00:51

## 2018-01-01 RX ADMIN — PROPOFOL 50 MCG/KG/MIN: 10 INJECTION, EMULSION INTRAVENOUS at 12:09

## 2018-01-01 RX ADMIN — Medication 2000 UNITS: at 08:30

## 2018-01-01 RX ADMIN — HYDRALAZINE HYDROCHLORIDE 100 MG: 50 TABLET ORAL at 05:43

## 2018-01-01 RX ADMIN — FUROSEMIDE 40 MG: 10 INJECTION, SOLUTION INTRAMUSCULAR; INTRAVENOUS at 12:28

## 2018-01-01 RX ADMIN — IPRATROPIUM BROMIDE AND ALBUTEROL SULFATE 3 ML: .5; 3 SOLUTION RESPIRATORY (INHALATION) at 00:59

## 2018-01-01 RX ADMIN — ATORVASTATIN CALCIUM 80 MG: 40 TABLET, FILM COATED ORAL at 09:09

## 2018-01-01 RX ADMIN — GUAIFENESIN 200 MG: 200 TABLET ORAL at 11:20

## 2018-01-01 RX ADMIN — PANTOPRAZOLE SODIUM 40 MG: 40 TABLET, DELAYED RELEASE ORAL at 10:09

## 2018-01-01 RX ADMIN — LEVOTHYROXINE SODIUM 25 MCG: 25 TABLET ORAL at 10:58

## 2018-01-01 RX ADMIN — PANTOPRAZOLE SODIUM 40 MG: 40 TABLET, DELAYED RELEASE ORAL at 09:44

## 2018-01-01 RX ADMIN — Medication 1 CAPSULE: at 08:15

## 2018-01-01 RX ADMIN — INSULIN DETEMIR 35 UNITS: 100 INJECTION, SOLUTION SUBCUTANEOUS at 09:58

## 2018-01-01 RX ADMIN — ATORVASTATIN CALCIUM 80 MG: 40 TABLET, FILM COATED ORAL at 09:28

## 2018-01-01 RX ADMIN — ALBUMIN HUMAN 25 G: 0.25 SOLUTION INTRAVENOUS at 10:52

## 2018-01-01 RX ADMIN — SODIUM CHLORIDE 5 MG/HR: 9 INJECTION, SOLUTION INTRAVENOUS at 14:40

## 2018-01-01 RX ADMIN — FUROSEMIDE 40 MG: 10 INJECTION INTRAMUSCULAR; INTRAVENOUS at 09:20

## 2018-01-01 RX ADMIN — GUAIFENESIN 200 MG: 200 TABLET ORAL at 08:20

## 2018-01-01 RX ADMIN — IPRATROPIUM BROMIDE AND ALBUTEROL SULFATE 3 ML: .5; 3 SOLUTION RESPIRATORY (INHALATION) at 18:46

## 2018-01-01 RX ADMIN — PROPOFOL 25 MCG/KG/MIN: 10 INJECTION, EMULSION INTRAVENOUS at 12:10

## 2018-01-01 RX ADMIN — PROPOFOL 40 MCG/KG/MIN: 10 INJECTION, EMULSION INTRAVENOUS at 15:46

## 2018-01-01 RX ADMIN — IPRATROPIUM BROMIDE AND ALBUTEROL SULFATE 3 ML: .5; 3 SOLUTION RESPIRATORY (INHALATION) at 12:49

## 2018-01-01 RX ADMIN — Medication 2000 MCG: at 08:50

## 2018-01-01 RX ADMIN — IPRATROPIUM BROMIDE AND ALBUTEROL SULFATE 3 ML: .5; 3 SOLUTION RESPIRATORY (INHALATION) at 00:49

## 2018-01-01 RX ADMIN — HYDRALAZINE HYDROCHLORIDE 100 MG: 50 TABLET ORAL at 20:12

## 2018-01-01 RX ADMIN — GUAIFENESIN 200 MG: 200 TABLET ORAL at 05:39

## 2018-01-01 RX ADMIN — Medication: at 21:23

## 2018-01-01 RX ADMIN — ISOSORBIDE MONONITRATE 180 MG: 60 TABLET, EXTENDED RELEASE ORAL at 08:17

## 2018-01-01 RX ADMIN — GUAIFENESIN 600 MG: 600 TABLET, EXTENDED RELEASE ORAL at 14:55

## 2018-01-01 RX ADMIN — METHYLPREDNISOLONE SODIUM SUCCINATE 40 MG: 40 INJECTION, POWDER, FOR SOLUTION INTRAMUSCULAR; INTRAVENOUS at 13:22

## 2018-01-01 RX ADMIN — ATORVASTATIN CALCIUM 80 MG: 40 TABLET, FILM COATED ORAL at 08:16

## 2018-01-01 RX ADMIN — AMLODIPINE BESYLATE 10 MG: 10 TABLET ORAL at 09:08

## 2018-01-01 RX ADMIN — GUAIFENESIN 200 MG: 200 TABLET ORAL at 21:06

## 2018-01-01 RX ADMIN — Medication 2000 MCG: at 08:01

## 2018-01-01 RX ADMIN — SERTRALINE 50 MG: 50 TABLET, FILM COATED ORAL at 16:33

## 2018-01-01 RX ADMIN — Medication 10 ML: at 09:09

## 2018-01-01 RX ADMIN — HYDRALAZINE HYDROCHLORIDE 100 MG: 50 TABLET ORAL at 05:19

## 2018-01-01 RX ADMIN — IPRATROPIUM BROMIDE AND ALBUTEROL SULFATE 3 ML: .5; 3 SOLUTION RESPIRATORY (INHALATION) at 00:29

## 2018-01-01 RX ADMIN — FLUCONAZOLE 100 MG: 2 INJECTION INTRAVENOUS at 13:12

## 2018-01-01 RX ADMIN — LABETALOL HCL 200 MG: 200 TABLET, FILM COATED ORAL at 21:21

## 2018-01-01 RX ADMIN — SODIUM CHLORIDE 10 MG/HR: 9 INJECTION, SOLUTION INTRAVENOUS at 23:14

## 2018-01-01 RX ADMIN — AMLODIPINE BESYLATE 10 MG: 10 TABLET ORAL at 08:34

## 2018-01-01 RX ADMIN — LEVOTHYROXINE SODIUM 25 MCG: 25 TABLET ORAL at 08:28

## 2018-01-01 RX ADMIN — PROPOFOL 50 MCG/KG/MIN: 10 INJECTION, EMULSION INTRAVENOUS at 05:34

## 2018-01-01 RX ADMIN — PROPOFOL 50 MCG/KG/MIN: 10 INJECTION, EMULSION INTRAVENOUS at 18:57

## 2018-01-01 RX ADMIN — INSULIN ASPART 12 UNITS: 100 INJECTION, SOLUTION INTRAVENOUS; SUBCUTANEOUS at 21:07

## 2018-01-01 RX ADMIN — METHYLPREDNISOLONE SODIUM SUCCINATE 60 MG: 125 INJECTION, POWDER, FOR SOLUTION INTRAMUSCULAR; INTRAVENOUS at 21:18

## 2018-01-01 RX ADMIN — IPRATROPIUM BROMIDE AND ALBUTEROL SULFATE 3 ML: .5; 3 SOLUTION RESPIRATORY (INHALATION) at 18:43

## 2018-01-01 RX ADMIN — HYDRALAZINE HYDROCHLORIDE 10 MG: 20 INJECTION INTRAMUSCULAR; INTRAVENOUS at 09:55

## 2018-01-01 RX ADMIN — TERAZOSIN HYDROCHLORIDE 10 MG: 5 CAPSULE ORAL at 08:50

## 2018-01-01 RX ADMIN — Medication 10 ML: at 20:27

## 2018-01-01 RX ADMIN — AMLODIPINE BESYLATE 10 MG: 10 TABLET ORAL at 08:31

## 2018-01-01 RX ADMIN — VECURONIUM BROMIDE 10 MG: 1 INJECTION, POWDER, LYOPHILIZED, FOR SOLUTION INTRAVENOUS at 08:26

## 2018-01-01 RX ADMIN — Medication 10 ML: at 08:23

## 2018-01-01 RX ADMIN — DEXTROSE MONOHYDRATE 0.5 MCG/KG/MIN: 50 INJECTION, SOLUTION INTRAVENOUS at 05:34

## 2018-01-01 RX ADMIN — SODIUM CHLORIDE 5 MG/HR: 9 INJECTION, SOLUTION INTRAVENOUS at 11:53

## 2018-01-01 RX ADMIN — MINERAL SUPPLEMENT IRON 300 MG / 5 ML STRENGTH LIQUID 100 PER BOX UNFLAVORED 300 MG: at 08:15

## 2018-01-01 RX ADMIN — PROPOFOL 50 MCG/KG/MIN: 10 INJECTION, EMULSION INTRAVENOUS at 17:30

## 2018-01-01 RX ADMIN — GUAIFENESIN 600 MG: 600 TABLET, EXTENDED RELEASE ORAL at 10:10

## 2018-01-01 RX ADMIN — Medication 2000 MCG: at 09:27

## 2018-01-01 RX ADMIN — GUAIFENESIN 200 MG: 200 TABLET ORAL at 00:09

## 2018-01-01 RX ADMIN — FUROSEMIDE 20 MG: 10 INJECTION, SOLUTION INTRAMUSCULAR; INTRAVENOUS at 18:54

## 2018-01-01 RX ADMIN — FERROUS SULFATE TAB 325 MG (65 MG ELEMENTAL FE) 325 MG: 325 (65 FE) TAB at 08:21

## 2018-01-01 RX ADMIN — IPRATROPIUM BROMIDE AND ALBUTEROL SULFATE 3 ML: .5; 3 SOLUTION RESPIRATORY (INHALATION) at 13:48

## 2018-01-01 RX ADMIN — LABETALOL HCL 200 MG: 200 TABLET, FILM COATED ORAL at 22:05

## 2018-01-01 RX ADMIN — PROPOFOL 30 MCG/KG/MIN: 10 INJECTION, EMULSION INTRAVENOUS at 08:26

## 2018-01-01 RX ADMIN — TAZOBACTAM SODIUM AND PIPERACILLIN SODIUM 4.5 G: .5; 4 INJECTION, POWDER, LYOPHILIZED, FOR SOLUTION INTRAVENOUS at 15:29

## 2018-01-01 RX ADMIN — IPRATROPIUM BROMIDE AND ALBUTEROL SULFATE 3 ML: .5; 3 SOLUTION RESPIRATORY (INHALATION) at 18:34

## 2018-01-01 RX ADMIN — IPRATROPIUM BROMIDE 0.5 MG: 0.5 SOLUTION RESPIRATORY (INHALATION) at 12:48

## 2018-01-01 RX ADMIN — ATORVASTATIN CALCIUM 80 MG: 40 TABLET, FILM COATED ORAL at 10:02

## 2018-01-01 RX ADMIN — ALBUMIN HUMAN 25 G: 0.25 SOLUTION INTRAVENOUS at 08:35

## 2018-01-01 RX ADMIN — Medication 10 ML: at 21:22

## 2018-01-01 RX ADMIN — TAZOBACTAM SODIUM AND PIPERACILLIN SODIUM 4.5 G: .5; 4 INJECTION, POWDER, LYOPHILIZED, FOR SOLUTION INTRAVENOUS at 15:16

## 2018-01-01 RX ADMIN — LABETALOL HCL 200 MG: 200 TABLET, FILM COATED ORAL at 20:24

## 2018-01-01 RX ADMIN — INSULIN ASPART 5 UNITS: 100 INJECTION, SOLUTION INTRAVENOUS; SUBCUTANEOUS at 18:10

## 2018-01-01 RX ADMIN — METHYLPREDNISOLONE SODIUM SUCCINATE 40 MG: 125 INJECTION, POWDER, FOR SOLUTION INTRAMUSCULAR; INTRAVENOUS at 11:07

## 2018-01-01 RX ADMIN — Medication: at 20:19

## 2018-01-01 RX ADMIN — IPRATROPIUM BROMIDE AND ALBUTEROL SULFATE 3 ML: .5; 3 SOLUTION RESPIRATORY (INHALATION) at 06:49

## 2018-01-01 RX ADMIN — POTASSIUM CHLORIDE 10 MEQ: 7.46 INJECTION, SOLUTION INTRAVENOUS at 06:25

## 2018-01-01 RX ADMIN — AMLODIPINE BESYLATE 10 MG: 10 TABLET ORAL at 08:50

## 2018-01-01 RX ADMIN — GUAIFENESIN 200 MG: 200 TABLET ORAL at 16:52

## 2018-01-01 RX ADMIN — POTASSIUM CHLORIDE 40 MEQ: 1.5 POWDER, FOR SOLUTION ORAL at 10:42

## 2018-01-01 RX ADMIN — GUAIFENESIN 200 MG: 200 TABLET ORAL at 23:45

## 2018-01-01 RX ADMIN — DESMOPRESSIN ACETATE 5 MCG: 4 INJECTION INTRAVENOUS at 08:17

## 2018-01-01 RX ADMIN — CLONIDINE HYDROCHLORIDE 0.1 MG: 0.1 TABLET ORAL at 22:13

## 2018-01-01 RX ADMIN — DEXTROSE MONOHYDRATE 25 G: 25 INJECTION, SOLUTION INTRAVENOUS at 11:30

## 2018-01-01 RX ADMIN — CETIRIZINE HYDROCHLORIDE 10 MG: 10 TABLET ORAL at 09:44

## 2018-01-01 RX ADMIN — FERROUS SULFATE TAB 325 MG (65 MG ELEMENTAL FE) 325 MG: 325 (65 FE) TAB at 08:38

## 2018-01-01 RX ADMIN — Medication 2000 UNITS: at 10:11

## 2018-01-01 RX ADMIN — SERTRALINE 50 MG: 50 TABLET, FILM COATED ORAL at 19:01

## 2018-01-01 RX ADMIN — FONDAPARINUX SODIUM 2.5 MG: 2.5 INJECTION, SOLUTION SUBCUTANEOUS at 10:09

## 2018-01-01 RX ADMIN — PROPOFOL 40 MCG/KG/MIN: 10 INJECTION, EMULSION INTRAVENOUS at 09:43

## 2018-01-01 RX ADMIN — FOLIC ACID 1 MG: 5 INJECTION, SOLUTION INTRAMUSCULAR; INTRAVENOUS; SUBCUTANEOUS at 09:26

## 2018-01-01 RX ADMIN — Medication 10 ML: at 20:20

## 2018-01-01 RX ADMIN — PREDNISONE 10 MG: 10 TABLET ORAL at 10:56

## 2018-01-01 RX ADMIN — INSULIN ASPART 5 UNITS: 100 INJECTION, SOLUTION INTRAVENOUS; SUBCUTANEOUS at 13:21

## 2018-01-01 RX ADMIN — DOXYCYCLINE 100 MG: 100 CAPSULE ORAL at 06:16

## 2018-01-01 RX ADMIN — HYDRALAZINE HYDROCHLORIDE 100 MG: 50 TABLET ORAL at 16:33

## 2018-01-01 RX ADMIN — THROMBIN, TOPICAL (BOVINE) 2 UNITS: KIT at 09:22

## 2018-01-01 RX ADMIN — Medication 10 ML: at 20:37

## 2018-01-01 RX ADMIN — FERROUS SULFATE TAB 325 MG (65 MG ELEMENTAL FE) 325 MG: 325 (65 FE) TAB at 10:58

## 2018-01-01 RX ADMIN — GUAIFENESIN 200 MG: 200 TABLET ORAL at 17:29

## 2018-01-01 RX ADMIN — LACTULOSE 20 G: 20 SOLUTION ORAL at 11:04

## 2018-01-01 RX ADMIN — PROPOFOL 35 MCG/KG/MIN: 10 INJECTION, EMULSION INTRAVENOUS at 00:26

## 2018-01-01 RX ADMIN — PROPOFOL 50 MCG/KG/MIN: 10 INJECTION, EMULSION INTRAVENOUS at 17:49

## 2018-01-01 RX ADMIN — GUAIFENESIN 200 MG: 200 TABLET ORAL at 07:54

## 2018-01-01 RX ADMIN — INSULIN ASPART 3 UNITS: 100 INJECTION, SOLUTION INTRAVENOUS; SUBCUTANEOUS at 08:16

## 2018-01-01 RX ADMIN — FUROSEMIDE 20 MG: 10 INJECTION INTRAMUSCULAR; INTRAVENOUS at 12:32

## 2018-01-01 RX ADMIN — Medication 10 ML: at 21:34

## 2018-01-01 RX ADMIN — HYDRALAZINE HYDROCHLORIDE 100 MG: 50 TABLET ORAL at 05:57

## 2018-01-01 RX ADMIN — Medication: at 11:55

## 2018-01-01 RX ADMIN — SERTRALINE 50 MG: 50 TABLET, FILM COATED ORAL at 17:58

## 2018-01-01 RX ADMIN — LEVOTHYROXINE SODIUM 25 MCG: 25 TABLET ORAL at 08:17

## 2018-01-01 RX ADMIN — PREDNISONE 30 MG: 10 TABLET ORAL at 09:18

## 2018-01-01 RX ADMIN — PROPOFOL 35 MCG/KG/MIN: 10 INJECTION, EMULSION INTRAVENOUS at 07:35

## 2018-01-01 RX ADMIN — MIDAZOLAM HYDROCHLORIDE 4 MG: 1 INJECTION, SOLUTION INTRAMUSCULAR; INTRAVENOUS at 11:34

## 2018-01-01 RX ADMIN — Medication 2000 MCG: at 09:52

## 2018-01-01 RX ADMIN — MIDAZOLAM HYDROCHLORIDE 4 MG: 1 INJECTION, SOLUTION INTRAMUSCULAR; INTRAVENOUS at 10:04

## 2018-01-01 RX ADMIN — GUAIFENESIN 200 MG: 200 TABLET ORAL at 23:41

## 2018-01-01 RX ADMIN — Medication 10 ML: at 08:45

## 2018-01-01 RX ADMIN — Medication 10 ML: at 10:08

## 2018-01-01 RX ADMIN — CLONIDINE HYDROCHLORIDE 0.3 MG: 0.3 TABLET ORAL at 05:57

## 2018-01-01 RX ADMIN — HYDRALAZINE HYDROCHLORIDE 100 MG: 50 TABLET ORAL at 21:18

## 2018-01-01 RX ADMIN — PROPOFOL 50 MCG/KG/MIN: 10 INJECTION, EMULSION INTRAVENOUS at 23:28

## 2018-01-01 RX ADMIN — CLONIDINE HYDROCHLORIDE 0.3 MG: 0.3 TABLET ORAL at 05:39

## 2018-01-01 RX ADMIN — CARVEDILOL 12.5 MG: 6.25 TABLET, FILM COATED ORAL at 18:11

## 2018-01-01 RX ADMIN — PROPOFOL 35 MCG/KG/MIN: 10 INJECTION, EMULSION INTRAVENOUS at 03:49

## 2018-01-01 RX ADMIN — FUROSEMIDE 20 MG: 10 INJECTION, SOLUTION INTRAMUSCULAR; INTRAVENOUS at 07:13

## 2018-01-01 RX ADMIN — PROPOFOL 50 MCG/KG/MIN: 10 INJECTION, EMULSION INTRAVENOUS at 06:10

## 2018-01-01 RX ADMIN — BUDESONIDE AND FORMOTEROL FUMARATE DIHYDRATE 2 PUFF: 160; 4.5 AEROSOL RESPIRATORY (INHALATION) at 19:35

## 2018-01-01 RX ADMIN — IPRATROPIUM BROMIDE AND ALBUTEROL SULFATE 3 ML: .5; 3 SOLUTION RESPIRATORY (INHALATION) at 00:36

## 2018-01-01 RX ADMIN — GUAIFENESIN 200 MG: 200 TABLET ORAL at 02:19

## 2018-01-01 RX ADMIN — TAZOBACTAM SODIUM AND PIPERACILLIN SODIUM 4.5 G: .5; 4 INJECTION, POWDER, LYOPHILIZED, FOR SOLUTION INTRAVENOUS at 00:22

## 2018-01-01 RX ADMIN — TAZOBACTAM SODIUM AND PIPERACILLIN SODIUM 4.5 G: .5; 4 INJECTION, POWDER, LYOPHILIZED, FOR SOLUTION INTRAVENOUS at 11:00

## 2018-01-01 RX ADMIN — CLONIDINE HYDROCHLORIDE 0.2 MG: 0.2 TABLET ORAL at 15:44

## 2018-01-01 RX ADMIN — Medication 10 ML: at 08:17

## 2018-01-01 RX ADMIN — GUAIFENESIN 200 MG: 200 TABLET ORAL at 11:15

## 2018-01-01 RX ADMIN — Medication 1 CAPSULE: at 08:19

## 2018-01-01 RX ADMIN — SERTRALINE 50 MG: 50 TABLET, FILM COATED ORAL at 18:02

## 2018-01-01 RX ADMIN — POTASSIUM CHLORIDE 40 MEQ: 1.5 POWDER, FOR SOLUTION ORAL at 10:00

## 2018-01-01 RX ADMIN — VANCOMYCIN HYDROCHLORIDE 1000 MG: 5 INJECTION, POWDER, LYOPHILIZED, FOR SOLUTION INTRAVENOUS at 11:00

## 2018-01-01 RX ADMIN — CLONIDINE HYDROCHLORIDE 0.3 MG: 0.3 TABLET ORAL at 14:30

## 2018-01-01 RX ADMIN — HYDRALAZINE HYDROCHLORIDE 100 MG: 50 TABLET ORAL at 21:39

## 2018-01-01 RX ADMIN — PROPOFOL 30 MCG/KG/MIN: 10 INJECTION, EMULSION INTRAVENOUS at 02:14

## 2018-01-01 RX ADMIN — PROPOFOL 50 MCG/KG/MIN: 10 INJECTION, EMULSION INTRAVENOUS at 03:06

## 2018-01-01 RX ADMIN — ATORVASTATIN CALCIUM 80 MG: 40 TABLET, FILM COATED ORAL at 09:52

## 2018-01-01 RX ADMIN — GABAPENTIN 300 MG: 300 CAPSULE ORAL at 21:18

## 2018-01-01 RX ADMIN — METHYLPREDNISOLONE SODIUM SUCCINATE 60 MG: 125 INJECTION, POWDER, FOR SOLUTION INTRAMUSCULAR; INTRAVENOUS at 21:33

## 2018-01-01 RX ADMIN — CARVEDILOL 6.25 MG: 6.25 TABLET, FILM COATED ORAL at 18:02

## 2018-01-01 RX ADMIN — PANTOPRAZOLE SODIUM 40 MG: 40 TABLET, DELAYED RELEASE ORAL at 09:06

## 2018-01-01 RX ADMIN — TAZOBACTAM SODIUM AND PIPERACILLIN SODIUM 4.5 G: .5; 4 INJECTION, POWDER, LYOPHILIZED, FOR SOLUTION INTRAVENOUS at 14:58

## 2018-01-01 RX ADMIN — CARVEDILOL 12.5 MG: 6.25 TABLET, FILM COATED ORAL at 16:33

## 2018-01-01 RX ADMIN — EPINEPHRINE 1 MG: 1 INJECTION INTRAMUSCULAR; INTRAVENOUS; SUBCUTANEOUS at 09:21

## 2018-01-01 RX ADMIN — GUAIFENESIN 200 MG: 200 TABLET ORAL at 17:50

## 2018-01-01 RX ADMIN — FERROUS SULFATE TAB 325 MG (65 MG ELEMENTAL FE) 325 MG: 325 (65 FE) TAB at 08:36

## 2018-01-01 RX ADMIN — INSULIN DETEMIR 25 UNITS: 100 INJECTION, SOLUTION SUBCUTANEOUS at 08:57

## 2018-01-01 RX ADMIN — VANCOMYCIN HYDROCHLORIDE 1000 MG: 5 INJECTION, POWDER, LYOPHILIZED, FOR SOLUTION INTRAVENOUS at 23:00

## 2018-01-01 RX ADMIN — POLYETHYLENE GLYCOL 3350 17 G: 17 POWDER, FOR SOLUTION ORAL at 20:28

## 2018-01-01 RX ADMIN — GUAIFENESIN 200 MG: 200 TABLET ORAL at 20:32

## 2018-01-01 RX ADMIN — GUAIFENESIN 200 MG: 200 TABLET ORAL at 08:34

## 2018-01-01 RX ADMIN — Medication 1 CAPSULE: at 09:54

## 2018-01-01 RX ADMIN — PROPOFOL 35 MCG/KG/MIN: 10 INJECTION, EMULSION INTRAVENOUS at 04:27

## 2018-01-01 RX ADMIN — HYDRALAZINE HYDROCHLORIDE 100 MG: 50 TABLET ORAL at 14:22

## 2018-01-01 RX ADMIN — ISOSORBIDE MONONITRATE 240 MG: 60 TABLET, EXTENDED RELEASE ORAL at 09:26

## 2018-01-01 RX ADMIN — CHLOROTHIAZIDE SODIUM 500 MG: 500 INJECTION, POWDER, LYOPHILIZED, FOR SOLUTION INTRAVENOUS at 13:13

## 2018-01-01 RX ADMIN — LEVOTHYROXINE SODIUM 25 MCG: 25 TABLET ORAL at 08:51

## 2018-01-01 RX ADMIN — IPRATROPIUM BROMIDE AND ALBUTEROL SULFATE 3 ML: .5; 3 SOLUTION RESPIRATORY (INHALATION) at 18:31

## 2018-01-01 RX ADMIN — ASPIRIN 81 MG: 81 TABLET ORAL at 09:09

## 2018-01-01 RX ADMIN — GUAIFENESIN 200 MG: 200 TABLET ORAL at 16:00

## 2018-01-01 RX ADMIN — CLONIDINE HYDROCHLORIDE 0.3 MG: 0.3 TABLET ORAL at 13:23

## 2018-01-01 RX ADMIN — PANTOPRAZOLE SODIUM 40 MG: 40 TABLET, DELAYED RELEASE ORAL at 08:17

## 2018-01-01 RX ADMIN — GUAIFENESIN 200 MG: 200 TABLET ORAL at 03:01

## 2018-01-01 RX ADMIN — PROPOFOL 35 MCG/KG/MIN: 10 INJECTION, EMULSION INTRAVENOUS at 02:25

## 2018-01-01 RX ADMIN — CLONIDINE HYDROCHLORIDE 0.3 MG: 0.3 TABLET ORAL at 05:31

## 2018-01-01 RX ADMIN — ATORVASTATIN CALCIUM 80 MG: 40 TABLET, FILM COATED ORAL at 08:27

## 2018-01-01 RX ADMIN — INSULIN ASPART 12 UNITS: 100 INJECTION, SOLUTION INTRAVENOUS; SUBCUTANEOUS at 10:52

## 2018-01-01 RX ADMIN — FONDAPARINUX SODIUM 2.5 MG: 2.5 INJECTION, SOLUTION SUBCUTANEOUS at 09:56

## 2018-01-01 RX ADMIN — PROPOFOL 50 MCG/KG/MIN: 10 INJECTION, EMULSION INTRAVENOUS at 02:16

## 2018-01-01 RX ADMIN — IPRATROPIUM BROMIDE AND ALBUTEROL SULFATE 3 ML: .5; 3 SOLUTION RESPIRATORY (INHALATION) at 00:40

## 2018-01-01 RX ADMIN — MIDAZOLAM HYDROCHLORIDE 2 MG: 1 INJECTION, SOLUTION INTRAMUSCULAR; INTRAVENOUS at 12:20

## 2018-01-01 RX ADMIN — GUAIFENESIN 600 MG: 600 TABLET, EXTENDED RELEASE ORAL at 09:08

## 2018-01-01 RX ADMIN — VANCOMYCIN HYDROCHLORIDE 1000 MG: 5 INJECTION, POWDER, LYOPHILIZED, FOR SOLUTION INTRAVENOUS at 03:04

## 2018-01-01 RX ADMIN — TAZOBACTAM SODIUM AND PIPERACILLIN SODIUM 4.5 G: .5; 4 INJECTION, POWDER, LYOPHILIZED, FOR SOLUTION INTRAVENOUS at 08:37

## 2018-01-01 RX ADMIN — DOCUSATE SODIUM AND SENNA 1 TABLET: 50; 8.6 TABLET, FILM COATED ORAL at 21:14

## 2018-01-01 RX ADMIN — CETIRIZINE HYDROCHLORIDE 10 MG: 10 TABLET ORAL at 10:12

## 2018-01-01 RX ADMIN — INSULIN ASPART 8 UNITS: 100 INJECTION, SOLUTION INTRAVENOUS; SUBCUTANEOUS at 06:35

## 2018-01-01 RX ADMIN — Medication 10 ML: at 08:22

## 2018-01-01 RX ADMIN — Medication: at 03:42

## 2018-01-01 RX ADMIN — IPRATROPIUM BROMIDE 0.5 MG: 0.5 SOLUTION RESPIRATORY (INHALATION) at 01:27

## 2018-01-01 RX ADMIN — ATORVASTATIN CALCIUM 80 MG: 40 TABLET, FILM COATED ORAL at 09:43

## 2018-01-01 RX ADMIN — ASPIRIN 81 MG: 81 TABLET ORAL at 09:51

## 2018-01-01 RX ADMIN — CLONIDINE HYDROCHLORIDE 0.3 MG: 0.3 TABLET ORAL at 05:23

## 2018-01-01 RX ADMIN — INSULIN ASPART 12 UNITS: 100 INJECTION, SOLUTION INTRAVENOUS; SUBCUTANEOUS at 06:30

## 2018-01-01 RX ADMIN — FUROSEMIDE 40 MG: 10 INJECTION INTRAMUSCULAR; INTRAVENOUS at 12:28

## 2018-01-01 RX ADMIN — LABETALOL HCL 300 MG: 200 TABLET, FILM COATED ORAL at 09:55

## 2018-01-01 RX ADMIN — HYDRALAZINE HYDROCHLORIDE 100 MG: 50 TABLET ORAL at 05:24

## 2018-01-01 RX ADMIN — HYDRALAZINE HYDROCHLORIDE 100 MG: 50 TABLET ORAL at 05:06

## 2018-01-01 RX ADMIN — CLONIDINE HYDROCHLORIDE 0.3 MG: 0.3 TABLET ORAL at 21:22

## 2018-01-01 RX ADMIN — LEVOTHYROXINE SODIUM 25 MCG: 25 TABLET ORAL at 08:16

## 2018-01-01 RX ADMIN — INSULIN ASPART 10 UNITS: 100 INJECTION, SOLUTION INTRAVENOUS; SUBCUTANEOUS at 13:07

## 2018-01-01 RX ADMIN — CARVEDILOL 6.25 MG: 6.25 TABLET, FILM COATED ORAL at 08:21

## 2018-01-01 RX ADMIN — POTASSIUM CHLORIDE 40 MEQ: 1.5 POWDER, FOR SOLUTION ORAL at 05:35

## 2018-01-01 RX ADMIN — VANCOMYCIN HYDROCHLORIDE 1000 MG: 5 INJECTION, POWDER, LYOPHILIZED, FOR SOLUTION INTRAVENOUS at 02:16

## 2018-01-01 RX ADMIN — Medication 2000 UNITS: at 08:25

## 2018-01-01 RX ADMIN — IPRATROPIUM BROMIDE AND ALBUTEROL SULFATE 3 ML: .5; 3 SOLUTION RESPIRATORY (INHALATION) at 19:21

## 2018-01-01 RX ADMIN — IPRATROPIUM BROMIDE 0.5 MG: 0.5 SOLUTION RESPIRATORY (INHALATION) at 18:42

## 2018-01-01 RX ADMIN — TAZOBACTAM SODIUM AND PIPERACILLIN SODIUM 4.5 G: .5; 4 INJECTION, POWDER, LYOPHILIZED, FOR SOLUTION INTRAVENOUS at 16:23

## 2018-01-01 RX ADMIN — DOCUSATE SODIUM AND SENNA 1 TABLET: 50; 8.6 TABLET, FILM COATED ORAL at 20:34

## 2018-01-01 RX ADMIN — PROPOFOL 50 MCG/KG/MIN: 10 INJECTION, EMULSION INTRAVENOUS at 23:25

## 2018-01-01 RX ADMIN — GUAIFENESIN 600 MG: 600 TABLET, EXTENDED RELEASE ORAL at 21:32

## 2018-01-01 RX ADMIN — IPRATROPIUM BROMIDE 0.5 MG: 0.5 SOLUTION RESPIRATORY (INHALATION) at 00:55

## 2018-01-01 RX ADMIN — IPRATROPIUM BROMIDE 0.5 MG: 0.5 SOLUTION RESPIRATORY (INHALATION) at 06:20

## 2018-01-01 RX ADMIN — HYDRALAZINE HYDROCHLORIDE 100 MG: 50 TABLET ORAL at 13:33

## 2018-01-01 RX ADMIN — HYDRALAZINE HYDROCHLORIDE 10 MG: 20 INJECTION INTRAMUSCULAR; INTRAVENOUS at 10:37

## 2018-01-01 RX ADMIN — POTASSIUM CHLORIDE 40 MEQ: 1500 TABLET, EXTENDED RELEASE ORAL at 10:59

## 2018-01-01 RX ADMIN — ISOSORBIDE MONONITRATE 240 MG: 60 TABLET, EXTENDED RELEASE ORAL at 08:16

## 2018-01-01 RX ADMIN — CLONIDINE HYDROCHLORIDE 0.1 MG: 0.1 TABLET ORAL at 09:41

## 2018-01-01 RX ADMIN — PROPOFOL 50 MCG/KG/MIN: 10 INJECTION, EMULSION INTRAVENOUS at 04:57

## 2018-01-01 RX ADMIN — FUROSEMIDE 40 MG: 10 INJECTION, SOLUTION INTRAMUSCULAR; INTRAVENOUS at 09:20

## 2018-01-01 RX ADMIN — INSULIN DETEMIR 30 UNITS: 100 INJECTION, SOLUTION SUBCUTANEOUS at 10:59

## 2018-01-01 RX ADMIN — POLYETHYLENE GLYCOL 3350 17 G: 17 POWDER, FOR SOLUTION ORAL at 08:49

## 2018-01-01 RX ADMIN — FUROSEMIDE 20 MG: 10 INJECTION, SOLUTION INTRAMUSCULAR; INTRAVENOUS at 08:14

## 2018-01-01 RX ADMIN — IPRATROPIUM BROMIDE 0.5 MG: 0.5 SOLUTION RESPIRATORY (INHALATION) at 12:07

## 2018-01-01 RX ADMIN — ALUMINUM HYDROXIDE, MAGNESIUM HYDROXIDE, AND DIMETHICONE 15 ML: 400; 400; 40 SUSPENSION ORAL at 04:16

## 2018-01-01 RX ADMIN — LEVOTHYROXINE SODIUM 25 MCG: 25 TABLET ORAL at 08:01

## 2018-01-01 RX ADMIN — CARVEDILOL 25 MG: 25 TABLET, FILM COATED ORAL at 18:02

## 2018-01-01 RX ADMIN — IPRATROPIUM BROMIDE AND ALBUTEROL SULFATE 3 ML: .5; 3 SOLUTION RESPIRATORY (INHALATION) at 06:46

## 2018-01-01 RX ADMIN — INSULIN DETEMIR 35 UNITS: 100 INJECTION, SOLUTION SUBCUTANEOUS at 10:37

## 2018-01-01 RX ADMIN — PANTOPRAZOLE SODIUM 40 MG: 40 TABLET, DELAYED RELEASE ORAL at 10:58

## 2018-01-01 RX ADMIN — ISOSORBIDE MONONITRATE 240 MG: 60 TABLET, EXTENDED RELEASE ORAL at 10:05

## 2018-01-01 RX ADMIN — GUAIFENESIN 200 MG: 200 TABLET ORAL at 08:17

## 2018-01-01 RX ADMIN — GUAIFENESIN 200 MG: 200 TABLET ORAL at 00:19

## 2018-01-01 RX ADMIN — INSULIN DETEMIR 25 UNITS: 100 INJECTION, SOLUTION SUBCUTANEOUS at 09:32

## 2018-01-01 RX ADMIN — GUAIFENESIN 200 MG: 200 TABLET ORAL at 07:20

## 2018-01-01 RX ADMIN — INSULIN ASPART 5 UNITS: 100 INJECTION, SOLUTION INTRAVENOUS; SUBCUTANEOUS at 11:46

## 2018-01-01 RX ADMIN — HYDRALAZINE HYDROCHLORIDE 100 MG: 50 TABLET ORAL at 06:26

## 2018-01-01 RX ADMIN — CARVEDILOL 12.5 MG: 6.25 TABLET, FILM COATED ORAL at 19:01

## 2018-01-01 RX ADMIN — Medication 2000 MCG: at 08:29

## 2018-01-01 RX ADMIN — HYDRALAZINE HYDROCHLORIDE 100 MG: 50 TABLET ORAL at 13:27

## 2018-01-01 RX ADMIN — ATORVASTATIN CALCIUM 80 MG: 40 TABLET, FILM COATED ORAL at 08:39

## 2018-01-01 RX ADMIN — ISOSORBIDE MONONITRATE 180 MG: 60 TABLET, EXTENDED RELEASE ORAL at 10:57

## 2018-01-01 RX ADMIN — GUAIFENESIN 200 MG: 200 TABLET ORAL at 05:36

## 2018-01-01 RX ADMIN — PROPOFOL 50 MCG/KG/MIN: 10 INJECTION, EMULSION INTRAVENOUS at 12:33

## 2018-01-01 RX ADMIN — AMLODIPINE BESYLATE 10 MG: 10 TABLET ORAL at 08:01

## 2018-01-01 RX ADMIN — GUAIFENESIN 200 MG: 200 TABLET ORAL at 10:02

## 2018-01-01 RX ADMIN — TAZOBACTAM SODIUM AND PIPERACILLIN SODIUM 4.5 G: .5; 4 INJECTION, POWDER, LYOPHILIZED, FOR SOLUTION INTRAVENOUS at 06:35

## 2018-01-01 RX ADMIN — THROMBIN, TOPICAL (BOVINE): KIT at 11:33

## 2018-01-01 RX ADMIN — CETIRIZINE HYDROCHLORIDE 10 MG: 10 TABLET ORAL at 10:58

## 2018-01-01 RX ADMIN — GABAPENTIN 300 MG: 300 CAPSULE ORAL at 21:48

## 2018-01-01 RX ADMIN — SODIUM CHLORIDE 7 UNITS/HR: 9 INJECTION, SOLUTION INTRAVENOUS at 00:07

## 2018-01-01 RX ADMIN — FONDAPARINUX SODIUM 2.5 MG: 2.5 INJECTION, SOLUTION SUBCUTANEOUS at 10:59

## 2018-01-01 RX ADMIN — GUAIFENESIN 200 MG: 200 TABLET ORAL at 20:37

## 2018-01-01 RX ADMIN — SODIUM CHLORIDE 10 MG/HR: 9 INJECTION, SOLUTION INTRAVENOUS at 05:43

## 2018-01-01 RX ADMIN — VANCOMYCIN HYDROCHLORIDE 750 MG: 5 INJECTION, POWDER, LYOPHILIZED, FOR SOLUTION INTRAVENOUS at 13:22

## 2018-01-01 RX ADMIN — LANSOPRAZOLE 30 MG: KIT at 06:30

## 2018-01-01 RX ADMIN — Medication 1 CAPSULE: at 17:50

## 2018-01-01 RX ADMIN — VANCOMYCIN HYDROCHLORIDE 1000 MG: 5 INJECTION, POWDER, LYOPHILIZED, FOR SOLUTION INTRAVENOUS at 13:53

## 2018-01-01 RX ADMIN — FUROSEMIDE 40 MG: 10 INJECTION, SOLUTION INTRAMUSCULAR; INTRAVENOUS at 10:53

## 2018-01-01 RX ADMIN — Medication: at 12:51

## 2018-01-01 RX ADMIN — MIDAZOLAM HYDROCHLORIDE 4 MG: 1 INJECTION, SOLUTION INTRAMUSCULAR; INTRAVENOUS at 15:06

## 2018-01-01 RX ADMIN — IPRATROPIUM BROMIDE 0.5 MG: 0.5 SOLUTION RESPIRATORY (INHALATION) at 19:01

## 2018-01-01 RX ADMIN — HYDRALAZINE HYDROCHLORIDE 100 MG: 50 TABLET ORAL at 05:35

## 2018-01-01 RX ADMIN — IPRATROPIUM BROMIDE AND ALBUTEROL SULFATE 3 ML: .5; 3 SOLUTION RESPIRATORY (INHALATION) at 06:40

## 2018-01-01 RX ADMIN — GUAIFENESIN 200 MG: 200 TABLET ORAL at 04:03

## 2018-01-01 RX ADMIN — DOXYCYCLINE 100 MG: 100 CAPSULE ORAL at 18:03

## 2018-01-01 RX ADMIN — DOCUSATE SODIUM AND SENNA 1 TABLET: 50; 8.6 TABLET, FILM COATED ORAL at 22:05

## 2018-01-01 RX ADMIN — FUROSEMIDE 60 MG: 10 INJECTION, SOLUTION INTRAMUSCULAR; INTRAVENOUS at 09:50

## 2018-01-01 RX ADMIN — ISOSORBIDE MONONITRATE 180 MG: 60 TABLET, EXTENDED RELEASE ORAL at 09:51

## 2018-01-01 RX ADMIN — IPRATROPIUM BROMIDE 0.5 MG: 0.5 SOLUTION RESPIRATORY (INHALATION) at 00:01

## 2018-01-01 RX ADMIN — GUAIFENESIN 200 MG: 200 TABLET ORAL at 08:00

## 2018-01-01 RX ADMIN — CLONIDINE HYDROCHLORIDE 0.2 MG: 0.3 TABLET ORAL at 05:31

## 2018-01-01 RX ADMIN — SODIUM CHLORIDE 7 UNITS/HR: 9 INJECTION, SOLUTION INTRAVENOUS at 11:18

## 2018-01-01 RX ADMIN — FERROUS SULFATE TAB 325 MG (65 MG ELEMENTAL FE) 325 MG: 325 (65 FE) TAB at 10:56

## 2018-01-01 RX ADMIN — TAZOBACTAM SODIUM AND PIPERACILLIN SODIUM 4.5 G: .5; 4 INJECTION, POWDER, LYOPHILIZED, FOR SOLUTION INTRAVENOUS at 19:07

## 2018-01-01 RX ADMIN — FERROUS SULFATE TAB 325 MG (65 MG ELEMENTAL FE) 325 MG: 325 (65 FE) TAB at 10:11

## 2018-01-01 RX ADMIN — POTASSIUM CHLORIDE 40 MEQ: 1.5 POWDER, FOR SOLUTION ORAL at 05:46

## 2018-01-01 RX ADMIN — POLYETHYLENE GLYCOL 3350 17 G: 17 POWDER, FOR SOLUTION ORAL at 08:00

## 2018-01-01 RX ADMIN — PROPOFOL 40 MCG/KG/MIN: 10 INJECTION, EMULSION INTRAVENOUS at 12:20

## 2018-01-01 RX ADMIN — AMLODIPINE BESYLATE 10 MG: 10 TABLET ORAL at 08:15

## 2018-01-01 RX ADMIN — IPRATROPIUM BROMIDE AND ALBUTEROL SULFATE 3 ML: .5; 3 SOLUTION RESPIRATORY (INHALATION) at 12:44

## 2018-01-01 RX ADMIN — INSULIN DETEMIR 35 UNITS: 100 INJECTION, SOLUTION SUBCUTANEOUS at 21:10

## 2018-01-01 RX ADMIN — GUAIFENESIN 200 MG: 200 TABLET ORAL at 08:49

## 2018-01-01 RX ADMIN — FONDAPARINUX SODIUM 2.5 MG: 2.5 INJECTION, SOLUTION SUBCUTANEOUS at 10:57

## 2018-01-01 RX ADMIN — Medication 10 ML: at 09:29

## 2018-01-01 RX ADMIN — PROPOFOL 40 MCG/KG/MIN: 10 INJECTION, EMULSION INTRAVENOUS at 06:02

## 2018-01-01 RX ADMIN — INSULIN ASPART 10 UNITS: 100 INJECTION, SOLUTION INTRAVENOUS; SUBCUTANEOUS at 13:20

## 2018-01-01 RX ADMIN — BUDESONIDE AND FORMOTEROL FUMARATE DIHYDRATE 2 PUFF: 160; 4.5 AEROSOL RESPIRATORY (INHALATION) at 19:22

## 2018-01-01 RX ADMIN — HYDRALAZINE HYDROCHLORIDE 100 MG: 50 TABLET ORAL at 13:16

## 2018-01-01 RX ADMIN — HYDRALAZINE HYDROCHLORIDE 100 MG: 50 TABLET ORAL at 15:43

## 2018-01-01 RX ADMIN — SODIUM CHLORIDE 100 ML: 900 INJECTION, SOLUTION INTRAVENOUS at 11:36

## 2018-01-01 RX ADMIN — ALBUMIN HUMAN 25 G: 0.25 SOLUTION INTRAVENOUS at 09:50

## 2018-01-01 RX ADMIN — CARVEDILOL 25 MG: 25 TABLET, FILM COATED ORAL at 17:32

## 2018-01-01 RX ADMIN — CLONIDINE HYDROCHLORIDE 0.3 MG: 0.3 TABLET ORAL at 21:33

## 2018-01-01 RX ADMIN — Medication 1 CAPSULE: at 08:50

## 2018-01-01 RX ADMIN — CARVEDILOL 12.5 MG: 6.25 TABLET, FILM COATED ORAL at 11:06

## 2018-01-01 RX ADMIN — ISOSORBIDE MONONITRATE 240 MG: 60 TABLET, EXTENDED RELEASE ORAL at 09:28

## 2018-01-01 RX ADMIN — IPRATROPIUM BROMIDE 0.5 MG: 0.5 SOLUTION RESPIRATORY (INHALATION) at 13:28

## 2018-01-01 RX ADMIN — HYDRALAZINE HYDROCHLORIDE 5 MG: 20 INJECTION INTRAMUSCULAR; INTRAVENOUS at 08:16

## 2018-01-01 RX ADMIN — LANSOPRAZOLE 30 MG: KIT at 20:21

## 2018-01-01 RX ADMIN — IPRATROPIUM BROMIDE AND ALBUTEROL SULFATE 3 ML: .5; 3 SOLUTION RESPIRATORY (INHALATION) at 12:40

## 2018-01-01 RX ADMIN — LEVOTHYROXINE SODIUM 25 MCG: 25 TABLET ORAL at 10:54

## 2018-01-01 RX ADMIN — IPRATROPIUM BROMIDE AND ALBUTEROL SULFATE 3 ML: .5; 3 SOLUTION RESPIRATORY (INHALATION) at 12:17

## 2018-01-01 RX ADMIN — METHYLPREDNISOLONE SODIUM SUCCINATE 40 MG: 40 INJECTION, POWDER, FOR SOLUTION INTRAMUSCULAR; INTRAVENOUS at 05:34

## 2018-01-01 RX ADMIN — GUAIFENESIN 600 MG: 600 TABLET, EXTENDED RELEASE ORAL at 21:20

## 2018-01-01 RX ADMIN — INSULIN ASPART 5 UNITS: 100 INJECTION, SOLUTION INTRAVENOUS; SUBCUTANEOUS at 21:33

## 2018-01-01 RX ADMIN — ATORVASTATIN CALCIUM 80 MG: 40 TABLET, FILM COATED ORAL at 10:56

## 2018-01-01 RX ADMIN — HYDRALAZINE HYDROCHLORIDE 100 MG: 50 TABLET ORAL at 22:05

## 2018-01-01 RX ADMIN — FERROUS SULFATE TAB 325 MG (65 MG ELEMENTAL FE) 325 MG: 325 (65 FE) TAB at 09:54

## 2018-01-01 RX ADMIN — GUAIFENESIN 200 MG: 200 TABLET ORAL at 23:36

## 2018-01-01 RX ADMIN — ALBUMIN HUMAN 25 G: 0.25 SOLUTION INTRAVENOUS at 21:19

## 2018-01-01 RX ADMIN — HYDRALAZINE HYDROCHLORIDE 100 MG: 50 TABLET ORAL at 13:48

## 2018-01-01 RX ADMIN — GUAIFENESIN 200 MG: 200 TABLET ORAL at 04:33

## 2018-01-01 RX ADMIN — FUROSEMIDE 40 MG: 10 INJECTION, SOLUTION INTRAMUSCULAR; INTRAVENOUS at 18:34

## 2018-01-01 RX ADMIN — PROPOFOL 30 MCG/KG/MIN: 10 INJECTION, EMULSION INTRAVENOUS at 06:32

## 2018-01-01 RX ADMIN — IPRATROPIUM BROMIDE AND ALBUTEROL SULFATE 3 ML: .5; 3 SOLUTION RESPIRATORY (INHALATION) at 00:50

## 2018-01-01 RX ADMIN — SERTRALINE 50 MG: 50 TABLET, FILM COATED ORAL at 17:32

## 2018-01-01 RX ADMIN — IPRATROPIUM BROMIDE AND ALBUTEROL SULFATE 3 ML: .5; 3 SOLUTION RESPIRATORY (INHALATION) at 18:35

## 2018-01-01 RX ADMIN — IPRATROPIUM BROMIDE AND ALBUTEROL SULFATE 3 ML: .5; 3 SOLUTION RESPIRATORY (INHALATION) at 06:23

## 2018-01-01 RX ADMIN — PROPOFOL 45 MCG/KG/MIN: 10 INJECTION, EMULSION INTRAVENOUS at 13:52

## 2018-01-01 RX ADMIN — GUAIFENESIN 200 MG: 200 TABLET ORAL at 08:16

## 2018-01-01 RX ADMIN — CLONIDINE HYDROCHLORIDE 0.3 MG: 0.3 TABLET ORAL at 06:40

## 2018-01-01 RX ADMIN — PROPOFOL 50 MCG/KG/MIN: 10 INJECTION, EMULSION INTRAVENOUS at 20:26

## 2018-01-01 RX ADMIN — PROPOFOL 30 MCG/KG/MIN: 10 INJECTION, EMULSION INTRAVENOUS at 14:57

## 2018-01-01 RX ADMIN — IPRATROPIUM BROMIDE AND ALBUTEROL SULFATE 3 ML: .5; 3 SOLUTION RESPIRATORY (INHALATION) at 18:55

## 2018-01-01 RX ADMIN — Medication 2000 UNITS: at 09:27

## 2018-01-01 RX ADMIN — FERROUS SULFATE TAB 325 MG (65 MG ELEMENTAL FE) 325 MG: 325 (65 FE) TAB at 08:28

## 2018-01-01 RX ADMIN — Medication 2000 MCG: at 08:36

## 2018-01-01 RX ADMIN — PANTOPRAZOLE SODIUM 40 MG: 40 TABLET, DELAYED RELEASE ORAL at 08:34

## 2018-01-01 RX ADMIN — IPRATROPIUM BROMIDE 0.5 MG: 0.5 SOLUTION RESPIRATORY (INHALATION) at 07:37

## 2018-01-01 RX ADMIN — DOCUSATE SODIUM AND SENNA 1 TABLET: 50; 8.6 TABLET, FILM COATED ORAL at 08:01

## 2018-01-01 RX ADMIN — ASPIRIN 81 MG: 81 TABLET ORAL at 08:39

## 2018-01-01 RX ADMIN — PROPOFOL 50 MCG/KG/MIN: 10 INJECTION, EMULSION INTRAVENOUS at 06:34

## 2018-01-01 RX ADMIN — AMLODIPINE BESYLATE 10 MG: 10 TABLET ORAL at 10:56

## 2018-01-01 RX ADMIN — SERTRALINE 50 MG: 50 TABLET, FILM COATED ORAL at 18:11

## 2018-01-01 RX ADMIN — IPRATROPIUM BROMIDE AND ALBUTEROL SULFATE 3 ML: .5; 3 SOLUTION RESPIRATORY (INHALATION) at 06:45

## 2018-01-01 RX ADMIN — CLONIDINE HYDROCHLORIDE 0.3 MG: 0.3 TABLET ORAL at 20:34

## 2018-01-01 RX ADMIN — POLYETHYLENE GLYCOL 3350 17 G: 17 POWDER, FOR SOLUTION ORAL at 08:22

## 2018-01-01 RX ADMIN — SODIUM CHLORIDE: 9 INJECTION, SOLUTION INTRAVENOUS at 07:30

## 2018-01-01 RX ADMIN — DOXYCYCLINE 100 MG: 100 CAPSULE ORAL at 09:18

## 2018-01-01 RX ADMIN — GUAIFENESIN 600 MG: 600 TABLET, EXTENDED RELEASE ORAL at 11:06

## 2018-01-01 RX ADMIN — HYDRALAZINE HYDROCHLORIDE 100 MG: 50 TABLET ORAL at 13:23

## 2018-01-01 RX ADMIN — CLONIDINE HYDROCHLORIDE 0.1 MG: 0.1 TABLET ORAL at 06:16

## 2018-01-01 RX ADMIN — ALBUMIN HUMAN 25 G: 0.25 SOLUTION INTRAVENOUS at 21:17

## 2018-01-01 RX ADMIN — BUDESONIDE AND FORMOTEROL FUMARATE DIHYDRATE 2 PUFF: 160; 4.5 AEROSOL RESPIRATORY (INHALATION) at 07:03

## 2018-01-01 RX ADMIN — PREDNISONE 20 MG: 20 TABLET ORAL at 08:37

## 2018-01-01 RX ADMIN — LANSOPRAZOLE 30 MG: KIT at 05:48

## 2018-01-01 RX ADMIN — LANSOPRAZOLE 30 MG: KIT at 08:39

## 2018-01-01 RX ADMIN — IPRATROPIUM BROMIDE AND ALBUTEROL SULFATE 3 ML: .5; 3 SOLUTION RESPIRATORY (INHALATION) at 00:27

## 2018-01-01 RX ADMIN — Medication 2000 UNITS: at 10:52

## 2018-01-01 RX ADMIN — CLONIDINE HYDROCHLORIDE 0.3 MG: 0.3 TABLET ORAL at 14:21

## 2018-01-01 RX ADMIN — METHYLPREDNISOLONE SODIUM SUCCINATE 60 MG: 125 INJECTION, POWDER, FOR SOLUTION INTRAMUSCULAR; INTRAVENOUS at 05:33

## 2018-01-01 RX ADMIN — PROPOFOL 45 MCG/KG/MIN: 10 INJECTION, EMULSION INTRAVENOUS at 10:56

## 2018-01-01 RX ADMIN — FUROSEMIDE 40 MG: 10 INJECTION, SOLUTION INTRAMUSCULAR; INTRAVENOUS at 06:43

## 2018-01-01 RX ADMIN — PROPOFOL 50 MCG/KG/MIN: 10 INJECTION, EMULSION INTRAVENOUS at 17:21

## 2018-01-01 RX ADMIN — AMLODIPINE BESYLATE 10 MG: 10 TABLET ORAL at 10:04

## 2018-01-01 RX ADMIN — FERROUS SULFATE TAB 325 MG (65 MG ELEMENTAL FE) 325 MG: 325 (65 FE) TAB at 08:16

## 2018-01-01 RX ADMIN — HYDRALAZINE HYDROCHLORIDE 100 MG: 50 TABLET ORAL at 05:39

## 2018-01-01 RX ADMIN — GUAIFENESIN 200 MG: 200 TABLET ORAL at 00:26

## 2018-01-01 RX ADMIN — DOCUSATE SODIUM AND SENNA 1 TABLET: 50; 8.6 TABLET, FILM COATED ORAL at 08:53

## 2018-01-01 RX ADMIN — Medication 1 CAPSULE: at 09:45

## 2018-01-01 RX ADMIN — LEVOTHYROXINE SODIUM 25 MCG: 25 TABLET ORAL at 10:03

## 2018-01-01 RX ADMIN — GUAIFENESIN 200 MG: 200 TABLET ORAL at 05:29

## 2018-01-01 RX ADMIN — SERTRALINE 50 MG: 50 TABLET, FILM COATED ORAL at 16:52

## 2018-01-01 RX ADMIN — LABETALOL HCL 200 MG: 200 TABLET, FILM COATED ORAL at 08:53

## 2018-01-01 RX ADMIN — PROPOFOL 35 MCG/KG/MIN: 10 INJECTION, EMULSION INTRAVENOUS at 18:00

## 2018-01-01 RX ADMIN — INSULIN ASPART 12 UNITS: 100 INJECTION, SOLUTION INTRAVENOUS; SUBCUTANEOUS at 18:10

## 2018-01-01 RX ADMIN — SODIUM CHLORIDE 5 MG/HR: 9 INJECTION, SOLUTION INTRAVENOUS at 13:13

## 2018-01-01 RX ADMIN — HYDRALAZINE HYDROCHLORIDE 100 MG: 50 TABLET ORAL at 05:38

## 2018-01-01 RX ADMIN — LABETALOL HCL 200 MG: 200 TABLET, FILM COATED ORAL at 09:28

## 2018-01-01 RX ADMIN — HYDRALAZINE HYDROCHLORIDE 100 MG: 50 TABLET ORAL at 13:20

## 2018-01-01 RX ADMIN — CLONIDINE HYDROCHLORIDE 0.2 MG: 0.1 TABLET ORAL at 12:38

## 2018-01-01 RX ADMIN — IPRATROPIUM BROMIDE AND ALBUTEROL SULFATE 3 ML: .5; 3 SOLUTION RESPIRATORY (INHALATION) at 00:13

## 2018-01-01 RX ADMIN — IPRATROPIUM BROMIDE AND ALBUTEROL SULFATE 3 ML: .5; 3 SOLUTION RESPIRATORY (INHALATION) at 12:34

## 2018-01-01 RX ADMIN — CLONIDINE HYDROCHLORIDE 0.2 MG: 0.2 TABLET ORAL at 05:19

## 2018-01-01 RX ADMIN — SODIUM CHLORIDE 13 UNITS/HR: 9 INJECTION, SOLUTION INTRAVENOUS at 02:25

## 2018-01-01 RX ADMIN — GUAIFENESIN 200 MG: 200 TABLET ORAL at 20:24

## 2018-01-01 RX ADMIN — PROPOFOL 35 MCG/KG/MIN: 10 INJECTION, EMULSION INTRAVENOUS at 06:57

## 2018-01-01 RX ADMIN — BUDESONIDE AND FORMOTEROL FUMARATE DIHYDRATE 2 PUFF: 160; 4.5 AEROSOL RESPIRATORY (INHALATION) at 07:02

## 2018-01-01 RX ADMIN — INSULIN DETEMIR 35 UNITS: 100 INJECTION, SOLUTION SUBCUTANEOUS at 09:47

## 2018-01-01 RX ADMIN — GABAPENTIN 300 MG: 300 CAPSULE ORAL at 22:12

## 2018-01-01 RX ADMIN — Medication 2000 MCG: at 08:15

## 2018-01-01 RX ADMIN — HYDRALAZINE HYDROCHLORIDE 100 MG: 50 TABLET ORAL at 05:41

## 2018-01-01 RX ADMIN — VANCOMYCIN HYDROCHLORIDE 1000 MG: 5 INJECTION, POWDER, LYOPHILIZED, FOR SOLUTION INTRAVENOUS at 21:33

## 2018-01-01 RX ADMIN — METHYLPREDNISOLONE SODIUM SUCCINATE 40 MG: 40 INJECTION, POWDER, FOR SOLUTION INTRAMUSCULAR; INTRAVENOUS at 21:05

## 2018-01-01 RX ADMIN — TAZOBACTAM SODIUM AND PIPERACILLIN SODIUM 4.5 G: .5; 4 INJECTION, POWDER, LYOPHILIZED, FOR SOLUTION INTRAVENOUS at 06:31

## 2018-01-01 RX ADMIN — POLYETHYLENE GLYCOL 3350 17 G: 17 POWDER, FOR SOLUTION ORAL at 08:33

## 2018-01-01 RX ADMIN — Medication: at 13:25

## 2018-01-01 RX ADMIN — DEXTROSE MONOHYDRATE 25 G: 25 INJECTION, SOLUTION INTRAVENOUS at 06:32

## 2018-01-01 RX ADMIN — IPRATROPIUM BROMIDE 0.5 MG: 0.5 SOLUTION RESPIRATORY (INHALATION) at 19:25

## 2018-01-01 RX ADMIN — GUAIFENESIN 200 MG: 200 TABLET ORAL at 21:14

## 2018-01-01 RX ADMIN — SERTRALINE 50 MG: 50 TABLET, FILM COATED ORAL at 18:03

## 2018-01-01 RX ADMIN — IPRATROPIUM BROMIDE 0.5 MG: 0.5 SOLUTION RESPIRATORY (INHALATION) at 13:35

## 2018-01-01 RX ADMIN — VECURONIUM BROMIDE 5 MG: 1 INJECTION, POWDER, LYOPHILIZED, FOR SOLUTION INTRAVENOUS at 11:43

## 2018-01-01 RX ADMIN — PHYTONADIONE 10 MG: 10 INJECTION, EMULSION INTRAMUSCULAR; INTRAVENOUS; SUBCUTANEOUS at 09:29

## 2018-01-01 RX ADMIN — INSULIN ASPART 8 UNITS: 100 INJECTION, SOLUTION INTRAVENOUS; SUBCUTANEOUS at 17:16

## 2018-01-01 RX ADMIN — Medication 2000 UNITS: at 08:54

## 2018-01-01 RX ADMIN — CARVEDILOL 25 MG: 25 TABLET, FILM COATED ORAL at 17:46

## 2018-01-01 RX ADMIN — GUAIFENESIN 200 MG: 200 TABLET ORAL at 15:53

## 2018-01-01 RX ADMIN — Medication: at 12:39

## 2018-01-01 RX ADMIN — SODIUM CHLORIDE 7.5 MG/HR: 9 INJECTION, SOLUTION INTRAVENOUS at 09:15

## 2018-01-01 RX ADMIN — Medication 2000 MCG: at 08:54

## 2018-01-01 RX ADMIN — LEVOTHYROXINE SODIUM 25 MCG: 25 TABLET ORAL at 08:18

## 2018-01-01 RX ADMIN — Medication: at 17:21

## 2018-01-01 RX ADMIN — INSULIN ASPART 8 UNITS: 100 INJECTION, SOLUTION INTRAVENOUS; SUBCUTANEOUS at 10:49

## 2018-01-01 RX ADMIN — CLONIDINE HYDROCHLORIDE 0.3 MG: 0.3 TABLET ORAL at 13:15

## 2018-01-01 RX ADMIN — POTASSIUM CHLORIDE 10 MEQ: 7.46 INJECTION, SOLUTION INTRAVENOUS at 09:38

## 2018-01-01 RX ADMIN — HYDRALAZINE HYDROCHLORIDE 100 MG: 50 TABLET ORAL at 21:14

## 2018-01-01 RX ADMIN — GUAIFENESIN 200 MG: 200 TABLET ORAL at 04:19

## 2018-01-01 RX ADMIN — FUROSEMIDE 40 MG: 10 INJECTION, SOLUTION INTRAMUSCULAR; INTRAVENOUS at 08:17

## 2018-01-01 RX ADMIN — CLONIDINE HYDROCHLORIDE 0.3 MG: 0.3 TABLET ORAL at 05:34

## 2018-01-01 RX ADMIN — SERTRALINE 50 MG: 50 TABLET, FILM COATED ORAL at 17:50

## 2018-01-01 RX ADMIN — AMLODIPINE BESYLATE 10 MG: 10 TABLET ORAL at 08:39

## 2018-01-01 RX ADMIN — ISOSORBIDE MONONITRATE 180 MG: 60 TABLET, EXTENDED RELEASE ORAL at 09:06

## 2018-01-01 RX ADMIN — Medication 10 ML: at 09:30

## 2018-01-01 RX ADMIN — IPRATROPIUM BROMIDE AND ALBUTEROL SULFATE 3 ML: .5; 3 SOLUTION RESPIRATORY (INHALATION) at 18:58

## 2018-01-01 RX ADMIN — Medication 2000 UNITS: at 08:33

## 2018-01-01 RX ADMIN — POTASSIUM CHLORIDE 40 MEQ: 1.5 POWDER, FOR SOLUTION ORAL at 11:38

## 2018-01-01 RX ADMIN — TERAZOSIN HYDROCHLORIDE 10 MG: 5 CAPSULE ORAL at 08:34

## 2018-01-01 RX ADMIN — GUAIFENESIN 200 MG: 200 TABLET ORAL at 01:18

## 2018-01-01 RX ADMIN — PROPOFOL 50 MCG/KG/MIN: 10 INJECTION, EMULSION INTRAVENOUS at 08:17

## 2018-01-01 RX ADMIN — CARVEDILOL 12.5 MG: 6.25 TABLET, FILM COATED ORAL at 08:37

## 2018-01-01 RX ADMIN — CLONIDINE HYDROCHLORIDE 0.1 MG: 0.1 TABLET ORAL at 21:38

## 2018-01-01 RX ADMIN — IPRATROPIUM BROMIDE 0.5 MG: 0.5 SOLUTION RESPIRATORY (INHALATION) at 00:12

## 2018-01-01 RX ADMIN — LANSOPRAZOLE 30 MG: KIT at 22:05

## 2018-01-01 RX ADMIN — AMLODIPINE BESYLATE 10 MG: 10 TABLET ORAL at 10:12

## 2018-01-01 RX ADMIN — Medication 10 ML: at 21:29

## 2018-01-01 RX ADMIN — BUDESONIDE AND FORMOTEROL FUMARATE DIHYDRATE 2 PUFF: 160; 4.5 AEROSOL RESPIRATORY (INHALATION) at 07:04

## 2018-01-01 RX ADMIN — MINERAL SUPPLEMENT IRON 300 MG / 5 ML STRENGTH LIQUID 100 PER BOX UNFLAVORED 300 MG: at 08:23

## 2018-01-01 RX ADMIN — IPRATROPIUM BROMIDE AND ALBUTEROL SULFATE 3 ML: .5; 3 SOLUTION RESPIRATORY (INHALATION) at 00:44

## 2018-01-01 RX ADMIN — PROPOFOL 50 MCG/KG/MIN: 10 INJECTION, EMULSION INTRAVENOUS at 17:08

## 2018-01-01 RX ADMIN — LABETALOL HCL 200 MG: 200 TABLET, FILM COATED ORAL at 09:26

## 2018-01-01 RX ADMIN — VECURONIUM BROMIDE 10 MG: 1 INJECTION, POWDER, LYOPHILIZED, FOR SOLUTION INTRAVENOUS at 13:52

## 2018-01-01 RX ADMIN — GUAIFENESIN 200 MG: 200 TABLET ORAL at 22:05

## 2018-01-01 RX ADMIN — INSULIN ASPART 3 UNITS: 100 INJECTION, SOLUTION INTRAVENOUS; SUBCUTANEOUS at 21:39

## 2018-01-01 RX ADMIN — PANTOPRAZOLE SODIUM 40 MG: 40 TABLET, DELAYED RELEASE ORAL at 08:01

## 2018-01-01 RX ADMIN — PREDNISONE 20 MG: 20 TABLET ORAL at 08:53

## 2018-01-01 RX ADMIN — POLYETHYLENE GLYCOL 3350 17 G: 17 POWDER, FOR SOLUTION ORAL at 09:27

## 2018-01-01 RX ADMIN — PROPOFOL 50 MCG/KG/MIN: 10 INJECTION, EMULSION INTRAVENOUS at 04:32

## 2018-01-01 RX ADMIN — PROPOFOL 35 MCG/KG/MIN: 10 INJECTION, EMULSION INTRAVENOUS at 18:11

## 2018-01-01 RX ADMIN — MIDAZOLAM HYDROCHLORIDE 4 MG: 1 INJECTION INTRAMUSCULAR; INTRAVENOUS at 11:34

## 2018-01-01 RX ADMIN — GUAIFENESIN 600 MG: 600 TABLET, EXTENDED RELEASE ORAL at 08:38

## 2018-01-01 RX ADMIN — SERTRALINE 50 MG: 50 TABLET, FILM COATED ORAL at 16:23

## 2018-01-01 RX ADMIN — METHYLPREDNISOLONE SODIUM SUCCINATE 20 MG: 40 INJECTION, POWDER, FOR SOLUTION INTRAMUSCULAR; INTRAVENOUS at 08:18

## 2018-01-01 RX ADMIN — ASPIRIN 81 MG: 81 TABLET ORAL at 10:58

## 2018-01-01 RX ADMIN — IPRATROPIUM BROMIDE AND ALBUTEROL SULFATE 3 ML: .5; 3 SOLUTION RESPIRATORY (INHALATION) at 12:35

## 2018-01-01 RX ADMIN — EPINEPHRINE 1 MG: 0.1 INJECTION, SOLUTION ENDOTRACHEAL; INTRACARDIAC; INTRAVENOUS at 12:20

## 2018-01-01 RX ADMIN — HYDRALAZINE HYDROCHLORIDE 10 MG: 20 INJECTION INTRAMUSCULAR; INTRAVENOUS at 02:50

## 2018-01-01 RX ADMIN — IPRATROPIUM BROMIDE 0.5 MG: 0.5 SOLUTION RESPIRATORY (INHALATION) at 01:09

## 2018-01-01 RX ADMIN — PROPOFOL 50 MCG/KG/MIN: 10 INJECTION, EMULSION INTRAVENOUS at 14:41

## 2018-01-01 RX ADMIN — IPRATROPIUM BROMIDE 0.5 MG: 0.5 SOLUTION RESPIRATORY (INHALATION) at 00:29

## 2018-01-01 RX ADMIN — FUROSEMIDE 40 MG: 10 INJECTION, SOLUTION INTRAMUSCULAR; INTRAVENOUS at 08:29

## 2018-01-01 RX ADMIN — SERTRALINE 50 MG: 50 TABLET, FILM COATED ORAL at 17:46

## 2018-01-01 RX ADMIN — GUAIFENESIN 200 MG: 200 TABLET ORAL at 23:18

## 2018-01-01 RX ADMIN — INSULIN ASPART 8 UNITS: 100 INJECTION, SOLUTION INTRAVENOUS; SUBCUTANEOUS at 15:02

## 2018-01-01 RX ADMIN — PIPERACILLIN SODIUM,TAZOBACTAM SODIUM 3.38 G: 3; .375 INJECTION, POWDER, FOR SOLUTION INTRAVENOUS at 17:54

## 2018-01-01 RX ADMIN — LANSOPRAZOLE 30 MG: KIT at 05:32

## 2018-01-01 RX ADMIN — IPRATROPIUM BROMIDE 0.5 MG: 0.5 SOLUTION RESPIRATORY (INHALATION) at 06:53

## 2018-01-01 RX ADMIN — MINERAL SUPPLEMENT IRON 300 MG / 5 ML STRENGTH LIQUID 100 PER BOX UNFLAVORED 300 MG: at 09:27

## 2018-01-01 RX ADMIN — CARVEDILOL 25 MG: 25 TABLET, FILM COATED ORAL at 09:41

## 2018-01-01 RX ADMIN — PREDNISONE 20 MG: 20 TABLET ORAL at 10:18

## 2018-01-01 RX ADMIN — SERTRALINE 50 MG: 50 TABLET, FILM COATED ORAL at 18:09

## 2018-01-01 RX ADMIN — FERROUS SULFATE TAB 325 MG (65 MG ELEMENTAL FE) 325 MG: 325 (65 FE) TAB at 08:33

## 2018-01-01 RX ADMIN — PROPOFOL 40 MCG/KG/MIN: 10 INJECTION, EMULSION INTRAVENOUS at 03:01

## 2018-01-01 RX ADMIN — INSULIN DETEMIR 35 UNITS: 100 INJECTION, SOLUTION SUBCUTANEOUS at 21:49

## 2018-01-01 RX ADMIN — SODIUM CHLORIDE 50 ML: 9 INJECTION, SOLUTION INTRAVENOUS at 16:26

## 2018-01-01 RX ADMIN — CARVEDILOL 25 MG: 25 TABLET, FILM COATED ORAL at 18:09

## 2018-01-01 RX ADMIN — GUAIFENESIN 200 MG: 200 TABLET ORAL at 21:22

## 2018-01-01 RX ADMIN — SERTRALINE 50 MG: 50 TABLET, FILM COATED ORAL at 17:02

## 2018-01-01 RX ADMIN — HYDRALAZINE HYDROCHLORIDE 100 MG: 50 TABLET ORAL at 05:09

## 2018-01-01 RX ADMIN — CLONIDINE HYDROCHLORIDE 0.3 MG: 0.3 TABLET ORAL at 13:55

## 2018-01-01 RX ADMIN — GUAIFENESIN 200 MG: 200 TABLET ORAL at 13:23

## 2018-01-01 RX ADMIN — ISOSORBIDE MONONITRATE 240 MG: 60 TABLET, EXTENDED RELEASE ORAL at 08:33

## 2018-01-01 RX ADMIN — SODIUM CHLORIDE 13 UNITS/HR: 9 INJECTION, SOLUTION INTRAVENOUS at 10:34

## 2018-01-01 RX ADMIN — CLONIDINE HYDROCHLORIDE 0.2 MG: 0.2 TABLET ORAL at 21:32

## 2018-01-01 RX ADMIN — GABAPENTIN 300 MG: 300 CAPSULE ORAL at 20:50

## 2018-01-01 RX ADMIN — DOCUSATE SODIUM AND SENNA 1 TABLET: 50; 8.6 TABLET, FILM COATED ORAL at 08:13

## 2018-01-01 RX ADMIN — LEVOTHYROXINE SODIUM 25 MCG: 25 TABLET ORAL at 10:09

## 2018-01-01 RX ADMIN — AMLODIPINE BESYLATE 10 MG: 10 TABLET ORAL at 09:54

## 2018-01-01 RX ADMIN — Medication 1 CAPSULE: at 08:34

## 2018-01-01 RX ADMIN — HYDRALAZINE HYDROCHLORIDE 100 MG: 50 TABLET ORAL at 06:16

## 2018-01-01 RX ADMIN — IPRATROPIUM BROMIDE AND ALBUTEROL SULFATE 3 ML: .5; 3 SOLUTION RESPIRATORY (INHALATION) at 13:34

## 2018-01-01 RX ADMIN — LACTULOSE 20 G: 20 SOLUTION ORAL at 22:06

## 2018-01-01 RX ADMIN — Medication 2000 MCG: at 09:44

## 2018-01-01 RX ADMIN — Medication 2000 UNITS: at 08:50

## 2018-01-01 RX ADMIN — ATORVASTATIN CALCIUM 80 MG: 40 TABLET, FILM COATED ORAL at 08:50

## 2018-01-01 RX ADMIN — CARVEDILOL 12.5 MG: 6.25 TABLET, FILM COATED ORAL at 10:09

## 2018-01-01 RX ADMIN — Medication 10 ML: at 21:23

## 2018-01-01 RX ADMIN — PROPOFOL 40 MCG/KG/MIN: 10 INJECTION, EMULSION INTRAVENOUS at 15:16

## 2018-01-01 RX ADMIN — WATER 20 ML: 1 INJECTION INTRAMUSCULAR; INTRAVENOUS; SUBCUTANEOUS at 20:39

## 2018-01-01 RX ADMIN — ATORVASTATIN CALCIUM 80 MG: 40 TABLET, FILM COATED ORAL at 08:23

## 2018-01-01 RX ADMIN — PROPOFOL 50 MCG/KG/MIN: 10 INJECTION, EMULSION INTRAVENOUS at 10:01

## 2018-01-01 RX ADMIN — HYDRALAZINE HYDROCHLORIDE 100 MG: 50 TABLET ORAL at 21:20

## 2018-01-01 RX ADMIN — Medication 2000 MCG: at 10:55

## 2018-01-01 RX ADMIN — PREDNISONE 10 MG: 10 TABLET ORAL at 10:08

## 2018-01-01 RX ADMIN — HYDRALAZINE HYDROCHLORIDE 100 MG: 50 TABLET ORAL at 05:33

## 2018-01-01 RX ADMIN — LEVOTHYROXINE SODIUM 25 MCG: 25 TABLET ORAL at 08:38

## 2018-01-01 RX ADMIN — LEVOTHYROXINE SODIUM 25 MCG: 25 TABLET ORAL at 09:41

## 2018-01-01 RX ADMIN — GUAIFENESIN 200 MG: 200 TABLET ORAL at 20:19

## 2018-01-01 RX ADMIN — PROPOFOL 50 MCG/KG/MIN: 10 INJECTION, EMULSION INTRAVENOUS at 12:31

## 2018-01-01 RX ADMIN — POTASSIUM CHLORIDE 10 MEQ: 7.46 INJECTION, SOLUTION INTRAVENOUS at 08:41

## 2018-01-01 RX ADMIN — CLONIDINE HYDROCHLORIDE 0.2 MG: 0.2 TABLET ORAL at 20:13

## 2018-01-01 NOTE — PLAN OF CARE
Problem: COPD, Chronic Bronchitis/Emphysema (Adult)  Goal: Signs and Symptoms of Listed Potential Problems Will be Absent or Manageable (COPD, Chronic Bronchitis/Emphysema)  Outcome: Ongoing (interventions implemented as appropriate)      Problem: Skin Integrity Impairment, Risk/Actual (Adult)  Goal: Identify Related Risk Factors and Signs and Symptoms  Outcome: Ongoing (interventions implemented as appropriate)      Problem: Fall Risk (Adult)  Goal: Identify Related Risk Factors and Signs and Symptoms  Outcome: Ongoing (interventions implemented as appropriate)      Problem: Patient Care Overview (Adult)  Goal: Plan of Care Review  Outcome: Ongoing (interventions implemented as appropriate)    Goal: Discharge Needs Assessment  Outcome: Ongoing (interventions implemented as appropriate)

## 2018-01-01 NOTE — THERAPY EVALUATION
Acute Care - Physical Therapy Initial Evaluation/Treatment Note  SHAHZAD Mejia     Patient Name: Se Anne  : 1950  MRN: [id number]  Today's Date: 2018   Onset of Illness/Injury or Date of Surgery Date: 18 (admit date)  Date of Referral to PT: 18  Referring Physician: Renzo      Admit Date: 2017     Visit Dx:    ICD-10-CM ICD-9-CM   1. Acute on chronic respiratory failure with hypoxia and hypercapnia J96.21 518.84    J96.22 786.09     799.02   2. COPD exacerbation J44.1 491.21     Patient Active Problem List   Diagnosis   • COPD (chronic obstructive pulmonary disease)   • Shortness of breath   • Morbid obesity   • Hypoxia   • Edema extremities   • Sleep apnea   • Wheezing   • Allergic rhinitis   • Essential hypertension   • Sore throat   • Cough   • Pneumonia   • Respiratory failure     Past Medical History:   Diagnosis Date   • Agent orange exposure    • Arthritis    • CHF (congestive heart failure)    • COPD (chronic obstructive pulmonary disease)    • Coronary artery disease    • Diabetes mellitus    • Hyperlipidemia    • Hypertension      Past Surgical History:   Procedure Laterality Date   • CARDIAC SURGERY      stent placement   • CATARACT EXTRACTION     • JOINT REPLACEMENT      right knee   • KNEE SURGERY            PT ASSESSMENT (last 72 hours)      PT Evaluation       18 1130 17 1415    Rehab Evaluation    Document Type evaluation;therapy note (daily note)  -CT     Subjective Information agree to therapy;complains of;dyspnea  -CT     Patient Effort, Rehab Treatment adequate  -CT     Symptoms Noted During/After Treatment shortness of breath  -CT     Symptoms Noted Comment Pt placed on bipap during therapy evaluation today. Pt only able tolerate transfer to chair.   -CT     General Information    Patient Profile Review yes  -CT     Onset of Illness/Injury or Date of Surgery Date 18   admit date  -CT     Referring Physician Renzo  -CT      Precautions/Limitations fall precautions;oxygen therapy device and L/min  -CT     Prior Level of Function independent:;all household mobility   with use of AD  -CT     Equipment Currently Used at Home bipap/ cpap;cane, quad;nebulizer;hospital bed;power chair, (recliner lift);walker, rolling;oxygen  -CT     Plans/Goals Discussed With patient;agreed upon  -CT     Risks Reviewed patient:;LOB;nausea/vomiting;increased discomfort;dizziness;change in vital signs;increased drainage;lines disloged  -CT     Benefits Reviewed patient:;improve function;increase independence;increase strength;increase balance;decrease pain;decrease risk of DVT;improve skin integrity;increase knowledge  -CT     Barriers to Rehab medically complex;physical barrier  -CT     Living Environment    Lives With child(esther), adult  -CT     Living Arrangements house  -CT     Home Accessibility ramps present at home  -CT     Clinical Impression    Date of Referral to PT 12/30/18  -CT     Functional Level At Time Of Evaluation Mod A   -CT     Patient/Family Goals Statement Pt goals are to return to PLOF  -CT     Criteria for Skilled Therapeutic Interventions Met yes;treatment indicated  -CT     Pathology/Pathophysiology Noted (Describe Specifically for Each System) musculoskeletal;neuromuscular  -CT     Impairments Found (describe specific impairments) aerobic capacity/endurance;gait, locomotion, and balance  -CT     Functional Limitations in Following Categories (Describe Specific Limitations) self-care;home management  -CT     Rehab Potential good, to achieve stated therapy goals  -CT     Predicted Duration of Therapy Intervention (days/wks) length of stay  -CT     Vital Signs    Pre SpO2 (%) 90  -CT     O2 Delivery Pre Treatment supplemental O2  -CT     Intra SpO2 (%) 84  -CT     O2 Delivery Intra Treatment supplemental O2  -CT     Post SpO2 (%) 94  -CT     O2 Delivery Post Treatment supplemental O2   pt placed on bipap  -CT     Cognitive  Assessment/Intervention    Current Cognitive/Communication Assessment functional  -CT     Orientation Status oriented x 4  -CT     Follows Commands/Answers Questions able to follow multi-step instructions;100% of the time  -CT     Personal Safety Interventions fall prevention program maintained;gait belt;muscle strengthening facilitated;nonskid shoes/slippers when out of bed  -CT     ROM (Range of Motion)    General ROM Detail BLE WFL  -CT     MMT (Manual Muscle Testing)    General MMT Assessment Detail BLE grossly 4-/5  -CT     Muscle Tone Assessment    Muscle Tone Assessment  Bilateral Upper Extremities;Bilateral Lower Extremities  -KL    Bilateral Upper Extremities Muscle Tone Assessment  mildly decreased tone  -KL    Bilateral Lower Extremities Muscle Tone Assessment  mildly decreased tone  -KL    Bed Mobility, Assessment/Treatment    Bed Mobility, Assistive Device bed rails  -CT     Bed Mobility, Roll Left, [place] minimum assist (75% patient effort)  -CT     Bed Mobility, Roll Right, [place] minimum assist (75% patient effort)  -CT     Bed Mobility, Scoot/Bridge, [place] minimum assist (75% patient effort)  -CT     Bed Mob, Supine to Sit, [place] minimum assist (75% patient effort)  -CT     Bed Mobility, Impairments strength decreased  -CT     Transfer Assessment/Treatment    Transfers, Bed-Chair [place] minimum assist (75% patient effort)  -CT     Transfers, Chair-Bed [place] minimum assist (75% patient effort)  -CT     Transfers, Bed-Chair-Bed, Assist Device rolling walker  -CT     Transfers, Sit-Stand [place] minimum assist (75% patient effort)  -CT     Transfers, Stand-Sit [place] minimum assist (75% patient effort)  -CT     Transfers, Sit-Stand-Sit, Assist Device rolling walker;bed rails  -CT     Transfer, Impairments strength decreased  -CT     Gait Assessment/Treatment    Gait, [place] Level minimum assist (75% patient effort);2 person assist required;1  person + 1 person to manage equipment;verbal cues required;nonverbal cues required (demo/gesture)  -CT     Gait, Assistive Device rolling walker  -CT     Gait, Distance (Feet) 2   during transfer only  -CT     Gait, Gait Pattern Analysis swing-to gait  -CT     Positioning and Restraints    Pre-Treatment Position in bed  -CT     Post Treatment Position chair  -CT     In Chair sitting;call light within reach;encouraged to call for assist;notified nsg  -CT       12/31/17 0845 12/31/17 0210    Muscle Tone Assessment    Muscle Tone Assessment Bilateral Upper Extremities;Bilateral Lower Extremities  -KL Bilateral Upper Extremities  -RP    Bilateral Upper Extremities Muscle Tone Assessment mildly decreased tone  -KL mildly decreased tone  -RP    Bilateral Lower Extremities Muscle Tone Assessment mildly decreased tone  -KL mildly decreased tone  -RP      12/30/17 2000 12/30/17 1400    Muscle Tone Assessment    Muscle Tone Assessment Bilateral Upper Extremities  -RP Bilateral Upper Extremities  -ERIC    Bilateral Upper Extremities Muscle Tone Assessment mildly decreased tone  -RP mildly decreased tone  -ERIC    Bilateral Lower Extremities Muscle Tone Assessment mildly decreased tone  -RP mildly decreased tone  -ERIC      12/30/17 0730 12/29/17 1910    Muscle Tone Assessment    Muscle Tone Assessment Bilateral Upper Extremities  -ERIC Bilateral Upper Extremities  -RP    Bilateral Upper Extremities Muscle Tone Assessment mildly decreased tone  -ERIC mildly decreased tone  -RP    Bilateral Lower Extremities Muscle Tone Assessment mildly decreased tone  -ERIC mildly decreased tone  -RP      User Key  (r) = Recorded By, (t) = Taken By, (c) = Cosigned By    Initials Name Provider Type    ERIC Chase, REGGIE Registered Nurse    MAK Dowell, PT Physical Therapist    YAIR Irving, RN Registered Nurse    RP Stacia Dia, RN Registered Nurse          Physical Therapy Education     Title: PT OT SLP Therapies (Done)      Topic: Physical Therapy (Done)     Point: Mobility training (Done)    Learning Progress Summary    Learner Readiness Method Response Comment Documented by Status   Patient Acceptance E   CT 01/01/18 1140 Done               Point: Home exercise program (Done)    Learning Progress Summary    Learner Readiness Method Response Comment Documented by Status   Patient Acceptance E   CT 01/01/18 1140 Done               Point: Body mechanics (Done)    Learning Progress Summary    Learner Readiness Method Response Comment Documented by Status   Patient Acceptance E   CT 01/01/18 1140 Done               Point: Precautions (Done)    Learning Progress Summary    Learner Readiness Method Response Comment Documented by Status   Patient Acceptance E   CT 01/01/18 1140 Done                      User Key     Initials Effective Dates Name Provider Type Discipline    CT 03/14/16 -  Lisette Dowell, PT Physical Therapist PT                PT Recommendation and Plan  Anticipated Equipment Needs At Discharge:  (to be determined)  Anticipated Discharge Disposition: home with 24/7 care, home with home health  Planned Therapy Interventions: balance training, bed mobility training, gait training, home exercise program, motor coordination training, neuromuscular re-education, patient/family education, postural re-education, stair training, strengthening, transfer training  PT Frequency: 3-5 times/wk, per priority policy             IP PT Goals       01/01/18 1139          Transfer Training PT LTG    Transfer Training PT LTG, Date Established 01/01/18  -CT      Transfer Training PT LTG, Time to Achieve by discharge  -CT      Transfer Training PT LTG, Activity Type bed to chair /chair to bed;sit to stand/stand to sit  -CT      Transfer Training PT LTG, [place] Level supervision required;contact guard assist  -CT      Transfer Training PT LTG, Assist Device other (see comments)   with appropriate AD  -CT      Gait Training PT LTG     Gait Training Goal PT LTG, Date Established 01/01/18  -CT      Gait Training Goal PT LTG, Time to Achieve by discharge  -CT      Gait Training Goal PT LTG, [place] Level supervision required;contact guard assist  -CT      Gait Training Goal PT LTG, Assist Device other (see comments)   with appropriate AD  -CT      Gait Training Goal PT LTG, Distance to Achieve 50  -CT        User Key  (r) = Recorded By, (t) = Taken By, (c) = Cosigned By    Initials Name Provider Type    CT Lisette Dowell PT Physical Therapist                Outcome Measures       01/01/18 1100          How much help from another person do you currently need...    Turning from your back to your side while in flat bed without using bedrails? 4  -CT      Moving from lying on back to sitting on the side of a flat bed without bedrails? 3  -CT      Moving to and from a bed to a chair (including a wheelchair)? 3  -CT      Standing up from a chair using your arms (e.g., wheelchair, bedside chair)? 3  -CT      Climbing 3-5 steps with a railing? 2  -CT      To walk in hospital room? 2  -CT      AM-PAC 6 Clicks Score 17  -CT      Functional Assessment    Outcome Measure Options AM-PAC 6 Clicks Basic Mobility (PT)  -CT        User Key  (r) = Recorded By, (t) = Taken By, (c) = Cosigned By    Initials Name Provider Type    CT Lisette Dowell PT Physical Therapist           Time Calculation:         PT Charges       01/01/18 1140          Time Calculation    PT Received On 01/01/18  -CT      PT - Next Appointment 01/02/18  -CT      PT Goal Re-Cert Due Date 01/15/18  -CT      Time Calculation- PT    Total Timed Code Minutes- PT 45 minute(s)  -CT        User Key  (r) = Recorded By, (t) = Taken By, (c) = Cosigned By    Initials Name Provider Type    CT Lisette Dowell PT Physical Therapist          Therapy Charges for Today     Code Description Service Date Service Provider Modifiers Qty    84616507361 HC PT MOBILITY CURRENT 1/1/2018 Lisette Dowell PT GP,  CK 1    38251591430 HC PT MOBILITY PROJECTED 1/1/2018 Lisette Dowell, PT GP, CJ 1    73639252013 HC PT EVAL HIGH COMPLEXITY 2 1/1/2018 Lisette Dowell, PT GP 1    38300827568 HC GAIT TRAINING EA 15 MIN 1/1/2018 Lisette Dowell, PT GP 1    29845902057 HC PT THER SUPP EA 15 MIN 1/1/2018 Lisette Dowell, PT GP 3          PT G-Codes  Outcome Measure Options: AM-PAC 6 Clicks Basic Mobility (PT)  Score: 17  Functional Limitation: Mobility: Walking and moving around  Mobility: Walking and Moving Around Current Status (): At least 40 percent but less than 60 percent impaired, limited or restricted  Mobility: Walking and Moving Around Goal Status (): At least 20 percent but less than 40 percent impaired, limited or restricted      Lisette Dowell, PT  1/1/2018

## 2018-01-01 NOTE — PROGRESS NOTES
HOSPITALIST PROGRESS NOTE    Patient Identification:  Name:  Se Anne  Age:  67 y.o.  Sex:  male  :  1950  MRN:  [id number]  Visit Number:  [id number]  Primary Care Provider:  No Known Provider    Length of stay:  4     HPI: 66 yo male admitted with dyspnea    Subjective:  The patient is seen this morning in a sitting in the bedside chair.  He reports that he continues to feel dyspneic especially when CPAP is removed and he was placed on nasal cannula.  He reports occasional cough with minimal sputum production.  He denies chest pain and/or pressure.  He denies any palpitations.  He has no abdominal pain and/or vomiting.    Patient denies urinary symptoms. He denies previous knowledge of bladder polyp.    Present during exam: REGGIE Montiel    Current Hospital Meds:    amLODIPine 10 mg Oral Daily   aspirin 81 mg Oral Daily   atorvastatin 80 mg Oral Daily   budesonide-formoterol 2 puff Inhalation BID - RT   bumetanide 1 mg Oral Once   carvedilol 25 mg Oral BID With Meals   cetirizine 10 mg Oral Daily   cholecalciferol 2,000 Units Oral Daily   ferrous sulfate 325 mg Oral Daily With Breakfast   fondaparinux 2.5 mg Subcutaneous Q24H   gabapentin 300 mg Oral Nightly   hydrALAZINE 100 mg Oral Q8H   insulin aspart 0-14 Units Subcutaneous 4x Daily AC & at Bedtime   insulin aspart 10 Units Subcutaneous TID With Meals   insulin detemir 35 Units Subcutaneous Daily   insulin detemir 35 Units Subcutaneous Nightly   ipratropium 0.5 mg Nebulization 4x Daily - RT   isosorbide mononitrate 180 mg Oral Daily   lactobacillus acidophilus 1 capsule Oral Daily   levothyroxine 25 mcg Oral Daily   methylPREDNISolone sodium succinate 20 mg Intravenous Q12H   pantoprazole 40 mg Oral Daily   piperacillin-tazobactam 4.5 g Intravenous Q6H   sertraline 50 mg Oral Q PM   vancomycin 1,000 mg Intravenous Q12H   vitamin B-12 2,000 mcg Oral Daily       Vital Signs  Temp:  [97.1 °F (36.2 °C)-98.4 °F (36.9 °C)] 98.4 °F (36.9 °C)  Heart Rate:   [50-86] 68  Resp:  [18-25] 21  BP: (160-231)/() 176/91  Last 3 weights    12/29/17  0400 12/30/17  0405 12/31/17  0403   Weight: 120 kg (265 lb 3.4 oz) 120 kg (263 lb 14.3 oz) 130 kg (286 lb)     Body mass index is 46.16 kg/(m^2).       Intake/Output Summary (Last 24 hours) at 01/01/18 1110  Last data filed at 01/01/18 0826   Gross per 24 hour   Intake             1910 ml   Output             2150 ml   Net             -240 ml       Physical exam:  Physical Exam   Constitutional: He is oriented to person, place, and time. He appears well-developed and well-nourished. No distress. Face mask in place.   obese   HENT:   Head: Normocephalic and atraumatic.   Nose: Nose normal.   Mouth/Throat: Oropharynx is clear and moist and mucous membranes are normal.   Eyes: Conjunctivae and EOM are normal. Pupils are equal, round, and reactive to light. No scleral icterus.   Neck: Trachea normal. Neck supple. No JVD present. Carotid bruit is not present. No thyromegaly present.   Cardiovascular: Normal rate, regular rhythm, normal heart sounds and normal pulses.  Exam reveals no gallop and no friction rub.    No murmur heard.  Trace-1+ edema bilateral lower extremities   Pulmonary/Chest: Effort normal. No respiratory distress. He has decreased breath sounds. He has no wheezes. He has no rales.   Abdominal: Soft. Bowel sounds are normal. He exhibits no distension. There is no tenderness. There is no guarding.   Neurological: He is alert and oriented to person, place, and time. He has normal strength. No cranial nerve deficit.   Skin: Skin is warm, dry and intact. No rash noted. No erythema.   Psychiatric: He has a normal mood and affect. His speech is normal.      Telemetry: Sinus rhythm    Results Review:    Results from last 7 days  Lab Units 01/01/18  0050 12/31/17  0117 12/30/17  0234 12/29/17  0427 12/28/17  0932   WBC 10*3/mm3 6.19 8.63 8.29 9.38 7.39   HEMOGLOBIN g/dL 8.8* 9.0* 8.0* 8.5* 9.8*   HEMATOCRIT % 28.7* 30.1*  26.3* 26.9* 32.2*   PLATELETS 10*3/mm3 149 163 134 103* 188       Results from last 7 days  Lab Units 01/01/18  0050 12/31/17  0117 12/30/17  0234 12/29/17  1112 12/29/17  0427 12/28/17  0932   SODIUM mmol/L 145 144 139  --  142 145   POTASSIUM mmol/L 3.8 4.0 3.6  --  3.8 3.3*   CHLORIDE mmol/L 110 108 103  --  103 97*   CO2 mmol/L 31.3 31.9 27.3  --  32.0* 39.9*   BUN mg/dL 22* 22* 20  --  21 16   CREATININE mg/dL 1.32* 1.45* 1.46* 1.52* 1.40* 1.27   CALCIUM mg/dL 8.4 8.2 7.5*  --  7.6* 8.8   GLUCOSE mg/dL 139* 179* 281*  --  276* 178*       Results from last 7 days  Lab Units 01/01/18  0050 12/31/17  0117 12/30/17  0234 12/29/17  0427 12/28/17  0932   BILIRUBIN mg/dL 1.1 1.0 0.6 0.6 0.6   ALK PHOS U/L 42 47 41 41 55   AST (SGOT) U/L 25 27 17 16 18   ALT (SGPT) U/L 52* 51* 25 25 28       Results from last 7 days  Lab Units 01/01/18  0050 12/31/17  0117 12/30/17  0234 12/29/17  0427   MAGNESIUM mg/dL 2.7* 2.7* 2.6 1.1*           Results from last 7 days  Lab Units 12/29/17  0427 12/28/17  2155 12/28/17  1640   CK TOTAL U/L 45 39  39 40   TROPONIN I ng/mL 0.041* 0.038 0.035   CK MB INDEX % 2.4  --  1.3       Results from last 7 days  Lab Units 12/29/17  0427 12/28/17  0932   BNP pg/mL 307.0* 127.0*     CXR, 12/31/17:  FINDINGS:   Bibasilar atelectasis.   Heart size is stable.   No pneumothorax.   No pleural effusion.   Bony and soft tissue structures are unremarkable.      IMPRESSION:  Stable radiographic appearance of the chest.      Assessment/Plan      -Acute on chronic hypoxic and hypercapnic respiratory failure secondary to an acute COPD exacerbation with a bibasilar healthcare associated pneumonia  -Septic shock that has resolved  -Uncontrolled essential hyprtension  -Acute hypomagnesemia, resolved  -Acute hypophosphatemia that has resolved  -5 mm bladder polyp of unclear significance  -Diabetes mellitus type 2 with improving control  -Stage III chronic kidney disease  -Atypical chest pain that has  resolved  -Thrombocytopenia that has resolved  -Chronic microcytic anemia with iron deficiency  -Obesity    Continue with vancomycin and Zosyn.  Continue scheduled inhalants.  Continue with IV steroids for now and consider de-escalation to prednisone beginning tomorrow.  I have discussed with respiratory and we will try the patient on high flow nasal cannula.  Continue physical therapy as tolerated/as available.    A urology consultation has been placed for evaluation of his bladder polyp to see if this needs to be addressed while inpatient or could be evaluating on an outpatient basis.    I have increased the dose of his Coreg and will give him a one-time dose of oral Bumex now.  The patient is on Bumex 1 mg daily at home.    Continue to supplement his electrolytes as needed.  Continue with his current insulin regimen as his blood glucose levels have improved.    Repeat his CBC and BMP in the morning.     The patient is high risk due to the following diagnoses/reasons:  Acute on chronic respiratory failure, ross, ckd    I discussed the patients findings and my recommendations with patient and nursing staff.    Disposition  Home when medically stable    Teresa Sandhu DO  01/01/18  11:08 AM

## 2018-01-01 NOTE — PLAN OF CARE
Problem: NPPV/CPAP (Adult)  Goal: Signs and Symptoms of Listed Potential Problems Will be Absent or Manageable (NPPV/CPAP)  Outcome: Ongoing (interventions implemented as appropriate)   12/31/17 1908   NPPV/CPAP   Problems Assessed (NPPV/CPAP) all

## 2018-01-01 NOTE — PLAN OF CARE
Problem: Inpatient Physical Therapy  Goal: Transfer Training Goal 1 LTG- PT   01/01/18 1139   Transfer Training PT LTG   Transfer Training PT LTG, Date Established 01/01/18   Transfer Training PT LTG, Time to Achieve by discharge   Transfer Training PT LTG, Activity Type bed to chair /chair to bed;sit to stand/stand to sit   Transfer Training PT LTG, [place] Level supervision required;contact guard assist   Transfer Training PT LTG, Assist Device other (see comments)  (with appropriate AD)     Goal: Gait Training Goal LTG- PT   01/01/18 1139   Gait Training PT LTG   Gait Training Goal PT LTG, Date Established 01/01/18   Gait Training Goal PT LTG, Time to Achieve by discharge   Gait Training Goal PT LTG, [place] Level supervision required;contact guard assist   Gait Training Goal PT LTG, Assist Device other (see comments)  (with appropriate AD)   Gait Training Goal PT LTG, Distance to Achieve 50

## 2018-01-01 NOTE — PLAN OF CARE
Problem: NPPV/CPAP (Adult)  Goal: Signs and Symptoms of Listed Potential Problems Will be Absent or Manageable (NPPV/CPAP)  Outcome: Ongoing (interventions implemented as appropriate)   01/01/18 6186   NPPV/CPAP   Problems Assessed (NPPV/CPAP) all

## 2018-01-01 NOTE — PLAN OF CARE
Problem: COPD, Chronic Bronchitis/Emphysema (Adult)  Goal: Signs and Symptoms of Listed Potential Problems Will be Absent or Manageable (COPD, Chronic Bronchitis/Emphysema)  Outcome: Ongoing (interventions implemented as appropriate)      Problem: Skin Integrity Impairment, Risk/Actual (Adult)  Goal: Skin Integrity/Wound Healing  Outcome: Ongoing (interventions implemented as appropriate)      Problem: Fall Risk (Adult)  Goal: Identify Related Risk Factors and Signs and Symptoms  Outcome: Ongoing (interventions implemented as appropriate)    Goal: Absence of Falls  Outcome: Ongoing (interventions implemented as appropriate)      Problem: Patient Care Overview (Adult)  Goal: Plan of Care Review  Outcome: Ongoing (interventions implemented as appropriate)   12/31/17 6000   Coping/Psychosocial Response Interventions   Plan Of Care Reviewed With patient   Patient Care Overview   Progress progress toward functional goals as expected     Goal: Adult Individualization and Mutuality  Outcome: Ongoing (interventions implemented as appropriate)    Goal: Discharge Needs Assessment  Outcome: Ongoing (interventions implemented as appropriate)      Problem: NPPV/CPAP (Adult)  Goal: Signs and Symptoms of Listed Potential Problems Will be Absent or Manageable (NPPV/CPAP)  Outcome: Ongoing (interventions implemented as appropriate)

## 2018-01-02 NOTE — PROGRESS NOTES
HOSPITALIST PROGRESS NOTE    Patient Identification:  Name:  Se Anne  Age:  67 y.o.  Sex:  male  :  1950  MRN:  [id number]  Visit Number:  [id number]  Primary Care Provider:  No Known Provider    Length of stay:  5     HPI: 66 yo male admitted with dyspnea    Subjective:  The patient is seen this morning and is sitting up on the bedside.    He currently is on high flow nasal cannula and states that he is feeling a little better today.  He denied to me significant cough but nursing staff states that he had an episode of hemoptysis shortly after my visit.    He did not ambulate with PT yesterday as he did not feel well but hopes to work with him today.    He denies dyspnea at this time.  He denies constipation, abdominal pain and/or vomiting.    Blood pressure remains slightly elevated. Patient was started on a Cardene drip by Pulmonary. He also received a one time dose of IV Lasix and reports increased urine output with this.    Present during exam: N/A    Current Hospital Meds:    amLODIPine 10 mg Oral Daily   aspirin 81 mg Oral Daily   atorvastatin 80 mg Oral Daily   budesonide-formoterol 2 puff Inhalation BID - RT   carvedilol 25 mg Oral BID With Meals   cetirizine 10 mg Oral Daily   cholecalciferol 2,000 Units Oral Daily   doxycycline 100 mg Oral Q12H   ferrous sulfate 325 mg Oral Daily With Breakfast   fondaparinux 2.5 mg Subcutaneous Q24H   gabapentin 300 mg Oral Nightly   hydrALAZINE 100 mg Oral Q8H   insulin aspart 0-14 Units Subcutaneous 4x Daily AC & at Bedtime   insulin aspart 10 Units Subcutaneous TID With Meals   insulin detemir 35 Units Subcutaneous Daily   insulin detemir 35 Units Subcutaneous Nightly   ipratropium 0.5 mg Nebulization 4x Daily - RT   isosorbide mononitrate 180 mg Oral Daily   lactobacillus acidophilus 1 capsule Oral Daily   levothyroxine 25 mcg Oral Daily   pantoprazole 40 mg Oral Daily   potassium chloride 40 mEq Oral Q4H   predniSONE 30 mg Oral Daily With Breakfast    sertraline 50 mg Oral Q PM   vitamin B-12 2,000 mcg Oral Daily       Vital Signs  Temp:  [98 °F (36.7 °C)-99.7 °F (37.6 °C)] 98 °F (36.7 °C)  Heart Rate:  [55-73] 67  Resp:  [16-22] 17  BP: (167-200)/(63-89) 175/71  Last 3 weights    12/30/17  0405 12/31/17  0403 01/02/18  0412   Weight: 120 kg (263 lb 14.3 oz) 130 kg (286 lb) 130 kg (287 lb 0.6 oz)     Body mass index is 46.33 kg/(m^2).       Intake/Output Summary (Last 24 hours) at 01/02/18 1120  Last data filed at 01/02/18 0919   Gross per 24 hour   Intake             1400 ml   Output             2385 ml   Net             -985 ml       Physical exam:  Physical Exam   Constitutional: He is oriented to person, place, and time. He appears well-developed and well-nourished. No distress. Nasal cannula in place.   Obese; HFNC at 36%, 45L   HENT:   Head: Normocephalic and atraumatic.   Nose: Nose normal.   Mouth/Throat: Oropharynx is clear and moist and mucous membranes are normal.   Eyes: Conjunctivae and EOM are normal. Pupils are equal, round, and reactive to light. No scleral icterus.   Neck: Trachea normal. Neck supple. No JVD present. Carotid bruit is not present. No thyromegaly present.   Cardiovascular: Normal rate, regular rhythm, normal heart sounds and normal pulses.  Exam reveals no gallop and no friction rub.    No murmur heard.  Trace-1+ edema bilateral lower extremities   Pulmonary/Chest: Effort normal. No respiratory distress. He has decreased breath sounds. He has no wheezes. He has rales in the left lower field.   Abdominal: Soft. Bowel sounds are normal. He exhibits no distension. There is no tenderness. There is no guarding.   Neurological: He is alert and oriented to person, place, and time. He has normal strength. No cranial nerve deficit.   Skin: Skin is warm, dry and intact. No rash noted. No erythema.   Psychiatric: He has a normal mood and affect. His speech is normal.      Telemetry: Sinus rhythm    Results Review:    Results from last 7  days  Lab Units 01/02/18  0044 01/01/18  0050 12/31/17  0117 12/30/17  0234 12/29/17  0427 12/28/17  0932   WBC 10*3/mm3 5.76 6.19 8.63 8.29 9.38 7.39   HEMOGLOBIN g/dL 8.4* 8.8* 9.0* 8.0* 8.5* 9.8*   HEMATOCRIT % 27.0* 28.7* 30.1* 26.3* 26.9* 32.2*   PLATELETS 10*3/mm3 153 149 163 134 103* 188       Results from last 7 days  Lab Units 01/02/18  0044 01/01/18 0050 12/31/17  0117 12/30/17  0234 12/29/17  1112 12/29/17  0427 12/28/17  0932   SODIUM mmol/L 143 145 144 139  --  142 145   POTASSIUM mmol/L 3.6 3.8 4.0 3.6  --  3.8 3.3*   CHLORIDE mmol/L 107 110 108 103  --  103 97*   CO2 mmol/L 30.0 31.3 31.9 27.3  --  32.0* 39.9*   BUN mg/dL 24* 22* 22* 20  --  21 16   CREATININE mg/dL 1.38* 1.32* 1.45* 1.46* 1.52* 1.40* 1.27   CALCIUM mg/dL 8.7 8.4 8.2 7.5*  --  7.6* 8.8   GLUCOSE mg/dL 116* 139* 179* 281*  --  276* 178*       Results from last 7 days  Lab Units 01/01/18  0050 12/31/17 0117 12/30/17 0234 12/29/17  0427 12/28/17  0932   BILIRUBIN mg/dL 1.1 1.0 0.6 0.6 0.6   ALK PHOS U/L 42 47 41 41 55   AST (SGOT) U/L 25 27 17 16 18   ALT (SGPT) U/L 52* 51* 25 25 28       Results from last 7 days  Lab Units 01/01/18  0050 12/31/17 0117 12/30/17  0234 12/29/17  0427   MAGNESIUM mg/dL 2.7* 2.7* 2.6 1.1*           Results from last 7 days  Lab Units 12/29/17  0427 12/28/17  2155 12/28/17  1640   CK TOTAL U/L 45 39  39 40   TROPONIN I ng/mL 0.041* 0.038 0.035   CK MB INDEX % 2.4  --  1.3       Results from last 7 days  Lab Units 12/29/17  0427 12/28/17  0932   BNP pg/mL 307.0* 127.0*         CXR, 12/31/17:  FINDINGS:   Bibasilar atelectasis.   Heart size is stable.   No pneumothorax.   No pleural effusion.   Bony and soft tissue structures are unremarkable.      IMPRESSION:  Stable radiographic appearance of the chest.      Assessment/Plan      -Acute on chronic hypoxic and hypercapnic respiratory failure secondary to an acute COPD exacerbation with a bibasilar healthcare associated pneumonia  -Septic shock that has  resolved  -Uncontrolled essential hyprtension  -Acute hypomagnesemia, resolved  -Acute hypophosphatemia that has resolved  -5 mm bladder polyp of unclear significance  -Diabetes mellitus type 2 with improving control  -Stage III chronic kidney disease  -Thrombocytopenia that has resolved  -Chronic microcytic anemia with iron deficiency  -Obesity    Patient's antibiotic regimen has be de-escalated to oral doxycycline by ID through 1/5/18. Continue scheduled inhalants.  IV solumedrol has been changed to PO prednisone by Pulmonary. Continue inhalents. Wean HFNC as tolerated. He received a one time dose of IV Lasix today. Continue physical therapy as tolerated/as available.    A urology consultation has been placed for evaluation of his bladder polyp to see if this needs to be addressed while inpatient or could be evaluating on an outpatient basis.    The patient has been started on a Cardene drip by pulmonary. Goal SBP would be 140-150 mmHg given his history of Stage III CKD to ensure renal perfusion.    Continue to supplement his electrolytes as needed.  Continue with his current insulin regimen as his blood glucose levels have improved.    Repeat his CBC and BMP in the morning.     The patient is high risk due to the following diagnoses/reasons:  Acute on chronic respiratory failure, ross, ckd, uncontrolled htn    I discussed the patients findings and my recommendations with patient.    Disposition  Home when medically stable    Teresa Sandhu DO  01/02/18  11:20 AM

## 2018-01-02 NOTE — PLAN OF CARE
Problem: COPD, Chronic Bronchitis/Emphysema (Adult)  Goal: Signs and Symptoms of Listed Potential Problems Will be Absent or Manageable (COPD, Chronic Bronchitis/Emphysema)  Outcome: Ongoing (interventions implemented as appropriate)      Problem: Skin Integrity Impairment, Risk/Actual (Adult)  Goal: Identify Related Risk Factors and Signs and Symptoms  Outcome: Ongoing (interventions implemented as appropriate)    Goal: Skin Integrity/Wound Healing  Outcome: Ongoing (interventions implemented as appropriate)      Problem: Fall Risk (Adult)  Goal: Identify Related Risk Factors and Signs and Symptoms  Outcome: Ongoing (interventions implemented as appropriate)    Goal: Absence of Falls  Outcome: Ongoing (interventions implemented as appropriate)      Problem: Patient Care Overview (Adult)  Goal: Plan of Care Review  Outcome: Ongoing (interventions implemented as appropriate)    Goal: Adult Individualization and Mutuality  Outcome: Ongoing (interventions implemented as appropriate)    Goal: Discharge Needs Assessment  Outcome: Ongoing (interventions implemented as appropriate)      Problem: NPPV/CPAP (Adult)  Goal: Signs and Symptoms of Listed Potential Problems Will be Absent or Manageable (NPPV/CPAP)  Outcome: Ongoing (interventions implemented as appropriate)

## 2018-01-02 NOTE — PROGRESS NOTES
LOS: 5 days     Chief Complaint:  Pulmonology is following for respiratory failure     Subjective     Interval History:     Mr. Anne is doing well this morning. He is sitting up in his bed, he is in no distress. He reports he is starting to feel better.     History taken from: patient chart RN    Review of Systems:   Review of Systems   Constitutional: Negative for chills, diaphoresis and fatigue.   HENT: Negative for congestion and rhinorrhea.    Eyes: Negative for visual disturbance.   Respiratory: Positive for shortness of breath. Negative for cough and wheezing.    Cardiovascular: Negative for leg swelling.   Gastrointestinal: Negative for abdominal distention and abdominal pain.   Endocrine: Negative for cold intolerance and heat intolerance.   Genitourinary: Negative for difficulty urinating.   Musculoskeletal: Negative for arthralgias and myalgias.   Skin: Negative for color change and pallor.   Allergic/Immunologic: Negative for environmental allergies.   Neurological: Negative for dizziness, weakness and light-headedness.   Psychiatric/Behavioral: Negative for agitation, behavioral problems and confusion.                     Objective     Vital Signs  Temp:  [98 °F (36.7 °C)-99.7 °F (37.6 °C)] 98 °F (36.7 °C)  Heart Rate:  [55-73] 67  Resp:  [16-22] 17  BP: (167-200)/(63-91) 175/71  Body mass index is 46.33 kg/(m^2).    Intake/Output Summary (Last 24 hours) at 01/02/18 0843  Last data filed at 01/02/18 0541   Gross per 24 hour   Intake             1910 ml   Output             1870 ml   Net               40 ml          Physical Exam:  GENERAL APPEARANCE: Well developed, well nourished, alert and cooperative, and appears to be in no acute distress.    HEAD: normocephalic.    EYES: PERRL    NECK: Neck supple.     CARDIAC: Normal S1 and S2. No S3, S4 or murmurs. Rhythm is regular. There is no cyanosis or pallor. Extremities are warm and well perfused. Capillary refill is less than 2 seconds. No carotid  bruits.  No jvd pulses equal bilaterally   Respiratory: Clear to auscultation and percussion without rales, rhonchi, wheezing or diminished breath sounds.    GI: Positive bowel sounds. Soft, nondistended, nontender.     Musculoskeletal: No significant deformity or joint abnormality. 2+ bilateral edema. Peripheral pulses intact.     NEUROLOGICAL: Strength and sensation symmetric and intact throughout.     PSYCHIATRIC: The mental examination revealed the patient was oriented to person, place, and time.                 Results Review:                I reviewed the patient's new clinical results.  I reviewed the patient's new imaging results and agree with the interpretation.    Results from last 7 days  Lab Units 01/02/18  0044 01/01/18 0050 12/31/17  0117   WBC 10*3/mm3 5.76 6.19 8.63   HEMOGLOBIN g/dL 8.4* 8.8* 9.0*   PLATELETS 10*3/mm3 153 149 163       Results from last 7 days  Lab Units 01/02/18 0044 01/01/18 0050 12/31/17 0117 12/30/17  0234   SODIUM mmol/L 143 145 144 139   POTASSIUM mmol/L 3.6 3.8 4.0 3.6   CHLORIDE mmol/L 107 110 108 103   CO2 mmol/L 30.0 31.3 31.9 27.3   BUN mg/dL 24* 22* 22* 20   CREATININE mg/dL 1.38* 1.32* 1.45* 1.46*   CALCIUM mg/dL 8.7 8.4 8.2 7.5*   GLUCOSE mg/dL 116* 139* 179* 281*   MAGNESIUM mg/dL  --  2.7* 2.7* 2.6     Lab Results   Component Value Date    INR 1.18 (H) 12/06/2017    INR 1.12 (H) 12/02/2017    INR 1.01 01/27/2017    PROTIME 15.2 12/06/2017    PROTIME 14.5 12/02/2017    PROTIME 11.4 01/27/2017       Results from last 7 days  Lab Units 01/01/18 0050 12/31/17 0117 12/30/17  0234   ALK PHOS U/L 42 47 41   BILIRUBIN mg/dL 1.1 1.0 0.6   ALT (SGPT) U/L 52* 51* 25   AST (SGOT) U/L 25 27 17       Results from last 7 days  Lab Units 12/29/17  0443   PH, ARTERIAL pH units 7.437   PO2 ART mm Hg 85.8   PCO2, ARTERIAL mm Hg 43.9   HCO3 ART mmol/L 28.9*     Imaging Results (last 24 hours)     ** No results found for the last 24 hours. **             Medication Review:    Scheduled Medications:    albumin human 25 g Intravenous Once   amLODIPine 10 mg Oral Daily   aspirin 81 mg Oral Daily   atorvastatin 80 mg Oral Daily   budesonide-formoterol 2 puff Inhalation BID - RT   carvedilol 25 mg Oral BID With Meals   cetirizine 10 mg Oral Daily   cholecalciferol 2,000 Units Oral Daily   doxycycline 100 mg Oral Q12H   ferrous sulfate 325 mg Oral Daily With Breakfast   fondaparinux 2.5 mg Subcutaneous Q24H   furosemide 40 mg Intravenous Once   gabapentin 300 mg Oral Nightly   hydrALAZINE 100 mg Oral Q8H   insulin aspart 0-14 Units Subcutaneous 4x Daily AC & at Bedtime   insulin aspart 10 Units Subcutaneous TID With Meals   insulin detemir 35 Units Subcutaneous Daily   insulin detemir 35 Units Subcutaneous Nightly   ipratropium 0.5 mg Nebulization 4x Daily - RT   isosorbide mononitrate 180 mg Oral Daily   lactobacillus acidophilus 1 capsule Oral Daily   levothyroxine 25 mcg Oral Daily   pantoprazole 40 mg Oral Daily   potassium chloride 40 mEq Oral Q4H   predniSONE 30 mg Oral Daily With Breakfast   sertraline 50 mg Oral Q PM   vitamin B-12 2,000 mcg Oral Daily     Continuous infusions:    niCARdipine 5-15 mg/hr       Assessment/Plan     Respiratory Failure: likely related to underlying lung disease, COPD and fluid volume overload     Antibiotics changed today to doxycyline PO by ID. Continue scheduled inhalants and nebulizer's.     Incentive spirometer 10 times an hour during the day. Titrate Fi02 to maintain Sp02 >92-94%.       On rounds he is on high flow nasal cannula at 45 Liters and 39% Fi02. Medrol dc'd, prednisone 30mg PO started, continue to wean as tolerated.     Will give lasix 40mg IV and albumin 25g x1 today for overload, monitor renal function.     Hypertension: -200, Cardene gtt started, goal is to maintain SBP around 150, he has had chronic HTN and do not want to drop his pressure to quickly which can cause worsening renal failure and encephalopathy.     Continue  continuous ECG and v/s monitoring, maintain MAP >65.     CKD stage III: Creatinine 1.38, 24 hour urine 1.8 liters, continue strict I&O, electrolytes WNL.       Patient Active Problem List   Diagnosis Code   • COPD (chronic obstructive pulmonary disease) J44.9   • Shortness of breath R06.02   • Morbid obesity E66.01   • Hypoxia R09.02   • Edema extremities R60.0   • Sleep apnea G47.30   • Wheezing R06.2   • Allergic rhinitis J30.9   • Essential hypertension I10   • Sore throat J02.9   • Cough R05   • Pneumonia J18.9   • Respiratory failure J96.90         YANG Mathews  01/02/18  8:43 AM      Scribed for Dr. Gomez by YANG Cassidy.     I, Jeremi Gomez M.D. attest that the above note accurately reflects the work and decisions made  by me.  Patient was seen and evaluated by Dr. Gomez, including history of present illness, physical exam, assessment, and treatment plan.  The above note was reviewed and edited by Dr. Gomez.

## 2018-01-02 NOTE — PLAN OF CARE
Problem: COPD, Chronic Bronchitis/Emphysema (Adult)  Goal: Signs and Symptoms of Listed Potential Problems Will be Absent or Manageable (COPD, Chronic Bronchitis/Emphysema)  Outcome: Ongoing (interventions implemented as appropriate)      Problem: Skin Integrity Impairment, Risk/Actual (Adult)  Goal: Identify Related Risk Factors and Signs and Symptoms  Outcome: Ongoing (interventions implemented as appropriate)      Problem: Fall Risk (Adult)  Goal: Identify Related Risk Factors and Signs and Symptoms  Outcome: Outcome(s) achieved Date Met: 01/02/18    Goal: Absence of Falls  Outcome: Ongoing (interventions implemented as appropriate)

## 2018-01-02 NOTE — THERAPY TREATMENT NOTE
Acute Care - Physical Therapy Treatment Note   [place]     Patient Name: Se Anne  : 1950  MRN: [id number]  Today's Date: 2018  Onset of Illness/Injury or Date of Surgery Date: 18 (admit date)  Date of Referral to PT: 18  Referring Physician: Renzo    Admit Date: 2017    Visit Dx:    ICD-10-CM ICD-9-CM   1. Acute on chronic respiratory failure with hypoxia and hypercapnia J96.21 518.84    J96.22 786.09     799.02   2. COPD exacerbation J44.1 491.21     Patient Active Problem List   Diagnosis   • COPD (chronic obstructive pulmonary disease)   • Shortness of breath   • Morbid obesity   • Hypoxia   • Edema extremities   • Sleep apnea   • Wheezing   • Allergic rhinitis   • Essential hypertension   • Sore throat   • Cough   • Pneumonia   • Respiratory failure               Adult Rehabilitation Note       18 1613          Rehab Assessment/Intervention    Discipline physical therapist  -CT      Document Type therapy note (daily note)  -CT      Subjective Information agree to therapy  -CT      Patient Effort, Rehab Treatment good  -CT      Precautions/Limitations fall precautions;oxygen therapy device and L/min  -CT      Patient Response to Treatment Pt on hiflow O2 during treament session limiting functional mobility. Pt tolerated treatment session fairly well with rest breaks provided as needed.   -CT      Recorded by [CT] Lisette Dowell PT      Cognitive Assessment/Intervention    Current Cognitive/Communication Assessment functional  -CT      Orientation Status oriented x 4  -CT      Personal Safety Interventions fall prevention program maintained;gait belt;muscle strengthening facilitated;nonskid shoes/slippers when out of bed  -CT      Recorded by [CT] Lisette Dowell PT      Bed Mobility, Assessment/Treatment    Bed Mobility, Assistive Device bed rails  -CT      Bed Mobility, Roll Left, [place] minimum assist (75% patient effort)  -CT      Bed Mobility, Roll Right,  [place] minimum assist (75% patient effort)  -CT      Bed Mobility, Scoot/Bridge, [place] minimum assist (75% patient effort)  -CT      Bed Mob, Supine to Sit, [place] minimum assist (75% patient effort)  -CT      Bed Mobility, Impairments strength decreased  -CT      Recorded by [CT] Lisette Dowell PT      Transfer Assessment/Treatment    Transfers, Bed-Chair [place] minimum assist (75% patient effort)  -CT      Transfers, Chair-Bed [place] minimum assist (75% patient effort)  -CT      Transfers, Bed-Chair-Bed, Assist Device rolling walker  -CT      Transfers, Sit-Stand [place] minimum assist (75% patient effort)  -CT      Transfers, Stand-Sit [place] minimum assist (75% patient effort)  -CT      Transfers, Sit-Stand-Sit, Assist Device rolling walker;bed rails  -CT      Transfer, Impairments strength decreased  -CT      Recorded by [CT] Lisette Dowell PT      Gait Assessment/Treatment    Gait, [place] Level minimum assist (75% patient effort);2 person assist required;1 person + 1 person to manage equipment;verbal cues required;nonverbal cues required (demo/gesture)  -CT      Gait, Assistive Device rolling walker  -CT      Gait, Distance (Feet) --   during transfer  -CT      Gait, Gait Pattern Analysis swing-to gait  -CT      Recorded by [CT] Lisette Dowell PT      Therapy Exercises    Bilateral Lower Extremities AROM:;standing;5 reps  -CT      Recorded by [CT] Lisette Dowell PT      Positioning and Restraints    Pre-Treatment Position in bed  -CT      Post Treatment Position chair  -CT      In Chair sitting;call light within reach;encouraged to call for assist;notified nsg  -CT      Recorded by [CT] Lisette Dowell PT        User Key  (r) = Recorded By, (t) = Taken By, (c) = Cosigned By    Initials Name Effective Dates    CT Lisette Dowell PT 03/14/16 -                 IP PT Goals       01/01/18 1139          Transfer Training PT LTG    Transfer Training PT LTG, Date  Established 01/01/18  -CT      Transfer Training PT LTG, Time to Achieve by discharge  -CT      Transfer Training PT LTG, Activity Type bed to chair /chair to bed;sit to stand/stand to sit  -CT      Transfer Training PT LTG, [place] Level supervision required;contact guard assist  -CT      Transfer Training PT LTG, Assist Device other (see comments)   with appropriate AD  -CT      Gait Training PT LTG    Gait Training Goal PT LTG, Date Established 01/01/18  -CT      Gait Training Goal PT LTG, Time to Achieve by discharge  -CT      Gait Training Goal PT LTG, [place] Level supervision required;contact guard assist  -CT      Gait Training Goal PT LTG, Assist Device other (see comments)   with appropriate AD  -CT      Gait Training Goal PT LTG, Distance to Achieve 50  -CT        User Key  (r) = Recorded By, (t) = Taken By, (c) = Cosigned By    Initials Name Provider Type    MAK Dowell PT Physical Therapist          Physical Therapy Education     Title: PT OT SLP Therapies (Done)     Topic: Physical Therapy (Done)     Point: Mobility training (Done)    Learning Progress Summary    Learner Readiness Method Response Comment Documented by Status   Patient Acceptance E   CT 01/02/18 1615 Done    Acceptance E NR   01/02/18 0513 Active    Acceptance E   CT 01/01/18 1140 Done               Point: Home exercise program (Done)    Learning Progress Summary    Learner Readiness Method Response Comment Documented by Status   Patient Acceptance E VU  CT 01/02/18 1615 Done    Acceptance E NR   01/02/18 0513 Active    Acceptance E   CT 01/01/18 1140 Done               Point: Body mechanics (Done)    Learning Progress Summary    Learner Readiness Method Response Comment Documented by Status   Patient Acceptance E VU  CT 01/02/18 1615 Done    Acceptance E NR  KF 01/02/18 0513 Active    Acceptance E   CT 01/01/18 1140 Done               Point: Precautions (Done)    Learning Progress Summary    Learner  Readiness Method Response Comment Documented by Status   Patient Acceptance E VU  CT 01/02/18 1615 Done    Acceptance E NR  KF 01/02/18 0513 Active    Acceptance E VU  CT 01/01/18 1140 Done                      User Key     Initials Effective Dates Name Provider Type Discipline    CT 03/14/16 -  Lisette Dowell PT Physical Therapist PT    KF 07/19/17 -  Suzy Jacobs, RN Registered Nurse Nurse                    PT Recommendation and Plan  Anticipated Equipment Needs At Discharge:  (to be determined)  Anticipated Discharge Disposition: home with /7 care, home with home health  Planned Therapy Interventions: balance training, bed mobility training, gait training, home exercise program, motor coordination training, neuromuscular re-education, patient/family education, postural re-education, stair training, strengthening, transfer training  PT Frequency: 3-5 times/wk, per priority policy             Outcome Measures       01/02/18 1600 01/01/18 1100       How much help from another person do you currently need...    Turning from your back to your side while in flat bed without using bedrails? 4  -CT 4  -CT     Moving from lying on back to sitting on the side of a flat bed without bedrails? 3  -CT 3  -CT     Moving to and from a bed to a chair (including a wheelchair)? 3  -CT 3  -CT     Standing up from a chair using your arms (e.g., wheelchair, bedside chair)? 3  -CT 3  -CT     Climbing 3-5 steps with a railing? 2  -CT 2  -CT     To walk in hospital room? 2  -CT 2  -CT     AM-PAC 6 Clicks Score 17  -CT 17  -CT     Functional Assessment    Outcome Measure Options AM-PAC 6 Clicks Basic Mobility (PT)  -CT AM-PAC 6 Clicks Basic Mobility (PT)  -CT       User Key  (r) = Recorded By, (t) = Taken By, (c) = Cosigned By    Initials Name Provider Type    CT Lisette Dowell PT Physical Therapist           Time Calculation:         PT Charges       01/02/18 1615          Time Calculation    PT Received On 01/02/18  -CT      PT -  Next Appointment 01/03/18  -CT      PT Goal Re-Cert Due Date 01/15/18  -CT      Time Calculation- PT    Total Timed Code Minutes- PT 24 minute(s)  -CT        User Key  (r) = Recorded By, (t) = Taken By, (c) = Cosigned By    Initials Name Provider Type    CT Lisette Dowell, PT Physical Therapist          Therapy Charges for Today     Code Description Service Date Service Provider Modifiers Qty    48956035910 HC PT MOBILITY CURRENT 1/1/2018 Lisette Dowell, PT GP, CK 1    56371689160 HC PT MOBILITY PROJECTED 1/1/2018 Lisette Dowell, PT GP, CJ 1    98894655184 HC PT EVAL HIGH COMPLEXITY 2 1/1/2018 Lisette Dowell, PT GP 1    46111653186 HC GAIT TRAINING EA 15 MIN 1/1/2018 Lisette Dowell, PT GP 1    60570144274 HC PT THER SUPP EA 15 MIN 1/1/2018 Lisette Dowell, PT GP 3    18306672677 HC PT THERAPEUTIC ACT EA 15 MIN 1/2/2018 Lisette Dowell, PT GP 1    40877111596 HC PT THER PROC EA 15 MIN 1/2/2018 Lisette Dowell, PT GP 1    04575334260 HC PT THER SUPP EA 15 MIN 1/2/2018 Lisette Dowell, PT GP 2          PT G-Codes  Outcome Measure Options: AM-PAC 6 Clicks Basic Mobility (PT)  Score: 17  Functional Limitation: Mobility: Walking and moving around  Mobility: Walking and Moving Around Current Status (): At least 40 percent but less than 60 percent impaired, limited or restricted  Mobility: Walking and Moving Around Goal Status (): At least 20 percent but less than 40 percent impaired, limited or restricted    Lisette Dowell, PT  1/2/2018

## 2018-01-02 NOTE — PROGRESS NOTES
"  I have personally seen and examined the patient today and discussed overnight interval progress and pertinent issues with nursing staff.    Subjective:    Slight hypoxemia overnight but improving since.  No evidence of aspiration.  No fever or diarrhea.  Overall stable from infectious disease standpoint with CRP level almost normal.    History taken from: patient chart RN      Vital Signs    /71  Pulse 67  Temp 98 °F (36.7 °C)  Resp 17  Ht 167.6 cm (66\")  Wt 130 kg (287 lb 0.6 oz)  SpO2 97%  BMI 46.33 kg/m2    Temp:  [98 °F (36.7 °C)-99.7 °F (37.6 °C)] 98 °F (36.7 °C)      Intake/Output Summary (Last 24 hours) at 01/02/18 1115  Last data filed at 01/02/18 0919   Gross per 24 hour   Intake             1400 ml   Output             2385 ml   Net             -985 ml     Intake & Output (last 3 days)       12/30 0701 - 12/31 0700 12/31 0701 - 01/01 0700 01/01 0701 - 01/02 0700 01/02 0701 - 01/03 0700    P.O. 1460 1360 1370     IV Piggyback 900 1050 900     Total Intake(mL/kg) 2360 (18.2) 2410 (18.6) 2270 (17.4)     Urine (mL/kg/hr) 1400 (0.4) 2550 (0.8) 1870 (0.6) 775 (1.4)    Stool 0 (0) 0 (0)      Total Output 1400 2550 1870 775    Net +960 -140 +400 -775            Unmeasured Urine Occurrence   1 x     Unmeasured Stool Occurrence 2 x 1 x               Physical Exam:       General Appearance:    Alert, cooperative, in no acute distress, Currently on nasal cannula   Head:    Normocephalic, without obvious abnormality, atraumatic   Eyes:            Lids and lashes normal, conjunctivae and sclerae normal, no   icterus, no pallor, corneas clear, PERRLA   Ears:    Ears appear intact with no abnormalities noted   Throat:   No oral lesions, no thrush, oral mucosa moist   Neck:   No adenopathy, supple, trachea midline, no thyromegaly, no   carotid bruit, no JVD   Back:     No tenderness to percussion or palpation, range of motion   normal   Lungs:    Decreased breath sounds overall but no rhonchi or crackles as " well as no wheezing.       Heart:    Regular rhythm and normal rate, normal S1 and S2, no            murmur, no gallop, no rub, no click   Chest Wall:    No abnormalities observed   Abdomen:     Normal bowel sounds, no masses, no organomegaly, soft        non-tender, non-distended, no guarding, no rebound                tenderness   Rectal:     Deferred   Extremities:   Moves all extremities well, no edema, no cyanosis, no             redness   Pulses:   Pulses palpable and equal bilaterally   Skin:   No bleeding, bruising or rash   Lymph nodes:   No palpable adenopathy   Neurologic:   Awake, alert and oriented x 3. Following commands.         Results:      Results from last 7 days  Lab Units 01/02/18  0044 01/01/18  0050 12/31/17  0117 12/30/17  0234 12/29/17  0427 12/28/17  0932   WBC 10*3/mm3 5.76 6.19 8.63 8.29 9.38 7.39     Lab Results   Component Value Date    NEUTROABS 4.66 01/02/2018         Results from last 7 days  Lab Units 01/02/18  0044   CREATININE mg/dL 1.38*         Results from last 7 days  Lab Units 01/01/18 0050 12/31/17  0117 12/30/17  0234   CRP mg/dL 1.32* 2.16* 3.94*     Results Review:    I have personally reviewed laboratory data, culture results, radiology studies and antimicrobial therapy.    Hospital Medications (active)       Dose Frequency Start End    amLODIPine (NORVASC) tablet 10 mg 10 mg Daily 12/28/2017     Sig - Route: Take 1 tablet by mouth Daily. - Oral    aspirin EC tablet 81 mg 81 mg Daily 12/29/2017     Sig - Route: Take 1 tablet by mouth Daily. - Oral    Cosign for Ordering: Required by Seun Muller MD    atorvastatin (LIPITOR) tablet 80 mg 80 mg Daily 12/28/2017     Sig - Route: Take 2 tablets by mouth Daily. - Oral    budesonide-formoterol (SYMBICORT) 160-4.5 MCG/ACT inhaler 2 puff 2 puff 2 Times Daily - RT 12/28/2017     Sig - Route: Inhale 2 puffs 2 (Two) Times a Day. - Inhalation    carvedilol (COREG) tablet 12.5 mg 12.5 mg 2 Times Daily With Meals 12/28/2017     Sig  - Route: Take 2 tablets by mouth 2 (Two) Times a Day With Meals. - Oral    cetirizine (zyrTEC) tablet 10 mg 10 mg Daily 12/28/2017     Sig - Route: Take 1 tablet by mouth Daily. - Oral    cholecalciferol (VITAMIN D3) tablet 2,000 Units 2,000 Units Daily 12/28/2017     Sig - Route: Take 5 tablets by mouth Daily. - Oral    dextrose (D50W) solution 25 g 25 g Every 15 Minutes PRN 12/28/2017     Sig - Route: Infuse 50 mL into a venous catheter Every 15 (Fifteen) Minutes As Needed for Low Blood Sugar (Blood Sugar Less Than 70, Patient Has IV Access - Unresponsive, NPO or Unable To Safely Swallow). - Intravenous    Cosign for Ordering: Required by Seun Muller MD    dextrose (GLUTOSE) oral gel 15 g 15 g Every 15 Minutes PRN 12/28/2017     Sig - Route: Take 15 g by mouth Every 15 (Fifteen) Minutes As Needed for Low Blood Sugar (Blood Sugar Less Than 70, Patient Alert, Is Not NPO & Can Safely Swallow). - Oral    Cosign for Ordering: Required by Seun Muller MD    ferrous sulfate tablet 325 mg 325 mg Daily With Breakfast 12/29/2017     Sig - Route: Take 1 tablet by mouth Daily With Breakfast. - Oral    fondaparinux (ARIXTRA) injection 2.5 mg 2.5 mg Every 24 Hours Scheduled 12/30/2017     Sig - Route: Inject 0.5 mL under the skin Daily. - Subcutaneous    gabapentin (NEURONTIN) capsule 300 mg 300 mg Nightly 12/28/2017     Sig - Route: Take 1 capsule by mouth Every Night. - Oral    glucagon (human recombinant) (GLUCAGEN DIAGNOSTIC) injection 1 mg 1 mg Every 15 Minutes PRN 12/28/2017     Sig - Route: Inject 1 mg under the skin Every 15 (Fifteen) Minutes As Needed (Blood Glucose Less Than 70 - Patient Without IV Access - Unresponsive, NPO or Unable To Safely Swallow). - Subcutaneous    Cosign for Ordering: Required by Seun Muller MD    hydrALAZINE (APRESOLINE) tablet 100 mg 100 mg Every 8 Hours Scheduled 12/30/2017     Sig - Route: Take 2 tablets by mouth Every 8 (Eight) Hours. - Oral    insulin aspart (novoLOG) injection  "0-14 Units 0-14 Units 4 Times Daily Before Meals & Nightly 12/28/2017     Sig - Route: Inject 0-14 Units under the skin 4 (Four) Times a Day Before Meals & at Bedtime. - Subcutaneous    insulin aspart (novoLOG) injection 10 Units 10 Units 3 Times Daily With Meals 12/30/2017     Sig - Route: Inject 10 Units under the skin 3 (Three) Times a Day With Meals. - Subcutaneous    insulin detemir (LEVEMIR) injection 30 Units 30 Units Daily 12/31/2017     Sig - Route: Inject 30 Units under the skin Daily. - Subcutaneous    insulin detemir (LEVEMIR) injection 30 Units 30 Units Nightly 12/30/2017     Sig - Route: Inject 30 Units under the skin Every Night. - Subcutaneous    ipratropium (ATROVENT) nebulizer solution 0.5 mg 0.5 mg 4 Times Daily - RT 12/28/2017     Sig - Route: Take 2.5 mL by nebulization 4 (Four) Times a Day. - Nebulization    isosorbide mononitrate (IMDUR) 24 hr tablet 180 mg 180 mg Daily 12/28/2017     Sig - Route: Take 3 tablets by mouth Daily. - Oral    lactobacillus acidophilus (RISAQUAD) capsule 1 capsule 1 capsule Daily 12/29/2017 1/5/2018    Sig - Route: Take 1 capsule by mouth Daily. - Oral    levothyroxine (SYNTHROID, LEVOTHROID) tablet 25 mcg 25 mcg Daily 12/28/2017     Sig - Route: Take 1 tablet by mouth Daily. - Oral    Magnesium Sulfate 2 gram Bolus, followed by 8 gram infusion (total Mg dose 10 grams)- Mg less than or equal to 1mg/dL 2 g As Needed 12/28/2017     Sig - Route: Infuse 50 mL into a venous catheter As Needed (Mg less than or equal to 1mg/dL). - Intravenous    Cosign for Ordering: Required by Seun Muller MD    Linked Group 1:  \"Or\" Linked Group Details        magnesium sulfate 4 gram infusion- Mg 1.6-1.9 mg/dL 4 g As Needed 12/28/2017     Sig - Route: Infuse 100 mL into a venous catheter As Needed (Mg 1.6-1.9 mg/dL). - Intravenous    Cosign for Ordering: Required by Seun Muller MD    Linked Group 1:  \"Or\" Linked Group Details        Magnesium Sulfate 6 gram Infusion (2 gm x 3) -Mg " "1.1 -1.5 mg/dL 2 g As Needed 12/28/2017     Sig - Route: Infuse 50 mL into a venous catheter As Needed (Mg 1.1 -1.5 mg/dL). - Intravenous    Cosign for Ordering: Required by Senu Muller MD    Linked Group 1:  \"Or\" Linked Group Details        Magnesium Sulfate 6 gram Infusion (2 gm x 3) -Mg 1.1 -1.5 mg/dL 2 g Every 2 Hours 12/29/2017 12/29/2017    Sig - Route: Infuse 50 mL into a venous catheter Every 2 (Two) Hours. - Intravenous    methylPREDNISolone sodium succinate (SOLU-Medrol) injection 20 mg 20 mg Every 12 Hours Scheduled 12/29/2017     Sig - Route: Infuse 0.5 mL into a venous catheter Every 12 (Twelve) Hours. - Intravenous    nitroglycerin (NITROSTAT) SL tablet 0.4 mg 0.4 mg Every 5 Minutes PRN 12/28/2017     Sig - Route: Place 1 tablet under the tongue Every 5 (Five) Minutes As Needed for Chest Pain. - Sublingual    Cosign for Ordering: Accepted by Jeremie Bermudez MD on 12/28/2017  2:34 PM    pantoprazole (PROTONIX) EC tablet 40 mg 40 mg Daily 12/28/2017     Sig - Route: Take 1 tablet by mouth Daily. - Oral    piperacillin-tazobactam (ZOSYN) 4.5 g/100 mL 0.9% NS IVPB (mbp) 4.5 g Every 6 Hours 12/29/2017 1/4/2018    Sig - Route: Infuse 100 mL into a venous catheter Every 6 (Six) Hours. - Intravenous    potassium chloride (K-DUR,KLOR-CON) CR tablet 40 mEq 40 mEq Every 4 Hours 12/30/2017 12/30/2017    Sig - Route: Take 2 tablets by mouth Every 4 (Four) Hours. - Oral    potassium chloride (KLOR-CON) packet 40 mEq 40 mEq As Needed 12/28/2017     Sig - Route: Take 40 mEq by mouth As Needed (potassium replacement, see admin instructions). - Oral    Cosign for Ordering: Required by Seun Muller MD    Linked Group 2:  \"Or\" Linked Group Details        potassium chloride (MICRO-K) CR capsule 40 mEq 40 mEq As Needed 12/28/2017     Sig - Route: Take 4 capsules by mouth As Needed (potassium replacement.  see admin instructions). - Oral    Cosign for Ordering: Required by Seun Muller MD    Linked Group 2:  " "\"Or\" Linked Group Details        potassium chloride 10 mEq in 100 mL IVPB 10 mEq Every 1 Hour PRN 12/28/2017     Sig - Route: Infuse 100 mL into a venous catheter Every 1 (One) Hour As Needed (potassium protocol PERIPHERAL - see admin instructions). - Intravenous    Cosign for Ordering: Required by Seun Muller MD    Linked Group 2:  \"Or\" Linked Group Details        sertraline (ZOLOFT) tablet 50 mg 50 mg Every Evening 12/28/2017     Sig - Route: Take 1 tablet by mouth Every Evening. - Oral    sodium chloride 0.9 % flush 1-10 mL 1-10 mL As Needed 12/28/2017     Sig - Route: Infuse 1-10 mL into a venous catheter As Needed for Line Care. - Intravenous    Cosign for Ordering: Required by Seun Muller MD    sodium chloride 0.9 % flush 10 mL 10 mL As Needed 12/28/2017     Sig - Route: Infuse 10 mL into a venous catheter As Needed for Line Care. - Intravenous    Cosign for Ordering: Accepted by Jeremie Bermudez MD on 12/28/2017  2:34 PM    Linked Group 3:  \"And\" Linked Group Details        sodium phosphates 15 mmol in sodium chloride 0.9 % 250 mL IVPB 15 mmol Once 12/29/2017 12/29/2017    Sig - Route: Infuse 15 mmol into a venous catheter 1 (One) Time. - Intravenous    sodium phosphates 15 mmol in sodium chloride 0.9 % 250 mL IVPB 15 mmol Once 12/30/2017     Sig - Route: Infuse 15 mmol into a venous catheter 1 (One) Time. - Intravenous    vancomycin (VANCOCIN) 1,000 mg in sodium chloride 0.9 % 250 mL IVPB 1,000 mg Every 12 Hours 12/29/2017 1/5/2018    Sig - Route: Infuse 1,000 mg into a venous catheter Every 12 (Twelve) Hours. - Intravenous    vitamin B-12 (CYANOCOBALAMIN) tablet 2,000 mcg 2,000 mcg Daily 12/28/2017     Sig - Route: Take 2 tablets by mouth Daily. - Oral    hydrALAZINE (APRESOLINE) tablet 75 mg (Discontinued) 75 mg Every 8 Hours Scheduled 12/28/2017 12/30/2017    Sig - Route: Take 75 mg by mouth Every 8 (Eight) Hours. - Oral    insulin aspart (novoLOG) injection 5 Units (Discontinued) 5 Units 3 " Times Daily With Meals 12/29/2017 12/30/2017    Sig - Route: Inject 5 Units under the skin 3 (Three) Times a Day With Meals. - Subcutaneous    insulin detemir (LEVEMIR) injection 15 Units (Discontinued) 15 Units Daily 12/29/2017 12/30/2017    Sig - Route: Inject 15 Units under the skin Daily. - Subcutaneous    insulin detemir (LEVEMIR) injection 25 Units (Discontinued) 25 Units Nightly 12/29/2017 12/30/2017    Sig - Route: Inject 25 Units under the skin Every Night. - Subcutaneous    sodium chloride 0.9 % infusion (Discontinued) 50 mL/hr Continuous 12/28/2017 12/29/2017    Sig - Route: Infuse 50 mL/hr into a venous catheter Continuous. - Intravenous            Cultures:    Blood Culture   Date Value Ref Range Status   12/28/2017 No growth at 24 hours  Preliminary   12/28/2017 No growth at 24 hours  Preliminary           Assessment/Plan     ASSESSMENT:    1.  Septic shock on admission with lactic acid greater than for an elevated CRP level  2.  Pneumonia     PLAN:    Slight hypoxemia overnight but improving since.  No evidence of aspiration.  No fever or diarrhea.  Overall stable from infectious disease standpoint with CRP level almost normal.    In the setting of such significant clinical improvement with almost normalize CRP level and normal white count antibiotic therapy was de-escalated to doxycycline 100 mg by mouth twice a day to continue through 1/5/2018.     Patient's findings and recommendations were discussed with patient and nursing staff    Code Status: Full Code     Scribed for Shaista Foss M.D. by Annalisa Zuñiga PA-C.    Annalisa Zuñiga PA-C  01/02/18  11:15 AM    Physician Attestation:    The documentation recorded by the scribe accurately reflects the service I personally performed and the decisions made by me.    Shaista Foss MD  Infectious Diseases  01/03/18  5:51 AM

## 2018-01-03 NOTE — CONSULTS
Consult placed for insulin teaching.  Spoke with patient has been on insulin therapy for awhile and had no questions about administering it.  Denies any questions or concerns at this time.

## 2018-01-03 NOTE — CONSULTS
Name:  Se Anne  :  1950    DATE OF ADMISSION  2017    DATE OF CONSULT  1/3/2018     REFERRING PHYSICIAN  * No referring provider recorded for this case *    PRIMARY CARE PHYSICIAN  No Known Provider    REASON FOR CONSULT  Very small mass seen on CT the bladder    CHIEF COMPLAINT  Chief Complaint   Patient presents with   • Shortness of Breath     pt reports was just here in hospital for pneumonia discharged 1 weeks ago. pt reports started having shortness of breathe x 2 days ago and got worse last night.       HISTORY OF PRESENT ILLNESS:   Se Anne is a 67 y.o. male who has a history of chronic respiratory failure requiring 3 liters of supplemental oxygen via nasal cannula, chronic kidney disease, diabetes mellitus type 2, COPD, history of tobacco abuse, hypothyroidism, and anemia. He was most recently hospitalized at this facility from -17 with intubation and mechanical ventilation from - given acute on chronic hypoxic and hypercapnic respiratory failure with bibasilar pneumonia and severe sepsis.   He was discharged with Omnicef and doxycycline.   On this date, he presented to the ED with 2-3 days of progressively worsening dyspnea. He reports worsening wheeze and cough.  He reports orthopnea and worsening dyspnea on exertion.  He does also report some midsternal chest discomfort with shortness of breath he characterizes this as a tight sensation.  Taking a deep breath tended to make it worse.  The intensity currently 0/10.  He does think his shortness of breath has improved some since coming to the hospital.  Patient is a nonsmoker.  Initial ABG demonstrated hypercapnea and hypoxia on home supplemental oxygen of 3 liters.  CT chest per PE protocol with groundglass attenuation and atelectasis. No pulmonary defect to suggest pulmonary embolism, but subsegmental artery branches were not well visualized.  Mr. Anne denies fever and chills. He denies abdominal pain,  nausea, and vomiting.  He denies dysuria and hematuria . Mr. Anne reports he sleeps with BiPAP nightly and uses 3 liters of supplemental oxygen throughout the day. He ambulates with a cane and walker. He does report worsening bilateral lower extremity edema.  He had been noted to have some left lower quadrant tenderness to palpation, he states he had normal bowel movements yesterday.  No emesis.  No urinary symptoms.  He denies any fever.  He does get leg edema but he thinks it is the same or slightly improved to its baseline.     While Mr. Anne quit smoking several years ago, he does live with his wife, a 2-3 pack a day smoker.  He has no urologic history whatsoever.  Specifically no bleeding, hematuria, frequency, urgency, dysuria, or any history of genitourinary malignancy.  Apparently on CT scanning he was found to have a tiny lesion consistent with either a tiny bladder tumor or thickened folds of the bladder as is not completely distended.  This is different from a CT of 2016.  He also has asymptomatic gallstones and renal calculi.      I saw Se Anne in his hospital room this morning.    PAST MEDICAL HISTORY  Past Medical History:   Diagnosis Date   • Agent orange exposure    • Arthritis    • CHF (congestive heart failure)    • COPD (chronic obstructive pulmonary disease)    • Coronary artery disease    • Diabetes mellitus    • Hyperlipidemia    • Hypertension        PAST SURGICAL HISTORY  Past Surgical History:   Procedure Laterality Date   • CARDIAC SURGERY      stent placement   • CATARACT EXTRACTION     • JOINT REPLACEMENT      right knee   • KNEE SURGERY         SOCIAL HISTORY  Social History     Social History   • Marital status:      Spouse name: N/A   • Number of children: N/A   • Years of education: N/A     Occupational History   • Not on file.     Social History Main Topics   • Smoking status: Former Smoker     Years: 40.00     Quit date: 3/21/2010   • Smokeless tobacco: Never Used   •  Alcohol use No   • Drug use: No   • Sexual activity: Defer     Other Topics Concern   • Not on file     Social History Narrative       FAMILY HISTORY  Family History   Problem Relation Age of Onset   • Heart disease Mother    • Cancer Father    • Heart attack Sister    • Heart attack Brother    • Cancer Paternal Grandfather        ALLERGIES  Allergies   Allergen Reactions   • Iodine    • Lisinopril    • Other      Seafood       INPATIENT MEDICATIONS  Current Facility-Administered Medications   Medication Dose Route Frequency Provider Last Rate Last Dose   • amLODIPine (NORVASC) tablet 10 mg  10 mg Oral Daily Seun Muller MD   10 mg at 01/02/18 0855   • aspirin EC tablet 81 mg  81 mg Oral Daily Lee Ann Sharpe PA-C   81 mg at 01/02/18 0858   • atorvastatin (LIPITOR) tablet 80 mg  80 mg Oral Daily Seun Muller MD   80 mg at 01/02/18 0855   • budesonide-formoterol (SYMBICORT) 160-4.5 MCG/ACT inhaler 2 puff  2 puff Inhalation BID - RT Seun Muller MD   2 puff at 01/03/18 0621   • carvedilol (COREG) tablet 25 mg  25 mg Oral BID With Meals Teresa Sandhu DO   25 mg at 01/02/18 1809   • cetirizine (zyrTEC) tablet 10 mg  10 mg Oral Daily Seun Muller MD   10 mg at 01/02/18 0854   • cholecalciferol (VITAMIN D3) tablet 2,000 Units  2,000 Units Oral Daily Seun Muller MD   2,000 Units at 01/02/18 0854   • dextrose (D50W) solution 25 g  25 g Intravenous Q15 Min PRN Lee Ann Sharpe PA-C       • dextrose (GLUTOSE) oral gel 15 g  15 g Oral Q15 Min PRN Lee Ann Sharpe PA-C       • doxycycline (MONODOX) capsule 100 mg  100 mg Oral Q12H Shaista Foss MD   100 mg at 01/03/18 0506   • ferrous sulfate tablet 325 mg  325 mg Oral Daily With Breakfast Seun Mluler MD   325 mg at 01/02/18 0856   • fondaparinux (ARIXTRA) injection 2.5 mg  2.5 mg Subcutaneous Q24H Seun Muller MD   2.5 mg at 01/02/18 1009   • gabapentin (NEURONTIN) capsule 300 mg  300 mg Oral Nightly Seun Muller MD   300 mg at  01/02/18 2050   • glucagon (human recombinant) (GLUCAGEN DIAGNOSTIC) injection 1 mg  1 mg Subcutaneous Q15 Min PRN Lee Ann Sharpe PA-C       • hydrALAZINE (APRESOLINE) tablet 100 mg  100 mg Oral Q8H Seun Muller MD   100 mg at 01/03/18 0506   • insulin aspart (novoLOG) injection 0-14 Units  0-14 Units Subcutaneous 4x Daily AC & at Bedtime Seun Muller MD   3 Units at 01/02/18 2325   • insulin aspart (novoLOG) injection 10 Units  10 Units Subcutaneous TID With Meals Seun Muller MD   10 Units at 01/02/18 1320   • insulin detemir (LEVEMIR) injection 35 Units  35 Units Subcutaneous Daily Seun Muller MD   35 Units at 01/02/18 0900   • insulin detemir (LEVEMIR) injection 35 Units  35 Units Subcutaneous Nightly Seun Muller MD   35 Units at 01/02/18 2048   • ipratropium (ATROVENT) nebulizer solution 0.5 mg  0.5 mg Nebulization 4x Daily - RT Seun Muller MD   0.5 mg at 01/03/18 0620   • isosorbide mononitrate (IMDUR) 24 hr tablet 180 mg  180 mg Oral Daily Seun Muller MD   180 mg at 01/02/18 0918   • lactobacillus acidophilus (RISAQUAD) capsule 1 capsule  1 capsule Oral Daily Mariela Oliver [facility]   1 capsule at 01/02/18 0855   • levothyroxine (SYNTHROID, LEVOTHROID) tablet 25 mcg  25 mcg Oral Daily Seun Muller MD   25 mcg at 01/02/18 0854   • Magnesium Sulfate 2 gram Bolus, followed by 8 gram infusion (total Mg dose 10 grams)- Mg less than or equal to 1mg/dL  2 g Intravenous PRN Lee Ann Sharpe PA-C        Or   • Magnesium Sulfate 6 gram Infusion (2 gm x 3) -Mg 1.1 -1.5 mg/dL  2 g Intravenous PRN Lee Ann Sharpe PA-C        Or   • magnesium sulfate 4 gram infusion- Mg 1.6-1.9 mg/dL  4 g Intravenous PRN Lee Ann Sharpe PA-C       • niCARdipine (CARDENE) 25 mg in sodium chloride 0.9 % 250 mL (0.1 mg/mL) infusion  5-15 mg/hr Intravenous Titrated Jeremi Gomez  mL/hr at 01/03/18 0543 10 mg/hr at 01/03/18 0543   • nitroglycerin (NITROSTAT) SL tablet 0.4 mg  0.4 mg Sublingual  Q5 Min PRN LASHAY Patrick   0.4 mg at 12/28/17 1208   • pantoprazole (PROTONIX) EC tablet 40 mg  40 mg Oral Daily Seun Muller MD   40 mg at 01/02/18 0919   • potassium chloride (MICRO-K) CR capsule 40 mEq  40 mEq Oral PRN Lee Ann Sharpe PA-C        Or   • potassium chloride (KLOR-CON) packet 40 mEq  40 mEq Oral PRN Lee Ann Sharpe PA-C        Or   • potassium chloride 10 mEq in 100 mL IVPB  10 mEq Intravenous Q1H PRN Lee Ann Sharpe PA-C       • predniSONE (DELTASONE) tablet 20 mg  20 mg Oral Daily With Breakfast YANG Mathews       • sertraline (ZOLOFT) tablet 50 mg  50 mg Oral Q PM Seun Muller MD   50 mg at 01/02/18 1809   • sodium chloride 0.9 % flush 1-10 mL  1-10 mL Intravenous PRN Lee Ann Sharpe PA-C       • sodium chloride 0.9 % flush 10 mL  10 mL Intravenous PRN LASHAY Patrick       • sodium chloride 0.9 % flush 10 mL  10 mL Intracatheter Q12H Seun Muller MD   10 mL at 01/02/18 2051   • sodium chloride 0.9 % flush 10 mL  10 mL Intracatheter PRN Seun Muller MD       • vitamin B-12 (CYANOCOBALAMIN) tablet 2,000 mcg  2,000 mcg Oral Daily Seun Muller MD   2,000 mcg at 01/02/18 0854       REVIEW OF SYSTEMS  CONSTITUTIONAL:  No fever, chills, night sweats or fatigue.  EYES:  No blurry vision, diplopia or other vision changes.  ENT:  No hearing loss, nosebleeds or sore throat.  CARDIOVASCULAR:  No palpitations, arrhythmia, syncopal episodes or edema.  PULMONARY:  Severe shortness of breath  GASTROINTESTINAL:  No nausea or vomiting.  No constipation or diarrhea.  No abdominal pain.  GENITOURINARY:  No hematuria, kidney stones or frequent urination.  MUSCULOSKELETAL:  No joint or back pains.  INTEGUMENTARY: No rashes or pruritus.  ENDOCRINE:  No excessive thirst or hot flashes.  HEMATOLOGIC:  No history of free bleeding, spontaneous bleeding or clotting.  IMMUNOLOGIC:  No allergies or frequent infections.  NEUROLOGIC: No numbness, tingling, seizures or  "weakness.  PSYCHIATRIC:  No anxiety or depression.    PHYSICAL EXAMINATION    /72 (BP Location: Left arm, Patient Position: Lying)  Pulse 60  Temp 97.7 °F (36.5 °C)  Resp 22  Ht 167.6 cm (66\")  Wt 127 kg (280 lb 4.8 oz)  SpO2 91%  BMI 45.24 kg/m2    GENERAL:  A orbital the obese, white male in no moderate distress.  HEENT:  Pupils equally round and reactive to light.  Extraocular muscles intact.  CARDIOVASCULAR:  Regular rate and rhythm.  No murmurs, gallops or rubs.  LUNGS:  On BiPAP  ABDOMEN:  Soft, nontender, nondistended with positive bowel sounds.  EXTREMITIES:  No clubbing, cyanosis or edema bilaterally.  SKIN:  No rashes or petechiae.  NEURO:  Cranial nerves grossly intact.  No focal deficits.  PSYCH:  Alert and oriented x3.    LABORATORY     Lab Results   Component Value Date    WBC 6.64 01/03/2018    RBC 2.68 (L) 01/03/2018    HGB 7.8 (L) 01/03/2018    HCT 25.4 (L) 01/03/2018    MCV 94.8 (H) 01/03/2018    MCH 29.1 01/03/2018    MCHC 30.7 (L) 01/03/2018    RDW 15.4 (H) 01/03/2018    RDWSD 52.0 01/03/2018    MPV 9.2 01/03/2018     01/03/2018    NEUTRORELPCT 71.9 01/03/2018    LYMPHORELPCT 14.6 (L) 01/03/2018    MONORELPCT 11.3 01/03/2018    EOSRELPCT 0.2 01/03/2018    BASORELPCT 0.2 01/03/2018    AUTOIGPER 1.8 (H) 01/03/2018    NEUTROABS 4.78 01/03/2018    LYMPHSABS 0.97 (L) 01/03/2018    MONOSABS 0.75 01/03/2018    EOSABS 0.01 01/03/2018    BASOSABS 0.01 01/03/2018    AUTOIGNUM 0.12 (H) 01/03/2018       Lab Results   Component Value Date    GLUCOSE 145 (H) 01/03/2018     01/03/2018    K 4.0 01/03/2018    CO2 27.7 01/03/2018     01/03/2018    ANIONGAP 10.3 01/03/2018    CREATININE 1.42 (H) 01/03/2018    BUN 26 (H) 01/03/2018    BCR 18.3 01/03/2018    CALCIUM 8.8 01/03/2018    EGFRIFNONA 49 (L) 01/03/2018    ALKPHOS 42 01/01/2018    PROTEINTOT 6.6 01/01/2018    ALT 52 (H) 01/01/2018    AST 25 01/01/2018    BILITOT 1.1 01/01/2018    ALBUMIN 4.00 01/01/2018    GLOB 2.6 01/01/2018 "    LABIL2 1.5 01/01/2018       Lab Results   Component Value Date    MG 2.7 (H) 01/01/2018    PHOS 3.2 01/01/2018     No results found for: LDH, URICACID     IMAGING  Imaging Results (last 72 hours)     Procedure Component Value Units Date/Time    XR Chest AP [[id number]] Collected:  12/31/17 0934     Updated:  12/31/17 0936    Narrative:       EXAMINATION:  XR CHEST AP-      CLINICAL INDICATION:     resp failure; J96.21-Acute and chronic  respiratory failure with hypoxia; J96.22-Acute and chronic respiratory  failure with hypercapnia; J44.1-Chronic obstructive pulmonary disease  with (acute) exacerbation     TECHNIQUE:  XR CHEST AP-       COMPARISON: 12/28/2017      FINDINGS:   Bibasilar atelectasis.   Heart size is stable.   No pneumothorax.   No pleural effusion.   Bony and soft tissue structures are unremarkable.            Impression:       Stable radiographic appearance of the chest.     This report was finalized on 12/31/2017 9:34 AM by Dr. Miko Coto MD.             PATHOLOGY  * Cannot find OR log *    IMPRESSION AND PLAN  Se Anne is a 67 y.o., white male with: #1.  Small radiographic filling defects seen on CT scanning of the bladder.  It is mild to moderately distended and certainly could represent a fold in the bladder.  Clearly it has no bearing on the current hospitalization and I recommend outpatient cystoscopy and if there is something of concern week and fulgurated rate in the office at that time.  Please arrange a urology appointment for cystoscopy the time of this discharge acute for consultation      The patient was in agreement with these plans.    Thank you for asking us to participate in Se Anne's care.  We will continue to follow with you.  Please do not hesitate to call with any questions or concerns that you may have.    A total of 60 minutes were spent coordinating this patient’s care in clinic today; 30 minutes of which were face-to-face with the patient, reviewing his  medical history and counseling on the current treatment and followup plan.  All questions were answered to his satisfaction.       This document was signed by No name on file. January 3, 2018 7:05 AM

## 2018-01-03 NOTE — THERAPY TREATMENT NOTE
Acute Care - Physical Therapy Treatment Note   Roberto     Patient Name: Se Anne  : 1950  MRN: [id number]  Today's Date: 1/3/2018  Onset of Illness/Injury or Date of Surgery Date: 18 (admit date)  Date of Referral to PT: 18  Referring Physician: Renzo    Admit Date: 2017    Visit Dx:    ICD-10-CM ICD-9-CM   1. Acute on chronic respiratory failure with hypoxia and hypercapnia J96.21 518.84    J96.22 786.09     799.02   2. COPD exacerbation J44.1 491.21     Patient Active Problem List   Diagnosis   • COPD (chronic obstructive pulmonary disease)   • Shortness of breath   • Morbid obesity   • Hypoxia   • Edema extremities   • Sleep apnea   • Wheezing   • Allergic rhinitis   • Essential hypertension   • Sore throat   • Cough   • Pneumonia   • Respiratory failure               Adult Rehabilitation Note       18 1629 18 1613       Rehab Assessment/Intervention    Discipline physical therapy assistant  -RF physical therapist  -CT     Document Type therapy note (daily note)  -RF therapy note (daily note)  -CT     Subjective Information agree to therapy  -RF agree to therapy  -CT     Patient Effort, Rehab Treatment good  -RF good  -CT     Symptoms Noted During/After Treatment fatigue;shortness of breath  -RF      Precautions/Limitations fall precautions;oxygen therapy device and L/min  -RF fall precautions;oxygen therapy device and L/min  -CT     Patient Response to Treatment Patient with limited activity secondary to respiratory condition and need for bipap and hiflow 02 machines. Patient was assisted to EOB sitting X10 mins and was changed from bipap to hiflow machine. Pt was then assisted to reclining chair and left to eat breakfast with call bell in hand. Good activity tolerance was noted, however SOB was noted and rest breaks were required.   -RF Pt on hiflow O2 during treament session limiting functional mobility. Pt tolerated treatment session fairly well with rest breaks  provided as needed.   -CT     Recorded by [RF] Stacia Potter PTA [CT] Lisette Dowell, PT     Vital Signs    Pre SpO2 (%) 93  -RF      O2 Delivery Pre Treatment supplemental O2  -RF      Intra SpO2 (%) 90  -RF      O2 Delivery Intra Treatment supplemental O2  -RF      Post SpO2 (%) 97  -RF      O2 Delivery Post Treatment supplemental O2  -RF      Recorded by [RF] Stacia Potter PTA      Pain Assessment    Pain Assessment No/denies pain  -RF      Recorded by [RF] Stacia Potter PTA      Cognitive Assessment/Intervention    Current Cognitive/Communication Assessment functional  -RF functional  -CT     Orientation Status oriented x 4  -RF oriented x 4  -CT     Follows Commands/Answers Questions able to follow single-step instructions;100% of the time  -RF      Personal Safety good awareness, safety precautions  -RF      Personal Safety Interventions fall prevention program maintained;muscle strengthening facilitated;gait belt;nonskid shoes/slippers when out of bed  -RF fall prevention program maintained;gait belt;muscle strengthening facilitated;nonskid shoes/slippers when out of bed  -CT     Recorded by [RF] Stacia Potter, EMMANUEL [CT] Lisette Dowell, PT     Bed Mobility, Assessment/Treatment    Bed Mobility, Assistive Device bed rails  -RF bed rails  -CT     Bed Mobility, Roll Left, [place] minimum assist (75% patient effort)  -RF minimum assist (75% patient effort)  -CT     Bed Mobility, Roll Right, [place] minimum assist (75% patient effort)  -RF minimum assist (75% patient effort)  -CT     Bed Mobility, Scoot/Bridge, [place] minimum assist (75% patient effort)  -RF minimum assist (75% patient effort)  -CT     Bed Mob, Supine to Sit, [place] minimum assist (75% patient effort)  -RF minimum assist (75% patient effort)  -CT     Bed Mobility, Impairments strength decreased  -RF strength decreased  -CT     Recorded by [RF] Stacia Potter PTA [CT] Lisette Dowell, PT     Transfer  Assessment/Treatment    Transfers, Bed-Chair [place] minimum assist (75% patient effort)  -RF minimum assist (75% patient effort)  -CT     Transfers, Chair-Bed [place] minimum assist (75% patient effort)  -RF minimum assist (75% patient effort)  -CT     Transfers, Bed-Chair-Bed, Assist Device rolling walker  -RF rolling walker  -CT     Transfers, Sit-Stand [place] minimum assist (75% patient effort)  -RF minimum assist (75% patient effort)  -CT     Transfers, Stand-Sit [place] minimum assist (75% patient effort)  -RF minimum assist (75% patient effort)  -CT     Transfers, Sit-Stand-Sit, Assist Device rolling walker;bed rails  -RF rolling walker;bed rails  -CT     Transfer, Impairments strength decreased  -RF strength decreased  -CT     Recorded by [RF] Stacia Potter PTA [CT] Lisette Dowell, KENDAL     Gait Assessment/Treatment    Gait, [place] Level minimum assist (75% patient effort);2 person assist required;1 person + 1 person to manage equipment;verbal cues required;nonverbal cues required (demo/gesture)  -RF minimum assist (75% patient effort);2 person assist required;1 person + 1 person to manage equipment;verbal cues required;nonverbal cues required (demo/gesture)  -CT     Gait, Assistive Device rolling walker  -RF rolling walker  -CT     Gait, Distance (Feet) 10  -RF --   during transfer  -CT     Gait, Gait Pattern Analysis swing-to gait  -RF swing-to gait  -CT     Gait, Comment short distance from bed to chair.   -RF      Recorded by [RF] Stacia Potter PTA [CT] Lisette Dowell, KENDAL     Therapy Exercises    Bilateral Lower Extremities AROM:;5 reps;sitting  -RF AROM:;standing;5 reps  -CT     Recorded by [RF] Stacia Potter PTA [CT] Lisette Dowell, PT     Positioning and Restraints    Pre-Treatment Position in bed  -RF in bed  -CT     Post Treatment Position chair  -RF chair  -CT     In Chair sitting;call light within reach;encouraged to call for assist  -RF sitting;call light  within reach;encouraged to call for assist;notified nsg  -CT     Recorded by [RF] Stacia Potter, PTA [CT] Lisette Dowell PT       User Key  (r) = Recorded By, (t) = Taken By, (c) = Cosigned By    Initials Name Effective Dates    CT Lisette Dowell, PT 03/14/16 -     RF Stacia Potter, PTA 07/19/17 -                 IP PT Goals       01/01/18 1139          Transfer Training PT LTG    Transfer Training PT LTG, Date Established 01/01/18  -CT      Transfer Training PT LTG, Time to Achieve by discharge  -CT      Transfer Training PT LTG, Activity Type bed to chair /chair to bed;sit to stand/stand to sit  -CT      Transfer Training PT LTG, [place] Level supervision required;contact guard assist  -CT      Transfer Training PT LTG, Assist Device other (see comments)   with appropriate AD  -CT      Gait Training PT LTG    Gait Training Goal PT LTG, Date Established 01/01/18  -CT      Gait Training Goal PT LTG, Time to Achieve by discharge  -CT      Gait Training Goal PT LTG, [place] Level supervision required;contact guard assist  -CT      Gait Training Goal PT LTG, Assist Device other (see comments)   with appropriate AD  -CT      Gait Training Goal PT LTG, Distance to Achieve 50  -CT        User Key  (r) = Recorded By, (t) = Taken By, (c) = Cosigned By    Initials Name Provider Type    CT Lisettegabe ValladaresKENDAL cortes Physical Therapist          Physical Therapy Education     Title: PT OT SLP Therapies (Done)     Topic: Physical Therapy (Done)     Point: Mobility training (Done)    Learning Progress Summary    Learner Readiness Method Response Comment Documented by Status   Patient Acceptance E VU,NR  RF 01/03/18 1634 Done    Acceptance E NR  KF 01/03/18 0152 Active    Acceptance E VU  CT 01/02/18 1615 Done    Acceptance E NR  KF 01/02/18 0513 Active    Acceptance E VU  CT 01/01/18 1140 Done               Point: Home exercise program (Done)    Learning Progress Summary    Learner Readiness Method Response Comment  Documented by Status   Patient Acceptance E VU,NR  RF 01/03/18 1634 Done    Acceptance E NR  KF 01/03/18 0152 Active    Acceptance E VU  CT 01/02/18 1615 Done    Acceptance E NR  KF 01/02/18 0513 Active    Acceptance E VU  CT 01/01/18 1140 Done               Point: Body mechanics (Done)    Learning Progress Summary    Learner Readiness Method Response Comment Documented by Status   Patient Acceptance E VU,NR   01/03/18 1634 Done    Acceptance E NR  KF 01/03/18 0152 Active    Acceptance E VU  CT 01/02/18 1615 Done    Acceptance E NR  KF 01/02/18 0513 Active    Acceptance E VU  CT 01/01/18 1140 Done               Point: Precautions (Done)    Learning Progress Summary    Learner Readiness Method Response Comment Documented by Status   Patient Acceptance E VU,NR   01/03/18 1634 Done    Acceptance E NR   01/03/18 0152 Active    Acceptance E VU  CT 01/02/18 1615 Done    Acceptance E NR   01/02/18 0513 Active    Acceptance E VU  CT 01/01/18 1140 Done                      User Key     Initials Effective Dates Name Provider Type Discipline    CT 03/14/16 -  Lisette Dowell, PT Physical Therapist PT     07/19/17 -  Stacia Potter PTA Physical Therapy Assistant PT     07/19/17 -  Suzy Jacobs, RN Registered Nurse Nurse                    PT Recommendation and Plan  Anticipated Equipment Needs At Discharge:  (to be determined)  Anticipated Discharge Disposition: home with 24/7 care, home with home health  Planned Therapy Interventions: balance training, bed mobility training, gait training, home exercise program, motor coordination training, neuromuscular re-education, patient/family education, postural re-education, stair training, strengthening, transfer training  PT Frequency: 3-5 times/wk, per priority policy             Outcome Measures       01/03/18 1600 01/02/18 1600 01/01/18 1100    How much help from another person do you currently need...    Turning from your back to your side while in flat bed  without using bedrails? 4  -RF 4  -CT 4  -CT    Moving from lying on back to sitting on the side of a flat bed without bedrails? 3  -RF 3  -CT 3  -CT    Moving to and from a bed to a chair (including a wheelchair)? 3  -RF 3  -CT 3  -CT    Standing up from a chair using your arms (e.g., wheelchair, bedside chair)? 3  -RF 3  -CT 3  -CT    Climbing 3-5 steps with a railing? 2  -RF 2  -CT 2  -CT    To walk in hospital room? 2  -RF 2  -CT 2  -CT    AM-PAC 6 Clicks Score 17  -RF 17  -CT 17  -CT    Functional Assessment    Outcome Measure Options AM-PAC 6 Clicks Basic Mobility (PT)  -RF AM-PAC 6 Clicks Basic Mobility (PT)  -CT AM-PAC 6 Clicks Basic Mobility (PT)  -CT      User Key  (r) = Recorded By, (t) = Taken By, (c) = Cosigned By    Initials Name Provider Type    CT Lisette Dowell, PT Physical Therapist    RF Stacia Potter PTA Physical Therapy Assistant           Time Calculation:         PT Charges       01/03/18 1634          Time Calculation    PT Received On 01/03/18  -RF      PT - Next Appointment 01/04/18  -RF      PT Goal Re-Cert Due Date 01/15/18  -RF      Time Calculation- PT    Total Timed Code Minutes- PT 40 minute(s)  -RF        User Key  (r) = Recorded By, (t) = Taken By, (c) = Cosigned By    Initials Name Provider Type    RF Stacia Potter PTA Physical Therapy Assistant          Therapy Charges for Today     Code Description Service Date Service Provider Modifiers Qty    64597217717 HC PT THER PROC EA 15 MIN 1/3/2018 Stacia Potter PTA GP 1    44871595112 HC PT THERAPEUTIC ACT EA 15 MIN 1/3/2018 Stacia Potter PTA GP 2    39331742951 HC PT THER SUPP EA 15 MIN 1/3/2018 Stacia Potter PTA GP 3          PT G-Codes  Outcome Measure Options: AM-PAC 6 Clicks Basic Mobility (PT)  Score: 17  Functional Limitation: Mobility: Walking and moving around  Mobility: Walking and Moving Around Current Status (): At least 40 percent but less than 60 percent impaired, limited or restricted  Mobility:  Walking and Moving Around Goal Status (): At least 20 percent but less than 40 percent impaired, limited or restricted    Stacia Potter, PTA  1/3/2018

## 2018-01-03 NOTE — PROGRESS NOTES
"  I have personally seen and examined the patient today and discussed overnight interval progress and pertinent issues with nursing staff.    Subjective:    On BiPAP.  Stable with no issues reported overnight.    History taken from: patient chart RN      Vital Signs    /57  Pulse 58  Temp 98.1 °F (36.7 °C)  Resp 26  Ht 167.6 cm (66\")  Wt 127 kg (280 lb 4.8 oz)  SpO2 96%  BMI 45.24 kg/m2    Temp:  [97.4 °F (36.3 °C)-98.1 °F (36.7 °C)] 98.1 °F (36.7 °C)      Intake/Output Summary (Last 24 hours) at 01/03/18 1131  Last data filed at 01/03/18 0405   Gross per 24 hour   Intake              480 ml   Output             2500 ml   Net            -2020 ml     Intake & Output (last 3 days)       12/31 0701 - 01/01 0700 01/01 0701 - 01/02 0700 01/02 0701 - 01/03 0700 01/03 0701 - 01/04 0700    P.O. 1360 1370 480     IV Piggyback 1050 900      Total Intake(mL/kg) 2410 (18.6) 2270 (17.4) 480 (3.8)     Urine (mL/kg/hr) 2550 (0.8) 1870 (0.6) 3275 (1.1)     Stool 0 (0)       Total Output 2550 1870 3275      Net -140 +400 -2795              Unmeasured Urine Occurrence  1 x      Unmeasured Stool Occurrence 1 x                Physical Exam:       General Appearance:    Alert, cooperative, in no acute distress, On BiPAP.  Stable with no issues reported overnight.   Head:    Normocephalic, without obvious abnormality, atraumatic   Eyes:            Lids and lashes normal, conjunctivae and sclerae normal, no   icterus, no pallor, corneas clear, PERRLA   Ears:    Ears appear intact with no abnormalities noted   Throat:   No oral lesions, no thrush, oral mucosa moist   Neck:   No adenopathy, supple, trachea midline, no thyromegaly, no   carotid bruit, no JVD   Back:     No tenderness to percussion or palpation, range of motion   normal   Lungs:    Decreased breath sounds overall but no rhonchi or crackles as well as no wheezing.       Heart:    Regular rhythm and normal rate, normal S1 and S2, no            murmur, no gallop, no " rub, no click   Chest Wall:    No abnormalities observed   Abdomen:     Normal bowel sounds, no masses, no organomegaly, soft        non-tender, non-distended, no guarding, no rebound                tenderness   Rectal:     Deferred   Extremities:   Moves all extremities well, no edema, no cyanosis, no             redness   Pulses:   Pulses palpable and equal bilaterally   Skin:   No bleeding, bruising or rash   Lymph nodes:   No palpable adenopathy   Neurologic:   Awake, alert and oriented x 3. Following commands.         Results:      Results from last 7 days  Lab Units 01/03/18  0201 01/02/18  0044 01/01/18  0050 12/31/17  0117 12/30/17  0234 12/29/17  0427 12/28/17  0932   WBC 10*3/mm3 6.64 5.76 6.19 8.63 8.29 9.38 7.39     Lab Results   Component Value Date    NEUTROABS 4.78 01/03/2018         Results from last 7 days  Lab Units 01/03/18  0201   CREATININE mg/dL 1.42*         Results from last 7 days  Lab Units 01/01/18  0050 12/31/17  0117 12/30/17  0234   CRP mg/dL 1.32* 2.16* 3.94*     Results Review:    I have personally reviewed laboratory data, culture results, radiology studies and antimicrobial therapy.    Hospital Medications (active)       Dose Frequency Start End    amLODIPine (NORVASC) tablet 10 mg 10 mg Daily 12/28/2017     Sig - Route: Take 1 tablet by mouth Daily. - Oral    aspirin EC tablet 81 mg 81 mg Daily 12/29/2017     Sig - Route: Take 1 tablet by mouth Daily. - Oral    Cosign for Ordering: Required by Seun Muller MD    atorvastatin (LIPITOR) tablet 80 mg 80 mg Daily 12/28/2017     Sig - Route: Take 2 tablets by mouth Daily. - Oral    budesonide-formoterol (SYMBICORT) 160-4.5 MCG/ACT inhaler 2 puff 2 puff 2 Times Daily - RT 12/28/2017     Sig - Route: Inhale 2 puffs 2 (Two) Times a Day. - Inhalation    carvedilol (COREG) tablet 12.5 mg 12.5 mg 2 Times Daily With Meals 12/28/2017     Sig - Route: Take 2 tablets by mouth 2 (Two) Times a Day With Meals. - Oral    cetirizine (zyrTEC) tablet  10 mg 10 mg Daily 12/28/2017     Sig - Route: Take 1 tablet by mouth Daily. - Oral    cholecalciferol (VITAMIN D3) tablet 2,000 Units 2,000 Units Daily 12/28/2017     Sig - Route: Take 5 tablets by mouth Daily. - Oral    dextrose (D50W) solution 25 g 25 g Every 15 Minutes PRN 12/28/2017     Sig - Route: Infuse 50 mL into a venous catheter Every 15 (Fifteen) Minutes As Needed for Low Blood Sugar (Blood Sugar Less Than 70, Patient Has IV Access - Unresponsive, NPO or Unable To Safely Swallow). - Intravenous    Cosign for Ordering: Required by Seun Muller MD    dextrose (GLUTOSE) oral gel 15 g 15 g Every 15 Minutes PRN 12/28/2017     Sig - Route: Take 15 g by mouth Every 15 (Fifteen) Minutes As Needed for Low Blood Sugar (Blood Sugar Less Than 70, Patient Alert, Is Not NPO & Can Safely Swallow). - Oral    Cosign for Ordering: Required by Seun Muller MD    ferrous sulfate tablet 325 mg 325 mg Daily With Breakfast 12/29/2017     Sig - Route: Take 1 tablet by mouth Daily With Breakfast. - Oral    fondaparinux (ARIXTRA) injection 2.5 mg 2.5 mg Every 24 Hours Scheduled 12/30/2017     Sig - Route: Inject 0.5 mL under the skin Daily. - Subcutaneous    gabapentin (NEURONTIN) capsule 300 mg 300 mg Nightly 12/28/2017     Sig - Route: Take 1 capsule by mouth Every Night. - Oral    glucagon (human recombinant) (GLUCAGEN DIAGNOSTIC) injection 1 mg 1 mg Every 15 Minutes PRN 12/28/2017     Sig - Route: Inject 1 mg under the skin Every 15 (Fifteen) Minutes As Needed (Blood Glucose Less Than 70 - Patient Without IV Access - Unresponsive, NPO or Unable To Safely Swallow). - Subcutaneous    Cosign for Ordering: Required by Seun Muller MD    hydrALAZINE (APRESOLINE) tablet 100 mg 100 mg Every 8 Hours Scheduled 12/30/2017     Sig - Route: Take 2 tablets by mouth Every 8 (Eight) Hours. - Oral    insulin aspart (novoLOG) injection 0-14 Units 0-14 Units 4 Times Daily Before Meals & Nightly 12/28/2017     Sig - Route: Inject 0-14  "Units under the skin 4 (Four) Times a Day Before Meals & at Bedtime. - Subcutaneous    insulin aspart (novoLOG) injection 10 Units 10 Units 3 Times Daily With Meals 12/30/2017     Sig - Route: Inject 10 Units under the skin 3 (Three) Times a Day With Meals. - Subcutaneous    insulin detemir (LEVEMIR) injection 30 Units 30 Units Daily 12/31/2017     Sig - Route: Inject 30 Units under the skin Daily. - Subcutaneous    insulin detemir (LEVEMIR) injection 30 Units 30 Units Nightly 12/30/2017     Sig - Route: Inject 30 Units under the skin Every Night. - Subcutaneous    ipratropium (ATROVENT) nebulizer solution 0.5 mg 0.5 mg 4 Times Daily - RT 12/28/2017     Sig - Route: Take 2.5 mL by nebulization 4 (Four) Times a Day. - Nebulization    isosorbide mononitrate (IMDUR) 24 hr tablet 180 mg 180 mg Daily 12/28/2017     Sig - Route: Take 3 tablets by mouth Daily. - Oral    lactobacillus acidophilus (RISAQUAD) capsule 1 capsule 1 capsule Daily 12/29/2017 1/5/2018    Sig - Route: Take 1 capsule by mouth Daily. - Oral    levothyroxine (SYNTHROID, LEVOTHROID) tablet 25 mcg 25 mcg Daily 12/28/2017     Sig - Route: Take 1 tablet by mouth Daily. - Oral    Magnesium Sulfate 2 gram Bolus, followed by 8 gram infusion (total Mg dose 10 grams)- Mg less than or equal to 1mg/dL 2 g As Needed 12/28/2017     Sig - Route: Infuse 50 mL into a venous catheter As Needed (Mg less than or equal to 1mg/dL). - Intravenous    Cosign for Ordering: Required by Seun Muller MD    Linked Group 1:  \"Or\" Linked Group Details        magnesium sulfate 4 gram infusion- Mg 1.6-1.9 mg/dL 4 g As Needed 12/28/2017     Sig - Route: Infuse 100 mL into a venous catheter As Needed (Mg 1.6-1.9 mg/dL). - Intravenous    Cosign for Ordering: Required by Seun Muller MD    Linked Group 1:  \"Or\" Linked Group Details        Magnesium Sulfate 6 gram Infusion (2 gm x 3) -Mg 1.1 -1.5 mg/dL 2 g As Needed 12/28/2017     Sig - Route: Infuse 50 mL into a venous catheter As " "Needed (Mg 1.1 -1.5 mg/dL). - Intravenous    Cosign for Ordering: Required by Seun Muller MD    Linked Group 1:  \"Or\" Linked Group Details        Magnesium Sulfate 6 gram Infusion (2 gm x 3) -Mg 1.1 -1.5 mg/dL 2 g Every 2 Hours 12/29/2017 12/29/2017    Sig - Route: Infuse 50 mL into a venous catheter Every 2 (Two) Hours. - Intravenous    methylPREDNISolone sodium succinate (SOLU-Medrol) injection 20 mg 20 mg Every 12 Hours Scheduled 12/29/2017     Sig - Route: Infuse 0.5 mL into a venous catheter Every 12 (Twelve) Hours. - Intravenous    nitroglycerin (NITROSTAT) SL tablet 0.4 mg 0.4 mg Every 5 Minutes PRN 12/28/2017     Sig - Route: Place 1 tablet under the tongue Every 5 (Five) Minutes As Needed for Chest Pain. - Sublingual    Cosign for Ordering: Accepted by Jeremie Bermudez MD on 12/28/2017  2:34 PM    pantoprazole (PROTONIX) EC tablet 40 mg 40 mg Daily 12/28/2017     Sig - Route: Take 1 tablet by mouth Daily. - Oral    piperacillin-tazobactam (ZOSYN) 4.5 g/100 mL 0.9% NS IVPB (mbp) 4.5 g Every 6 Hours 12/29/2017 1/4/2018    Sig - Route: Infuse 100 mL into a venous catheter Every 6 (Six) Hours. - Intravenous    potassium chloride (K-DUR,KLOR-CON) CR tablet 40 mEq 40 mEq Every 4 Hours 12/30/2017 12/30/2017    Sig - Route: Take 2 tablets by mouth Every 4 (Four) Hours. - Oral    potassium chloride (KLOR-CON) packet 40 mEq 40 mEq As Needed 12/28/2017     Sig - Route: Take 40 mEq by mouth As Needed (potassium replacement, see admin instructions). - Oral    Cosign for Ordering: Required by Seun Muller MD    Linked Group 2:  \"Or\" Linked Group Details        potassium chloride (MICRO-K) CR capsule 40 mEq 40 mEq As Needed 12/28/2017     Sig - Route: Take 4 capsules by mouth As Needed (potassium replacement.  see admin instructions). - Oral    Cosign for Ordering: Required by Seun Muller MD    Linked Group 2:  \"Or\" Linked Group Details        potassium chloride 10 mEq in 100 mL IVPB 10 mEq Every 1 Hour PRN " "12/28/2017     Sig - Route: Infuse 100 mL into a venous catheter Every 1 (One) Hour As Needed (potassium protocol PERIPHERAL - see admin instructions). - Intravenous    Cosign for Ordering: Required by Seun Muller MD    Linked Group 2:  \"Or\" Linked Group Details        sertraline (ZOLOFT) tablet 50 mg 50 mg Every Evening 12/28/2017     Sig - Route: Take 1 tablet by mouth Every Evening. - Oral    sodium chloride 0.9 % flush 1-10 mL 1-10 mL As Needed 12/28/2017     Sig - Route: Infuse 1-10 mL into a venous catheter As Needed for Line Care. - Intravenous    Cosign for Ordering: Required by Seun Muller MD    sodium chloride 0.9 % flush 10 mL 10 mL As Needed 12/28/2017     Sig - Route: Infuse 10 mL into a venous catheter As Needed for Line Care. - Intravenous    Cosign for Ordering: Accepted by Jeremie Bermudez MD on 12/28/2017  2:34 PM    Linked Group 3:  \"And\" Linked Group Details        sodium phosphates 15 mmol in sodium chloride 0.9 % 250 mL IVPB 15 mmol Once 12/29/2017 12/29/2017    Sig - Route: Infuse 15 mmol into a venous catheter 1 (One) Time. - Intravenous    sodium phosphates 15 mmol in sodium chloride 0.9 % 250 mL IVPB 15 mmol Once 12/30/2017     Sig - Route: Infuse 15 mmol into a venous catheter 1 (One) Time. - Intravenous    vancomycin (VANCOCIN) 1,000 mg in sodium chloride 0.9 % 250 mL IVPB 1,000 mg Every 12 Hours 12/29/2017 1/5/2018    Sig - Route: Infuse 1,000 mg into a venous catheter Every 12 (Twelve) Hours. - Intravenous    vitamin B-12 (CYANOCOBALAMIN) tablet 2,000 mcg 2,000 mcg Daily 12/28/2017     Sig - Route: Take 2 tablets by mouth Daily. - Oral    hydrALAZINE (APRESOLINE) tablet 75 mg (Discontinued) 75 mg Every 8 Hours Scheduled 12/28/2017 12/30/2017    Sig - Route: Take 75 mg by mouth Every 8 (Eight) Hours. - Oral    insulin aspart (novoLOG) injection 5 Units (Discontinued) 5 Units 3 Times Daily With Meals 12/29/2017 12/30/2017    Sig - Route: Inject 5 Units under the skin 3 (Three) " Times a Day With Meals. - Subcutaneous    insulin detemir (LEVEMIR) injection 15 Units (Discontinued) 15 Units Daily 12/29/2017 12/30/2017    Sig - Route: Inject 15 Units under the skin Daily. - Subcutaneous    insulin detemir (LEVEMIR) injection 25 Units (Discontinued) 25 Units Nightly 12/29/2017 12/30/2017    Sig - Route: Inject 25 Units under the skin Every Night. - Subcutaneous    sodium chloride 0.9 % infusion (Discontinued) 50 mL/hr Continuous 12/28/2017 12/29/2017    Sig - Route: Infuse 50 mL/hr into a venous catheter Continuous. - Intravenous            Cultures:    Blood Culture   Date Value Ref Range Status   12/28/2017 No growth at 24 hours  Preliminary   12/28/2017 No growth at 24 hours  Preliminary           Assessment/Plan     ASSESSMENT:    1.  Septic shock on admission with lactic acid greater than for an elevated CRP level  2.  Pneumonia     PLAN:    On BiPAP.  Stable with no issues reported overnight.    In the setting of such significant clinical improvement with almost normalize CRP level and normal white count antibiotic therapy was de-escalated to doxycycline 100 mg by mouth twice a day to continue through 1/5/2018.    Infectious Disease will sign off at this time. Please call back with any questions. Thank you!    Patient's findings and recommendations were discussed with patient and nursing staff    Code Status: Full Code     Scribed for Shaista Foss M.D. by Annalisa Zuñiga PA-C.    Annalisa Zuñiga PA-C  01/03/18  11:31 AM    Physician Attestation:    The documentation recorded by the scribe accurately reflects the service I personally performed and the decisions made by me.    Shaista Foss MD  Infectious Diseases  01/03/18  11:31 AM

## 2018-01-03 NOTE — PROGRESS NOTES
HOSPITALIST PROGRESS NOTE    Patient Identification:  Name:  Se Anne  Age:  67 y.o.  Sex:  male  :  1950  MRN:  [id number]  Visit Number:  [id number]  Primary Care Provider:  No Known Provider    Length of stay:  6     HPI: 66 yo male admitted with dyspnea    Subjective:  The patient is seen this morning and is sitting in the bedside chair on BiPAP. He was placed on HFNC earlier today while eating breakfast but had an episode of dyspnea. He denies anxiety.    He states that overall he is doing better with the exception of ongoing (and at times severe) dyspnea with or without exertion.    He denies constipation, abdominal pain and/or vomiting.    Patient's blood pressure improving on Cardene drip.     Present during exam: REGGIE Bolaños    Current Hospital Meds:    amLODIPine 10 mg Oral Daily   aspirin 81 mg Oral Daily   atorvastatin 80 mg Oral Daily   budesonide-formoterol 2 puff Inhalation BID - RT   carvedilol 25 mg Oral BID With Meals   cetirizine 10 mg Oral Daily   cholecalciferol 2,000 Units Oral Daily   CloNIDine 0.1 mg Oral Q8H   doxycycline 100 mg Oral Q12H   ferrous sulfate 325 mg Oral Daily With Breakfast   fondaparinux 2.5 mg Subcutaneous Q24H   gabapentin 300 mg Oral Nightly   hydrALAZINE 100 mg Oral Q8H   insulin aspart 0-14 Units Subcutaneous 4x Daily AC & at Bedtime   insulin aspart 10 Units Subcutaneous TID With Meals   insulin detemir 35 Units Subcutaneous Daily   insulin detemir 35 Units Subcutaneous Nightly   ipratropium 0.5 mg Nebulization 4x Daily - RT   isosorbide mononitrate 180 mg Oral Daily   lactobacillus acidophilus 1 capsule Oral Daily   levothyroxine 25 mcg Oral Daily   pantoprazole 40 mg Oral Daily   predniSONE 20 mg Oral Daily With Breakfast   sertraline 50 mg Oral Q PM   sodium chloride 10 mL Intracatheter Q12H   vitamin B-12 2,000 mcg Oral Daily       Vital Signs  Temp:  [97.4 °F (36.3 °C)-98.1 °F (36.7 °C)] 97.8 °F (36.6 °C)  Heart Rate:  [50-90] 50  Resp:  [18-26] 21  BP:  (137-164)/(52-86) 142/63  Last 3 weights    12/31/17  0403 01/02/18  0412 01/03/18  0405   Weight: 130 kg (286 lb) 130 kg (287 lb 0.6 oz) 127 kg (280 lb 4.8 oz)     Body mass index is 45.24 kg/(m^2).       Intake/Output Summary (Last 24 hours) at 01/03/18 1431  Last data filed at 01/03/18 0405   Gross per 24 hour   Intake              480 ml   Output             2500 ml   Net            -2020 ml       Physical exam:  Physical Exam   Constitutional: He is oriented to person, place, and time. He appears well-developed and well-nourished. No distress. Nasal cannula in place.   Obese; BIPAP   HENT:   Head: Normocephalic and atraumatic.   Nose: Nose normal.   Mouth/Throat: Oropharynx is clear and moist and mucous membranes are normal.   Eyes: Conjunctivae and EOM are normal. Pupils are equal, round, and reactive to light. No scleral icterus.   Neck: Trachea normal. Neck supple. No JVD present. Carotid bruit is not present. No thyromegaly present.   Cardiovascular: Normal rate, regular rhythm, normal heart sounds and normal pulses.  Exam reveals no gallop and no friction rub.    No murmur heard.  1+ edema bilateral lower extremities   Pulmonary/Chest: Effort normal. No respiratory distress. He has decreased breath sounds. He has wheezes in the right upper field.   Abdominal: Soft. Bowel sounds are normal. He exhibits no distension. There is no tenderness. There is no guarding.   Neurological: He is alert and oriented to person, place, and time. He has normal strength. No cranial nerve deficit.   Skin: Skin is warm, dry and intact. No rash noted. No erythema.   Psychiatric: He has a normal mood and affect. His speech is normal.      Telemetry: Sinus rhythm    Results Review:    Results from last 7 days  Lab Units 01/03/18  0201 01/02/18  0044 01/01/18  0050 12/31/17  0117 12/30/17  0234 12/29/17  0427 12/28/17  0932   WBC 10*3/mm3 6.64 5.76 6.19 8.63 8.29 9.38 7.39   HEMOGLOBIN g/dL 7.8* 8.4* 8.8* 9.0* 8.0* 8.5* 9.8*    HEMATOCRIT % 25.4* 27.0* 28.7* 30.1* 26.3* 26.9* 32.2*   PLATELETS 10*3/mm3 173 153 149 163 134 103* 188       Results from last 7 days  Lab Units 01/03/18  0201 01/02/18  1702 01/02/18  0044 01/01/18  0050 12/31/17  0117 12/30/17  0234 12/29/17  1112 12/29/17  0427 12/28/17  0932   SODIUM mmol/L 146  --  143 145 144 139  --  142 145   POTASSIUM mmol/L 4.0 4.4 3.6 3.8 4.0 3.6  --  3.8 3.3*   CHLORIDE mmol/L 108  --  107 110 108 103  --  103 97*   CO2 mmol/L 27.7  --  30.0 31.3 31.9 27.3  --  32.0* 39.9*   BUN mg/dL 26*  --  24* 22* 22* 20  --  21 16   CREATININE mg/dL 1.42*  --  1.38* 1.32* 1.45* 1.46* 1.52* 1.40* 1.27   CALCIUM mg/dL 8.8  --  8.7 8.4 8.2 7.5*  --  7.6* 8.8   GLUCOSE mg/dL 145*  --  116* 139* 179* 281*  --  276* 178*       Results from last 7 days  Lab Units 01/01/18  0050 12/31/17  0117 12/30/17  0234 12/29/17  0427 12/28/17  0932   BILIRUBIN mg/dL 1.1 1.0 0.6 0.6 0.6   ALK PHOS U/L 42 47 41 41 55   AST (SGOT) U/L 25 27 17 16 18   ALT (SGPT) U/L 52* 51* 25 25 28       Results from last 7 days  Lab Units 01/01/18  0050 12/31/17  0117 12/30/17  0234 12/29/17  0427   MAGNESIUM mg/dL 2.7* 2.7* 2.6 1.1*           Results from last 7 days  Lab Units 12/29/17  0427 12/28/17  2155 12/28/17  1640   CK TOTAL U/L 45 39  39 40   TROPONIN I ng/mL 0.041* 0.038 0.035   CK MB INDEX % 2.4  --  1.3       Results from last 7 days  Lab Units 12/29/17  0427 12/28/17  0932   BNP pg/mL 307.0* 127.0*         CXR, 1/3/18:  FINDINGS:      There is bibasilar consolidation.  Appearance compatible with congestive heart failure.  There is no evidence of an acute osseous abnormality.   There are no suspicious-appearing parenchymal soft tissue nodules.          IMPRESSION:  Bibasilar consolidation and CHF.        Assessment/Plan      -Acute on chronic hypoxic and hypercapnic respiratory failure secondary to an acute COPD exacerbation with a bibasilar healthcare associated pneumonia  -Septic shock that has  resolved  -Uncontrolled essential hyprtension  -Acute hypomagnesemia, resolved  -Acute hypophosphatemia that has resolved  -Possible5 mm bladder polyp of unclear significance  -Diabetes mellitus type 2 with improving control  -Stage III chronic kidney disease  -Thrombocytopenia that has resolved  -Chronic microcytic anemia with iron deficiency  -Obesity    Patient's antibiotic regimen has be de-escalated to oral doxycycline by ID through 1/5/18. Continue scheduled inhalants.  IV solumedrol has been changed to PO prednisone by Pulmonary; continue to wean as tolerated. Continue inhalents. Continue IV Lasix as tolerated; slight bump in BUN/Cr today. Continue physical therapy as tolerated/as available.    Urology evaluated the patient today and recommends an outpatient appointment for cystoscopy.    The patient remains on a Cardene drip by pulmonary. Goal SBP would be 140-150 mmHg given his history of Stage III CKD to ensure renal perfusion. He has also been started on PO Clonidine.    Continue to supplement his electrolytes as needed.  Continue with his current insulin regimen as his blood glucose levels have improved.    Repeat his CBC and BMP in the morning.     The patient is high risk due to the following diagnoses/reasons:  Acute on chronic respiratory failure, ross, ckd, uncontrolled htn    I discussed the patients findings and my recommendations with: patient and nursing staff.    Disposition  Home when medically stable    Teresa Sandhu,   01/03/18  2:31 PM

## 2018-01-03 NOTE — PLAN OF CARE
Problem: COPD, Chronic Bronchitis/Emphysema (Adult)  Goal: Signs and Symptoms of Listed Potential Problems Will be Absent or Manageable (COPD, Chronic Bronchitis/Emphysema)  Outcome: Ongoing (interventions implemented as appropriate)      Problem: Skin Integrity Impairment, Risk/Actual (Adult)  Goal: Identify Related Risk Factors and Signs and Symptoms  Outcome: Ongoing (interventions implemented as appropriate)    Goal: Skin Integrity/Wound Healing  Outcome: Ongoing (interventions implemented as appropriate)      Problem: Fall Risk (Adult)  Goal: Absence of Falls  Outcome: Ongoing (interventions implemented as appropriate)      Problem: Patient Care Overview (Adult)  Goal: Plan of Care Review  Outcome: Ongoing (interventions implemented as appropriate)    Goal: Adult Individualization and Mutuality  Outcome: Ongoing (interventions implemented as appropriate)    Goal: Discharge Needs Assessment  Outcome: Ongoing (interventions implemented as appropriate)      Problem: NPPV/CPAP (Adult)  Goal: Signs and Symptoms of Listed Potential Problems Will be Absent or Manageable (NPPV/CPAP)  Outcome: Ongoing (interventions implemented as appropriate)

## 2018-01-03 NOTE — PROGRESS NOTES
LOS: 6 days     Chief Complaint:  Pulmonology is following for respiratory failure     Subjective     Interval History:     Mr. Anne is resting in his room, remains on bipap this morning. He is feeling some better but is still having shortness of breath and some mild to moderate distress.     History taken from: patient chart RN    Review of Systems:   Review of Systems   Constitutional: Negative for chills, fatigue and fever.   HENT: Negative for congestion and rhinorrhea.    Eyes: Negative for photophobia and visual disturbance.   Respiratory: Positive for shortness of breath. Negative for wheezing.    Cardiovascular: Negative for chest pain and leg swelling.   Gastrointestinal: Negative for abdominal distention and abdominal pain.   Endocrine: Negative for cold intolerance and heat intolerance.   Genitourinary: Negative for difficulty urinating.   Musculoskeletal: Negative for arthralgias.   Skin: Negative for color change and pallor.   Allergic/Immunologic: Negative for environmental allergies.   Neurological: Negative for dizziness, weakness and light-headedness.   Hematological: Negative for adenopathy.   Psychiatric/Behavioral: Negative for agitation, behavioral problems and confusion.                     Objective     Vital Signs  Temp:  [97.4 °F (36.3 °C)-97.8 °F (36.6 °C)] 97.7 °F (36.5 °C)  Heart Rate:  [53-74] 61  Resp:  [18-22] 22  BP: (135-187)/(53-92) 145/57  Body mass index is 45.24 kg/(m^2).    Intake/Output Summary (Last 24 hours) at 01/03/18 0829  Last data filed at 01/03/18 0405   Gross per 24 hour   Intake              480 ml   Output             2925 ml   Net            -2445 ml          Physical Exam:  GENERAL APPEARANCE: Well developed, well nourished, alert and cooperative, in mild to moderate respiratory distress.     HEAD: normocephalic.    EYES: PERRL    NECK: Neck supple.     CARDIAC: Normal S1 and S2. No S3, S4 or murmurs. Rhythm is regular. There is no cyanosis or pallor. Extremities  are warm and well perfused. Capillary refill is less than 2 seconds. No carotid bruits.    Respiratory: crackles.     GI: Positive bowel sounds. Soft, nondistended, nontender.     Musculoskeletal: No significant deformity or joint abnormality.  Peripheral pulses intact.     NEUROLOGICAL: Strength and sensation symmetric and intact throughout.     PSYCHIATRIC: The mental examination revealed the patient was oriented to person, place, and time.                 Results Review:                I reviewed the patient's new clinical results.  I reviewed the patient's new imaging results and agree with the interpretation.    Results from last 7 days  Lab Units 01/03/18  0201 01/02/18  0044 01/01/18  0050   WBC 10*3/mm3 6.64 5.76 6.19   HEMOGLOBIN g/dL 7.8* 8.4* 8.8*   PLATELETS 10*3/mm3 173 153 149       Results from last 7 days  Lab Units 01/03/18  0201 01/02/18  1702 01/02/18  0044 01/01/18 0050 12/31/17 0117 12/30/17  0234   SODIUM mmol/L 146  --  143 145 144 139   POTASSIUM mmol/L 4.0 4.4 3.6 3.8 4.0 3.6   CHLORIDE mmol/L 108  --  107 110 108 103   CO2 mmol/L 27.7  --  30.0 31.3 31.9 27.3   BUN mg/dL 26*  --  24* 22* 22* 20   CREATININE mg/dL 1.42*  --  1.38* 1.32* 1.45* 1.46*   CALCIUM mg/dL 8.8  --  8.7 8.4 8.2 7.5*   GLUCOSE mg/dL 145*  --  116* 139* 179* 281*   MAGNESIUM mg/dL  --   --   --  2.7* 2.7* 2.6     Lab Results   Component Value Date    INR 1.18 (H) 12/06/2017    INR 1.12 (H) 12/02/2017    INR 1.01 01/27/2017    PROTIME 15.2 12/06/2017    PROTIME 14.5 12/02/2017    PROTIME 11.4 01/27/2017       Results from last 7 days  Lab Units 01/01/18  0050 12/31/17  0117 12/30/17  0234   ALK PHOS U/L 42 47 41   BILIRUBIN mg/dL 1.1 1.0 0.6   ALT (SGPT) U/L 52* 51* 25   AST (SGOT) U/L 25 27 17       Results from last 7 days  Lab Units 01/03/18  0815   PH, ARTERIAL pH units 7.378   PO2 ART mm Hg 65.0*   PCO2, ARTERIAL mm Hg 56.0*   HCO3 ART mmol/L 32.2*     Imaging Results (last 24 hours)     Procedure Component Value  Units Date/Time    XR Chest 1 View [[id number]] Collected:  01/03/18 0806     Updated:  01/03/18 0810    Narrative:       XR CHEST 1 VIEW-     CLINICAL INDICATION: Shortness of air; J96.21-Acute and chronic  respiratory failure with hypoxia; J96.22-Acute and chronic respiratory  failure with hypercapnia; J44.1-Chronic obstructive pulmonary disease  with (acute) exacerbation.          COMPARISON: 12/31/2017      TECHNIQUE: Single frontal view of the chest.     FINDINGS:     There is bibasilar consolidation.  Appearance compatible with congestive heart failure.  There is no evidence of an acute osseous abnormality.   There are no suspicious-appearing parenchymal soft tissue nodules.            Impression:       Bibasilar consolidation and CHF.         This report was finalized on 1/3/2018 8:07 AM by Dr. Noe Avila MD.                Medication Review:   Scheduled Medications:    amLODIPine 10 mg Oral Daily   aspirin 81 mg Oral Daily   atorvastatin 80 mg Oral Daily   budesonide-formoterol 2 puff Inhalation BID - RT   carvedilol 25 mg Oral BID With Meals   cetirizine 10 mg Oral Daily   cholecalciferol 2,000 Units Oral Daily   CloNIDine 0.1 mg Oral Q8H   doxycycline 100 mg Oral Q12H   ferrous sulfate 325 mg Oral Daily With Breakfast   fondaparinux 2.5 mg Subcutaneous Q24H   gabapentin 300 mg Oral Nightly   hydrALAZINE 100 mg Oral Q8H   insulin aspart 0-14 Units Subcutaneous 4x Daily AC & at Bedtime   insulin aspart 10 Units Subcutaneous TID With Meals   insulin detemir 35 Units Subcutaneous Daily   insulin detemir 35 Units Subcutaneous Nightly   ipratropium 0.5 mg Nebulization 4x Daily - RT   isosorbide mononitrate 180 mg Oral Daily   lactobacillus acidophilus 1 capsule Oral Daily   levothyroxine 25 mcg Oral Daily   pantoprazole 40 mg Oral Daily   predniSONE 20 mg Oral Daily With Breakfast   sertraline 50 mg Oral Q PM   sodium chloride 10 mL Intracatheter Q12H   vitamin B-12 2,000 mcg Oral Daily     Continuous  infusions:    niCARdipine 5-15 mg/hr Last Rate: 10 mg/hr (01/03/18 0543)       Assessment/Plan      Respiratory Failure: likely related to underlying lung disease, COPD and fluid volume overload. Antibiotics were changed yesterday to doxy PO by ID. Continue scheduled inhalants and nebulizer's. Incentive spirometer 10 times an hour during the day. Continue Bipap at HS and for shortness of breath during the day. Continue supplemental oxygen to maintain Sp02 >92-94%. Will hold diuresis today for increased creatinine after yesterday's dose. Prednisone decreased to 20 mg PO daily, continue to wean as tolerated.     Patient in mild-to moderate respiratory distress this morning while on BiPap, settings changed to 20/8, ABG and Chest xray ordered, will review and develop plan from there. He is awake, alert and following commands.      Hypertension: /72, Cardene gtt started yesterday, on 10mg/hr,  goal is to maintain SBP around 150, he does have chronic HTN will lower BP slowly to not infarct renals or induce encephalopathy. Clonidine 0.1mg PO TID started today to assist in weaning off Cardene.      CKD stage III: Creatinine 1.42 , 24 hour urine 3.2 liters, continue strict I&O, electrolytes WNL.     ID: WBC is 6.64, neutrophils are 71.9, no fevers, continue doxy PO. Continue to monitor lab trends and clinical changes.        Patient Active Problem List   Diagnosis Code   • COPD (chronic obstructive pulmonary disease) J44.9   • Shortness of breath R06.02   • Morbid obesity E66.01   • Hypoxia R09.02   • Edema extremities R60.0   • Sleep apnea G47.30   • Wheezing R06.2   • Allergic rhinitis J30.9   • Essential hypertension I10   • Sore throat J02.9   • Cough R05   • Pneumonia J18.9   • Respiratory failure J96.90       YANG Mathews  01/03/18  8:29 AM      Scribed for Dr. Gomez by YANG Cassidy.       IJeremi M.D. attest that the above note accurately reflects the work and decisions made   by me.  Patient was seen and evaluated by Dr. Gomez, including history of present illness, physical exam, assessment, and treatment plan.  The above note was reviewed and edited by Dr. Gomez.  Critical Care time spent in direct patient care: 35 minutes (excluding procedure time, if applicable) including high complexity decision making to assess, manipulate, and support vital organ system failure in this individual who has impairment of one or more vital organ systems such that there is a high probability of imminent or life threatening deterioration in the patient’s condition.

## 2018-01-03 NOTE — PLAN OF CARE
Problem: NPPV/CPAP (Adult)  Goal: Signs and Symptoms of Listed Potential Problems Will be Absent or Manageable (NPPV/CPAP)  Outcome: Ongoing (interventions implemented as appropriate)   01/02/18 1912   NPPV/CPAP   Problems Assessed (NPPV/CPAP) all

## 2018-01-03 NOTE — PLAN OF CARE
Problem: COPD, Chronic Bronchitis/Emphysema (Adult)  Goal: Signs and Symptoms of Listed Potential Problems Will be Absent or Manageable (COPD, Chronic Bronchitis/Emphysema)  Outcome: Ongoing (interventions implemented as appropriate)      Problem: Skin Integrity Impairment, Risk/Actual (Adult)  Goal: Identify Related Risk Factors and Signs and Symptoms  Outcome: Ongoing (interventions implemented as appropriate)      Problem: Patient Care Overview (Adult)  Goal: Plan of Care Review  Outcome: Ongoing (interventions implemented as appropriate)
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
## 2018-01-04 NOTE — PROGRESS NOTES
LOS: 7 days     Chief Complaint:  Pulmonology is following for respiratory failure     Subjective     Interval History:     Mr. Anne is doing better this morning, no respiratory distress noted on exam, blood pressure has improved, was taken off cardene gtt yesterday.     History taken from: patient chart RN    Review of Systems:   Review of Systems   Constitutional: Negative for chills, fatigue and fever.   HENT: Negative for congestion and rhinorrhea.    Eyes: Negative for visual disturbance.   Respiratory: Negative for cough, shortness of breath and wheezing.    Cardiovascular: Positive for leg swelling. Negative for chest pain.   Gastrointestinal: Negative for abdominal distention and abdominal pain.   Endocrine: Negative for cold intolerance and heat intolerance.   Genitourinary: Negative for difficulty urinating.   Musculoskeletal: Negative for arthralgias and myalgias.   Skin: Negative for color change and pallor.   Neurological: Negative for dizziness, weakness and light-headedness.   Psychiatric/Behavioral: Negative for agitation, behavioral problems and confusion.                     Objective     Vital Signs  Temp:  [97.7 °F (36.5 °C)-98.3 °F (36.8 °C)] 98.1 °F (36.7 °C)  Heart Rate:  [48-90] 53  Resp:  [16-26] 25  BP: (120-166)/() 156/63  Body mass index is 45.06 kg/(m^2).    Intake/Output Summary (Last 24 hours) at 01/04/18 0823  Last data filed at 01/04/18 0513   Gross per 24 hour   Intake                0 ml   Output             1175 ml   Net            -1175 ml          Physical Exam:  GENERAL APPEARANCE: Well developed, well nourished, alert and cooperative, and appears to be in no acute distress.    HEAD: normocephalic.    EYES: PERRL    NECK: Neck supple.     CARDIAC: Normal S1 and S2. No S3, S4 or murmurs. Rhythm is regular. There is no cyanosis or pallor. Extremities are warm and well perfused. Capillary refill is less than 2 seconds. No carotid bruits. No jvd pulses are equal bilaterally      Respiratory: Clear to auscultation and percussion without rales, rhonchi, wheezing or diminished breath sounds.    GI: Positive bowel sounds. Soft, nondistended, nontender.     Musculoskeletal: No significant deformity or joint abnormality. 3+ bilateral pitting edema.     NEUROLOGICAL: Strength and sensation symmetric and intact throughout.     PSYCHIATRIC: The mental examination revealed the patient was oriented to person, place, and time.                 Results Review:                I reviewed the patient's new clinical results.  I reviewed the patient's new imaging results and agree with the interpretation.    Results from last 7 days  Lab Units 01/04/18  0042 01/03/18  0201 01/02/18  0044   WBC 10*3/mm3 6.71 6.64 5.76   HEMOGLOBIN g/dL 7.0* 7.8* 8.4*   PLATELETS 10*3/mm3 182 173 153       Results from last 7 days  Lab Units 01/04/18  0042 01/03/18  0201 01/02/18  1702 01/02/18  0044 01/01/18  0050 12/31/17  0117 12/30/17  0234   SODIUM mmol/L 146 146  --  143 145 144 139   POTASSIUM mmol/L 4.2 4.0 4.4 3.6 3.8 4.0 3.6   CHLORIDE mmol/L 108 108  --  107 110 108 103   CO2 mmol/L 30.8 27.7  --  30.0 31.3 31.9 27.3   BUN mg/dL 30* 26*  --  24* 22* 22* 20   CREATININE mg/dL 1.41* 1.42*  --  1.38* 1.32* 1.45* 1.46*   CALCIUM mg/dL 8.6 8.8  --  8.7 8.4 8.2 7.5*   GLUCOSE mg/dL 102 145*  --  116* 139* 179* 281*   MAGNESIUM mg/dL  --   --   --   --  2.7* 2.7* 2.6     Lab Results   Component Value Date    INR 1.18 (H) 12/06/2017    INR 1.12 (H) 12/02/2017    INR 1.01 01/27/2017    PROTIME 15.2 12/06/2017    PROTIME 14.5 12/02/2017    PROTIME 11.4 01/27/2017       Results from last 7 days  Lab Units 01/01/18  0050 12/31/17  0117 12/30/17  0234   ALK PHOS U/L 42 47 41   BILIRUBIN mg/dL 1.1 1.0 0.6   ALT (SGPT) U/L 52* 51* 25   AST (SGOT) U/L 25 27 17       Results from last 7 days  Lab Units 01/03/18  0815   PH, ARTERIAL pH units 7.378   PO2 ART mm Hg 65.0*   PCO2, ARTERIAL mm Hg 56.0*   HCO3 ART mmol/L 32.2*      Imaging Results (last 24 hours)     ** No results found for the last 24 hours. **             Medication Review:   Scheduled Medications:    albumin human 25 g Intravenous Once   amLODIPine 10 mg Oral Daily   aspirin 81 mg Oral Daily   atorvastatin 80 mg Oral Daily   budesonide-formoterol 2 puff Inhalation BID - RT   carvedilol 25 mg Oral BID With Meals   cetirizine 10 mg Oral Daily   cholecalciferol 2,000 Units Oral Daily   CloNIDine 0.1 mg Oral Q8H   doxycycline 100 mg Oral Q12H   ferrous sulfate 325 mg Oral Daily With Breakfast   fondaparinux 2.5 mg Subcutaneous Q24H   furosemide 60 mg Intravenous Once   gabapentin 300 mg Oral Nightly   hydrALAZINE 100 mg Oral Q8H   insulin aspart 0-14 Units Subcutaneous 4x Daily AC & at Bedtime   insulin aspart 10 Units Subcutaneous TID With Meals   insulin detemir 35 Units Subcutaneous Daily   insulin detemir 35 Units Subcutaneous Nightly   ipratropium 0.5 mg Nebulization 4x Daily - RT   isosorbide mononitrate 180 mg Oral Daily   lactobacillus acidophilus 1 capsule Oral Daily   levothyroxine 25 mcg Oral Daily   pantoprazole 40 mg Oral Daily   predniSONE 20 mg Oral Daily With Breakfast   sertraline 50 mg Oral Q PM   sodium chloride 10 mL Intracatheter Q12H   vitamin B-12 2,000 mcg Oral Daily     Continuous infusions:       Assessment/Plan      Respiratory Failure: likely related to underlying lung disease, COPD and fluid volume overload. Continue Doxy per ID recommendations. Will give lasix 60 mg x1 today with albumin, will monitor renal function and BP closely. Incentive spirometer 10 times during the day. Continue high flow nasal cannula during day, wean to maintain Sp02 >92-94%. Continue Bipap at HS.     Hypertension: cardene off since yesterday evening, clonidine .1mg PO was started yesterday, BP on rounds is 151/71. Continue continuous ECG and v/s monitoring, maintain MAP >65.        CKD stage III: Creatinine 1.41, 24 hour urine 1.1 liter, continue strict I&O,  electrolytes WNL.     ID: WBC is 6.71, Neutrophils are 76.9, no fevers, continue to monitor lab trends and clinical changes, continue doxy per ID recommendations.        Patient Active Problem List   Diagnosis Code   • COPD (chronic obstructive pulmonary disease) J44.9   • Shortness of breath R06.02   • Morbid obesity E66.01   • Hypoxia R09.02   • Edema extremities R60.0   • Sleep apnea G47.30   • Wheezing R06.2   • Allergic rhinitis J30.9   • Essential hypertension I10   • Sore throat J02.9   • Cough R05   • Pneumonia J18.9   • Respiratory failure J96.90       YANG Mathews  01/04/18  8:23 AM      Scribed for Dr. Gomez by YANG Cassidy.     I, Jeremi Gomez M.D. attest that the above note accurately reflects the work and decisions made  by me.  Patient was seen and evaluated by Dr. Gomez, including history of present illness, physical exam, assessment, and treatment plan.  The above note was reviewed and edited by Dr. Gomez.

## 2018-01-04 NOTE — PLAN OF CARE
Problem: COPD, Chronic Bronchitis/Emphysema (Adult)  Goal: Signs and Symptoms of Listed Potential Problems Will be Absent or Manageable (COPD, Chronic Bronchitis/Emphysema)  Outcome: Ongoing (interventions implemented as appropriate)      Problem: Patient Care Overview (Adult)  Goal: Plan of Care Review  Outcome: Ongoing (interventions implemented as appropriate)

## 2018-01-04 NOTE — DISCHARGE PLACEMENT REQUEST
"Ward Copeland (67 y.o. Male)     Date of Birth Social Security Number Address Home Phone MRN    1950  [address] [facility] [address] [phone] [id number]    [employer] Marital Status          Shinto        Admission Date Admission Type Admitting Provider Attending Provider Department, Room/Bed    12/28/17 Emergency Seun Muller MD Grace, Aimee Russell, DO [facility] CARE, P204/S2    Discharge Date Discharge Disposition Discharge Destination                      Attending Provider: Teresa Sandhu DO     Allergies:  Iodine, Lisinopril, Other    Isolation:  None   Infection:  None   Code Status:  FULL    Ht:  167.6 cm (66\")   Wt:  127 kg (279 lb 3.2 oz)    Admission Cmt:  None   Principal Problem:  None                Active Insurance as of 12/28/2017     Primary Coverage     Payor Plan Insurance Group Employer/Plan Group    ANTHEM MEDICARE REPLACEMENT ANTHEM MEDICARE ADVANTAGE [id number]     Payor Plan Address Payor Plan Phone Number Effective From Effective To    [address] [phone] 1/1/2016     [place] [address]       Subscriber Name Subscriber Birth Date Member ID       WARD COPELAND 1950 [id number]                 Emergency Contacts      (Rel.) Home Phone Work Phone Mobile Phone    Adeline Copeland (Spouse) [phone] -- --    May Mccloud (Daughter) [phone] -- --    Genesis Mcknight (Daughter) [phone] -- --            Emergency Contact Information     Name Relation Home Work Mobile    Adeline Copeland Spouse [phone]      May Mccloud Daughter [phone]      Genesis Mcknight Daughter [phone]            Insurance Information                ANTHEM MEDICARE REPLACEMENT/ANTHEM MEDICARE ADVANTAGE Phone: [phone]    Subscriber: Ward Copeland Subscriber#: [id number]    Group#: [id number] Precert#:              History & Physical      Seun Muller MD at 12/28/2017  2:19 PM          Patient " "Identification:  Name:  Se Anne  Age:  67 y.o.  Sex:  male  :  1950  MRN:  [id number]   Visit Number:  [id number]  Primary Care Physician:  No Known Provider    I have seen the patient in conjunction with Lee Ann Sharpe PA-C and I agree with the following statements:    I have interviewed and examined the patient, I have discussed this case with the physician assistant.  I concur with her findings in this note also constitutes my findings.     Chief complaint: \"Couldn't breathe\"    History of presenting illness:  67 y.o. male Se Anne has history of chronic respiratory failure requiring 3 liters of supplemental oxygen via nasal cannula, chronic kidney disease, diabetes mellitus type 2, COPD, history of tobacco abuse, hypothyroidism, and anemia. He was most recently hospitalized at this facility from -17 with intubation and mechanical ventilation from - given acute on chronic hypoxic and hypercapnic respiratory failure with bibasilar pneumonia and severe sepsis.   He was discharged with Omnicef and doxycycline.   On this date, he presented to the ED with 2-3 days of progressively worsening dyspnea. He reports worsening wheeze and cough.  He reports orthopnea and worsening dyspnea on exertion.  He does also report some midsternal chest discomfort with shortness of breath he characterizes this as a tight sensation.  Taking a deep breath tended to make it worse.  The intensity currently 0/10.  He does think his shortness of breath has improved some since coming to the hospital.  Patient is a nonsmoker.  Initial ABG demonstrated hypercapnea and hypoxia on home supplemental oxygen of 3 liters.  CT chest per PE protocol with groundglass attenuation and atelectasis. No pulmonary defect to suggest pulmonary embolism, but subsegmental artery branches were not well visualized.  Mr. Anne denies fever and chills. He denies abdominal pain, nausea, and vomiting.  He denies dysuria and " hematuria . Mr. Anne reports he sleeps with BiPAP nightly and uses 3 liters of supplemental oxygen throughout the day. He ambulates with a cane and walker. He does report worsening bilateral lower extremity edema.  He had been noted to have some left lower quadrant tenderness to palpation, he states he had normal bowel movements yesterday.  No emesis.  No urinary symptoms.  He denies any fever.  He does get leg edema but he thinks it is the same or slightly improved to its baseline.    While Mr. Anne quit smoking several years ago, he does live with his wife, a 2-3 pack a day smoker.   ---------------------------------------------------------------------------------------------------------------------   Review of Systems   Constitutional: Negative for diaphoresis, fatigue and fever.   HENT: Negative for facial swelling and rhinorrhea.    Eyes: Negative for discharge and redness.   Respiratory: Positive for cough, chest tightness, shortness of breath and wheezing.    Cardiovascular: Positive for chest pain and leg swelling.   Gastrointestinal: Negative for abdominal distention and abdominal pain.   Endocrine: Negative for cold intolerance and heat intolerance.   Genitourinary: Negative for difficulty urinating and dysuria.   Musculoskeletal: Negative for arthralgias and gait problem.   Skin: Negative for color change and pallor.   Neurological: Negative for dizziness and headaches.   Psychiatric/Behavioral: Negative for agitation and confusion.      ---------------------------------------------------------------------------------------------------------------------   Past Medical History:   Diagnosis Date   • Agent orange exposure    • Arthritis    • CHF (congestive heart failure)    • COPD (chronic obstructive pulmonary disease)    • Coronary artery disease    • Diabetes mellitus    • Hyperlipidemia    • Hypertension      Past Surgical History:   Procedure Laterality Date   • CARDIAC SURGERY      stent placement    • CATARACT EXTRACTION     • JOINT REPLACEMENT      right knee   • KNEE SURGERY       Family History   Problem Relation Age of Onset   • Heart disease Mother    • Cancer Father    • Heart attack Sister    • Heart attack Brother    • Cancer Paternal Grandfather      Social History     Social History   • Marital status:      Spouse name: N/A   • Number of children: N/A   • Years of education: N/A     Social History Main Topics   • Smoking status: Former Smoker     Years: 40.00     Quit date: 3/21/2010   • Smokeless tobacco: Never Used   • Alcohol use No   • Drug use: No   • Sexual activity: Defer     Other Topics Concern   • None     Social History Narrative     ---------------------------------------------------------------------------------------------------------------------   Allergies:  Lisinopril and Other  ---------------------------------------------------------------------------------------------------------------------   Prior to Admission Medications     Prescriptions Last Dose Informant Patient Reported? Taking?    albuterol (PROVENTIL) (2.5 MG/3ML) 0.083% nebulizer solution  Pharmacy Yes No    Take 2.5 mg by nebulization Every 4 (Four) Hours As Needed for Wheezing or Shortness of Air.    amLODIPine (NORVASC) 10 MG tablet  Pharmacy Yes No    Take 10 mg by mouth Daily.    aspirin 81 MG EC tablet  Pharmacy Yes No    Take 81 mg by mouth Daily.    atorvastatin (LIPITOR) 80 MG tablet  Pharmacy Yes No    Take 80 mg by mouth Daily.    budesonide-formoterol (SYMBICORT) 160-4.5 MCG/ACT inhaler  Pharmacy Yes No    Inhale 2 puffs 2 (Two) Times a Day.    bumetanide (BUMEX) 1 MG tablet   No No    Take 1 tablet by mouth Daily.    carvedilol (COREG) 25 MG tablet  Pharmacy Yes No    Take 12.5 mg by mouth 2 (Two) Times a Day With Meals.    Cholecalciferol (VITAMIN D3) 2000 UNITS tablet  Pharmacy Yes No    Take 1 tablet by mouth Daily.    gabapentin (NEURONTIN) 100 MG capsule  Pharmacy Yes No    Take 700 mg by mouth  Daily.    hydrALAZINE (APRESOLINE) 25 MG tablet   No No    Take 3 tablets by mouth 3 (Three) Times a Day.    insulin NPH-insulin regular (humuLIN 70/30,novoLIN 70/30) (70-30) 100 UNIT/ML injection  Pharmacy Yes No    Inject 52 Units under the skin Every Morning.    insulin NPH-insulin regular (humuLIN 70/30,novoLIN 70/30) (70-30) 100 UNIT/ML injection  Pharmacy Yes No    Inject 54 Units under the skin Every Night.    isosorbide mononitrate (IMDUR) 60 MG 24 hr tablet  Pharmacy Yes No    Take 180 mg by mouth Daily.    levothyroxine (SYNTHROID, LEVOTHROID) 25 MCG tablet  Pharmacy Yes No    Take 25 mcg by mouth Daily.    loratadine (CLARITIN) 10 MG tablet  Pharmacy Yes No    Take 10 mg by mouth Daily.    metFORMIN (GLUCOPHAGE) 500 MG tablet  Pharmacy Yes No    Take 500 mg by mouth 2 (Two) Times a Day With Meals.    nitroglycerin (NITROSTAT) 0.4 MG SL tablet  Pharmacy Yes No    Place 0.4 mg under the tongue Every 5 (Five) Minutes As Needed for chest pain. Take no more than 3 doses in 15 minutes.    pantoprazole (PROTONIX) 40 MG EC tablet  Pharmacy Yes No    Take 40 mg by mouth Daily.    predniSONE (DELTASONE) 10 MG tablet   No No    10mg x 2 days, 5mg x 2 days, then stop.    sertraline (ZOLOFT) 100 MG tablet  Pharmacy Yes No    Take 50 mg by mouth Every Evening.    tiotropium (SPIRIVA) 18 MCG per inhalation capsule  Pharmacy Yes No    Place 1 capsule into inhaler and inhale Daily.    traMADol (ULTRAM) 50 MG tablet  Pharmacy Yes No    Take 50 mg by mouth Every 12 (Twelve) Hours As Needed for Moderate Pain .    vitamin B-12 (CYANOCOBALAMIN) 1000 MCG tablet  Pharmacy Yes No    Take 2,000 mcg by mouth Daily.        Hospital Scheduled Meds:    vancomycin 2,000 mg Intravenous Once        ---------------------------------------------------------------------------------------------------------------------   Vital Signs:  Temp:  [98 °F (36.7 °C)] 98 °F (36.7 °C)  Heart Rate:  [67-87] 86  Resp:  [18-20] 19  BP: (147-204)/(64-99)  159/77  Last 3 weights    12/28/17  0851   Weight: 118 kg (261 lb)     Body mass index is 42.13 kg/(m^2).  ---------------------------------------------------------------------------------------------------------------------       Physical Exam    Physical Exam:  Constitutional:  Chronically ill appearing. Morbidly obese by BMI.  Appears to be in mild distress. (Appears mildly tachypnea but overall comfortable on high flow oxygen, saturation 97% on 45% high flow)  HENT:  Head: Normocephalic and atraumatic.  Mouth:  Moist mucous membranes.  Speech is normal  Eyes:  Conjunctivae and EOM are normal.  Pupils are equal, round, and reactive to light.  No scleral icterus.  Neck:  Neck supple.  No JVD present.    Cardiovascular:  Normal rate, regular rhythm. Normal s1/s2  with no murmur.  Pulmonary/Chest:  Mild acute respiratory distress. Bilateral wheezing appreciated throughout.  He has bilateral breath sounds diminished throughout no definite crackles noted  Abdominal:  Soft.  Bowel sounds are present.  Nondistended he is tender in the left lower quadrant to deep palpation without rebound or guarding, I concur good bowel sounds.    Musculoskeletal:  Bilateral lower extremity edema, pitting 2+.  Some skin changes to suggest chronic edema.  Dry heels bilaterally.  No noted deformity.    Neurological:  Alert and oriented to person, place, and time.  No cranial nerve deficit.  No tongue deviation.  No facial droop.  No slurred speech.  Strength is symmetric   Skin:  Skin is warm and dry.  No rash noted.  No pallor.   Psychiatric:  Normal mood and affect.  strong pulses on all 4 extremities.    ---------------------------------------------------------------------------------------------------------------------  EKG:       EKG image reviewed, nonspecific findings.  ---------------------------------------------------------------------------------------------------------------------         Results from last 7 days  Lab Units  12/28/17  0932   WBC 10*3/mm3 7.39   HEMOGLOBIN g/dL 9.8*   HEMATOCRIT % 32.2*   MCV fL 95.0*   MCHC g/dL 30.4*   PLATELETS 10*3/mm3 188       Results from last 7 days  Lab Units 12/28/17  1334   PH, ARTERIAL pH units 7.498*   PO2 ART mm Hg 66.2*   PCO2, ARTERIAL mm Hg 43.5   HCO3 ART mmol/L 33.0*       Results from last 7 days  Lab Units 12/28/17  0932   SODIUM mmol/L 145   POTASSIUM mmol/L 3.3*   CHLORIDE mmol/L 97*   CO2 mmol/L 39.9*   BUN mg/dL 16   CREATININE mg/dL 1.27   EGFR IF NONAFRICN AM mL/min/1.73 56*   CALCIUM mg/dL 8.8   GLUCOSE mg/dL 178*   ALBUMIN g/dL 4.40   BILIRUBIN mg/dL 0.6   ALK PHOS U/L 55   AST (SGOT) U/L 18   ALT (SGPT) U/L 28   Estimated Creatinine Clearance: 66.4 mL/min (by C-G formula based on Cr of 1.27).  No results found for: AMMONIA    Results from last 7 days  Lab Units 12/28/17  0932   TROPONIN I ng/mL 0.028         Lab Results   Component Value Date    HGBA1C 7.80 (H) 12/06/2017     Lab Results   Component Value Date    TSH 0.350 (L) 12/07/2017    FREET4 0.65 (L) 12/08/2017     No results found for: PREGTESTUR, PREGSERUM, HCG, HCGQUANT  Pain Management Panel     Pain Management Panel Latest Ref Rng & Units 12/7/2017 12/7/2017    CREATININE UR mg/dL 152.6 152.6    AMPHETAMINES SCREEN, URINE Negative - -    BARBITURATES SCREEN Negative - -    BENZODIAZEPINE SCREEN, URINE Negative - -    COCAINE SCREEN, URINE Negative - -    METHADONE SCREEN, URINE Negative - -                        ---------------------------------------------------------------------------------------------------------------------  Imaging Results (last 7 days)     Procedure Component Value Units Date/Time    XR Chest 1 View [[id number]] Collected:  12/28/17 1015     Updated:  12/28/17 1017    Narrative:       EXAMINATION:  XR CHEST 1 VW-      CLINICAL INDICATION:     sob     TECHNIQUE:  XR CHEST 1 VW-       COMPARISON: 12/10/2017      FINDINGS:   The lungs remain aerated.   Heart size is stable.   No pneumothorax.    No pleural effusion.   Bony and soft tissue structures are unremarkable.            Impression:       Stable radiographic appearance of the chest.        This report was finalized on 12/28/2017 10:15 AM by Dr. Miko Coto MD.       CT Chest Pulmonary Embolism With Contrast [[id number]] Collected:  12/28/17 1242     Updated:  12/28/17 1245    Narrative:       EXAMINATION: CT CHEST PULMONARY EMBOLISM W CONTRAST-      Technique: Multiple CT axial images were obtained through the level of  pulmonary arteries, following IV contrast administration per CT PE  protocol.  Volume Rendered 3D or MIP images performed.     Radiation dose reduction techniques were utilized per ALARA protocol.  Automated exposure control was initiated through either or [employer] or  [employer] software packages by  protocol.                  CLINICAL INDICATION:    SOB and Chest Pain     COMPARISON:    Chest x-ray performed on 12/28/2017.     FINDINGS:    Some bibasilar groundglass attenuation possibly  representing alveolar edema or atelectasis. Coronary artery  calcification. Subsegmental pulmonary artery branches are not well  evaluated. There is no pulmonary artery filling defect to suggest  pulmonary embolism. There is no thoracic adenopathy. No pleural or  pericardial effusion. Incidentally imaged upper abdomen is unremarkable.  Bone windows show no acute osseous abnormality.       Impression:          Some bibasilar groundglass attenuation possibly representing alveolar  edema or atelectasis. Coronary artery calcification. Subsegmental  pulmonary artery branches are not well evaluated.     This report was finalized on 12/28/2017 12:43 PM by Dr. Miko Coto MD.           CT chest images reviewed, appears to have minimal atelectasis.        Cultures:   PENDING      I have personally reviewed the radiology images and read the final radiology  report.  ---------------------------------------------------------------------------------------------------------------------  Assessment and Plan:    -Acute on chronic hypoxic and hypercapnic respiratory failure likely 2/2 acute exacerbation of COPD: His BNP is overall low, I do not think he has significant pulmonary edema.  IV Rocephin and IV doxycycline ordered.  Mycoplasma and Legionella testing ordered.  Sputum culture has been ordered.  CT chest without acute infiltrate.  Recent treatment for pneumonia while hospitalized. .  WBC unremarkable. Lactic acid and C-RP added. Currently on high-flow oxygen via nasal cannula. Pulmonlogy consultation placed. Influenza negative.  Mycoplasma and Legionella screens ordered    (Since the above note was made, patient did meet septic shock criteria.  His lactic acid was elevated, although not documented he was tachypnea And CRP was elevated.  Lactic acid met shock criteria.  He is being given 30 cc per Bolus and antibiotics were broadened.  He is now having left lower quadrant tenderness.  I'm going to get a CT of his abdomen as well.  He possibly could have diverticulitis or worst-case ischemic bowel.  Reflex lactic acid will be drawn 4 hours.  Patient had a previous echocardiogram within this month that showed a normal ejection fraction.  Because of shock, ID has been consulted, part of his edema may be related to his Norvasc.)    -Acute exacerbation of COPD:  IV solumedrol 40 mg BID ordered. IV abx as previously outlined. Duoneb treatments ordered.  Pulmonology consultation placed. Mr. Anne is an office patient of Dr. Gomez.     -Chest pain, typical and atypical features with history of CAD with proximal LAD stent:  Serial cardiac enzymes ordered.  Recent echocardiogram with EF unremarkable.  Aspirin 81 mg ordered.  Will monitor on telemetry.  .  CT chest with alveolar edema vs. Atelectasis.  Troponin negative at this time.      -Chronic anemia: Folate  and vitamine b12 within normal limits on 12/9.  Iron panel demonstrating iron deficiency.  Repeat indices drawn.    -Chronic kidney disease, stage III: Continue to follow creatinine.  Bumex therapy at home.    -Diabetes mellitus type II, ID:  FSBG ACHS.   Hemoglobin a1c.  I have ordered some basal insulin as I have advanced him to clear liquids.  Intake and use sliding scale, would expect some loss of control with the steroids.    -Hypokalemia:  Electrolyte replacement protocol ordered Magnesium protocol ordered.     -Hypothyroidism, previous studies suggested possibly sick euthyroid, these will be repeated.    -DVT prophylaxis with SQ Heparin     Lee Ann Sharpe PA-C  12/28/17  2:50 PM  ---------------------------------------------------------------------------------------------------------------------            Electronically signed by Seun Muller MD at 12/28/2017  6:26 PM        Vital Signs (last 24 hours)       01/03 0700  -  01/04 0659 01/04 0700  -  01/04 1545   Most Recent    Temp (°F) 97.7 -  98.3       98.1 (36.7)    Heart Rate (!)48 -  90    50 -  74     56    Resp 16 -  26    20 -  25     20    /71 -  166/76    145/52 -  171/57     171/57    SpO2 (%) (!)85 -  100    92 -  100     100          Intake & Output (last day)       01/03 0701 - 01/04 0700 01/04 0701 - 01/05 0700    P.O.      Total Intake(mL/kg)      Urine (mL/kg/hr) 1175 (0.4) 800 (0.7)    Total Output 1175 800    Net -1175 -800              Hospital Medications (active)       Dose Frequency Start End    albumin human 25 % IV SOLN 25 g 25 g Once 1/4/2018 1/4/2018    Sig - Route: Infuse 100 mL into a venous catheter 1 (One) Time. - Intravenous    amLODIPine (NORVASC) tablet 10 mg 10 mg Daily 12/28/2017     Sig - Route: Take 1 tablet by mouth Daily. - Oral    aspirin EC tablet 81 mg 81 mg Daily 12/29/2017     Sig - Route: Take 1 tablet by mouth Daily. - Oral    Cosign for Ordering: Accepted by Seun Muller MD on 12/30/2017  6:38  PM    atorvastatin (LIPITOR) tablet 80 mg 80 mg Daily 12/28/2017     Sig - Route: Take 2 tablets by mouth Daily. - Oral    budesonide-formoterol (SYMBICORT) 160-4.5 MCG/ACT inhaler 2 puff 2 puff 2 Times Daily - RT 12/28/2017     Sig - Route: Inhale 2 puffs 2 (Two) Times a Day. - Inhalation    carvedilol (COREG) tablet 25 mg 25 mg 2 Times Daily With Meals 1/1/2018     Sig - Route: Take 1 tablet by mouth 2 (Two) Times a Day With Meals. - Oral    cetirizine (zyrTEC) tablet 10 mg 10 mg Daily 12/28/2017     Sig - Route: Take 1 tablet by mouth Daily. - Oral    cholecalciferol (VITAMIN D3) tablet 2,000 Units 2,000 Units Daily 12/28/2017     Sig - Route: Take 5 tablets by mouth Daily. - Oral    CloNIDine (CATAPRES) tablet 0.1 mg 0.1 mg Every 8 Hours Scheduled 1/3/2018     Sig - Route: Take 1 tablet by mouth Every 8 (Eight) Hours. - Oral    dextrose (D50W) solution 25 g 25 g Every 15 Minutes PRN 12/28/2017     Sig - Route: Infuse 50 mL into a venous catheter Every 15 (Fifteen) Minutes As Needed for Low Blood Sugar (Blood Sugar Less Than 70, Patient Has IV Access - Unresponsive, NPO or Unable To Safely Swallow). - Intravenous    Cosign for Ordering: Accepted by Seun Muller MD on 12/30/2017  6:38 PM    dextrose (GLUTOSE) oral gel 15 g 15 g Every 15 Minutes PRN 12/28/2017     Sig - Route: Take 15 g by mouth Every 15 (Fifteen) Minutes As Needed for Low Blood Sugar (Blood Sugar Less Than 70, Patient Alert, Is Not NPO & Can Safely Swallow). - Oral    Cosign for Ordering: Accepted by Seun Muller MD on 12/30/2017  6:38 PM    doxycycline (MONODOX) capsule 100 mg 100 mg Every 12 Hours Scheduled 1/2/2018 1/6/2018    Sig - Route: Take 1 capsule by mouth Every 12 (Twelve) Hours. - Oral    ferrous sulfate tablet 325 mg 325 mg Daily With Breakfast 12/29/2017     Sig - Route: Take 1 tablet by mouth Daily With Breakfast. - Oral    fondaparinux (ARIXTRA) injection 2.5 mg 2.5 mg Every 24 Hours Scheduled 12/30/2017     Sig - Route: Inject  0.5 mL under the skin Daily. - Subcutaneous    furosemide (LASIX) injection 60 mg 60 mg Once 1/4/2018 1/4/2018    Sig - Route: Infuse 6 mL into a venous catheter 1 (One) Time. - Intravenous    gabapentin (NEURONTIN) capsule 300 mg 300 mg Nightly 12/28/2017     Sig - Route: Take 1 capsule by mouth Every Night. - Oral    glucagon (human recombinant) (GLUCAGEN DIAGNOSTIC) injection 1 mg 1 mg Every 15 Minutes PRN 12/28/2017     Sig - Route: Inject 1 mg under the skin Every 15 (Fifteen) Minutes As Needed (Blood Glucose Less Than 70 - Patient Without IV Access - Unresponsive, NPO or Unable To Safely Swallow). - Subcutaneous    Cosign for Ordering: Accepted by Seun Muller MD on 12/30/2017  6:38 PM    hydrALAZINE (APRESOLINE) tablet 100 mg 100 mg Every 8 Hours Scheduled 12/30/2017     Sig - Route: Take 2 tablets by mouth Every 8 (Eight) Hours. - Oral    insulin aspart (novoLOG) injection 0-14 Units 0-14 Units 4 Times Daily Before Meals & Nightly 12/28/2017     Sig - Route: Inject 0-14 Units under the skin 4 (Four) Times a Day Before Meals & at Bedtime. - Subcutaneous    insulin aspart (novoLOG) injection 10 Units 10 Units 3 Times Daily With Meals 12/30/2017     Sig - Route: Inject 10 Units under the skin 3 (Three) Times a Day With Meals. - Subcutaneous    insulin detemir (LEVEMIR) injection 35 Units 35 Units Daily 1/1/2018     Sig - Route: Inject 35 Units under the skin Daily. - Subcutaneous    insulin detemir (LEVEMIR) injection 35 Units 35 Units Nightly 12/31/2017     Sig - Route: Inject 35 Units under the skin Every Night. - Subcutaneous    ipratropium (ATROVENT) nebulizer solution 0.5 mg 0.5 mg 4 Times Daily - RT 12/28/2017     Sig - Route: Take 2.5 mL by nebulization 4 (Four) Times a Day. - Nebulization    isosorbide mononitrate (IMDUR) 24 hr tablet 180 mg 180 mg Daily 12/28/2017     Sig - Route: Take 3 tablets by mouth Daily. - Oral    lactobacillus acidophilus (RISAQUAD) capsule 1 capsule 1 capsule Daily  "12/29/2017 1/4/2018    Sig - Route: Take 1 capsule by mouth Daily. - Oral    levothyroxine (SYNTHROID, LEVOTHROID) tablet 25 mcg 25 mcg Daily 12/28/2017     Sig - Route: Take 1 tablet by mouth Daily. - Oral    Magnesium Sulfate 2 gram Bolus, followed by 8 gram infusion (total Mg dose 10 grams)- Mg less than or equal to 1mg/dL 2 g As Needed 12/28/2017     Sig - Route: Infuse 50 mL into a venous catheter As Needed (Mg less than or equal to 1mg/dL). - Intravenous    Cosign for Ordering: Accepted by Seun Muller MD on 12/30/2017  6:38 PM    Linked Group 1:  \"Or\" Linked Group Details        magnesium sulfate 4 gram infusion- Mg 1.6-1.9 mg/dL 4 g As Needed 12/28/2017     Sig - Route: Infuse 100 mL into a venous catheter As Needed (Mg 1.6-1.9 mg/dL). - Intravenous    Cosign for Ordering: Accepted by Seun Muller MD on 12/30/2017  6:38 PM    Linked Group 1:  \"Or\" Linked Group Details        Magnesium Sulfate 6 gram Infusion (2 gm x 3) -Mg 1.1 -1.5 mg/dL 2 g As Needed 12/28/2017     Sig - Route: Infuse 50 mL into a venous catheter As Needed (Mg 1.1 -1.5 mg/dL). - Intravenous    Cosign for Ordering: Accepted by Seun Muller MD on 12/30/2017  6:38 PM    Linked Group 1:  \"Or\" Linked Group Details        nitroglycerin (NITROSTAT) SL tablet 0.4 mg 0.4 mg Every 5 Minutes PRN 12/28/2017     Sig - Route: Place 1 tablet under the tongue Every 5 (Five) Minutes As Needed for Chest Pain. - Sublingual    Cosign for Ordering: Accepted by Jeremie Bermudez MD on 12/28/2017  2:34 PM    pantoprazole (PROTONIX) EC tablet 40 mg 40 mg Daily 12/28/2017     Sig - Route: Take 1 tablet by mouth Daily. - Oral    potassium chloride (KLOR-CON) packet 40 mEq 40 mEq As Needed 12/28/2017     Sig - Route: Take 40 mEq by mouth As Needed (potassium replacement, see admin instructions). - Oral    Cosign for Ordering: Accepted by Seun Muller MD on 12/30/2017  6:38 PM    Linked Group 2:  \"Or\" Linked Group Details        potassium chloride " "(MICRO-K) CR capsule 40 mEq 40 mEq As Needed 12/28/2017     Sig - Route: Take 4 capsules by mouth As Needed (potassium replacement.  see admin instructions). - Oral    Cosign for Ordering: Accepted by Seun Muller MD on 12/30/2017  6:38 PM    Linked Group 2:  \"Or\" Linked Group Details        potassium chloride 10 mEq in 100 mL IVPB 10 mEq Every 1 Hour PRN 12/28/2017     Sig - Route: Infuse 100 mL into a venous catheter Every 1 (One) Hour As Needed (potassium protocol PERIPHERAL - see admin instructions). - Intravenous    Cosign for Ordering: Accepted by Seun Muller MD on 12/30/2017  6:38 PM    Linked Group 2:  \"Or\" Linked Group Details        predniSONE (DELTASONE) tablet 10 mg 10 mg Daily With Breakfast 1/5/2018     Sig - Route: Take 1 tablet by mouth Daily With Breakfast. - Oral    sertraline (ZOLOFT) tablet 50 mg 50 mg Every Evening 12/28/2017     Sig - Route: Take 1 tablet by mouth Every Evening. - Oral    sodium chloride 0.9 % flush 1-10 mL 1-10 mL As Needed 12/28/2017     Sig - Route: Infuse 1-10 mL into a venous catheter As Needed for Line Care. - Intravenous    Cosign for Ordering: Accepted by Seun Muller MD on 12/30/2017  6:38 PM    sodium chloride 0.9 % flush 10 mL 10 mL As Needed 12/28/2017     Sig - Route: Infuse 10 mL into a venous catheter As Needed for Line Care. - Intravenous    Cosign for Ordering: Accepted by Jeremie Bermudez MD on 12/28/2017  2:34 PM    Linked Group 3:  \"And\" Linked Group Details        sodium chloride 0.9 % flush 10 mL 10 mL Every 12 Hours Scheduled 1/2/2018     Sig - Route: 10 mL by Intracatheter route Every 12 (Twelve) Hours. - Intracatheter    Cosign for Ordering: Required by Seun Muller MD    sodium chloride 0.9 % flush 10 mL 10 mL As Needed 1/2/2018     Sig - Route: 10 mL by Intracatheter route As Needed for Line Care (After Medication Administration). - Intracatheter    Cosign for Ordering: Required by Seun Muller MD    vitamin B-12 (CYANOCOBALAMIN) " tablet 2,000 mcg 2,000 mcg Daily 12/28/2017     Sig - Route: Take 2 tablets by mouth Daily. - Oral    niCARdipine (CARDENE) 25 mg in sodium chloride 0.9 % 250 mL (0.1 mg/mL) infusion (Discontinued) 5-15 mg/hr Titrated 1/2/2018 1/4/2018    Sig - Route: Infuse 5-15 mg/hr into a venous catheter Dose Adjusted By Provider As Needed. - Intravenous    predniSONE (DELTASONE) tablet 20 mg (Discontinued) 20 mg Daily With Breakfast 1/3/2018 1/4/2018    Sig - Route: Take 1 tablet by mouth Daily With Breakfast. - Oral            Lab Results (last 24 hours)     Procedure Component Value Units Date/Time    POC Glucose Once [[id number]]  (Normal) Collected:  01/03/18 1629    Specimen:  Blood Updated:  01/03/18 1644     Glucose 121 mg/dL     Narrative:       Meter: [id number] : [id number] Torres Collins    POC Glucose Once [[id number]]  (Abnormal) Collected:  01/03/18 2026    Specimen:  Blood Updated:  01/03/18 2039     Glucose 132 (H) mg/dL     Narrative:       Meter: [id number] : [id number] Ofelia Watters    CBC & Differential [[id number]] Collected:  01/04/18 0042    Specimen:  Blood Updated:  01/04/18 0051    Narrative:       The following orders were created for panel order CBC & Differential.  Procedure                               Abnormality         Status                     ---------                               -----------         ------                     CBC Auto Differential[[id number]]        Abnormal            Final result                 Please view results for these tests on the individual orders.    CBC Auto Differential [[id number]]  (Abnormal) Collected:  01/04/18 0042    Specimen:  Blood Updated:  01/04/18 0051     WBC 6.71 10*3/mm3      RBC 2.44 (L) 10*6/mm3      Hemoglobin 7.0 (L) g/dL      Hematocrit 23.7 (L) %      MCV 97.1 (H) fL      MCH 28.7 pg      MCHC 29.5 (L) g/dL      RDW 15.2 (H) %      RDW-SD 50.0 fl      MPV 8.3 fL      Platelets 182 10*3/mm3      Neutrophil % 76.9 (H) %      Lymphocyte %  14.8 (L) %      Monocyte % 6.6 %      Eosinophil % 0.1 %      Basophil % 0.0 %      Immature Grans % 1.6 (H) %      Neutrophils, Absolute 5.16 10*3/mm3      Lymphocytes, Absolute 0.99 (L) 10*3/mm3      Monocytes, Absolute 0.44 10*3/mm3      Eosinophils, Absolute 0.01 10*3/mm3      Basophils, Absolute 0.00 10*3/mm3      Immature Grans, Absolute 0.11 (H) 10*3/mm3     Basic Metabolic Panel [[id number]]  (Abnormal) Collected:  01/04/18 0042    Specimen:  Blood Updated:  01/04/18 0123     Glucose 102 mg/dL      BUN 30 (H) mg/dL      Creatinine 1.41 (H) mg/dL      Sodium 146 mmol/L      Potassium 4.2 mmol/L      Chloride 108 mmol/L      CO2 30.8 mmol/L      Calcium 8.6 mg/dL      eGFR Non African Amer 50 (L) mL/min/1.73      BUN/Creatinine Ratio 21.3     Anion Gap 7.2 mmol/L     Narrative:       GFR Normal >60  Chronic Kidney Disease <60  Kidney Failure <15    Osmolality, Calculated [[id number]]  (Normal) Collected:  01/04/18 0042    Specimen:  Blood Updated:  01/04/18 0123     Osmolality Calc 296.9 mOsm/kg     C-reactive Protein [[id number]]  (Normal) Collected:  01/04/18 0042    Specimen:  Blood Updated:  01/04/18 0124     C-Reactive Protein <0.50 mg/dL     POC Glucose Once [[id number]]  (Abnormal) Collected:  01/04/18 0712    Specimen:  Blood Updated:  01/04/18 0731     Glucose 63 (L) mg/dL     Narrative:       Meter: [id number] : [id number] Ofelia Watters    POC Glucose Once [[id number]]  (Normal) Collected:  01/04/18 1001    Specimen:  Blood Updated:  01/04/18 1018     Glucose 120 mg/dL     Narrative:       Meter: [id number] : [id number] CALLIE MARTINEZ    POC Glucose Once [[id number]]  (Abnormal) Collected:  01/04/18 1132    Specimen:  Blood Updated:  01/04/18 1140     Glucose 138 (H) mg/dL     Narrative:       Meter: [id number] : [id number] GARRETT RAISA        Imaging Results (last 24 hours)     ** No results found for the last 24 hours. **        Operative/Procedure Notes (last 24 hours) (Notes from 1/3/2018  3:45  PM through 1/4/2018  3:45 PM)     No notes of this type exist for this encounter.           Physician Progress Notes (last 24 hours) (Notes from 1/3/2018  3:45 PM through 1/4/2018  3:45 PM)      YANG Mathews at 1/4/2018  8:23 AM  Version 1 of 1          LOS: 7 days     Chief Complaint:  Pulmonology is following for respiratory failure     Subjective     Interval History:     Mr. Anne is doing better this morning, no respiratory distress noted on exam, blood pressure has improved, was taken off cardene gtt yesterday.     History taken from: patient chart RN    Review of Systems:   Review of Systems   Constitutional: Negative for chills, fatigue and fever.   HENT: Negative for congestion and rhinorrhea.    Eyes: Negative for visual disturbance.   Respiratory: Negative for cough, shortness of breath and wheezing.    Cardiovascular: Positive for leg swelling. Negative for chest pain.   Gastrointestinal: Negative for abdominal distention and abdominal pain.   Endocrine: Negative for cold intolerance and heat intolerance.   Genitourinary: Negative for difficulty urinating.   Musculoskeletal: Negative for arthralgias and myalgias.   Skin: Negative for color change and pallor.   Neurological: Negative for dizziness, weakness and light-headedness.   Psychiatric/Behavioral: Negative for agitation, behavioral problems and confusion.                     Objective     Vital Signs  Temp:  [97.7 °F (36.5 °C)-98.3 °F (36.8 °C)] 98.1 °F (36.7 °C)  Heart Rate:  [48-90] 53  Resp:  [16-26] 25  BP: (120-166)/() 156/63  Body mass index is 45.06 kg/(m^2).    Intake/Output Summary (Last 24 hours) at 01/04/18 0823  Last data filed at 01/04/18 0513   Gross per 24 hour   Intake                0 ml   Output             1175 ml   Net            -1175 ml          Physical Exam:  GENERAL APPEARANCE: Well developed, well nourished, alert and cooperative, and appears to be in no acute distress.    HEAD: normocephalic.    EYES:  PERRL    NECK: Neck supple.     CARDIAC: Normal S1 and S2. No S3, S4 or murmurs. Rhythm is regular. There is no cyanosis or pallor. Extremities are warm and well perfused. Capillary refill is less than 2 seconds. No carotid bruits.    Respiratory: Clear to auscultation and percussion without rales, rhonchi, wheezing or diminished breath sounds.    GI: Positive bowel sounds. Soft, nondistended, nontender.     Musculoskeletal: No significant deformity or joint abnormality. 3+ bilateral pitting edema.     NEUROLOGICAL: Strength and sensation symmetric and intact throughout.     PSYCHIATRIC: The mental examination revealed the patient was oriented to person, place, and time.                 Results Review:                I reviewed the patient's new clinical results.  I reviewed the patient's new imaging results and agree with the interpretation.    Results from last 7 days  Lab Units 01/04/18  0042 01/03/18  0201 01/02/18  0044   WBC 10*3/mm3 6.71 6.64 5.76   HEMOGLOBIN g/dL 7.0* 7.8* 8.4*   PLATELETS 10*3/mm3 182 173 153       Results from last 7 days  Lab Units 01/04/18  0042 01/03/18  0201 01/02/18  1702 01/02/18  0044 01/01/18  0050 12/31/17  0117 12/30/17  0234   SODIUM mmol/L 146 146  --  143 145 144 139   POTASSIUM mmol/L 4.2 4.0 4.4 3.6 3.8 4.0 3.6   CHLORIDE mmol/L 108 108  --  107 110 108 103   CO2 mmol/L 30.8 27.7  --  30.0 31.3 31.9 27.3   BUN mg/dL 30* 26*  --  24* 22* 22* 20   CREATININE mg/dL 1.41* 1.42*  --  1.38* 1.32* 1.45* 1.46*   CALCIUM mg/dL 8.6 8.8  --  8.7 8.4 8.2 7.5*   GLUCOSE mg/dL 102 145*  --  116* 139* 179* 281*   MAGNESIUM mg/dL  --   --   --   --  2.7* 2.7* 2.6     Lab Results   Component Value Date    INR 1.18 (H) 12/06/2017    INR 1.12 (H) 12/02/2017    INR 1.01 01/27/2017    PROTIME 15.2 12/06/2017    PROTIME 14.5 12/02/2017    PROTIME 11.4 01/27/2017       Results from last 7 days  Lab Units 01/01/18  0050 12/31/17  0117 12/30/17  0234   ALK PHOS U/L 42 47 41   BILIRUBIN mg/dL 1.1  1.0 0.6   ALT (SGPT) U/L 52* 51* 25   AST (SGOT) U/L 25 27 17       Results from last 7 days  Lab Units 01/03/18  0815   PH, ARTERIAL pH units 7.378   PO2 ART mm Hg 65.0*   PCO2, ARTERIAL mm Hg 56.0*   HCO3 ART mmol/L 32.2*     Imaging Results (last 24 hours)     ** No results found for the last 24 hours. **             Medication Review:   Scheduled Medications:    albumin human 25 g Intravenous Once   amLODIPine 10 mg Oral Daily   aspirin 81 mg Oral Daily   atorvastatin 80 mg Oral Daily   budesonide-formoterol 2 puff Inhalation BID - RT   carvedilol 25 mg Oral BID With Meals   cetirizine 10 mg Oral Daily   cholecalciferol 2,000 Units Oral Daily   CloNIDine 0.1 mg Oral Q8H   doxycycline 100 mg Oral Q12H   ferrous sulfate 325 mg Oral Daily With Breakfast   fondaparinux 2.5 mg Subcutaneous Q24H   furosemide 60 mg Intravenous Once   gabapentin 300 mg Oral Nightly   hydrALAZINE 100 mg Oral Q8H   insulin aspart 0-14 Units Subcutaneous 4x Daily AC & at Bedtime   insulin aspart 10 Units Subcutaneous TID With Meals   insulin detemir 35 Units Subcutaneous Daily   insulin detemir 35 Units Subcutaneous Nightly   ipratropium 0.5 mg Nebulization 4x Daily - RT   isosorbide mononitrate 180 mg Oral Daily   lactobacillus acidophilus 1 capsule Oral Daily   levothyroxine 25 mcg Oral Daily   pantoprazole 40 mg Oral Daily   predniSONE 20 mg Oral Daily With Breakfast   sertraline 50 mg Oral Q PM   sodium chloride 10 mL Intracatheter Q12H   vitamin B-12 2,000 mcg Oral Daily     Continuous infusions:       Assessment/Plan      Respiratory Failure: likely related to underlying lung disease, COPD and fluid volume overload. Continue Doxy per ID recommendations. Will give lasix 60 mg x1 today with albumin, will monitor renal function and BP closely. Incentive spirometer 10 times during the day. Continue high flow nasal cannula during day, wean to maintain Sp02 >92-94%. Continue Bipap at HS.     Hypertension:  cardene off since yesterday  evening, clonidine .1mg PO was started yesterday, BP on rounds is 151/71. Continue continuous ECG and v/s monitoring, maintain MAP >65.        CKD stage III: Creatinine 1.41, 24 hour urine 1.1 liter, continue strict I&O, electrolytes WNL.     ID: WBC is 6.71, Neutrophils are 76.9, no fevers, continue to monitor lab trends and clinical changes, continue doxy per ID recommendations.        Patient Active Problem List   Diagnosis Code   • COPD (chronic obstructive pulmonary disease) J44.9   • Shortness of breath R06.02   • Morbid obesity E66.01   • Hypoxia R09.02   • Edema extremities R60.0   • Sleep apnea G47.30   • Wheezing R06.2   • Allergic rhinitis J30.9   • Essential hypertension I10   • Sore throat J02.9   • Cough R05   • Pneumonia J18.9   • Respiratory failure J96.90       YANG Mathews  18  8:23 AM      Scribed for Dr. Gomez by YANG Cassidy.        Electronically signed by YANG Mathews at 2018  8:29 AM      Azeem Meneses MD at 2018  9:34 AM  Version 1 of 1         Pt's filling defect could be a trabeculation on the dome or it could be a bladder lesion/tumor.  Will plan on seeing him as an out pt for cystoscopy.  The appointment has already been made will sign off.     Electronically signed by Azeem Meneses MD at 2018  9:35 AM      Teresa Sandhu DO at 2018 12:39 PM  Version 1 of 1         HOSPITALIST PROGRESS NOTE    Patient Identification:  Name:  Se Anne  Age:  67 y.o.  Sex:  male  :  1950  MRN:  [id number]  Visit Number:  [id number]  Primary Care Provider:  No Known Provider    Length of stay:  7     HPI: 66 yo male admitted with dyspnea    Subjective:  The patient is seen this morning and is sitting in the bedside chair on HFNC. He states that he is feeling alittle better today. He denies chest pain. No abdominal pain or vomiting. He is using his bedside commode.    Present during exam: N/A    Current  Hospital Meds:    amLODIPine 10 mg Oral Daily   aspirin 81 mg Oral Daily   atorvastatin 80 mg Oral Daily   budesonide-formoterol 2 puff Inhalation BID - RT   carvedilol 25 mg Oral BID With Meals   cetirizine 10 mg Oral Daily   cholecalciferol 2,000 Units Oral Daily   CloNIDine 0.1 mg Oral Q8H   doxycycline 100 mg Oral Q12H   ferrous sulfate 325 mg Oral Daily With Breakfast   fondaparinux 2.5 mg Subcutaneous Q24H   gabapentin 300 mg Oral Nightly   hydrALAZINE 100 mg Oral Q8H   insulin aspart 0-14 Units Subcutaneous 4x Daily AC & at Bedtime   insulin aspart 10 Units Subcutaneous TID With Meals   insulin detemir 35 Units Subcutaneous Daily   insulin detemir 35 Units Subcutaneous Nightly   ipratropium 0.5 mg Nebulization 4x Daily - RT   isosorbide mononitrate 180 mg Oral Daily   levothyroxine 25 mcg Oral Daily   pantoprazole 40 mg Oral Daily   [START ON 1/5/2018] predniSONE 10 mg Oral Daily With Breakfast   sertraline 50 mg Oral Q PM   sodium chloride 10 mL Intracatheter Q12H   vitamin B-12 2,000 mcg Oral Daily       Vital Signs  Temp:  [97.7 °F (36.5 °C)-98.3 °F (36.8 °C)] 98.1 °F (36.7 °C)  Heart Rate:  [48-64] 52  Resp:  [16-25] 25  BP: (120-166)/() 145/52  Last 3 weights    01/02/18  0412 01/03/18  0405 01/04/18  0403   Weight: 130 kg (287 lb 0.6 oz) 127 kg (280 lb 4.8 oz) 127 kg (279 lb 3.2 oz)     Body mass index is 45.06 kg/(m^2).       Intake/Output Summary (Last 24 hours) at 01/04/18 1240  Last data filed at 01/04/18 1138   Gross per 24 hour   Intake                0 ml   Output             1975 ml   Net            -1975 ml       Physical exam:  Physical Exam   Constitutional: He is oriented to person, place, and time. He appears well-developed and well-nourished. No distress. Nasal cannula in place.   Obese; HFNC   HENT:   Head: Normocephalic and atraumatic.   Nose: Nose normal.   Mouth/Throat: Oropharynx is clear and moist and mucous membranes are normal.   Eyes: Conjunctivae and EOM are normal. Pupils  are equal, round, and reactive to light. No scleral icterus.   Neck: Trachea normal. Neck supple. No JVD present. Carotid bruit is not present. No thyromegaly present.   Cardiovascular: Normal rate, regular rhythm, normal heart sounds and normal pulses.  Exam reveals no gallop and no friction rub.    No murmur heard.  2+ edema bilateral lower extremities   Pulmonary/Chest: Effort normal. No respiratory distress. He has decreased breath sounds. He has wheezes.   Abdominal: Soft. Bowel sounds are normal. He exhibits no distension. There is no tenderness. There is no guarding.   Neurological: He is alert and oriented to person, place, and time. He has normal strength. No cranial nerve deficit.   Skin: Skin is warm, dry and intact. No rash noted. No erythema.   Psychiatric: He has a normal mood and affect. His speech is normal.      Telemetry: Sinus rhythm    Results Review:    Results from last 7 days  Lab Units 01/04/18  0042 01/03/18  0201 01/02/18  0044 01/01/18  0050 12/31/17  0117 12/30/17  0234 12/29/17  0427   WBC 10*3/mm3 6.71 6.64 5.76 6.19 8.63 8.29 9.38   HEMOGLOBIN g/dL 7.0* 7.8* 8.4* 8.8* 9.0* 8.0* 8.5*   HEMATOCRIT % 23.7* 25.4* 27.0* 28.7* 30.1* 26.3* 26.9*   PLATELETS 10*3/mm3 182 173 153 149 163 134 103*       Results from last 7 days  Lab Units 01/04/18  0042 01/03/18  0201 01/02/18  1702 01/02/18  0044 01/01/18  0050 12/31/17  0117 12/30/17  0234 12/29/17  1112 12/29/17  0427   SODIUM mmol/L 146 146  --  143 145 144 139  --  142   POTASSIUM mmol/L 4.2 4.0 4.4 3.6 3.8 4.0 3.6  --  3.8   CHLORIDE mmol/L 108 108  --  107 110 108 103  --  103   CO2 mmol/L 30.8 27.7  --  30.0 31.3 31.9 27.3  --  32.0*   BUN mg/dL 30* 26*  --  24* 22* 22* 20  --  21   CREATININE mg/dL 1.41* 1.42*  --  1.38* 1.32* 1.45* 1.46* 1.52* 1.40*   CALCIUM mg/dL 8.6 8.8  --  8.7 8.4 8.2 7.5*  --  7.6*   GLUCOSE mg/dL 102 145*  --  116* 139* 179* 281*  --  276*       Results from last 7 days  Lab Units 01/01/18  0050 12/31/17  0117  12/30/17  0234 12/29/17  0427   BILIRUBIN mg/dL 1.1 1.0 0.6 0.6   ALK PHOS U/L 42 47 41 41   AST (SGOT) U/L 25 27 17 16   ALT (SGPT) U/L 52* 51* 25 25       Results from last 7 days  Lab Units 01/01/18  0050 12/31/17  0117 12/30/17  0234 12/29/17  0427   MAGNESIUM mg/dL 2.7* 2.7* 2.6 1.1*           Results from last 7 days  Lab Units 12/29/17  0427 12/28/17  2155 12/28/17  1640   CK TOTAL U/L 45 39  39 40   TROPONIN I ng/mL 0.041* 0.038 0.035   CK MB INDEX % 2.4  --  1.3       C-RP: <0.50    CXR, 1/3/18:  FINDINGS:      There is bibasilar consolidation.  Appearance compatible with congestive heart failure.  There is no evidence of an acute osseous abnormality.   There are no suspicious-appearing parenchymal soft tissue nodules.      IMPRESSION:  Bibasilar consolidation and CHF.         Assessment/Plan      -Acute on chronic hypoxic and hypercapnic respiratory failure secondary to an acute COPD exacerbation with a bibasilar healthcare associated pneumonia and acute volume overload  -Septic shock that has resolved  -Generalized weakness  -Uncontrolled essential hypertension that has improved  -Acute hypomagnesemia, resolved  -Acute hypophosphatemia that has resolved  -Possible5 mm bladder polyp of unclear significance - outpatient urology follow up  -Diabetes mellitus type 2 with improving control  -Stage III chronic kidney disease  -Thrombocytopenia that has resolved  -Chronic microcytic anemia with iron deficiency; hemoglobin 7 today, likely due to acute illness and blood draws  -Obesity    Patient's antibiotic regimen has be de-escalated to oral doxycycline by ID through 1/5/18. Continue scheduled inhalants.  IV solumedrol has been changed to PO prednisone by Pulmonary; continue to wean as tolerated. Continue inhalents. Continue IV Lasix as tolerated; patient to receive an additional dose today. Continue physical therapy as tolerated/as available. Will consult OT as well as patient may be interested in SNF for PT  after discharge.    Cardene drip has been discontinued. Goal SBP would be 140-150 mmHg given his history of Stage III CKD to ensure renal perfusion. Continue clonidine and his other anti-hypertensive medications and titrate based on BP.    Continue to supplement his electrolytes as needed.  Continue with his current insulin regimen as his blood glucose levels have improved.    Repeat his CBC and BMP in the morning. C-RP has normalized.    The patient is high risk due to the following diagnoses/reasons:  Acute on chronic respiratory failure, ross, ckd, uncontrolled htn    I discussed the patients findings and my recommendations with: patient and nursing staff.    Disposition  Home when medically stable; may benefit from SNF v swing v inpatient rehab. Will consult OT today and discuss with NUBIA Sandhu DO  01/04/18  12:40 PM         Electronically signed by Teresa Sandhu DO at 1/4/2018 12:53 PM

## 2018-01-04 NOTE — PROGRESS NOTES
Chief Complaint: Bladder Mass  Pt's filling defect could be a trabeculation on the dome or it could be a bladder lesion/tumor.  Will plan on seeing him as an out pt for cystoscopy.  The appointment has already been made will sign off.

## 2018-01-04 NOTE — THERAPY TREATMENT NOTE
Acute Care - Physical Therapy Treatment Note   [place]     Patient Name: Se Anne  : 1950  MRN: [id number]  Today's Date: 2018  Onset of Illness/Injury or Date of Surgery Date: 18 (admit date)  Date of Referral to PT: 18  Referring Physician: Renzo    Admit Date: 2017    Visit Dx:    ICD-10-CM ICD-9-CM   1. Acute on chronic respiratory failure with hypoxia and hypercapnia J96.21 518.84    J96.22 786.09     799.02   2. COPD exacerbation J44.1 491.21     Patient Active Problem List   Diagnosis   • COPD (chronic obstructive pulmonary disease)   • Shortness of breath   • Morbid obesity   • Hypoxia   • Edema extremities   • Sleep apnea   • Wheezing   • Allergic rhinitis   • Essential hypertension   • Sore throat   • Cough   • Pneumonia   • Respiratory failure               Adult Rehabilitation Note       18 1546 18 1629 18 1613    Rehab Assessment/Intervention    Discipline physical therapy assistant  -RF physical therapy assistant  -RF physical therapist  -CT    Document Type therapy note (daily note)  -RF therapy note (daily note)  -RF therapy note (daily note)  -CT    Subjective Information agree to therapy  -RF agree to therapy  -RF agree to therapy  -CT    Patient Effort, Rehab Treatment good  -RF good  -RF good  -CT    Symptoms Noted During/After Treatment shortness of breath;fatigue  -RF fatigue;shortness of breath  -RF     Precautions/Limitations fall precautions;oxygen therapy device and L/min  -RF fall precautions;oxygen therapy device and L/min  -RF fall precautions;oxygen therapy device and L/min  -CT    Patient Response to Treatment Patient tolerant to treatment session, but demonstrates signifincant SOB with all activities and transfers. Pt cued for PLB throughout treatment.   -RF Patient with limited activity secondary to respiratory condition and need for bipap and hiflow 02 machines. Patient was assisted to EOB sitting X10 mins and was changed from  bipap to hiflow machine. Pt was then assisted to reclining chair and left to eat breakfast with call bell in hand. Good activity tolerance was noted, however SOB was noted and rest breaks were required.   -RF Pt on hiflow O2 during treament session limiting functional mobility. Pt tolerated treatment session fairly well with rest breaks provided as needed.   -CT    Recorded by [RF] Stacia Potter PTA [RF] Stacia Potter PTA [CT] Lisette Dowell, KENDAL    Vital Signs    Pre SpO2 (%) 98  -RF 93  -RF     O2 Delivery Pre Treatment supplemental O2  -RF supplemental O2  -RF     Intra SpO2 (%) 94  -RF 90  -RF     O2 Delivery Intra Treatment supplemental O2  -RF supplemental O2  -RF     Post SpO2 (%) 98  -RF 97  -RF     O2 Delivery Post Treatment supplemental O2  -RF supplemental O2  -RF     Recorded by [RF] Stacia Potter PTA [RF] Stacia Potter PTA     Pain Assessment    Pain Assessment No/denies pain  -RF No/denies pain  -RF     Recorded by [RF] Stacia Potter, EMMANUEL [RF] Stacia Potter PTA     Cognitive Assessment/Intervention    Current Cognitive/Communication Assessment functional  -RF functional  -RF functional  -CT    Orientation Status oriented x 4  -RF oriented x 4  -RF oriented x 4  -CT    Follows Commands/Answers Questions able to follow single-step instructions;100% of the time  -RF able to follow single-step instructions;100% of the time  -RF     Personal Safety good awareness, safety precautions  -RF good awareness, safety precautions  -RF     Personal Safety Interventions fall prevention program maintained;muscle strengthening facilitated;gait belt;nonskid shoes/slippers when out of bed  -RF fall prevention program maintained;muscle strengthening facilitated;gait belt;nonskid shoes/slippers when out of bed  -RF fall prevention program maintained;gait belt;muscle strengthening facilitated;nonskid shoes/slippers when out of bed  -CT    Recorded by [RF] Stacia Potter PTA [RF] Stacia Potter,  PTA [CT] Lisette Dowell, PT    Bed Mobility, Assessment/Treatment    Bed Mobility, Assistive Device bed rails  -RF bed rails  -RF bed rails  -CT    Bed Mobility, Roll Left, [place] minimum assist (75% patient effort)  -RF minimum assist (75% patient effort)  -RF minimum assist (75% patient effort)  -CT    Bed Mobility, Roll Right, [place] minimum assist (75% patient effort)  -RF minimum assist (75% patient effort)  -RF minimum assist (75% patient effort)  -CT    Bed Mobility, Scoot/Bridge, [place] minimum assist (75% patient effort)  -RF minimum assist (75% patient effort)  -RF minimum assist (75% patient effort)  -CT    Bed Mob, Supine to Sit, [place] minimum assist (75% patient effort)  -RF minimum assist (75% patient effort)  -RF minimum assist (75% patient effort)  -CT    Bed Mobility, Impairments strength decreased  -RF strength decreased  -RF strength decreased  -CT    Recorded by [RF] Stacia Potter, PTA [RF] Stacia Potter, PTA [CT] Lisette Dowell, PT    Transfer Assessment/Treatment    Transfers, Bed-Chair [place] minimum assist (75% patient effort);contact guard assist  -RF minimum assist (75% patient effort)  -RF minimum assist (75% patient effort)  -CT    Transfers, Chair-Bed [place] minimum assist (75% patient effort);contact guard assist  -RF minimum assist (75% patient effort)  -RF minimum assist (75% patient effort)  -CT    Transfers, Bed-Chair-Bed, Assist Device rolling walker  -RF rolling walker  -RF rolling walker  -CT    Transfers, Sit-Stand [place] minimum assist (75% patient effort);contact guard assist  -RF minimum assist (75% patient effort)  -RF minimum assist (75% patient effort)  -CT    Transfers, Stand-Sit [place] minimum assist (75% patient effort);contact guard assist  -RF minimum assist (75% patient effort)  -RF minimum assist (75% patient effort)  -CT    Transfers, Sit-Stand-Sit, Assist Device rolling walker;bed rails  -RF rolling walker;bed  rails  -RF rolling walker;bed rails  -CT    Transfer, Impairments strength decreased  -RF strength decreased  -RF strength decreased  -CT    Recorded by [RF] Stacia Potter PTA [RF] Stacia Potter PTA [CT] Lisette Dowell PT    Gait Assessment/Treatment    Gait, [place] Level minimum assist (75% patient effort);2 person assist required;verbal cues required;nonverbal cues required (demo/gesture);contact guard assist  -RF minimum assist (75% patient effort);2 person assist required;1 person + 1 person to manage equipment;verbal cues required;nonverbal cues required (demo/gesture)  -RF minimum assist (75% patient effort);2 person assist required;1 person + 1 person to manage equipment;verbal cues required;nonverbal cues required (demo/gesture)  -CT    Gait, Assistive Device rolling walker  -RF rolling walker  -RF rolling walker  -CT    Gait, Distance (Feet) 5  -RF 10  -RF --   during transfer  -CT    Gait, Gait Pattern Analysis swing-to gait  -RF swing-to gait  -RF swing-to gait  -CT    Gait, Comment Short distance from bed to chair.   -RF short distance from bed to chair.   -RF     Recorded by [RF] Stacia Potter PTA [RF] Stacia Potter PTA [CT] Lisette Dowell PT    Therapy Exercises    Bilateral Lower Extremities AROM:;5 reps;sitting   Pt becomes SOB with increased reps and requires rest breaks  -RF AROM:;5 reps;sitting  -RF AROM:;standing;5 reps  -CT    Recorded by [RF] Stacia Potter PTA [RF] Stacia Potter PTA [CT] Lisette Dowell PT    Positioning and Restraints    Pre-Treatment Position in bed  -RF in bed  -RF in bed  -CT    Post Treatment Position chair  -RF chair  -RF chair  -CT    In Chair sitting;call light within reach;encouraged to call for assist  -RF sitting;call light within reach;encouraged to call for assist  -RF sitting;call light within reach;encouraged to call for assist;notified nsg  -CT    Recorded by [RF] Stacia Potter PTA [RF] Stacia Potter PTA [CT] Lisette  Delmer PT      User Key  (r) = Recorded By, (t) = Taken By, (c) = Cosigned By    Initials Name Effective Dates    CT Lisette Valladaresell, PT 03/14/16 -     RF Stacia Potter, PTA 07/19/17 -                 IP PT Goals       01/01/18 1139          Transfer Training PT LTG    Transfer Training PT LTG, Date Established 01/01/18  -CT      Transfer Training PT LTG, Time to Achieve by discharge  -CT      Transfer Training PT LTG, Activity Type bed to chair /chair to bed;sit to stand/stand to sit  -CT      Transfer Training PT LTG, [place] Level supervision required;contact guard assist  -CT      Transfer Training PT LTG, Assist Device other (see comments)   with appropriate AD  -CT      Gait Training PT LTG    Gait Training Goal PT LTG, Date Established 01/01/18  -CT      Gait Training Goal PT LTG, Time to Achieve by discharge  -CT      Gait Training Goal PT LTG, [place] Level supervision required;contact guard assist  -CT      Gait Training Goal PT LTG, Assist Device other (see comments)   with appropriate AD  -CT      Gait Training Goal PT LTG, Distance to Achieve 50  -CT        User Key  (r) = Recorded By, (t) = Taken By, (c) = Cosigned By    Initials Name Provider Type    CT Lisette Delmer, KENDAL Physical Therapist          Physical Therapy Education     Title: PT OT SLP Therapies (Done)     Topic: Physical Therapy (Done)     Point: Mobility training (Done)    Learning Progress Summary    Learner Readiness Method Response Comment Documented by Status   Patient Acceptance E VU,NR  RF 01/04/18 1550 Done    Acceptance E VU  KF 01/04/18 0014 Done    Acceptance E VU,NR  RF 01/03/18 1634 Done    Acceptance E NR  KF 01/03/18 0152 Active    Acceptance E VU  CT 01/02/18 1615 Done    Acceptance E NR  KF 01/02/18 0513 Active    Acceptance E VU  CT 01/01/18 1140 Done               Point: Home exercise program (Done)    Learning Progress Summary    Learner Readiness Method Response Comment Documented by Status   Patient  Acceptance E VU,NR  RF 01/04/18 1550 Done    Acceptance E VU  KF 01/04/18 0014 Done    Acceptance E VU,NR  RF 01/03/18 1634 Done    Acceptance E NR  KF 01/03/18 0152 Active    Acceptance E VU  CT 01/02/18 1615 Done    Acceptance E NR  KF 01/02/18 0513 Active    Acceptance E VU  CT 01/01/18 1140 Done               Point: Body mechanics (Done)    Learning Progress Summary    Learner Readiness Method Response Comment Documented by Status   Patient Acceptance E VU,NR   01/04/18 1550 Done    Acceptance E VU  KF 01/04/18 0014 Done    Acceptance E VU,NR  RF 01/03/18 1634 Done    Acceptance E NR  KF 01/03/18 0152 Active    Acceptance E VU  CT 01/02/18 1615 Done    Acceptance E NR  KF 01/02/18 0513 Active    Acceptance E VU  CT 01/01/18 1140 Done               Point: Precautions (Done)    Learning Progress Summary    Learner Readiness Method Response Comment Documented by Status   Patient Acceptance E VU,NR   01/04/18 1550 Done    Acceptance E VU  KF 01/04/18 0014 Done    Acceptance E VU,NR   01/03/18 1634 Done    Acceptance E NR  KF 01/03/18 0152 Active    Acceptance E VU  CT 01/02/18 1615 Done    Acceptance E NR  KF 01/02/18 0513 Active    Acceptance E VU  CT 01/01/18 1140 Done                      User Key     Initials Effective Dates Name Provider Type Discipline    CT 03/14/16 -  Lisette Dowell, PT Physical Therapist PT     07/19/17 -  Stacia Potter PTA Physical Therapy Assistant PT     07/19/17 -  Suzy Jacobs, RN Registered Nurse Nurse                    PT Recommendation and Plan  Anticipated Equipment Needs At Discharge:  (to be determined)  Anticipated Discharge Disposition: home with 24/7 care, home with home health  Planned Therapy Interventions: balance training, bed mobility training, gait training, home exercise program, motor coordination training, neuromuscular re-education, patient/family education, postural re-education, stair training, strengthening, transfer training  PT Frequency: 3-5  times/wk, per priority policy             Outcome Measures       01/04/18 1500 01/03/18 1600 01/02/18 1600    How much help from another person do you currently need...    Turning from your back to your side while in flat bed without using bedrails? 4  -RF 4  -RF 4  -CT    Moving from lying on back to sitting on the side of a flat bed without bedrails? 3  -RF 3  -RF 3  -CT    Moving to and from a bed to a chair (including a wheelchair)? 3  -RF 3  -RF 3  -CT    Standing up from a chair using your arms (e.g., wheelchair, bedside chair)? 3  -RF 3  -RF 3  -CT    Climbing 3-5 steps with a railing? 2  -RF 2  -RF 2  -CT    To walk in hospital room? 2  -RF 2  -RF 2  -CT    AM-PAC 6 Clicks Score 17  -RF 17  -RF 17  -CT    Functional Assessment    Outcome Measure Options AM-PAC 6 Clicks Basic Mobility (PT)  -RF AM-PAC 6 Clicks Basic Mobility (PT)  -RF AM-PAC 6 Clicks Basic Mobility (PT)  -CT      User Key  (r) = Recorded By, (t) = Taken By, (c) = Cosigned By    Initials Name Provider Type    CT Lisette Dowell, KENDAL Physical Therapist    RF Stacia Potter PTA Physical Therapy Assistant           Time Calculation:         PT Charges       01/04/18 1550          Time Calculation    PT Received On 01/04/18  -RF      PT - Next Appointment 01/05/18  -RF      PT Goal Re-Cert Due Date 01/15/18  -RF      Time Calculation- PT    Total Timed Code Minutes- PT 24 minute(s)  -RF        User Key  (r) = Recorded By, (t) = Taken By, (c) = Cosigned By    Initials Name Provider Type    RF Stacia Potter PTA Physical Therapy Assistant          Therapy Charges for Today     Code Description Service Date Service Provider Modifiers Qty    98008817504 HC PT THER PROC EA 15 MIN 1/3/2018 Stacia Potter PTA GP 1    45878642888 HC PT THERAPEUTIC ACT EA 15 MIN 1/3/2018 Stacia Potter PTA GP 2    05300901422 HC PT THER SUPP EA 15 MIN 1/3/2018 Stacia Potter PTA GP 3    27130854384 HC PT THER PROC EA 15 MIN 1/4/2018 Stacia Potter, PTA GP 1     [id number]  PT THERAPEUTIC ACT EA 15 MIN 1/4/2018 Stacia Potter PTA GP 1    78792316843 HC PT THER SUPP EA 15 MIN 1/4/2018 Stacia Potter PTA GP 2          PT G-Codes  Outcome Measure Options: AM-PAC 6 Clicks Basic Mobility (PT)  Score: 17  Functional Limitation: Mobility: Walking and moving around  Mobility: Walking and Moving Around Current Status (): At least 40 percent but less than 60 percent impaired, limited or restricted  Mobility: Walking and Moving Around Goal Status (): At least 20 percent but less than 40 percent impaired, limited or restricted    Stacia Potter PTA  1/4/2018

## 2018-01-04 NOTE — PROGRESS NOTES
HOSPITALIST PROGRESS NOTE    Patient Identification:  Name:  Se Anne  Age:  67 y.o.  Sex:  male  :  1950  MRN:  [id number]  Visit Number:  [id number]  Primary Care Provider:  No Known Provider    Length of stay:  7     HPI: 66 yo male admitted with dyspnea    Subjective:  The patient is seen this morning and is sitting in the bedside chair on HFNC. He states that he is feeling alittle better today. He denies chest pain. No abdominal pain or vomiting. He is using his bedside commode.    Present during exam: N/A    Current Hospital Meds:    amLODIPine 10 mg Oral Daily   aspirin 81 mg Oral Daily   atorvastatin 80 mg Oral Daily   budesonide-formoterol 2 puff Inhalation BID - RT   carvedilol 25 mg Oral BID With Meals   cetirizine 10 mg Oral Daily   cholecalciferol 2,000 Units Oral Daily   CloNIDine 0.1 mg Oral Q8H   doxycycline 100 mg Oral Q12H   ferrous sulfate 325 mg Oral Daily With Breakfast   fondaparinux 2.5 mg Subcutaneous Q24H   gabapentin 300 mg Oral Nightly   hydrALAZINE 100 mg Oral Q8H   insulin aspart 0-14 Units Subcutaneous 4x Daily AC & at Bedtime   insulin aspart 10 Units Subcutaneous TID With Meals   insulin detemir 35 Units Subcutaneous Daily   insulin detemir 35 Units Subcutaneous Nightly   ipratropium 0.5 mg Nebulization 4x Daily - RT   isosorbide mononitrate 180 mg Oral Daily   levothyroxine 25 mcg Oral Daily   pantoprazole 40 mg Oral Daily   [START ON 2018] predniSONE 10 mg Oral Daily With Breakfast   sertraline 50 mg Oral Q PM   sodium chloride 10 mL Intracatheter Q12H   vitamin B-12 2,000 mcg Oral Daily       Vital Signs  Temp:  [97.7 °F (36.5 °C)-98.3 °F (36.8 °C)] 98.1 °F (36.7 °C)  Heart Rate:  [48-64] 52  Resp:  [16-25] 25  BP: (120-166)/() 145/52  Last 3 weights    18  0412 18  0405 18  0403   Weight: 130 kg (287 lb 0.6 oz) 127 kg (280 lb 4.8 oz) 127 kg (279 lb 3.2 oz)     Body mass index is 45.06 kg/(m^2).       Intake/Output Summary (Last 24 hours)  at 01/04/18 1240  Last data filed at 01/04/18 1138   Gross per 24 hour   Intake                0 ml   Output             1975 ml   Net            -1975 ml       Physical exam:  Physical Exam   Constitutional: He is oriented to person, place, and time. He appears well-developed and well-nourished. No distress. Nasal cannula in place.   Obese; HFNC   HENT:   Head: Normocephalic and atraumatic.   Nose: Nose normal.   Mouth/Throat: Oropharynx is clear and moist and mucous membranes are normal.   Eyes: Conjunctivae and EOM are normal. Pupils are equal, round, and reactive to light. No scleral icterus.   Neck: Trachea normal. Neck supple. No JVD present. Carotid bruit is not present. No thyromegaly present.   Cardiovascular: Normal rate, regular rhythm, normal heart sounds and normal pulses.  Exam reveals no gallop and no friction rub.    No murmur heard.  2+ edema bilateral lower extremities   Pulmonary/Chest: Effort normal. No respiratory distress. He has decreased breath sounds. He has wheezes.   Abdominal: Soft. Bowel sounds are normal. He exhibits no distension. There is no tenderness. There is no guarding.   Neurological: He is alert and oriented to person, place, and time. He has normal strength. No cranial nerve deficit.   Skin: Skin is warm, dry and intact. No rash noted. No erythema.   Psychiatric: He has a normal mood and affect. His speech is normal.      Telemetry: Sinus rhythm    Results Review:    Results from last 7 days  Lab Units 01/04/18  0042 01/03/18  0201 01/02/18  0044 01/01/18  0050 12/31/17  0117 12/30/17  0234 12/29/17  0427   WBC 10*3/mm3 6.71 6.64 5.76 6.19 8.63 8.29 9.38   HEMOGLOBIN g/dL 7.0* 7.8* 8.4* 8.8* 9.0* 8.0* 8.5*   HEMATOCRIT % 23.7* 25.4* 27.0* 28.7* 30.1* 26.3* 26.9*   PLATELETS 10*3/mm3 182 173 153 149 163 134 103*       Results from last 7 days  Lab Units 01/04/18  0042 01/03/18  0201 01/02/18  1702 01/02/18  0044 01/01/18  0050 12/31/17  0117 12/30/17  0234 12/29/17  1112  12/29/17  0427   SODIUM mmol/L 146 146  --  143 145 144 139  --  142   POTASSIUM mmol/L 4.2 4.0 4.4 3.6 3.8 4.0 3.6  --  3.8   CHLORIDE mmol/L 108 108  --  107 110 108 103  --  103   CO2 mmol/L 30.8 27.7  --  30.0 31.3 31.9 27.3  --  32.0*   BUN mg/dL 30* 26*  --  24* 22* 22* 20  --  21   CREATININE mg/dL 1.41* 1.42*  --  1.38* 1.32* 1.45* 1.46* 1.52* 1.40*   CALCIUM mg/dL 8.6 8.8  --  8.7 8.4 8.2 7.5*  --  7.6*   GLUCOSE mg/dL 102 145*  --  116* 139* 179* 281*  --  276*       Results from last 7 days  Lab Units 01/01/18  0050 12/31/17  0117 12/30/17  0234 12/29/17  0427   BILIRUBIN mg/dL 1.1 1.0 0.6 0.6   ALK PHOS U/L 42 47 41 41   AST (SGOT) U/L 25 27 17 16   ALT (SGPT) U/L 52* 51* 25 25       Results from last 7 days  Lab Units 01/01/18  0050 12/31/17  0117 12/30/17  0234 12/29/17  0427   MAGNESIUM mg/dL 2.7* 2.7* 2.6 1.1*           Results from last 7 days  Lab Units 12/29/17  0427 12/28/17  2155 12/28/17  1640   CK TOTAL U/L 45 39  39 40   TROPONIN I ng/mL 0.041* 0.038 0.035   CK MB INDEX % 2.4  --  1.3       C-RP: <0.50    CXR, 1/3/18:  FINDINGS:      There is bibasilar consolidation.  Appearance compatible with congestive heart failure.  There is no evidence of an acute osseous abnormality.   There are no suspicious-appearing parenchymal soft tissue nodules.      IMPRESSION:  Bibasilar consolidation and CHF.         Assessment/Plan      -Acute on chronic hypoxic and hypercapnic respiratory failure secondary to an acute COPD exacerbation with a bibasilar healthcare associated pneumonia and acute volume overload  -Septic shock that has resolved  -Generalized weakness  -Uncontrolled essential hypertension that has improved  -Acute hypomagnesemia, resolved  -Acute hypophosphatemia that has resolved  -Possible5 mm bladder polyp of unclear significance - outpatient urology follow up  -Diabetes mellitus type 2 with improving control  -Stage III chronic kidney disease  -Thrombocytopenia that has resolved  -Chronic  microcytic anemia with iron deficiency; hemoglobin 7 today, likely due to acute illness and blood draws  -Obesity    Patient's antibiotic regimen has be de-escalated to oral doxycycline by ID through 1/5/18. Continue scheduled inhalants.  IV solumedrol has been changed to PO prednisone by Pulmonary; continue to wean as tolerated. Continue inhalents. Continue IV Lasix as tolerated; patient to receive an additional dose today. Continue physical therapy as tolerated/as available. Will consult OT as well as patient may be interested in SNF for PT after discharge.    Cardene drip has been discontinued. Goal SBP would be 140-150 mmHg given his history of Stage III CKD to ensure renal perfusion. Continue clonidine and his other anti-hypertensive medications and titrate based on BP.    Continue to supplement his electrolytes as needed.  Continue with his current insulin regimen as his blood glucose levels have improved.    Repeat his CBC and BMP in the morning. C-RP has normalized.    The patient is high risk due to the following diagnoses/reasons:  Acute on chronic respiratory failure, ross, ckd, uncontrolled htn    I discussed the patients findings and my recommendations with: patient and nursing staff.    Disposition  Home when medically stable; may benefit from SNF v swing v inpatient rehab. Will consult OT today and discuss with NUBIA Sandhu DO  01/04/18  12:40 PM

## 2018-01-05 NOTE — PROGRESS NOTES
Discharge Planning Assessment   [facility]     Patient Name: Se Anne  MRN: [id number]  Today's Date: 1/5/2018    Admit Date: 12/28/2017       Discharge Plan       01/05/18 1506    Case Management/Social Work Plan    Plan SS continued working on nursing home placement for the pt on this date.  SS received call from Laila and she states they contacted the insurance and the pt could not be located.  SS contacted Eboni in  and she contacted the pt's  at the insurance and she states the pt's insurance termed on January 1st, 2018.  SS contacted [place], pt's spouse and she states she will have her granddaughter look and see if their is inurance cards available and will have someone bring new cards to the hospital.  SS is also working on his spouse being admitted to the nursing home on this date.  SS spoke with the pt and he states he signed up for [employer] and his insurance card is at home.  SS contacted the pt's daughter-in-law, Carlotta and requested her to bring insurance cards to the hospital.  SS will follow.     Patient/Family In Agreement With Plan yes          Expected Discharge Date and Time     Expected Discharge Date Expected Discharge Time    Jan 8, 2018                Daniella Wilburn

## 2018-01-05 NOTE — THERAPY EVALUATION
Acute Care - Occupational Therapy Initial Evaluation   [place]     Patient Name: Se Anne  : 1950  MRN: [id number]  Today's Date: 2018  Onset of Illness/Injury or Date of Surgery Date: 18 (admit date)     Referring Physician: Renzo    Admit Date: 2017       ICD-10-CM ICD-9-CM   1. Acute on chronic respiratory failure with hypoxia and hypercapnia J96.21 518.84    J96.22 786.09     799.02   2. COPD exacerbation J44.1 491.21     Patient Active Problem List   Diagnosis   • COPD (chronic obstructive pulmonary disease)   • Shortness of breath   • Morbid obesity   • Hypoxia   • Edema extremities   • Sleep apnea   • Wheezing   • Allergic rhinitis   • Essential hypertension   • Sore throat   • Cough   • Pneumonia   • Respiratory failure     Past Medical History:   Diagnosis Date   • Agent orange exposure    • Arthritis    • CHF (congestive heart failure)    • COPD (chronic obstructive pulmonary disease)    • Coronary artery disease    • Diabetes mellitus    • Hyperlipidemia    • Hypertension      Past Surgical History:   Procedure Laterality Date   • CARDIAC SURGERY      stent placement   • CATARACT EXTRACTION     • JOINT REPLACEMENT      right knee   • KNEE SURGERY            OT ASSESSMENT FLOWSHEET (last 72 hours)      OT Evaluation       18 1315 18 0230 18 1930 18 1546 18 1502    Rehab Evaluation    Document Type evaluation  -KR   therapy note (daily note)  -RF     Subjective Information    agree to therapy  -RF     Patient Effort, Rehab Treatment    good  -RF     Symptoms Noted During/After Treatment    shortness of breath;fatigue  -RF     Symptoms Noted Comment Pt. presents with all AE/DME in home with 24/ care and assist provided as needed. Very SOA upon evaluation attempt. Discussed therapy needs and OT will defer endurance/mobility training to physical therapy at this time as tolerated.    -KR        General Information    Precautions/Limitations    fall  precautions;oxygen therapy device and L/min  -RF     Vital Signs    Pre SpO2 (%)    98  -RF     O2 Delivery Pre Treatment    supplemental O2  -RF     Intra SpO2 (%)    94  -RF     O2 Delivery Intra Treatment    supplemental O2  -RF     Post SpO2 (%)    98  -RF     O2 Delivery Post Treatment    supplemental O2  -RF     Pain Assessment    Pain Assessment    No/denies pain  -RF     Cognitive Assessment/Intervention    Current Cognitive/Communication Assessment    functional  -RF     Orientation Status    oriented x 4  -RF     Follows Commands/Answers Questions    able to follow single-step instructions;100% of the time  -RF     Personal Safety    good awareness, safety precautions  -RF     Personal Safety Interventions    fall prevention program maintained;muscle strengthening facilitated;gait belt;nonskid shoes/slippers when out of bed  -RF     Muscle Tone Assessment    Bilateral Upper Extremities Muscle Tone Assessment  mildly decreased tone  -MJ mildly decreased tone  -MJ  mildly decreased tone  -LK    Bilateral Lower Extremities Muscle Tone Assessment  mildly decreased tone  -MJ mildly decreased tone  -MJ  mildly decreased tone  -LK    Bed Mobility, Assessment/Treatment    Bed Mobility, Assistive Device    bed rails  -RF     Bed Mobility, Roll Left, [place]    minimum assist (75% patient effort)  -RF     Bed Mobility, Roll Right, [place]    minimum assist (75% patient effort)  -RF     Bed Mobility, Scoot/Bridge, [place]    minimum assist (75% patient effort)  -RF     Bed Mob, Supine to Sit, [place]    minimum assist (75% patient effort)  -RF     Bed Mobility, Impairments    strength decreased  -RF     Transfer Assessment/Treatment    Transfers, Bed-Chair [place]    minimum assist (75% patient effort);contact guard assist  -RF     Transfers, Chair-Bed [place]    minimum assist (75% patient effort);contact guard assist  -RF     Transfers, Bed-Chair-Bed, Assist Device    rolling walker   -RF     Transfers, Sit-Stand [place]    minimum assist (75% patient effort);contact guard assist  -RF     Transfers, Stand-Sit [place]    minimum assist (75% patient effort);contact guard assist  -RF     Transfers, Sit-Stand-Sit, Assist Device    rolling walker;bed rails  -RF     Transfer, Impairments    strength decreased  -RF     Therapy Exercises    Bilateral Lower Extremities    AROM:;5 reps;sitting   Pt becomes SOB with increased reps and requires rest breaks  -RF     Positioning and Restraints    Pre-Treatment Position    in bed  -RF     Post Treatment Position    chair  -RF     In Chair    sitting;call light within reach;encouraged to call for assist  -RF       01/04/18 0900 01/04/18 0230 01/03/18 2030 01/03/18 1629 01/03/18 1426    Rehab Evaluation    Document Type    therapy note (daily note)  -RF     Subjective Information    agree to therapy  -RF     Patient Effort, Rehab Treatment    good  -RF     Symptoms Noted During/After Treatment    fatigue;shortness of breath  -RF     General Information    Precautions/Limitations    fall precautions;oxygen therapy device and L/min  -RF     Vital Signs    Pre SpO2 (%)    93  -RF     O2 Delivery Pre Treatment    supplemental O2  -RF     Intra SpO2 (%)    90  -RF     O2 Delivery Intra Treatment    supplemental O2  -RF     Post SpO2 (%)    97  -RF     O2 Delivery Post Treatment    supplemental O2  -RF     Pain Assessment    Pain Assessment    No/denies pain  -RF     Cognitive Assessment/Intervention    Current Cognitive/Communication Assessment    functional  -RF     Orientation Status    oriented x 4  -RF     Follows Commands/Answers Questions    able to follow single-step instructions;100% of the time  -RF     Personal Safety    good awareness, safety precautions  -RF     Personal Safety Interventions    fall prevention program maintained;muscle strengthening facilitated;gait belt;nonskid shoes/slippers when out of bed  -RF     Muscle Tone Assessment     Muscle Tone Assessment  Bilateral Upper Extremities;Bilateral Lower Extremities  -KF Bilateral Upper Extremities;Bilateral Lower Extremities  -KF      Bilateral Upper Extremities Muscle Tone Assessment mildly decreased tone  -LK mildly decreased tone  -KF mildly decreased tone  -KF  mildly decreased tone  -LK    Bilateral Lower Extremities Muscle Tone Assessment mildly decreased tone  -LK mildly decreased tone  -KF mildly decreased tone  -KF  mildly decreased tone  -LK    Bed Mobility, Assessment/Treatment    Bed Mobility, Assistive Device    bed rails  -RF     Bed Mobility, Roll Left, [place]    minimum assist (75% patient effort)  -RF     Bed Mobility, Roll Right, [place]    minimum assist (75% patient effort)  -RF     Bed Mobility, Scoot/Bridge, [place]    minimum assist (75% patient effort)  -RF     Bed Mob, Supine to Sit, [place]    minimum assist (75% patient effort)  -RF     Bed Mobility, Impairments    strength decreased  -RF     Transfer Assessment/Treatment    Transfers, Bed-Chair [place]    minimum assist (75% patient effort)  -RF     Transfers, Chair-Bed [place]    minimum assist (75% patient effort)  -RF     Transfers, Bed-Chair-Bed, Assist Device    rolling walker  -RF     Transfers, Sit-Stand [place]    minimum assist (75% patient effort)  -RF     Transfers, Stand-Sit [place]    minimum assist (75% patient effort)  -RF     Transfers, Sit-Stand-Sit, Assist Device    rolling walker;bed rails  -RF     Transfer, Impairments    strength decreased  -RF     Therapy Exercises    Bilateral Lower Extremities    AROM:;5 reps;sitting  -RF     Positioning and Restraints    Pre-Treatment Position    in bed  -RF     Post Treatment Position    chair  -RF     In Chair    sitting;call light within reach;encouraged to call for assist  -RF       01/03/18 0836 01/03/18 0155 01/02/18 2030 01/02/18 1613       Rehab Evaluation    Document Type    therapy note (daily note)  -CT      Subjective Information    agree to therapy  -CT     Patient Effort, Rehab Treatment    good  -CT     General Information    Precautions/Limitations    fall precautions;oxygen therapy device and L/min  -CT     Cognitive Assessment/Intervention    Current Cognitive/Communication Assessment    functional  -CT     Orientation Status    oriented x 4  -CT     Personal Safety Interventions    fall prevention program maintained;gait belt;muscle strengthening facilitated;nonskid shoes/slippers when out of bed  -CT     Muscle Tone Assessment    Muscle Tone Assessment  Bilateral Upper Extremities;Bilateral Lower Extremities  -KF Bilateral Upper Extremities;Bilateral Lower Extremities  -KF      Bilateral Upper Extremities Muscle Tone Assessment mildly decreased tone  -LK mildly decreased tone  -KF mildly decreased tone  -KF      Bilateral Lower Extremities Muscle Tone Assessment mildly decreased tone  -LK mildly decreased tone  -KF mildly decreased tone  -KF      Bed Mobility, Assessment/Treatment    Bed Mobility, Assistive Device    bed rails  -CT     Bed Mobility, Roll Left, [place]    minimum assist (75% patient effort)  -CT     Bed Mobility, Roll Right, [place]    minimum assist (75% patient effort)  -CT     Bed Mobility, Scoot/Bridge, [place]    minimum assist (75% patient effort)  -CT     Bed Mob, Supine to Sit, [place]    minimum assist (75% patient effort)  -CT     Bed Mobility, Impairments    strength decreased  -CT     Transfer Assessment/Treatment    Transfers, Bed-Chair [place]    minimum assist (75% patient effort)  -CT     Transfers, Chair-Bed [place]    minimum assist (75% patient effort)  -CT     Transfers, Bed-Chair-Bed, Assist Device    rolling walker  -CT     Transfers, Sit-Stand [place]    minimum assist (75% patient effort)  -CT     Transfers, Stand-Sit [place]    minimum assist (75% patient effort)  -CT     Transfers, Sit-Stand-Sit, Assist Device    rolling  walker;bed rails  -CT     Transfer, Impairments    strength decreased  -CT     Therapy Exercises    Bilateral Lower Extremities    AROM:;standing;5 reps  -CT     Positioning and Restraints    Pre-Treatment Position    in bed  -CT     Post Treatment Position    chair  -CT     In Chair    sitting;call light within reach;encouraged to call for assist;notified nsg  -CT       User Key  (r) = Recorded By, (t) = Taken By, (c) = Cosigned By    Initials Name Effective Dates    LK Mami Badillo, REGGIE 06/16/16 -     KR Kirk Higginbotham, OT 03/14/16 -     CT Lisette Dowell, PT 03/14/16 -     MJ Jailene Anne RN 06/16/16 -     RF Stacia Potter PTA 07/19/17 -     KF Suzy Jacobs RN 07/19/17 -                  OT Recommendation and Plan                    Outcome Measures       01/04/18 1500 01/03/18 1600 01/02/18 1600    How much help from another person do you currently need...    Turning from your back to your side while in flat bed without using bedrails? 4  -RF 4  -RF 4  -CT    Moving from lying on back to sitting on the side of a flat bed without bedrails? 3  -RF 3  -RF 3  -CT    Moving to and from a bed to a chair (including a wheelchair)? 3  -RF 3  -RF 3  -CT    Standing up from a chair using your arms (e.g., wheelchair, bedside chair)? 3  -RF 3  -RF 3  -CT    Climbing 3-5 steps with a railing? 2  -RF 2  -RF 2  -CT    To walk in hospital room? 2  -RF 2  -RF 2  -CT    AM-PAC 6 Clicks Score 17  -RF 17  -RF 17  -CT    Functional Assessment    Outcome Measure Options AM-PAC 6 Clicks Basic Mobility (PT)  -RF AM-PAC 6 Clicks Basic Mobility (PT)  -RF AM-PAC 6 Clicks Basic Mobility (PT)  -CT      User Key  (r) = Recorded By, (t) = Taken By, (c) = Cosigned By    Initials Name Provider Type    CT Lisette Dowell, PT Physical Therapist    RF Stacia Potter, PTA Physical Therapy Assistant          Time Calculation:        Therapy Charges for Today     Code Description Service Date Service Provider Modifiers Qty    [id number]   OT EVAL LOW COMPLEXITY 1 1/5/2018 Kirk Higginbotham, OT GO 1               Kirk Higginbotham, OT  1/5/2018

## 2018-01-05 NOTE — PROGRESS NOTES
HOSPITALIST PROGRESS NOTE    Patient Identification:  Name:  Se Anne  Age:  67 y.o.  Sex:  male  :  1950  MRN:  [id number]  Visit Number:  [id number]  Primary Care Provider:  No Known Provider    Length of stay:  8     HPI: 66 yo male admitted with dyspnea    Subjective:  The patient is seen this morning and is sitting in the bedside chair on HFNC. Continues to report that his dyspnea is slowly improving. He reports productive cough. He denies any new symptoms - no urinary symptoms, abdominal pain and/or diarrhea.    Present during exam: REGGIE Bolaños    Current Hospital Meds:    albumin human 25 g Intravenous Once   amLODIPine 10 mg Oral Daily   aspirin 81 mg Oral Daily   atorvastatin 80 mg Oral Daily   budesonide-formoterol 2 puff Inhalation BID - RT   carvedilol 12.5 mg Oral BID With Meals   cetirizine 10 mg Oral Daily   cholecalciferol 2,000 Units Oral Daily   CloNIDine 0.1 mg Oral Q8H   doxycycline 100 mg Oral Q12H   ferrous sulfate 325 mg Oral Daily With Breakfast   fondaparinux 2.5 mg Subcutaneous Q24H   gabapentin 300 mg Oral Nightly   hydrALAZINE 100 mg Oral Q8H   insulin aspart 0-14 Units Subcutaneous 4x Daily AC & at Bedtime   insulin aspart 8 Units Subcutaneous TID With Meals   insulin detemir 30 Units Subcutaneous Daily   insulin detemir 32 Units Subcutaneous Nightly   ipratropium 0.5 mg Nebulization 4x Daily - RT   isosorbide mononitrate 180 mg Oral Daily   levothyroxine 25 mcg Oral Daily   pantoprazole 40 mg Oral Daily   predniSONE 10 mg Oral Daily With Breakfast   sertraline 50 mg Oral Q PM   sodium chloride 10 mL Intracatheter Q12H   sodium chloride      vitamin B-12 2,000 mcg Oral Daily       Vital Signs  Temp:  [97.6 °F (36.4 °C)-98.5 °F (36.9 °C)] 97.6 °F (36.4 °C)  Heart Rate:  [49-74] 55  Resp:  [10-27] 10  BP: (127-180)/(51-81) 155/61  Last 3 weights    18  0405 18  0403 18  0435   Weight: 127 kg (280 lb 4.8 oz) 127 kg (279 lb 3.2 oz) 126 kg (278 lb 1.6 oz)      Body mass index is 44.89 kg/(m^2).       Intake/Output Summary (Last 24 hours) at 01/05/18 1103  Last data filed at 01/05/18 0800   Gross per 24 hour   Intake              880 ml   Output             3175 ml   Net            -2295 ml       Physical exam:  Physical Exam   Constitutional: He is oriented to person, place, and time. He appears well-developed and well-nourished. No distress. Nasal cannula in place.   Obese; HFNC   HENT:   Head: Normocephalic and atraumatic.   Nose: Nose normal.   Mouth/Throat: Oropharynx is clear and moist and mucous membranes are normal.   Eyes: Conjunctivae and EOM are normal. Pupils are equal, round, and reactive to light. No scleral icterus.   Neck: Trachea normal. Neck supple. No JVD present. Carotid bruit is not present. No thyromegaly present.   Cardiovascular: Normal rate, regular rhythm, normal heart sounds and normal pulses.  Exam reveals no gallop and no friction rub.    No murmur heard.  2+ edema bilateral lower extremities   Pulmonary/Chest: Effort normal. No respiratory distress. He has decreased breath sounds. He has wheezes. He has rhonchi.   Abdominal: Soft. Bowel sounds are normal. He exhibits no distension. There is no tenderness. There is no guarding.   Neurological: He is alert and oriented to person, place, and time. He has normal strength. No cranial nerve deficit.   Skin: Skin is warm, dry and intact. No rash noted. No erythema.   Psychiatric: He has a normal mood and affect. His speech is normal.      Telemetry: Sinus rhythm    Results Review:    Results from last 7 days  Lab Units 01/05/18  1002 01/05/18  0106 01/04/18  0042 01/03/18  0201 01/02/18  0044 01/01/18  0050 12/31/17  0117 12/30/17  0234   WBC 10*3/mm3  --  7.06 6.71 6.64 5.76 6.19 8.63 8.29   HEMOGLOBIN g/dL 8.3* 6.6* 7.0* 7.8* 8.4* 8.8* 9.0* 8.0*   HEMATOCRIT % 26.3* 21.9* 23.7* 25.4* 27.0* 28.7* 30.1* 26.3*   PLATELETS 10*3/mm3  --  172 182 173 153 149 163 134       Results from last 7  days  Lab Units 01/05/18  0106 01/04/18  0042 01/03/18  0201 01/02/18  1702 01/02/18  0044 01/01/18  0050 12/31/17  0117 12/30/17  0234   SODIUM mmol/L 142 146 146  --  143 145 144 139   POTASSIUM mmol/L 3.8 4.2 4.0 4.4 3.6 3.8 4.0 3.6   CHLORIDE mmol/L 105 108 108  --  107 110 108 103   CO2 mmol/L 34.1* 30.8 27.7  --  30.0 31.3 31.9 27.3   BUN mg/dL 30* 30* 26*  --  24* 22* 22* 20   CREATININE mg/dL 1.34* 1.41* 1.42*  --  1.38* 1.32* 1.45* 1.46*   CALCIUM mg/dL 9.0 8.6 8.8  --  8.7 8.4 8.2 7.5*   GLUCOSE mg/dL 182* 102 145*  --  116* 139* 179* 281*       Results from last 7 days  Lab Units 01/01/18  0050 12/31/17  0117 12/30/17  0234   BILIRUBIN mg/dL 1.1 1.0 0.6   ALK PHOS U/L 42 47 41   AST (SGOT) U/L 25 27 17   ALT (SGPT) U/L 52* 51* 25       Results from last 7 days  Lab Units 01/01/18  0050 12/31/17  0117 12/30/17  0234   MAGNESIUM mg/dL 2.7* 2.7* 2.6       CXR, 1/3/18:  FINDINGS:      There is bibasilar consolidation.  Appearance compatible with congestive heart failure.  There is no evidence of an acute osseous abnormality.   There are no suspicious-appearing parenchymal soft tissue nodules.      IMPRESSION:  Bibasilar consolidation and CHF.         Assessment/Plan      -Acute on chronic hypoxic and hypercapnic respiratory failure secondary to an acute COPD exacerbation with a bibasilar healthcare associated pneumonia and acute volume overload/acute diastolic heart failure exacerbation  -Septic shock that has resolved  -Generalized weakness  -Insulin dependent diabetes mellitus type 2 with intermittent hypoglycemia  -Acute on chronic microcytic anemia with iron deficiency; hemoglobin 7 today, likely due to acute illness and blood draws  -Uncontrolled essential hypertension  -Acute hypomagnesemia, resolved  -Acute hypophosphatemia that has resolved  -Possible5 mm bladder polyp of unclear significance - outpatient urology follow up  -Stage III chronic kidney disease  -Thrombocytopenia that has  resolved  -Obesity    The patient received 1 unit of PRBC today with a good response in his hgb/hct. Will repeat his CBC tomorrow. No signs of active bleeding.     Patient's antibiotic regimen has be de-escalated to oral doxycycline by ID through 1/5/18. Continue scheduled inhalants.  IV solumedrol has been changed to PO prednisone by Pulmonary; continue to wean as tolerated. Continue inhalents. Continue IV Lasix as tolerated; patient to a dose today. Add Mucinex and update chest xray tomorrow morning.    Continue physical therapy as tolerated/as available. Will consult OT as well as patient may be interested in SNF for PT after discharge.  assisting with possible discharge to SNF.     Coreg dose has been decreased due to bradycardia. Patient remains with some intermittent hypertension. He has been started on Clonidine; will continue for now. Goal SBP would be 140-150 mmHg given his history of Stage III CKD to ensure renal perfusion.     Decrease evening dose of Levemir and follow his blood glucose levels closely. Continue to supplement his electrolytes as needed.      Repeat his CBC and BMP in the morning. C-RP has normalized.    The patient is high risk due to the following diagnoses/reasons:  Acute on chronic respiratory failure, ross, ckd, uncontrolled htn    I discussed the patients findings and my recommendations with: patient and nursing staff.    Disposition  Home when medically stable; may benefit from SNF v swing v inpatient rehab;  assisting    Teresa Sandhu DO  01/05/18  11:03 AM

## 2018-01-05 NOTE — THERAPY TREATMENT NOTE
Acute Care - Physical Therapy Treatment Note   [place]     Patient Name: Se Anne  : 1950  MRN: [id number]  Today's Date: 2018  Onset of Illness/Injury or Date of Surgery Date: 18 (admit date)  Date of Referral to PT: 18  Referring Physician: Renzo    Admit Date: 2017    Visit Dx:    ICD-10-CM ICD-9-CM   1. Acute on chronic respiratory failure with hypoxia and hypercapnia J96.21 518.84    J96.22 786.09     799.02   2. COPD exacerbation J44.1 491.21     Patient Active Problem List   Diagnosis   • COPD (chronic obstructive pulmonary disease)   • Shortness of breath   • Morbid obesity   • Hypoxia   • Edema extremities   • Sleep apnea   • Wheezing   • Allergic rhinitis   • Essential hypertension   • Sore throat   • Cough   • Pneumonia   • Respiratory failure               Adult Rehabilitation Note       18 1653 18 1546 18 1629    Rehab Assessment/Intervention    Discipline physical therapist  -CT physical therapy assistant  -RF physical therapy assistant  -RF    Document Type therapy note (daily note)  -CT therapy note (daily note)  -RF therapy note (daily note)  -RF    Subjective Information agree to therapy  -CT agree to therapy  -RF agree to therapy  -RF    Patient Effort, Rehab Treatment  good  -RF good  -RF    Symptoms Noted During/After Treatment  shortness of breath;fatigue  -RF fatigue;shortness of breath  -RF    Precautions/Limitations fall precautions;oxygen therapy device and L/min  -CT fall precautions;oxygen therapy device and L/min  -RF fall precautions;oxygen therapy device and L/min  -RF    Patient Response to Treatment Pt contines with hiflow O2 today and is unable to ambulate further distances d/t tubing.   -CT Patient tolerant to treatment session, but demonstrates signifincant SOB with all activities and transfers. Pt cued for PLB throughout treatment.   -RF Patient with limited activity secondary to respiratory condition and need for bipap and  hiflow 02 machines. Patient was assisted to EOB sitting X10 mins and was changed from bipap to hiflow machine. Pt was then assisted to reclining chair and left to eat breakfast with call bell in hand. Good activity tolerance was noted, however SOB was noted and rest breaks were required.   -RF    Recorded by [CT] Lisette Dowell, PT [RF] Stacia Potter, PTA [RF] Stacia Potter PTA    Vital Signs    Pre SpO2 (%)  98  -RF 93  -RF    O2 Delivery Pre Treatment  supplemental O2  -RF supplemental O2  -RF    Intra SpO2 (%)  94  -RF 90  -RF    O2 Delivery Intra Treatment  supplemental O2  -RF supplemental O2  -RF    Post SpO2 (%)  98  -RF 97  -RF    O2 Delivery Post Treatment  supplemental O2  -RF supplemental O2  -RF    Recorded by  [RF] Stacia Potter, PTA [RF] Stacia Potter PTA    Pain Assessment    Pain Assessment  No/denies pain  -RF No/denies pain  -RF    Recorded by  [RF] Stacia Potter, PTA [RF] Stacia Potter PTA    Cognitive Assessment/Intervention    Current Cognitive/Communication Assessment functional  -CT functional  -RF functional  -RF    Orientation Status oriented x 4  -CT oriented x 4  -RF oriented x 4  -RF    Follows Commands/Answers Questions  able to follow single-step instructions;100% of the time  -RF able to follow single-step instructions;100% of the time  -RF    Personal Safety  good awareness, safety precautions  -RF good awareness, safety precautions  -RF    Personal Safety Interventions fall prevention program maintained;gait belt;muscle strengthening facilitated;nonskid shoes/slippers when out of bed  -CT fall prevention program maintained;muscle strengthening facilitated;gait belt;nonskid shoes/slippers when out of bed  -RF fall prevention program maintained;muscle strengthening facilitated;gait belt;nonskid shoes/slippers when out of bed  -RF    Recorded by [CT] Lisette Dowell, PT [RF] Stacia Potter, PTA [RF] Stacia Potter PTA    Bed Mobility, Assessment/Treatment    Bed  Mobility, Assistive Device bed rails  -CT bed rails  -RF bed rails  -RF    Bed Mobility, Roll Left, [place] minimum assist (75% patient effort)  -CT minimum assist (75% patient effort)  -RF minimum assist (75% patient effort)  -RF    Bed Mobility, Roll Right, [place] minimum assist (75% patient effort)  -CT minimum assist (75% patient effort)  -RF minimum assist (75% patient effort)  -RF    Bed Mobility, Scoot/Bridge, [place] minimum assist (75% patient effort)  -CT minimum assist (75% patient effort)  -RF minimum assist (75% patient effort)  -RF    Bed Mob, Supine to Sit, [place] minimum assist (75% patient effort)  -CT minimum assist (75% patient effort)  -RF minimum assist (75% patient effort)  -RF    Bed Mobility, Impairments strength decreased  -CT strength decreased  -RF strength decreased  -RF    Recorded by [CT] Lisette Dowell, PT [RF] Stacia Potter, PTA [RF] Stacia Potter, PTA    Transfer Assessment/Treatment    Transfers, Bed-Chair [place] minimum assist (75% patient effort);contact guard assist  -CT minimum assist (75% patient effort);contact guard assist  -RF minimum assist (75% patient effort)  -RF    Transfers, Chair-Bed [place]  minimum assist (75% patient effort);contact guard assist  -RF minimum assist (75% patient effort)  -RF    Transfers, Bed-Chair-Bed, Assist Device rolling walker  -CT rolling walker  -RF rolling walker  -RF    Transfers, Sit-Stand [place] minimum assist (75% patient effort);contact guard assist  -CT minimum assist (75% patient effort);contact guard assist  -RF minimum assist (75% patient effort)  -RF    Transfers, Stand-Sit [place] minimum assist (75% patient effort);contact guard assist  -CT minimum assist (75% patient effort);contact guard assist  -RF minimum assist (75% patient effort)  -RF    Transfers, Sit-Stand-Sit, Assist Device rolling walker;bed rails  -CT rolling walker;bed rails  -RF rolling walker;bed rails  -RF     Transfer, Impairments strength decreased  -CT strength decreased  -RF strength decreased  -RF    Recorded by [CT] Lisette Dowell, PT [RF] Stacia Potter PTA [RF] Stacia Potter PTA    Gait Assessment/Treatment    Gait, [place] minimum assist (75% patient effort);2 person assist required;verbal cues required;nonverbal cues required (demo/gesture);contact guard assist  -CT minimum assist (75% patient effort);2 person assist required;verbal cues required;nonverbal cues required (demo/gesture);contact guard assist  -RF minimum assist (75% patient effort);2 person assist required;1 person + 1 person to manage equipment;verbal cues required;nonverbal cues required (demo/gesture)  -RF    Gait, Assistive Device rolling walker  -CT rolling walker  -RF rolling walker  -RF    Gait, Distance (Feet) 7  -CT 5  -RF 10  -RF    Gait, Gait Pattern Analysis swing-to gait  -CT swing-to gait  -RF swing-to gait  -RF    Gait, Comment  Short distance from bed to chair.   -RF short distance from bed to chair.   -RF    Recorded by [CT] Lisette Dowell, PT [RF] Stacia Potter, EMMANUEL [RF] Stacia Potter PTA    Therapy Exercises    Bilateral Lower Extremities AROM:;5 reps;sitting   Pt becomes SOB with increased reps and requires rest breaks  -CT AROM:;5 reps;sitting   Pt becomes SOB with increased reps and requires rest breaks  -RF AROM:;5 reps;sitting  -RF    Recorded by [CT] Lisette Dowell, KENDAL [RF] Stacia Potter, EMMANUEL [RF] Stacia Potter PTA    Positioning and Restraints    Pre-Treatment Position in bed  -CT in bed  -RF in bed  -RF    Post Treatment Position chair  -CT chair  -RF chair  -RF    In Chair sitting;call light within reach;encouraged to call for assist;notified nsg  -CT sitting;call light within reach;encouraged to call for assist  -RF sitting;call light within reach;encouraged to call for assist  -RF    Recorded by [CT] Lisette Dowell, PT [RF] Stacia Potter, EMMANUEL [RF] Stacia Potter PTA      User Menjivar   (r) = Recorded By, (t) = Taken By, (c) = Cosigned By    Initials Name Effective Dates    CT Lisettegabe Dowell, PT 03/14/16 -     RF Stacia Potter, PTA 07/19/17 -                 IP PT Goals       01/01/18 1139          Transfer Training PT LTG    Transfer Training PT LTG, Date Established 01/01/18  -CT      Transfer Training PT LTG, Time to Achieve by discharge  -CT      Transfer Training PT LTG, Activity Type bed to chair /chair to bed;sit to stand/stand to sit  -CT      Transfer Training PT LTG, [place] supervision required;contact guard assist  -CT      Transfer Training PT LTG, Assist Device other (see comments)   with appropriate AD  -CT      Gait Training PT LTG    Gait Training Goal PT LTG, Date Established 01/01/18  -CT      Gait Training Goal PT LTG, Time to Achieve by discharge  -CT      Gait Training Goal PT LTG, [place] supervision required;contact guard assist  -CT      Gait Training Goal PT LTG, Assist Device other (see comments)   with appropriate AD  -CT      Gait Training Goal PT LTG, Distance to Achieve 50  -CT        User Key  (r) = Recorded By, (t) = Taken By, (c) = Cosigned By    Initials Name Provider Type    CT Lisette Dowell, PT Physical Therapist          Physical Therapy Education     Title: PT OT SLP Therapies (Done)     Topic: Physical Therapy (Done)     Point: Mobility training (Done)    Learning Progress Summary    Learner Readiness Method Response Comment Documented by Status   Patient Acceptance E VU  CT 01/05/18 1655 Done    Acceptance E VU,NR  LK 01/05/18 1651 Done    Acceptance E VU,NR  RF 01/04/18 1550 Done    Acceptance E VU  KF 01/04/18 0014 Done    Acceptance E VU,NR  RF 01/03/18 1634 Done    Acceptance E NR  KF 01/03/18 0152 Active    Acceptance E VU  CT 01/02/18 1615 Done    Acceptance E NR  KF 01/02/18 0513 Active    Acceptance E VU  CT 01/01/18 1140 Done               Point: Home exercise program (Done)    Learning Progress Summary    Learner Readiness  Method Response Comment Documented by Status   Patient Acceptance E VU  CT 01/05/18 1655 Done    Acceptance E VU,NR   01/05/18 1651 Done    Acceptance E VU,NR   01/04/18 1550 Done    Acceptance E VU  KF 01/04/18 0014 Done    Acceptance E VU,NR   01/03/18 1634 Done    Acceptance E NR   01/03/18 0152 Active    Acceptance E VU  CT 01/02/18 1615 Done    Acceptance E NR  KF 01/02/18 0513 Active    Acceptance E VU  CT 01/01/18 1140 Done               Point: Body mechanics (Done)    Learning Progress Summary    Learner Readiness Method Response Comment Documented by Status   Patient Acceptance E VU  CT 01/05/18 1655 Done    Acceptance E VU,NR   01/05/18 1651 Done    Acceptance E VU,NR   01/04/18 1550 Done    Acceptance E VU   01/04/18 0014 Done    Acceptance E VU,NR   01/03/18 1634 Done    Acceptance E NR   01/03/18 0152 Active    Acceptance E VU  CT 01/02/18 1615 Done    Acceptance E NR   01/02/18 0513 Active    Acceptance E VU  CT 01/01/18 1140 Done               Point: Precautions (Done)    Learning Progress Summary    Learner Readiness Method Response Comment Documented by Status   Patient Acceptance E VU  CT 01/05/18 1655 Done    Acceptance E VU,NR   01/05/18 1651 Done    Acceptance E VU,NR   01/04/18 1550 Done    Acceptance E VU   01/04/18 0014 Done    Acceptance E VU,NR   01/03/18 1634 Done    Acceptance E NR   01/03/18 0152 Active    Acceptance E VU  CT 01/02/18 1615 Done    Acceptance E NR   01/02/18 0513 Active    Acceptance E VU  CT 01/01/18 1140 Done                      User Key     Initials Effective Dates Name Provider Type Discipline     06/16/16 -  Mami Badillo, RN Registered Nurse Nurse    CT 03/14/16 -  Lisette Dowell PT Physical Therapist PT     07/19/17 -  Stacia Potter PTA Physical Therapy Assistant PT     07/19/17 -  Suzy Jacobs, RN Registered Nurse Nurse                    PT Recommendation and Plan  Anticipated Equipment Needs At Discharge:  (to be  determined)  Anticipated Discharge Disposition: home with 24/7 care, home with home health  Planned Therapy Interventions: balance training, bed mobility training, gait training, home exercise program, motor coordination training, neuromuscular re-education, patient/family education, postural re-education, stair training, strengthening, transfer training  PT Frequency: 3-5 times/wk, per priority policy             Outcome Measures       01/05/18 1600 01/04/18 1500 01/03/18 1600    How much help from another person do you currently need...    Turning from your back to your side while in flat bed without using bedrails? 4  -CT 4  -RF 4  -RF    Moving from lying on back to sitting on the side of a flat bed without bedrails? 3  -CT 3  -RF 3  -RF    Moving to and from a bed to a chair (including a wheelchair)? 3  -CT 3  -RF 3  -RF    Standing up from a chair using your arms (e.g., wheelchair, bedside chair)? 3  -CT 3  -RF 3  -RF    Climbing 3-5 steps with a railing? 2  -CT 2  -RF 2  -RF    To walk in hospital room? 2  -CT 2  -RF 2  -RF    AM-PAC 6 Clicks Score 17  -CT 17  -RF 17  -RF    Functional Assessment    Outcome Measure Options AM-PAC 6 Clicks Basic Mobility (PT)  -CT AM-PAC 6 Clicks Basic Mobility (PT)  -RF AM-PAC 6 Clicks Basic Mobility (PT)  -RF      User Key  (r) = Recorded By, (t) = Taken By, (c) = Cosigned By    Initials Name Provider Type    CT Lisette Dowell, PT Physical Therapist    RF Stacia Potter, PTA Physical Therapy Assistant           Time Calculation:         PT Charges       01/05/18 1655          Time Calculation    PT Received On 01/05/18  -CT      PT - Next Appointment 01/08/18  -CT      PT Goal Re-Cert Due Date 01/15/18  -CT      Time Calculation- PT    Total Timed Code Minutes- PT 24 minute(s)  -CT        User Key  (r) = Recorded By, (t) = Taken By, (c) = Cosigned By    Initials Name Provider Type    CT Lisette Dowell, PT Physical Therapist          Therapy Charges for Today     Code  Description Service Date Service Provider Modifiers Qty    56919422685 HC GAIT TRAINING EA 15 MIN 1/5/2018 Lisette Dowell, PT GP 1    90973665830 HC PT THER PROC EA 15 MIN 1/5/2018 Lisette Dowell, PT GP 1    12979711602 HC PT THER SUPP EA 15 MIN 1/5/2018 Lisette Dowell, PT GP 2          PT G-Codes  Outcome Measure Options: AM-PAC 6 Clicks Basic Mobility (PT)  Score: 17  Functional Limitation: Mobility: Walking and moving around  Mobility: Walking and Moving Around Current Status (): At least 40 percent but less than 60 percent impaired, limited or restricted  Mobility: Walking and Moving Around Goal Status (): At least 20 percent but less than 40 percent impaired, limited or restricted    Lisette Dowell, PT  1/5/2018

## 2018-01-05 NOTE — PROGRESS NOTES
LOS: 8 days     Chief Complaint:  Pulmonology is following for respiratory failure     Subjective     Interval History:     Mr. Anne is doing much better this morning, no distress noted on exam.     History taken from: patient, chart, RN.    Review of Systems:   Review of Systems   Constitutional: Negative for chills, fatigue and fever.   HENT: Negative for congestion and rhinorrhea.    Eyes: Negative for visual disturbance.   Respiratory: Positive for shortness of breath (improving). Negative for cough and wheezing.    Cardiovascular: Negative for chest pain and leg swelling.   Gastrointestinal: Negative for abdominal distention and abdominal pain.   Endocrine: Negative for cold intolerance and heat intolerance.   Genitourinary: Negative for difficulty urinating.   Musculoskeletal: Negative for arthralgias and myalgias.   Skin: Negative for color change and pallor.   Allergic/Immunologic: Negative for environmental allergies.   Neurological: Negative for dizziness, weakness and light-headedness.   Psychiatric/Behavioral: Negative for agitation, behavioral problems and confusion.                     Objective     Vital Signs  Temp:  [97.6 °F (36.4 °C)-98.5 °F (36.9 °C)] 97.6 °F (36.4 °C)  Heart Rate:  [50-74] 51  Resp:  [16-27] 18  BP: (133-180)/(51-81) 161/59  Body mass index is 44.89 kg/(m^2).    Intake/Output Summary (Last 24 hours) at 01/05/18 0823  Last data filed at 01/05/18 0635   Gross per 24 hour   Intake              520 ml   Output             3175 ml   Net            -2655 ml          Physical Exam:  GENERAL APPEARANCE: Well developed, well nourished, alert and cooperative, and appears to be in no acute distress.    HEAD: normocephalic.    EYES: PERRL    NECK: Neck supple.     CARDIAC: Normal S1 and S2. No S3, S4 or murmurs. Rhythm is regular. There is no cyanosis or pallor. Extremities are warm and well perfused. Capillary refill is less than 2 seconds. No carotid bruits.    Respiratory: Clear to  auscultation and percussion without rales, rhonchi, wheezing or diminished breath sounds.    GI: Positive bowel sounds. Soft, nondistended, nontender.     Musculoskeletal: No significant deformity or joint abnormality. 3+ bilateral pitting edema.  Peripheral pulses intact.     NEUROLOGICAL: Strength and sensation symmetric and intact throughout.     PSYCHIATRIC: The mental examination revealed the patient was oriented to person, place, and time.                 Results Review:                I reviewed the patient's new clinical results.  I reviewed the patient's new imaging results and agree with the interpretation.    Results from last 7 days  Lab Units 01/05/18  0106 01/04/18  0042 01/03/18  0201   WBC 10*3/mm3 7.06 6.71 6.64   HEMOGLOBIN g/dL 6.6* 7.0* 7.8*   PLATELETS 10*3/mm3 172 182 173       Results from last 7 days  Lab Units 01/05/18  0106 01/04/18  0042 01/03/18  0201  01/01/18  0050 12/31/17  0117 12/30/17  0234   SODIUM mmol/L 142 146 146  < > 145 144 139   POTASSIUM mmol/L 3.8 4.2 4.0  < > 3.8 4.0 3.6   CHLORIDE mmol/L 105 108 108  < > 110 108 103   CO2 mmol/L 34.1* 30.8 27.7  < > 31.3 31.9 27.3   BUN mg/dL 30* 30* 26*  < > 22* 22* 20   CREATININE mg/dL 1.34* 1.41* 1.42*  < > 1.32* 1.45* 1.46*   CALCIUM mg/dL 9.0 8.6 8.8  < > 8.4 8.2 7.5*   GLUCOSE mg/dL 182* 102 145*  < > 139* 179* 281*   MAGNESIUM mg/dL  --   --   --   --  2.7* 2.7* 2.6   < > = values in this interval not displayed.  Lab Results   Component Value Date    INR 1.18 (H) 12/06/2017    INR 1.12 (H) 12/02/2017    INR 1.01 01/27/2017    PROTIME 15.2 12/06/2017    PROTIME 14.5 12/02/2017    PROTIME 11.4 01/27/2017       Results from last 7 days  Lab Units 01/01/18  0050 12/31/17  0117 12/30/17  0234   ALK PHOS U/L 42 47 41   BILIRUBIN mg/dL 1.1 1.0 0.6   ALT (SGPT) U/L 52* 51* 25   AST (SGOT) U/L 25 27 17       Results from last 7 days  Lab Units 01/03/18  0815   PH, ARTERIAL pH units 7.378   PO2 ART mm Hg 65.0*   PCO2, ARTERIAL mm Hg 56.0*    HCO3 ART mmol/L 32.2*     Imaging Results (last 24 hours)     ** No results found for the last 24 hours. **             Medication Review:   Scheduled Medications:    albumin human 25 g Intravenous Once   amLODIPine 10 mg Oral Daily   aspirin 81 mg Oral Daily   atorvastatin 80 mg Oral Daily   budesonide-formoterol 2 puff Inhalation BID - RT   carvedilol 12.5 mg Oral BID With Meals   cetirizine 10 mg Oral Daily   cholecalciferol 2,000 Units Oral Daily   CloNIDine 0.1 mg Oral Q8H   doxycycline 100 mg Oral Q12H   ferrous sulfate 325 mg Oral Daily With Breakfast   fondaparinux 2.5 mg Subcutaneous Q24H   furosemide 60 mg Intravenous Once   gabapentin 300 mg Oral Nightly   hydrALAZINE 100 mg Oral Q8H   insulin aspart 0-14 Units Subcutaneous 4x Daily AC & at Bedtime   insulin aspart 8 Units Subcutaneous TID With Meals   insulin detemir 30 Units Subcutaneous Daily   insulin detemir 35 Units Subcutaneous Nightly   ipratropium 0.5 mg Nebulization 4x Daily - RT   isosorbide mononitrate 180 mg Oral Daily   levothyroxine 25 mcg Oral Daily   pantoprazole 40 mg Oral Daily   predniSONE 10 mg Oral Daily With Breakfast   sertraline 50 mg Oral Q PM   sodium chloride 10 mL Intracatheter Q12H   sodium chloride      vitamin B-12 2,000 mcg Oral Daily     Continuous infusions:       Assessment/Plan      Respiratory Failure: likely related to underlying lung disease, COPD and fluid volume overload. Continue Doxy per ID recommendations. Will repeat lasix 60 mg x1 today with albumin, will monitor renal function and BP closely, will give potassium 40meq PO x1.  Incentive spirometer 10 times during the day. Continue high flow nasal cannula during day, wean to maintain Sp02 >92-94%. Continue Bipap at HS.      Hypertension:  /60 today, continue current PO BP meds.  Continue continuous ECG and v/s monitoring, maintain MAP >65.     Bradycardia: HR 50's, will decrease dose of coreg and monitor closely.      Hypoglycemia: level is 73 this  morning, will decrease levemir and novolog, continue accuchecks q4hr and prn. Monitor response.       CKD stage III: Creatinine 1.34, 24 hour urine 2.9 liters, continue strict I&O, electrolytes WNL.     Low H/H: level is 6.6/21.9 this AM, BP is stable, receiving blood, primary is managing.      ID: WBC is 7.06, Neutrophils are 75.9, no fevers, continue to monitor lab trends and clinical changes, continue doxy per ID recommendations.     Patient Active Problem List   Diagnosis Code   • COPD (chronic obstructive pulmonary disease) J44.9   • Shortness of breath R06.02   • Morbid obesity E66.01   • Hypoxia R09.02   • Edema extremities R60.0   • Sleep apnea G47.30   • Wheezing R06.2   • Allergic rhinitis J30.9   • Essential hypertension I10   • Sore throat J02.9   • Cough R05   • Pneumonia J18.9   • Respiratory failure J96.90       YANG Mathews  01/05/18  8:23 AM      Scribed for Dr. Gomez by YANG Cassidy.     I, Jeremi Gomez M.D. attest that the above note accurately reflects the work and decisions made  by me.  Patient was seen and evaluated by Dr. Gomez, including history of present illness, physical exam, assessment, and treatment plan.  The above note was reviewed and edited by Dr. Gomez.  Critical Care time spent in direct patient care: 33 minutes (excluding procedure time, if applicable) including high complexity decision making to assess, manipulate, and support vital organ system failure in this individual who has impairment of one or more vital organ systems such that there is a high probability of imminent or life threatening deterioration in the patient’s condition.

## 2018-01-06 NOTE — PROGRESS NOTES
HOSPITALIST PROGRESS NOTE    Patient Identification:  Name:  Se Anne  Age:  67 y.o.  Sex:  male  :  1950  MRN:  [id number]  Visit Number:  [id number]  Primary Care Provider:  No Known Provider    Length of stay:  9     HPI: 66 yo male admitted with dyspnea    Subjective:  The patient was seen this morning and is lying in bed with BiPAP on.  The patient states he feels worse today and complains of extreme shortness of breath.  Other history is difficult to obtain at this time as he is on BiPAP.  No acute events overnight reported to me per nursing staff.    Present during exam: N/A    Current Hospital Meds:    amLODIPine 10 mg Oral Daily   aspirin 81 mg Oral Daily   atorvastatin 80 mg Oral Daily   budesonide-formoterol 2 puff Inhalation BID - RT   carvedilol 12.5 mg Oral BID With Meals   cetirizine 10 mg Oral Daily   cholecalciferol 2,000 Units Oral Daily   CloNIDine 0.2 mg Oral Q8H   ferrous sulfate 325 mg Oral Daily With Breakfast   fondaparinux 2.5 mg Subcutaneous Q24H   furosemide 60 mg Intravenous Once   gabapentin 300 mg Oral Nightly   guaiFENesin 600 mg Oral Q12H   hydrALAZINE 100 mg Oral Q8H   insulin aspart 0-14 Units Subcutaneous 4x Daily AC & at Bedtime   insulin aspart 8 Units Subcutaneous TID With Meals   insulin detemir 30 Units Subcutaneous Daily   insulin detemir 32 Units Subcutaneous Nightly   ipratropium 0.5 mg Nebulization 4x Daily - RT   isosorbide mononitrate 180 mg Oral Daily   levothyroxine 25 mcg Oral Daily   pantoprazole 40 mg Oral Daily   predniSONE 10 mg Oral Daily With Breakfast   sertraline 50 mg Oral Q PM   sodium chloride 10 mL Intracatheter Q12H   sodium chloride      vitamin B-12 2,000 mcg Oral Daily       Vital Signs  Temp:  [94.6 °F (34.8 °C)-98.8 °F (37.1 °C)] 95.6 °F (35.3 °C)  Heart Rate:  [] 75  Resp:  [17-24] 22  BP: (139-184)/(61-94) 171/94  Last 3 weights    18  0403 18  0435 18  0400   Weight: 127 kg (279 lb 3.2 oz) 126 kg (278 lb 1.6  oz) 125 kg (274 lb 8 oz)     Body mass index is 44.31 kg/(m^2).       Intake/Output Summary (Last 24 hours) at 01/06/18 1249  Last data filed at 01/06/18 1103   Gross per 24 hour   Intake             1010 ml   Output             3715 ml   Net            -2705 ml       Physical exam:  Physical Exam   Constitutional: He is oriented to person, place, and time. He appears well-developed and well-nourished. No distress. Face mask in place.   Obese   HENT:   Head: Normocephalic and atraumatic.   Nose: Nose normal.   Mouth/Throat: Oropharynx is clear and moist and mucous membranes are normal.   Eyes: Conjunctivae and EOM are normal. Pupils are equal, round, and reactive to light. No scleral icterus.   Neck: Trachea normal. Neck supple. No JVD present. Carotid bruit is not present. No thyromegaly present.   Cardiovascular: Normal rate, regular rhythm, normal heart sounds and normal pulses.  Exam reveals no gallop and no friction rub.    No murmur heard.  1-2+ edema bilateral lower extremities   Pulmonary/Chest: Effort normal. No respiratory distress. He has decreased breath sounds. He has no wheezes. He has rhonchi in the left upper field and the left lower field.   Abdominal: Soft. Bowel sounds are normal. He exhibits no distension. There is no tenderness. There is no guarding.   Neurological: He is alert and oriented to person, place, and time. He has normal strength. No cranial nerve deficit.   Skin: Skin is warm, dry and intact. No rash noted. No erythema.   Psychiatric: He has a normal mood and affect. His speech is normal.      Telemetry: Sinus rhythm    Results Review:    Results from last 7 days  Lab Units 01/06/18  0039 01/05/18  1002 01/05/18  0106 01/04/18  0042 01/03/18  0201 01/02/18  0044 01/01/18  0050 12/31/17  0117   WBC 10*3/mm3 9.07  --  7.06 6.71 6.64 5.76 6.19 8.63   HEMOGLOBIN g/dL 7.3* 8.3* 6.6* 7.0* 7.8* 8.4* 8.8* 9.0*   HEMATOCRIT % 24.3* 26.3* 21.9* 23.7* 25.4* 27.0* 28.7* 30.1*   PLATELETS  10*3/mm3 203  --  172 182 173 153 149 163       Results from last 7 days  Lab Units 01/06/18  0039 01/05/18  0106 01/04/18  0042 01/03/18  0201 01/02/18  1702 01/02/18  0044 01/01/18  0050 12/31/17  0117   SODIUM mmol/L 145 142 146 146  --  143 145 144   POTASSIUM mmol/L 3.6 3.8 4.2 4.0 4.4 3.6 3.8 4.0   CHLORIDE mmol/L 106 105 108 108  --  107 110 108   CO2 mmol/L 33.7* 34.1* 30.8 27.7  --  30.0 31.3 31.9   BUN mg/dL 24* 30* 30* 26*  --  24* 22* 22*   CREATININE mg/dL 1.28 1.34* 1.41* 1.42*  --  1.38* 1.32* 1.45*   CALCIUM mg/dL 9.2 9.0 8.6 8.8  --  8.7 8.4 8.2   GLUCOSE mg/dL 95 182* 102 145*  --  116* 139* 179*       Results from last 7 days  Lab Units 01/06/18  0039 01/01/18  0050 12/31/17  0117   BILIRUBIN mg/dL 1.1 1.1 1.0   ALK PHOS U/L 41 42 47   AST (SGOT) U/L 17 25 27   ALT (SGPT) U/L 32 52* 51*       Results from last 7 days  Lab Units 01/01/18  0050 12/31/17  0117   MAGNESIUM mg/dL 2.7* 2.7*       CXR, 1/6/18:  FINDINGS:      Bibasilar consolidation  The cardiac silhouette is normal. The pulmonary vasculature is  unremarkable.  There is no evidence of an acute osseous abnormality.   There are no suspicious-appearing parenchymal soft tissue nodules.      IMPRESSION:  Bibasilar consolidation          Assessment/Plan      -Acute on chronic hypoxic and hypercapnic respiratory failure secondary to an acute COPD exacerbation with a bibasilar healthcare associated pneumonia and acute volume overload/acute diastolic heart failure exacerbation  -Uncontrolled essential hypertension  -Septic shock that has resolved  -Generalized weakness  -Insulin dependent diabetes mellitus type 2 with intermittent hypoglycemia  -Acute on chronic microcytic anemia with iron deficiency; hemoglobin 7 today, likely due to acute illness and blood draws however may be contributing to his weakness and shortness of breath  -Acute hypomagnesemia, resolved  -Acute hypophosphatemia that has resolved  -Possible5 mm bladder polyp of unclear  significance - outpatient urology follow up  -Stage III chronic kidney disease  -Thrombocytopenia that has resolved  -Obesity    The patient will receive a one-time dose of IV Lasix today and will order a stat ABG. Today's CXR overall stable.     Repeat chest x-ray reveals bibasilar consolidation. The patient has completed a course of antibiotics during his hospital stay.  Continue scheduled inhalants.  Continue to taper his oral steroids as tolerated.      Continue physical therapy as tolerated/as available.  assisting with placement at time of discharge.    Coreg dose has been decreased due to bradycardia. Patient remains with some intermittent hypertension. He has been started on Clonidine; will increase the dose at this time. Goal SBP would be 140-150 mmHg given his history of Stage III CKD to ensure renal perfusion.     Will again cut back on the dose of his Levemir. Continue to supplement his electrolytes as needed.      Repeat his CBC and BMP in the morning.    The patient is high risk due to the following diagnoses/reasons:  Acute on chronic respiratory failure, ross, ckd, uncontrolled htn    I discussed the patients findings and my recommendations with: patient and nursing staff.    Disposition  Home when medically stable; may benefit from SNF v swing v inpatient rehab;  assisting    Teresa Sandhu DO  01/06/18  12:49 PM

## 2018-01-06 NOTE — PLAN OF CARE
Problem: COPD, Chronic Bronchitis/Emphysema (Adult)  Goal: Signs and Symptoms of Listed Potential Problems Will be Absent or Manageable (COPD, Chronic Bronchitis/Emphysema)  Outcome: Ongoing (interventions implemented as appropriate)   01/05/18 1651   COPD, Chronic Bronchitis/Emphysema   Problems Assessed (COPD, Chronic Bronchitis/Emphysema) all   Problems Present (COPD, Chronic Bronchitis/Emphysema) situational response       Problem: Skin Integrity Impairment, Risk/Actual (Adult)  Goal: Identify Related Risk Factors and Signs and Symptoms  Outcome: Ongoing (interventions implemented as appropriate)   01/05/18 1651   Skin Integrity Impairment, Risk/Actual   Skin Integrity Impairment, Risk/Actual: Related Risk Factors sensory impairment;tissue perfusion impaired;infection/disease process;age extremes;cognitive impairment   Signs and Symptoms (Skin Integrity Impairment) edema;inflammation     Goal: Skin Integrity/Wound Healing  Outcome: Ongoing (interventions implemented as appropriate)   12/31/17 2330   Skin Integrity Impairment, Risk/Actual (Adult)   Skin Integrity/Wound Healing making progress toward outcome       Problem: Fall Risk (Adult)  Goal: Absence of Falls  Outcome: Ongoing (interventions implemented as appropriate)   12/31/17 2330   Fall Risk (Adult)   Absence of Falls making progress toward outcome       Problem: Patient Care Overview (Adult)  Goal: Plan of Care Review  Outcome: Ongoing (interventions implemented as appropriate)   01/05/18 1651 01/05/18 1915   Coping/Psychosocial Response Interventions   Plan Of Care Reviewed With --  patient   Patient Care Overview   Progress improving --      Goal: Adult Individualization and Mutuality  Outcome: Ongoing (interventions implemented as appropriate)   12/29/17 0353   Mutuality/Individual Preferences   What Anxieties, Fears or Concerns Do You Have About Your Health or Care? none   What Questions Do You Have About Your Health or Care? none   What Information  Would Help Us Give You More Personalized Care? nothing     Goal: Discharge Needs Assessment  Outcome: Ongoing (interventions implemented as appropriate)   12/29/17 1139 01/01/18 1130   Discharge Needs Assessment   Concerns To Be Addressed no discharge needs identified --    Readmission Within The Last 30 Days no previous admission in last 30 days --    Equipment Needed After Discharge none --    Discharge Disposition still a patient --    Current Health   Outpatient/Agency/Support Group Needs (Pt has [facility]. Updated information will need to be sent at discharge. ) --    Living Environment   Transportation Available car;family or friend will provide --    Self-Care   Equipment Currently Used at Home --  bipap/ cpap;cane, quad;nebulizer;hospital bed;power chair, (recliner lift);walker, rolling;oxygen

## 2018-01-07 NOTE — PLAN OF CARE
Problem: COPD, Chronic Bronchitis/Emphysema (Adult)  Goal: Signs and Symptoms of Listed Potential Problems Will be Absent or Manageable (COPD, Chronic Bronchitis/Emphysema)  Outcome: Ongoing (interventions implemented as appropriate)   01/06/18 1544   COPD, Chronic Bronchitis/Emphysema   Problems Assessed (COPD, Chronic Bronchitis/Emphysema) all   Problems Present (COPD, Chronic Bronchitis/Emphysema) none       Problem: Skin Integrity Impairment, Risk/Actual (Adult)  Goal: Identify Related Risk Factors and Signs and Symptoms  Outcome: Ongoing (interventions implemented as appropriate)   01/05/18 1651   Skin Integrity Impairment, Risk/Actual   Skin Integrity Impairment, Risk/Actual: Related Risk Factors sensory impairment;tissue perfusion impaired;infection/disease process;age extremes;cognitive impairment   Signs and Symptoms (Skin Integrity Impairment) edema;inflammation     Goal: Skin Integrity/Wound Healing  Outcome: Ongoing (interventions implemented as appropriate)   12/31/17 2330   Skin Integrity Impairment, Risk/Actual (Adult)   Skin Integrity/Wound Healing making progress toward outcome       Problem: Fall Risk (Adult)  Goal: Absence of Falls  Outcome: Ongoing (interventions implemented as appropriate)   12/31/17 2330   Fall Risk (Adult)   Absence of Falls making progress toward outcome       Problem: Patient Care Overview (Adult)  Goal: Plan of Care Review  Outcome: Ongoing (interventions implemented as appropriate)   01/05/18 1651 01/06/18 2045   Coping/Psychosocial Response Interventions   Plan Of Care Reviewed With --  patient   Patient Care Overview   Progress improving --      Goal: Adult Individualization and Mutuality  Outcome: Ongoing (interventions implemented as appropriate)   12/29/17 0353   Mutuality/Individual Preferences   What Anxieties, Fears or Concerns Do You Have About Your Health or Care? none   What Questions Do You Have About Your Health or Care? none   What Information Would Help Us  Give You More Personalized Care? nothing     Goal: Discharge Needs Assessment  Outcome: Ongoing (interventions implemented as appropriate)   12/29/17 1139 01/01/18 1130   Discharge Needs Assessment   Concerns To Be Addressed no discharge needs identified --    Readmission Within The Last 30 Days no previous admission in last 30 days --    Equipment Needed After Discharge none --    Discharge Disposition still a patient --    Current Health   Outpatient/Agency/Support Group Needs (Pt has [facility]. Updated information will need to be sent at discharge. ) --    Living Environment   Transportation Available car;family or friend will provide --    Self-Care   Equipment Currently Used at Home --  bipap/ cpap;cane, quad;nebulizer;hospital bed;power chair, (recliner lift);walker, rolling;oxygen       Problem: NPPV/CPAP (Adult)  Goal: Signs and Symptoms of Listed Potential Problems Will be Absent or Manageable (NPPV/CPAP)  Outcome: Ongoing (interventions implemented as appropriate)   01/02/18 1912 01/03/18 1944   NPPV/CPAP   Problems Assessed (NPPV/CPAP) all --    Problems Present (NPPV/CPAP) --  situational response

## 2018-01-07 NOTE — PLAN OF CARE
Problem: COPD, Chronic Bronchitis/Emphysema (Adult)  Goal: Signs and Symptoms of Listed Potential Problems Will be Absent or Manageable (COPD, Chronic Bronchitis/Emphysema)  Outcome: Ongoing (interventions implemented as appropriate)      Problem: Skin Integrity Impairment, Risk/Actual (Adult)  Goal: Identify Related Risk Factors and Signs and Symptoms  Outcome: Ongoing (interventions implemented as appropriate)      Problem: Patient Care Overview (Adult)  Goal: Plan of Care Review  Outcome: Ongoing (interventions implemented as appropriate)      Problem: NPPV/CPAP (Adult)  Goal: Signs and Symptoms of Listed Potential Problems Will be Absent or Manageable (NPPV/CPAP)  Outcome: Ongoing (interventions implemented as appropriate)

## 2018-01-07 NOTE — PROGRESS NOTES
HOSPITALIST PROGRESS NOTE    Patient Identification:  Name:  Se Anne  Age:  67 y.o.  Sex:  male  :  1950  MRN:  [id number]  Visit Number:  [id number]  Primary Care Provider:  No Known Provider    Length of stay:  10     HPI: 66 yo male admitted with dyspnea    Subjective:  The patient was seen this morning and is lying in bed with BiPAP on.  The patient's FiO2 had to be increased today as he reports worsening shortness of breath and states that he just cannot get a good breath.  The patient states that his cough is becoming more productive but he denies any worsening.  The patient denies any chest pain.  He has no abdominal pain    Present during exam: REGGIE Bolaños    Current Hospital Meds:    amLODIPine 10 mg Oral Daily   aspirin 81 mg Oral Daily   atorvastatin 80 mg Oral Daily   budesonide-formoterol 2 puff Inhalation BID - RT   carvedilol 12.5 mg Oral BID With Meals   cetirizine 10 mg Oral Daily   cholecalciferol 2,000 Units Oral Daily   CloNIDine 0.2 mg Oral Q8H   ferrous sulfate 325 mg Oral Daily With Breakfast   fondaparinux 2.5 mg Subcutaneous Q24H   furosemide 60 mg Intravenous Once   gabapentin 300 mg Oral Nightly   guaiFENesin 600 mg Oral Q12H   hydrALAZINE 100 mg Oral Q8H   insulin aspart 0-14 Units Subcutaneous 4x Daily AC & at Bedtime   insulin detemir 25 Units Subcutaneous Daily   insulin detemir 30 Units Subcutaneous Nightly   ipratropium-albuterol 3 mL Nebulization 4x Daily - RT   isosorbide mononitrate 180 mg Oral Daily   levothyroxine 25 mcg Oral Daily   methylPREDNISolone sodium succinate 40 mg Intravenous Q12H   pantoprazole 40 mg Oral Daily   potassium chloride 40 mEq Oral Q4H   sertraline 50 mg Oral Q PM   sodium chloride 10 mL Intracatheter Q12H   sodium chloride      vitamin B-12 2,000 mcg Oral Daily       Vital Signs  Temp:  [97.5 °F (36.4 °C)-99.1 °F (37.3 °C)] 98.1 °F (36.7 °C)  Heart Rate:  [54-75] 61  Resp:  [22-27] 24  BP: (141-230)/() 167/66  Last 3 weights     01/04/18  0403 01/05/18  0435 01/06/18  0400   Weight: 127 kg (279 lb 3.2 oz) 126 kg (278 lb 1.6 oz) 125 kg (274 lb 8 oz)     Body mass index is 44.31 kg/(m^2).       Intake/Output Summary (Last 24 hours) at 01/07/18 1119  Last data filed at 01/07/18 1000   Gross per 24 hour   Intake                0 ml   Output             5250 ml   Net            -5250 ml       Physical exam:  Physical Exam   Constitutional: He is oriented to person, place, and time. He appears well-developed and well-nourished. No distress. Face mask in place.   Obese   HENT:   Head: Normocephalic and atraumatic.   Nose: Nose normal.   Mouth/Throat: Oropharynx is clear and moist and mucous membranes are normal.   Eyes: Conjunctivae and EOM are normal. Pupils are equal, round, and reactive to light. No scleral icterus.   Neck: Trachea normal. Neck supple. No JVD present. Carotid bruit is not present. No thyromegaly present.   Cardiovascular: Normal rate, regular rhythm, normal heart sounds and normal pulses.  Exam reveals no gallop and no friction rub.    No murmur heard.  1-2+ edema bilateral lower extremities   Pulmonary/Chest: Effort normal. No respiratory distress. He has decreased breath sounds. He has no wheezes. He has rhonchi in the left upper field.   Poor air movement bilaterally   Abdominal: Soft. Bowel sounds are normal. He exhibits no distension. There is no tenderness. There is no guarding.   Neurological: He is alert and oriented to person, place, and time. He has normal strength. No cranial nerve deficit.   Skin: Skin is warm, dry and intact. No rash noted. No erythema.   Psychiatric: He has a normal mood and affect. His speech is normal.      Telemetry: Sinus rhythm    Results Review:    Results from last 7 days  Lab Units 01/07/18  0036 01/06/18  0039 01/05/18  1002 01/05/18  0106 01/04/18  0042 01/03/18  0201 01/02/18  0044 01/01/18  0050   WBC 10*3/mm3 8.08 9.07  --  7.06 6.71 6.64 5.76 6.19   HEMOGLOBIN g/dL 7.2* 7.3* 8.3*  6.6* 7.0* 7.8* 8.4* 8.8*   HEMATOCRIT % 24.0* 24.3* 26.3* 21.9* 23.7* 25.4* 27.0* 28.7*   PLATELETS 10*3/mm3 220 203  --  172 182 173 153 149       Results from last 7 days  Lab Units 01/07/18  0036 01/06/18  0039 01/05/18  0106 01/04/18  0042 01/03/18  0201 01/02/18  1702 01/02/18  0044 01/01/18  0050   SODIUM mmol/L 148 145 142 146 146  --  143 145   POTASSIUM mmol/L 3.5 3.6 3.8 4.2 4.0 4.4 3.6 3.8   CHLORIDE mmol/L 102 106 105 108 108  --  107 110   CO2 mmol/L 37.9* 33.7* 34.1* 30.8 27.7  --  30.0 31.3   BUN mg/dL 28* 24* 30* 30* 26*  --  24* 22*   CREATININE mg/dL 1.38* 1.28 1.34* 1.41* 1.42*  --  1.38* 1.32*   CALCIUM mg/dL 9.1 9.2 9.0 8.6 8.8  --  8.7 8.4   GLUCOSE mg/dL 136* 95 182* 102 145*  --  116* 139*       Results from last 7 days  Lab Units 01/06/18  0039 01/01/18  0050   BILIRUBIN mg/dL 1.1 1.1   ALK PHOS U/L 41 42   AST (SGOT) U/L 17 25   ALT (SGPT) U/L 32 52*       Results from last 7 days  Lab Units 01/01/18  0050   MAGNESIUM mg/dL 2.7*             CXR, 1/6/18:  FINDINGS:      Bibasilar consolidation  The cardiac silhouette is normal. The pulmonary vasculature is  unremarkable.  There is no evidence of an acute osseous abnormality.   There are no suspicious-appearing parenchymal soft tissue nodules.      IMPRESSION:  Bibasilar consolidation          Assessment/Plan      -Acute on chronic hypoxic and hypercapnic respiratory failure secondary to an acute COPD exacerbation with a bibasilar healthcare associated pneumonia and acute volume overload/acute diastolic heart failure exacerbation  -Uncontrolled essential hypertension  -Septic shock that has resolved  -Negative fluid balance, approximately 5L  -Generalized weakness  -Insulin dependent diabetes mellitus type 2 with intermittent hypoglycemia  -Acute on chronic microcytic anemia with iron deficiency; hemoglobin 7 today, likely due to acute illness and blood draws however may be contributing to his weakness and shortness of breath  -Acute  hypomagnesemia, resolved  -Acute hypophosphatemia that has resolved  -Possible5 mm bladder polyp of unclear significance - outpatient urology follow up  -Stage III chronic kidney disease  -Thrombocytopenia that has resolved  -Obesity    BiPAP adjustments have been made.  The patient has received another dose of IV Lasix.  I will escalate his steroid therapy and his scheduled inhalants and we will add albuterol to Atrovent and increase the frequency.  A repeat chest x-ray and ABG have been ordered and in the meantime the patient will be admitted to the critical care unit with low threshold for intubation.  The patient was intubated approximately one month ago.  Hold off on escalating his antibiotic therapy at this time as he has no fever no leukocytosis and just completed a course of antibiotics on January 5.    Continue physical therapy as tolerated/as available.  assisting with placement at time of discharge.    Coreg dose has been decreased due to bradycardia. Patient remains with some intermittent hypertension. He has been started on Clonidine; continue with increased dose. Goal SBP would be 140-150 mmHg given his history of Stage III CKD to ensure renal perfusion.     Continue with current insulin regimen for now; blood glucose levels improved. Continue to supplement his electrolytes as needed.      Repeat his CBC, CRP and BMP in the morning.    The patient is high risk due to the following diagnoses/reasons:  Acute on chronic respiratory failure, ross, ckd, uncontrolled htn    I discussed the patients findings and my recommendations with: patient and nursing staff.    Disposition  Home when medically stable; may benefit from SNF v swing v inpatient rehab;  assisting. Plan to transfer to CCU today due to worsening respiratory status.    Teresa Sandhu DO  01/07/18  11:19 AM

## 2018-01-07 NOTE — PROGRESS NOTES
THC Physician - Brief Progress Note  PERMANENT  01/07/2018 17:22    [facility]  [facility] - U - 10 - [place], [place] ([facility])    WARD COPELAND.    Date of Service 01/07/2018 17:22    HPI/Events of Note AICU Provider Assessment Note      Patient admitted with worsening shortness of breath, hypercapnic respiratory failure acute on chronic has known COPD on home oxygen and nocturnal BiPAP.  Was admitted several weeks ago with respiratory failure requiring intubation.  Is currently on BiPAP   seems to be comfortable when I was talking to him no use of accessory muscles in breathing  status post cardiac catheter KG with normal sinus rhythm with QTC prolongation, 462 ms which is abnormal for a male.  Ventricle systolic pressure 200 with an LVEDP of 24 on the pigtail ejection fraction 60%, left dominant otherwise nonobstructive   coronary artery disease.  Echo last month: Ejection fraction 6065%, aortic sclerosis mild MR no definite pulmonary hypertension right TR . Chest x-ray shows congestive heart failure.  Cardiomegaly no focal infiltrates this appears worse from January 3    Assessment and Plan:      Patient admitted with worsening shortness of breath, hypercapnic respiratory failure acute on chronic has known COPD on home oxygen and nocturnal BiPAP.  Was admitted several weeks ago with respiratory failure requiring intubation.  Is currently on BiPAP   seems to be comfortable when I was talking to him no use of accessory muscles in breathing  status post cardiac catheter KG with normal sinus rhythm with QTC prolongation, 462 ms which is abnormal for a male.  Ventricle systolic pressure 200 with an LVEDP of 24 on the pigtail ejection fraction 60%, left dominant otherwise nonobstructive   coronary artery disease.  Echo last month: Ejection fraction 6065%, aortic sclerosis mild MR no definite pulmonary hypertension right TR . Chest x-ray shows congestive heart failure.  Cardiomegaly no focal  infiltrates this appears worse from January 3      __X___   Video Assessment performed  __X___   Most recent labs reviewed  ___X__   Vital Signs reviewed  ___X__   Best Practices addressed:                 VTE prophylaxis: Y                 SUP (when indicated):                 Glycemic control:                      Please notify bedside physician when present or [facility] if glc > 180 X 2                 Sepsis guidelines:                 Lung protective strategy:    __X__     Spoke with bedside RN  _____     Orders written      Contact [facility] for any needs if bedside physician is not present.      Interventions Major-Hypercarbia - evaluation and management, Respiratory failure - evaluation and management, Other: COPD /HFpEF        Electronically Signed by: Keron Sultana) on 1/7/2018 5:25 PM

## 2018-01-08 NOTE — PLAN OF CARE
Problem: COPD, Chronic Bronchitis/Emphysema (Adult)  Goal: Signs and Symptoms of Listed Potential Problems Will be Absent or Manageable (COPD, Chronic Bronchitis/Emphysema)  Outcome: Ongoing (interventions implemented as appropriate)      Problem: Skin Integrity Impairment, Risk/Actual (Adult)  Goal: Identify Related Risk Factors and Signs and Symptoms  Outcome: Ongoing (interventions implemented as appropriate)    Goal: Skin Integrity/Wound Healing  Outcome: Ongoing (interventions implemented as appropriate)      Problem: NPPV/CPAP (Adult)  Goal: Signs and Symptoms of Listed Potential Problems Will be Absent or Manageable (NPPV/CPAP)  Outcome: Ongoing (interventions implemented as appropriate)

## 2018-01-08 NOTE — PROGRESS NOTES
LOS: 11 days     Chief Complaint:  Pulmonology is following for respiratory failure     Subjective     Interval History:     Mr. Anne is doing well this morning, on high flow nasal cannula, he was moved from PCU to CCU overnight.     History taken from: patient chart RN    Review of Systems:   Review of Systems   Constitutional: Negative for chills and fever.   HENT: Negative for congestion and rhinorrhea.    Eyes: Negative for photophobia and visual disturbance.   Respiratory: Positive for shortness of breath. Negative for wheezing.    Cardiovascular: Positive for leg swelling. Negative for chest pain.   Gastrointestinal: Negative for abdominal distention and abdominal pain.   Endocrine: Negative for cold intolerance and heat intolerance.   Genitourinary: Negative for difficulty urinating.   Musculoskeletal: Negative for arthralgias and myalgias.   Skin: Negative for color change and pallor.   Allergic/Immunologic: Negative for environmental allergies.   Neurological: Negative for dizziness, weakness and light-headedness.   Psychiatric/Behavioral: Negative for agitation and behavioral problems.                     Objective     Vital Signs  Temp:  [97.7 °F (36.5 °C)-98.6 °F (37 °C)] 98.2 °F (36.8 °C)  Heart Rate:  [59-76] 67  Resp:  [15-28] 25  BP: (123-204)/() 126/63  Body mass index is 44.31 kg/(m^2).    Intake/Output Summary (Last 24 hours) at 01/08/18 1119  Last data filed at 01/08/18 0939   Gross per 24 hour   Intake              704 ml   Output             1700 ml   Net             -996 ml     I/O this shift:  In: 654 [P.O.:354; Blood:300]  Out: -     Physical Exam:  GENERAL APPEARANCE: Well developed, well nourished, alert and cooperative, and appears to be in no acute distress.    HEAD: normocephalic.    EYES: PERRL    NECK: Neck supple.     CARDIAC: Normal S1 and S2. No S3, S4 or murmurs. Rhythm is regular. There is no cyanosis or pallor. Extremities are warm and well perfused. Capillary refill is  less than 2 seconds. No carotid bruits.    Respiratory: Clear to auscultation and percussion without rales, rhonchi, wheezing or diminished breath sounds.    GI: Positive bowel sounds. Soft, nondistended, nontender.     Musculoskeletal: No significant deformity or joint abnormality. 2+ bilateral pitting edema. Peripheral pulses intact.     NEUROLOGICAL: Strength and sensation symmetric and intact throughout.     PSYCHIATRIC: The mental examination revealed the patient was oriented to person, place, and time.                 Results Review:                I reviewed the patient's new clinical results.  I reviewed the patient's new imaging results and agree with the interpretation.    Results from last 7 days  Lab Units 01/08/18  0223 01/07/18  0036 01/06/18  0039   WBC 10*3/mm3 7.67 8.08 9.07   HEMOGLOBIN g/dL 6.4* 7.2* 7.3*   PLATELETS 10*3/mm3 167 220 203       Results from last 7 days  Lab Units 01/08/18  0108 01/07/18  1703 01/07/18  0036 01/06/18  0039   SODIUM mmol/L 150  --  148 145   POTASSIUM mmol/L 3.8 3.6 3.5 3.6   CHLORIDE mmol/L 104  --  102 106   CO2 mmol/L >40.0*  --  37.9* 33.7*   BUN mg/dL 29*  --  28* 24*   CREATININE mg/dL 1.38*  --  1.38* 1.28   CALCIUM mg/dL 8.8  --  9.1 9.2   GLUCOSE mg/dL 131*  --  136* 95   MAGNESIUM mg/dL 2.2  --   --   --      Lab Results   Component Value Date    INR 1.18 (H) 12/06/2017    INR 1.12 (H) 12/02/2017    INR 1.01 01/27/2017    PROTIME 15.2 12/06/2017    PROTIME 14.5 12/02/2017    PROTIME 11.4 01/27/2017       Results from last 7 days  Lab Units 01/06/18  0039   ALK PHOS U/L 41   BILIRUBIN mg/dL 1.1   ALT (SGPT) U/L 32   AST (SGOT) U/L 17       Results from last 7 days  Lab Units 01/07/18  1149   PH, ARTERIAL pH units 7.421   PO2 ART mm Hg 83.7   PCO2, ARTERIAL mm Hg 67.4*   HCO3 ART mmol/L 42.8*     Imaging Results (last 24 hours)     Procedure Component Value Units Date/Time    XR Chest 1 View [[id number]] Collected:  01/07/18 2236     Updated:  01/07/18 2239     Narrative:       EXAMINATION: XR CHEST 1 VW-      CLINICAL INDICATION:     worsening hypoxia; J96.21-Acute and chronic  respiratory failure with hypoxia; J96.22-Acute and chronic respiratory  failure with hypercapnia; J44.1-Chronic obstructive pulmonary disease  with (acute) exacerbation     TECHNIQUE:  XR CHEST 1 VW-      COMPARISON: 01/06/2018      FINDINGS:   Persistent bilateral patchy airspace disease may represent edema or  pneumonia. Cardiomegaly is stable. Improved aeration of the lung bases.  No effusion or pneumothorax.       Impression:       Slight improvement in aeration. Otherwise stable bilateral  patchy airspace disease and cardiac enlargement.     This report was finalized on 1/7/2018 10:37 PM by Dr. Kirk Kellogg MD.                Medication Review:   Scheduled Medications:    albumin human 25 g Intravenous BID   amLODIPine 10 mg Oral Daily   aspirin 81 mg Oral Daily   atorvastatin 80 mg Oral Daily   budesonide-formoterol 2 puff Inhalation BID - RT   carvedilol 12.5 mg Oral BID With Meals   cetirizine 10 mg Oral Daily   cholecalciferol 2,000 Units Oral Daily   CloNIDine 0.3 mg Oral Q8H   ferrous sulfate 325 mg Oral Daily With Breakfast   fondaparinux 2.5 mg Subcutaneous Q24H   gabapentin 300 mg Oral Nightly   guaiFENesin 600 mg Oral Q12H   hydrALAZINE 100 mg Oral Q8H   insulin aspart 0-14 Units Subcutaneous 4x Daily AC & at Bedtime   insulin detemir 25 Units Subcutaneous Daily   insulin detemir 30 Units Subcutaneous Nightly   ipratropium-albuterol 3 mL Nebulization Q6H - RT   isosorbide mononitrate 180 mg Oral Daily   levothyroxine 25 mcg Oral Daily   pantoprazole 40 mg Oral Daily   predniSONE 20 mg Oral Daily With Breakfast   sertraline 50 mg Oral Q PM   sodium chloride 10 mL Intracatheter Q12H   sodium chloride      vitamin B-12 2,000 mcg Oral Daily     Continuous infusions:       Assessment/Plan      Neuro: patient is alert and oriented x3, no concerns currently.     Respiratory Failure:  likely related to underlying lung disease, COPD and fluid volume overload.  Incentive spirometer 10 times during the day. Continue high flow nasal cannula during day, wean to maintain Sp02 >92-94%, on initial evaluation Sp02 is 89%, 55L and 66% Fi02, increased to 75%, SpO2 increased to 92%. Continue Bipap at HS. Continue scheduled inhalants and nebulizer's. IV medrol changed to prednisone PO, wean as tolerated.       Cardiac:  /56 continue current PO BP meds.  Continue continuous ECG and v/s monitoring, maintain MAP >65.      CKD stage III: Creatinine 1.38, 24 hour urine 2.8 liters, continue strict I&O, electrolytes WNL.     Hypernatremia: likely intravascularly depleted, Will give albumin 25g 25% BID x1 day. Will hold off on D5W due to elevated glucoses and also for anasarca.      Low H/H: level is 6.4/21.6 this AM, BP is stable, receiving blood, will get CT abd to rule out retroperitoneal bleed.         ID: WBC is 7.67, Neutrophils are 88.4 no fevers, continue to monitor lab trends and clinical changes.      Patient Active Problem List   Diagnosis Code   • COPD (chronic obstructive pulmonary disease) J44.9   • Shortness of breath R06.02   • Morbid obesity E66.01   • Hypoxia R09.02   • Edema extremities R60.0   • Sleep apnea G47.30   • Wheezing R06.2   • Allergic rhinitis J30.9   • Essential hypertension I10   • Sore throat J02.9   • Cough R05   • Pneumonia J18.9   • Respiratory failure J96.90       YANG Mathews  01/08/18  11:19 AM    Scribed for Dr. Gomez by YANG Cassidy.       IJeremi M.D. attest that the above note accurately reflects the work and decisions made  by me.  Patient was seen and evaluated by Dr. Gomez, including history of present illness, physical exam, assessment, and treatment plan.  The above note was reviewed and edited by Dr. Gomez.  Critical Care time spent in direct patient care: 33 minutes (excluding procedure time, if applicable) including high  complexity decision making to assess, manipulate, and support vital organ system failure in this individual who has impairment of one or more vital organ systems such that there is a high probability of imminent or life threatening deterioration in the patient’s condition.

## 2018-01-08 NOTE — DISCHARGE PLACEMENT REQUEST
"Ward Copeland (67 y.o. Male)     Date of Birth Social Security Number Address Home Phone MRN    1950  [address] [address] [phone] [id number]    Rastafarian Marital Status          Roman Catholic        Admission Date Admission Type Admitting Provider Attending Provider Department, Room/Bed    12/28/17 Emergency Seun Muller MD Grace, Aimee Russell, DO [facility] CRITICAL CARE, CC06/1C    Discharge Date Discharge Disposition Discharge Destination                      Attending Provider: Teresa Sandhu DO     Allergies:  Iodine, Lisinopril    Isolation:  None   Infection:  None   Code Status:  FULL    Ht:  167.6 cm (66\")   Wt:  125 kg (274 lb 8 oz)    Admission Cmt:  None   Principal Problem:  None                Active Insurance as of 12/28/2017     Primary Coverage     Payor Plan Insurance Group Employer/Plan Group    ANTHEM MEDICARE REPLACEMENT ANTHEM MEDICARE ADVANTAGE [id number]     Payor Plan Address Payor Plan Phone Number Effective From Effective To    [address] [phone] 1/1/2016 12/31/2017    [place] [address]       Subscriber Name Subscriber Birth Date Member ID       WARD COPELAND 1950 [id number]                 Emergency Contacts      (Rel.) Home Phone Work Phone Mobile Phone    Adeline Copeland (Spouse) [phone] -- --    May Mccloud (Daughter) [phone] -- --    Genesis Mcknight (Daughter) [phone] -- --            Emergency Contact Information     Name Relation Home Work Mobile    Adeline Copeland Spouse [phone]      May Mccloud Daughter [phone]      Genesis Mcknight Daughter [phone]            Insurance Information                ANTHEM MEDICARE REPLACEMENT/ANTHEM MEDICARE ADVANTAGE Phone: [phone]    Subscriber: Ward Copeland Subscriber#: [id number]    Group#: [id number] Precert#:              History & Physical      Seun Muller MD at 12/28/2017  2:19 PM          Patient " "Identification:  Name:  Se Anne  Age:  67 y.o.  Sex:  male  :  1950  MRN:  [id number]   Visit Number:  [id number]  Primary Care Physician:  No Known Provider    I have seen the patient in conjunction with Lee Ann Sharpe PA-C and I agree with the following statements:    I have interviewed and examined the patient, I have discussed this case with the physician assistant.  I concur with her findings in this note also constitutes my findings.     Chief complaint: \"Couldn't breathe\"    History of presenting illness:  67 y.o. male Se Anne has history of chronic respiratory failure requiring 3 liters of supplemental oxygen via nasal cannula, chronic kidney disease, diabetes mellitus type 2, COPD, history of tobacco abuse, hypothyroidism, and anemia. He was most recently hospitalized at this facility from -17 with intubation and mechanical ventilation from - given acute on chronic hypoxic and hypercapnic respiratory failure with bibasilar pneumonia and severe sepsis.   He was discharged with Omnicef and doxycycline.   On this date, he presented to the ED with 2-3 days of progressively worsening dyspnea. He reports worsening wheeze and cough.  He reports orthopnea and worsening dyspnea on exertion.  He does also report some midsternal chest discomfort with shortness of breath he characterizes this as a tight sensation.  Taking a deep breath tended to make it worse.  The intensity currently 0/10.  He does think his shortness of breath has improved some since coming to the hospital.  Patient is a nonsmoker.  Initial ABG demonstrated hypercapnea and hypoxia on home supplemental oxygen of 3 liters.  CT chest per PE protocol with groundglass attenuation and atelectasis. No pulmonary defect to suggest pulmonary embolism, but subsegmental artery branches were not well visualized.  Mr. Anne denies fever and chills. He denies abdominal pain, nausea, and vomiting.  He denies dysuria and " hematuria . Mr. Anne reports he sleeps with BiPAP nightly and uses 3 liters of supplemental oxygen throughout the day. He ambulates with a cane and walker. He does report worsening bilateral lower extremity edema.  He had been noted to have some left lower quadrant tenderness to palpation, he states he had normal bowel movements yesterday.  No emesis.  No urinary symptoms.  He denies any fever.  He does get leg edema but he thinks it is the same or slightly improved to its baseline.    While Mr. Anne quit smoking several years ago, he does live with his wife, a 2-3 pack a day smoker.   ---------------------------------------------------------------------------------------------------------------------   Review of Systems   Constitutional: Negative for diaphoresis, fatigue and fever.   HENT: Negative for facial swelling and rhinorrhea.    Eyes: Negative for discharge and redness.   Respiratory: Positive for cough, chest tightness, shortness of breath and wheezing.    Cardiovascular: Positive for chest pain and leg swelling.   Gastrointestinal: Negative for abdominal distention and abdominal pain.   Endocrine: Negative for cold intolerance and heat intolerance.   Genitourinary: Negative for difficulty urinating and dysuria.   Musculoskeletal: Negative for arthralgias and gait problem.   Skin: Negative for color change and pallor.   Neurological: Negative for dizziness and headaches.   Psychiatric/Behavioral: Negative for agitation and confusion.      ---------------------------------------------------------------------------------------------------------------------   Past Medical History:   Diagnosis Date   • Agent orange exposure    • Arthritis    • CHF (congestive heart failure)    • COPD (chronic obstructive pulmonary disease)    • Coronary artery disease    • Diabetes mellitus    • Hyperlipidemia    • Hypertension      Past Surgical History:   Procedure Laterality Date   • CARDIAC SURGERY      stent placement    • CATARACT EXTRACTION     • JOINT REPLACEMENT      right knee   • KNEE SURGERY       Family History   Problem Relation Age of Onset   • Heart disease Mother    • Cancer Father    • Heart attack Sister    • Heart attack Brother    • Cancer Paternal Grandfather      Social History     Social History   • Marital status:      Spouse name: N/A   • Number of children: N/A   • Years of education: N/A     Social History Main Topics   • Smoking status: Former Smoker     Years: 40.00     Quit date: 3/21/2010   • Smokeless tobacco: Never Used   • Alcohol use No   • Drug use: No   • Sexual activity: Defer     Other Topics Concern   • None     Social History Narrative     ---------------------------------------------------------------------------------------------------------------------   Allergies:  Lisinopril and Other  ---------------------------------------------------------------------------------------------------------------------   Prior to Admission Medications     Prescriptions Last Dose Informant Patient Reported? Taking?    albuterol (PROVENTIL) (2.5 MG/3ML) 0.083% nebulizer solution  Pharmacy Yes No    Take 2.5 mg by nebulization Every 4 (Four) Hours As Needed for Wheezing or Shortness of Air.    amLODIPine (NORVASC) 10 MG tablet  Pharmacy Yes No    Take 10 mg by mouth Daily.    aspirin 81 MG EC tablet  Pharmacy Yes No    Take 81 mg by mouth Daily.    atorvastatin (LIPITOR) 80 MG tablet  Pharmacy Yes No    Take 80 mg by mouth Daily.    budesonide-formoterol (SYMBICORT) 160-4.5 MCG/ACT inhaler  Pharmacy Yes No    Inhale 2 puffs 2 (Two) Times a Day.    bumetanide (BUMEX) 1 MG tablet   No No    Take 1 tablet by mouth Daily.    carvedilol (COREG) 25 MG tablet  Pharmacy Yes No    Take 12.5 mg by mouth 2 (Two) Times a Day With Meals.    Cholecalciferol (VITAMIN D3) 2000 UNITS tablet  Pharmacy Yes No    Take 1 tablet by mouth Daily.    gabapentin (NEURONTIN) 100 MG capsule  Pharmacy Yes No    Take 700 mg by mouth  Daily.    hydrALAZINE (APRESOLINE) 25 MG tablet   No No    Take 3 tablets by mouth 3 (Three) Times a Day.    insulin NPH-insulin regular (humuLIN 70/30,novoLIN 70/30) (70-30) 100 UNIT/ML injection  Pharmacy Yes No    Inject 52 Units under the skin Every Morning.    insulin NPH-insulin regular (humuLIN 70/30,novoLIN 70/30) (70-30) 100 UNIT/ML injection  Pharmacy Yes No    Inject 54 Units under the skin Every Night.    isosorbide mononitrate (IMDUR) 60 MG 24 hr tablet  Pharmacy Yes No    Take 180 mg by mouth Daily.    levothyroxine (SYNTHROID, LEVOTHROID) 25 MCG tablet  Pharmacy Yes No    Take 25 mcg by mouth Daily.    loratadine (CLARITIN) 10 MG tablet  Pharmacy Yes No    Take 10 mg by mouth Daily.    metFORMIN (GLUCOPHAGE) 500 MG tablet  Pharmacy Yes No    Take 500 mg by mouth 2 (Two) Times a Day With Meals.    nitroglycerin (NITROSTAT) 0.4 MG SL tablet  Pharmacy Yes No    Place 0.4 mg under the tongue Every 5 (Five) Minutes As Needed for chest pain. Take no more than 3 doses in 15 minutes.    pantoprazole (PROTONIX) 40 MG EC tablet  Pharmacy Yes No    Take 40 mg by mouth Daily.    predniSONE (DELTASONE) 10 MG tablet   No No    10mg x 2 days, 5mg x 2 days, then stop.    sertraline (ZOLOFT) 100 MG tablet  Pharmacy Yes No    Take 50 mg by mouth Every Evening.    tiotropium (SPIRIVA) 18 MCG per inhalation capsule  Pharmacy Yes No    Place 1 capsule into inhaler and inhale Daily.    traMADol (ULTRAM) 50 MG tablet  Pharmacy Yes No    Take 50 mg by mouth Every 12 (Twelve) Hours As Needed for Moderate Pain .    vitamin B-12 (CYANOCOBALAMIN) 1000 MCG tablet  Pharmacy Yes No    Take 2,000 mcg by mouth Daily.        Hospital Scheduled Meds:    vancomycin 2,000 mg Intravenous Once        ---------------------------------------------------------------------------------------------------------------------   Vital Signs:  Temp:  [98 °F (36.7 °C)] 98 °F (36.7 °C)  Heart Rate:  [67-87] 86  Resp:  [18-20] 19  BP: (147-204)/(64-99)  159/77  Last 3 weights    12/28/17  0851   Weight: 118 kg (261 lb)     Body mass index is 42.13 kg/(m^2).  ---------------------------------------------------------------------------------------------------------------------       Physical Exam    Physical Exam:  Constitutional:  Chronically ill appearing. Morbidly obese by BMI.  Appears to be in mild distress. (Appears mildly tachypnea but overall comfortable on high flow oxygen, saturation 97% on 45% high flow)  HENT:  Head: Normocephalic and atraumatic.  Mouth:  Moist mucous membranes.  Speech is normal  Eyes:  Conjunctivae and EOM are normal.  Pupils are equal, round, and reactive to light.  No scleral icterus.  Neck:  Neck supple.  No JVD present.    Cardiovascular:  Normal rate, regular rhythm. Normal s1/s2  with no murmur.  Pulmonary/Chest:  Mild acute respiratory distress. Bilateral wheezing appreciated throughout.  He has bilateral breath sounds diminished throughout no definite crackles noted  Abdominal:  Soft.  Bowel sounds are present.  Nondistended he is tender in the left lower quadrant to deep palpation without rebound or guarding, I concur good bowel sounds.    Musculoskeletal:  Bilateral lower extremity edema, pitting 2+.  Some skin changes to suggest chronic edema.  Dry heels bilaterally.  No noted deformity.    Neurological:  Alert and oriented to person, place, and time.  No cranial nerve deficit.  No tongue deviation.  No facial droop.  No slurred speech.  Strength is symmetric   Skin:  Skin is warm and dry.  No rash noted.  No pallor.   Psychiatric:  Normal mood and affect.  strong pulses on all 4 extremities.    ---------------------------------------------------------------------------------------------------------------------  EKG:       EKG image reviewed, nonspecific findings.  ---------------------------------------------------------------------------------------------------------------------         Results from last 7 days  Lab Units  12/28/17  0932   WBC 10*3/mm3 7.39   HEMOGLOBIN g/dL 9.8*   HEMATOCRIT % 32.2*   MCV fL 95.0*   MCHC g/dL 30.4*   PLATELETS 10*3/mm3 188       Results from last 7 days  Lab Units 12/28/17  1334   PH, ARTERIAL pH units 7.498*   PO2 ART mm Hg 66.2*   PCO2, ARTERIAL mm Hg 43.5   HCO3 ART mmol/L 33.0*       Results from last 7 days  Lab Units 12/28/17  0932   SODIUM mmol/L 145   POTASSIUM mmol/L 3.3*   CHLORIDE mmol/L 97*   CO2 mmol/L 39.9*   BUN mg/dL 16   CREATININE mg/dL 1.27   EGFR IF NONAFRICN AM mL/min/1.73 56*   CALCIUM mg/dL 8.8   GLUCOSE mg/dL 178*   ALBUMIN g/dL 4.40   BILIRUBIN mg/dL 0.6   ALK PHOS U/L 55   AST (SGOT) U/L 18   ALT (SGPT) U/L 28   Estimated Creatinine Clearance: 66.4 mL/min (by C-G formula based on Cr of 1.27).  No results found for: AMMONIA    Results from last 7 days  Lab Units 12/28/17  0932   TROPONIN I ng/mL 0.028         Lab Results   Component Value Date    HGBA1C 7.80 (H) 12/06/2017     Lab Results   Component Value Date    TSH 0.350 (L) 12/07/2017    FREET4 0.65 (L) 12/08/2017     No results found for: PREGTESTUR, PREGSERUM, HCG, HCGQUANT  Pain Management Panel     Pain Management Panel Latest Ref Rng & Units 12/7/2017 12/7/2017    CREATININE UR mg/dL 152.6 152.6    AMPHETAMINES SCREEN, URINE Negative - -    BARBITURATES SCREEN Negative - -    BENZODIAZEPINE SCREEN, URINE Negative - -    COCAINE SCREEN, URINE Negative - -    METHADONE SCREEN, URINE Negative - -                        ---------------------------------------------------------------------------------------------------------------------  Imaging Results (last 7 days)     Procedure Component Value Units Date/Time    XR Chest 1 View [[id number]] Collected:  12/28/17 1015     Updated:  12/28/17 1017    Narrative:       EXAMINATION:  XR CHEST 1 VW-      CLINICAL INDICATION:     sob     TECHNIQUE:  XR CHEST 1 VW-       COMPARISON: 12/10/2017      FINDINGS:   The lungs remain aerated.   Heart size is stable.   No pneumothorax.    No pleural effusion.   Bony and soft tissue structures are unremarkable.            Impression:       Stable radiographic appearance of the chest.        This report was finalized on 12/28/2017 10:15 AM by Dr. Miko Coto MD.       CT Chest Pulmonary Embolism With Contrast [[id number]] Collected:  12/28/17 1242     Updated:  12/28/17 1245    Narrative:       EXAMINATION: CT CHEST PULMONARY EMBOLISM W CONTRAST-      Technique: Multiple CT axial images were obtained through the level of  pulmonary arteries, following IV contrast administration per CT PE  protocol.  Volume Rendered 3D or MIP images performed.     Radiation dose reduction techniques were utilized per ALARA protocol.  Automated exposure control was initiated through either or [employer] or  [employer] software packages by  protocol.                  CLINICAL INDICATION:    SOB and Chest Pain     COMPARISON:    Chest x-ray performed on 12/28/2017.     FINDINGS:    Some bibasilar groundglass attenuation possibly  representing alveolar edema or atelectasis. Coronary artery  calcification. Subsegmental pulmonary artery branches are not well  evaluated. There is no pulmonary artery filling defect to suggest  pulmonary embolism. There is no thoracic adenopathy. No pleural or  pericardial effusion. Incidentally imaged upper abdomen is unremarkable.  Bone windows show no acute osseous abnormality.       Impression:          Some bibasilar groundglass attenuation possibly representing alveolar  edema or atelectasis. Coronary artery calcification. Subsegmental  pulmonary artery branches are not well evaluated.     This report was finalized on 12/28/2017 12:43 PM by Dr. Miko Coto MD.           CT chest images reviewed, appears to have minimal atelectasis.        Cultures:   PENDING      I have personally reviewed the radiology images and read the final radiology  report.  ---------------------------------------------------------------------------------------------------------------------  Assessment and Plan:    -Acute on chronic hypoxic and hypercapnic respiratory failure likely 2/2 acute exacerbation of COPD: His BNP is overall low, I do not think he has significant pulmonary edema.  IV Rocephin and IV doxycycline ordered.  Mycoplasma and Legionella testing ordered.  Sputum culture has been ordered.  CT chest without acute infiltrate.  Recent treatment for pneumonia while hospitalized. .  WBC unremarkable. Lactic acid and C-RP added. Currently on high-flow oxygen via nasal cannula. Pulmonlogy consultation placed. Influenza negative.  Mycoplasma and Legionella screens ordered    (Since the above note was made, patient did meet septic shock criteria.  His lactic acid was elevated, although not documented he was tachypnea And CRP was elevated.  Lactic acid met shock criteria.  He is being given 30 cc per Bolus and antibiotics were broadened.  He is now having left lower quadrant tenderness.  I'm going to get a CT of his abdomen as well.  He possibly could have diverticulitis or worst-case ischemic bowel.  Reflex lactic acid will be drawn 4 hours.  Patient had a previous echocardiogram within this month that showed a normal ejection fraction.  Because of shock, ID has been consulted, part of his edema may be related to his Norvasc.)    -Acute exacerbation of COPD:  IV solumedrol 40 mg BID ordered. IV abx as previously outlined. Duoneb treatments ordered.  Pulmonology consultation placed. Mr. Anne is an office patient of Dr. Gomez.     -Chest pain, typical and atypical features with history of CAD with proximal LAD stent:  Serial cardiac enzymes ordered.  Recent echocardiogram with EF unremarkable.  Aspirin 81 mg ordered.  Will monitor on telemetry.  .  CT chest with alveolar edema vs. Atelectasis.  Troponin negative at this time.      -Chronic anemia: Folate  and vitamine b12 within normal limits on 12/9.  Iron panel demonstrating iron deficiency.  Repeat indices drawn.    -Chronic kidney disease, stage III: Continue to follow creatinine.  Bumex therapy at home.    -Diabetes mellitus type II, ID:  FSBG ACHS.   Hemoglobin a1c.  I have ordered some basal insulin as I have advanced him to clear liquids.  Intake and use sliding scale, would expect some loss of control with the steroids.    -Hypokalemia:  Electrolyte replacement protocol ordered Magnesium protocol ordered.     -Hypothyroidism, previous studies suggested possibly sick euthyroid, these will be repeated.    -DVT prophylaxis with SQ Heparin     Lee Ann Sharpe PA-C  12/28/17  2:50 PM  ---------------------------------------------------------------------------------------------------------------------            Electronically signed by Seun Muller MD at 12/28/2017  6:26 PM        Vital Signs (last 24 hours)       01/07 0700  -  01/08 0659 01/08 0700  -  01/08 0949   Most Recent    Temp (°F) 97.7 -  98.6    98 -  98.2     98.2 (36.8)    Heart Rate 54 -  76    66 -  72     68    Resp 15 -  28    16 -  26     26    /67 -  (!) 204/70    141/57 -  176/76     159/56    SpO2 (%) (!)81 -  100    91 -  96     96          Intake & Output (last day)       01/07 0701 - 01/08 0700 01/08 0701 - 01/09 0700    P.O.  354    Blood  300    IV Piggyback 50     Total Intake(mL/kg) 50 (0.4) 654 (5.3)    Urine (mL/kg/hr) 2800 (0.9)     Stool      Total Output 2800      Net -2750 +654              Hospital Medications (active)       Dose Frequency Start End    albumin human 25 % IV SOLN 25 g 25 g 2 Times Daily (BID) 1/8/2018 1/9/2018    Sig - Route: Infuse 100 mL into a venous catheter 2 (Two) Times a Day. - Intravenous    aluminum-magnesium hydroxide-simethicone (MAALOX MAX) 400-400-40 MG/5ML suspension 15 mL 15 mL Every 6 Hours PRN 1/8/2018     Sig - Route: Take 15 mL by mouth Every 6 (Six) Hours As Needed for  Indigestion or Heartburn. - Oral    amLODIPine (NORVASC) tablet 10 mg 10 mg Daily 12/28/2017     Sig - Route: Take 1 tablet by mouth Daily. - Oral    aspirin EC tablet 81 mg 81 mg Daily 12/29/2017     Sig - Route: Take 1 tablet by mouth Daily. - Oral    Cosign for Ordering: Accepted by Seun Muller MD on 12/30/2017  6:38 PM    atorvastatin (LIPITOR) tablet 80 mg 80 mg Daily 12/28/2017     Sig - Route: Take 2 tablets by mouth Daily. - Oral    budesonide-formoterol (SYMBICORT) 160-4.5 MCG/ACT inhaler 2 puff 2 puff 2 Times Daily - RT 12/28/2017     Sig - Route: Inhale 2 puffs 2 (Two) Times a Day. - Inhalation    carvedilol (COREG) tablet 12.5 mg 12.5 mg 2 Times Daily With Meals 1/5/2018     Sig - Route: Take 2 tablets by mouth 2 (Two) Times a Day With Meals. - Oral    cetirizine (zyrTEC) tablet 10 mg 10 mg Daily 12/28/2017     Sig - Route: Take 1 tablet by mouth Daily. - Oral    cholecalciferol (VITAMIN D3) tablet 2,000 Units 2,000 Units Daily 12/28/2017     Sig - Route: Take 5 tablets by mouth Daily. - Oral    CloNIDine (CATAPRES) tablet 0.3 mg 0.3 mg Every 8 Hours Scheduled 1/8/2018     Sig - Route: Take 1 tablet by mouth Every 8 (Eight) Hours. - Oral    dextrose (D50W) solution 25 g 25 g Every 15 Minutes PRN 12/28/2017     Sig - Route: Infuse 50 mL into a venous catheter Every 15 (Fifteen) Minutes As Needed for Low Blood Sugar (Blood Sugar Less Than 70, Patient Has IV Access - Unresponsive, NPO or Unable To Safely Swallow). - Intravenous    Cosign for Ordering: Accepted by Seun Muller MD on 12/30/2017  6:38 PM    dextrose (GLUTOSE) oral gel 15 g 15 g Every 15 Minutes PRN 12/28/2017     Sig - Route: Take 15 g by mouth Every 15 (Fifteen) Minutes As Needed for Low Blood Sugar (Blood Sugar Less Than 70, Patient Alert, Is Not NPO & Can Safely Swallow). - Oral    Cosign for Ordering: Accepted by Seun Muller MD on 12/30/2017  6:38 PM    ferrous sulfate tablet 325 mg 325 mg Daily With Breakfast 12/29/2017     Sig -  Route: Take 1 tablet by mouth Daily With Breakfast. - Oral    fondaparinux (ARIXTRA) injection 2.5 mg 2.5 mg Every 24 Hours Scheduled 12/30/2017     Sig - Route: Inject 0.5 mL under the skin Daily. - Subcutaneous    furosemide (LASIX) injection 40 mg 40 mg Once 1/7/2018 1/7/2018    Sig - Route: Infuse 4 mL into a venous catheter 1 (One) Time. - Intravenous    gabapentin (NEURONTIN) capsule 300 mg 300 mg Nightly 12/28/2017     Sig - Route: Take 1 capsule by mouth Every Night. - Oral    glucagon (human recombinant) (GLUCAGEN DIAGNOSTIC) injection 1 mg 1 mg Every 15 Minutes PRN 12/28/2017     Sig - Route: Inject 1 mg under the skin Every 15 (Fifteen) Minutes As Needed (Blood Glucose Less Than 70 - Patient Without IV Access - Unresponsive, NPO or Unable To Safely Swallow). - Subcutaneous    Cosign for Ordering: Accepted by Seun Muller MD on 12/30/2017  6:38 PM    guaiFENesin (MUCINEX) 12 hr tablet 600 mg 600 mg Every 12 Hours Scheduled 1/5/2018     Sig - Route: Take 1 tablet by mouth Every 12 (Twelve) Hours. - Oral    hydrALAZINE (APRESOLINE) injection 10 mg 10 mg Every 6 Hours PRN 1/7/2018     Sig - Route: Infuse 0.5 mL into a venous catheter Every 6 (Six) Hours As Needed for High Blood Pressure. - Intravenous    hydrALAZINE (APRESOLINE) tablet 100 mg 100 mg Every 8 Hours Scheduled 12/30/2017     Sig - Route: Take 2 tablets by mouth Every 8 (Eight) Hours. - Oral    insulin aspart (novoLOG) injection 0-14 Units 0-14 Units 4 Times Daily Before Meals & Nightly 12/28/2017     Sig - Route: Inject 0-14 Units under the skin 4 (Four) Times a Day Before Meals & at Bedtime. - Subcutaneous    insulin detemir (LEVEMIR) injection 25 Units 25 Units Daily 1/7/2018     Sig - Route: Inject 25 Units under the skin Daily. - Subcutaneous    insulin detemir (LEVEMIR) injection 30 Units 30 Units Nightly 1/6/2018     Sig - Route: Inject 30 Units under the skin Every Night. - Subcutaneous    ipratropium-albuterol (DUO-NEB) nebulizer  "solution 3 mL 3 mL Every 6 Hours - RT 1/7/2018     Sig - Route: Take 3 mL by nebulization Every 6 (Six) Hours. - Nebulization    isosorbide mononitrate (IMDUR) 24 hr tablet 180 mg 180 mg Daily 12/28/2017     Sig - Route: Take 3 tablets by mouth Daily. - Oral    levothyroxine (SYNTHROID, LEVOTHROID) tablet 25 mcg 25 mcg Daily 12/28/2017     Sig - Route: Take 1 tablet by mouth Daily. - Oral    Magnesium Sulfate 2 gram Bolus, followed by 8 gram infusion (total Mg dose 10 grams)- Mg less than or equal to 1mg/dL 2 g As Needed 12/28/2017     Sig - Route: Infuse 50 mL into a venous catheter As Needed (Mg less than or equal to 1mg/dL). - Intravenous    Cosign for Ordering: Accepted by Seun Muller MD on 12/30/2017  6:38 PM    Linked Group 1:  \"Or\" Linked Group Details        magnesium sulfate 4 gram infusion- Mg 1.6-1.9 mg/dL 4 g As Needed 12/28/2017     Sig - Route: Infuse 100 mL into a venous catheter As Needed (Mg 1.6-1.9 mg/dL). - Intravenous    Cosign for Ordering: Accepted by Seun Muller MD on 12/30/2017  6:38 PM    Linked Group 1:  \"Or\" Linked Group Details        Magnesium Sulfate 6 gram Infusion (2 gm x 3) -Mg 1.1 -1.5 mg/dL 2 g As Needed 12/28/2017     Sig - Route: Infuse 50 mL into a venous catheter As Needed (Mg 1.1 -1.5 mg/dL). - Intravenous    Cosign for Ordering: Accepted by Seun Muller MD on 12/30/2017  6:38 PM    Linked Group 1:  \"Or\" Linked Group Details        nitroglycerin (NITROSTAT) SL tablet 0.4 mg 0.4 mg Every 5 Minutes PRN 12/28/2017     Sig - Route: Place 1 tablet under the tongue Every 5 (Five) Minutes As Needed for Chest Pain. - Sublingual    Cosign for Ordering: Accepted by Jeremie Bermudez MD on 12/28/2017  2:34 PM    pantoprazole (PROTONIX) EC tablet 40 mg 40 mg Daily 12/28/2017     Sig - Route: Take 1 tablet by mouth Daily. - Oral    potassium chloride (K-DUR,KLOR-CON) CR tablet 40 mEq 40 mEq Every 4 Hours 1/7/2018 1/7/2018    Sig - Route: Take 2 tablets by mouth Every 4 (Four) " "Hours. - Oral    potassium chloride (KLOR-CON) packet 40 mEq 40 mEq As Needed 12/28/2017     Sig - Route: Take 40 mEq by mouth As Needed (potassium replacement, see admin instructions). - Oral    Cosign for Ordering: Accepted by Seun Muller MD on 12/30/2017  6:38 PM    Linked Group 2:  \"Or\" Linked Group Details        potassium chloride (MICRO-K) CR capsule 40 mEq 40 mEq As Needed 12/28/2017     Sig - Route: Take 4 capsules by mouth As Needed (potassium replacement.  see admin instructions). - Oral    Cosign for Ordering: Accepted by Seun Muller MD on 12/30/2017  6:38 PM    Linked Group 2:  \"Or\" Linked Group Details        potassium chloride 10 mEq in 100 mL IVPB 10 mEq Every 1 Hour PRN 12/28/2017     Sig - Route: Infuse 100 mL into a venous catheter Every 1 (One) Hour As Needed (potassium protocol PERIPHERAL - see admin instructions). - Intravenous    Cosign for Ordering: Accepted by Seun Muller MD on 12/30/2017  6:38 PM    Linked Group 2:  \"Or\" Linked Group Details        predniSONE (DELTASONE) tablet 20 mg 20 mg Daily With Breakfast 1/8/2018     Sig - Route: Take 1 tablet by mouth Daily With Breakfast. - Oral    sertraline (ZOLOFT) tablet 50 mg 50 mg Every Evening 12/28/2017     Sig - Route: Take 1 tablet by mouth Every Evening. - Oral    sodium chloride 0.9 % flush 1-10 mL 1-10 mL As Needed 12/28/2017     Sig - Route: Infuse 1-10 mL into a venous catheter As Needed for Line Care. - Intravenous    Cosign for Ordering: Accepted by Seun Muller MD on 12/30/2017  6:38 PM    sodium chloride 0.9 % flush 10 mL 10 mL As Needed 12/28/2017     Sig - Route: Infuse 10 mL into a venous catheter As Needed for Line Care. - Intravenous    Cosign for Ordering: Accepted by Jeremie Bermudez MD on 12/28/2017  2:34 PM    Linked Group 3:  \"And\" Linked Group Details        sodium chloride 0.9 % flush 10 mL 10 mL Every 12 Hours Scheduled 1/2/2018     Sig - Route: 10 mL by Intracatheter route Every 12 (Twelve) Hours. - " Intracatheter    Cosign for Ordering: Required by Seun Muller MD    sodium chloride 0.9 % flush 10 mL 10 mL As Needed 1/2/2018     Sig - Route: 10 mL by Intracatheter route As Needed for Line Care (After Medication Administration). - Intracatheter    Cosign for Ordering: Required by Seun Muller MD    sodium chloride 0.9 % infusion  - ADS Override Pull   1/5/2018     Notes to Pharmacy: Created by cabinet override    vitamin B-12 (CYANOCOBALAMIN) tablet 2,000 mcg 2,000 mcg Daily 12/28/2017     Sig - Route: Take 2 tablets by mouth Daily. - Oral    CloNIDine (CATAPRES) tablet 0.2 mg (Discontinued) 0.2 mg Every 8 Hours Scheduled 1/6/2018 1/8/2018    Sig - Route: Take 1 tablet by mouth Every 8 (Eight) Hours. - Oral    furosemide (LASIX) injection 60 mg (Discontinued) 60 mg Once 1/6/2018 1/7/2018    Sig - Route: Infuse 6 mL into a venous catheter 1 (One) Time. - Intravenous    ipratropium (ATROVENT) nebulizer solution 0.5 mg (Discontinued) 0.5 mg 4 Times Daily - RT 12/28/2017 1/7/2018    Sig - Route: Take 2.5 mL by nebulization 4 (Four) Times a Day. - Nebulization    ipratropium-albuterol (DUO-NEB) nebulizer solution 3 mL (Discontinued) 3 mL 4 Times Daily - RT 1/7/2018 1/7/2018    Sig - Route: Take 3 mL by nebulization 4 (Four) Times a Day. - Nebulization    methylPREDNISolone sodium succinate (SOLU-Medrol) injection 40 mg (Discontinued) 40 mg Every 12 Hours 1/7/2018 1/8/2018    Sig - Route: Infuse 1 mL into a venous catheter Every 12 (Twelve) Hours. - Intravenous    predniSONE (DELTASONE) tablet 10 mg (Discontinued) 10 mg Daily With Breakfast 1/5/2018 1/7/2018    Sig - Route: Take 1 tablet by mouth Daily With Breakfast. - Oral    sodium bicarbonate 8.4 % injection 8.4%  - ADS Override Pull (Discontinued)   1/8/2018 1/8/2018    Notes to Pharmacy: Created by cabinet override    Reason for Discontinue: Returned to ADS            Lab Results (last 24 hours)     Procedure Component Value Units Date/Time    POC Glucose  Once [[id number]]  (Abnormal) Collected:  01/07/18 1125    Specimen:  Blood Updated:  01/07/18 1153     Glucose 63 (L) mg/dL     Narrative:       Meter: [id number] : [id number] Torres Collins    Blood Gas, Arterial [[id number]]  (Abnormal) Collected:  01/07/18 1149    Specimen:  Arterial Blood Updated:  01/07/18 1200     Site Arterial: left radial     Travis's Test N/A     pH, Arterial 7.421 pH units      pCO2, Arterial 67.4 (C) mm Hg      pO2, Arterial 83.7 mm Hg      HCO3, Arterial 42.8 (C) mmol/L      Base Excess, Arterial 16.5 mmol/L      O2 Saturation, Arterial 95.1 %      Hemoglobin, Blood Gas 7.4 (L) g/dL      Hematocrit, Blood Gas 22.0 (L) %      Oxyhemoglobin 92.9 %      Methemoglobin 0.20 %      Carboxyhemoglobin 2.1 %      A-a Gradiant >300.0 (H) mmHg      Temperature 98.6 C      Barometric Pressure for Blood Gas 735 mmHg      Modality BiPap     FIO2 70 %      [facility] 24     IPAP 20     EPAP 8    POC Glucose Once [[id number]]  (Normal) Collected:  01/07/18 1714    Specimen:  Blood Updated:  01/07/18 1724     Glucose 112 mg/dL     Narrative:       Meter: [id number] : [id number] PARI STALLINGS    Potassium [[id number]]  (Normal) Collected:  01/07/18 1703    Specimen:  Blood Updated:  01/07/18 1730     Potassium 3.6 mmol/L     POC Glucose Once [[id number]]  (Normal) Collected:  01/07/18 1953    Specimen:  Blood Updated:  01/07/18 2013     Glucose 122 mg/dL     Narrative:       Meter: [id number] : [id number] CHINA HEADLEY    C-reactive Protein [[id number]]  (Abnormal) Collected:  01/08/18 0108    Specimen:  Blood Updated:  01/08/18 0148     C-Reactive Protein 6.62 (H) mg/dL     Basic Metabolic Panel [[id number]]  (Abnormal) Collected:  01/08/18 0108    Specimen:  Blood Updated:  01/08/18 0157     Glucose 131 (H) mg/dL      BUN 29 (H) mg/dL      Creatinine 1.38 (H) mg/dL      Sodium 150 mmol/L      Potassium 3.8 mmol/L      Chloride 104 mmol/L      CO2 >40.0 (H) mmol/L      Calcium 8.8 mg/dL       eGFR Non African Amer 51 (L) mL/min/1.73      BUN/Creatinine Ratio 21.0     Anion Gap -- mmol/L       Unable to calculate Anion Gap.       Narrative:       GFR Normal >60  Chronic Kidney Disease <60  Kidney Failure <15    Osmolality, Calculated [[id number]]  (Abnormal) Collected:  01/08/18 0108    Specimen:  Blood Updated:  01/08/18 0157     Osmolality Calc 305.6 (H) mOsm/kg     CBC & Differential [[id number]] Collected:  01/08/18 0223    Specimen:  Blood Updated:  01/08/18 0309    Narrative:       The following orders were created for panel order CBC & Differential.  Procedure                               Abnormality         Status                     ---------                               -----------         ------                     CBC Auto Differential[[id number]]        Abnormal            Final result                 Please view results for these tests on the individual orders.    CBC Auto Differential [[id number]]  (Abnormal) Collected:  01/08/18 0223    Specimen:  Blood Updated:  01/08/18 0309     WBC 7.67 10*3/mm3      RBC 2.23 (L) 10*6/mm3      Hemoglobin 6.4 (C) g/dL      Hematocrit 21.6 (C) %      MCV 96.9 (H) fL      MCH 28.7 pg      MCHC 29.6 (L) g/dL      RDW 15.2 (H) %      RDW-SD 49.8 fl      MPV 9.5 fL      Platelets 167 10*3/mm3      Neutrophil % 88.4 (H) %      Lymphocyte % 6.3 (L) %      Monocyte % 5.0 %      Eosinophil % 0.0 %      Basophil % 0.0 %      Immature Grans % 0.3 %      Neutrophils, Absolute 6.79 (H) 10*3/mm3      Lymphocytes, Absolute 0.48 (L) 10*3/mm3      Monocytes, Absolute 0.38 10*3/mm3      Eosinophils, Absolute 0.00 10*3/mm3      Basophils, Absolute 0.00 10*3/mm3      Immature Grans, Absolute 0.02 10*3/mm3     POC Glucose Once [[id number]]  (Abnormal) Collected:  01/08/18 0724    Specimen:  Blood Updated:  01/08/18 0731     Glucose 274 (H) mg/dL     Narrative:       Meter: [id number] : [id number] JULISSA ROSA    Magnesium [[id number]]  (Normal) Collected:  01/08/18 0108     Specimen:  Blood Updated:  18     Magnesium 2.2 mg/dL     Phosphorus [[id number]]  (Normal) Collected:  18 0108    Specimen:  Blood Updated:  18     Phosphorus 3.5 mg/dL         Imaging Results (last 24 hours)     Procedure Component Value Units Date/Time    XR Chest 1 View [[id number]] Collected:  18     Updated:  18    Narrative:       EXAMINATION: XR CHEST 1 VW-      CLINICAL INDICATION:     worsening hypoxia; J96.21-Acute and chronic  respiratory failure with hypoxia; J96.22-Acute and chronic respiratory  failure with hypercapnia; J44.1-Chronic obstructive pulmonary disease  with (acute) exacerbation     TECHNIQUE:  XR CHEST 1 VW-      COMPARISON: 2018      FINDINGS:   Persistent bilateral patchy airspace disease may represent edema or  pneumonia. Cardiomegaly is stable. Improved aeration of the lung bases.  No effusion or pneumothorax.       Impression:       Slight improvement in aeration. Otherwise stable bilateral  patchy airspace disease and cardiac enlargement.     This report was finalized on 2018 10:37 PM by Dr. Kirk Kellogg MD.           Operative/Procedure Notes (last 24 hours) (Notes from 2018  9:49 AM through 2018  9:49 AM)     No notes of this type exist for this encounter.           Physician Progress Notes (last 24 hours) (Notes from 2018  9:49 AM through 2018  9:49 AM)      Teresa Sandhu DO at 2018 11:19 AM  Version 1 of 1         HOSPITALIST PROGRESS NOTE    Patient Identification:  Name:  Se Anne  Age:  67 y.o.  Sex:  male  :  1950  MRN:  [id number]  Visit Number:  [id number]  Primary Care Provider:  No Known Provider    Length of stay:  10     HPI: 66 yo male admitted with dyspnea    Subjective:  The patient was seen this morning and is lying in bed with BiPAP on.  The patient's FiO2 had to be increased today as he reports worsening shortness of breath and states that he just cannot get a  good breath.  The patient states that his cough is becoming more productive but he denies any worsening.  The patient denies any chest pain.  He has no abdominal pain    Present during exam: REGGIE Bolaños    Current Hospital Meds:    amLODIPine 10 mg Oral Daily   aspirin 81 mg Oral Daily   atorvastatin 80 mg Oral Daily   budesonide-formoterol 2 puff Inhalation BID - RT   carvedilol 12.5 mg Oral BID With Meals   cetirizine 10 mg Oral Daily   cholecalciferol 2,000 Units Oral Daily   CloNIDine 0.2 mg Oral Q8H   ferrous sulfate 325 mg Oral Daily With Breakfast   fondaparinux 2.5 mg Subcutaneous Q24H   furosemide 60 mg Intravenous Once   gabapentin 300 mg Oral Nightly   guaiFENesin 600 mg Oral Q12H   hydrALAZINE 100 mg Oral Q8H   insulin aspart 0-14 Units Subcutaneous 4x Daily AC & at Bedtime   insulin detemir 25 Units Subcutaneous Daily   insulin detemir 30 Units Subcutaneous Nightly   ipratropium-albuterol 3 mL Nebulization 4x Daily - RT   isosorbide mononitrate 180 mg Oral Daily   levothyroxine 25 mcg Oral Daily   methylPREDNISolone sodium succinate 40 mg Intravenous Q12H   pantoprazole 40 mg Oral Daily   potassium chloride 40 mEq Oral Q4H   sertraline 50 mg Oral Q PM   sodium chloride 10 mL Intracatheter Q12H   sodium chloride      vitamin B-12 2,000 mcg Oral Daily       Vital Signs  Temp:  [97.5 °F (36.4 °C)-99.1 °F (37.3 °C)] 98.1 °F (36.7 °C)  Heart Rate:  [54-75] 61  Resp:  [22-27] 24  BP: (141-230)/() 167/66  Last 3 weights    01/04/18  0403 01/05/18  0435 01/06/18  0400   Weight: 127 kg (279 lb 3.2 oz) 126 kg (278 lb 1.6 oz) 125 kg (274 lb 8 oz)     Body mass index is 44.31 kg/(m^2).       Intake/Output Summary (Last 24 hours) at 01/07/18 1119  Last data filed at 01/07/18 1000   Gross per 24 hour   Intake                0 ml   Output             5250 ml   Net            -5250 ml       Physical exam:  Physical Exam   Constitutional: He is oriented to person, place, and time. He appears well-developed and  well-nourished. No distress. Face mask in place.   Obese   HENT:   Head: Normocephalic and atraumatic.   Nose: Nose normal.   Mouth/Throat: Oropharynx is clear and moist and mucous membranes are normal.   Eyes: Conjunctivae and EOM are normal. Pupils are equal, round, and reactive to light. No scleral icterus.   Neck: Trachea normal. Neck supple. No JVD present. Carotid bruit is not present. No thyromegaly present.   Cardiovascular: Normal rate, regular rhythm, normal heart sounds and normal pulses.  Exam reveals no gallop and no friction rub.    No murmur heard.  1-2+ edema bilateral lower extremities   Pulmonary/Chest: Effort normal. No respiratory distress. He has decreased breath sounds. He has no wheezes. He has rhonchi in the left upper field.   Poor air movement bilaterally   Abdominal: Soft. Bowel sounds are normal. He exhibits no distension. There is no tenderness. There is no guarding.   Neurological: He is alert and oriented to person, place, and time. He has normal strength. No cranial nerve deficit.   Skin: Skin is warm, dry and intact. No rash noted. No erythema.   Psychiatric: He has a normal mood and affect. His speech is normal.      Telemetry: Sinus rhythm    Results Review:    Results from last 7 days  Lab Units 01/07/18  0036 01/06/18  0039 01/05/18  1002 01/05/18  0106 01/04/18  0042 01/03/18  0201 01/02/18  0044 01/01/18  0050   WBC 10*3/mm3 8.08 9.07  --  7.06 6.71 6.64 5.76 6.19   HEMOGLOBIN g/dL 7.2* 7.3* 8.3* 6.6* 7.0* 7.8* 8.4* 8.8*   HEMATOCRIT % 24.0* 24.3* 26.3* 21.9* 23.7* 25.4* 27.0* 28.7*   PLATELETS 10*3/mm3 220 203  --  172 182 173 153 149       Results from last 7 days  Lab Units 01/07/18  0036 01/06/18  0039 01/05/18  0106 01/04/18  0042 01/03/18  0201 01/02/18  1702 01/02/18  0044 01/01/18  0050   SODIUM mmol/L 148 145 142 146 146  --  143 145   POTASSIUM mmol/L 3.5 3.6 3.8 4.2 4.0 4.4 3.6 3.8   CHLORIDE mmol/L 102 106 105 108 108  --  107 110   CO2 mmol/L 37.9* 33.7* 34.1* 30.8  27.7  --  30.0 31.3   BUN mg/dL 28* 24* 30* 30* 26*  --  24* 22*   CREATININE mg/dL 1.38* 1.28 1.34* 1.41* 1.42*  --  1.38* 1.32*   CALCIUM mg/dL 9.1 9.2 9.0 8.6 8.8  --  8.7 8.4   GLUCOSE mg/dL 136* 95 182* 102 145*  --  116* 139*       Results from last 7 days  Lab Units 01/06/18  0039 01/01/18  0050   BILIRUBIN mg/dL 1.1 1.1   ALK PHOS U/L 41 42   AST (SGOT) U/L 17 25   ALT (SGPT) U/L 32 52*       Results from last 7 days  Lab Units 01/01/18  0050   MAGNESIUM mg/dL 2.7*             CXR, 1/6/18:  FINDINGS:      Bibasilar consolidation  The cardiac silhouette is normal. The pulmonary vasculature is  unremarkable.  There is no evidence of an acute osseous abnormality.   There are no suspicious-appearing parenchymal soft tissue nodules.      IMPRESSION:  Bibasilar consolidation          Assessment/Plan      -Acute on chronic hypoxic and hypercapnic respiratory failure secondary to an acute COPD exacerbation with a bibasilar healthcare associated pneumonia and acute volume overload/acute diastolic heart failure exacerbation  -Uncontrolled essential hypertension  -Septic shock that has resolved  -Negative fluid balance, approximately 5L  -Generalized weakness  -Insulin dependent diabetes mellitus type 2 with intermittent hypoglycemia  -Acute on chronic microcytic anemia with iron deficiency; hemoglobin 7 today, likely due to acute illness and blood draws however may be contributing to his weakness and shortness of breath  -Acute hypomagnesemia, resolved  -Acute hypophosphatemia that has resolved  -Possible5 mm bladder polyp of unclear significance - outpatient urology follow up  -Stage III chronic kidney disease  -Thrombocytopenia that has resolved  -Obesity    BiPAP adjustments have been made.  The patient has received another dose of IV Lasix.  I will escalate his steroid therapy and his scheduled inhalants and we will add albuterol to Atrovent and increase the frequency.  A repeat chest x-ray and ABG have been  ordered and in the meantime the patient will be admitted to the critical care unit with low threshold for intubation.  The patient was intubated approximately one month ago.  Hold off on escalating his antibiotic therapy at this time as he has no fever no leukocytosis and just completed a course of antibiotics on January 5.    Continue physical therapy as tolerated/as available.  assisting with placement at time of discharge.    Coreg dose has been decreased due to bradycardia. Patient remains with some intermittent hypertension. He has been started on Clonidine; continue with increased dose. Goal SBP would be 140-150 mmHg given his history of Stage III CKD to ensure renal perfusion.     Continue with current insulin regimen for now; blood glucose levels improved. Continue to supplement his electrolytes as needed.      Repeat his CBC, CRP and BMP in the morning.    The patient is high risk due to the following diagnoses/reasons:  Acute on chronic respiratory failure, ross, ckd, uncontrolled htn    I discussed the patients findings and my recommendations with: patient and nursing staff.    Disposition  Home when medically stable; may benefit from SNF v swing v inpatient rehab;  assisting. Plan to transfer to CCU today due to worsening respiratory status.    Teresa Sandhu DO  01/07/18  11:19 AM         Electronically signed by Teresa Sandhu DO at 1/7/2018 11:35 AM      Progress Notes signed by Keron Navarrete MD at 1/7/2018  5:25 PM   Version 1 of 1         [facility] Physician - Brief Progress Note  PERMANENT  01/07/2018 17:22    [facility]  [facility] - [facility] - [place], [place] ([facility])    WARD COPELAND.    Date of Service 01/07/2018 17:22    HPI/Events of Note AICU Provider Assessment Note      Patient admitted with worsening shortness of breath, hypercapnic respiratory failure acute on chronic has known COPD on home oxygen and nocturnal BiPAP.  Was admitted  several weeks ago with respiratory failure requiring intubation.  Is currently on BiPAP   seems to be comfortable when I was talking to him no use of accessory muscles in breathing  status post cardiac catheter KG with normal sinus rhythm with QTC prolongation, 462 ms which is abnormal for a male.  Ventricle systolic pressure 200 with an LVEDP of 24 on the pigtail ejection fraction 60%, left dominant otherwise nonobstructive   coronary artery disease.  Echo last month: Ejection fraction 6065%, aortic sclerosis mild MR no definite pulmonary hypertension right TR . Chest x-ray shows congestive heart failure.  Cardiomegaly no focal infiltrates this appears worse from January 3    Assessment and Plan:      Patient admitted with worsening shortness of breath, hypercapnic respiratory failure acute on chronic has known COPD on home oxygen and nocturnal BiPAP.  Was admitted several weeks ago with respiratory failure requiring intubation.  Is currently on BiPAP   seems to be comfortable when I was talking to him no use of accessory muscles in breathing  status post cardiac catheter KG with normal sinus rhythm with QTC prolongation, 462 ms which is abnormal for a male.  Ventricle systolic pressure 200 with an LVEDP of 24 on the pigtail ejection fraction 60%, left dominant otherwise nonobstructive   coronary artery disease.  Echo last month: Ejection fraction 6065%, aortic sclerosis mild MR no definite pulmonary hypertension right TR . Chest x-ray shows congestive heart failure.  Cardiomegaly no focal infiltrates this appears worse from January 3      __X___   Video Assessment performed  __X___   Most recent labs reviewed  ___X__   Vital Signs reviewed  ___X__   Best Practices addressed:                 VTE prophylaxis: Y                 SUP (when indicated):                 Glycemic control:                      Please notify bedside physician when present or [facility] if glc > 180 X 2                 Sepsis  "guidelines:                 Lung protective strategy:    __X__     Spoke with bedside RN  _____     Orders written      Contact [facility] for any needs if bedside physician is not present.      Interventions Major-Hypercarbia - evaluation and management, Respiratory failure - evaluation and management, Other: COPD /HFpEF        Electronically Signed by: Keron Sultana) on 1/7/2018 5:25 PM     Electronically signed by Keron Sultana MD at 1/7/2018  5:25 PM      Seun Muller MD at 1/8/2018  8:12 AM  Version 1 of 1           History of presenting illness:  Patient is on 75% oxygen and his saturations 90-91%.  He wears 2 L at home.  He is home O2 dependent COPD.  He denies any pain or specifically chest pain as well.  He still has a cough but feels like he \"can't get phlegm up\".  His diet has been good, he denies any blood from GI tract, he has a Worley catheter in place to monitor his input and output and has diuresed well.    Vital Signs  Temp:  [97.7 °F (36.5 °C)-98.6 °F (37 °C)] 98 °F (36.7 °C)  Heart Rate:  [56-76] 67  Resp:  [15-28] 20  BP: (123-204)/() 176/76  Body mass index is 44.31 kg/(m^2).      Intake/Output Summary (Last 24 hours) at 01/08/18 0812  Last data filed at 01/08/18 0500   Gross per 24 hour   Intake               50 ml   Output             2800 ml   Net            -2750 ml     Intake & Output (last 3 days)       01/05 0701 - 01/06 0700 01/06 0701 - 01/07 0700 01/07 0701 - 01/08 0700 01/08 0701 - 01/09 0700    P.O. 2280 0      Blood        IV Piggyback 50  50     Total Intake(mL/kg) 2330 (18.7) 0 (0) 50 (0.4)     Urine (mL/kg/hr) 3275 (1.1) 5290 (1.8) 2800 (0.9)     Stool  0 (0)      Total Output 3275 5290 2800      Net -[phone] -[phone] -9201                    Physical exam:  Physical Exam   Constitutional: Well-developed, well-nourished. He appears comfortable currently on the high flow oxygen.  Normocephalic and atraumatic.  Hearing is intact, mucous membranes are moist.  speech is " normal   Eyes:  Pupils are equal, round No scleral icterus.   Cardiovascular: Normal rate, regular rhythm and normal heart sounds.  No murmur rub or gallop  Pulmonary/Chest: Bilateral breath sounds crackles bilaterally more to bases  Abdominal: Soft, rounded, bowel sounds are active, nondistended, nontender  Musculoskeletal: Strength is symmetric, no joint effusions noted.  No significant edema is noted today.  He has some thickening of the skin from previous edema but overall he appears to be approaching euvolemic status.  Skin: Skin is warm and dry.    Psychiatric:  Normal mood and affect.     Telemetry: sinus rhythm, reviewed    Results Review:  Lab Results   Component Value Date    WBC 7.67 01/08/2018    HGB 6.4 (C) 01/08/2018    HCT 21.6 (C) 01/08/2018    MCV 96.9 (H) 01/08/2018     01/08/2018     Lab Results   Component Value Date    GLUCOSE 131 (H) 01/08/2018    BUN 29 (H) 01/08/2018    CREATININE 1.38 (H) 01/08/2018    EGFRIFNONA 51 (L) 01/08/2018    BCR 21.0 01/08/2018    CO2 >40.0 (H) 01/08/2018    CALCIUM 8.8 01/08/2018    ALBUMIN 4.00 01/06/2018    LABIL2 1.8 01/06/2018    AST 17 01/06/2018    ALT 32 01/06/2018       Imaging Results (last 72 hours)     Procedure Component Value Units Date/Time    XR Chest 1 View [[id number]] Collected:  12/28/17 1015     Updated:  12/28/17 1017    Narrative:       EXAMINATION:  XR CHEST 1 VW-      CLINICAL INDICATION:     sob     TECHNIQUE:  XR CHEST 1 VW-       COMPARISON: 12/10/2017      FINDINGS:   The lungs remain aerated.   Heart size is stable.   No pneumothorax.   No pleural effusion.   Bony and soft tissue structures are unremarkable.            Impression:       Stable radiographic appearance of the chest.        This report was finalized on 12/28/2017 10:15 AM by Dr. Miko Coto MD.       CT Chest Pulmonary Embolism With Contrast [[id number]] Collected:  12/28/17 1242     Updated:  12/28/17 1245    Narrative:       EXAMINATION: CT CHEST PULMONARY  EMBOLISM W CONTRAST-      Technique: Multiple CT axial images were obtained through the level of  pulmonary arteries, following IV contrast administration per CT PE  protocol.  Volume Rendered 3D or MIP images performed.     Radiation dose reduction techniques were utilized per ALARA protocol.  Automated exposure control was initiated through either or 51ed[employer] or  DoseRight software packages by  protocol.                  CLINICAL INDICATION:    SOB and Chest Pain     COMPARISON:    Chest x-ray performed on 12/28/2017.     FINDINGS:    Some bibasilar groundglass attenuation possibly  representing alveolar edema or atelectasis. Coronary artery  calcification. Subsegmental pulmonary artery branches are not well  evaluated. There is no pulmonary artery filling defect to suggest  pulmonary embolism. There is no thoracic adenopathy. No pleural or  pericardial effusion. Incidentally imaged upper abdomen is unremarkable.  Bone windows show no acute osseous abnormality.       Impression:          Some bibasilar groundglass attenuation possibly representing alveolar  edema or atelectasis. Coronary artery calcification. Subsegmental  pulmonary artery branches are not well evaluated.     This report was finalized on 12/28/2017 12:43 PM by Dr. Miko Coto MD.       CT Abdomen Pelvis Without Contrast [[id number]] Collected:  12/29/17 0627     Updated:  12/29/17 0631    Narrative:          CT ABDOMEN PELVIS WO CONTRAST-        TECHNIQUE: Multiple axial CT images were obtained from lung bases  through pubic symphysis without administration of IV contrast.  Reformatted images in the coronal and/or sagittal plane(s) were  generated from the axial data set to facilitate diagnostic accuracy  and/or surgical planning.  Oral Contrast:NONE.     Radiation dose reduction techniques were utilized per ALARA protocol.  Automated exposure control was initiated through either or CareDose or  DoseRight software packages by   protocol.       1390.22204 mGy.cm     Clinical information  elevated lactic acid and LLQ abd pain; J96.21-Acute and chronic  respiratory failure with hypoxia; J96.22-Acute and chronic respiratory  failure with hypercapnia; J44.1-Chronic obstructive pulmonary disease  with (acute) exacerbation      Comparison  NONE.     Findings  LOWER THORAX: BIBASILAR ATELECTASIS AND MINIMAL AIRSPACE DISEASE.     ABDOMEN:        LIVER: Homogeneous. No focal hepatic mass or ductal dilatation.        GALLBLADDER: GALLSTONES IN THE GALLBLADDER IDENTIFIED.        PANCREAS: Unremarkable. No mass or ductal dilatation.        SPLEEN: Homogeneous. No splenomegaly.        ADRENALS: No mass.        KIDNEYS: NONOBSTRUCTIVE BILATERAL RENAL CALCULI.        GI TRACT: Non-dilated. No definite wall thickening.        PERITONEUM: No free air. No free fluid or loculated fluid  collections.        MESENTERY: Unremarkable.        LYMPH NODES: No lymphadenopathy.        VASCULATURE: No evidence of aneurysm.        ABDOMINAL WALL: No focal hernia or mass.           OTHER: None.     PELVIS:        BLADDER: 5 MM POLYP INVOLVING THE SUPERIOR LEFT ASPECT OF THE URINARY  BLADDER THAT SHOULD BE FURTHER EVALUATED WITH CYSTOSCOPY.        REPRODUCTIVE: Unremarkable as visualized.        APPENDIX: Nondistended. No surrounding inflammation.     BONES: No acute bony abnormality.       Impression:       Impression:  1. Bibasilar pneumonia.  2. Cholelithiasis.  3.5 mm polyp involving the superior left aspect of the urinary bladder  that should be further evaluated with cystoscopy.     This report was finalized on 12/29/2017 6:29 AM by Dr. Miko Coto MD.         Chest x-ray From the seventh was reviewed and shows bilateral airspace disease, the official read appears to show some improvement.    Echo EF normal in December    D/W Dr Gomez          Assessment/Plan     Active Problems:    Acute hypoxic respiratory failure secondary to COPD exacerbation  with pneumonia.  Patient is now off antibiotics, he completed his doxycycline course as recommended by infectious disease on January 5.  He appears to have some fluid by chest x-ray otherwise he does not appear to have significant fluid overload, sodium is rising, patient is going to be given albumin per pulmonology, will follow chest x-ray in the a.m.  Recheck BMP in the a.m.  CRP was beginning to rise, recheck this in a.m.    septic shock, on admission, resolved    Anemia, slight drift, he is receiving a transfusion, will check stool Hemoccult.  He is on iron.  He is on Protonix.  He's had no obvious bleeding.  Will continue Arixtra currently for DVT prophylaxis, if he has recurrent drift may have to reconsider this.  Patient would be high risk for any sedation for EGD at this time.    Hypomagnesemia, earlier in the stay, recheck    Hypophosphatemia, earlier in the stay, recheck in a.m.    Chronic kidney disease stage III, remain stable    Diabetes, light fluctuations, steroids being weaned, monitor.    Hypothyroidism, TSH overall normal, T4 slightly low, probable sick euthyroid, this can be followed up as an outpatient.    Atypical chest pain on presentation, resolved, troponin trend is flat CPKs normal    Htn, not well controlled, clonidine increased    DVT prophylaxis, as above, continue Arixtra, if her current hemoglobin drop may have to consider stopping this.    Since patient stay will be prolonged, I have consulted continue care      Seun Muller MD  01/08/18  8:12 AM       Electronically signed by Seun Muller MD at 1/8/2018  8:26 AM        Consult Notes (last 24 hours) (Notes from 1/7/2018  9:49 AM through 1/8/2018  9:49 AM)     No notes of this type exist for this encounter.

## 2018-01-08 NOTE — PROGRESS NOTES
Discharge Planning Assessment   Roberto     Patient Name: Se Anne  MRN: [id number]  Today's Date: 1/8/2018    Admit Date: 12/28/2017       Discharge Plan       01/08/18 1344    Case Management/Social Work Plan    Plan SS received call from Jaylene Harris at [facility] and she states she has initaited the consult with the pt's insurance. SS will continue to follow.     Patient/Family In Agreement With Plan yes        Expected Discharge Date and Time     Expected Discharge Date Expected Discharge Time    Jan 8, 2018           Daniella Wilburn

## 2018-01-08 NOTE — PROGRESS NOTES
"  History of presenting illness:  Patient is on 75% oxygen and his saturations 90-91%.  He wears 2 L at home.  He is home O2 dependent COPD.  He denies any pain or specifically chest pain as well.  He still has a cough but feels like he \"can't get phlegm up\".  His diet has been good, he denies any blood from GI tract, he has a Worley catheter in place to monitor his input and output and has diuresed well.    Vital Signs  Temp:  [97.7 °F (36.5 °C)-98.6 °F (37 °C)] 98 °F (36.7 °C)  Heart Rate:  [56-76] 67  Resp:  [15-28] 20  BP: (123-204)/() 176/76  Body mass index is 44.31 kg/(m^2).      Intake/Output Summary (Last 24 hours) at 01/08/18 0812  Last data filed at 01/08/18 0500   Gross per 24 hour   Intake               50 ml   Output             2800 ml   Net            -2750 ml     Intake & Output (last 3 days)       01/05 0701 - 01/06 0700 01/06 0701 - 01/07 0700 01/07 0701 - 01/08 0700 01/08 0701 - 01/09 0700    P.O. 2280 0      Blood        IV Piggyback 50  50     Total Intake(mL/kg) 2330 (18.7) 0 (0) 50 (0.4)     Urine (mL/kg/hr) 3275 (1.1) 5290 (1.8) 2800 (0.9)     Stool  0 (0)      Total Output 3275 5290 2800      Net -945 -5290 -2750                    Physical exam:  Physical Exam   Constitutional: Well-developed, well-nourished. He appears comfortable currently on the high flow oxygen.  Normocephalic and atraumatic.  Hearing is intact, mucous membranes are moist.  speech is normal   Eyes:  Pupils are equal, round No scleral icterus.   Cardiovascular: Normal rate, regular rhythm and normal heart sounds.  No murmur rub or gallop  Pulmonary/Chest: Bilateral breath sounds crackles bilaterally more to bases  Abdominal: Soft, rounded, bowel sounds are active, nondistended, nontender  Musculoskeletal: Strength is symmetric, no joint effusions noted.  No significant edema is noted today.  He has some thickening of the skin from previous edema but overall he appears to be approaching euvolemic status.  Skin: Skin " is warm and dry.    Psychiatric:  Normal mood and affect.     Telemetry: sinus rhythm, reviewed    Results Review:  Lab Results   Component Value Date    WBC 7.67 01/08/2018    HGB 6.4 (C) 01/08/2018    HCT 21.6 (C) 01/08/2018    MCV 96.9 (H) 01/08/2018     01/08/2018     Lab Results   Component Value Date    GLUCOSE 131 (H) 01/08/2018    BUN 29 (H) 01/08/2018    CREATININE 1.38 (H) 01/08/2018    EGFRIFNONA 51 (L) 01/08/2018    BCR 21.0 01/08/2018    CO2 >40.0 (H) 01/08/2018    CALCIUM 8.8 01/08/2018    ALBUMIN 4.00 01/06/2018    LABIL2 1.8 01/06/2018    AST 17 01/06/2018    ALT 32 01/06/2018       Imaging Results (last 72 hours)     Procedure Component Value Units Date/Time    XR Chest 1 View [[id number]] Collected:  12/28/17 1015     Updated:  12/28/17 1017    Narrative:       EXAMINATION:  XR CHEST 1 VW-      CLINICAL INDICATION:     sob     TECHNIQUE:  XR CHEST 1 VW-       COMPARISON: 12/10/2017      FINDINGS:   The lungs remain aerated.   Heart size is stable.   No pneumothorax.   No pleural effusion.   Bony and soft tissue structures are unremarkable.            Impression:       Stable radiographic appearance of the chest.        This report was finalized on 12/28/2017 10:15 AM by Dr. Miko Coto MD.       CT Chest Pulmonary Embolism With Contrast [[id number]] Collected:  12/28/17 1242     Updated:  12/28/17 1245    Narrative:       EXAMINATION: CT CHEST PULMONARY EMBOLISM W CONTRAST-      Technique: Multiple CT axial images were obtained through the level of  pulmonary arteries, following IV contrast administration per CT PE  protocol.  Volume Rendered 3D or MIP images performed.     Radiation dose reduction techniques were utilized per ALARA protocol.  Automated exposure control was initiated through either or [employer]Whistle or  DoseRight software packages by  protocol.                  CLINICAL INDICATION:    SOB and Chest Pain     COMPARISON:    Chest x-ray performed on 12/28/2017.      FINDINGS:    Some bibasilar groundglass attenuation possibly  representing alveolar edema or atelectasis. Coronary artery  calcification. Subsegmental pulmonary artery branches are not well  evaluated. There is no pulmonary artery filling defect to suggest  pulmonary embolism. There is no thoracic adenopathy. No pleural or  pericardial effusion. Incidentally imaged upper abdomen is unremarkable.  Bone windows show no acute osseous abnormality.       Impression:          Some bibasilar groundglass attenuation possibly representing alveolar  edema or atelectasis. Coronary artery calcification. Subsegmental  pulmonary artery branches are not well evaluated.     This report was finalized on 12/28/2017 12:43 PM by Dr. Miko Coto MD.       CT Abdomen Pelvis Without Contrast [[id number]] Collected:  12/29/17 0627     Updated:  12/29/17 0631    Narrative:          CT ABDOMEN PELVIS WO CONTRAST-        TECHNIQUE: Multiple axial CT images were obtained from lung bases  through pubic symphysis without administration of IV contrast.  Reformatted images in the coronal and/or sagittal plane(s) were  generated from the axial data set to facilitate diagnostic accuracy  and/or surgical planning.  Oral Contrast:NONE.     Radiation dose reduction techniques were utilized per ALARA protocol.  Automated exposure control was initiated through either or CareDose or  DoseRight software packages by  protocol.       1390.97890 mGy.cm     Clinical information  elevated lactic acid and LLQ abd pain; J96.21-Acute and chronic  respiratory failure with hypoxia; J96.22-Acute and chronic respiratory  failure with hypercapnia; J44.1-Chronic obstructive pulmonary disease  with (acute) exacerbation      Comparison  NONE.     Findings  LOWER THORAX: BIBASILAR ATELECTASIS AND MINIMAL AIRSPACE DISEASE.     ABDOMEN:        LIVER: Homogeneous. No focal hepatic mass or ductal dilatation.        GALLBLADDER: GALLSTONES IN THE GALLBLADDER  IDENTIFIED.        PANCREAS: Unremarkable. No mass or ductal dilatation.        SPLEEN: Homogeneous. No splenomegaly.        ADRENALS: No mass.        KIDNEYS: NONOBSTRUCTIVE BILATERAL RENAL CALCULI.        GI TRACT: Non-dilated. No definite wall thickening.        PERITONEUM: No free air. No free fluid or loculated fluid  collections.        MESENTERY: Unremarkable.        LYMPH NODES: No lymphadenopathy.        VASCULATURE: No evidence of aneurysm.        ABDOMINAL WALL: No focal hernia or mass.           OTHER: None.     PELVIS:        BLADDER: 5 MM POLYP INVOLVING THE SUPERIOR LEFT ASPECT OF THE URINARY  BLADDER THAT SHOULD BE FURTHER EVALUATED WITH CYSTOSCOPY.        REPRODUCTIVE: Unremarkable as visualized.        APPENDIX: Nondistended. No surrounding inflammation.     BONES: No acute bony abnormality.       Impression:       Impression:  1. Bibasilar pneumonia.  2. Cholelithiasis.  3.5 mm polyp involving the superior left aspect of the urinary bladder  that should be further evaluated with cystoscopy.     This report was finalized on 12/29/2017 6:29 AM by Dr. Miko Coto MD.         Chest x-ray From the seventh was reviewed and shows bilateral airspace disease, the official read appears to show some improvement.    Echo EF normal in December    D/W Dr Gomez          Assessment/Plan     Active Problems:    Acute hypoxic respiratory failure secondary to COPD exacerbation with pneumonia.  Patient is now off antibiotics, he completed his doxycycline course as recommended by infectious disease on January 5.  He appears to have some fluid by chest x-ray otherwise he does not appear to have significant fluid overload, sodium is rising, patient is going to be given albumin per pulmonology, will follow chest x-ray in the a.m.  Recheck BMP in the a.m.  CRP was beginning to rise, recheck this in a.m.    septic shock, on admission, resolved    Anemia, slight drift, he is receiving a transfusion, will check stool  Hemoccult.  He is on iron.  He is on Protonix.  He's had no obvious bleeding.  Will continue Arixtra currently for DVT prophylaxis, if he has recurrent drift may have to reconsider this.  Patient would be high risk for any sedation for EGD at this time.    Hypomagnesemia, earlier in the stay, recheck    Hypophosphatemia, earlier in the stay, recheck in a.m.    Chronic kidney disease stage III, remain stable    Diabetes, light fluctuations, steroids being weaned, monitor.    Hypothyroidism, TSH overall normal, T4 slightly low, probable sick euthyroid, this can be followed up as an outpatient.    Atypical chest pain on presentation, resolved, troponin trend is flat CPKs normal    Htn, not well controlled, clonidine increased    DVT prophylaxis, as above, continue Arixtra, if her current hemoglobin drop may have to consider stopping this.    Since patient stay will be prolonged, I have consulted continue care      Seun Muller MD  01/08/18  8:12 AM

## 2018-01-08 NOTE — PROGRESS NOTES
Patient hemoglobin did not respond to transfusion, will hold on transfer out of the unit, check CT to rule out retroperitoneal hematoma, transfused 2 additional units, discussed with Dr. Gomez.

## 2018-01-08 NOTE — PAYOR COMM NOTE
"  [facility]  NPI: [id number]    Utilization Review   Contact:Eboni Vivas RN, BSN  Phone: [phone]  Fax: [phone]    Denia mr/Attn: james harper  Inpatient Auth Req  REF:[id number]  DX: J96.90, J44.1  His insurance changed as of 18  So I though I needed to call it in starting with date the humana started   Se Anne (67 y.o. Male)     Date of Birth Social Security Number Address Home Phone MRN    1950  [address] [facility] [address] [phone] [id number]    Sabianism Marital Status          Oriental orthodox        Admission Date Admission Type Admitting Provider Attending Provider Department, Room/Bed    17 Emergency Seun Muller MD Grace, Aimee Russell, DO [facility] CRITICAL CARE, CC06/1C    Discharge Date Discharge Disposition Discharge Destination                      Attending Provider: Teresa Sandhu DO     Allergies:  Iodine, Lisinopril    Isolation:  None   Infection:  None   Code Status:  FULL    Ht:  167.6 cm (66\")   Wt:  125 kg (274 lb 8 oz)    Admission Cmt:  None   Principal Problem:  None                  Emergency Contacts      (Rel.) Home Phone Work Phone Mobile Phone    Adeline Anne (Spouse) [phone] -- --    May Mccloud (Daughter) [phone] -- --    Genesis Mcknight (Daughter) [phone] -- --               History & Physical      Seun Muller MD at 2017  2:19 PM          Patient Identification:  Name:  Se Anne  Age:  67 y.o.  Sex:  male  :  1950  MRN:  [id number]   Visit Number:  [id number]  Primary Care Physician:  No Known Provider    I have seen the patient in conjunction with Lee Ann Sharpe PA-C and I agree with the following statements:    I have interviewed and examined the patient, I have discussed this case with the physician assistant.  I concur with her findings in this note also constitutes my findings.     Chief complaint: \"Couldn't breathe\"    History of presenting " illness:  67 y.o. male Se Anne has history of chronic respiratory failure requiring 3 liters of supplemental oxygen via nasal cannula, chronic kidney disease, diabetes mellitus type 2, COPD, history of tobacco abuse, hypothyroidism, and anemia. He was most recently hospitalized at this facility from 12/6-12/12/17 with intubation and mechanical ventilation from 12/6-12/8 given acute on chronic hypoxic and hypercapnic respiratory failure with bibasilar pneumonia and severe sepsis.   He was discharged with Omnicef and doxycycline.   On this date, he presented to the ED with 2-3 days of progressively worsening dyspnea. He reports worsening wheeze and cough.  He reports orthopnea and worsening dyspnea on exertion.  He does also report some midsternal chest discomfort with shortness of breath he characterizes this as a tight sensation.  Taking a deep breath tended to make it worse.  The intensity currently 0/10.  He does think his shortness of breath has improved some since coming to the hospital.  Patient is a nonsmoker.  Initial ABG demonstrated hypercapnea and hypoxia on home supplemental oxygen of 3 liters.  CT chest per PE protocol with groundglass attenuation and atelectasis. No pulmonary defect to suggest pulmonary embolism, but subsegmental artery branches were not well visualized.  Mr. Anne denies fever and chills. He denies abdominal pain, nausea, and vomiting.  He denies dysuria and hematuria . Mr. Anne reports he sleeps with BiPAP nightly and uses 3 liters of supplemental oxygen throughout the day. He ambulates with a cane and walker. He does report worsening bilateral lower extremity edema.  He had been noted to have some left lower quadrant tenderness to palpation, he states he had normal bowel movements yesterday.  No emesis.  No urinary symptoms.  He denies any fever.  He does get leg edema but he thinks it is the same or slightly improved to its baseline.    While Mr. Anne quit smoking several  years ago, he does live with his wife, a 2-3 pack a day smoker.   ---------------------------------------------------------------------------------------------------------------------   Review of Systems   Constitutional: Negative for diaphoresis, fatigue and fever.   HENT: Negative for facial swelling and rhinorrhea.    Eyes: Negative for discharge and redness.   Respiratory: Positive for cough, chest tightness, shortness of breath and wheezing.    Cardiovascular: Positive for chest pain and leg swelling.   Gastrointestinal: Negative for abdominal distention and abdominal pain.   Endocrine: Negative for cold intolerance and heat intolerance.   Genitourinary: Negative for difficulty urinating and dysuria.   Musculoskeletal: Negative for arthralgias and gait problem.   Skin: Negative for color change and pallor.   Neurological: Negative for dizziness and headaches.   Psychiatric/Behavioral: Negative for agitation and confusion.      ---------------------------------------------------------------------------------------------------------------------   Past Medical History:   Diagnosis Date   • Agent orange exposure    • Arthritis    • CHF (congestive heart failure)    • COPD (chronic obstructive pulmonary disease)    • Coronary artery disease    • Diabetes mellitus    • Hyperlipidemia    • Hypertension      Past Surgical History:   Procedure Laterality Date   • CARDIAC SURGERY      stent placement   • CATARACT EXTRACTION     • JOINT REPLACEMENT      right knee   • KNEE SURGERY       Family History   Problem Relation Age of Onset   • Heart disease Mother    • Cancer Father    • Heart attack Sister    • Heart attack Brother    • Cancer Paternal Grandfather      Social History     Social History   • Marital status:      Spouse name: N/A   • Number of children: N/A   • Years of education: N/A     Social History Main Topics   • Smoking status: Former Smoker     Years: 40.00     Quit date: 3/21/2010   • Smokeless  tobacco: Never Used   • Alcohol use No   • Drug use: No   • Sexual activity: Defer     Other Topics Concern   • None     Social History Narrative     ---------------------------------------------------------------------------------------------------------------------   Allergies:  Lisinopril and Other  ---------------------------------------------------------------------------------------------------------------------   ---------------------------------------------------------------------------------------------------------------------   Vital Signs:  Temp:  [98 °F (36.7 °C)] 98 °F (36.7 °C)  Heart Rate:  [67-87] 86  Resp:  [18-20] 19  BP: (147-204)/(64-99) 159/77  Last 3 weights    12/28/17  08   Weight: 118 kg (261 lb)     Body mass index is 42.13 kg/(m^2).  ---------------------------------------------------------------------------------------------------------------------       Physical Exam    Physical Exam:  Constitutional:  Chronically ill appearing. Morbidly obese by BMI.  Appears to be in mild distress. (Appears mildly tachypnea but overall comfortable on high flow oxygen, saturation 97% on 45% high flow)  HENT:  Head: Normocephalic and atraumatic.  Mouth:  Moist mucous membranes.  Speech is normal  Eyes:  Conjunctivae and EOM are normal.  Pupils are equal, round, and reactive to light.  No scleral icterus.  Neck:  Neck supple.  No JVD present.    Cardiovascular:  Normal rate, regular rhythm. Normal s1/s2  with no murmur.  Pulmonary/Chest:  Mild acute respiratory distress. Bilateral wheezing appreciated throughout.  He has bilateral breath sounds diminished throughout no definite crackles noted  Abdominal:  Soft.  Bowel sounds are present.  Nondistended he is tender in the left lower quadrant to deep palpation without rebound or guarding, I concur good bowel sounds.    Musculoskeletal:  Bilateral lower extremity edema, pitting 2+.  Some skin changes to suggest chronic edema.  Dry heels bilaterally.  No  noted deformity.    Neurological:  Alert and oriented to person, place, and time.  No cranial nerve deficit.  No tongue deviation.  No facial droop.  No slurred speech.  Strength is symmetric   Skin:  Skin is warm and dry.  No rash noted.  No pallor.   Psychiatric:  Normal mood and affect.  strong pulses on all 4 extremities.    ---------------------------------------------------------------------------------------------------------------------  EKG:       EKG image reviewed, nonspecific findings.  ---------------------------------------------------------------------------------------------------------------------         Results from last 7 days  Lab Units 12/28/17  0932   WBC 10*3/mm3 7.39   HEMOGLOBIN g/dL 9.8*   HEMATOCRIT % 32.2*   MCV fL 95.0*   MCHC g/dL 30.4*   PLATELETS 10*3/mm3 188       Results from last 7 days  Lab Units 12/28/17  1334   PH, ARTERIAL pH units 7.498*   PO2 ART mm Hg 66.2*   PCO2, ARTERIAL mm Hg 43.5   HCO3 ART mmol/L 33.0*       Results from last 7 days  Lab Units 12/28/17  0932   SODIUM mmol/L 145   POTASSIUM mmol/L 3.3*   CHLORIDE mmol/L 97*   CO2 mmol/L 39.9*   BUN mg/dL 16   CREATININE mg/dL 1.27   EGFR IF NONAFRICN AM mL/min/1.73 56*   CALCIUM mg/dL 8.8   GLUCOSE mg/dL 178*   ALBUMIN g/dL 4.40   BILIRUBIN mg/dL 0.6   ALK PHOS U/L 55   AST (SGOT) U/L 18   ALT (SGPT) U/L 28   Estimated Creatinine Clearance: 66.4 mL/min (by C-G formula based on Cr of 1.27).  No results found for: AMMONIA               ---------------------------------------------------------------------------------------------------------------------   CT Chest Pulmonary Embolism With Contrast [[id number]] Collected:  12/28/17 1242     Updated:  12/28/17 1245    Narrative:       EXAMINATION: CT CHEST PULMONARY EMBOLISM W CONTRAST-      Technique: Multiple CT axial images were obtained through the level of  pulmonary arteries, following IV contrast administration per CT PE  protocol.  Volume Rendered 3D or MIP images  performed.     Radiation dose reduction techniques were utilized per ALARA protocol.  Automated exposure control was initiated through either or nfon or  DoseRight software packages by  protocol.                  CLINICAL INDICATION:    SOB and Chest Pain     COMPARISON:    Chest x-ray performed on 12/28/2017.     FINDINGS:    Some bibasilar groundglass attenuation possibly  representing alveolar edema or atelectasis. Coronary artery  calcification. Subsegmental pulmonary artery branches are not well  evaluated. There is no pulmonary artery filling defect to suggest  pulmonary embolism. There is no thoracic adenopathy. No pleural or  pericardial effusion. Incidentally imaged upper abdomen is unremarkable.  Bone windows show no acute osseous abnormality.       Impression:          Some bibasilar groundglass attenuation possibly representing alveolar  edema or atelectasis. Coronary artery calcification. Subsegmental  pulmonary artery branches are not well evaluated.     This report was finalized on 12/28/2017 12:43 PM by Dr. Miko Coto MD.           CT chest images reviewed, appears to have minimal atelectasis.        Cultures:   PENDING      I have personally reviewed the radiology images and read the final radiology report.  ---------------------------------------------------------------------------------------------------------------------  Assessment and Plan:    -Acute on chronic hypoxic and hypercapnic respiratory failure likely 2/2 acute exacerbation of COPD: His BNP is overall low, I do not think he has significant pulmonary edema.  IV Rocephin and IV doxycycline ordered.  Mycoplasma and Legionella testing ordered.  Sputum culture has been ordered.  CT chest without acute infiltrate.  Recent treatment for pneumonia while hospitalized. .  WBC unremarkable. Lactic acid and C-RP added. Currently on high-flow oxygen via nasal cannula. Pulmonlogy consultation placed. Influenza negative.   Mycoplasma and Legionella screens ordered    (Since the above note was made, patient did meet septic shock criteria.  His lactic acid was elevated, although not documented he was tachypnea And CRP was elevated.  Lactic acid met shock criteria.  He is being given 30 cc per Bolus and antibiotics were broadened.  He is now having left lower quadrant tenderness.  I'm going to get a CT of his abdomen as well.  He possibly could have diverticulitis or worst-case ischemic bowel.  Reflex lactic acid will be drawn 4 hours.  Patient had a previous echocardiogram within this month that showed a normal ejection fraction.  Because of shock, ID has been consulted, part of his edema may be related to his Norvasc.)    -Acute exacerbation of COPD:  IV solumedrol 40 mg BID ordered. IV abx as previously outlined. Duoneb treatments ordered.  Pulmonology consultation placed. Mr. Anne is an office patient of Dr. Gomez.     -Chest pain, typical and atypical features with history of CAD with proximal LAD stent:  Serial cardiac enzymes ordered.  Recent echocardiogram with EF unremarkable.  Aspirin 81 mg ordered.  Will monitor on telemetry.  .  CT chest with alveolar edema vs. Atelectasis.  Troponin negative at this time.      -Chronic anemia: Folate and vitamine b12 within normal limits on 12/9.  Iron panel demonstrating iron deficiency.  Repeat indices drawn.    -Chronic kidney disease, stage III: Continue to follow creatinine.  Bumex therapy at home.    -Diabetes mellitus type II, ID:  FSBG ACHS.   Hemoglobin a1c.  I have ordered some basal insulin as I have advanced him to clear liquids.  Intake and use sliding scale, would expect some loss of control with the steroids.    -Hypokalemia:  Electrolyte replacement protocol ordered Magnesium protocol ordered.     -Hypothyroidism, previous studies suggested possibly sick euthyroid, these will be repeated.    -DVT prophylaxis with SQ Heparin     Lee Ann Sharpe,  ANISH  12/28/17  2:50 PM  ---------------------------------------------------------------------------------------------------------------------            Electronically signed by Seun Muller MD at 12/28/2017  6:26 PM           Emergency Department Notes      Meena Medeiros at 12/28/2017  8:48 AM              Patient reports midsternal chest pain that he describes as pressure and tightness upon return from radiology; LASHAY Milner made aware, verbal order for repeat EKG received at this time. Vital reassessed and are noted to be stable. /99, O2 90% via 3L/NC, HR 82 NSR on monitor.      Sharir Bernal RN  12/28/17 1156       Sharri Bernal RN  12/28/17 1202       Electronically signed by Sharri Bernal RN at 12/28/2017 12:02 PM      Sharri Bernal RN at 12/28/2017 12:13 PM          Pt reports chest pain is now gone after administration of nitroglycerin SL tablet. Vital signs remain stable. LASHAY Milner made aware     Sharri Bernal RN  12/28/17 1215       Sharri Bernal RN  12/28/17 1215       Electronically signed by Sharri Bernal RN at 12/28/2017 12:15 PM      LASHAY Patrick at 12/28/2017 12:47 PM     Attestation signed by Jeremie Bermudez MD at 12/28/2017  2:35 PM              For this patient encounter, I reviewed the NP or PA documentation, treatment plan, and medical decision making. Jeremie Bermudez MD 12/28/2017 2:35 PM                               Subjective   Patient is a 67 y.o. male presenting with shortness of breath.   Shortness of Breath   Severity:  Moderate  Onset quality:  Gradual  Duration:  2 days  Timing:  Constant  Progression:  Worsening  Chronicity:  Recurrent  Context comment:  Patient was recently in the hospital and intubated.  Ports other over the past 2-3 days he's progressively gotten worse and shortness of breath.  He's had wheezing.  No fever.  Relieved by:  Nothing  Associated  symptoms: cough, sputum production (brown) and wheezing    Associated symptoms: no abdominal pain, no chest pain and no fever        Review of Systems   Constitutional: Negative.  Negative for fever.   HENT: Negative.    Respiratory: Positive for cough, sputum production (brown), shortness of breath and wheezing.    Cardiovascular: Negative.  Negative for chest pain.   Gastrointestinal: Negative.  Negative for abdominal pain.   Endocrine: Negative.    Genitourinary: Negative.  Negative for dysuria.   Skin: Negative.    Neurological: Negative.    Psychiatric/Behavioral: Negative.    All other systems reviewed and are negative.        Objective   Physical Exam   Constitutional: He is oriented to person, place, and time. He appears well-developed and well-nourished. No distress.   HENT:   Head: Normocephalic and atraumatic.   Right Ear: External ear normal.   Left Ear: External ear normal.   Nose: Nose normal.   Eyes: Conjunctivae and EOM are normal. Pupils are equal, round, and reactive to light.   Neck: Normal range of motion. Neck supple. No JVD present. No tracheal deviation present.   Cardiovascular: Normal rate, regular rhythm and normal heart sounds.    No murmur heard.  Pulmonary/Chest: He is in respiratory distress (mild). He has wheezes.   Abdominal: Soft. Bowel sounds are normal. There is no tenderness.   Musculoskeletal: Normal range of motion. He exhibits no edema or deformity.   Neurological: He is alert and oriented to person, place, and time. No cranial nerve deficit.   Skin: Skin is warm and dry. No rash noted. He is not diaphoretic. No erythema. No pallor.   Psychiatric: He has a normal mood and affect. His behavior is normal. Thought content normal.   Nursing note and vitals reviewed.      Procedures        ED Course  ED Course   Value Comment By Time   Potassium: (!) 3.3 (Reviewed) LASHAY Patrick 12/28 1006    D/w Dr Bermudez and Dr Gomez- CT chest PE protocol.  Patient has history of allergy to  iodine on his chart though discussing with patient he has had IV contrast in the past without difficulty.  He states that his problem with iodine was vomiting when he ate seafood.  He has never had hives or angioedema LASHAY Patrick 12/28 1103    Patient had developed some chest discomfort/tightness after returning from radiology.  This was relieved after one nitroglycerin.  We repeated his EKG which did not show any acute ST changes.  We did give him aspirin LASHAY Patrick 12/28 1217    D/w Dr Muller- repeat abg on high flow LASHAY Patrick 12/28 1253    Paged LASHAY Blue 12/28 1339    D/w Dr Muller- PCU admit LASHAY Patrick 12/28 1347                  MDM  Number of Diagnoses or Management Options  Acute on chronic respiratory failure with hypoxia and hypercapnia: new and requires workup  COPD exacerbation: new and requires workup     Amount and/or Complexity of Data Reviewed  Clinical lab tests: reviewed and ordered  Tests in the radiology section of CPT®:  reviewed and ordered  Tests in the medicine section of CPT®:  reviewed and ordered  Decide to obtain previous medical records or to obtain history from someone other than the patient: yes  Discuss the patient with other providers: yes    Risk of Complications, Morbidity, and/or Mortality  Presenting problems: high  Diagnostic procedures: high  Management options: high    Critical Care  Total time providing critical care: 30-74 minutes (50)      Final diagnoses:   Acute on chronic respiratory failure with hypoxia and hypercapnia   COPD exacerbation            LASHAY Patrick  12/28/17 1340         Electronically signed by Sharri Bernal RN at 12/28/2017  1:57 PM        Ventilator/Non-Invasive Ventilation Settings     Start     Ordered    01/06/18 0627  . BIPAP  As Needed-RT     Comments:  respiratory to mangage   Question:  .  Answer:  BIPAP    01/06/18 0628 12/29/17 1055  . CPAP; Pressure: 12  At Bedtime & As  Needed-RT     Question Answer Comment   . CPAP    Pressure 12        17 1054             Physician Progress Notes (last 7 days) (Notes from 2018  7:41 AM through 2018  7:41 AM)      Teresa Sandhu DO at 2018 11:08 AM  Version 1 of 1         HOSPITALIST PROGRESS NOTE    Patient Identification:  Name:  Se Anne  Age:  67 y.o.  Sex:  male  :  1950  MRN:  [id number]  Visit Number:  [id number]  Primary Care Provider:  No Known Provider    Length of stay:  4     HPI: 66 yo male admitted with dyspnea    Subjective:  The patient is seen this morning in a sitting in the bedside chair.  He reports that he continues to feel dyspneic especially when CPAP is removed and he was placed on nasal cannula.  He reports occasional cough with minimal sputum production.  He denies chest pain and/or pressure.  He denies any palpitations.  He has no abdominal pain and/or vomiting.    Patient denies urinary symptoms. He denies previous knowledge of bladder polyp.    Present during exam: REGGIE Montiel    Current Hospital Meds:    amLODIPine 10 mg Oral Daily   aspirin 81 mg Oral Daily   atorvastatin 80 mg Oral Daily   budesonide-formoterol 2 puff Inhalation BID - RT   bumetanide 1 mg Oral Once   carvedilol 25 mg Oral BID With Meals   cetirizine 10 mg Oral Daily   cholecalciferol 2,000 Units Oral Daily   ferrous sulfate 325 mg Oral Daily With Breakfast   fondaparinux 2.5 mg Subcutaneous Q24H   gabapentin 300 mg Oral Nightly   hydrALAZINE 100 mg Oral Q8H   insulin aspart 0-14 Units Subcutaneous 4x Daily AC & at Bedtime   insulin aspart 10 Units Subcutaneous TID With Meals   insulin detemir 35 Units Subcutaneous Daily   insulin detemir 35 Units Subcutaneous Nightly   ipratropium 0.5 mg Nebulization 4x Daily - RT   isosorbide mononitrate 180 mg Oral Daily   lactobacillus acidophilus 1 capsule Oral Daily   levothyroxine 25 mcg Oral Daily   methylPREDNISolone sodium succinate 20 mg Intravenous Q12H   pantoprazole  40 mg Oral Daily   piperacillin-tazobactam 4.5 g Intravenous Q6H   sertraline 50 mg Oral Q PM   vancomycin 1,000 mg Intravenous Q12H   vitamin B-12 2,000 mcg Oral Daily       Vital Signs  Temp:  [97.1 °F (36.2 °C)-98.4 °F (36.9 °C)] 98.4 °F (36.9 °C)  Heart Rate:  [50-86] 68  Resp:  [18-25] 21  BP: (160-231)/() 176/91  Last 3 weights    12/29/17  0400 12/30/17  0405 12/31/17  0403   Weight: 120 kg (265 lb 3.4 oz) 120 kg (263 lb 14.3 oz) 130 kg (286 lb)     Body mass index is 46.16 kg/(m^2).     Physical exam:  Physical Exam   Constitutional: He is oriented to person, place, and time. He appears well-developed and well-nourished. No distress. Face mask in place.   obese   HENT:   Head: Normocephalic and atraumatic.   Nose: Nose normal.   Mouth/Throat: Oropharynx is clear and moist and mucous membranes are normal.   Eyes: Conjunctivae and EOM are normal. Pupils are equal, round, and reactive to light. No scleral icterus.   Neck: Trachea normal. Neck supple. No JVD present. Carotid bruit is not present. No thyromegaly present.   Cardiovascular: Normal rate, regular rhythm, normal heart sounds and normal pulses.  Exam reveals no gallop and no friction rub.    No murmur heard.  Trace-1+ edema bilateral lower extremities   Pulmonary/Chest: Effort normal. No respiratory distress. He has decreased breath sounds. He has no wheezes. He has no rales.   Abdominal: Soft. Bowel sounds are normal. He exhibits no distension. There is no tenderness. There is no guarding.   Neurological: He is alert and oriented to person, place, and time. He has normal strength. No cranial nerve deficit.   Skin: Skin is warm, dry and intact. No rash noted. No erythema.   Psychiatric: He has a normal mood and affect. His speech is normal.      Telemetry: Sinus rhythm    Results Review:    Results from last 7 days  Lab Units 01/01/18  0050 12/31/17  0117 12/30/17  0234 12/29/17  0427 12/28/17  0932   WBC 10*3/mm3 6.19 8.63 8.29 9.38 7.39    HEMOGLOBIN g/dL 8.8* 9.0* 8.0* 8.5* 9.8*   HEMATOCRIT % 28.7* 30.1* 26.3* 26.9* 32.2*   PLATELETS 10*3/mm3 149 163 134 103* 188       Results from last 7 days  Lab Units 01/01/18  0050 12/31/17  0117 12/30/17  0234 12/29/17  1112 12/29/17  0427 12/28/17  0932   SODIUM mmol/L 145 144 139  --  142 145   POTASSIUM mmol/L 3.8 4.0 3.6  --  3.8 3.3*   CHLORIDE mmol/L 110 108 103  --  103 97*   CO2 mmol/L 31.3 31.9 27.3  --  32.0* 39.9*   BUN mg/dL 22* 22* 20  --  21 16   CREATININE mg/dL 1.32* 1.45* 1.46* 1.52* 1.40* 1.27   CALCIUM mg/dL 8.4 8.2 7.5*  --  7.6* 8.8   GLUCOSE mg/dL 139* 179* 281*  --  276* 178*       Results from last 7 days  Lab Units 01/01/18  0050       BILIRUBIN mg/dL 1.1       ALK PHOS U/L 42       AST (SGOT) U/L 25       ALT (SGPT) U/L 52*           Results from last 7 days  Lab Units 01/01/18  0050 12/31/17  0117 12/30/17  0234 12/29/17  0427   MAGNESIUM mg/dL 2.7* 2.7* 2.6 1.1*           Results from last 7 days  Lab Units 12/29/17  0427 12/28/17  2155 12/28/17  1640   CK TOTAL U/L 45 39  39 40   TROPONIN I ng/mL 0.041* 0.038 0.035   CK MB INDEX % 2.4  --  1.3       Results from last 7 days  Lab Units 12/29/17  0427 12/28/17  0932   BNP pg/mL 307.0* 127.0*       Assessment/Plan      -Acute on chronic hypoxic and hypercapnic respiratory failure secondary to an acute COPD exacerbation with a bibasilar healthcare associated pneumonia  -Septic shock that has resolved  -Uncontrolled essential hyprtension  -Acute hypomagnesemia, resolved  -Acute hypophosphatemia that has resolved  -5 mm bladder polyp of unclear significance  -Diabetes mellitus type 2 with improving control  -Stage III chronic kidney disease  -Atypical chest pain that has resolved  -Thrombocytopenia that has resolved  -Chronic microcytic anemia with iron deficiency  -Obesity    Continue with vancomycin and Zosyn.  Continue scheduled inhalants.  Continue with IV steroids for now and consider de-escalation to prednisone beginning  tomorrow.  I have discussed with respiratory and we will try the patient on high flow nasal cannula.  Continue physical therapy as tolerated/as available.    A urology consultation has been placed for evaluation of his bladder polyp to see if this needs to be addressed while inpatient or could be evaluating on an outpatient basis.    I have increased the dose of his Coreg and will give him a one-time dose of oral Bumex now.  The patient is on Bumex 1 mg daily at home.    Continue to supplement his electrolytes as needed.  Continue with his current insulin regimen as his blood glucose levels have improved.    Repeat his CBC and BMP in the morning.     The patient is high risk due to the following diagnoses/reasons:  Acute on chronic respiratory failure, ross, ckd    I discussed the patients findings and my recommendations with patient and nursing staff.    Disposition  Home when medically stable    Teresa Sandhu DO  01/01/18  11:08 AM         Electronically signed by Teresa Sandhu DO at 1/1/2018 11:20 AM      Jeremi Gomez MD at 1/2/2018  8:43 AM  Version 2 of 2          LOS: 5 days     Chief Complaint:  Pulmonology is following for respiratory failure     Subjective     Interval History:     Mr. Anne is doing well this morning. He is sitting up in his bed, he is in no distress. He reports he is starting to feel better.     History taken from: patient chart RN    Review of Systems:   Review of Systems   Constitutional: Negative for chills, diaphoresis and fatigue.   HENT: Negative for congestion and rhinorrhea.    Eyes: Negative for visual disturbance.   Respiratory: Positive for shortness of breath. Negative for cough and wheezing.        Objective     Vital Signs  Temp:  [98 °F (36.7 °C)-99.7 °F (37.6 °C)] 98 °F (36.7 °C)  Heart Rate:  [55-73] 67  Resp:  [16-22] 17  BP: (167-200)/(63-91) 175/71  Body mass index is 46.33 kg/(m^2).       Assessment/Plan     Respiratory Failure: likely related to  underlying lung disease, COPD and fluid volume overload     Antibiotics changed today to doxycyline PO by ID. Continue scheduled inhalants and nebulizer's.     Incentive spirometer 10 times an hour during the day. Titrate Fi02 to maintain Sp02 >92-94%.       On rounds he is on high flow nasal cannula at 45 Liters and 39% Fi02. Medrol dc'd, prednisone 30mg PO started, continue to wean as tolerated.     Will give lasix 40mg IV and albumin 25g x1 today for overload, monitor renal function.     Hypertension: -200, Cardene gtt started, goal is to maintain SBP around 150, he has had chronic HTN and do not want to drop his pressure to quickly which can cause worsening renal failure and encephalopathy.     Continue continuous ECG and v/s monitoring, maintain MAP >65.     CKD stage III: Creatinine 1.38, 24 hour urine 1.8 liters, continue strict I&O, electrolytes WNL.       YANG Mathews  01/02/18  8:43 AM      Scribed for Dr. Gomez by YANG Cassidy.     I, Jeremi Gomez M.D. attest that the above note accurately reflects the work and decisions made  by me.  Patient was seen and evaluated by Dr. Gomez, including history of present illness, physical exam, assessment, and treatment plan.  The above note was reviewed and edited by Dr. Gomez.     Electronically signed by Jeremi Gomez MD at 1/2/2018  4:22 PM      YANG Mathews at 1/2/2018  8:43 AM  Version 1 of 2          LOS: 5 days     Chief Complaint:  Pulmonology is following for respiratory failure     Subjective     Interval History:     Mr. nAne is doing well this morning. He is sitting up in his bed, he is in no distress. He reports he is starting to feel better.     History taken from: patient chart RN    Review of Systems:   Review of Systems   Constitutional: Negative for chills, diaphoresis and fatigue.   HENT: Negative for congestion and rhinorrhea.    Eyes: Negative for visual disturbance.   Respiratory: Positive for  shortness of breath    Objective     Vital Signs  Temp:  [98 °F (36.7 °C)-99.7 °F (37.6 °C)] 98 °F (36.7 °C)  Heart Rate:  [55-73] 67  Resp:  [16-22] 17  BP: (167-200)/(63-91) 175/71  Body mass index is 46.33 kg/(m^2).    Intake/Output Summary (Last 24 hours) at 01/02/18 0843  Last data filed at 01/02/18 0541   Gross per 24 hour   Intake             1910 ml   Output             1870 ml   Net               40 ml          Results Review:                I reviewed the patient's new clinical results.  I reviewed the patient's new imaging results and agree with the interpretation.    Results from last 7 days  Lab Units 01/02/18  0044 01/01/18  0050    WBC 10*3/mm3 5.76 6.19    HEMOGLOBIN g/dL 8.4* 8.8*    PLATELETS 10*3/mm3 153 149        Results from last 7 days  Lab Units 01/02/18  0044 01/01/18  0050     SODIUM mmol/L 143 145     POTASSIUM mmol/L 3.6 3.8     CHLORIDE mmol/L 107 110     CO2 mmol/L 30.0 31.3     BUN mg/dL 24* 22*     CREATININE mg/dL 1.38* 1.32*     CALCIUM mg/dL 8.7 8.4     GLUCOSE mg/dL 116* 139*     MAGNESIUM mg/dL  --  2.7*         Results from last 7 days  Lab Units 01/01/18  0050     ALK PHOS U/L 42     BILIRUBIN mg/dL 1.1     ALT (SGPT) U/L 52*     AST (SGOT) U/L 25             Assessment/Plan     Respiratory Failure: likely related to underlying lung disease, COPD and fluid volume overload. Antibiotics changed today to doxycyline PO by ID. Continue scheduled inhalants and nebulizer's. Incentive spirometer 10 times an hour during the day. Titrate Fi02 to maintain Sp02 >92-94%. On rounds he is on high flow nasal cannula at 45 Liters and 39% Fi02. Medrol dc'd, prednisone 30mg PO started, continue to wean as tolerated. Will give lasix 40mg IV and albumin 25g x1 today for overload, monitor renal function.     Hypertension: -200, Cardene gtt started, goal is to maintain SBP around 150, he has had chronic HTN and do not want to drop his pressure to quickly which can cause worsening renal failure  "and encephalopathy. Continue continuous ECG and v/s monitoring, maintain MAP >65.     CKD stage III: Creatinine 1.38, 24 hour urine 1.8 liters, continue strict I&O, electrolytes WNL.           YANG Mathews  01/02/18  8:43 AM      Scribed for Dr. Gomez by YANG Cassidy.        Electronically signed by YANG Mathews at 1/2/2018  8:55 AM      Shaista Foss MD at 1/2/2018 11:15 AM  Version 2 of 2           I have personally seen and examined the patient today and discussed overnight interval progress and pertinent issues with nursing staff.    Subjective:    Slight hypoxemia overnight but improving since.  No evidence of aspiration.  No fever or diarrhea.  Overall stable from infectious disease standpoint with CRP level almost normal.    History taken from: patient chart RN      Vital Signs    /71  Pulse 67  Temp 98 °F (36.7 °C)  Resp 17  Ht 167.6 cm (66\")  Wt 130 kg (287 lb 0.6 oz)  SpO2 97%  BMI 46.33 kg/m2    Temp:  [98 °F (36.7 °C)-99.7 °F (37.6 °C)] 98 °F (36.7 °C)        Intake & Output (last 3 days)       12/30 0701 - 12/31 0700 12/31 0701 - 01/01 0700 01/01 0701 - 01/02 0700 01/02 0701 - 01/03 0700    P.O. 1460 1360 1370     IV Piggyback 900 1050 900     Total Intake(mL/kg) 2360 (18.2) 2410 (18.6) 2270 (17.4)     Urine (mL/kg/hr) 1400 (0.4) 2550 (0.8) 1870 (0.6) 775 (1.4)    Stool 0 (0) 0 (0)      Total Output 1400 2550 1870 775    Net +960 -140 +400 -775            Unmeasured Urine Occurrence   1 x     Unmeasured Stool Occurrence 2 x 1 x                 Results:      Results from last 7 days  Lab Units 01/02/18  0044 01/01/18  0050       WBC 10*3/mm3 5.76 6.19         Lab Results   Component Value Date    NEUTROABS 4.66 01/02/2018         Results from last 7 days  Lab Units 01/02/18  0044   CREATININE mg/dL 1.38*           Lab Units 01/01/18  0050     CRP mg/dL 1.32*         Assessment/Plan     ASSESSMENT:    1.  Septic shock on admission with lactic acid " "greater than for an elevated CRP level  2.  Pneumonia     PLAN:    Slight hypoxemia overnight but improving since.  No evidence of aspiration.  No fever or diarrhea.  Overall stable from infectious disease standpoint with CRP level almost normal.    In the setting of such significant clinical improvement with almost normalize CRP level and normal white count antibiotic therapy was de-escalated to doxycycline 100 mg by mouth twice a day to continue through 1/5/2018.     Patient's findings and recommendations were discussed with patient and nursing staff    Code Status: Full Code     Scribed for Shaista Foss M.D. by Annalisa Zuñgia PA-C.    Annalisa Zuñiga PA-C  01/02/18  11:15 AM    Physician Attestation:    The documentation recorded by the scribe accurately reflects the service I personally performed and the decisions made by me.    Shaista Foss MD  Infectious Diseases  01/03/18  5:51 AM       Electronically signed by Shaista Foss MD at 1/3/2018  5:51 AM      Annalisa Zuñiga PA-C at 1/2/2018 11:15 AM  Version 1 of 2           I have personally seen and examined the patient today and discussed overnight interval progress and pertinent issues with nursing staff.    Subjective:    Slight hypoxemia overnight but improving since.  No evidence of aspiration.  No fever or diarrhea.  Overall stable from infectious disease standpoint with CRP level almost normal.    History taken from: patient chart RN      Vital Signs    /71  Pulse 67  Temp 98 °F (36.7 °C)  Resp 17  Ht 167.6 cm (66\")  Wt 130 kg (287 lb 0.6 oz)  SpO2 97%  BMI 46.33 kg/m2    Temp:  [98 °F (36.7 °C)-99.7 °F (37.6 °C)] 98 °F (36.7 °C)          Intake & Output (last 3 days)       12/30 0701 - 12/31 0700 12/31 0701 - 01/01 0700 01/01 0701 - 01/02 0700 01/02 0701 - 01/03 0700    P.O. 1460 1360 1370     IV Piggyback 900 1050 900     Total Intake(mL/kg) 2360 (18.2) 2410 (18.6) 2270 (17.4)     Urine (mL/kg/hr) 1400 (0.4) 2550 (0.8) " 1870 (0.6) 775 (1.4)    Stool 0 (0) 0 (0)      Total Output 1400 2550 1870 775    Net +960 -140 +400 -775            Unmeasured Urine Occurrence   1 x     Unmeasured Stool Occurrence 2 x 1 x                Results:      Results from last 7 days  Lab Units 18  0044 18  0050 17  0117 17  0234 17  0427 17  0932   WBC 10*3/mm3 5.76 6.19 8.63 8.29 9.38 7.39     Lab Results   Component Value Date    NEUTROABS 4.66 2018         Results from last 7 days  Lab Units 18  0044   CREATININE mg/dL 1.38*         Results from last 7 days  Lab Units 18  0050 17  0117 17  0234   CRP mg/dL 1.32* 2.16* 3.94*         Assessment/Plan     ASSESSMENT:    1.  Septic shock on admission with lactic acid greater than for an elevated CRP level  2.  Pneumonia     PLAN:    Slight hypoxemia overnight but improving since.  No evidence of aspiration.  No fever or diarrhea.  Overall stable from infectious disease standpoint with CRP level almost normal.    In the setting of such significant clinical improvement with almost normalize CRP level and normal white count antibiotic therapy was de-escalated to doxycycline 100 mg by mouth twice a day to continue through 2018.     Patient's findings and recommendations were discussed with patient and nursing staff    Code Status: Full Code     Scribed for Shaista Foss M.D. by Annalisa Zuñiga PA-C.    Annalisa Zuñiga PA-C  18  11:15 AM       Electronically signed by Annalisa Zuñiga PA-C at 2018 11:17 AM      Teresa Sandhu DO at 2018 11:20 AM  Version 1 of 1         HOSPITALIST PROGRESS NOTE    Patient Identification:  Name:  Se Anne  Age:  67 y.o.  Sex:  male  :  1950  MRN:  [id number]  Visit Number:  [id number]  Primary Care Provider:  No Known Provider    Length of stay:  5     HPI: 68 yo male admitted with dyspnea    Subjective:  The patient is seen this morning and is sitting up on the  bedside.    He currently is on high flow nasal cannula and states that he is feeling a little better today.  He denied to me significant cough but nursing staff states that he had an episode of hemoptysis shortly after my visit.    He did not ambulate with PT yesterday as he did not feel well but hopes to work with him today.    He denies dyspnea at this time.  He denies constipation, abdominal pain and/or vomiting.    Blood pressure remains slightly elevated. Patient was started on a Cardene drip by Pulmonary. He also received a one time dose of IV Lasix and reports increased urine output with this.    Present during exam: N/A    Current Hospital Meds:    amLODIPine 10 mg Oral Daily   aspirin 81 mg Oral Daily   atorvastatin 80 mg Oral Daily   budesonide-formoterol 2 puff Inhalation BID - RT   carvedilol 25 mg Oral BID With Meals   cetirizine 10 mg Oral Daily   cholecalciferol 2,000 Units Oral Daily   doxycycline 100 mg Oral Q12H   ferrous sulfate 325 mg Oral Daily With Breakfast   fondaparinux 2.5 mg Subcutaneous Q24H   gabapentin 300 mg Oral Nightly   hydrALAZINE 100 mg Oral Q8H   insulin aspart 0-14 Units Subcutaneous 4x Daily AC & at Bedtime   insulin aspart 10 Units Subcutaneous TID With Meals   insulin detemir 35 Units Subcutaneous Daily   insulin detemir 35 Units Subcutaneous Nightly   ipratropium 0.5 mg Nebulization 4x Daily - RT   isosorbide mononitrate 180 mg Oral Daily   lactobacillus acidophilus 1 capsule Oral Daily   levothyroxine 25 mcg Oral Daily   pantoprazole 40 mg Oral Daily   potassium chloride 40 mEq Oral Q4H   predniSONE 30 mg Oral Daily With Breakfast   sertraline 50 mg Oral Q PM   vitamin B-12 2,000 mcg Oral Daily       Vital Signs  Temp:  [98 °F (36.7 °C)-99.7 °F (37.6 °C)] 98 °F (36.7 °C)  Heart Rate:  [55-73] 67  Resp:  [16-22] 17  BP: (167-200)/(63-89) 175/71  Last 3 weights    12/30/17  0405 12/31/17  0403 01/02/18  0412   Weight: 120 kg (263 lb 14.3 oz) 130 kg (286 lb) 130 kg (287 lb 0.6  oz)     Body mass index is 46.33 kg/(m^2).       Intake/Output Summary (Last 24 hours) at 01/02/18 1120  Last data filed at 01/02/18 0919   Gross per 24 hour   Intake             1400 ml   Output             2385 ml   Net             -985 ml       Physical exam:  Physical Exam   Constitutional: He is oriented to person, place, and time. He appears well-developed and well-nourished. No distress. Nasal cannula in place.   Obese; HFNC at 36%, 45L   HENT:   Head: Normocephalic and atraumatic.   Nose: Nose normal.   Mouth/Throat: Oropharynx is clear and moist and mucous membranes are normal.   Eyes: Conjunctivae and EOM are normal. Pupils are equal, round, and reactive to light. No scleral icterus.   Neck: Trachea normal. Neck supple. No JVD present. Carotid bruit is not present. No thyromegaly present.   Cardiovascular: Normal rate, regular rhythm, normal heart sounds and normal pulses.  Exam reveals no gallop and no friction rub.    No murmur heard.  Trace-1+ edema bilateral lower extremities   Pulmonary/Chest: Effort normal. No respiratory distress. He has decreased breath sounds. He has no wheezes. He has rales in the left lower field.   Abdominal: Soft. Bowel sounds are normal. He exhibits no distension. There is no tenderness. There is no guarding.   Neurological: He is alert and oriented to person, place, and time. He has normal strength. No cranial nerve deficit.   Skin: Skin is warm, dry and intact. No rash noted. No erythema.   Psychiatric: He has a normal mood and affect. His speech is normal.      Telemetry: Sinus rhythm    Results Review:    Results from last 7 days  Lab Units 01/02/18  0044 01/01/18  0050 12/31/17  0117 12/30/17  0234 12/29/17  0427 12/28/17  0932   WBC 10*3/mm3 5.76 6.19 8.63 8.29 9.38 7.39   HEMOGLOBIN g/dL 8.4* 8.8* 9.0* 8.0* 8.5* 9.8*   HEMATOCRIT % 27.0* 28.7* 30.1* 26.3* 26.9* 32.2*   PLATELETS 10*3/mm3 153 149 163 134 103* 188       Results from last 7 days  Lab Units 01/02/18  0044  01/01/18  0050 12/31/17  0117 12/30/17  0234 12/29/17  1112 12/29/17  0427 12/28/17  0932   SODIUM mmol/L 143 145 144 139  --  142 145   POTASSIUM mmol/L 3.6 3.8 4.0 3.6  --  3.8 3.3*   CHLORIDE mmol/L 107 110 108 103  --  103 97*   CO2 mmol/L 30.0 31.3 31.9 27.3  --  32.0* 39.9*   BUN mg/dL 24* 22* 22* 20  --  21 16   CREATININE mg/dL 1.38* 1.32* 1.45* 1.46* 1.52* 1.40* 1.27   CALCIUM mg/dL 8.7 8.4 8.2 7.5*  --  7.6* 8.8   GLUCOSE mg/dL 116* 139* 179* 281*  --  276* 178*       Results from last 7 days  Lab Units 01/01/18  0050 12/31/17  0117 12/30/17  0234 12/29/17  0427 12/28/17  0932   BILIRUBIN mg/dL 1.1 1.0 0.6 0.6 0.6   ALK PHOS U/L 42 47 41 41 55   AST (SGOT) U/L 25 27 17 16 18   ALT (SGPT) U/L 52* 51* 25 25 28       Results from last 7 days  Lab Units 01/01/18  0050 12/31/17  0117 12/30/17  0234 12/29/17  0427   MAGNESIUM mg/dL 2.7* 2.7* 2.6 1.1*           Results from last 7 days  Lab Units 12/29/17  0427 12/28/17  2155 12/28/17  1640   CK TOTAL U/L 45 39  39 40   TROPONIN I ng/mL 0.041* 0.038 0.035   CK MB INDEX % 2.4  --  1.3       Results from last 7 days  Lab Units 12/29/17  0427 12/28/17  0932   BNP pg/mL 307.0* 127.0*         CXR, 12/31/17:  FINDINGS:   Bibasilar atelectasis.   Heart size is stable.   No pneumothorax.   No pleural effusion.   Bony and soft tissue structures are unremarkable.      IMPRESSION:  Stable radiographic appearance of the chest.      Assessment/Plan      -Acute on chronic hypoxic and hypercapnic respiratory failure secondary to an acute COPD exacerbation with a bibasilar healthcare associated pneumonia  -Septic shock that has resolved  -Uncontrolled essential hyprtension  -Acute hypomagnesemia, resolved  -Acute hypophosphatemia that has resolved  -5 mm bladder polyp of unclear significance  -Diabetes mellitus type 2 with improving control  -Stage III chronic kidney disease  -Thrombocytopenia that has resolved  -Chronic microcytic anemia with iron  deficiency  -Obesity    Patient's antibiotic regimen has be de-escalated to oral doxycycline by ID through 1/5/18. Continue scheduled inhalants.  IV solumedrol has been changed to PO prednisone by Pulmonary. Continue inhalents. Wean HFNC as tolerated. He received a one time dose of IV Lasix today. Continue physical therapy as tolerated/as available.    A urology consultation has been placed for evaluation of his bladder polyp to see if this needs to be addressed while inpatient or could be evaluating on an outpatient basis.    The patient has been started on a Cardene drip by pulmonary. Goal SBP would be 140-150 mmHg given his history of Stage III CKD to ensure renal perfusion.    Continue to supplement his electrolytes as needed.  Continue with his current insulin regimen as his blood glucose levels have improved.    Repeat his CBC and BMP in the morning.     The patient is high risk due to the following diagnoses/reasons:  Acute on chronic respiratory failure, ross, ckd, uncontrolled htn    I discussed the patients findings and my recommendations with patient.    Disposition  Home when medically stable    Teresa Sandhu DO  01/02/18  11:20 AM         Electronically signed by eTresa Sandhu DO at 1/2/2018 11:29 AM      Jeremi Gomez MD at 1/3/2018  8:28 AM  Version 2 of 2          LOS: 6 days     Chief Complaint:  Pulmonology is following for respiratory failure     Subjective     Interval History:     Mr. Anne is resting in his room, remains on bipap this morning. He is feeling some better but is still having shortness of breath and some mild to moderate distress.     History taken from: patient chart RN    Review of Systems:   Review of Systems   Constitutional: Negative for chills, fatigue and fever.   HENT: Negative for congestion and rhinorrhea.    Eyes: Negative for photophobia and visual disturbance.   Respiratory: Positive for shortness of breath. Negative for wheezing.    Cardiovascular:  Negative for chest pain and leg swelling.   Gastrointestinal: Negative for abdominal distention and abdominal pain.   Endocrine: Negative for cold intolerance and heat intolerance.   Genitourinary: Negative for difficulty urinating.   Musculoskeletal: Negative for arthralgias.   Skin: Negative for color change and pallor.   Allergic/Immunologic: Negative for environmental allergies.   Neurological: Negative for dizziness, weakness and light-headedness.   Hematological: Negative for adenopathy.   Psychiatric/Behavioral: Negative for agitation, behavioral problems and confusion.                     Objective     Vital Signs  Temp:  [97.4 °F (36.3 °C)-97.8 °F (36.6 °C)] 97.7 °F (36.5 °C)  Heart Rate:  [53-74] 61  Resp:  [18-22] 22  BP: (135-187)/(53-92) 145/57  Body mass index is 45.24 kg/(m^2).      Results from last 7 days  Lab Units 01/03/18  0201     WBC 10*3/mm3 6.64     HEMOGLOBIN g/dL 7.8*     PLATELETS 10*3/mm3 173         Results from last 7 days  Lab Units 01/03/18  0201        SODIUM mmol/L 146        POTASSIUM mmol/L 4.0        CHLORIDE mmol/L 108        CO2 mmol/L 27.7        BUN mg/dL 26*        CREATININE mg/dL 1.42*        CALCIUM mg/dL 8.8        GLUCOSE mg/dL 145*        MAGNESIUM mg/dL  --             Results from last 7 days  Lab Units 01/03/18  0815   PH, ARTERIAL pH units 7.378   PO2 ART mm Hg 65.0*   PCO2, ARTERIAL mm Hg 56.0*   HCO3 ART mmol/L 32.2*     Imaging Results (last 24 hours)     Procedure Component Value Units Date/Time    XR Chest 1 View [[id number]] Collected:  01/03/18 0806     Updated:  01/03/18 0810    Narrative:       XR CHEST 1 VIEW-     CLINICAL INDICATION: Shortness of air; J96.21-Acute and chronic  respiratory failure with hypoxia; J96.22-Acute and chronic respiratory  failure with hypercapnia; J44.1-Chronic obstructive pulmonary disease  with (acute) exacerbation.          COMPARISON: 12/31/2017      TECHNIQUE: Single frontal view of the chest.     FINDINGS:     There is  bibasilar consolidation.  Appearance compatible with congestive heart failure.  There is no evidence of an acute osseous abnormality.   There are no suspicious-appearing parenchymal soft tissue nodules.            Impression:       Bibasilar consolidation and CHF.         This report was finalized on 1/3/2018 8:07 AM by Dr. Noe Avila MD.                Medication Review:   Scheduled Medications:    amLODIPine 10 mg Oral Daily   aspirin 81 mg Oral Daily   atorvastatin 80 mg Oral Daily   budesonide-formoterol 2 puff Inhalation BID - RT   carvedilol 25 mg Oral BID With Meals   cetirizine 10 mg Oral Daily   cholecalciferol 2,000 Units Oral Daily   CloNIDine 0.1 mg Oral Q8H   doxycycline 100 mg Oral Q12H   ferrous sulfate 325 mg Oral Daily With Breakfast   fondaparinux 2.5 mg Subcutaneous Q24H   gabapentin 300 mg Oral Nightly   hydrALAZINE 100 mg Oral Q8H   insulin aspart 0-14 Units Subcutaneous 4x Daily AC & at Bedtime   insulin aspart 10 Units Subcutaneous TID With Meals   insulin detemir 35 Units Subcutaneous Daily   insulin detemir 35 Units Subcutaneous Nightly   ipratropium 0.5 mg Nebulization 4x Daily - RT   isosorbide mononitrate 180 mg Oral Daily   lactobacillus acidophilus 1 capsule Oral Daily   levothyroxine 25 mcg Oral Daily   pantoprazole 40 mg Oral Daily   predniSONE 20 mg Oral Daily With Breakfast   sertraline 50 mg Oral Q PM   sodium chloride 10 mL Intracatheter Q12H   vitamin B-12 2,000 mcg Oral Daily     Continuous infusions:    niCARdipine 5-15 mg/hr Last Rate: 10 mg/hr (01/03/18 0543)       Assessment/Plan      Respiratory Failure: likely related to underlying lung disease, COPD and fluid volume overload. Antibiotics were changed yesterday to doxy PO by ID. Continue scheduled inhalants and nebulizer's. Incentive spirometer 10 times an hour during the day. Continue Bipap at HS and for shortness of breath during the day. Continue supplemental oxygen to maintain Sp02 >92-94%. Will hold diuresis today  for increased creatinine after yesterday's dose. Prednisone decreased to 20 mg PO daily, continue to wean as tolerated.     Patient in mild-to moderate respiratory distress this morning while on BiPap, settings changed to 20/8, ABG and Chest xray ordered, will review and develop plan from there. He is awake, alert and following commands.      Hypertension: SBP  164/72, Cardene gtt started yesterday, on 10mg/hr,  goal is to maintain SBP around 150, he does have chronic HTN will lower BP slowly to not infarct renals or induce encephalopathy. Clonidine 0.1mg PO TID started today to assist in weaning off Cardene.      CKD stage III: Creatinine 1.42 , 24 hour urine 3.2 liters, continue strict I&O, electrolytes WNL.     ID: WBC is 6.64, neutrophils are 71.9, no fevers, continue doxy PO. Continue to monitor lab trends and clinical changes.          YANG Mathews  01/03/18  8:29 AM      Scribed for Dr. Gomez by YANG Cassidy.       I, Jeremi Gomez M.D. attest that the above note accurately reflects the work and decisions made  by me.  Patient was seen and evaluated by Dr. Gomez, including history of present illness, physical exam, assessment, and treatment plan.  The above note was reviewed and edited by Dr. Gomez.  Critical Care time spent in direct patient care: 35 minutes (excluding procedure time, if applicable) including high complexity decision making to assess, manipulate, and support vital organ system failure in this individual who has impairment of one or more vital organ systems such that there is a high probability of imminent or life threatening deterioration in the patient’s condition.     Electronically signed by Jeremi Gomez MD at 1/3/2018  1:05 PM      YANG Mathews at 1/3/2018  8:28 AM  Version 1 of 2          LOS: 6 days     Chief Complaint:  Pulmonology is following for respiratory failure     Subjective     Interval History:     Mr. Anne is resting in his  room, remains on bipap this morning. He is feeling some better but is still having shortness of breath and some mild to moderate distress.     History taken from: patient chart RN    Review of Systems:   Review of Systems   Constitutional: Negative for chills, fatigue and fever.   HENT: Negative for congestion and rhinorrhea.    Eyes: Negative for photophobia and visual disturbance.   Respiratory: Positive for shortness of breath                  Objective     Vital Signs  Temp:  [97.4 °F (36.3 °C)-97.8 °F (36.6 °C)] 97.7 °F (36.5 °C)  Heart Rate:  [53-74] 61  Resp:  [18-22] 22  BP: (135-187)/(53-92) 145/57  Body mass index is 45.24 kg/(m^2).    Intake/Output Summary (Last 24 hours) at 01/03/18 0829  Last data filed at 01/03/18 0405   Gross per 24 hour   Intake              480 ml   Output             2925 ml   Net            -2445 ml          Physical Exam:  GENERAL APPEARANCE: Well developed, well nourished, alert and cooperative, in mild to moderate respiratory distress.     HEAD: normocephalic.    EYES: PERRL    NECK: Neck supple.     CARDIAC: Normal S1 and S2. No S3, S4 or murmurs. Rhythm is regular. There is no cyanosis or pallor. Extremities are warm and well perfused. Capillary refill is less than 2 seconds. No carotid bruits.    Respiratory: crackles.     GI: Positive bowel sounds. Soft, nondistended, nontender.     Musculoskeletal: No significant deformity or joint abnormality.  Peripheral pulses intact.     NEUROLOGICAL: Strength and sensation symmetric and intact throughout.     PSYCHIATRIC: The mental examination revealed the patient was oriented to person, place, and time.                 Results Review:                I reviewed the patient's new clinical results.  I reviewed the patient's new imaging results and agree with the interpretation.    Results from last 7 days  Lab Units 01/03/18  0201 01/02/18  0044 01/01/18  0050   WBC 10*3/mm3 6.64 5.76 6.19   HEMOGLOBIN g/dL 7.8* 8.4* 8.8*   PLATELETS  10*3/mm3 173 153 149     Results from last 7 days  Lab Units 01/03/18  0815    pH units 7.378   PO2 ART mm Hg 65.0*   PCO2, ARTERIAL mm Hg 56.0*   HCO3 ART mmol/L 32.2*     Imaging Results (last 24 hours)     Procedure Component Value Units Date/Time    XR Chest 1 View [[id number]] Collected:  01/03/18 0806     Updated:  01/03/18 0810    Narrative:       XR CHEST 1 VIEW-     CLINICAL INDICATION: Shortness of air; J96.21-Acute and chronic  respiratory failure with hypoxia; J96.22-Acute and chronic respiratory  failure with hypercapnia; J44.1-Chronic obstructive pulmonary disease  with (acute) exacerbation.          COMPARISON: 12/31/2017      TECHNIQUE: Single frontal view of the chest.     FINDINGS:     There is bibasilar consolidation.  Appearance compatible with congestive heart failure.  There is no evidence of an acute osseous abnormality.   There are no suspicious-appearing parenchymal soft tissue nodules.            Impression:       Bibasilar consolidation and CHF.         This report was finalized on 1/3/2018 8:07 AM by Dr. Noe Avila MD.                      Assessment/Plan      Respiratory Failure: likely related to underlying lung disease, COPD and fluid volume overload. Antibiotics were changed yesterday to doxy PO by ID. Continue scheduled inhalants and nebulizer's. Incentive spirometer 10 times an hour during the day. Continue Bipap at HS and for shortness of breath during the day. Continue supplemental oxygen to maintain Sp02 >92-94%. Will hold diuresis today for increased creatinine after yesterday's dose. Prednisone decreased to 20 mg PO daily, continue to wean as tolerated.     Patient in mild-to moderate respiratory distress this morning while on BiPap, settings changed to 20/8, ABG and Chest xray ordered, will review and develop plan from there. He is awake, alert and following commands.      Hypertension: SBP  164/72, Cardene gtt started yesterday, on 10mg/hr,  goal is to maintain SBP around  "150, he does have chronic HTN will lower BP slowly to not infarct renals or induce encephalopathy. Clonidine 0.1mg PO TID started today to assist in weaning off Cardene.      CKD stage III: Creatinine 1.42 , 24 hour urine 3.2 liters, continue strict I&O, electrolytes WNL.     ID: WBC is 6.64, neutrophils are 71.9, no fevers, continue doxy PO. Continue to monitor lab trends and clinical changes.          YANG Mathews  01/03/18  8:29 AM      Scribed for Dr. Gomez by YANG Cassidy.        Electronically signed by YANG Mathews at 1/3/2018  8:37 AM      Shaista Foss MD at 1/3/2018 11:31 AM  Version 2 of 2           I have personally seen and examined the patient today and discussed overnight interval progress and pertinent issues with nursing staff.    Subjective:    On BiPAP.  Stable with no issues reported overnight.    History taken from: patient chart RN      Vital Signs    /57  Pulse 58  Temp 98.1 °F (36.7 °C)  Resp 26  Ht 167.6 cm (66\")  Wt 127 kg (280 lb 4.8 oz)  SpO2 96%  BMI 45.24 kg/m2    Temp:  [97.4 °F (36.3 °C)-98.1 °F (36.7 °C)] 98.1 °F (36.7 °C)          Results Review:    I have personally reviewed laboratory data, culture results, radiology studies and antimicrobial therapy.    Hospital Medications (active)       Dose Frequency Start End    amLODIPine (NORVASC) tablet 10 mg 10 mg Daily 12/28/2017     Sig - Route: Take 1 tablet by mouth Daily. - Oral    aspirin EC tablet 81 mg 81 mg Daily 12/29/2017     Sig - Route: Take 1 tablet by mouth Daily. - Oral    Cosign for Ordering: Required by Seun Muller MD    atorvastatin (LIPITOR) tablet 80 mg 80 mg Daily 12/28/2017     Sig - Route: Take 2 tablets by mouth Daily. - Oral    budesonide-formoterol (SYMBICORT) 160-4.5 MCG/ACT inhaler 2 puff 2 puff 2 Times Daily - RT 12/28/2017     Sig - Route: Inhale 2 puffs 2 (Two) Times a Day. - Inhalation    carvedilol (COREG) tablet 12.5 mg 12.5 mg 2 Times Daily " With Meals 12/28/2017     Sig - Route: Take 2 tablets by mouth 2 (Two) Times a Day With Meals. - Oral    cetirizine (zyrTEC) tablet 10 mg 10 mg Daily 12/28/2017     Sig - Route: Take 1 tablet by mouth Daily. - Oral    cholecalciferol (VITAMIN D3) tablet 2,000 Units 2,000 Units Daily 12/28/2017     Sig - Route: Take 5 tablets by mouth Daily. - Oral    dextrose (D50W) solution 25 g 25 g Every 15 Minutes PRN 12/28/2017     Sig - Route: Infuse 50 mL into a venous catheter Every 15 (Fifteen) Minutes As Needed for Low Blood Sugar (Blood Sugar Less Than 70, Patient Has IV Access - Unresponsive, NPO or Unable To Safely Swallow). - Intravenous    Cosign for Ordering: Required by Seun Muller MD    dextrose (GLUTOSE) oral gel 15 g 15 g Every 15 Minutes PRN 12/28/2017     Sig - Route: Take 15 g by mouth Every 15 (Fifteen) Minutes As Needed for Low Blood Sugar (Blood Sugar Less Than 70, Patient Alert, Is Not NPO & Can Safely Swallow). - Oral    Cosign for Ordering: Required by Seun Muller MD    ferrous sulfate tablet 325 mg 325 mg Daily With Breakfast 12/29/2017     Sig - Route: Take 1 tablet by mouth Daily With Breakfast. - Oral    fondaparinux (ARIXTRA) injection 2.5 mg 2.5 mg Every 24 Hours Scheduled 12/30/2017     Sig - Route: Inject 0.5 mL under the skin Daily. - Subcutaneous    gabapentin (NEURONTIN) capsule 300 mg 300 mg Nightly 12/28/2017     Sig - Route: Take 1 capsule by mouth Every Night. - Oral    glucagon (human recombinant) (GLUCAGEN DIAGNOSTIC) injection 1 mg 1 mg Every 15 Minutes PRN 12/28/2017     Sig - Route: Inject 1 mg under the skin Every 15 (Fifteen) Minutes As Needed (Blood Glucose Less Than 70 - Patient Without IV Access - Unresponsive, NPO or Unable To Safely Swallow). - Subcutaneous    Cosign for Ordering: Required by Seun Muller MD    hydrALAZINE (APRESOLINE) tablet 100 mg 100 mg Every 8 Hours Scheduled 12/30/2017     Sig - Route: Take 2 tablets by mouth Every 8 (Eight) Hours. - Oral     "insulin aspart (novoLOG) injection 0-14 Units 0-14 Units 4 Times Daily Before Meals & Nightly 12/28/2017     Sig - Route: Inject 0-14 Units under the skin 4 (Four) Times a Day Before Meals & at Bedtime. - Subcutaneous    insulin aspart (novoLOG) injection 10 Units 10 Units 3 Times Daily With Meals 12/30/2017     Sig - Route: Inject 10 Units under the skin 3 (Three) Times a Day With Meals. - Subcutaneous    insulin detemir (LEVEMIR) injection 30 Units 30 Units Daily 12/31/2017     Sig - Route: Inject 30 Units under the skin Daily. - Subcutaneous    insulin detemir (LEVEMIR) injection 30 Units 30 Units Nightly 12/30/2017     Sig - Route: Inject 30 Units under the skin Every Night. - Subcutaneous    ipratropium (ATROVENT) nebulizer solution 0.5 mg 0.5 mg 4 Times Daily - RT 12/28/2017     Sig - Route: Take 2.5 mL by nebulization 4 (Four) Times a Day. - Nebulization    isosorbide mononitrate (IMDUR) 24 hr tablet 180 mg 180 mg Daily 12/28/2017     Sig - Route: Take 3 tablets by mouth Daily. - Oral    lactobacillus acidophilus (RISAQUAD) capsule 1 capsule 1 capsule Daily 12/29/2017 1/5/2018    Sig - Route: Take 1 capsule by mouth Daily. - Oral    levothyroxine (SYNTHROID, LEVOTHROID) tablet 25 mcg 25 mcg Daily 12/28/2017     Sig - Route: Take 1 tablet by mouth Daily. - Oral    Magnesium Sulfate 2 gram Bolus, followed by 8 gram infusion (total Mg dose 10 grams)- Mg less than or equal to 1mg/dL 2 g As Needed 12/28/2017     Sig - Route: Infuse 50 mL into a venous catheter As Needed (Mg less than or equal to 1mg/dL). - Intravenous    Cosign for Ordering: Required by Seun Muller MD    Linked Group 1:  \"Or\" Linked Group Details        magnesium sulfate 4 gram infusion- Mg 1.6-1.9 mg/dL 4 g As Needed 12/28/2017     Sig - Route: Infuse 100 mL into a venous catheter As Needed (Mg 1.6-1.9 mg/dL). - Intravenous    Cosign for Ordering: Required by Seun Muller MD    Linked Group 1:  \"Or\" Linked Group Details        Magnesium " "Sulfate 6 gram Infusion (2 gm x 3) -Mg 1.1 -1.5 mg/dL 2 g As Needed 12/28/2017     Sig - Route: Infuse 50 mL into a venous catheter As Needed (Mg 1.1 -1.5 mg/dL). - Intravenous    Cosign for Ordering: Required by Seun Muller MD    Linked Group 1:  \"Or\" Linked Group Details        Magnesium Sulfate 6 gram Infusion (2 gm x 3) -Mg 1.1 -1.5 mg/dL 2 g Every 2 Hours 12/29/2017 12/29/2017    Sig - Route: Infuse 50 mL into a venous catheter Every 2 (Two) Hours. - Intravenous    methylPREDNISolone sodium succinate (SOLU-Medrol) injection 20 mg 20 mg Every 12 Hours Scheduled 12/29/2017     Sig - Route: Infuse 0.5 mL into a venous catheter Every 12 (Twelve) Hours. - Intravenous    nitroglycerin (NITROSTAT) SL tablet 0.4 mg 0.4 mg Every 5 Minutes PRN 12/28/2017     Sig - Route: Place 1 tablet under the tongue Every 5 (Five) Minutes As Needed for Chest Pain. - Sublingual    Cosign for Ordering: Accepted by Jeremie Bermudez MD on 12/28/2017  2:34 PM    pantoprazole (PROTONIX) EC tablet 40 mg 40 mg Daily 12/28/2017     Sig - Route: Take 1 tablet by mouth Daily. - Oral    piperacillin-tazobactam (ZOSYN) 4.5 g/100 mL 0.9% NS IVPB (mbp) 4.5 g Every 6 Hours 12/29/2017 1/4/2018    Sig - Route: Infuse 100 mL into a venous catheter Every 6 (Six) Hours. - Intravenous    potassium chloride (K-DUR,KLOR-CON) CR tablet 40 mEq 40 mEq Every 4 Hours 12/30/2017 12/30/2017    Sig - Route: Take 2 tablets by mouth Every 4 (Four) Hours. - Oral    potassium chloride (KLOR-CON) packet 40 mEq 40 mEq As Needed 12/28/2017     Sig - Route: Take 40 mEq by mouth As Needed (potassium replacement, see admin instructions). - Oral    Cosign for Ordering: Required by Seun Muller MD    Linked Group 2:  \"Or\" Linked Group Details        potassium chloride (MICRO-K) CR capsule 40 mEq 40 mEq As Needed 12/28/2017     Sig - Route: Take 4 capsules by mouth As Needed (potassium replacement.  see admin instructions). - Oral    Cosign for Ordering: Required by " "Seun Muller MD    Linked Group 2:  \"Or\" Linked Group Details        potassium chloride 10 mEq in 100 mL IVPB 10 mEq Every 1 Hour PRN 12/28/2017     Sig - Route: Infuse 100 mL into a venous catheter Every 1 (One) Hour As Needed (potassium protocol PERIPHERAL - see admin instructions). - Intravenous    Cosign for Ordering: Required by Seun Muller MD    Linked Group 2:  \"Or\" Linked Group Details        sertraline (ZOLOFT) tablet 50 mg 50 mg Every Evening 12/28/2017     Sig - Route: Take 1 tablet by mouth Every Evening. - Oral    sodium chloride 0.9 % flush 1-10 mL 1-10 mL As Needed 12/28/2017     Sig - Route: Infuse 1-10 mL into a venous catheter As Needed for Line Care. - Intravenous    Cosign for Ordering: Required by Seun Muller MD    sodium chloride 0.9 % flush 10 mL 10 mL As Needed 12/28/2017     Sig - Route: Infuse 10 mL into a venous catheter As Needed for Line Care. - Intravenous    Cosign for Ordering: Accepted by Jeremie Bermudez MD on 12/28/2017  2:34 PM    Linked Group 3:  \"And\" Linked Group Details        sodium phosphates 15 mmol in sodium chloride 0.9 % 250 mL IVPB 15 mmol Once 12/29/2017 12/29/2017    Sig - Route: Infuse 15 mmol into a venous catheter 1 (One) Time. - Intravenous    sodium phosphates 15 mmol in sodium chloride 0.9 % 250 mL IVPB 15 mmol Once 12/30/2017     Sig - Route: Infuse 15 mmol into a venous catheter 1 (One) Time. - Intravenous    vancomycin (VANCOCIN) 1,000 mg in sodium chloride 0.9 % 250 mL IVPB 1,000 mg Every 12 Hours 12/29/2017 1/5/2018    Sig - Route: Infuse 1,000 mg into a venous catheter Every 12 (Twelve) Hours. - Intravenous    vitamin B-12 (CYANOCOBALAMIN) tablet 2,000 mcg 2,000 mcg Daily 12/28/2017     Sig - Route: Take 2 tablets by mouth Daily. - Oral    hydrALAZINE (APRESOLINE) tablet 75 mg (Discontinued) 75 mg Every 8 Hours Scheduled 12/28/2017 12/30/2017    Sig - Route: Take 75 mg by mouth Every 8 (Eight) Hours. - Oral    insulin aspart (novoLOG) " injection 5 Units (Discontinued) 5 Units 3 Times Daily With Meals 12/29/2017 12/30/2017    Sig - Route: Inject 5 Units under the skin 3 (Three) Times a Day With Meals. - Subcutaneous    insulin detemir (LEVEMIR) injection 15 Units (Discontinued) 15 Units Daily 12/29/2017 12/30/2017    Sig - Route: Inject 15 Units under the skin Daily. - Subcutaneous    insulin detemir (LEVEMIR) injection 25 Units (Discontinued) 25 Units Nightly 12/29/2017 12/30/2017    Sig - Route: Inject 25 Units under the skin Every Night. - Subcutaneous    sodium chloride 0.9 % infusion (Discontinued) 50 mL/hr Continuous 12/28/2017 12/29/2017    Sig - Route: Infuse 50 mL/hr into a venous catheter Continuous. - Intravenous            Cultures:    Blood Culture   Date Value Ref Range Status   12/28/2017 No growth at 24 hours  Preliminary   12/28/2017 No growth at 24 hours  Preliminary           Assessment/Plan     ASSESSMENT:    1.  Septic shock on admission with lactic acid greater than for an elevated CRP level  2.  Pneumonia     PLAN:    On BiPAP.  Stable with no issues reported overnight.    In the setting of such significant clinical improvement with almost normalize CRP level and normal white count antibiotic therapy was de-escalated to doxycycline 100 mg by mouth twice a day to continue through 1/5/2018.    Infectious Disease will sign off at this time. Please call back with any questions. Thank you!    Patient's findings and recommendations were discussed with patient and nursing staff    Code Status: Full Code     Scribed for Shaista Foss M.D. by Annalisa Zuñiga PA-C.    Annalisa Zuñiga PA-C  01/03/18  11:31 AM    Physician Attestation:    The documentation recorded by the scribe accurately reflects the service I personally performed and the decisions made by me.    Shaista Foss MD  Infectious Diseases  01/03/18  11:31 AM       Electronically signed by Shaista Foss MD at 1/8/2018  7:15 AM      Annalisa Zuñiga PA-C at  "1/3/2018 11:31 AM  Version 1 of 2           I have personally seen and examined the patient today and discussed overnight interval progress and pertinent issues with nursing staff.    Subjective:    On BiPAP.  Stable with no issues reported overnight.    History taken from: patient chart RN      Vital Signs    /57  Pulse 58  Temp 98.1 °F (36.7 °C)  Resp 26  Ht 167.6 cm (66\")  Wt 127 kg (280 lb 4.8 oz)  SpO2 96%  BMI 45.24 kg/m2    Temp:  [97.4 °F (36.3 °C)-98.1 °F (36.7 °C)] 98.1 °F (36.7 °C)      Intake/Output Summary (Last 24 hours) at 01/03/18 1131  Last data filed at 01/03/18 0405   Gross per 24 hour   Intake              480 ml   Output             2500 ml   Net            -2020 ml     Intake & Output (last 3 days)       12/31 0701 - 01/01 0700 01/01 0701 - 01/02 0700 01/02 0701 - 01/03 0700 01/03 0701 - 01/04 0700    P.O. 1360 1370 480     IV Piggyback 1050 900      Total Intake(mL/kg) 2410 (18.6) 2270 (17.4) 480 (3.8)     Urine (mL/kg/hr) 2550 (0.8) 1870 (0.6) 3275 (1.1)     Stool 0 (0)       Total Output 2550 1870 3275      Net -140 +400 -2795              Unmeasured Urine Occurrence  1 x      Unmeasured Stool Occurrence 1 x                Physical Exam:       General Appearance:    Alert, cooperative, in no acute distress, On BiPAP.  Stable with no issues reported overnight.   Head:    Normocephalic, without obvious abnormality, atraumatic   Eyes:            Lids and lashes normal, conjunctivae and sclerae normal, no   icterus, no pallor, corneas clear, PERRLA   Ears:    Ears appear intact with no abnormalities noted   Throat:   No oral lesions, no thrush, oral mucosa moist   Neck:   No adenopathy, supple, trachea midline, no thyromegaly, no   carotid bruit, no JVD   Back:     No tenderness to percussion or palpation, range of motion   normal   Lungs:    Decreased breath sounds overall but no rhonchi or crackles as well as no wheezing.       Heart:    Regular rhythm and normal rate, normal S1 " and S2, no            murmur, no gallop, no rub, no click   Chest Wall:    No abnormalities observed   Abdomen:     Normal bowel sounds, no masses, no organomegaly, soft        non-tender, non-distended, no guarding, no rebound                tenderness   Rectal:     Deferred   Extremities:   Moves all extremities well, no edema, no cyanosis, no             redness   Pulses:   Pulses palpable and equal bilaterally   Skin:   No bleeding, bruising or rash   Lymph nodes:   No palpable adenopathy   Neurologic:   Awake, alert and oriented x 3. Following commands.         Results:      Results from last 7 days  Lab Units 01/03/18  0201 01/02/18  0044 01/01/18  0050 12/31/17  0117 12/30/17  0234 12/29/17  0427 12/28/17  0932   WBC 10*3/mm3 6.64 5.76 6.19 8.63 8.29 9.38 7.39     Lab Results   Component Value Date    NEUTROABS 4.78 01/03/2018         Results from last 7 days  Lab Units 01/03/18  0201   CREATININE mg/dL 1.42*         Results from last 7 days  Lab Units 01/01/18  0050 12/31/17  0117 12/30/17  0234   CRP mg/dL 1.32* 2.16* 3.94*   Assessment/Plan     ASSESSMENT:    1.  Septic shock on admission with lactic acid greater than for an elevated CRP level  2.  Pneumonia     PLAN:    On BiPAP.  Stable with no issues reported overnight.    In the setting of such significant clinical improvement with almost normalize CRP level and normal white count antibiotic therapy was de-escalated to doxycycline 100 mg by mouth twice a day to continue through 1/5/2018.    Infectious Disease will sign off at this time. Please call back with any questions. Thank you!    Patient's findings and recommendations were discussed with patient and nursing staff    Code Status: Full Code     Scribed for Shaista Foss M.D. by Annalisa Zuñiga PA-C.    Annalisa Zuñiga PA-C  01/03/18  11:31 AM    Physician Attestation:    The documentation recorded by the scribe accurately reflects the service I personally performed and the decisions made by  me.    Shaista Foss MD  Infectious Diseases  18  11:31 AM       Electronically signed by Annalisa Zuñiga PA-C at 1/3/2018 11:32 AM      Teresa Sandhu DO at 1/3/2018  2:29 PM  Version 1 of 1         HOSPITALIST PROGRESS NOTE    Patient Identification:  Name:  Se Anne  Age:  67 y.o.  Sex:  male  :  1950  MRN:  [id number]  Visit Number:  [id number]  Primary Care Provider:  No Known Provider    Length of stay:  6     HPI: 66 yo male admitted with dyspnea    Subjective:  The patient is seen this morning and is sitting in the bedside chair on BiPAP. He was placed on HFNC earlier today while eating breakfast but had an episode of dyspnea. He denies anxiety.    He states that overall he is doing better with the exception of ongoing (and at times severe) dyspnea with or without exertion.    He denies constipation, abdominal pain and/or vomiting.    Patient's blood pressure improving on Cardene drip.     Present during exam: REGGIE Bolaños    Current Hospital Meds:    amLODIPine 10 mg Oral Daily   aspirin 81 mg Oral Daily   atorvastatin 80 mg Oral Daily   budesonide-formoterol 2 puff Inhalation BID - RT   carvedilol 25 mg Oral BID With Meals   cetirizine 10 mg Oral Daily   cholecalciferol 2,000 Units Oral Daily   CloNIDine 0.1 mg Oral Q8H   doxycycline 100 mg Oral Q12H   ferrous sulfate 325 mg Oral Daily With Breakfast   fondaparinux 2.5 mg Subcutaneous Q24H   gabapentin 300 mg Oral Nightly   hydrALAZINE 100 mg Oral Q8H   insulin aspart 0-14 Units Subcutaneous 4x Daily AC & at Bedtime   insulin aspart 10 Units Subcutaneous TID With Meals   insulin detemir 35 Units Subcutaneous Daily   insulin detemir 35 Units Subcutaneous Nightly   ipratropium 0.5 mg Nebulization 4x Daily - RT   isosorbide mononitrate 180 mg Oral Daily   lactobacillus acidophilus 1 capsule Oral Daily   levothyroxine 25 mcg Oral Daily   pantoprazole 40 mg Oral Daily   predniSONE 20 mg Oral Daily With Breakfast   sertraline 50 mg Oral  Q PM   sodium chloride 10 mL Intracatheter Q12H   vitamin B-12 2,000 mcg Oral Daily       Vital Signs  Temp:  [97.4 °F (36.3 °C)-98.1 °F (36.7 °C)] 97.8 °F (36.6 °C)  Heart Rate:  [50-90] 50  Resp:  [18-26] 21  BP: (137-164)/(52-86) 142/63    Physical exam:  Physical Exam   Constitutional: He is oriented to person, place, and time. He appears well-developed and well-nourished. No distress. Nasal cannula in place.   Obese; BIPAP   HENT:   Head: Normocephalic and atraumatic.   Nose: Nose normal.   Mouth/Throat: Oropharynx is clear and moist and mucous membranes are normal.   Eyes: Conjunctivae and EOM are normal. Pupils are equal, round, and reactive to light. No scleral icterus.   Neck: Trachea normal. Neck supple. No JVD present. Carotid bruit is not present. No thyromegaly present.   Cardiovascular: Normal rate, regular rhythm, normal heart sounds and normal pulses.  Exam reveals no gallop and no friction rub.    No murmur heard.  1+ edema bilateral lower extremities   Pulmonary/Chest: Effort normal. No respiratory distress. He has decreased breath sounds. He has wheezes in the right upper field.   Abdominal: Soft. Bowel sounds are normal. He exhibits no distension. There is no tenderness. There is no guarding.   Neurological: He is alert and oriented to person, place, and time. He has normal strength. No cranial nerve deficit.   Skin: Skin is warm, dry and intact. No rash noted. No erythema.   Psychiatric: He has a normal mood and affect. His speech is normal.      Telemetry: Sinus rhythm    Results Review:    Results from last 7 days  Lab Units 01/03/18  0201 01/02/18  0044 01/01/18  0050 12/31/17  0117 12/30/17  0234 12/29/17  0427 12/28/17  0932   WBC 10*3/mm3 6.64 5.76 6.19 8.63 8.29 9.38 7.39   HEMOGLOBIN g/dL 7.8* 8.4* 8.8* 9.0* 8.0* 8.5* 9.8*   HEMATOCRIT % 25.4* 27.0* 28.7* 30.1* 26.3* 26.9* 32.2*   PLATELETS 10*3/mm3 173 153 149 163 134 103* 188       Results from last 7 days  Lab Units 01/03/18  0208  01/02/18  1702 01/02/18  0044 01/01/18  0050 12/31/17  0117 12/30/17  0234 12/29/17  1112 12/29/17  0427 12/28/17  0932   SODIUM mmol/L 146  --  143 145 144 139  --  142 145   POTASSIUM mmol/L 4.0 4.4 3.6 3.8 4.0 3.6  --  3.8 3.3*   CHLORIDE mmol/L 108  --  107 110 108 103  --  103 97*   CO2 mmol/L 27.7  --  30.0 31.3 31.9 27.3  --  32.0* 39.9*   BUN mg/dL 26*  --  24* 22* 22* 20  --  21 16   CREATININE mg/dL 1.42*  --  1.38* 1.32* 1.45* 1.46* 1.52* 1.40* 1.27   CALCIUM mg/dL 8.8  --  8.7 8.4 8.2 7.5*  --  7.6* 8.8   GLUCOSE mg/dL 145*  --  116* 139* 179* 281*  --  276* 178*       Results from last 7 days  Lab Units 01/01/18  0050 12/31/17  0117 12/30/17  0234 12/29/17  0427 12/28/17  0932   BILIRUBIN mg/dL 1.1 1.0 0.6 0.6 0.6   ALK PHOS U/L 42 47 41 41 55   AST (SGOT) U/L 25 27 17 16 18   ALT (SGPT) U/L 52* 51* 25 25 28       Results from last 7 days  Lab Units 01/01/18  0050 12/31/17  0117 12/30/17  0234 12/29/17  0427   MAGNESIUM mg/dL 2.7* 2.7* 2.6 1.1*           Results from last 7 days  Lab Units 12/29/17  0427 12/28/17  2155 12/28/17  1640   CK TOTAL U/L 45 39  39 40   TROPONIN I ng/mL 0.041* 0.038 0.035   CK MB INDEX % 2.4  --  1.3       Results from last 7 days  Lab Units 12/29/17  0427 12/28/17  0932   BNP pg/mL 307.0* 127.0*         CXR, 1/3/18:  FINDINGS:      There is bibasilar consolidation.  Appearance compatible with congestive heart failure.  There is no evidence of an acute osseous abnormality.   There are no suspicious-appearing parenchymal soft tissue nodules.          IMPRESSION:  Bibasilar consolidation and CHF.        Assessment/Plan      -Acute on chronic hypoxic and hypercapnic respiratory failure secondary to an acute COPD exacerbation with a bibasilar healthcare associated pneumonia  -Septic shock that has resolved  -Uncontrolled essential hyprtension  -Acute hypomagnesemia, resolved  -Acute hypophosphatemia that has resolved  -Possible5 mm bladder polyp of unclear significance  -Diabetes  mellitus type 2 with improving control  -Stage III chronic kidney disease  -Thrombocytopenia that has resolved  -Chronic microcytic anemia with iron deficiency  -Obesity    Patient's antibiotic regimen has be de-escalated to oral doxycycline by ID through 1/5/18. Continue scheduled inhalants.  IV solumedrol has been changed to PO prednisone by Pulmonary; continue to wean as tolerated. Continue inhalents. Continue IV Lasix as tolerated; slight bump in BUN/Cr today. Continue physical therapy as tolerated/as available.    Urology evaluated the patient today and recommends an outpatient appointment for cystoscopy.    The patient remains on a Cardene drip by pulmonary. Goal SBP would be 140-150 mmHg given his history of Stage III CKD to ensure renal perfusion. He has also been started on PO Clonidine.    Continue to supplement his electrolytes as needed.  Continue with his current insulin regimen as his blood glucose levels have improved.    Repeat his CBC and BMP in the morning.     The patient is high risk due to the following diagnoses/reasons:  Acute on chronic respiratory failure, ross, ckd, uncontrolled htn    I discussed the patients findings and my recommendations with: patient and nursing staff.    Disposition  Home when medically stable    Teresa Sandhu DO  01/03/18  2:31 PM         Electronically signed by Teresa Sandhu DO at 1/3/2018  2:47 PM      Jeremi Gomez MD at 1/4/2018  8:23 AM  Version 2 of 2          LOS: 7 days     Chief Complaint:  Pulmonology is following for respiratory failure     Subjective     Interval History:     Mr. Anne is doing better this morning, no respiratory distress noted on exam, blood pressure has improved, was taken off cardene gtt yesterday.     History taken from: patient chart RN    Review of Systems:    Cardiovascular: Positive for leg swelling. Negative for chest pain.                     Objective     Vital Signs  Temp:  [97.7 °F (36.5 °C)-98.3 °F (36.8 °C)]  98.1 °F (36.7 °C)  Heart Rate:  [48-90] 53  Resp:  [16-26] 25  BP: (120-166)/() 156/63  Body mass index is 45.06 kg/(m^2).                    Results Review:                I reviewed the patient's new clinical results.  I reviewed the patient's new imaging results and agree with the interpretation.    Results from last 7 days  Lab Units 01/04/18  0042 01/03/18  0201 01/02/18  0044   WBC 10*3/mm3 6.71 6.64 5.76   HEMOGLOBIN g/dL 7.0* 7.8* 8.4*   PLATELETS 10*3/mm3 182 173 153       Results from last 7 days  Lab Units 01/04/18 0042 01/03/18  0201        SODIUM mmol/L 146 146        POTASSIUM mmol/L 4.2 4.0        CHLORIDE mmol/L 108 108        CO2 mmol/L 30.8 27.7        BUN mg/dL 30* 26*        CREATININE mg/dL 1.41* 1.42*        CALCIUM mg/dL 8.6 8.8        GLUCOSE mg/dL 102 145*        MAGNESIUM mg/dL  --   --               Medication Review:   Scheduled Medications:    albumin human 25 g Intravenous Once   amLODIPine 10 mg Oral Daily   aspirin 81 mg Oral Daily   atorvastatin 80 mg Oral Daily   budesonide-formoterol 2 puff Inhalation BID - RT   carvedilol 25 mg Oral BID With Meals   cetirizine 10 mg Oral Daily   cholecalciferol 2,000 Units Oral Daily   CloNIDine 0.1 mg Oral Q8H   doxycycline 100 mg Oral Q12H   ferrous sulfate 325 mg Oral Daily With Breakfast   fondaparinux 2.5 mg Subcutaneous Q24H   furosemide 60 mg Intravenous Once   gabapentin 300 mg Oral Nightly   hydrALAZINE 100 mg Oral Q8H   insulin aspart 0-14 Units Subcutaneous 4x Daily AC & at Bedtime   insulin aspart 10 Units Subcutaneous TID With Meals   insulin detemir 35 Units Subcutaneous Daily   insulin detemir 35 Units Subcutaneous Nightly   ipratropium 0.5 mg Nebulization 4x Daily - RT   isosorbide mononitrate 180 mg Oral Daily   lactobacillus acidophilus 1 capsule Oral Daily   levothyroxine 25 mcg Oral Daily   pantoprazole 40 mg Oral Daily   predniSONE 20 mg Oral Daily With Breakfast   sertraline 50 mg Oral Q PM   sodium chloride 10 mL  Intracatheter Q12H   vitamin B-12 2,000 mcg Oral Daily     Continuous infusions:       Assessment/Plan      Respiratory Failure: likely related to underlying lung disease, COPD and fluid volume overload. Continue Doxy per ID recommendations. Will give lasix 60 mg x1 today with albumin, will monitor renal function and BP closely. Incentive spirometer 10 times during the day. Continue high flow nasal cannula during day, wean to maintain Sp02 >92-94%. Continue Bipap at HS.     Hypertension:  cardene off since yesterday evening, clonidine .1mg PO was started yesterday, BP on rounds is 151/71. Continue continuous ECG and v/s monitoring, maintain MAP >65.        CKD stage III: Creatinine 1.41, 24 hour urine 1.1 liter, continue strict I&O, electrolytes WNL.     ID: WBC is 6.71, Neutrophils are 76.9, no fevers, continue to monitor lab trends and clinical changes, continue doxy per ID recommendations.          YANG Mathews  01/04/18  8:23 AM      Scribed for Dr. Gomez by YANG Cassidy.     I, Jeremi Gomez M.D. attest that the above note accurately reflects the work and decisions made  by me.  Patient was seen and evaluated by Dr. Gomez, including history of present illness, physical exam, assessment, and treatment plan.  The above note was reviewed and edited by Dr. Gomez.       Electronically signed by Jeremi Gomez MD at 1/4/2018  4:24 PM      YANG Mathews at 1/4/2018  8:23 AM  Version 1 of 2          LOS: 7 days     Chief Complaint:  Pulmonology is following for respiratory failure     Subjective     Interval History:     Mr. Anne is doing better this morning, no respiratory distress noted on exam, blood pressure has improved, was taken off cardene gtt yesterday.     History taken from: patient chart RN    Review of Systems:   Cardiovascular: Positive for leg swelling.    Objective     Vital Signs  Temp:  [97.7 °F (36.5 °C)-98.3 °F (36.8 °C)] 98.1 °F (36.7 °C)  Heart Rate:   [48-90] 53  Resp:  [16-26] 25  BP: (120-166)/() 156/63  Body mass index is 45.06 kg/(m^2).       Musculoskeletal: No significant deformity or joint abnormality. 3+ bilateral pitting edema.           Assessment/Plan      Respiratory Failure: likely related to underlying lung disease, COPD and fluid volume overload. Continue Doxy per ID recommendations. Will give lasix 60 mg x1 today with albumin, will monitor renal function and BP closely. Incentive spirometer 10 times during the day. Continue high flow nasal cannula during day, wean to maintain Sp02 >92-94%. Continue Bipap at HS.     Hypertension:  cardene off since yesterday evening, clonidine .1mg PO was started yesterday, BP on rounds is 151/71. Continue continuous ECG and v/s monitoring, maintain MAP >65.        CKD stage III: Creatinine 1.41, 24 hour urine 1.1 liter, continue strict I&O, electrolytes WNL.     ID: WBC is 6.71, Neutrophils are 76.9, no fevers, continue to monitor lab trends and clinical changes, continue doxy per ID recommendations.          YANG Mathews  18  8:23 AM      Scribed for Dr. Gomez by YANG Cassidy.        Electronically signed by YANG Mathews at 2018  8:29 AM      Azeem Meneses MD at 2018  9:34 AM  Version 1 of 1         Pt's filling defect could be a trabeculation on the dome or it could be a bladder lesion/tumor.  Will plan on seeing him as an out pt for cystoscopy.  The appointment has already been made will sign off.     Electronically signed by Azeem Meneses MD at 2018  9:35 AM      Teresa Sandhu DO at 2018 12:39 PM  Version 1 of 1         HOSPITALIST PROGRESS NOTE    Patient Identification:  Name:  Se Anne  Age:  67 y.o.  Sex:  male  :  1950  MRN:  [id number]  Visit Number:  [id number]  Primary Care Provider:  No Known Provider    Length of stay:  7     HPI: 68 yo male admitted with dyspnea    Subjective:  The patient is  seen this morning and is sitting in the bedside chair on [facility]. He states that he is feeling alittle better today. He denies chest pain. No abdominal pain or vomiting. He is using his bedside commode.    Present during exam: N/A    Current Hospital Meds:    amLODIPine 10 mg Oral Daily   aspirin 81 mg Oral Daily   atorvastatin 80 mg Oral Daily   budesonide-formoterol 2 puff Inhalation BID - RT   carvedilol 25 mg Oral BID With Meals   cetirizine 10 mg Oral Daily   cholecalciferol 2,000 Units Oral Daily   CloNIDine 0.1 mg Oral Q8H   doxycycline 100 mg Oral Q12H   ferrous sulfate 325 mg Oral Daily With Breakfast   fondaparinux 2.5 mg Subcutaneous Q24H   gabapentin 300 mg Oral Nightly   hydrALAZINE 100 mg Oral Q8H   insulin aspart 0-14 Units Subcutaneous 4x Daily AC & at Bedtime   insulin aspart 10 Units Subcutaneous TID With Meals   insulin detemir 35 Units Subcutaneous Daily   insulin detemir 35 Units Subcutaneous Nightly   ipratropium 0.5 mg Nebulization 4x Daily - RT   isosorbide mononitrate 180 mg Oral Daily   levothyroxine 25 mcg Oral Daily   pantoprazole 40 mg Oral Daily   [START ON 1/5/2018] predniSONE 10 mg Oral Daily With Breakfast   sertraline 50 mg Oral Q PM   sodium chloride 10 mL Intracatheter Q12H   vitamin B-12 2,000 mcg Oral Daily       Vital Signs  Temp:  [97.7 °F (36.5 °C)-98.3 °F (36.8 °C)] 98.1 °F (36.7 °C)  Heart Rate:  [48-64] 52  Resp:  [16-25] 25  BP: (120-166)/() 145/52      Physical exam:    2+ edema bilateral lower extremities   Pulmonary/Chest: Effort normal. No respiratory distress. He has decreased breath sounds. He has wheezes.        Telemetry: Sinus rhythm    Results Review:    Results from last 7 days  Lab Units 01/04/18  0042 01/03/18  0201 01/02/18  0044 01/01/18  0050 12/31/17  0117 12/30/17  0234 12/29/17  0427   WBC 10*3/mm3 6.71 6.64 5.76 6.19 8.63 8.29 9.38   HEMOGLOBIN g/dL 7.0* 7.8* 8.4* 8.8* 9.0* 8.0* 8.5*   HEMATOCRIT % 23.7* 25.4* 27.0* 28.7* 30.1* 26.3* 26.9*    PLATELETS 10*3/mm3 182 173 153 149 163 134 103*       Results from last 7 days  Lab Units 01/04/18  0042 01/03/18  0201 01/02/18  1702 01/02/18  0044 01/01/18  0050 12/31/17  0117 12/30/17  0234 12/29/17  1112 12/29/17  0427   SODIUM mmol/L 146 146  --  143 145 144 139  --  142   POTASSIUM mmol/L 4.2 4.0 4.4 3.6 3.8 4.0 3.6  --  3.8   CHLORIDE mmol/L 108 108  --  107 110 108 103  --  103   CO2 mmol/L 30.8 27.7  --  30.0 31.3 31.9 27.3  --  32.0*   BUN mg/dL 30* 26*  --  24* 22* 22* 20  --  21   CREATININE mg/dL 1.41* 1.42*  --  1.38* 1.32* 1.45* 1.46* 1.52* 1.40*   CALCIUM mg/dL 8.6 8.8  --  8.7 8.4 8.2 7.5*  --  7.6*   GLUCOSE mg/dL 102 145*  --  116* 139* 179* 281*  --  276*       Results from last 7 days  Lab Units 01/01/18  0050 12/31/17  0117 12/30/17  0234 12/29/17  0427   BILIRUBIN mg/dL 1.1 1.0 0.6 0.6   ALK PHOS U/L 42 47 41 41   AST (SGOT) U/L 25 27 17 16   ALT (SGPT) U/L 52* 51* 25 25       Results from last 7 days  Lab Units 01/01/18  0050 12/31/17  0117 12/30/17  0234 12/29/17  0427   MAGNESIUM mg/dL 2.7* 2.7* 2.6 1.1*           Results from last 7 days  Lab Units 12/29/17  0427 12/28/17  2155 12/28/17  1640   CK TOTAL U/L 45 39  39 40   TROPONIN I ng/mL 0.041* 0.038 0.035   CK MB INDEX % 2.4  --  1.3       C-RP: <0.50    CXR, 1/3/18:  FINDINGS:      There is bibasilar consolidation.  Appearance compatible with congestive heart failure.  There is no evidence of an acute osseous abnormality.   There are no suspicious-appearing parenchymal soft tissue nodules.      IMPRESSION:  Bibasilar consolidation and CHF.         Assessment/Plan      -Acute on chronic hypoxic and hypercapnic respiratory failure secondary to an acute COPD exacerbation with a bibasilar healthcare associated pneumonia and acute volume overload  -Septic shock that has resolved  -Generalized weakness  -Uncontrolled essential hypertension that has improved  -Acute hypomagnesemia, resolved  -Acute hypophosphatemia that has  resolved  -Possible5 mm bladder polyp of unclear significance - outpatient urology follow up  -Diabetes mellitus type 2 with improving control  -Stage III chronic kidney disease  -Thrombocytopenia that has resolved  -Chronic microcytic anemia with iron deficiency; hemoglobin 7 today, likely due to acute illness and blood draws  -Obesity    Patient's antibiotic regimen has be de-escalated to oral doxycycline by ID through 1/5/18. Continue scheduled inhalants.  IV solumedrol has been changed to PO prednisone by Pulmonary; continue to wean as tolerated. Continue inhalents. Continue IV Lasix as tolerated; patient to receive an additional dose today. Continue physical therapy as tolerated/as available. Will consult OT as well as patient may be interested in SNF for PT after discharge.    Cardene drip has been discontinued. Goal SBP would be 140-150 mmHg given his history of Stage III CKD to ensure renal perfusion. Continue clonidine and his other anti-hypertensive medications and titrate based on BP.    Continue to supplement his electrolytes as needed.  Continue with his current insulin regimen as his blood glucose levels have improved.    Repeat his CBC and BMP in the morning. C-RP has normalized.    The patient is high risk due to the following diagnoses/reasons:  Acute on chronic respiratory failure, ross, ckd, uncontrolled htn    I discussed the patients findings and my recommendations with: patient and nursing staff.    Disposition  Home when medically stable; may benefit from SNF v swing v inpatient rehab. Will consult OT today and discuss with NUBIA Sandhu DO  01/04/18  12:40 PM         Electronically signed by Teresa Sandhu DO at 1/4/2018 12:53 PM      Jeremi Gomez MD at 1/5/2018  8:23 AM  Version 2 of 2          LOS: 8 days     Chief Complaint:  Pulmonology is following for respiratory failure     Subjective     Interval History:     Mr. Anne is doing much better this morning, no  distress noted on exam.     History taken from: patient, chart, RN.    Review of Systems:      Respiratory: Positive for shortness of breath (improving). Negative for cough and wheezing.      Objective     Vital Signs  Temp:  [97.6 °F (36.4 °C)-98.5 °F (36.9 °C)] 97.6 °F (36.4 °C)  Heart Rate:  [50-74] 51  Resp:  [16-27] 18  BP: (133-180)/(51-81) 161/59  Body mass index is 44.89 kg/(m^2).            Assessment/Plan      Respiratory Failure: likely related to underlying lung disease, COPD and fluid volume overload. Continue Doxy per ID recommendations. Will repeat lasix 60 mg x1 today with albumin, will monitor renal function and BP closely, will give potassium 40meq PO x1.  Incentive spirometer 10 times during the day. Continue high flow nasal cannula during day, wean to maintain Sp02 >92-94%. Continue Bipap at HS.      Hypertension:  /60 today, continue current PO BP meds.  Continue continuous ECG and v/s monitoring, maintain MAP >65.     Bradycardia: HR 50's, will decrease dose of coreg and monitor closely.      Hypoglycemia: level is 73 this morning, will decrease levemir and novolog, continue accuchecks q4hr and prn. Monitor response.       CKD stage III: Creatinine 1.34, 24 hour urine 2.9 liters, continue strict I&O, electrolytes WNL.     Low H/H: level is 6.6/21.9 this AM, BP is stable, receiving blood, primary is managing.      ID: WBC is 7.06, Neutrophils are 75.9, no fevers, continue to monitor lab trends and clinical changes, continue doxy per ID recommendations.       YANG Mathews  01/05/18  8:23 AM      Scribed for Dr. Gomez by YANG Cassidy.     IJeremi M.D. attest that the above note accurately reflects the work and decisions made  by me.  Patient was seen and evaluated by Dr. Gomez, including history of present illness, physical exam, assessment, and treatment plan.  The above note was reviewed and edited by Dr. Gomez.  Critical Care time spent in direct  patient care: 33 minutes (excluding procedure time, if applicable) including high complexity decision making to assess, manipulate, and support vital organ system failure in this individual who has impairment of one or more vital organ systems such that there is a high probability of imminent or life threatening deterioration in the patient’s condition.     Electronically signed by Jeremi Gomez MD at 1/5/2018  2:23 PM      YANG Mathews at 1/5/2018  8:23 AM  Version 1 of 2          LOS: 8 days     Chief Complaint:  Pulmonology is following for respiratory failure     Subjective     Interval History:     Mr. Anne is doing much better this morning, no distress noted on exam.     History taken from: patient, chart, RN.    Review of Systems:      Respiratory: Positive for shortness of breath (improving). Negative for cough and wheezing.                        Objective     Vital Signs  Temp:  [97.6 °F (36.4 °C)-98.5 °F (36.9 °C)] 97.6 °F (36.4 °C)  Heart Rate:  [50-74] 51  Resp:  [16-27] 18  BP: (133-180)/(51-81) 161/59  Body mass index is 44.89 kg/(m^2).         Results from last 7 days  Lab Units 01/05/18  0106 01/04/18  0042 01/03/18  0201   WBC 10*3/mm3 7.06 6.71 6.64   HEMOGLOBIN g/dL 6.6* 7.0* 7.8*   PLATELETS 10*3/mm3 172 182 173       Results from last 7 days  Lab Units 01/05/18  0106 01/04/18  0042 01/03/18  0201  01/01/18  0050     SODIUM mmol/L 142 146 146  < > 145     POTASSIUM mmol/L 3.8 4.2 4.0  < > 3.8     CHLORIDE mmol/L 105 108 108  < > 110     CO2 mmol/L 34.1* 30.8 27.7  < > 31.3     BUN mg/dL 30* 30* 26*  < > 22*     CREATININE mg/dL 1.34* 1.41* 1.42*  < > 1.32*     CALCIUM mg/dL 9.0 8.6 8.8  < > 8.4     GLUCOSE mg/dL 182* 102 145*  < > 139*     MAGNESIUM mg/dL  --   --   --   --  2.7*     < > = values in this interval not displayed.    Results from last 7 days  Lab Units 01/01/18  0050     ALK PHOS U/L 42     BILIRUBIN mg/dL 1.1     ALT (SGPT) U/L 52*     AST (SGOT) U/L 25          Results from last 7 days  Lab Units 01/03/18  0815   PH, ARTERIAL pH units 7.378   PO2 ART mm Hg 65.0*   PCO2, ARTERIAL mm Hg 56.0*   HCO3 ART mmol/L 32.2*           Medication Review:   Scheduled Medications:    albumin human 25 g Intravenous Once   amLODIPine 10 mg Oral Daily   aspirin 81 mg Oral Daily   atorvastatin 80 mg Oral Daily   budesonide-formoterol 2 puff Inhalation BID - RT   carvedilol 12.5 mg Oral BID With Meals   cetirizine 10 mg Oral Daily   cholecalciferol 2,000 Units Oral Daily   CloNIDine 0.1 mg Oral Q8H   doxycycline 100 mg Oral Q12H   ferrous sulfate 325 mg Oral Daily With Breakfast   fondaparinux 2.5 mg Subcutaneous Q24H   furosemide 60 mg Intravenous Once   gabapentin 300 mg Oral Nightly   hydrALAZINE 100 mg Oral Q8H   insulin aspart 0-14 Units Subcutaneous 4x Daily AC & at Bedtime   insulin aspart 8 Units Subcutaneous TID With Meals   insulin detemir 30 Units Subcutaneous Daily   insulin detemir 35 Units Subcutaneous Nightly   ipratropium 0.5 mg Nebulization 4x Daily - RT   isosorbide mononitrate 180 mg Oral Daily   levothyroxine 25 mcg Oral Daily   pantoprazole 40 mg Oral Daily   predniSONE 10 mg Oral Daily With Breakfast   sertraline 50 mg Oral Q PM   sodium chloride 10 mL Intracatheter Q12H   sodium chloride      vitamin B-12 2,000 mcg Oral Daily     Continuous infusions:       Assessment/Plan      Respiratory Failure: likely related to underlying lung disease, COPD and fluid volume overload. Continue Doxy per ID recommendations. Will repeat lasix 60 mg x1 today with albumin, will monitor renal function and BP closely, will give potassium 40meq PO x1.  Incentive spirometer 10 times during the day. Continue high flow nasal cannula during day, wean to maintain Sp02 >92-94%. Continue Bipap at HS.      Hypertension:  /60 today, continue current PO BP meds.  Continue continuous ECG and v/s monitoring, maintain MAP >65.     Bradycardia: HR 50's, will decrease dose of coreg and  monitor closely.      Hypoglycemia: level is 73 this morning, will decrease levemir and novolog, continue accuchecks q4hr and prn. Monitor response.       CKD stage III: Creatinine 1.34, 24 hour urine 2.9 liters, continue strict I&O, electrolytes WNL.     Low H/H: level is 6.6/21.9 this AM, BP is stable, receiving blood, primary is managing.      ID: WBC is 7.06, Neutrophils are 75.9, no fevers, continue to monitor lab trends and clinical changes, continue doxy per ID recommendations.       YANG Mathews  18  8:23 AM      Scribed for Dr. Gomez by YANG Cassidy.        Electronically signed by YANG Mathews at 2018  8:29 AM      Teresa Sandhu DO at 2018 11:03 AM  Version 2 of 2         HOSPITALIST PROGRESS NOTE    Patient Identification:  Name:  Se Anne  Age:  67 y.o.  Sex:  male  :  1950  MRN:  [id number]  Visit Number:  [id number]  Primary Care Provider:  No Known Provider    Length of stay:  8     HPI: 68 yo male admitted with dyspnea    Subjective:  The patient is seen this morning and is sitting in the bedside chair on [facility]. Continues to report that his dyspnea is slowly improving. He reports productive cough. He denies any new symptoms - no urinary symptoms, abdominal pain and/or diarrhea.    Present during exam: REGGIE Bolaños    Current Hospital Meds:    albumin human 25 g Intravenous Once   amLODIPine 10 mg Oral Daily   aspirin 81 mg Oral Daily   atorvastatin 80 mg Oral Daily   budesonide-formoterol 2 puff Inhalation BID - RT   carvedilol 12.5 mg Oral BID With Meals   cetirizine 10 mg Oral Daily   cholecalciferol 2,000 Units Oral Daily   CloNIDine 0.1 mg Oral Q8H   doxycycline 100 mg Oral Q12H   ferrous sulfate 325 mg Oral Daily With Breakfast   fondaparinux 2.5 mg Subcutaneous Q24H   gabapentin 300 mg Oral Nightly   hydrALAZINE 100 mg Oral Q8H   insulin aspart 0-14 Units Subcutaneous 4x Daily AC & at Bedtime   insulin aspart 8 Units  Subcutaneous TID With Meals   insulin detemir 30 Units Subcutaneous Daily   insulin detemir 32 Units Subcutaneous Nightly   ipratropium 0.5 mg Nebulization 4x Daily - RT   isosorbide mononitrate 180 mg Oral Daily   levothyroxine 25 mcg Oral Daily   pantoprazole 40 mg Oral Daily   predniSONE 10 mg Oral Daily With Breakfast   sertraline 50 mg Oral Q PM   sodium chloride 10 mL Intracatheter Q12H   sodium chloride      vitamin B-12 2,000 mcg Oral Daily       Vital Signs  Temp:  [97.6 °F (36.4 °C)-98.5 °F (36.9 °C)] 97.6 °F (36.4 °C)  Heart Rate:  [49-74] 55  Resp:  [10-27] 10  BP: (127-180)/(51-81) 155/61      Physical exam:  2+ edema bilateral lower extremities   Pulmonary/Chest: Effort normal. No respiratory distress. He has decreased breath sounds. He has wheezes. He has rhonchi.        Telemetry: Sinus rhythm    Results Review:    Results from last 7 days  Lab Units 01/05/18  1002 01/05/18  0106 01/04/18  0042 01/03/18  0201 01/02/18  0044 01/01/18  0050     WBC 10*3/mm3  --  7.06 6.71 6.64 5.76 6.19     HEMOGLOBIN g/dL 8.3* 6.6* 7.0* 7.8* 8.4* 8.8*     HEMATOCRIT % 26.3* 21.9* 23.7* 25.4* 27.0* 28.7*     PLATELETS 10*3/mm3  --  172 182 173 153 149         Results from last 7 days  Lab Units 01/05/18  0106 01/04/18  0042 01/03/18  0201 01/02/18  1702 01/02/18  0044 01/01/18  0050     SODIUM mmol/L 142 146 146  --  143 145     POTASSIUM mmol/L 3.8 4.2 4.0 4.4 3.6 3.8     CHLORIDE mmol/L 105 108 108  --  107 110     CO2 mmol/L 34.1* 30.8 27.7  --  30.0 31.3     BUN mg/dL 30* 30* 26*  --  24* 22*     CREATININE mg/dL 1.34* 1.41* 1.42*  --  1.38* 1.32*     CALCIUM mg/dL 9.0 8.6 8.8  --  8.7 8.4     GLUCOSE mg/dL 182* 102 145*  --  116* 139*         Results from last 7 days  Lab Units 01/01/18 0050 12/31/17 0117 12/30/17 0234   BILIRUBIN mg/dL 1.1 1.0 0.6   ALK PHOS U/L 42 47 41   AST (SGOT) U/L 25 27 17   ALT (SGPT) U/L 52* 51* 25       Results from last 7 days  Lab Units 01/01/18 0050 12/31/17 0117 12/30/17  0234    MAGNESIUM mg/dL 2.7* 2.7* 2.6       CXR, 1/3/18:  FINDINGS:      There is bibasilar consolidation.  Appearance compatible with congestive heart failure.  There is no evidence of an acute osseous abnormality.   There are no suspicious-appearing parenchymal soft tissue nodules.      IMPRESSION:  Bibasilar consolidation and CHF.         Assessment/Plan      -Acute on chronic hypoxic and hypercapnic respiratory failure secondary to an acute COPD exacerbation with a bibasilar healthcare associated pneumonia and acute volume overload/acute diastolic heart failure exacerbation  -Septic shock that has resolved  -Generalized weakness  -Insulin dependent diabetes mellitus type 2 with intermittent hypoglycemia  -Acute on chronic microcytic anemia with iron deficiency; hemoglobin 7 today, likely due to acute illness and blood draws  -Uncontrolled essential hypertension  -Acute hypomagnesemia, resolved  -Acute hypophosphatemia that has resolved  -Possible5 mm bladder polyp of unclear significance - outpatient urology follow up  -Stage III chronic kidney disease  -Thrombocytopenia that has resolved  -Obesity    The patient received 1 unit of PRBC today with a good response in his hgb/hct. Will repeat his CBC tomorrow. No signs of active bleeding.     Patient's antibiotic regimen has be de-escalated to oral doxycycline by ID through 1/5/18. Continue scheduled inhalants.  IV solumedrol has been changed to PO prednisone by Pulmonary; continue to wean as tolerated. Continue inhalents. Continue IV Lasix as tolerated; patient to a dose today. Add Mucinex and update chest xray tomorrow morning.    Continue physical therapy as tolerated/as available. Will consult OT as well as patient may be interested in SNF for PT after discharge.  assisting with possible discharge to SNF.     Coreg dose has been decreased due to bradycardia. Patient remains with some intermittent hypertension. He has been started on Clonidine; will  continue for now. Goal SBP would be 140-150 mmHg given his history of Stage III CKD to ensure renal perfusion.     Decrease evening dose of Levemir and follow his blood glucose levels closely. Continue to supplement his electrolytes as needed.      Repeat his CBC and BMP in the morning. C-RP has normalized.    The patient is high risk due to the following diagnoses/reasons:  Acute on chronic respiratory failure, ross, ckd, uncontrolled htn    I discussed the patients findings and my recommendations with: patient and nursing staff.    Disposition  Home when medically stable; may benefit from SNF v swing v inpatient rehab;  assisting    Teresa Sandhu DO  18  11:03 AM         Electronically signed by Teresa Sandhu DO at 2018 11:13 AM      Teresa Sandhu DO at 2018 11:03 AM  Version 1 of 2         HOSPITALIST PROGRESS NOTE    Patient Identification:  Name:  Se Anne  Age:  67 y.o.  Sex:  male  :  1950  MRN:  [id number]  Visit Number:  [id number]  Primary Care Provider:  No Known Provider    Length of stay:  8     HPI: 68 yo male admitted with dyspnea    Subjective:  The patient is seen this morning and is sitting in the bedside chair on HFNC. Continues to report that his dyspnea is slowly improving. He reports productive cough. He denies any new symptoms - no urinary symptoms, abdominal pain and/or diarrhea.    Present during exam: REGGIE Bolaños    Current Hospital Meds:    albumin human 25 g Intravenous Once   amLODIPine 10 mg Oral Daily   aspirin 81 mg Oral Daily   atorvastatin 80 mg Oral Daily   budesonide-formoterol 2 puff Inhalation BID - RT   carvedilol 12.5 mg Oral BID With Meals   cetirizine 10 mg Oral Daily   cholecalciferol 2,000 Units Oral Daily   CloNIDine 0.1 mg Oral Q8H   doxycycline 100 mg Oral Q12H   ferrous sulfate 325 mg Oral Daily With Breakfast   fondaparinux 2.5 mg Subcutaneous Q24H   gabapentin 300 mg Oral Nightly   hydrALAZINE 100 mg Oral  Q8H   insulin aspart 0-14 Units Subcutaneous 4x Daily AC & at Bedtime   insulin aspart 8 Units Subcutaneous TID With Meals   insulin detemir 30 Units Subcutaneous Daily   insulin detemir 32 Units Subcutaneous Nightly   ipratropium 0.5 mg Nebulization 4x Daily - RT   isosorbide mononitrate 180 mg Oral Daily   levothyroxine 25 mcg Oral Daily   pantoprazole 40 mg Oral Daily   predniSONE 10 mg Oral Daily With Breakfast   sertraline 50 mg Oral Q PM   sodium chloride 10 mL Intracatheter Q12H   sodium chloride      vitamin B-12 2,000 mcg Oral Daily       Vital Signs  Temp:  [97.6 °F (36.4 °C)-98.5 °F (36.9 °C)] 97.6 °F (36.4 °C)  Heart Rate:  [49-74] 55  Resp:  [10-27] 10  BP: (127-180)/(51-81) 155/61      Physical exam:  2+ edema bilateral lower extremities   Pulmonary/Chest: Effort normal. No respiratory distress. He has decreased breath sounds. He has wheezes. He has rhonchi.     Telemetry: Sinus rhythm    Results Review:    Results from last 7 days  Lab Units 01/05/18  1002 01/05/18  0106 01/04/18  0042 01/03/18  0201 01/02/18  0044 01/01/18  0050 12/31/17  0117 12/30/17  0234   WBC 10*3/mm3  --  7.06 6.71 6.64 5.76 6.19 8.63 8.29   HEMOGLOBIN g/dL 8.3* 6.6* 7.0* 7.8* 8.4* 8.8* 9.0* 8.0*   HEMATOCRIT % 26.3* 21.9* 23.7* 25.4* 27.0* 28.7* 30.1* 26.3*   PLATELETS 10*3/mm3  --  172 182 173 153 149 163 134       Results from last 7 days  Lab Units 01/05/18  0106 01/04/18  0042 01/03/18  0201 01/02/18  1702 01/02/18  0044 01/01/18  0050 12/31/17  0117 12/30/17  0234   SODIUM mmol/L 142 146 146  --  143 145 144 139   POTASSIUM mmol/L 3.8 4.2 4.0 4.4 3.6 3.8 4.0 3.6   CHLORIDE mmol/L 105 108 108  --  107 110 108 103   CO2 mmol/L 34.1* 30.8 27.7  --  30.0 31.3 31.9 27.3   BUN mg/dL 30* 30* 26*  --  24* 22* 22* 20   CREATININE mg/dL 1.34* 1.41* 1.42*  --  1.38* 1.32* 1.45* 1.46*   CALCIUM mg/dL 9.0 8.6 8.8  --  8.7 8.4 8.2 7.5*   GLUCOSE mg/dL 182* 102 145*  --  116* 139* 179* 281*       Results from last 7 days  Lab Units  01/01/18  0050 12/31/17  0117 12/30/17  0234   BILIRUBIN mg/dL 1.1 1.0 0.6   ALK PHOS U/L 42 47 41   AST (SGOT) U/L 25 27 17   ALT (SGPT) U/L 52* 51* 25       Results from last 7 days  Lab Units 01/01/18  0050 12/31/17  0117 12/30/17  0234   MAGNESIUM mg/dL 2.7* 2.7* 2.6       CXR, 1/3/18:  FINDINGS:      There is bibasilar consolidation.  Appearance compatible with congestive heart failure.  There is no evidence of an acute osseous abnormality.   There are no suspicious-appearing parenchymal soft tissue nodules.      IMPRESSION:  Bibasilar consolidation and CHF.         Assessment/Plan      -Acute on chronic hypoxic and hypercapnic respiratory failure secondary to an acute COPD exacerbation with a bibasilar healthcare associated pneumonia and acute volume overload/acute diastolic heart failure exacerbation  -Septic shock that has resolved  -Generalized weakness  -Insulin dependent diabetes mellitus type 2 with intermittent hypoglycemia  -Acute on chronic microcytic anemia with iron deficiency; hemoglobin 7 today, likely due to acute illness and blood draws  -Uncontrolled essential hypertension  -Acute hypomagnesemia, resolved  -Acute hypophosphatemia that has resolved  -Possible5 mm bladder polyp of unclear significance - outpatient urology follow up  -Stage III chronic kidney disease  -Thrombocytopenia that has resolved  -Obesity    The patient received 1 unit of PRBC today with a good response in his hgb/hct. Will repeat his CBC tomorrow. No signs of active bleeding.     Patient's antibiotic regimen has be de-escalated to oral doxycycline by ID through 1/5/18. Continue scheduled inhalants.  IV solumedrol has been changed to PO prednisone by Pulmonary; continue to wean as tolerated. Continue inhalents. Continue IV Lasix as tolerated; patient to a dose today.     Continue physical therapy as tolerated/as available. Will consult OT as well as patient may be interested in SNF for PT after discharge.   assisting with possible discharge to SNF.     Coreg dose has been decreased due to bradycardia. Patient remains with some intermittent hypertension. He has been started on Clonidine; will continue for now. Goal SBP would be 140-150 mmHg given his history of Stage III CKD to ensure renal perfusion.     Decrease evening dose of Levemir and follow his blood glucose levels closely. Continue to supplement his electrolytes as needed.      Repeat his CBC and BMP in the morning. C-RP has normalized.    The patient is high risk due to the following diagnoses/reasons:  Acute on chronic respiratory failure, ross, ckd, uncontrolled htn    I discussed the patients findings and my recommendations with: patient and nursing staff.    Disposition  Home when medically stable; may benefit from SNF v swing v inpatient rehab;  assisting    Teresa Sandhu DO  18  11:03 AM         Electronically signed by Teresa Sandhu DO at 2018 11:11 AM      Teresa Sandhu DO at 2018 12:49 PM  Version 1 of 1         HOSPITALIST PROGRESS NOTE    Patient Identification:  Name:  Se Anne  Age:  67 y.o.  Sex:  male  :  1950  MRN:  [id number]  Visit Number:  [id number]  Primary Care Provider:  No Known Provider    Length of stay:  9     HPI: 68 yo male admitted with dyspnea    Subjective:  The patient was seen this morning and is lying in bed with BiPAP on.  The patient states he feels worse today and complains of extreme shortness of breath.  Other history is difficult to obtain at this time as he is on BiPAP.  No acute events overnight reported to me per nursing staff.    Present during exam: N/A    Current Hospital Meds:    amLODIPine 10 mg Oral Daily   aspirin 81 mg Oral Daily   atorvastatin 80 mg Oral Daily   budesonide-formoterol 2 puff Inhalation BID - RT   carvedilol 12.5 mg Oral BID With Meals   cetirizine 10 mg Oral Daily   cholecalciferol 2,000 Units Oral Daily   CloNIDine 0.2 mg Oral  Q8H   ferrous sulfate 325 mg Oral Daily With Breakfast   fondaparinux 2.5 mg Subcutaneous Q24H   furosemide 60 mg Intravenous Once   gabapentin 300 mg Oral Nightly   guaiFENesin 600 mg Oral Q12H   hydrALAZINE 100 mg Oral Q8H   insulin aspart 0-14 Units Subcutaneous 4x Daily AC & at Bedtime   insulin aspart 8 Units Subcutaneous TID With Meals   insulin detemir 30 Units Subcutaneous Daily   insulin detemir 32 Units Subcutaneous Nightly   ipratropium 0.5 mg Nebulization 4x Daily - RT   isosorbide mononitrate 180 mg Oral Daily   levothyroxine 25 mcg Oral Daily   pantoprazole 40 mg Oral Daily   predniSONE 10 mg Oral Daily With Breakfast   sertraline 50 mg Oral Q PM   sodium chloride 10 mL Intracatheter Q12H   sodium chloride      vitamin B-12 2,000 mcg Oral Daily       Vital Signs  Temp:  [94.6 °F (34.8 °C)-98.8 °F (37.1 °C)] 95.6 °F (35.3 °C)  Heart Rate:  [] 75  Resp:  [17-24] 22  BP: (139-184)/(61-94) 171/94        Physical exam:    1-2+ edema bilateral lower extremities   Pulmonary/Chest: Effort normal. No respiratory distress. He has decreased breath sounds. He has no wheezes. He has rhonchi in the left upper field and the left lower field.     Telemetry: Sinus rhythm    Results Review:    Results from last 7 days  Lab Units 01/06/18  0039 01/05/18  1002 01/05/18  0106 01/04/18  0042 01/03/18  0201 01/02/18  0044 01/01/18  0050 12/31/17  0117   WBC 10*3/mm3 9.07  --  7.06 6.71 6.64 5.76 6.19 8.63   HEMOGLOBIN g/dL 7.3* 8.3* 6.6* 7.0* 7.8* 8.4* 8.8* 9.0*   HEMATOCRIT % 24.3* 26.3* 21.9* 23.7* 25.4* 27.0* 28.7* 30.1*   PLATELETS 10*3/mm3 203  --  172 182 173 153 149 163       Results from last 7 days  Lab Units 01/06/18  0039 01/05/18  0106 01/04/18  0042 01/03/18  0201 01/02/18  1702 01/02/18  0044 01/01/18  0050 12/31/17  0117   SODIUM mmol/L 145 142 146 146  --  143 145 144   POTASSIUM mmol/L 3.6 3.8 4.2 4.0 4.4 3.6 3.8 4.0   CHLORIDE mmol/L 106 105 108 108  --  107 110 108   CO2 mmol/L 33.7* 34.1* 30.8 27.7   --  30.0 31.3 31.9   BUN mg/dL 24* 30* 30* 26*  --  24* 22* 22*   CREATININE mg/dL 1.28 1.34* 1.41* 1.42*  --  1.38* 1.32* 1.45*   CALCIUM mg/dL 9.2 9.0 8.6 8.8  --  8.7 8.4 8.2   GLUCOSE mg/dL 95 182* 102 145*  --  116* 139* 179*       Results from last 7 days  Lab Units 01/06/18  0039 01/01/18  0050    BILIRUBIN mg/dL 1.1 1.1    ALK PHOS U/L 41 42    AST (SGOT) U/L 17 25    ALT (SGPT) U/L 32 52*        Results from last 7 days  Lab Units 01/01/18  0050    MAGNESIUM mg/dL 2.7*        CXR, 1/6/18:  FINDINGS:      Bibasilar consolidation  The cardiac silhouette is normal. The pulmonary vasculature is  unremarkable.  There is no evidence of an acute osseous abnormality.   There are no suspicious-appearing parenchymal soft tissue nodules.      IMPRESSION:  Bibasilar consolidation          Assessment/Plan      -Acute on chronic hypoxic and hypercapnic respiratory failure secondary to an acute COPD exacerbation with a bibasilar healthcare associated pneumonia and acute volume overload/acute diastolic heart failure exacerbation  -Uncontrolled essential hypertension  -Septic shock that has resolved  -Generalized weakness  -Insulin dependent diabetes mellitus type 2 with intermittent hypoglycemia  -Acute on chronic microcytic anemia with iron deficiency; hemoglobin 7 today, likely due to acute illness and blood draws however may be contributing to his weakness and shortness of breath  -Acute hypomagnesemia, resolved  -Acute hypophosphatemia that has resolved  -Possible5 mm bladder polyp of unclear significance - outpatient urology follow up  -Stage III chronic kidney disease  -Thrombocytopenia that has resolved  -Obesity    The patient will receive a one-time dose of IV Lasix today and will order a stat ABG. Today's CXR overall stable.     Repeat chest x-ray reveals bibasilar consolidation. The patient has completed a course of antibiotics during his hospital stay.  Continue scheduled inhalants.  Continue to taper his oral  steroids as tolerated.      Continue physical therapy as tolerated/as available.  assisting with placement at time of discharge.    Coreg dose has been decreased due to bradycardia. Patient remains with some intermittent hypertension. He has been started on Clonidine; will increase the dose at this time. Goal SBP would be 140-150 mmHg given his history of Stage III CKD to ensure renal perfusion.     Will again cut back on the dose of his Levemir. Continue to supplement his electrolytes as needed.      Repeat his CBC and BMP in the morning.    The patient is high risk due to the following diagnoses/reasons:  Acute on chronic respiratory failure, ross, ckd, uncontrolled htn    I discussed the patients findings and my recommendations with: patient and nursing staff.    Disposition  Home when medically stable; may benefit from SNF v swing v inpatient rehab;  assisting    Teresa Sandhu DO  18  12:49 PM         Electronically signed by Teresa Sandhu DO at 2018  1:00 PM      Teresa Sandhu DO at 2018 11:19 AM  Version 1 of 1         HOSPITALIST PROGRESS NOTE    Patient Identification:  Name:  Se Anne  Age:  67 y.o.  Sex:  male  :  1950  MRN:  [id number]  Visit Number:  [id number]  Primary Care Provider:  No Known Provider    Length of stay:  10     HPI: 66 yo male admitted with dyspnea    Subjective:  The patient was seen this morning and is lying in bed with BiPAP on.  The patient's FiO2 had to be increased today as he reports worsening shortness of breath and states that he just cannot get a good breath.  The patient states that his cough is becoming more productive but he denies any worsening.  The patient denies any chest pain.  He has no abdominal pain    Present during exam: REGGIE Bolaños    Vital Signs  Temp:  [97.5 °F (36.4 °C)-99.1 °F (37.3 °C)] 98.1 °F (36.7 °C)  Heart Rate:  [54-75] 61  Resp:  [22-27] 24  BP: (141-230)/()  167/66      Intake/Output Summary (Last 24 hours) at 01/07/18 1119  Last data filed at 01/07/18 1000   Gross per 24 hour   Intake                0 ml   Output             5250 ml   Net            -5250 ml       Physical exam:  Physical Exam       1-2+ edema bilateral lower extremities   Pulmonary/Chest: Effort normal. No respiratory distress. He has decreased breath sounds. He has no wheezes. He has rhonchi in the left upper field.   Poor air movement bilaterally        Telemetry: Sinus rhythm            Assessment/Plan      -Acute on chronic hypoxic and hypercapnic respiratory failure secondary to an acute COPD exacerbation with a bibasilar healthcare associated pneumonia and acute volume overload/acute diastolic heart failure exacerbation  -Uncontrolled essential hypertension  -Septic shock that has resolved  -Negative fluid balance, approximately 5L  -Generalized weakness  -Insulin dependent diabetes mellitus type 2 with intermittent hypoglycemia  -Acute on chronic microcytic anemia with iron deficiency; hemoglobin 7 today, likely due to acute illness and blood draws however may be contributing to his weakness and shortness of breath  -Acute hypomagnesemia, resolved  -Acute hypophosphatemia that has resolved  -Possible5 mm bladder polyp of unclear significance - outpatient urology follow up  -Stage III chronic kidney disease  -Thrombocytopenia that has resolved  -Obesity    BiPAP adjustments have been made.  The patient has received another dose of IV Lasix.  I will escalate his steroid therapy and his scheduled inhalants and we will add albuterol to Atrovent and increase the frequency.  A repeat chest x-ray and ABG have been ordered and in the meantime the patient will be admitted to the critical care unit with low threshold for intubation.  The patient was intubated approximately one month ago.  Hold off on escalating his antibiotic therapy at this time as he has no fever no leukocytosis and just completed a  course of antibiotics on January 5.    Continue physical therapy as tolerated/as available.  assisting with placement at time of discharge.    Coreg dose has been decreased due to bradycardia. Patient remains with some intermittent hypertension. He has been started on Clonidine; continue with increased dose. Goal SBP would be 140-150 mmHg given his history of Stage III CKD to ensure renal perfusion.     Continue with current insulin regimen for now; blood glucose levels improved. Continue to supplement his electrolytes as needed.      Repeat his CBC, CRP and BMP in the morning.    The patient is high risk due to the following diagnoses/reasons:  Acute on chronic respiratory failure, ross, ckd, uncontrolled htn    I discussed the patients findings and my recommendations with: patient and nursing staff.    Disposition  Home when medically stable; may benefit from SNF v swing v inpatient rehab;  assisting. Plan to transfer to CCU today due to worsening respiratory status.    Teresa Sandhu DO  01/07/18  11:19 AM         Electronically signed by Teresa Sandhu DO at 1/7/2018 11:35 AM      Progress Notes signed by Keron Navarrete MD at 1/7/2018  5:25 PM   Version 1 of 1         [facility] Physician - Brief Progress Note  PERMANENT  01/07/2018 17:22    [facility]  [facility] - [facility] - [place], [place] ([facility])    WARD COPELAND.    Date of Service 01/07/2018 17:22    HPI/Events of Note AICU Provider Assessment Note      Patient admitted with worsening shortness of breath, hypercapnic respiratory failure acute on chronic has known COPD on home oxygen and nocturnal BiPAP.  Was admitted several weeks ago with respiratory failure requiring intubation.  Is currently on BiPAP   seems to be comfortable when I was talking to him no use of accessory muscles in breathing  status post cardiac catheter KG with normal sinus rhythm with QTC prolongation, 462 ms which is abnormal for a  male.  Ventricle systolic pressure 200 with an LVEDP of 24 on the pigtail ejection fraction 60%, left dominant otherwise nonobstructive   coronary artery disease.  Echo last month: Ejection fraction 6065%, aortic sclerosis mild MR no definite pulmonary hypertension right TR . Chest x-ray shows congestive heart failure.  Cardiomegaly no focal infiltrates this appears worse from January 3    Assessment and Plan:      Patient admitted with worsening shortness of breath, hypercapnic respiratory failure acute on chronic has known COPD on home oxygen and nocturnal BiPAP.  Was admitted several weeks ago with respiratory failure requiring intubation.  Is currently on BiPAP   seems to be comfortable when I was talking to him no use of accessory muscles in breathing  status post cardiac catheter KG with normal sinus rhythm with QTC prolongation, 462 ms which is abnormal for a male.  Ventricle systolic pressure 200 with an LVEDP of 24 on the pigtail ejection fraction 60%, left dominant otherwise nonobstructive   coronary artery disease.  Echo last month: Ejection fraction 6065%, aortic sclerosis mild MR no definite pulmonary hypertension right TR . Chest x-ray shows congestive heart failure.  Cardiomegaly no focal infiltrates this appears worse from January 3      __X___   Video Assessment performed  __X___   Most recent labs reviewed  ___X__   Vital Signs reviewed  ___X__   Best Practices addressed:                 VTE prophylaxis: Y                 SUP (when indicated):                 Glycemic control:                      Please notify bedside physician when present or [facility] if glc > 180 X 2                 Sepsis guidelines:                 Lung protective strategy:    __X__     Spoke with bedside RN  _____     Orders written      Contact [facility] for any needs if bedside physician is not present.      Interventions Major-Hypercarbia - evaluation and management, Respiratory failure - evaluation and  management, Other: COPD /HFpEF        Electronically Signed by: Keron Sultana) on 2018 5:25 PM     Electronically signed by Keron Sultana MD at 2018  5:25 PM           Consult Notes (last 7 days) (Notes from 18 through 18)      Kirill Stevens MD at 1/3/2018  7:05 AM  Version 1 of 1           Name:  Se Anne  :  1950    DATE OF ADMISSION  2017    DATE OF CONSULT  1/3/2018     REFERRING PHYSICIAN  * No referring provider recorded for this case *    PRIMARY CARE PHYSICIAN  No Known Provider    REASON FOR CONSULT  Very small mass seen on CT the bladder    CHIEF COMPLAINT  Chief Complaint   Patient presents with   • Shortness of Breath     pt reports was just here in hospital for pneumonia discharged 1 weeks ago. pt reports started having shortness of breathe x 2 days ago and got worse last night.       HISTORY OF PRESENT ILLNESS:   Se Anne is a 67 y.o. male who has a history of chronic respiratory failure requiring 3 liters of supplemental oxygen via nasal cannula, chronic kidney disease, diabetes mellitus type 2, COPD, history of tobacco abuse, hypothyroidism, and anemia. He was most recently hospitalized at this facility from -17 with intubation and mechanical ventilation from - given acute on chronic hypoxic and hypercapnic respiratory failure with bibasilar pneumonia and severe sepsis.   He was discharged with Omnicef and doxycycline.   On this date, he presented to the ED with 2-3 days of progressively worsening dyspnea. He reports worsening wheeze and cough.  He reports orthopnea and worsening dyspnea on exertion.  He does also report some midsternal chest discomfort with shortness of breath he characterizes this as a tight sensation.  Taking a deep breath tended to make it worse.  The intensity currently 0/10.  He does think his shortness of breath has improved some since coming to the hospital.  Patient is a nonsmoker.  Initial ABG  "demonstrated hypercapnea and hypoxia on home supplemental oxygen of 3 liters.  CT chest per PE protocol with groundglass attenuation and atelectasis. No pulmonary defect to suggest pulmonary embolism, but subsegmental artery branches were not well visualized.  Mr. Anne denies fever and chills. He denies abdominal pain, nausea, and vomiting.  He denies dysuria and hematuria . Mr. Anne reports he sleeps with BiPAP nightly and uses 3 liters of supplemental oxygen throughout the day. He ambulates with a cane and walker. He does report worsening bilateral lower extremity edema.  He had been noted to have some left lower quadrant tenderness to palpation, he states he had normal bowel movements yesterday.  No emesis.  No urinary symptoms.  He denies any fever.  He does get leg edema but he thinks it is the same or slightly improved to its baseline.     While Mr. Anne quit smoking several years ago, he does live with his wife, a 2-3 pack a day smoker.  He has no urologic history whatsoever.  Specifically no bleeding, hematuria, frequency, urgency, dysuria, or any history of genitourinary malignancy.  Apparently on CT scanning he was found to have a tiny lesion consistent with either a tiny bladder tumor or thickened folds of the bladder as is not completely distended.  This is different from a CT of 2016.  He also has asymptomatic gallstones and renal calculi.            PHYSICAL EXAMINATION    /72 (BP Location: Left arm, Patient Position: Lying)  Pulse 60  Temp 97.7 °F (36.5 °C)  Resp 22  Ht 167.6 cm (66\")  Wt 127 kg (280 lb 4.8 oz)  SpO2 91%  BMI 45.24 kg/m2    GENERAL:  A orbital the obese, white male in no moderate distress.  HEENT:  Pupils equally round and reactive to light.  Extraocular muscles intact.  CARDIOVASCULAR:  Regular rate and rhythm.  No murmurs, gallops or rubs.  LUNGS:  On BiPAP  ABDOMEN:  Soft, nontender, nondistended with positive bowel sounds.  EXTREMITIES:  No clubbing, cyanosis or " edema bilaterally.  SKIN:  No rashes or petechiae.  NEURO:  Cranial nerves grossly intact.  No focal deficits.  PSYCH:  Alert and oriented x3.        IMPRESSION AND PLAN  Se Anne is a 67 y.o., white male with: #1.  Small radiographic filling defects seen on CT scanning of the bladder.  It is mild to moderately distended and certainly could represent a fold in the bladder.  Clearly it has no bearing on the current hospitalization and I recommend outpatient cystoscopy and if there is something of concern week and fulgurated rate in the office at that time.  Please arrange a urology appointment for cystoscopy the time of this discharge acute for consultation      The patient was in agreement with these plans.    Thank you for asking us to participate in Se Anne's care.  We will continue to follow with you.  Please do not hesitate to call with any questions or concerns that you may have.    A total of 60 minutes were spent coordinating this patient’s care in clinic today; 30 minutes of which were face-to-face with the patient, reviewing his medical history and counseling on the current treatment and followup plan.  All questions were answered to his satisfaction.       This document was signed by No name on file. January 3, 2018 7:05 AM     Electronically signed by Kirill Stevens MD at 1/3/2018  7:10 AM

## 2018-01-08 NOTE — PROGRESS NOTES
Discharge Planning Assessment   Roberto     Patient Name: Se Anne  MRN: [id number]  Today's Date: 1/8/2018    Admit Date: 12/28/2017       Discharge Plan       01/08/18 0950    Case Management/Social Work Plan    Plan SS received consult for [facility].  SS contacted Sally at [facility] and she is agreeable to evaluate the pt for possible placement.  SS faxed referral to [facility] at [phone].  SS will continue to follow.     Patient/Family In Agreement With Plan yes                Expected Discharge Date and Time     Expected Discharge Date Expected Discharge Time    Jan 8, 2018      Daniella Wilburn

## 2018-01-09 NOTE — PLAN OF CARE
Problem: COPD, Chronic Bronchitis/Emphysema (Adult)  Goal: Signs and Symptoms of Listed Potential Problems Will be Absent or Manageable (COPD, Chronic Bronchitis/Emphysema)  Outcome: Ongoing (interventions implemented as appropriate)   01/08/18 1435   COPD, Chronic Bronchitis/Emphysema   Problems Assessed (COPD, Chronic Bronchitis/Emphysema) all   Problems Present (COPD, Chronic Bronchitis/Emphysema) situational response;hypoxia/hypoxemia       Problem: Skin Integrity Impairment, Risk/Actual (Adult)  Goal: Identify Related Risk Factors and Signs and Symptoms  Outcome: Ongoing (interventions implemented as appropriate)   01/07/18 1135 01/08/18 1435   Skin Integrity Impairment, Risk/Actual   Skin Integrity Impairment, Risk/Actual: Related Risk Factors --  fluid/nutrition status   Signs and Symptoms (Skin Integrity Impairment) edema;inflammation --      Goal: Skin Integrity/Wound Healing  Outcome: Ongoing (interventions implemented as appropriate)   01/08/18 1435   Skin Integrity Impairment, Risk/Actual (Adult)   Skin Integrity/Wound Healing making progress toward outcome       Problem: Fall Risk (Adult)  Goal: Absence of Falls  Outcome: Ongoing (interventions implemented as appropriate)   12/31/17 2330   Fall Risk (Adult)   Absence of Falls making progress toward outcome       Problem: Patient Care Overview (Adult)  Goal: Plan of Care Review  Outcome: Ongoing (interventions implemented as appropriate)   01/07/18 1135 01/08/18 1915   Coping/Psychosocial Response Interventions   Plan Of Care Reviewed With --  patient   Patient Care Overview   Progress declining --      Goal: Adult Individualization and Mutuality  Outcome: Ongoing (interventions implemented as appropriate)   12/29/17 0353   Mutuality/Individual Preferences   What Anxieties, Fears or Concerns Do You Have About Your Health or Care? none   What Questions Do You Have About Your Health or Care? none   What Information Would Help Us Give You More Personalized  Care? nothing     Goal: Discharge Needs Assessment  Outcome: Ongoing (interventions implemented as appropriate)   12/29/17 1139 01/01/18 1130   Discharge Needs Assessment   Concerns To Be Addressed no discharge needs identified --    Readmission Within The Last 30 Days no previous admission in last 30 days --    Equipment Needed After Discharge none --    Discharge Disposition still a patient --    Current Health   Outpatient/Agency/Support Group Needs (Pt has [facility]. Updated information will need to be sent at discharge. ) --    Living Environment   Transportation Available car;family or friend will provide --    Self-Care   Equipment Currently Used at Home --  bipap/ cpap;cane, quad;nebulizer;hospital bed;power chair, (recliner lift);walker, rolling;oxygen       Problem: NPPV/CPAP (Adult)  Goal: Signs and Symptoms of Listed Potential Problems Will be Absent or Manageable (NPPV/CPAP)  Outcome: Ongoing (interventions implemented as appropriate)   01/08/18 1435   NPPV/CPAP   Problems Assessed (NPPV/CPAP) all   Problems Present (NPPV/CPAP) dry mucous membranes;hypoxia/hypoxemia;situational response

## 2018-01-09 NOTE — PROGRESS NOTES
Seen in conjunction with Jennie PAREDES and Dr Gomez    History of presenting illness:  Patient was sleeping on my arrival, he was easily awakened but fell back asleep.  He is currently on BiPAP.  He does state that he occasionally has some chest pain, he states he has this when he takes a deep breath or coughs he does have a pain in his mid chest area.  This is more of a sharp pain.  He states that he also coughed up some blood today.  He has tolerated his diet.  No new complaints otherwise.  He states his bowels are moving.  No blood seen in bowels     Vital Signs  Temp:  [97.5 °F (36.4 °C)-98.5 °F (36.9 °C)] 98 °F (36.7 °C)  Heart Rate:  [47-72] 51  Resp:  [14-28] 24  BP: (114-166)/(46-98) 164/83  Body mass index is 44.44 kg/(m^2).      Intake/Output Summary (Last 24 hours) at 01/09/18 0824  Last data filed at 01/09/18 0824   Gross per 24 hour   Intake             1938 ml   Output             1170 ml   Net              768 ml     Intake & Output (last 3 days)       01/06 0701 - 01/07 0700 01/07 0701 - 01/08 0700 01/08 0701 - 01/09 0700 01/09 0701 - 01/10 0700    P.O. 0  684 354    Blood   900     IV Piggyback  50      Total Intake(mL/kg) 0 (0) 50 (0.4) 1584 (12.7) 354 (2.8)    Urine (mL/kg/hr) 5290 (1.8) 2800 (0.9) 1170 (0.4)     Stool 0 (0)  0 (0)     Total Output 5290 2800 1170      Net -5290 -2750 +414 +354            Unmeasured Urine Occurrence   2 x     Unmeasured Stool Occurrence   1 x           Physical exam:  Physical Exam   Constitutional: Well-developed, well-nourished.Morbidly obese on BiPAP saturations about 90%   Normocephalic and atraumatic.  Hearing is intact, mucous membranes are moist.    Eyes:  Pupils are equal, round   Cardiovascular Bradycardic regular rhythm without murmur rub or gallopry/Chest: Bilateral breath soundstill some coarse expiratory sounds with some crackles at the bases.al: Soft, rounded, bowel sounds are active, nondistended, nontender  Musculoskeletal: Strength is symmetric, no  joint effusions noted.  No edema    SCDs on  Skin: warm and dry.    Psychiatric:  Normal mood and affect.   Worley catheter in place medium yellow clear urine     Telemetry: sinus rhythm, reviewed    Results Review:  Lab Results   Component Value Date    WBC 8.95 01/09/2018    HGB 8.2 (L) 01/09/2018    HCT 26.2 (L) 01/09/2018    MCV 92.9 01/09/2018     01/09/2018     Lab Results   Component Value Date    GLUCOSE 138 (H) 01/09/2018    BUN 35 (H) 01/09/2018    CREATININE 1.40 (H) 01/09/2018    EGFRIFNONA 50 (L) 01/09/2018    BCR 25.0 01/09/2018    CO2 39.2 (H) 01/09/2018    CALCIUM 9.0 01/09/2018    ALBUMIN 4.10 01/09/2018    LABIL2 1.9 01/09/2018    AST 19 01/09/2018    ALT 19 01/09/2018       Imaging Results (last 72 hours)     Procedure Component Value Units Date/Time    XR Chest 1 View [[id number]] Collected:  12/28/17 1015     Updated:  12/28/17 1017    Narrative:       EXAMINATION:  XR CHEST 1 VW-      CLINICAL INDICATION:     sob     TECHNIQUE:  XR CHEST 1 VW-       COMPARISON: 12/10/2017      FINDINGS:   The lungs remain aerated.   Heart size is stable.   No pneumothorax.   No pleural effusion.   Bony and soft tissue structures are unremarkable.            Impression:       Stable radiographic appearance of the chest.        This report was finalized on 12/28/2017 10:15 AM by Dr. Miko Coto MD.       CT Chest Pulmonary Embolism With Contrast [[id number]] Collected:  12/28/17 1242     Updated:  12/28/17 1245    Narrative:       EXAMINATION: CT CHEST PULMONARY EMBOLISM W CONTRAST-      Technique: Multiple CT axial images were obtained through the level of  pulmonary arteries, following IV contrast administration per CT PE  protocol.  Volume Rendered 3D or MIP images performed.     Radiation dose reduction techniques were utilized per ALARA protocol.  Automated exposure control was initiated through either or IntenseDebate or  DoseRight software packages by  protocol.                  CLINICAL  INDICATION:    SOB and Chest Pain     COMPARISON:    Chest x-ray performed on 12/28/2017.     FINDINGS:    Some bibasilar groundglass attenuation possibly  representing alveolar edema or atelectasis. Coronary artery  calcification. Subsegmental pulmonary artery branches are not well  evaluated. There is no pulmonary artery filling defect to suggest  pulmonary embolism. There is no thoracic adenopathy. No pleural or  pericardial effusion. Incidentally imaged upper abdomen is unremarkable.  Bone windows show no acute osseous abnormality.       Impression:          Some bibasilar groundglass attenuation possibly representing alveolar  edema or atelectasis. Coronary artery calcification. Subsegmental  pulmonary artery branches are not well evaluated.     This report was finalized on 12/28/2017 12:43 PM by Dr. Miko Coto MD.       CT Abdomen Pelvis Without Contrast [[id number]] Collected:  12/29/17 0627     Updated:  12/29/17 0631    Narrative:          CT ABDOMEN PELVIS WO CONTRAST-        TECHNIQUE: Multiple axial CT images were obtained from lung bases  through pubic symphysis without administration of IV contrast.  Reformatted images in the coronal and/or sagittal plane(s) were  generated from the axial data set to facilitate diagnostic accuracy  and/or surgical planning.  Oral Contrast:NONE.     Radiation dose reduction techniques were utilized per ALARA protocol.  Automated exposure control was initiated through either or CareDose or  DoseRigApixio software packages by  protocol.       [id number] mGy.cm     Clinical information  elevated lactic acid and LLQ abd pain; J96.21-Acute and chronic  respiratory failure with hypoxia; J96.22-Acute and chronic respiratory  failure with hypercapnia; J44.1-Chronic obstructive pulmonary disease  with (acute) exacerbation      Comparison  NONE.     Findings  LOWER THORAX: BIBASILAR ATELECTASIS AND MINIMAL AIRSPACE DISEASE.     ABDOMEN:        LIVER: Homogeneous. No  focal hepatic mass or ductal dilatation.        GALLBLADDER: GALLSTONES IN THE GALLBLADDER IDENTIFIED.        PANCREAS: Unremarkable. No mass or ductal dilatation.        SPLEEN: Homogeneous. No splenomegaly.        ADRENALS: No mass.        KIDNEYS: NONOBSTRUCTIVE BILATERAL RENAL CALCULI.        GI TRACT: Non-dilated. No definite wall thickening.        PERITONEUM: No free air. No free fluid or loculated fluid  collections.        MESENTERY: Unremarkable.        LYMPH NODES: No lymphadenopathy.        VASCULATURE: No evidence of aneurysm.        ABDOMINAL WALL: No focal hernia or mass.           OTHER: None.     PELVIS:        BLADDER: 5 MM POLYP INVOLVING THE SUPERIOR LEFT ASPECT OF THE URINARY  BLADDER THAT SHOULD BE FURTHER EVALUATED WITH CYSTOSCOPY.        REPRODUCTIVE: Unremarkable as visualized.        APPENDIX: Nondistended. No surrounding inflammation.     BONES: No acute bony abnormality.       Impression:       Impression:  1. Bibasilar pneumonia.  2. Cholelithiasis.  3.5 mm polyp involving the superior left aspect of the urinary bladder  that should be further evaluated with cystoscopy.     This report was finalized on 12/29/2017 6:29 AM by Dr. Miko Coto MD.         Chest x-ray  From this a.m. reviewed, worsening pulmonary infiltrates.  CT scan done yesterday without evidence of retroperitoneal     Echo EF normal in December    D/W Dr Gomez          Assessment/Plan     Active Problems:    Acute hypoxic respiratory failure secondary to COPD exacerbation with pneumonia. Chest x-ray worsen, is currently not on antibiotics,   white count is normal CRP is improved slightly but with worsening chest x-ray discussed pulmonology, will start broad-spectrum antibiotics.  Will repeat blood cultures.  With his progressive failure Dr. Gomez did discuss with him while I was in the room, he would want to be intubated if needed.  Lasix to be given today.      septic shock, on admission, resolved    Anemia, exact  etiology is uncertain, he has received now 3 units of blood and his hemoglobin now has come up accordingly.  We have seen no blood in his GI tract and no retroperitoneal hematoma, follow.  Patient is off Arixtra currently.      Hypomagnesemia, earlier in the stay resolved     Hypophosphatemia, earlier in the stayresolved     Chronic kidney disease stage III, remain stable    Diabetes currently improving control, follow.      Hypothyroidism, TSH overall normal, T4 slightly low, probable sick euthyroid, this can be followed up as an outpatient.    Atypical chest pain on presentationwith hemoptysis.  Patient is going be placed on high flow and off BiPAP as was the hemoptysis and worsening pneumonia BiPAP may worsen this.      Htn, not well controlledbut he is having some bradycardia, Coreg decreased, follow as diuresed.  Patient is on beta blocker, clonidine, Norvasc, high-dose hydralazine, Imdur.      DVT prophylaxis, SCDs       Seun Muller MD  01/09/18  8:24 AM

## 2018-01-09 NOTE — PROGRESS NOTES
Endotracheal Intubation Procedure Note    Indication for endotracheal intubation: Critically ill, respiratory failure, hemoptysis    Sedation: Propofol -10 mL     Number of attempts: 1      Patient was emergently intubated as patient had altered mental status.  Patient was given propofol and vecuronium for sedation and paralysis.  Mac 4 blade was used to look at the vocal cords and grade view of Vocal cords was achieved.  Size 8 tube was inserted through the vocal cords.  Tube condensation was present.  End tidal CO2 color change was noticed for 10 breaths.  Bilateral breath sounds were present.    Patient's vitals remained normal.  Patient's pulse ox remained from % patient was put on assist control mode of ventilation.

## 2018-01-09 NOTE — PROGRESS NOTES
Bronchoscopy Procedure Note    Date of Operation: 1/9/2018    Pre-op Diagnosis: hemoptysis    Post-op Diagnosis: Bleeding From the right lung-right middle lobe    Surgeon: Jeremi Gomez MD    Assistants: None    Anesthesia: Please see anesthesia report for details    Operation: Flexible fiberoptic bronchoscopy, diagnostic     Findings: Bleeding from right middle lobe    Specimen: Bronchioloalveolar lavage of right lower lobe, right middle lobe and right upper lobe.    Bronchial lavage of left lower lobe    Estimated Blood Loss: 5-10 cc    Complications: None    Indications and History:  The patient is a 67 y.o. male with respiratory failure and hemoptysis.  The risks, benefits, complications, treatment options and expected outcomes were discussed with the patients next of kin.  The possibilities of reaction to medication, pulmonary aspiration, perforation of a viscus, bleeding, failure to diagnose a condition and creating a complication requiring transfusion or operation were discussed with the patients next of kin who freely signed the consent.      Description of Procedure:  The procedure was done in ICU at bed side and patient was identified as Se Anne and the procedure verified as Flexible Fiberoptic Bronchoscopy.  A Time Out was held and the above information confirmed.     Patient was already intubated in the bronchoscope was passed with the endotracheal tube  2 ml 1 % lidocaine was used topically on the walter.  Careful inspection of the tracheal lumen was accomplished. The scope was sequentially passed into the left main and then left upper and lower bronchi and segmental bronchi.       The scope was then withdrawn and advanced into the right main bronchus and then into the RUL, RML, and RLL bronchi and segmental bronchi.     Bleeding in the trachea and right and left main bronchi were seen    The blood was cleaned and lavaged with normal saline.  Samples-  Bronchial washings    All the subsegments  of right upper lobe middle lobe and lower lobe and left upper lobe and left lower lobes were visualized.  Bronchial washings were done.    Then the bronchoscope was wedged into the right lower lobe for a bronchioloalveolar lavage and close to 60 cc of saline was given once and close to 22 cc off saline mixed with blood was collected back.    Then the bronchoscope was wedged into the right middle lobe for a bronchioloalveolar lavage and close to 60 cc of saline was given once and close to 20 cc of saline mixed with blood was collected back.    In the bronchoscope was wedged into the right upper lobe for bronchoalveolar lavage and close to 60 cc of saline was given once and close to 16 cc was collected back.    Blood-was slowly oozing through the right middle lobe.  Cold saline and local epinephrine was given to control the bleeding.    Then the bronchoscope was wedged into the left lower lobe for bronchoalveolar lavage and close to 60 cc of saline was given once and close to 15 cc was aspirated back.  No fresh bleeding was seen.      Samples were sent for microbiology.          Jeremi Gomez MD

## 2018-01-09 NOTE — PROGRESS NOTES
Discharge Planning Assessment   Roberto     Patient Name: Se Anne  MRN: [id number]  Today's Date: 1/9/2018    Admit Date: 12/28/2017       Discharge Plan       01/09/18 1437    Case Management/Social Work Plan    Plan SS spoke with Sally at [facility] and informed her the pt has been intubated on this date.  Pt has an insurance that requires a 21 day LOS on the vent, 3 failed attempts and a stable airway prior to be approved for transfer.  SS will follow and if pt is extubated or meets the requirements of the insurance, SS will re-fax updated information to [facility].  SS will follow.     Patient/Family In Agreement With Plan yes          Expected Discharge Date and Time     Expected Discharge Date Expected Discharge Time    Jan 8, 2018                Daniella Wilburn

## 2018-01-09 NOTE — SIGNIFICANT NOTE
01/09/18 1737   Rehab Treatment   Discipline physical therapist   Treatment Not Performed unable to treat, medical status change  (Pt intubated on this date and no longer appropriate for PT. Plear re order PT if/when pt medical condition improves. )

## 2018-01-09 NOTE — PROGRESS NOTES
Nutrition Services    Patient Name:  Se Anne  YOB: 1950  MRN: [id number]  Admit Date:  12/28/2017    Notified that patient intubated.  Will recommend when and if TF initiated, Osmolite 1.2 at 25 ml/hr and increase as tolerated to 45 ml/hr.  At this time do not know what rate of Diprovan will be, so RD will f/u tomorrow and adjust  TF as necessary.  Thank you.    Electronically signed by:  Dianna Crouch RD  01/09/18 1:45 PM

## 2018-01-09 NOTE — PROGRESS NOTES
LOS: 12 days     Chief Complaint:  Pulmonology is following for respiratory failure     Subjective     Interval History:     Mr. Anne is resting in his room, no respiratory distress noted on exam.     History taken from: patient chart RN    Review of Systems:   Review of Systems   Constitutional: Negative for chills and fever.   HENT: Negative for congestion and rhinorrhea.    Eyes: Negative for photophobia and visual disturbance.   Respiratory: Negative for cough, shortness of breath and wheezing.    Cardiovascular: Negative for chest pain and leg swelling.   Gastrointestinal: Negative for abdominal distention and abdominal pain.   Endocrine: Negative for cold intolerance and heat intolerance.   Genitourinary: Negative for difficulty urinating.   Musculoskeletal: Negative for arthralgias and myalgias.   Skin: Negative for color change and pallor.   Allergic/Immunologic: Negative for environmental allergies.   Neurological: Negative for dizziness, weakness and light-headedness.   Psychiatric/Behavioral: Negative for agitation, behavioral problems and confusion.                     Objective     Vital Signs  Temp:  [97.5 °F (36.4 °C)-98.5 °F (36.9 °C)] 97.8 °F (36.6 °C)  Heart Rate:  [47-68] 56  Resp:  [14-28] 26  BP: (114-169)/(46-98) 128/70  Body mass index is 44.44 kg/(m^2).    Intake/Output Summary (Last 24 hours) at 01/09/18 1004  Last data filed at 01/09/18 0824   Gross per 24 hour   Intake             1284 ml   Output             1170 ml   Net              114 ml     I/O this shift:  In: 354 [P.O.:354]  Out: -     Physical Exam:  GENERAL APPEARANCE: Well developed, well nourished, alert and cooperative, and appears to be in no acute distress.    HEAD: normocephalic.    EYES: PERRL    NECK: Neck supple.     CARDIAC: Normal S1 and S2. No S3, S4 or murmurs. Rhythm is regular. There is no peripheral edema, cyanosis or pallor. Extremities are warm and well perfused. Capillary refill is less than 2 seconds. No  carotid bruits.    Respiratory: Clear to auscultation and percussion without rales, rhonchi, wheezing or diminished breath sounds.    GI: Positive bowel sounds. Soft, nondistended, nontender.     Musculoskeletal: No significant deformity or joint abnormality. No edema. Peripheral pulses intact.     NEUROLOGICAL: Strength and sensation symmetric and intact throughout.     PSYCHIATRIC: The mental examination revealed the patient was oriented to person, place, and time.                 Results Review:                I reviewed the patient's new clinical results.  I reviewed the patient's new imaging results and agree with the interpretation.    Results from last 7 days  Lab Units 01/09/18  0337 01/08/18  1220 01/08/18  0223 01/07/18  0036   WBC 10*3/mm3 8.95  --  7.67 8.08   HEMOGLOBIN g/dL 8.2* 6.8* 6.4* 7.2*   PLATELETS 10*3/mm3 163  --  167 220       Results from last 7 days  Lab Units 01/09/18  0337 01/08/18  0108 01/07/18  1703 01/07/18  0036   SODIUM mmol/L 143 150  --  148   POTASSIUM mmol/L 3.4* 3.8 3.6 3.5   CHLORIDE mmol/L 100 104  --  102   CO2 mmol/L 39.2* >40.0*  --  37.9*   BUN mg/dL 35* 29*  --  28*   CREATININE mg/dL 1.40* 1.38*  --  1.38*   CALCIUM mg/dL 9.0 8.8  --  9.1   GLUCOSE mg/dL 138* 131*  --  136*   MAGNESIUM mg/dL 2.5 2.2  --   --      Lab Results   Component Value Date    INR 1.18 (H) 12/06/2017    INR 1.12 (H) 12/02/2017    INR 1.01 01/27/2017    PROTIME 15.2 12/06/2017    PROTIME 14.5 12/02/2017    PROTIME 11.4 01/27/2017       Results from last 7 days  Lab Units 01/09/18  0337 01/06/18  0039   ALK PHOS U/L 33* 41   BILIRUBIN mg/dL 1.5 1.1   ALT (SGPT) U/L 19 32   AST (SGOT) U/L 19 17       Results from last 7 days  Lab Units 01/07/18  1149   PH, ARTERIAL pH units 7.421   PO2 ART mm Hg 83.7   PCO2, ARTERIAL mm Hg 67.4*   HCO3 ART mmol/L 42.8*     Imaging Results (last 24 hours)     Procedure Component Value Units Date/Time    XR Chest AP [[id number]] Collected:  01/09/18 0700     Updated:   01/09/18 0703    Narrative:       EXAMINATION: XR CHEST AP-      CLINICAL INDICATION:     Respiratory failure; J96.21-Acute and chronic  respiratory failure with hypoxia; J96.22-Acute and chronic respiratory  failure with hypercapnia; J44.1-Chronic obstructive pulmonary disease  with (acute) exacerbation     TECHNIQUE:  XR CHEST AP-      COMPARISON: 1/7/2018      FINDINGS:   Cardiomegaly and probable bilateral airspace edema increased. Underlying  COPD stable. Cardiomegaly stable. No effusion or pneumothorax.       Impression:       Increasing airspace disease otherwise stable chest.     This report was finalized on 1/9/2018 7:01 AM by Dr. Kirk Kellogg MD.       CT Abdomen Pelvis Without Contrast [[id number]] Collected:  01/09/18 0701     Updated:  01/09/18 0704    Narrative:          CT ABDOMEN PELVIS WO CONTRAST-        TECHNIQUE: Multiple axial CT images were obtained from lung bases  through pubic symphysis WITHOUT IV contrast. Reformatted images in the  coronal and/or sagittal plane(s) were generated from the axial data set  to facilitate diagnostic accuracy and/or surgical planning.  Oral Contrast:NONE.     Radiation dose reduction techniques were utilized per ALARA protocol.  Automated exposure control was initiated through either or CareDose or  DoseRight software packages by  protocol.       Radiation dose reduction techniques were utilized per ALARA protocol.  Automated exposure control was initiated through either or CareDose or  DoseRight software packages by  protocol.       [id number] mGy.cm     Clinical information  r/o retro peritoneal hemotoma; J96.21-Acute and chronic respiratory  failure with hypoxia; J96.22-Acute and chronic respiratory failure with  hypercapnia; J44.1-Chronic obstructive pulmonary disease with (acute)  exacerbation      Comparison  Comparison: 12/28/2017     Findings  LOWER THORAX: BILATERAL SMALL PLEURAL EFFUSIONS WITH BILATERAL  GROUNDGLASS AND PATCHY  AIRSPACE DISEASE.     ABDOMEN:        LIVER: Homogeneous. No focal hepatic mass or ductal dilatation.        GALLBLADDER: CHOLELITHIASIS.        PANCREAS: Unremarkable. No mass or ductal dilatation.        SPLEEN: Homogeneous. No splenomegaly.        ADRENALS: No mass.        KIDNEYS: NONOBSTRUCTING RIGHT KIDNEY STONES. NO HYDRONEPHROSIS.        GI TRACT: MILD DIVERTICULOSIS OF SIGMOID COLON WITHOUT  DIVERTICULITIS.        PERITONEUM: No free air. No free fluid or loculated fluid  collections.        MESENTERY: Unremarkable.        LYMPH NODES: No lymphadenopathy.        VASCULATURE: ARTERIAL VASCULAR CALCIFICATIONS WITHOUT ANEURYSM.        ABDOMINAL WALL: No focal hernia or mass.           OTHER: None.     PELVIS:        BLADDER: URINARY BLADDER WALL THICKENING LIKELY IN SETTING OF  PROSTATE HYPERTROPHY.        REPRODUCTIVE: Unremarkable as visualized.        APPENDIX: Nondistended. No surrounding inflammation.     BONES: No acute bony abnormality.       Impression:       Impression:  1. BILATERAL LOWER LOBE AIRSPACE DISEASE WITH SMALL PLEURAL EFFUSIONS.  2. NO ACUTE FINDINGS IDENTIFIED WITHIN ABDOMEN OR PELVIS. SPECIFICALLY  NO RETROPERITONEAL HEMATOMA.  3. OTHER NONACUTE FINDINGS AS ABOVE.     This report was finalized on 1/9/2018 7:02 AM by Dr. Kirk Kellogg MD.                Medication Review:   Scheduled Medications:    albumin human 25 g Intravenous BID   amLODIPine 10 mg Oral Daily   aspirin 81 mg Oral Daily   atorvastatin 80 mg Oral Daily   budesonide-formoterol 2 puff Inhalation BID - RT   carvedilol 6.25 mg Oral BID With Meals   cholecalciferol 2,000 Units Oral Daily   CloNIDine 0.3 mg Oral Q8H   ferrous sulfate 325 mg Oral Daily With Breakfast   furosemide 60 mg Intravenous Once   gabapentin 300 mg Oral Nightly   guaiFENesin 600 mg Oral Q12H   hydrALAZINE 100 mg Oral Q8H   insulin aspart 0-14 Units Subcutaneous 4x Daily AC & at Bedtime   insulin detemir 25 Units Subcutaneous Daily   insulin detemir 30  Units Subcutaneous Nightly   ipratropium-albuterol 3 mL Nebulization Q6H - RT   isosorbide mononitrate 180 mg Oral Daily   lactobacillus acidophilus 1 capsule Oral Daily   levothyroxine 25 mcg Oral Daily   pantoprazole 40 mg Oral Daily   piperacillin-tazobactam 4.5 g Intravenous Once   piperacillin-tazobactam 4.5 g Intravenous Q8H   potassium chloride 40 mEq Oral Q4H   potassium chloride 40 mEq Oral Once   predniSONE 10 mg Oral Daily With Breakfast   sertraline 50 mg Oral Q PM   sodium chloride 10 mL Intracatheter Q12H   sodium chloride      vancomycin 1,000 mg Intravenous Q12H   vitamin B-12 2,000 mcg Oral Daily     Continuous infusions:    Pharmacy Consult - Pharmacy to dose    Pharmacy to dose vancomycin        Assessment/Plan     Neuro: alert and oriented x3, no concerns currently.      Respiratory Failure: likely related to underlying lung disease, COPD and fluid volume overload.  Incentive spirometer 10 times during the day. Continue high flow nasal cannula during day, wean to maintain Sp02 >92-94%. Continue Bipap at HS. Continue scheduled inhalants and nebulizer's. IV medrol changed to prednisone PO, wean as tolerated. On Bipap sleeping his Sp02 remains 90%.     Chest xray today looks much worse today than yesterday, could be multifocal pneumonia or alveloar hemorrhage. Patient did report one episode of hemoptysis, will attempt to utilize high flow nasal cannula more instead of bipap.       1130: shortness of breath and respiratory distress continued to worsen on bipap, could not tolerate high flow nasal cannula. Patient had another episode of hemoptysis. Patient was intubated, see procedure notes. Blood noted in the pharynx during intubation.     1230 Bedside bronch completed, alvelolar hemorrhage noted, see bronch notes. ASA stopped.     Cardiac:  /83, HR 60.  Continue continuous ECG and v/s monitoring, maintain MAP >65.       CKD stage III: Creatinine 1.40, 24 hour urine 1.1 liters, continue strict  I&O, electrolytes replaced accordingly.     Hypokalemia: level is 3.4, being replaced and rechecked per protocol.       Hypernatremia: resolved, level is 143 today.       Low H/H: level is8.2/26.2, BP is stable, will repeat around 9am.        ID: WBC is 8.95, Neutrophils are 80.5 no fevers, continue to monitor lab trends and clinical changes. Due to worsening chest xray, primary is restarting broad spectrum antibiotics.     Patient Active Problem List   Diagnosis Code   • COPD (chronic obstructive pulmonary disease) J44.9   • Shortness of breath R06.02   • Morbid obesity E66.01   • Hypoxia R09.02   • Edema extremities R60.0   • Sleep apnea G47.30   • Wheezing R06.2   • Allergic rhinitis J30.9   • Essential hypertension I10   • Sore throat J02.9   • Cough R05   • Pneumonia J18.9   • Respiratory failure J96.90     YANG Mathews  01/09/18  10:04 AM      Scribed for Dr. Gomez by YANG Cassidy.     I, Jeremi Gomez M.D. attest that the above note accurately reflects the work and decisions made  by me.  Patient was seen and evaluated by Dr. Gomez, including history of present illness, physical exam, assessment, and treatment plan.  The above note was reviewed and edited by Dr. Gomez.  Critical Care time spent in direct patient care: 50 minutes (excluding procedure time, if applicable) including high complexity decision making to assess, manipulate, and support vital organ system failure in this individual who has impairment of one or more vital organ systems such that there is a high probability of imminent or life threatening deterioration in the patient’s condition.

## 2018-01-10 PROBLEM — J96.21 ACUTE ON CHRONIC RESPIRATORY FAILURE WITH HYPOXIA AND HYPERCAPNIA (HCC): Status: ACTIVE | Noted: 2017-01-01

## 2018-01-10 PROBLEM — J96.22 ACUTE ON CHRONIC RESPIRATORY FAILURE WITH HYPOXIA AND HYPERCAPNIA (HCC): Status: ACTIVE | Noted: 2017-01-01

## 2018-01-10 NOTE — PLAN OF CARE
Problem: Mechanical Ventilation, Invasive (Adult)  Goal: Signs and Symptoms of Listed Potential Problems Will be Absent or Manageable (Mechanical Ventilation, Invasive)  Outcome: Ongoing (interventions implemented as appropriate)  Patient on AC 22, 450, 100% and Peep 13.  Dr Gomez decreased to rate of 20.  No further weaning attempted today.

## 2018-01-10 NOTE — PLAN OF CARE
Problem: Fall Risk (Adult)  Goal: Identify Related Risk Factors and Signs and Symptoms  Outcome: Ongoing (interventions implemented as appropriate)    Goal: Absence of Falls  Outcome: Ongoing (interventions implemented as appropriate)      Problem: Patient Care Overview (Adult)  Goal: Plan of Care Review  Outcome: Ongoing (interventions implemented as appropriate)    Goal: Adult Individualization and Mutuality  Outcome: Ongoing (interventions implemented as appropriate)    Goal: Discharge Needs Assessment  Outcome: Ongoing (interventions implemented as appropriate)

## 2018-01-10 NOTE — PLAN OF CARE
Problem: COPD, Chronic Bronchitis/Emphysema (Adult)  Goal: Signs and Symptoms of Listed Potential Problems Will be Absent or Manageable (COPD, Chronic Bronchitis/Emphysema)  Outcome: Ongoing (interventions implemented as appropriate)   01/10/18 0448   COPD, Chronic Bronchitis/Emphysema   Problems Assessed (COPD, Chronic Bronchitis/Emphysema) all   Problems Present (COPD, Chronic Bronchitis/Emphysema) situational response;dyspnea       Problem: Skin Integrity Impairment, Risk/Actual (Adult)  Goal: Identify Related Risk Factors and Signs and Symptoms  Outcome: Ongoing (interventions implemented as appropriate)   01/10/18 0448   Skin Integrity Impairment, Risk/Actual   Skin Integrity Impairment, Risk/Actual: Related Risk Factors cognitive impairment;edema;immobility   Signs and Symptoms (Skin Integrity Impairment) edema     Goal: Skin Integrity/Wound Healing  Outcome: Ongoing (interventions implemented as appropriate)   01/10/18 0448   Skin Integrity Impairment, Risk/Actual (Adult)   Skin Integrity/Wound Healing making progress toward outcome       Problem: Fall Risk (Adult)  Goal: Identify Related Risk Factors and Signs and Symptoms  Outcome: Ongoing (interventions implemented as appropriate)   01/10/18 0447   Fall Risk   Fall Risk: Related Risk Factors culprit medication(s);history of falls   Fall Risk: Signs and Symptoms presence of risk factors     Goal: Absence of Falls  Outcome: Ongoing (interventions implemented as appropriate)   01/10/18 0447   Fall Risk (Adult)   Absence of Falls making progress toward outcome       Problem: Patient Care Overview (Adult)  Goal: Plan of Care Review  Outcome: Ongoing (interventions implemented as appropriate)   01/10/18 1400 01/10/18 1539   Coping/Psychosocial Response Interventions   Plan Of Care Reviewed With patient --    Patient Care Overview   Progress --  progress toward functional goals is gradual   Outcome Evaluation   Outcome Summary/Follow up Plan --  patient remains  sedated on vent        Problem: NPPV/CPAP (Adult)  Goal: Signs and Symptoms of Listed Potential Problems Will be Absent or Manageable (NPPV/CPAP)  Outcome: Ongoing (interventions implemented as appropriate)   01/08/18 1435   NPPV/CPAP   Problems Assessed (NPPV/CPAP) all   Problems Present (NPPV/CPAP) dry mucous membranes;hypoxia/hypoxemia;situational response       Problem: Mechanical Ventilation, Invasive (Adult)  Goal: Signs and Symptoms of Listed Potential Problems Will be Absent or Manageable (Mechanical Ventilation, Invasive)  Outcome: Ongoing (interventions implemented as appropriate)   01/10/18 0449 01/10/18 1539   Mechanical Ventilation, Invasive   Problems Assessed (Mechanical Ventilation, Invasive) all --    Problems Present (Mechanical Ventilation, Invasive) --  immobility;inability to wean

## 2018-01-10 NOTE — PLAN OF CARE
Problem: Mechanical Ventilation, Invasive (Adult)  Goal: Signs and Symptoms of Listed Potential Problems Will be Absent or Manageable (Mechanical Ventilation, Invasive)  Outcome: Ongoing (interventions implemented as appropriate)

## 2018-01-10 NOTE — PROGRESS NOTES
Discharge Planning Assessment   Roberto     Patient Name: Se Anne  MRN: [id number]  Today's Date: 1/10/2018    Admit Date: 12/28/2017         Discharge Plan       01/10/18 1257    Case Management/Social Work Plan    Plan Pt is currently on the vent.  Pt lives at home with his spouse, Adeline and his daughters.  Pt has [facility] and [facility].  Pt plans to return home at discharge. SS will continue to follow.      Patient/Family In Agreement With Plan yes          Expected Discharge Date and Time     Expected Discharge Date Expected Discharge Time    Jan 8, 2018           Daniella Wilburn

## 2018-01-10 NOTE — PROGRESS NOTES
LOS: 13 days   Patient Care Team:  No Known Provider as PCP - General  No Known Provider as PCP - Family Medicine    Chief Complaint:  Pulmonology is following for respiratory failure     Subjective     Interval History: patient is intubated and sedated.     History taken from: chart RN Patient unable to give history due to patient intubated.    Review of Systems:   Review of Systems - History obtained from chart review and unobtainable from patient due to mental status and Intubated      Objective     Vital Signs  Temp:  [98.2 °F (36.8 °C)-99.1 °F (37.3 °C)] 98.9 °F (37.2 °C)  Heart Rate:  [44-73] 51  Resp:  [20-28] 20  BP: (110-213)/() 154/62  FiO2 (%):  [60 %-100 %] 100 %  Body mass index is 44.44 kg/(m^2).    Intake/Output Summary (Last 24 hours) at 01/10/18 1015  Last data filed at 01/10/18 1003   Gross per 24 hour   Intake             2876 ml   Output             1550 ml   Net             1326 ml     I/O this shift:  In: 842 [Blood:342; IV Piggyback:500]  Out: -     Vent Settings  Vent Mode: VC/AC    Vt (Set, L): 0.45 L  Resp Rate (Set): 20     FiO2 (%): 100 %  PEEP/CPAP (cm H2O): 13 cm H20    Minute Ventilation (L/min) (Obs): 9.31 L/min  Resp Rate (Observed) Vent: 20     I:E Ratio (Obs): 1:3.80    PIP Observed (cm H2O): 46 cm H2O     Physical Exam:  GENERAL APPEARANCE: Intubated and sedated.  Appears to be resting comfortably in bed.     HEAD: normocephalic.    EYES: PERRLA.    THROAT: ET tube in place. Oral cavity and pharynx normal. No inflammation, swelling, exudate, or lesions.     NECK: Neck supple.     CARDIAC: Normal S1 and S2. No S3, S4 or murmurs. Rhythm is regular. There is no peripheral edema, cyanosis or pallor. Extremities are warm and well perfused. Capillary refill is less than 2 seconds. No carotid bruits.    Respiratory: Clear to auscultation and percussion without rales, rhonchi, wheezing or diminished breath sounds.    GI: Positive bowel sounds. Soft, nondistended, nontender.      Musculoskeletal: No significant deformity or joint abnormality. No edema. Peripheral pulses intact.    NEUROLOGICAL: Unable to assess due to sedation status.     PSYCHIATRIC: Unable to assess due to sedation status.     Results Review:     I reviewed the patient's new clinical results.  I reviewed the patient's new imaging results and agree with the interpretation.    Results from last 7 days  Lab Units 01/10/18  0034 01/09/18  0942 01/09/18  0337  01/08/18  0223   WBC 10*3/mm3 14.48*  --  8.95  --  7.67   HEMOGLOBIN g/dL 7.0* 8.4* 8.2*  < > 6.4*   PLATELETS 10*3/mm3 142  --  163  --  167   < > = values in this interval not displayed.    Results from last 7 days  Lab Units 01/10/18  0218 01/09/18  0337 01/08/18  0108   SODIUM mmol/L 145 143 150   POTASSIUM mmol/L 4.1 3.4* 3.8   CHLORIDE mmol/L 102 100 104   CO2 mmol/L 34.4* 39.2* >40.0*   BUN mg/dL 41* 35* 29*   CREATININE mg/dL 1.78* 1.40* 1.38*   CALCIUM mg/dL 8.7 9.0 8.8   GLUCOSE mg/dL 202* 138* 131*   MAGNESIUM mg/dL  --  2.5 2.2     Lab Results   Component Value Date    INR 1.25 (H) 01/10/2018    INR 1.27 (H) 01/09/2018    INR 1.18 (H) 12/06/2017    PROTIME 15.8 (H) 01/10/2018    PROTIME 16.1 (H) 01/09/2018    PROTIME 15.2 12/06/2017       Results from last 7 days  Lab Units 01/10/18  0218 01/09/18  0337 01/06/18  0039   ALK PHOS U/L 35* 33* 41   BILIRUBIN mg/dL 1.7 1.5 1.1   ALT (SGPT) U/L 16 19 32   AST (SGOT) U/L 17 19 17       Results from last 7 days  Lab Units 01/10/18  0500   PH, ARTERIAL pH units 7.410   PO2 ART mm Hg 74.9*   PCO2, ARTERIAL mm Hg 57.7*   HCO3 ART mmol/L 35.8*     Imaging Results (last 24 hours)     Procedure Component Value Units Date/Time    XR Chest 1 View [[id number]] Collected:  01/09/18 1249     Updated:  01/09/18 1317    Narrative:       EXAMINATION: XR CHEST 1 VW-      CLINICAL INDICATION:     Intubation, OG placement; J96.21-Acute and  chronic respiratory failure with hypoxia; J96.22-Acute and chronic  respiratory failure  with hypercapnia; J44.1-Chronic obstructive  pulmonary disease with (acute) exacerbation     TECHNIQUE:  XR CHEST 1 VW-      COMPARISON: 1/9/2018 5:46 AM     FINDINGS:   ET tube with tip just below clavicles. NG tube extends into stomach.  Increasing left basilar airspace disease. Stable perihilar opacities  right greater than left lung. Decreased density of left upper lobe  opacities. Small left effusion. No pneumothorax.       Impression:       1. Support devices as detailed above.  2. Increasing left basilar airspace disease. Otherwise slight decrease  in right greater than left somewhat perihilar airspace disease.     This report was finalized on 1/9/2018 12:51 PM by Dr. Kirk Kellogg MD.       XR Chest AP [[id number]] Collected:  01/10/18 0758     Updated:  01/10/18 0801    Narrative:       EXAMINATION: XR CHEST AP-      CLINICAL INDICATION:     Evaluate worsening pneumonia; J96.21-Acute and  chronic respiratory failure with hypoxia; J96.22-Acute and chronic  respiratory failure with hypercapnia; J44.1-Chronic obstructive  pulmonary disease with (acute) exacerbation     TECHNIQUE:  XR CHEST AP-      COMPARISON: 1/9/2018      FINDINGS:   Probably worsening bilateral patchy airspace disease and underlying  consolidations.   ET tube with tip at level of clavicles. NG tube extends presumably into  the stomach. No pneumothorax.       Impression:       Mild interval worsening of bilateral airspace disease.  Support devices as above.     This report was finalized on 1/10/2018 7:59 AM by Dr. Kirk Kellogg MD.       XR Abdomen KUB [[id number]] Collected:  01/10/18 0856     Updated:  01/10/18 0858    Narrative:       EXAMINATION: XR ABDOMEN KUB-      CLINICAL INDICATION:eval NGT; J96.21-Acute and chronic respiratory  failure with hypoxia; J96.22-Acute and chronic respiratory failure with  hypercapnia; J44.1-Chronic obstructive pulmonary disease with (acute)  exacerbation        COMPARISON: None      TECHNIQUE: KUB      FINDINGS:   NG tube with tip in the region of distal stomach. Left basilar airspace  disease and cardiomegaly. Nonobstructive bowel gas pattern.       Impression:       NG tube with tip in the region of distal stomach.     This report was finalized on 1/10/2018 8:56 AM by Dr. Kirk Kellogg MD.                Medication Review:   Scheduled Medications:    IVPB builder  Intravenous Once   amLODIPine 10 mg Oral Daily   atorvastatin 80 mg Oral Daily   budesonide-formoterol 2 puff Inhalation BID - RT   carvedilol 6.25 mg Oral BID With Meals   cholecalciferol 2,000 Units Oral Daily   CloNIDine 0.3 mg Oral Q8H   ferrous sulfate 325 mg Oral Daily With Breakfast   gabapentin 300 mg Oral Nightly   guaiFENesin 200 mg Oral Q4H   hydrALAZINE 100 mg Oral Q8H   insulin aspart 0-14 Units Subcutaneous 4x Daily AC & at Bedtime   insulin detemir 25 Units Subcutaneous Daily   insulin detemir 30 Units Subcutaneous Nightly   ipratropium-albuterol 3 mL Nebulization Q6H - RT   isosorbide mononitrate 180 mg Oral Daily   lactobacillus acidophilus 1 capsule Oral Daily   levothyroxine 25 mcg Oral Daily   methylPREDNISolone sodium succinate 60 mg Intravenous Q8H   pantoprazole 40 mg Oral Daily   piperacillin-tazobactam 4.5 g Intravenous Q8H   sertraline 50 mg Oral Q PM   sodium chloride 10 mL Intracatheter Q12H   sodium chloride 10 mL Intracatheter Q12H   sodium chloride      thrombin  Topical Once   vancomycin 1,000 mg Intravenous Q12H   vecuronium 5 mg Intravenous Once   vitamin B-12 2,000 mcg Oral Daily     Continuous infusions:    fentaNYL (SUBLIMAZE) PCA 1500 mcg/30 mL syringe     Pharmacy Consult - Pharmacy to dose     Pharmacy to dose vancomycin     propofol 5-50 mcg/kg/min Last Rate: 50 mcg/kg/min (01/10/18 1001)       Assessment/Plan     Neuro: patient is intubated and sedated per protocol.        Respiratory Failure: likely related to underlying lung disease, COPD and fluid volume overload.   Continue scheduled inhalants and  nebulizer's.  Patient was intubated yesterday, bronch completed at bedside. Chest xray reviewed and worsening today, likely again alveolar hemorrhage. Will plan for bronch again today. Will repeat albumin and lasix. Rate decreased to 20 on vent.     1 unit of PLTs and 1 unit of FFP ordered. Will also give Amicar to stop bleeding.     Intubated: 1/9/18.        Cardiac:  /65, HR 52.  Continue continuous ECG and v/s monitoring, maintain MAP >65.       SHEELA on CKD stage III: Creatinine 1.78, 24 hour urine 1.1 liters, continue strict I&O, electrolytes replaced accordingly.      Hypokalemia: resolved, level is 4.1.       Low H/H: level is 7/23, will give 1 unit of PRBC.     IV access: Midlines and peripherals.       ID: WBC is 14.48, Neutrophils are 97.0, no fevers, continue to monitor lab trends and clinical changes. Continue current antibiotics.       Patient Active Problem List   Diagnosis Code   • COPD (chronic obstructive pulmonary disease) J44.9   • Shortness of breath R06.02   • Morbid obesity E66.01   • Hypoxia R09.02   • Edema extremities R60.0   • Sleep apnea G47.30   • Wheezing R06.2   • Allergic rhinitis J30.9   • Essential hypertension I10   • Sore throat J02.9   • Cough R05   • Pneumonia J18.9   • Respiratory failure J96.90   • Acute on chronic respiratory failure with hypoxia and hypercapnia J96.21, J96.22     Bedside rounds were completed with RN and RRT, discussed case and plan in great detail.    YANG Mathews  01/10/18  10:15 AM    Scribed for Dr. Gomez by YANG Cassidy.       IJeremi M.D. attest that the above note accurately reflects the work and decisions made  by me.  Patient was seen and evaluated by Dr. Gomez, including history of present illness, physical exam, assessment, and treatment plan.  The above note was reviewed and edited by Dr. Gomez.Critical Care time spent in direct patient care: 43 minutes (excluding procedure time, if applicable) including  high complexity decision making to assess, manipulate, and support vital organ system failure in this individual who has impairment of one or more vital organ systems such that there is a high probability of imminent or life threatening deterioration in the patient’s condition.

## 2018-01-10 NOTE — PROGRESS NOTES
Seen in conjunction with Jennie PAREDES    History of presenting illness:  Events of yesterday noted, patient had increased respiratory distress and had to be intubated.  Bronchoscopy was done in the right middle lobe bleeding was noted.  Patient is currently on 100% oxygen.  Patient cannot give history due to being sedated and on the ventilator.    Vital Signs  Temp:  [97.8 °F (36.6 °C)-99.1 °F (37.3 °C)] 98.6 °F (37 °C)  Heart Rate:  [44-73] 55  Resp:  [22-28] 22  BP: (110-213)/() 157/57  FiO2 (%):  [60 %-100 %] 100 %  Body mass index is 44.44 kg/(m^2).      Intake/Output Summary (Last 24 hours) at 01/10/18 0758  Last data filed at 01/10/18 0642   Gross per 24 hour   Intake             2388 ml   Output             1550 ml   Net              838 ml     Intake & Output (last 3 days)       01/07 0701 - 01/08 0700 01/08 0701 - 01/09 0700 01/09 0701 - 01/10 0700 01/10 0701 - 01/11 0700    P.O.  684 354     I.V. (mL/kg)   186 (1.5)     Blood  900 668     Other   280     IV Piggyback 50  900     Total Intake(mL/kg) 50 (0.4) 1584 (12.7) 2388 (19.1)     Urine (mL/kg/hr) 2800 (0.9) 1170 (0.4) 1550 (0.5)     Stool  0 (0)      Total Output 2800 1170 1550      Net -2750 +414 +838              Unmeasured Urine Occurrence  2 x      Unmeasured Stool Occurrence  1 x            Physical exam:  Physical Exam   Constitutional: Well-developed, well-nourished.Morbidly obese On the ventilator and sedated  Normocephalic and atraumatic.  Mucous membranes appear moist  Eyes:  Pupils are equal, round , proximally 3 mm bilaterally, some subconjunctival edema noted.  Cardiovascular Bradycardic regular rhythm without murmur rub or gallop  Chest: Bilateral breath sound crackles noted right base otherwise despite chest x-ray findings lungs are fairly clear.  Abdomen:Soft, rounded, bowel sounds are active, nondistended, appears nontender as respiratory rate does not go up when palpated  Musculoskeletal: Cannot test strength 1+ edema, SCDs on,  good palpable distal pulses  Skin: warm and dry.    Worley catheter in place medium yellow clear urine   Neurologic: Sedated    Telemetry: sinus bradycardia, reviewed current rate in the 50s    Results Review:  Lab Results   Component Value Date    WBC 14.48 (H) 01/10/2018    HGB 7.0 (L) 01/10/2018    HCT 23.0 (L) 01/10/2018    MCV 94.7 (H) 01/10/2018     01/10/2018     Lab Results   Component Value Date    GLUCOSE 202 (H) 01/10/2018    BUN 41 (H) 01/10/2018    CREATININE 1.78 (H) 01/10/2018    EGFRIFNONA 38 (L) 01/10/2018    BCR 23.0 01/10/2018    CO2 34.4 (H) 01/10/2018    CALCIUM 8.7 01/10/2018    ALBUMIN 3.90 01/10/2018    LABIL2 1.9 01/10/2018    AST 17 01/10/2018    ALT 16 01/10/2018       Imaging Results (last 72 hours)     Procedure Component Value Units Date/Time    XR Chest 1 View [[id number]] Collected:  12/28/17 1015     Updated:  12/28/17 1017    Narrative:       EXAMINATION:  XR CHEST 1 VW-      CLINICAL INDICATION:     sob     TECHNIQUE:  XR CHEST 1 VW-       COMPARISON: 12/10/2017      FINDINGS:   The lungs remain aerated.   Heart size is stable.   No pneumothorax.   No pleural effusion.   Bony and soft tissue structures are unremarkable.            Impression:       Stable radiographic appearance of the chest.        This report was finalized on 12/28/2017 10:15 AM by Dr. Miko Coto MD.       CT Chest Pulmonary Embolism With Contrast [[id number]] Collected:  12/28/17 1242     Updated:  12/28/17 1245    Narrative:       EXAMINATION: CT CHEST PULMONARY EMBOLISM W CONTRAST-      Technique: Multiple CT axial images were obtained through the level of  pulmonary arteries, following IV contrast administration per CT PE  protocol.  Volume Rendered 3D or MIP images performed.     Radiation dose reduction techniques were utilized per ALARA protocol.  Automated exposure control was initiated through either or [employer] or  [employer] software packages by  protocol.                  CLINICAL  INDICATION:    SOB and Chest Pain     COMPARISON:    Chest x-ray performed on 12/28/2017.     FINDINGS:    Some bibasilar groundglass attenuation possibly  representing alveolar edema or atelectasis. Coronary artery  calcification. Subsegmental pulmonary artery branches are not well  evaluated. There is no pulmonary artery filling defect to suggest  pulmonary embolism. There is no thoracic adenopathy. No pleural or  pericardial effusion. Incidentally imaged upper abdomen is unremarkable.  Bone windows show no acute osseous abnormality.       Impression:          Some bibasilar groundglass attenuation possibly representing alveolar  edema or atelectasis. Coronary artery calcification. Subsegmental  pulmonary artery branches are not well evaluated.     This report was finalized on 12/28/2017 12:43 PM by Dr. Miko Coto MD.       CT Abdomen Pelvis Without Contrast [[id number]] Collected:  12/29/17 0627     Updated:  12/29/17 0631    Narrative:          CT ABDOMEN PELVIS WO CONTRAST-        TECHNIQUE: Multiple axial CT images were obtained from lung bases  through pubic symphysis without administration of IV contrast.  Reformatted images in the coronal and/or sagittal plane(s) were  generated from the axial data set to facilitate diagnostic accuracy  and/or surgical planning.  Oral Contrast:NONE.     Radiation dose reduction techniques were utilized per ALARA protocol.  Automated exposure control was initiated through either or [employer] or  [employer] software packages by  protocol.       [phone] mGy.cm     Clinical information  elevated lactic acid and LLQ abd pain; J96.21-Acute and chronic  respiratory failure with hypoxia; J96.22-Acute and chronic respiratory  failure with hypercapnia; J44.1-Chronic obstructive pulmonary disease  with (acute) exacerbation      Comparison  NONE.     Findings  LOWER THORAX: BIBASILAR ATELECTASIS AND MINIMAL AIRSPACE DISEASE.     ABDOMEN:        LIVER: Homogeneous. No  focal hepatic mass or ductal dilatation.        GALLBLADDER: GALLSTONES IN THE GALLBLADDER IDENTIFIED.        PANCREAS: Unremarkable. No mass or ductal dilatation.        SPLEEN: Homogeneous. No splenomegaly.        ADRENALS: No mass.        KIDNEYS: NONOBSTRUCTIVE BILATERAL RENAL CALCULI.        GI TRACT: Non-dilated. No definite wall thickening.        PERITONEUM: No free air. No free fluid or loculated fluid  collections.        MESENTERY: Unremarkable.        LYMPH NODES: No lymphadenopathy.        VASCULATURE: No evidence of aneurysm.        ABDOMINAL WALL: No focal hernia or mass.           OTHER: None.     PELVIS:        BLADDER: 5 MM POLYP INVOLVING THE SUPERIOR LEFT ASPECT OF THE URINARY  BLADDER THAT SHOULD BE FURTHER EVALUATED WITH CYSTOSCOPY.        REPRODUCTIVE: Unremarkable as visualized.        APPENDIX: Nondistended. No surrounding inflammation.     BONES: No acute bony abnormality.       Impression:       Impression:  1. Bibasilar pneumonia.  2. Cholelithiasis.  3.5 mm polyp involving the superior left aspect of the urinary bladder  that should be further evaluated with cystoscopy.     This report was finalized on 12/29/2017 6:29 AM by Dr. Miko Coto MD.         Chest x-ray This a.m. to my viewing still shows marked bilateral infiltrates, ET tube between the clavicles discussed with radiology, NG tube is actually difficult to see the tip, recommendation to get a KUB.    Echo EF normal in December    D/W Dr Gomez          Assessment/Plan     Active Problems:    Acute hypoxic respiratory failure secondary to COPD exacerbation with pneumonia.Now mechanically ventilated, on 100% oxygen.  Now with pulmonary hemorrhage, appears to be worse, discussed with pulmonology, patient is getting platelets, blood, FFP, Amicar, he is considering a repeat bronchoscopy.  Patient has been off anticoagulants for over 48 hours. Elimination half-life 17-21 hours.  This may be slightly prolonged and his chronic kidney  disease.  septic shock, on admission    Anemia, receiving transfusion today.    Possible pneumonia, CRP and white count have risen however he is on steroids, this may be inflammatory response to the pulmonary hemorrhage, continue broad-spectrum antibiotics at this time.  All cultures negative currently.    Diabetes continue current insulin however once tube feeds started this may need to be further adjusted.    Hypothyroidism, TSH normal, adequate replacement    Nutrition, tube feeds can be started hopefully after bronchoscopy.  Nutrition is to see the patient, KUB ordered.  Discussed with nursing, need to verify tube placement prior to institution of tube feeds.    Acute kidney injury superimposed on chronic kidney disease, I have asked nephrology to see, patient was diuresed yesterday.    DVT prophylaxis, SCDs     With multiorgan dysfunction prognosis is guarded.      Seun Muller MD  01/10/18  7:58 AM

## 2018-01-10 NOTE — CONSULTS
Nephrology Consult Note    Referring Provider: Dr Muller  Reason for Consultation: SHEELA    Subjective       History of present illness:  Se Anne is a 67 y.o. male who was admitted about 2 weeks ago with chief complaint of shortness of breath on exertion. He was being treated for hypoxic and hypercarbic respiratory failure secondary to COPD exacerbation . He decompensated yesterday and was moved to ICU. He developed hemoptysis and was intubated. He is requiring 100% FiO2 and is on 13 PEEP on my evaluation. He underwent bronchoscopy yesterday and is scheduled from another one today. Per nursing he is still having blood tinged secretions from ET tube. His baseline creatinine is 1.2-1.3 and has worsened to 1.7 today. He got lasix yesterday. His BP was uncontrolled earlier during the admission and was on nicardipine drip at some point. He is getting vancomycin and zosyn for possible pneumonia. He is not able to provide history and no family is at bedside  He has dark urine in villalobos. He is not on tube feeds but per nursing they are waiting on possible bronchoscopy to start the feedings. No diarrhea reported    Acute Kidney Injury Risk Factors :    Medications : lasix  Contrast : no  NSAIDS : no  Volume depletion : possible  Hemodynamic Instability : no    History  Past Medical History:   Diagnosis Date   • Agent orange exposure    • Arthritis    • CHF (congestive heart failure)    • COPD (chronic obstructive pulmonary disease)    • Coronary artery disease    • Diabetes mellitus    • Hyperlipidemia    • Hypertension    , Past Surgical History:   Procedure Laterality Date   • CARDIAC SURGERY      stent placement   • CATARACT EXTRACTION     • JOINT REPLACEMENT      right knee   • KNEE SURGERY     , Family History   Problem Relation Age of Onset   • Heart disease Mother    • Cancer Father    • Heart attack Sister    • Heart attack Brother    • Cancer Paternal Grandfather    , Social History   Substance Use Topics   •  Smoking status: Former Smoker     Years: 40.00     Quit date: 3/21/2010   • Smokeless tobacco: Never Used   • Alcohol use No   , Prescriptions Prior to Admission   Medication Sig Dispense Refill Last Dose   • amLODIPine (NORVASC) 10 MG tablet Take 10 mg by mouth Daily.   12/27/2017 at Unknown time   • aspirin 81 MG EC tablet Take 81 mg by mouth Daily.   12/27/2017 at Unknown time   • atorvastatin (LIPITOR) 80 MG tablet Take 80 mg by mouth Daily.   12/27/2017 at Unknown time   • budesonide-formoterol (SYMBICORT) 160-4.5 MCG/ACT inhaler Inhale 2 puffs 2 (Two) Times a Day.   12/27/2017 at Unknown time   • bumetanide (BUMEX) 1 MG tablet Take 1 tablet by mouth Daily. 30 tablet 0 12/27/2017 at Unknown time   • carvedilol (COREG) 25 MG tablet Take 12.5 mg by mouth 2 (Two) Times a Day With Meals.   12/27/2017 at Unknown time   • Cholecalciferol (VITAMIN D3) 2000 UNITS tablet Take 1 tablet by mouth Daily.   12/27/2017 at Unknown time   • gabapentin (NEURONTIN) 100 MG capsule Take 700 mg by mouth Daily.   12/27/2017 at Unknown time   • hydrALAZINE (APRESOLINE) 25 MG tablet Take 3 tablets by mouth 3 (Three) Times a Day. 180 tablet 0 12/27/2017 at Unknown time   • insulin NPH-insulin regular (humuLIN 70/30,novoLIN 70/30) (70-30) 100 UNIT/ML injection Inject 52 Units under the skin Every Morning.   12/27/2017 at Unknown time   • insulin NPH-insulin regular (humuLIN 70/30,novoLIN 70/30) (70-30) 100 UNIT/ML injection Inject 54 Units under the skin Every Night.   12/27/2017 at Unknown time   • isosorbide mononitrate (IMDUR) 60 MG 24 hr tablet Take 180 mg by mouth Daily.   12/27/2017 at Unknown time   • levothyroxine (SYNTHROID, LEVOTHROID) 25 MCG tablet Take 25 mcg by mouth Daily.   12/27/2017 at Unknown time   • loratadine (CLARITIN) 10 MG tablet Take 10 mg by mouth Daily.   12/27/2017 at Unknown time   • metFORMIN (GLUCOPHAGE) 500 MG tablet Take 500 mg by mouth 2 (Two) Times a Day With Meals.   12/27/2017 at Unknown time   •  pantoprazole (PROTONIX) 40 MG EC tablet Take 40 mg by mouth Daily.   12/27/2017 at Unknown time   • sertraline (ZOLOFT) 100 MG tablet Take 50 mg by mouth Every Evening.   12/27/2017 at Unknown time   • tiotropium (SPIRIVA) 18 MCG per inhalation capsule Place 1 capsule into inhaler and inhale Daily.   12/27/2017 at Unknown time   • vitamin B-12 (CYANOCOBALAMIN) 1000 MCG tablet Take 2,000 mcg by mouth Daily.   12/27/2017 at Unknown time   • albuterol (PROVENTIL) (2.5 MG/3ML) 0.083% nebulizer solution Take 2.5 mg by nebulization Every 4 (Four) Hours As Needed for Wheezing or Shortness of Air.   Unknown at Unknown time   • nitroglycerin (NITROSTAT) 0.4 MG SL tablet Place 0.4 mg under the tongue Every 5 (Five) Minutes As Needed for chest pain. Take no more than 3 doses in 15 minutes.   Unknown at Unknown time   • traMADol (ULTRAM) 50 MG tablet Take 50 mg by mouth Every 12 (Twelve) Hours As Needed for Moderate Pain .   Unknown at Unknown time   , Scheduled Meds:    amLODIPine 10 mg Oral Daily   atorvastatin 80 mg Oral Daily   budesonide-formoterol 2 puff Inhalation BID - RT   carvedilol 6.25 mg Oral BID With Meals   cholecalciferol 2,000 Units Oral Daily   CloNIDine 0.3 mg Oral Q8H   ferrous sulfate 325 mg Oral Daily With Breakfast   gabapentin 300 mg Oral Nightly   guaiFENesin 200 mg Oral Q4H   hydrALAZINE 100 mg Oral Q8H   insulin aspart 0-14 Units Subcutaneous 4x Daily AC & at Bedtime   insulin detemir 25 Units Subcutaneous Daily   insulin detemir 30 Units Subcutaneous Nightly   ipratropium-albuterol 3 mL Nebulization Q6H - RT   isosorbide mononitrate 180 mg Oral Daily   lactobacillus acidophilus 1 capsule Oral Daily   levothyroxine 25 mcg Oral Daily   methylPREDNISolone sodium succinate 60 mg Intravenous Q8H   pantoprazole 40 mg Oral Daily   piperacillin-tazobactam 4.5 g Intravenous Q8H   sertraline 50 mg Oral Q PM   sodium chloride 10 mL Intracatheter Q12H   sodium chloride 10 mL Intracatheter Q12H   vancomycin 1,000  mg Intravenous Q12H   vecuronium 5 mg Intravenous Once   vitamin B-12 2,000 mcg Oral Daily   , Continuous Infusions:    fentaNYL (SUBLIMAZE) PCA 1500 mcg/30 mL syringe     Pharmacy Consult - Pharmacy to dose     Pharmacy to dose vancomycin     propofol 5-50 mcg/kg/min Last Rate: 50 mcg/kg/min (01/10/18 1001)   , PRN Meds:  •  aluminum-magnesium hydroxide-simethicone  •  dextrose  •  dextrose  •  glucagon (human recombinant)  •  hydrALAZINE  •  magnesium sulfate **OR** magnesium sulfate **OR** magnesium sulfate  •  midazolam  •  nitroglycerin  •  Pharmacy Consult - Pharmacy to dose  •  Pharmacy to dose vancomycin  •  potassium chloride **OR** potassium chloride **OR** potassium chloride  •  sodium chloride  •  Insert peripheral IV **AND** sodium chloride  •  sodium chloride  •  sodium chloride and Allergies:  Iodine and Lisinopril    Review of Systems  Unable to obtain    Objective     Vital Signs  Temp:  [98.2 °F (36.8 °C)-99.1 °F (37.3 °C)] 98.9 °F (37.2 °C)  Heart Rate:  [44-61] 61  Resp:  [20-27] 20  BP: (110-174)/(49-74) 165/70  FiO2 (%):  [100 %] 100 %    I/O this shift:  In: 1091.8 [Blood:591.8; IV Piggyback:500]  Out: -   I/O last 3 completed shifts:  In: 3318 [P.O.:684; I.V.:186; Blood:1268; Other:280; IV Piggyback:900]  Out: 2095 [Urine:2095]    Physical Examination:    General Appearance : intubated, sedated  Head : normocephalic, without obvious abnormality and atraumatic  Eyes : conjunctivae and sclerae normal, no icterus, + pallor and PERRLA  Throat : ETT in place  Neck: no adenopathy, suppple, no carotid bruit   Lungs : crackles at bases, clear anteriorly  Heart : regular rhythm & normal rate, normal S1, S2, no murmur, no yovani, no rub   Abdomen :  normal bowel sounds, no masses and soft non-tender  Rectal : Deferred  Extremities : no redness and trace edema  Pulses :  palpable and equal bilaterally  Skin : no bleeding, bruising or rash  Neurologic : sedated    Laboratory Data :      WBC WBC   Date  Value Ref Range Status   01/10/2018 14.48 (H) 4.50 - 12.50 10*3/mm3 Final   01/09/2018 8.95 4.50 - 12.50 10*3/mm3 Final   01/08/2018 7.67 4.50 - 12.50 10*3/mm3 Final      HGB Hemoglobin   Date Value Ref Range Status   01/10/2018 7.0 (L) 14.0 - 18.0 g/dL Final   01/09/2018 8.4 (L) 14.0 - 18.0 g/dL Final   01/09/2018 8.2 (L) 14.0 - 18.0 g/dL Final   01/08/2018 6.8 (C) 14.0 - 18.0 g/dL Final   01/08/2018 6.4 (C) 14.0 - 18.0 g/dL Final      HCT Hematocrit   Date Value Ref Range Status   01/10/2018 23.0 (L) 42.0 - 52.0 % Final   01/09/2018 27.1 (L) 42.0 - 52.0 % Final   01/09/2018 26.2 (L) 42.0 - 52.0 % Final   01/08/2018 22.5 (L) 42.0 - 52.0 % Final   01/08/2018 21.6 (C) 42.0 - 52.0 % Final      Platlets No results found for: LABPLAT   MCV MCV   Date Value Ref Range Status   01/10/2018 94.7 (H) 80.0 - 94.0 fL Final   01/09/2018 92.9 80.0 - 94.0 fL Final   01/08/2018 96.9 (H) 80.0 - 94.0 fL Final          Sodium Sodium   Date Value Ref Range Status   01/10/2018 145 135 - 153 mmol/L Final   01/09/2018 143 135 - 153 mmol/L Final   01/08/2018 150 135 - 153 mmol/L Final      Potassium Potassium   Date Value Ref Range Status   01/10/2018 4.1 3.5 - 5.3 mmol/L Final   01/09/2018 3.4 (L) 3.5 - 5.3 mmol/L Final   01/08/2018 3.8 3.5 - 5.3 mmol/L Final   01/07/2018 3.6 3.5 - 5.3 mmol/L Final      Chloride Chloride   Date Value Ref Range Status   01/10/2018 102 99 - 112 mmol/L Final   01/09/2018 100 99 - 112 mmol/L Final   01/08/2018 104 99 - 112 mmol/L Final      CO2 CO2   Date Value Ref Range Status   01/10/2018 34.4 (H) 24.3 - 31.9 mmol/L Final   01/09/2018 39.2 (H) 24.3 - 31.9 mmol/L Final   01/08/2018 >40.0 (H) 24.3 - 31.9 mmol/L Final      BUN BUN   Date Value Ref Range Status   01/10/2018 41 (H) 7 - 21 mg/dL Final   01/09/2018 35 (H) 7 - 21 mg/dL Final   01/08/2018 29 (H) 7 - 21 mg/dL Final      Creatinine Creatinine   Date Value Ref Range Status   01/10/2018 1.78 (H) 0.43 - 1.29 mg/dL Final   01/09/2018 1.40 (H) 0.43 - 1.29  mg/dL Final   01/08/2018 1.38 (H) 0.43 - 1.29 mg/dL Final      Calcium Calcium   Date Value Ref Range Status   01/10/2018 8.7 7.7 - 10.0 mg/dL Final   01/09/2018 9.0 7.7 - 10.0 mg/dL Final   01/08/2018 8.8 7.7 - 10.0 mg/dL Final      PO4 No results found for: CAPO4   Albumin Albumin   Date Value Ref Range Status   01/10/2018 3.90 3.40 - 4.80 g/dL Final   01/09/2018 4.10 3.40 - 4.80 g/dL Final      Magnesium Magnesium   Date Value Ref Range Status   01/09/2018 2.5 1.7 - 2.6 mg/dL Final   01/08/2018 2.2 1.7 - 2.6 mg/dL Final      Uric Acid No results found for: URICACID     Radiology results :     Imaging Results (last 72 hours)     Procedure Component Value Units Date/Time    XR Chest 1 View [[id number]] Collected:  01/07/18 2236     Updated:  01/07/18 2239    Narrative:       EXAMINATION: XR CHEST 1 VW-      CLINICAL INDICATION:     worsening hypoxia; J96.21-Acute and chronic  respiratory failure with hypoxia; J96.22-Acute and chronic respiratory  failure with hypercapnia; J44.1-Chronic obstructive pulmonary disease  with (acute) exacerbation     TECHNIQUE:  XR CHEST 1 VW-      COMPARISON: 01/06/2018      FINDINGS:   Persistent bilateral patchy airspace disease may represent edema or  pneumonia. Cardiomegaly is stable. Improved aeration of the lung bases.  No effusion or pneumothorax.       Impression:       Slight improvement in aeration. Otherwise stable bilateral  patchy airspace disease and cardiac enlargement.     This report was finalized on 1/7/2018 10:37 PM by Dr. Kirk Kellogg MD.       XR Chest AP [[id number]] Collected:  01/09/18 0700     Updated:  01/09/18 0703    Narrative:       EXAMINATION: XR CHEST AP-      CLINICAL INDICATION:     Respiratory failure; J96.21-Acute and chronic  respiratory failure with hypoxia; J96.22-Acute and chronic respiratory  failure with hypercapnia; J44.1-Chronic obstructive pulmonary disease  with (acute) exacerbation     TECHNIQUE:  XR CHEST AP-      COMPARISON: 1/7/2018       FINDINGS:   Cardiomegaly and probable bilateral airspace edema increased. Underlying  COPD stable. Cardiomegaly stable. No effusion or pneumothorax.       Impression:       Increasing airspace disease otherwise stable chest.     This report was finalized on 1/9/2018 7:01 AM by Dr. Kirk Kellogg MD.       CT Abdomen Pelvis Without Contrast [[id number]] Collected:  01/09/18 0701     Updated:  01/09/18 0704    Narrative:          CT ABDOMEN PELVIS WO CONTRAST-        TECHNIQUE: Multiple axial CT images were obtained from lung bases  through pubic symphysis WITHOUT IV contrast. Reformatted images in the  coronal and/or sagittal plane(s) were generated from the axial data set  to facilitate diagnostic accuracy and/or surgical planning.  Oral Contrast:NONE.     Radiation dose reduction techniques were utilized per ALARA protocol.  Automated exposure control was initiated through either or CareDose or  DoseRight software packages by  protocol.       Radiation dose reduction techniques were utilized per ALARA protocol.  Automated exposure control was initiated through either or CareDose or  DoseRight software packages by  protocol.       [id number] mGy.cm     Clinical information  r/o retro peritoneal hemotoma; J96.21-Acute and chronic respiratory  failure with hypoxia; J96.22-Acute and chronic respiratory failure with  hypercapnia; J44.1-Chronic obstructive pulmonary disease with (acute)  exacerbation      Comparison  Comparison: 12/28/2017     Findings  LOWER THORAX: BILATERAL SMALL PLEURAL EFFUSIONS WITH BILATERAL  GROUNDGLASS AND PATCHY AIRSPACE DISEASE.     ABDOMEN:        LIVER: Homogeneous. No focal hepatic mass or ductal dilatation.        GALLBLADDER: CHOLELITHIASIS.        PANCREAS: Unremarkable. No mass or ductal dilatation.        SPLEEN: Homogeneous. No splenomegaly.        ADRENALS: No mass.        KIDNEYS: NONOBSTRUCTING RIGHT KIDNEY STONES. NO HYDRONEPHROSIS.        GI TRACT: MILD  DIVERTICULOSIS OF SIGMOID COLON WITHOUT  DIVERTICULITIS.        PERITONEUM: No free air. No free fluid or loculated fluid  collections.        MESENTERY: Unremarkable.        LYMPH NODES: No lymphadenopathy.        VASCULATURE: ARTERIAL VASCULAR CALCIFICATIONS WITHOUT ANEURYSM.        ABDOMINAL WALL: No focal hernia or mass.           OTHER: None.     PELVIS:        BLADDER: URINARY BLADDER WALL THICKENING LIKELY IN SETTING OF  PROSTATE HYPERTROPHY.        REPRODUCTIVE: Unremarkable as visualized.        APPENDIX: Nondistended. No surrounding inflammation.     BONES: No acute bony abnormality.       Impression:       Impression:  1. BILATERAL LOWER LOBE AIRSPACE DISEASE WITH SMALL PLEURAL EFFUSIONS.  2. NO ACUTE FINDINGS IDENTIFIED WITHIN ABDOMEN OR PELVIS. SPECIFICALLY  NO RETROPERITONEAL HEMATOMA.  3. OTHER NONACUTE FINDINGS AS ABOVE.     This report was finalized on 1/9/2018 7:02 AM by Dr. Kirk Kellogg MD.       XR Chest 1 View [[id number]] Collected:  01/09/18 1249     Updated:  01/09/18 1317    Narrative:       EXAMINATION: XR CHEST 1 VW-      CLINICAL INDICATION:     Intubation, OG placement; J96.21-Acute and  chronic respiratory failure with hypoxia; J96.22-Acute and chronic  respiratory failure with hypercapnia; J44.1-Chronic obstructive  pulmonary disease with (acute) exacerbation     TECHNIQUE:  XR CHEST 1 VW-      COMPARISON: 1/9/2018 5:46 AM     FINDINGS:   ET tube with tip just below clavicles. NG tube extends into stomach.  Increasing left basilar airspace disease. Stable perihilar opacities  right greater than left lung. Decreased density of left upper lobe  opacities. Small left effusion. No pneumothorax.       Impression:       1. Support devices as detailed above.  2. Increasing left basilar airspace disease. Otherwise slight decrease  in right greater than left somewhat perihilar airspace disease.     This report was finalized on 1/9/2018 12:51 PM by Dr. Kirk Kellogg MD.       XR Chest AP  [[id number]] Collected:  01/10/18 0758     Updated:  01/10/18 0801    Narrative:       EXAMINATION: XR CHEST AP-      CLINICAL INDICATION:     Evaluate worsening pneumonia; J96.21-Acute and  chronic respiratory failure with hypoxia; J96.22-Acute and chronic  respiratory failure with hypercapnia; J44.1-Chronic obstructive  pulmonary disease with (acute) exacerbation     TECHNIQUE:  XR CHEST AP-      COMPARISON: 1/9/2018      FINDINGS:   Probably worsening bilateral patchy airspace disease and underlying  consolidations.   ET tube with tip at level of clavicles. NG tube extends presumably into  the stomach. No pneumothorax.       Impression:       Mild interval worsening of bilateral airspace disease.  Support devices as above.     This report was finalized on 1/10/2018 7:59 AM by Dr. Kirk Kellogg MD.       XR Abdomen KUB [[id number]] Collected:  01/10/18 0856     Updated:  01/10/18 0858    Narrative:       EXAMINATION: XR ABDOMEN KUB-      CLINICAL INDICATION:eval NGT; J96.21-Acute and chronic respiratory  failure with hypoxia; J96.22-Acute and chronic respiratory failure with  hypercapnia; J44.1-Chronic obstructive pulmonary disease with (acute)  exacerbation        COMPARISON: None      TECHNIQUE: KUB     FINDINGS:   NG tube with tip in the region of distal stomach. Left basilar airspace  disease and cardiomegaly. Nonobstructive bowel gas pattern.       Impression:       NG tube with tip in the region of distal stomach.     This report was finalized on 1/10/2018 8:56 AM by Dr. Kirk Kellogg MD.               Medications:        amLODIPine 10 mg Oral Daily   atorvastatin 80 mg Oral Daily   budesonide-formoterol 2 puff Inhalation BID - RT   carvedilol 6.25 mg Oral BID With Meals   cholecalciferol 2,000 Units Oral Daily   CloNIDine 0.3 mg Oral Q8H   ferrous sulfate 325 mg Oral Daily With Breakfast   gabapentin 300 mg Oral Nightly   guaiFENesin 200 mg Oral Q4H   hydrALAZINE 100 mg Oral Q8H   insulin aspart 0-14 Units  Subcutaneous 4x Daily AC & at Bedtime   insulin detemir 25 Units Subcutaneous Daily   insulin detemir 30 Units Subcutaneous Nightly   ipratropium-albuterol 3 mL Nebulization Q6H - RT   isosorbide mononitrate 180 mg Oral Daily   lactobacillus acidophilus 1 capsule Oral Daily   levothyroxine 25 mcg Oral Daily   methylPREDNISolone sodium succinate 60 mg Intravenous Q8H   pantoprazole 40 mg Oral Daily   piperacillin-tazobactam 4.5 g Intravenous Q8H   sertraline 50 mg Oral Q PM   sodium chloride 10 mL Intracatheter Q12H   sodium chloride 10 mL Intracatheter Q12H   vancomycin 1,000 mg Intravenous Q12H   vecuronium 5 mg Intravenous Once   vitamin B-12 2,000 mcg Oral Daily       fentaNYL (SUBLIMAZE) PCA 1500 mcg/30 mL syringe     Pharmacy Consult - Pharmacy to dose     Pharmacy to dose vancomycin     propofol 5-50 mcg/kg/min Last Rate: 50 mcg/kg/min (01/10/18 1001)       Assessment/Plan     Active Problems:    Respiratory failure    Acute on chronic respiratory failure with hypoxia and hypercapnia      1. SHEELA on CKD 3 :his  baseline creatinine is 1.2-1.3 and has worsened to 1.7 today. SHEELA likely from diuresis . No hydronephrosis on recent CT scan of abdomen. I will check UA with microscopy, urine sodium and also check for proteinuria. Will dc vancomyicn and renal function is not stable and consult pharmacy to dose vancomycin and zosyn. Will watch with tube feeds. Avoid IVF for now due to respiratory failure and worsening pulmonary infiltrates    2. Alveolar hemorrhage : will check serologies to rule out vasculitis and goodpastures disease.     3. Respiratory failure : Dr Gomez managing    4. Anemia : Hb has dropped from around 10 on admission to 7, check stool occult. He is iron deficient but will avoid venofer for now due to acute illness. Platelet count is normal    5. HTN : BP is elevated, will watch. He is on several medications    He is critically ill  Thanks Dr Muller for the consult. We will follow with you.  I  discussed the patients findings and my recommendations with nursing staff and consulting provider    Fahad Card MD  01/10/18  11:54 AM

## 2018-01-10 NOTE — NURSING NOTE
Called to room by resp to eval patients lower lip for blisters.  At time of evaluation, lower lip is dry and intact.  No blisters noted.     Vent patient.  Preventative skin orders initiated.

## 2018-01-10 NOTE — PROGRESS NOTES
Bronchoscopy Procedure Note    Date of Operation: 1/10/2018    Pre-op Diagnosis: Hemoptysis    Post-op Diagnosis: Hemoptysis    Surgeon: Jeremi Gomez MD    Assistants: None    Anesthesia: Please see anesthesia report for details    Operation: Flexible fiberoptic bronchoscopy, diagnostic     Findings: Bleeding in the right lower lobe and left lower lobe    Specimen: None    Estimated Blood Loss: No fresh blood was seen and no fresh bleeding.    Complications: None    Indications and History:  The patient is a 67 y.o. male with hemoptysis and bleeding from the lungs.  The risks, benefits, complications, treatment options and expected outcomes were discussed with the patients wife.  The possibilities of reaction to medication, pulmonary aspiration, perforation of a viscus, bleeding, failure to diagnose a condition and creating a complication requiring transfusion or operation were discussed with the patients family who freely signed the consent.      Description of Procedure:  tab Anne and the procedure verified as Flexible Fiberoptic Bronchoscopy.  A Time Out was held and the above information confirmed.     After the induction of topical nasopharyngeal anesthesia, the patient was positioned  and the bronchoscope was passed through the nares . The vocal cords were visualized and  2 ml 1 % lidocaine was topically placed onto the cords. The cords were normal. The scope was then passed into the trachea.      2 ml 1 % lidocaine was used topically on the walter.  Careful inspection of the tracheal lumen was accomplished. The scope was sequentially passed into the left main and then left upper and lower bronchi and segmental bronchi.       The scope was then withdrawn and advanced into the right main bronchus and then into the RUL, RML, and RLL bronchi and segmental bronchi.     Old blood with some fresh blood was seen in the right and left lower lobes.    Epinephrine and thrombin was sprayed in the left and right  lower lobes.    We'll continue to monitor the patient for any bleeding.          Jeremi Gomez MD

## 2018-01-11 NOTE — PROGRESS NOTES
Nephrology  Note      Subjective     He had repeat bronchoscopy yesterday which showed bleeding from both lower lobes. FiO2 weaned down some. He is making urine. Tolerating tube feeds he is on sodium nitroprusside for HTN urgency  Objective     Vital Signs  Temp:  [98.3 °F (36.8 °C)-98.9 °F (37.2 °C)] 98.3 °F (36.8 °C)  Heart Rate:  [0-59] 49  Resp:  [20-21] 20  BP: (116-179)/(53-79) 158/56  FiO2 (%):  [85 %-100 %] 85 %    I/O this shift:  In: 250 [IV Piggyback:250]  Out: -   I/O last 3 completed shifts:  In: 5124.2 [I.V.:902.3; Blood:1586.8; Other:840; NG/GT:295; IV Piggyback:1500]  Out: 2450 [Urine:2450]    Physical Examination:    General Appearance : intubated, sedated  Head : normocephalic, without obvious abnormality and atraumatic  Eyes : + pallor   Throat : ETT in place  Neck:  suppple  Lungs : crackles at bases, clear anteriorly  Heart : regular rhythm & normal rate, normal S1, S2, no murmur  Abdomen :  normal bowel sounds,soft non-tender  Extremities :  trace edema  Skin : no  rash  Neurologic : sedated    Laboratory Data :      WBC WBC   Date Value Ref Range Status   01/11/2018 4.82 4.50 - 12.50 10*3/mm3 Final   01/10/2018 14.48 (H) 4.50 - 12.50 10*3/mm3 Final   01/09/2018 8.95 4.50 - 12.50 10*3/mm3 Final      HGB Hemoglobin   Date Value Ref Range Status   01/11/2018 7.5 (L) 14.0 - 18.0 g/dL Final   01/11/2018 7.2 (L) 14.0 - 18.0 g/dL Final   01/10/2018 7.3 (L) 14.0 - 18.0 g/dL Final   01/10/2018 7.0 (L) 14.0 - 18.0 g/dL Final   01/09/2018 8.4 (L) 14.0 - 18.0 g/dL Final   01/09/2018 8.2 (L) 14.0 - 18.0 g/dL Final      HCT Hematocrit   Date Value Ref Range Status   01/11/2018 24.3 (L) 42.0 - 52.0 % Final   01/11/2018 23.3 (L) 42.0 - 52.0 % Final   01/10/2018 23.2 (L) 42.0 - 52.0 % Final   01/10/2018 23.0 (L) 42.0 - 52.0 % Final   01/09/2018 27.1 (L) 42.0 - 52.0 % Final   01/09/2018 26.2 (L) 42.0 - 52.0 % Final      Platlets No results found for: LABPLAT   MCV MCV   Date Value Ref Range Status   01/11/2018  92.5 80.0 - 94.0 fL Final   01/10/2018 94.7 (H) 80.0 - 94.0 fL Final   01/09/2018 92.9 80.0 - 94.0 fL Final          Sodium Sodium   Date Value Ref Range Status   01/11/2018 145 135 - 153 mmol/L Final   01/10/2018 145 135 - 153 mmol/L Final   01/09/2018 143 135 - 153 mmol/L Final      Potassium Potassium   Date Value Ref Range Status   01/11/2018 3.6 3.5 - 5.3 mmol/L Final   01/10/2018 4.1 3.5 - 5.3 mmol/L Final   01/09/2018 3.4 (L) 3.5 - 5.3 mmol/L Final      Chloride Chloride   Date Value Ref Range Status   01/11/2018 103 99 - 112 mmol/L Final   01/10/2018 102 99 - 112 mmol/L Final   01/09/2018 100 99 - 112 mmol/L Final      CO2 CO2   Date Value Ref Range Status   01/11/2018 33.1 (H) 24.3 - 31.9 mmol/L Final   01/10/2018 34.4 (H) 24.3 - 31.9 mmol/L Final   01/09/2018 39.2 (H) 24.3 - 31.9 mmol/L Final      BUN BUN   Date Value Ref Range Status   01/11/2018 49 (H) 7 - 21 mg/dL Final   01/10/2018 41 (H) 7 - 21 mg/dL Final   01/09/2018 35 (H) 7 - 21 mg/dL Final      Creatinine Creatinine   Date Value Ref Range Status   01/11/2018 2.08 (H) 0.43 - 1.29 mg/dL Final   01/10/2018 1.78 (H) 0.43 - 1.29 mg/dL Final   01/09/2018 1.40 (H) 0.43 - 1.29 mg/dL Final      Calcium Calcium   Date Value Ref Range Status   01/11/2018 8.2 7.7 - 10.0 mg/dL Final   01/10/2018 8.7 7.7 - 10.0 mg/dL Final   01/09/2018 9.0 7.7 - 10.0 mg/dL Final      PO4 No results found for: CAPO4   Albumin Albumin   Date Value Ref Range Status   01/11/2018 3.70 3.40 - 4.80 g/dL Final   01/10/2018 3.90 3.40 - 4.80 g/dL Final   01/09/2018 4.10 3.40 - 4.80 g/dL Final      Magnesium Magnesium   Date Value Ref Range Status   01/09/2018 2.5 1.7 - 2.6 mg/dL Final      Uric Acid No results found for: URICACID     Radiology results :     Imaging Results (last 24 hours)     Procedure Component Value Units Date/Time    XR Chest AP [[id number]] Collected:  01/11/18 0807     Updated:  01/11/18 0810    Narrative:       EXAMINATION: XR CHEST AP-      CLINICAL INDICATION:      eval resp failure; J96.21-Acute and chronic  respiratory failure with hypoxia; J96.22-Acute and chronic respiratory  failure with hypercapnia; J44.1-Chronic obstructive pulmonary disease  with (acute) exacerbation; R09.02-Hypoxemia     TECHNIQUE:  XR CHEST AP-      COMPARISON: 1/10/2018      FINDINGS:   Diffuse airspace disease slightly improved. Cardiomegaly stable. Trace  effusions left greater than right. No pneumothorax.     ET tube with tip at level of clavicles. NG tube extends into stomach.       Impression:       Improvement in bilateral airspace disease. Support devices  as above. Otherwise stable chest.     This report was finalized on 1/11/2018 8:08 AM by Dr. Kirk Kellogg MD.       XR Chest 1 View [[id number]] Updated:  01/11/18 1228          Medications:        amLODIPine 10 mg Oral Daily   atorvastatin 80 mg Oral Daily   budesonide-formoterol 2 puff Inhalation BID - RT   carvedilol 6.25 mg Oral BID With Meals   cholecalciferol 2,000 Units Oral Daily   CloNIDine 0.3 mg Oral Q8H   ferrous sulfate 325 mg Oral Daily With Breakfast   gabapentin 300 mg Oral Nightly   guaiFENesin 200 mg Oral Q4H   hydrALAZINE 100 mg Oral Q8H   insulin aspart 0-14 Units Subcutaneous 4x Daily AC & at Bedtime   insulin aspart 8 Units Subcutaneous Q6H   insulin detemir 35 Units Subcutaneous Nightly   insulin detemir 35 Units Subcutaneous Daily   ipratropium-albuterol 3 mL Nebulization Q6H - RT   isosorbide mononitrate 240 mg Oral Daily   lactobacillus acidophilus 1 capsule Oral Daily   levothyroxine 25 mcg Oral Daily   methylPREDNISolone sodium succinate 40 mg Intravenous Q8H   pantoprazole 40 mg Oral Daily   piperacillin-tazobactam 4.5 g Intravenous Q8H   sertraline 50 mg Oral Q PM   sodium chloride 10 mL Intracatheter Q12H   sodium chloride 10 mL Intracatheter Q12H   terazosin 5 mg Oral Daily   vancomycin 750 mg Intravenous Q12H   vecuronium 5 mg Intravenous Once   vitamin B-12 2,000 mcg Oral Daily       fentaNYL (SUBLIMAZE)  PCA 1500 mcg/30 mL syringe     nitroprusside 0.3-10 mcg/kg/min Last Rate: 0.5 mcg/kg/min (01/11/18 2308)   Pharmacy Consult - Pharmacy to dose     Pharmacy to dose vancomycin     propofol 5-50 mcg/kg/min Last Rate: 30 mcg/kg/min (01/11/18 1861)       Assessment/Plan     Active Problems:    Respiratory failure    Acute on chronic respiratory failure with hypoxia and hypercapnia      1. SHEELA on CKD 3 :his  baseline creatinine is 1.2-1.3 and has worsened to 2 today. Likely prerenal SHEELA. Urine sodium is low . No hydronephrosis on recent CT scan of abdomen.  He has no hematuria and  Proteinuria is only 224 mg/gm. Will start 1/2 NS at 75 cc/hr as Na is 145    2. Alveolar hemorrhage : serologies pending, CXR appears little improved. D/w pulmonology    3. Respiratory failure : Dr Gomez managing    4. Anemia : Hb stable.  stool occult pending. He is iron deficient but will avoid venofer for now due to acute illness. Platelet count is normal    5. HTN : on nitroprusside    He is critically ill  I discussed the patients findings and my recommendations with RN, Mireya Muller and Jason Card MD  01/11/18  2:24 PM

## 2018-01-11 NOTE — PROGRESS NOTES
LOS: 14 days   Patient Care Team:  No Known Provider as PCP - General  No Known Provider as PCP - Family Medicine    Chief Complaint:  Pulmonology is following for respiratory failure     Subjective     Interval History: patient is intubated and sedated.      History taken from: chart RN Patient unable to give history due to patient intubated.    Review of Systems:   Review of Systems - History obtained from chart review and unobtainable from patient due to mental status and Intubated      Objective     Vital Signs  Temp:  [98.4 °F (36.9 °C)-98.9 °F (37.2 °C)] 98.7 °F (37.1 °C)  Heart Rate:  [0-61] 48  Resp:  [20] 20  BP: (116-179)/(53-75) 141/56  FiO2 (%):  [85 %-100 %] 85 %  Body mass index is 42.09 kg/(m^2).    Intake/Output Summary (Last 24 hours) at 01/11/18 0923  Last data filed at 01/11/18 0635   Gross per 24 hour   Intake          3290.16 ml   Output             1900 ml   Net          1390.16 ml          Vent Settings  Vent Mode: VC/AC    Vt (Set, L): 0.45 L  Resp Rate (Set): 20     FiO2 (%): 85 %  PEEP/CPAP (cm H2O): 13 cm H20    Minute Ventilation (L/min) (Obs): 9.05 L/min  Resp Rate (Observed) Vent: 20     I:E Ratio (Obs): 1:3.80    PIP Observed (cm H2O): 45 cm H2O     Physical Exam:  GENERAL APPEARANCE: Intubated and sedated.  Appears to be resting comfortably in bed.     HEAD: normocephalic.    EYES: PERRLA.    THROAT: ET tube in place. Oral cavity and pharynx normal. No inflammation, swelling, exudate, or lesions.     NECK: Neck supple.     CARDIAC: Normal S1 and S2. No S3, S4 or murmurs. Rhythm is regular. There is no peripheral edema, cyanosis or pallor. Extremities are warm and well perfused. Capillary refill is less than 2 seconds. No carotid bruits.    Respiratory: Clear to auscultation and percussion without rales, rhonchi, wheezing or diminished breath sounds.    GI: Positive bowel sounds. Soft, nondistended, nontender.     Musculoskeletal: No significant deformity or joint abnormality. No  edema. Peripheral pulses intact.    NEUROLOGICAL: Unable to assess due to sedation status.     PSYCHIATRIC: Unable to assess due to sedation status.     Results Review:     I reviewed the patient's new clinical results.  I reviewed the patient's new imaging results and agree with the interpretation.    Results from last 7 days  Lab Units 01/11/18  0108 01/10/18  1733 01/10/18  0034  01/09/18 0337   WBC 10*3/mm3 4.82  --  14.48*  --  8.95   HEMOGLOBIN g/dL 7.2* 7.3* 7.0*  < > 8.2*   PLATELETS 10*3/mm3 148  --  142  --  163   < > = values in this interval not displayed.    Results from last 7 days  Lab Units 01/11/18  0108 01/10/18  0218 01/09/18  0337 01/08/18  0108   SODIUM mmol/L 145 145 143 150   POTASSIUM mmol/L 3.6 4.1 3.4* 3.8   CHLORIDE mmol/L 103 102 100 104   CO2 mmol/L 33.1* 34.4* 39.2* >40.0*   BUN mg/dL 49* 41* 35* 29*   CREATININE mg/dL 2.08* 1.78* 1.40* 1.38*   CALCIUM mg/dL 8.2 8.7 9.0 8.8   GLUCOSE mg/dL 271* 202* 138* 131*   MAGNESIUM mg/dL  --   --  2.5 2.2     Lab Results   Component Value Date    INR 1.25 (H) 01/10/2018    INR 1.27 (H) 01/09/2018    INR 1.18 (H) 12/06/2017    PROTIME 15.8 (H) 01/10/2018    PROTIME 16.1 (H) 01/09/2018    PROTIME 15.2 12/06/2017       Results from last 7 days  Lab Units 01/11/18  0108 01/10/18  0218 01/09/18  0337   ALK PHOS U/L 45 35* 33*   BILIRUBIN mg/dL 1.1 1.7 1.5   ALT (SGPT) U/L 19 16 19   AST (SGOT) U/L 15 17 19       Results from last 7 days  Lab Units 01/11/18  0505   PH, ARTERIAL pH units 7.394   PO2 ART mm Hg 88.0   PCO2, ARTERIAL mm Hg 52.9*   HCO3 ART mmol/L 31.6*     Imaging Results (last 24 hours)     Procedure Component Value Units Date/Time    XR Chest AP [[id number]] Collected:  01/11/18 0807     Updated:  01/11/18 0810    Narrative:       EXAMINATION: XR CHEST AP-      CLINICAL INDICATION:     eval resp failure; J96.21-Acute and chronic  respiratory failure with hypoxia; J96.22-Acute and chronic respiratory  failure with hypercapnia;  J44.1-Chronic obstructive pulmonary disease  with (acute) exacerbation; R09.02-Hypoxemia     TECHNIQUE:  XR CHEST AP-      COMPARISON: 1/10/2018      FINDINGS:   Diffuse airspace disease slightly improved. Cardiomegaly stable. Trace  effusions left greater than right. No pneumothorax.     ET tube with tip at level of clavicles. NG tube extends into stomach.       Impression:       Improvement in bilateral airspace disease. Support devices  as above. Otherwise stable chest.     This report was finalized on 1/11/2018 8:08 AM by Dr. Kirk Kellogg MD.                Medication Review:   Scheduled Medications:    albumin human 25 g Intravenous Once   amLODIPine 10 mg Oral Daily   atorvastatin 80 mg Oral Daily   budesonide-formoterol 2 puff Inhalation BID - RT   carvedilol 6.25 mg Oral BID With Meals   cholecalciferol 2,000 Units Oral Daily   CloNIDine 0.3 mg Oral Q8H   ferrous sulfate 325 mg Oral Daily With Breakfast   gabapentin 300 mg Oral Nightly   guaiFENesin 200 mg Oral Q4H   hydrALAZINE 100 mg Oral Q8H   insulin aspart 0-14 Units Subcutaneous 4x Daily AC & at Bedtime   insulin aspart 8 Units Subcutaneous Q6H   insulin detemir 35 Units Subcutaneous Nightly   insulin detemir 35 Units Subcutaneous Daily   ipratropium-albuterol 3 mL Nebulization Q6H - RT   isosorbide mononitrate 240 mg Oral Daily   lactobacillus acidophilus 1 capsule Oral Daily   levothyroxine 25 mcg Oral Daily   methylPREDNISolone sodium succinate 40 mg Intravenous Q8H   pantoprazole 40 mg Oral Daily   piperacillin-tazobactam 4.5 g Intravenous Q8H   sertraline 50 mg Oral Q PM   sodium chloride 10 mL Intracatheter Q12H   sodium chloride 10 mL Intracatheter Q12H   terazosin 5 mg Oral Daily   vancomycin 1,000 mg Intravenous Q12H   vecuronium 5 mg Intravenous Once   vitamin B-12 2,000 mcg Oral Daily     Continuous infusions:    fentaNYL (SUBLIMAZE) PCA 1500 mcg/30 mL syringe     nitroprusside 0.3-10 mcg/kg/min Last Rate: 0.5 mcg/kg/min (01/11/18 0317)    Pharmacy Consult - Pharmacy to dose     Pharmacy to dose vancomycin     propofol 5-50 mcg/kg/min Last Rate: 30 mcg/kg/min (01/11/18 0632)       Assessment/Plan      Neuro: patient is intubated and sedated per protocol, no sedation vacation today due to seriousness of current condition.       Respiratory Failure: likely related to underlying lung disease, COPD and fluid volume overload. Continue scheduled inhalants and nebulizer's. Chest xray is the same, staff reports minimal blood coming from in-line ET tube suction. H/H remains in the low 7 range, will repeat that this morning.  Fi02 decreased to 85%. Medrol was decreased to 40 mg TID due to worsening glucoses. Will advance ET tube 2 cm and repeat Chest xray.      Intubated: 1/9/18.       Hypertension: /61, HR 55.  Continue continuous ECG and v/s monitoring, maintain MAP >65, nipride gtt infusing, imdur increased to 240 mg. Hytrin added for alpha-1 effect on SVR.       SHEELA on CKD stage III: Creatinine 2.08, 24 hour urine 1.9  liters, continue strict I&O, electrolytes replaced accordingly.       Low H/H: level is 7.2/23.3, will repeat level this morning and monitor closely.       IV access: Midlines and peripherals.       ID: WBC is 4.82, Neutrophils are 90.7 CRP is 7.61 no fevers, continue to monitor lab trends and clinical changes. Continue current antibiotics.     Patient Active Problem List   Diagnosis Code   • COPD (chronic obstructive pulmonary disease) J44.9   • Shortness of breath R06.02   • Morbid obesity E66.01   • Hypoxia R09.02   • Edema extremities R60.0   • Sleep apnea G47.30   • Wheezing R06.2   • Allergic rhinitis J30.9   • Essential hypertension I10   • Sore throat J02.9   • Cough R05   • Pneumonia J18.9   • Respiratory failure J96.90   • Acute on chronic respiratory failure with hypoxia and hypercapnia J96.21, J96.22         Bedside rounds were completed with RN and RRT, discussed case and plan in great detail.        Leyla Terrazas,  YANG  01/11/18  9:23 AM    Scribed for Dr. Gomez by YANG Cassidy.     I, Jeremi Gomez M.D. attest that the above note accurately reflects the work and decisions made  by me.  Patient was seen and evaluated by Dr. Gomez, including history of present illness, physical exam, assessment, and treatment plan.  The above note was reviewed and edited by Dr. Gomez.  Critical Care time spent in direct patient care: 35 minutes (excluding procedure time, if applicable) including high complexity decision making to assess, manipulate, and support vital organ system failure in this individual who has impairment of one or more vital organ systems such that there is a high probability of imminent or life threatening deterioration in the patient’s condition.

## 2018-01-11 NOTE — PROGRESS NOTES
Kinetics :  Vancomycin  3/7    The pre-dose vancomycin level exceeded the goal range for this therapy.  Will reduce the scheduled doses to vancomycin 750mg q 12 hrs to minimize any further accumulation of the drug and follow with you.

## 2018-01-11 NOTE — PLAN OF CARE
Problem: Mechanical Ventilation, Invasive (Adult)  Goal: Signs and Symptoms of Listed Potential Problems Will be Absent or Manageable (Mechanical Ventilation, Invasive)  Outcome: Ongoing (interventions implemented as appropriate)  Patient on AC 20, 450, 100% and PEEP 5 this AM.  Dr. Gomez decreased fio2 to 85%.  No further weaning attempted today.  Patient remains stable.

## 2018-01-11 NOTE — PROGRESS NOTES
History of presenting illness:  Patient had bronchoscopy in yesterday, some thrombin was placed through the bronchoscope, his FiO2 has been able to be weaned down some.  Hopefully bleeding has stopped.  Patient is now 72 hours off his Arixtra.  Patient himself cannot give a history as he is sedated on the ventilator.    Vital Signs  Temp:  [98.4 °F (36.9 °C)-98.9 °F (37.2 °C)] 98.7 °F (37.1 °C)  Heart Rate:  [0-61] 48  Resp:  [20] 20  BP: (116-179)/(53-75) 141/56  FiO2 (%):  [85 %-100 %] 85 %  Body mass index is 42.09 kg/(m^2).      Intake/Output Summary (Last 24 hours) at 01/11/18 0837  Last data filed at 01/11/18 0635   Gross per 24 hour   Intake          3540.16 ml   Output             1900 ml   Net          1640.16 ml     Intake & Output (last 3 days)       01/08 0701 - 01/09 0700 01/09 0701 - 01/10 0700 01/10 0701 - 01/11 0700 01/11 0701 - 01/12 0700    P.O. 684 354      I.V. (mL/kg)  186 (1.5) 716.3 (6.1)     Blood 900 668 918.8     Other  280 560     NG/GT   295     IV Piggyback  900 1050     Total Intake(mL/kg) 1584 (12.7) 2388 (19.1) 3540.2 (29.9)     Urine (mL/kg/hr) 1170 (0.4) 1550 (0.5) 1900 (0.7)     Stool 0 (0)       Total Output 1170 1550 1900      Net +414 +838 +1640.2              Unmeasured Urine Occurrence 2 x       Unmeasured Stool Occurrence 1 x             Physical exam:  Physical Exam   Constitutional: Well-developed, well-nourished. On the ventilator and sedated however he does open his eyes minimally to voice.  Normocephalic and atraumatic.  Mucous membranes appear moist  Eyes:  Pupils are equal, round , about the same size as yesterday.  Still with some subconjunctival edema  Cardiovascular Bradycardic regular rhythm without murmur rub or gallop  Chest: Bilateral breath sounds today that are without rhonchi rales or wheezing today anteriorly.  Abdomen:Soft, rounded, bowel sounds are active, nondistended, nontender  Musculoskeletal: Patient did follow commands, strength is symmetric he has  2+ edema of his left hand this is the arm with a blood pressure cuff 1+ edema of his feet adequate pulses  Neurological: Nonfocal, he is sedated but follows commands and moves all extremities  Worley catheter in place with again medium yellow clear urine  Skin warm and dry     Tube feeds are infusing    Telemetry: sinus bradycardia, reviewed current rate in the 50s    Results Review:  Lab Results   Component Value Date    WBC 4.82 01/11/2018    HGB 7.2 (L) 01/11/2018    HCT 23.3 (L) 01/11/2018    MCV 92.5 01/11/2018     01/11/2018     Lab Results   Component Value Date    GLUCOSE 271 (H) 01/11/2018    BUN 49 (H) 01/11/2018    CREATININE 2.08 (H) 01/11/2018    EGFRIFNONA 32 (L) 01/11/2018    BCR 23.6 01/11/2018    CO2 33.1 (H) 01/11/2018    CALCIUM 8.2 01/11/2018    ALBUMIN 3.70 01/11/2018    LABIL2 1.7 01/11/2018    AST 15 01/11/2018    ALT 19 01/11/2018       Imaging Results (last 72 hours)     Procedure Component Value Units Date/Time    XR Chest 1 View [[id number]] Collected:  12/28/17 1015     Updated:  12/28/17 1017    Narrative:       EXAMINATION:  XR CHEST 1 VW-      CLINICAL INDICATION:     sob     TECHNIQUE:  XR CHEST 1 VW-       COMPARISON: 12/10/2017      FINDINGS:   The lungs remain aerated.   Heart size is stable.   No pneumothorax.   No pleural effusion.   Bony and soft tissue structures are unremarkable.            Impression:       Stable radiographic appearance of the chest.        This report was finalized on 12/28/2017 10:15 AM by Dr. Miko Coto MD.       CT Chest Pulmonary Embolism With Contrast [[id number]] Collected:  12/28/17 1242     Updated:  12/28/17 1245    Narrative:       EXAMINATION: CT CHEST PULMONARY EMBOLISM W CONTRAST-      Technique: Multiple CT axial images were obtained through the level of  pulmonary arteries, following IV contrast administration per CT PE  protocol.  Volume Rendered 3D or MIP images performed.     Radiation dose reduction techniques were utilized per  ALARA protocol.  Automated exposure control was initiated through either or Smart Adventure or  D[employer] software packages by  protocol.                  CLINICAL INDICATION:    SOB and Chest Pain     COMPARISON:    Chest x-ray performed on 12/28/2017.     FINDINGS:    Some bibasilar groundglass attenuation possibly  representing alveolar edema or atelectasis. Coronary artery  calcification. Subsegmental pulmonary artery branches are not well  evaluated. There is no pulmonary artery filling defect to suggest  pulmonary embolism. There is no thoracic adenopathy. No pleural or  pericardial effusion. Incidentally imaged upper abdomen is unremarkable.  Bone windows show no acute osseous abnormality.       Impression:          Some bibasilar groundglass attenuation possibly representing alveolar  edema or atelectasis. Coronary artery calcification. Subsegmental  pulmonary artery branches are not well evaluated.     This report was finalized on 12/28/2017 12:43 PM by Dr. Miko Coto MD.       CT Abdomen Pelvis Without Contrast [[id number]] Collected:  12/29/17 0627     Updated:  12/29/17 0631    Narrative:          CT ABDOMEN PELVIS WO CONTRAST-        TECHNIQUE: Multiple axial CT images were obtained from lung bases  through pubic symphysis without administration of IV contrast.  Reformatted images in the coronal and/or sagittal plane(s) were  generated from the axial data set to facilitate diagnostic accuracy  and/or surgical planning.  Oral Contrast:NONE.     Radiation dose reduction techniques were utilized per ALARA protocol.  Automated exposure control was initiated through either or Smart Adventure or  D[employer]Right software packages by  protocol.       [phone] mGy.cm     Clinical information  elevated lactic acid and LLQ abd pain; J96.21-Acute and chronic  respiratory failure with hypoxia; J96.22-Acute and chronic respiratory  failure with hypercapnia; J44.1-Chronic obstructive pulmonary disease  with  (acute) exacerbation      Comparison  NONE.     Findings  LOWER THORAX: BIBASILAR ATELECTASIS AND MINIMAL AIRSPACE DISEASE.     ABDOMEN:        LIVER: Homogeneous. No focal hepatic mass or ductal dilatation.        GALLBLADDER: GALLSTONES IN THE GALLBLADDER IDENTIFIED.        PANCREAS: Unremarkable. No mass or ductal dilatation.        SPLEEN: Homogeneous. No splenomegaly.        ADRENALS: No mass.        KIDNEYS: NONOBSTRUCTIVE BILATERAL RENAL CALCULI.        GI TRACT: Non-dilated. No definite wall thickening.        PERITONEUM: No free air. No free fluid or loculated fluid  collections.        MESENTERY: Unremarkable.        LYMPH NODES: No lymphadenopathy.        VASCULATURE: No evidence of aneurysm.        ABDOMINAL WALL: No focal hernia or mass.           OTHER: None.     PELVIS:        BLADDER: 5 MM POLYP INVOLVING THE SUPERIOR LEFT ASPECT OF THE URINARY  BLADDER THAT SHOULD BE FURTHER EVALUATED WITH CYSTOSCOPY.        REPRODUCTIVE: Unremarkable as visualized.        APPENDIX: Nondistended. No surrounding inflammation.     BONES: No acute bony abnormality.       Impression:       Impression:  1. Bibasilar pneumonia.  2. Cholelithiasis.  3.5 mm polyp involving the superior left aspect of the urinary bladder  that should be further evaluated with cystoscopy.     This report was finalized on 12/29/2017 6:29 AM by Dr. Miko Coto MD.      Chest x-ray is not back as of this a.m.     Echo EF normal in December    D/W Dr Gomez/Kyaw          Assessment/Plan     Active Problems:    Acute hypoxic respiratory failure secondary to COPD exacerbation with pneumonia, patient is intubated and his FiO2 has been able to be weaned down, he now has a saturation 95% on 80% FiO2.  Repeat chest x-rays pending.  Patient did have pulmonary hemorrhage, there was a thought of possible Goodpasture's however there is no significant proteinuria so if this were to be the case certainly would not be renal involvement with no hematuria or  proteinuria.    septic shock, on admission    Anemia ,hemoglobin appears be stable at this time.    Possible pneumonia, inflammatory markers now are starting to trend downward, will continue on broad-spectrum antibiotics this time, with vancomycin and Zosyn.  Cultures remain negative at this time.      Diabetes control, insulin adjusted     Hypothyroidism, TSH normal, adequate replacement    Nutrition, goal rate 40 cc per hour, he is tolerating this currently 35 cc an hour and this will be increased to goal rate 7.    Acute kidney injury superimposed on chronic kidney disease, patient is being started on hypotonic saline, urine sodium was low at 16.      DVT prophylaxis, SCDs     With multiorgan failure, prognosis remains guarded.      Seun Muller MD  01/11/18  8:37 AM

## 2018-01-11 NOTE — PLAN OF CARE
Problem: COPD, Chronic Bronchitis/Emphysema (Adult)  Goal: Signs and Symptoms of Listed Potential Problems Will be Absent or Manageable (COPD, Chronic Bronchitis/Emphysema)  Outcome: Ongoing (interventions implemented as appropriate)   01/11/18 0009   COPD, Chronic Bronchitis/Emphysema   Problems Assessed (COPD, Chronic Bronchitis/Emphysema) all   Problems Present (COPD, Chronic Bronchitis/Emphysema) situational response       Problem: Skin Integrity Impairment, Risk/Actual (Adult)  Goal: Identify Related Risk Factors and Signs and Symptoms  Outcome: Ongoing (interventions implemented as appropriate)   01/11/18 0008   Skin Integrity Impairment, Risk/Actual   Skin Integrity Impairment, Risk/Actual: Related Risk Factors cognitive impairment;edema;fluid/nutrition status   Signs and Symptoms (Skin Integrity Impairment) edema     Goal: Skin Integrity/Wound Healing  Outcome: Ongoing (interventions implemented as appropriate)   01/11/18 0008   Skin Integrity Impairment, Risk/Actual (Adult)   Skin Integrity/Wound Healing making progress toward outcome       Problem: Fall Risk (Adult)  Goal: Identify Related Risk Factors and Signs and Symptoms  Outcome: Ongoing (interventions implemented as appropriate)   01/10/18 0447 01/11/18 0008   Fall Risk   Fall Risk: Related Risk Factors --  culprit medication(s);history of falls   Fall Risk: Signs and Symptoms presence of risk factors --      Goal: Absence of Falls  Outcome: Ongoing (interventions implemented as appropriate)   01/11/18 0008   Fall Risk (Adult)   Absence of Falls making progress toward outcome       Problem: Patient Care Overview (Adult)  Goal: Plan of Care Review  Outcome: Ongoing (interventions implemented as appropriate)   01/10/18 1539 01/11/18 0800   Coping/Psychosocial Response Interventions   Plan Of Care Reviewed With --  patient   Patient Care Overview   Progress progress toward functional goals is gradual --    Outcome Evaluation   Outcome Summary/Follow up  Plan patient remains sedated on vent  --        Problem: Mechanical Ventilation, Invasive (Adult)  Goal: Signs and Symptoms of Listed Potential Problems Will be Absent or Manageable (Mechanical Ventilation, Invasive)  Outcome: Ongoing (interventions implemented as appropriate)   01/11/18 0008   Mechanical Ventilation, Invasive   Problems Assessed (Mechanical Ventilation, Invasive) all   Problems Present (Mechanical Ventilation, Invasive) inability to wean

## 2018-01-12 NOTE — PROGRESS NOTES
Central Venous Catheter Insertion Procedure Note    Central venous cathter insertion using ultrasound     Indications:  Poor iv access, critically ill patient    Procedure Details   Informed consent was obtained for the procedure from next of kin , including sedation.  Risks of lung perforation, hemorrhage, arrhythmia, and adverse drug reaction were discussed.     Maximum sterile technique was used including antiseptics, cap, gloves, gown, hand hygiene, mask and sheet.    Under sterile conditions the skin above the left IJ location, ultrasound was used through out  the procedure , the left IJ area was prepped with chlor hexidine and covered with a sterile drape. Local anesthesia was applied to the skin and subcutaneous tissues. With the help of USG the vein was identified and needle was guided in that - blood was aspirated. A guide wire was then passed easily through the catheter. There were no arrhythmias. Then central venous cathter was introduced over guide wire and the guide wire was taken out through the brown port. The line was sutured in place.  Findings:  There were no changes to vital signs. Catheter was flushed withNS. Patient did tolerate procedure well.    Recommendations:  CXR ordered to verify placement.

## 2018-01-12 NOTE — PLAN OF CARE
Problem: COPD, Chronic Bronchitis/Emphysema (Adult)  Goal: Signs and Symptoms of Listed Potential Problems Will be Absent or Manageable (COPD, Chronic Bronchitis/Emphysema)  Outcome: Ongoing (interventions implemented as appropriate)   01/11/18 2032   COPD, Chronic Bronchitis/Emphysema   Problems Assessed (COPD, Chronic Bronchitis/Emphysema) all   Problems Present (COPD, Chronic Bronchitis/Emphysema) situational response       Problem: Skin Integrity Impairment, Risk/Actual (Adult)  Goal: Identify Related Risk Factors and Signs and Symptoms  Outcome: Ongoing (interventions implemented as appropriate)   01/11/18 2031   Skin Integrity Impairment, Risk/Actual   Skin Integrity Impairment, Risk/Actual: Related Risk Factors cognitive impairment;edema   Signs and Symptoms (Skin Integrity Impairment) edema     Goal: Skin Integrity/Wound Healing  Outcome: Ongoing (interventions implemented as appropriate)   01/11/18 2031   Skin Integrity Impairment, Risk/Actual (Adult)   Skin Integrity/Wound Healing making progress toward outcome       Problem: Fall Risk (Adult)  Goal: Identify Related Risk Factors and Signs and Symptoms  Outcome: Ongoing (interventions implemented as appropriate)   01/11/18 2031   Fall Risk   Fall Risk: Related Risk Factors history of falls;confusion/agitation   Fall Risk: Signs and Symptoms presence of risk factors     Goal: Absence of Falls  Outcome: Ongoing (interventions implemented as appropriate)   01/11/18 2031   Fall Risk (Adult)   Absence of Falls making progress toward outcome       Problem: Mechanical Ventilation, Invasive (Adult)  Goal: Signs and Symptoms of Listed Potential Problems Will be Absent or Manageable (Mechanical Ventilation, Invasive)  Outcome: Ongoing (interventions implemented as appropriate)   01/11/18 2321   Mechanical Ventilation, Invasive   Problems Assessed (Mechanical Ventilation, Invasive) artificial airway induced skin/tissue breakdown;inability to wean;mechanical  dysfunction;situational response;other (see comments)  (hypoxia/hypoxemia)   Problems Present (Mechanical Ventilation, Invasive) other (see comments);inability to wean  (hypoxia/hypoxemia)       Problem: Pressure Ulcer Risk (Karthikeyan Scale) (Adult,Obstetrics,Pediatric)  Goal: Identify Related Risk Factors and Signs and Symptoms  Outcome: Ongoing (interventions implemented as appropriate)    Goal: Skin Integrity  Outcome: Ongoing (interventions implemented as appropriate)   01/12/18 1331   Pressure Ulcer Risk (Karthikeyan Scale) (Adult,Obstetrics,Pediatric)   Skin Integrity making progress toward outcome

## 2018-01-12 NOTE — PROGRESS NOTES
Nephrology  Note      Subjective     FiO2 is down to 75% with 13 peep. He is off nitroprusside drip. SBP in 200s and RN about to give scheduled medications. HR is 50s mostly but sometimes dropping to 40s.  He is making urine. Did not get the IVF     Objective     Vital Signs  Temp:  [97.7 °F (36.5 °C)-98.8 °F (37.1 °C)] 98.5 °F (36.9 °C)  Heart Rate:  [45-59] 51  Resp:  [20-23] 20  BP: (120-206)/(47-85) 169/61  FiO2 (%):  [75 %-85 %] 75 %       I/O last 3 completed shifts:  In: 4374.9 [I.V.:1004.9; Other:820; NG/GT:1300; IV Piggyback:1250]  Out: 2100 [Urine:2100]    Physical Examination:    General Appearance : intubated, sedated  Head : normocephalic, without obvious abnormality and atraumatic  Eyes : + pallor   Throat : ETT in place  Neck:  suppple  Lungs :  clear anteriorly  Heart : regular rhythm , normal S1, S2, no murmur  Abdomen :  normal bowel sounds,soft non-tender  Extremities :  1+ edema  Skin : no  rash  Neurologic : sedated    Laboratory Data :      WBC WBC   Date Value Ref Range Status   01/12/2018 5.54 4.50 - 12.50 10*3/mm3 Final   01/11/2018 4.82 4.50 - 12.50 10*3/mm3 Final   01/10/2018 14.48 (H) 4.50 - 12.50 10*3/mm3 Final      HGB Hemoglobin   Date Value Ref Range Status   01/12/2018 7.5 (L) 14.0 - 18.0 g/dL Final   01/11/2018 7.5 (L) 14.0 - 18.0 g/dL Final   01/11/2018 7.2 (L) 14.0 - 18.0 g/dL Final   01/10/2018 7.3 (L) 14.0 - 18.0 g/dL Final   01/10/2018 7.0 (L) 14.0 - 18.0 g/dL Final      HCT Hematocrit   Date Value Ref Range Status   01/12/2018 24.2 (L) 42.0 - 52.0 % Final   01/11/2018 24.3 (L) 42.0 - 52.0 % Final   01/11/2018 23.3 (L) 42.0 - 52.0 % Final   01/10/2018 23.2 (L) 42.0 - 52.0 % Final   01/10/2018 23.0 (L) 42.0 - 52.0 % Final      Platlets No results found for: LABPLAT   MCV MCV   Date Value Ref Range Status   01/12/2018 93.8 80.0 - 94.0 fL Final   01/11/2018 92.5 80.0 - 94.0 fL Final   01/10/2018 94.7 (H) 80.0 - 94.0 fL Final          Sodium Sodium   Date Value Ref Range Status    01/12/2018 141 135 - 153 mmol/L Final   01/11/2018 145 135 - 153 mmol/L Final   01/10/2018 145 135 - 153 mmol/L Final      Potassium Potassium   Date Value Ref Range Status   01/12/2018 3.4 (L) 3.5 - 5.3 mmol/L Final   01/11/2018 3.6 3.5 - 5.3 mmol/L Final   01/10/2018 4.1 3.5 - 5.3 mmol/L Final      Chloride Chloride   Date Value Ref Range Status   01/12/2018 100 99 - 112 mmol/L Final   01/11/2018 103 99 - 112 mmol/L Final   01/10/2018 102 99 - 112 mmol/L Final      CO2 CO2   Date Value Ref Range Status   01/12/2018 32.2 (H) 24.3 - 31.9 mmol/L Final   01/11/2018 33.1 (H) 24.3 - 31.9 mmol/L Final   01/10/2018 34.4 (H) 24.3 - 31.9 mmol/L Final      BUN BUN   Date Value Ref Range Status   01/12/2018 72 (H) 7 - 21 mg/dL Final   01/11/2018 49 (H) 7 - 21 mg/dL Final   01/10/2018 41 (H) 7 - 21 mg/dL Final      Creatinine Creatinine   Date Value Ref Range Status   01/12/2018 2.24 (H) 0.43 - 1.29 mg/dL Final   01/11/2018 2.08 (H) 0.43 - 1.29 mg/dL Final   01/10/2018 1.78 (H) 0.43 - 1.29 mg/dL Final      Calcium Calcium   Date Value Ref Range Status   01/12/2018 8.2 7.7 - 10.0 mg/dL Final   01/11/2018 8.2 7.7 - 10.0 mg/dL Final   01/10/2018 8.7 7.7 - 10.0 mg/dL Final      PO4 No results found for: CAPO4   Albumin Albumin   Date Value Ref Range Status   01/12/2018 3.80 3.40 - 4.80 g/dL Final   01/11/2018 3.70 3.40 - 4.80 g/dL Final   01/10/2018 4.0 2.9 - 4.4 g/dL Final   01/10/2018 3.90 3.40 - 4.80 g/dL Final      Magnesium Magnesium   Date Value Ref Range Status   01/12/2018 2.9 (H) 1.7 - 2.6 mg/dL Final      Uric Acid No results found for: URICACID     Radiology results :     Imaging Results (last 24 hours)     Procedure Component Value Units Date/Time    XR Chest 1 View [[id number]] Collected:  01/11/18 1402     Updated:  01/11/18 1449    Narrative:       EXAMINATION: XR CHEST 1 VW-      CLINICAL INDICATION:     et tube placement; J96.21-Acute and chronic  respiratory failure with hypoxia; J96.22-Acute and chronic  respiratory  failure with hypercapnia; J44.1-Chronic obstructive pulmonary disease  with (acute) exacerbation; R09.02-Hypoxemia     TECHNIQUE:  XR CHEST 1 VW-      COMPARISON: 1/11/2018      FINDINGS:   Improved CHF changes. Improved airspace disease. ETT tip at clavicles.  NG tube in stomach.       Impression:       1. Improved airspace disease and CHF.  2. Support devices as above.     This report was finalized on 1/11/2018 2:07 PM by Dr. Kirk Kellogg MD.       XR Chest AP [[id number]] Collected:  01/12/18 0739     Updated:  01/12/18 0742    Narrative:       EXAMINATION: XR CHEST AP-      CLINICAL INDICATION:     Respiratory failure; J96.21-Acute and chronic  respiratory failure with hypoxia; J96.22-Acute and chronic respiratory  failure with hypercapnia; J44.1-Chronic obstructive pulmonary disease  with (acute) exacerbation; R09.02-Hypoxemia     TECHNIQUE:  XR CHEST AP-      COMPARISON: 1/11/2018      FINDINGS:   Stable appearance and positioning of support devices. Slight improvement  in bilateral airspace disease. No effusion or pneumothorax.       Impression:       Slight improvement in airspace disease. Otherwise stable  chest.     This report was finalized on 1/12/2018 7:40 AM by Dr. Kirk Kellogg MD.       XR Chest 1 View [[id number]] Collected:  01/12/18 1237     Updated:  01/12/18 1240    Narrative:       EXAMINATION: XR CHEST 1 VW-      CLINICAL INDICATION:     s/p central line placement; J96.21-Acute and  chronic respiratory failure with hypoxia; J96.22-Acute and chronic  respiratory failure with hypercapnia; J44.1-Chronic obstructive  pulmonary disease with (acute) exacerbation; R09.02-Hypoxemia     TECHNIQUE:  XR CHEST 1 VW-      COMPARISON: 1/12/2018      FINDINGS:   Interval placement of left IJ deep line. Tip in the SVC. ET tube with  tip at clavicles. NG tube extends into stomach. Patchy bilateral  airspace disease not significantly changed. No pneumothorax.       Impression:       1. Interval  left IJ deep line placement with tip in satisfactory  position. No pneumothorax. Otherwise stable chest.     This report was finalized on 1/12/2018 12:38 PM by Dr. Kirk Kellogg MD.           Medications:        amLODIPine 10 mg Oral Daily   atorvastatin 80 mg Oral Daily   budesonide-formoterol 2 puff Inhalation BID - RT   cholecalciferol 2,000 Units Oral Daily   CloNIDine 0.3 mg Oral Q8H   ferrous sulfate 325 mg Oral Daily With Breakfast   guaiFENesin 200 mg Oral Q4H   hydrALAZINE 100 mg Oral Q8H   ipratropium-albuterol 3 mL Nebulization Q6H - RT   isosorbide mononitrate 240 mg Oral Daily   labetalol 200 mg Oral Q12H   lactobacillus acidophilus 1 capsule Oral Daily   levothyroxine 25 mcg Oral Daily   methylPREDNISolone sodium succinate 1,000 mg Intravenous Daily   pantoprazole 40 mg Oral Daily   piperacillin-tazobactam 4.5 g Intravenous Q8H   polyethylene glycol 17 g Oral Daily   sennosides-docusate sodium 1 tablet Oral BID   sertraline 50 mg Oral Q PM   sodium chloride 10 mL Intracatheter Q12H   sodium chloride 10 mL Intracatheter Q12H   terazosin 10 mg Oral Daily   vecuronium 5 mg Intravenous Once   vitamin B-12 2,000 mcg Oral Daily       fentaNYL (SUBLIMAZE) PCA 1500 mcg/30 mL syringe     insulin regular infusion 1 unit/mL 1-20 Units/hr Last Rate: 12 Units/hr (01/12/18 1201)   niCARdipine 5-15 mg/hr    Pharmacy Consult - Pharmacy to dose     Pharmacy to dose vancomycin     propofol 5-50 mcg/kg/min Last Rate: 50 mcg/kg/min (01/12/18 1159)       Assessment/Plan     Active Problems:    Respiratory failure    Acute on chronic respiratory failure with hypoxia and hypercapnia      1. SHEELA on CKD 3 :his  baseline creatinine is 1.2-1.3 and has worsened to 2.2 today. Likely prerenal SHEELA. IVF stopped per pulmonary team. I will add free water flushes with tube feedings  He has no hematuria and  Proteinuria is only 224 mg/gm.     2. Alveolar hemorrhage : ANCA and antihistone pending. Rest serologies including antiGBM are  negative. . Getting pulse solumedrol per Dr Gomez    3. Respiratory failure : Dr Gomez managing    4. Anemia : Hb stable.  stool occult pending. He is iron deficient but will avoid venofer for now due to acute illness.     5. Hypertensive urgency : d/w Rn to start nicardipine drip if BP stays high, will switch coreg to labetalol due to bradycardia and place holding parameters for clonidine. BP will likely worsen with high dose solumedrol    6. Type 2 Dm : being started on insulin drip     7. Hypokalemia : on replacement protocol    He is critically ill  I discussed the patients findings and my recommendations with RN, Dr Renzo Card MD  01/12/18  12:21 PM

## 2018-01-12 NOTE — NURSING NOTE
Ask by nursing staff to assess patients right hand.  Deep purple discoloration noted to right palm.  No evidence of pressure relation.  Maroon discoloration noted to right knuckles.  Patient has generalized bruising to bilat arms ranging in color from purple to maroon.

## 2018-01-12 NOTE — PROGRESS NOTES
Discharge Planning Assessment   Roberto     Patient Name: Se Anne  MRN: [id number]  Today's Date: 1/12/2018    Admit Date: 12/28/2017       Discharge Plan       01/12/18 1540    Case Management/Social Work Plan    Plan Pt is currently on the vent.  Pt lives at home with his spouse, Adeline and his daughters.  Pt has [facility] and [facility].  Pt plans to return home at discharge.  Pt has been referred to Continue Care before intubation.  Pt's insurance requires a 21 day LOS, 3 failed weaning attempts and a stable airway before he can be submitted to the insurance.  SS will continue to follow.      Patient/Family In Agreement With Plan yes        Expected Discharge Date and Time     Expected Discharge Date Expected Discharge Time    Jan 8, 2018                Daniella Wilburn

## 2018-01-12 NOTE — PROGRESS NOTES
History of presenting illness:  She is sedated and on the ventilator, cannot give a history due to his medical condition.  No BM in 2 days.    Vital Signs  Temp:  [97.7 °F (36.5 °C)-98.8 °F (37.1 °C)] 97.7 °F (36.5 °C)  Heart Rate:  [45-57] 51  Resp:  [20-23] 21  BP: (120-177)/(47-79) 142/57  FiO2 (%):  [75 %-85 %] 75 %  Body mass index is 42.09 kg/(m^2).      Intake/Output Summary (Last 24 hours) at 01/12/18 0828  Last data filed at 01/12/18 0630   Gross per 24 hour   Intake          2864.94 ml   Output             1450 ml   Net          1414.94 ml     Intake & Output (last 3 days)       01/09 0701 - 01/10 0700 01/10 0701 - 01/11 0700 01/11 0701 - 01/12 0700 01/12 0701 - 01/13 0700    P.O. 354       I.V. (mL/kg) 186 (1.5) 716.3 (6.1) 679.9 (5.7)     Blood 668 918.8      Other 280 560 380     NG/GT  295 1005     IV Piggyback 900 1050 800     Total Intake(mL/kg) 2388 (19.1) 3540.2 (29.9) 2864.9 (24.2)     Urine (mL/kg/hr) 1550 (0.5) 1900 (0.7) 1450 (0.5)     Stool        Total Output 1550 1900 1450      Net +838 +1640.2 +1414.9                    Physical exam:  Physical Exam   Constitutional: Well-developed, well-nourished. On the ventilator and sedated   Normocephalic and atraumatic.  Mucous membranes appear moist  Eyes:  Pupils are equal, round , 2 mm , with this size difficult to tell reactivity, some subconjunctival edema  Cardiovascular Bradycardic regular rhythm 2/6 PABLO  Chest: Bilateral breath sounds today that are without rhonchi rales or wheezing today anteriorly.  Abdomen:Soft, rounded, bowel sounds are active, nondistended, nontender  Musculoskeletal: Cannot test strength right distal pulses 1-2+ edema  Neurological: Sedated  Worley catheter in place clear yellow to orange urine  Skin warm and dry some ecchymosis on his arms on his right thenar eminence he does have an area that appears to be of possible deep tissue injury, non-blanchable, only about the size of a dime, wound care has seen.    Tube feeds  are infusing at goal rate    Telemetry: sinus bradycardia, reviewed current rate remains in the 50s    Results Review:  Lab Results   Component Value Date    WBC 5.54 01/12/2018    HGB 7.5 (L) 01/12/2018    HCT 24.2 (L) 01/12/2018    MCV 93.8 01/12/2018     01/12/2018     Lab Results   Component Value Date    GLUCOSE 307 (H) 01/12/2018    BUN 72 (H) 01/12/2018    CREATININE 2.24 (H) 01/12/2018    EGFRIFNONA 29 (L) 01/12/2018    BCR 32.1 (H) 01/12/2018    CO2 32.2 (H) 01/12/2018    CALCIUM 8.2 01/12/2018    PROTENTOTREF 6.2 01/10/2018    ALBUMIN 3.80 01/12/2018    LABIL2 1.9 01/12/2018    AST 13 01/12/2018    ALT 13 01/12/2018       Imaging Results (last 72 hours)     Procedure Component Value Units Date/Time    XR Chest 1 View [[id number]] Collected:  12/28/17 1015     Updated:  12/28/17 1017    Narrative:       EXAMINATION:  XR CHEST 1 VW-      CLINICAL INDICATION:     sob     TECHNIQUE:  XR CHEST 1 VW-       COMPARISON: 12/10/2017      FINDINGS:   The lungs remain aerated.   Heart size is stable.   No pneumothorax.   No pleural effusion.   Bony and soft tissue structures are unremarkable.            Impression:       Stable radiographic appearance of the chest.        This report was finalized on 12/28/2017 10:15 AM by Dr. Miko Coto MD.       CT Chest Pulmonary Embolism With Contrast [[id number]] Collected:  12/28/17 1242     Updated:  12/28/17 1245    Narrative:       EXAMINATION: CT CHEST PULMONARY EMBOLISM W CONTRAST-      Technique: Multiple CT axial images were obtained through the level of  pulmonary arteries, following IV contrast administration per CT PE  protocol.  Volume Rendered 3D or MIP images performed.     Radiation dose reduction techniques were utilized per ALARA protocol.  Automated exposure control was initiated through either or [employer] or  DoseRight software packages by  protocol.                  CLINICAL INDICATION:    SOB and Chest Pain     COMPARISON:    Chest x-ray  performed on 12/28/2017.     FINDINGS:    Some bibasilar groundglass attenuation possibly  representing alveolar edema or atelectasis. Coronary artery  calcification. Subsegmental pulmonary artery branches are not well  evaluated. There is no pulmonary artery filling defect to suggest  pulmonary embolism. There is no thoracic adenopathy. No pleural or  pericardial effusion. Incidentally imaged upper abdomen is unremarkable.  Bone windows show no acute osseous abnormality.       Impression:          Some bibasilar groundglass attenuation possibly representing alveolar  edema or atelectasis. Coronary artery calcification. Subsegmental  pulmonary artery branches are not well evaluated.     This report was finalized on 12/28/2017 12:43 PM by Dr. Miko Coto MD.       CT Abdomen Pelvis Without Contrast [[id number]] Collected:  12/29/17 0627     Updated:  12/29/17 0631    Narrative:          CT ABDOMEN PELVIS WO CONTRAST-        TECHNIQUE: Multiple axial CT images were obtained from lung bases  through pubic symphysis without administration of IV contrast.  Reformatted images in the coronal and/or sagittal plane(s) were  generated from the axial data set to facilitate diagnostic accuracy  and/or surgical planning.  Oral Contrast:NONE.     Radiation dose reduction techniques were utilized per ALARA protocol.  Automated exposure control was initiated through either or Care[employer]ose or  DoseRight software packages by  protocol.       [phone] mGy.cm     Clinical information  elevated lactic acid and LLQ abd pain; J96.21-Acute and chronic  respiratory failure with hypoxia; J96.22-Acute and chronic respiratory  failure with hypercapnia; J44.1-Chronic obstructive pulmonary disease  with (acute) exacerbation      Comparison  NONE.     Findings  LOWER THORAX: BIBASILAR ATELECTASIS AND MINIMAL AIRSPACE DISEASE.     ABDOMEN:        LIVER: Homogeneous. No focal hepatic mass or ductal dilatation.        GALLBLADDER:  GALLSTONES IN THE GALLBLADDER IDENTIFIED.        PANCREAS: Unremarkable. No mass or ductal dilatation.        SPLEEN: Homogeneous. No splenomegaly.        ADRENALS: No mass.        KIDNEYS: NONOBSTRUCTIVE BILATERAL RENAL CALCULI.        GI TRACT: Non-dilated. No definite wall thickening.        PERITONEUM: No free air. No free fluid or loculated fluid  collections.        MESENTERY: Unremarkable.        LYMPH NODES: No lymphadenopathy.        VASCULATURE: No evidence of aneurysm.        ABDOMINAL WALL: No focal hernia or mass.           OTHER: None.     PELVIS:        BLADDER: 5 MM POLYP INVOLVING THE SUPERIOR LEFT ASPECT OF THE URINARY  BLADDER THAT SHOULD BE FURTHER EVALUATED WITH CYSTOSCOPY.        REPRODUCTIVE: Unremarkable as visualized.        APPENDIX: Nondistended. No surrounding inflammation.     BONES: No acute bony abnormality.       Impression:       Impression:  1. Bibasilar pneumonia.  2. Cholelithiasis.  3.5 mm polyp involving the superior left aspect of the urinary bladder  that should be further evaluated with cystoscopy.     This report was finalized on 12/29/2017 6:29 AM by Dr. Miko Coto MD.      Chest x-ray from the last 2 days evaluated, this appears to be more CHF although most likely this is from pulmonary hemorrhage.  Chest x-ray is clearing.    Echo EF normal in December    D/W Dr Gomez          Assessment/Plan     Active Problems:    Acute hypoxic respiratory failure secondary to COPD exacerbation with pneumonia, patient is intubated and his FiO2 has been able to be weaned down, To 85%, ABG is adequate.  Cannot diurese because of worsening renal function.  White count is normal, CRP is coming down.  All cultures negative.  With cultures being negative I'm stopping vancomycin, continue Zosyn for now.  Continue to follow him from poor markers.    Hypokalemia, being supplemented by protocol    Anemia ,hemoglobin remained stable currently.  Bleeding I feel has stopped.     Diabetes  control, patient is not getting 3 days of high-dose Solu-Medrol, I'm going to start an insulin drip, patient is poorly controlled, patient does not have enough IV access for this and I feel like he needs a central line, I've asked Dr. Gomez to evaluate for this.    Accelerated hypertension, nursing is going to stop the night prior to this morning, monitor on current medications.    Hypothyroidism, TSH normal, adequate replacement    Nutrition, tolerating tube feeds at goal rate, no bowel movement most likely related to the fentanyl, cathartics added by pulmonary.    Acute kidney injury superimposed on chronic kidney disease, Dr. Card is following.  Slightly worse today.    DVT prophylaxis, SCDs not using anticoagulants due to risk of bleeding    High risk due to multiorgan failure    35 min cc time      Seun Muller MD  01/12/18  8:28 AM

## 2018-01-12 NOTE — PROGRESS NOTES
LOS: 15 days   Patient Care Team:  No Known Provider as PCP - General  No Known Provider as PCP - Family Medicine    Chief Complaint:  Pulmonology is following for respiratory failure     Subjective     Interval History: patient is intubated and sedated.      History taken from: chart RN Patient unable to give history due to patient intubated.    Review of Systems:   Review of Systems - History obtained from chart review and unobtainable from patient due to mental status and Intubated      Objective     Vital Signs  Temp:  [97.7 °F (36.5 °C)-98.8 °F (37.1 °C)] 97.7 °F (36.5 °C)  Heart Rate:  [45-57] 51  Resp:  [20-23] 21  BP: (120-177)/(47-79) 142/57  FiO2 (%):  [75 %-85 %] 75 %  Body mass index is 42.09 kg/(m^2).    Intake/Output Summary (Last 24 hours) at 01/12/18 0904  Last data filed at 01/12/18 0630   Gross per 24 hour   Intake          2864.94 ml   Output             1450 ml   Net          1414.94 ml          Vent Settings  Vent Mode: VC/AC    Vt (Set, L): 0.45 L  Resp Rate (Set): 20     FiO2 (%): 75 %  PEEP/CPAP (cm H2O): 13 cm H20    Minute Ventilation (L/min) (Obs): 10.2 L/min  Resp Rate (Observed) Vent: 22     I:E Ratio (Obs): 1:3.30    PIP Observed (cm H2O): 40 cm H2O     Physical Exam:  GENERAL APPEARANCE: Intubated and sedated.  Appears to be resting comfortably in bed.     HEAD: normocephalic.    EYES: PERRLA.    THROAT: ET tube in place. Oral cavity and pharynx normal. No inflammation, swelling, exudate, or lesions.     NECK: Neck supple.     CARDIAC: Normal S1 and S2. No S3, S4 or murmurs. Rhythm is regular. There is no peripheral edema, cyanosis or pallor. Extremities are warm and well perfused. Capillary refill is less than 2 seconds. No carotid bruits.    Respiratory: Clear to auscultation and percussion without rales, rhonchi, wheezing or diminished breath sounds.    GI: Positive bowel sounds. Soft, nondistended, nontender.     Musculoskeletal: No significant deformity or joint abnormality. No  edema. Peripheral pulses intact.    NEUROLOGICAL: Unable to assess due to sedation status.     PSYCHIATRIC: Unable to assess due to sedation status.     Results Review:     I reviewed the patient's new clinical results.  I reviewed the patient's new imaging results and agree with the interpretation.    Results from last 7 days  Lab Units 01/12/18 0058 01/11/18  0911 01/11/18  0108  01/10/18  0034   WBC 10*3/mm3 5.54  --  4.82  --  14.48*   HEMOGLOBIN g/dL 7.5* 7.5* 7.2*  < > 7.0*   PLATELETS 10*3/mm3 168  --  148  --  142   < > = values in this interval not displayed.    Results from last 7 days  Lab Units 01/12/18  0058 01/11/18  0108 01/10/18  0218 01/09/18  0337 01/08/18 0108   SODIUM mmol/L 141 145 145 143 150   POTASSIUM mmol/L 3.4* 3.6 4.1 3.4* 3.8   CHLORIDE mmol/L 100 103 102 100 104   CO2 mmol/L 32.2* 33.1* 34.4* 39.2* >40.0*   BUN mg/dL 72* 49* 41* 35* 29*   CREATININE mg/dL 2.24* 2.08* 1.78* 1.40* 1.38*   CALCIUM mg/dL 8.2 8.2 8.7 9.0 8.8   GLUCOSE mg/dL 307* 271* 202* 138* 131*   MAGNESIUM mg/dL 2.9*  --   --  2.5 2.2     Lab Results   Component Value Date    INR 1.25 (H) 01/10/2018    INR 1.27 (H) 01/09/2018    INR 1.18 (H) 12/06/2017    PROTIME 15.8 (H) 01/10/2018    PROTIME 16.1 (H) 01/09/2018    PROTIME 15.2 12/06/2017       Results from last 7 days  Lab Units 01/12/18  0058 01/11/18  0108 01/10/18  0218   ALK PHOS U/L 38* 45 35*   BILIRUBIN mg/dL 0.8 1.1 1.7   ALT (SGPT) U/L 13 19 16   AST (SGOT) U/L 13 15 17       Results from last 7 days  Lab Units 01/12/18  0516   PH, ARTERIAL pH units 7.376   PO2 ART mm Hg 76.7*   PCO2, ARTERIAL mm Hg 58.4*   HCO3 ART mmol/L 33.5*     Imaging Results (last 24 hours)     Procedure Component Value Units Date/Time    XR Chest 1 View [[id number]] Collected:  01/11/18 1402     Updated:  01/11/18 1449    Narrative:       EXAMINATION: XR CHEST 1 VW-      CLINICAL INDICATION:     et tube placement; J96.21-Acute and chronic  respiratory failure with hypoxia;  J96.22-Acute and chronic respiratory  failure with hypercapnia; J44.1-Chronic obstructive pulmonary disease  with (acute) exacerbation; R09.02-Hypoxemia     TECHNIQUE:  XR CHEST 1 VW-      COMPARISON: 1/11/2018      FINDINGS:   Improved CHF changes. Improved airspace disease. ETT tip at clavicles.  NG tube in stomach.       Impression:       1. Improved airspace disease and CHF.  2. Support devices as above.     This report was finalized on 1/11/2018 2:07 PM by Dr. Kirk Kellogg MD.       XR Chest AP [[id number]] Collected:  01/12/18 0739     Updated:  01/12/18 0742    Narrative:       EXAMINATION: XR CHEST AP-      CLINICAL INDICATION:     Respiratory failure; J96.21-Acute and chronic  respiratory failure with hypoxia; J96.22-Acute and chronic respiratory  failure with hypercapnia; J44.1-Chronic obstructive pulmonary disease  with (acute) exacerbation; R09.02-Hypoxemia     TECHNIQUE:  XR CHEST AP-      COMPARISON: 1/11/2018      FINDINGS:   Stable appearance and positioning of support devices. Slight improvement  in bilateral airspace disease. No effusion or pneumothorax.       Impression:       Slight improvement in airspace disease. Otherwise stable  chest.     This report was finalized on 1/12/2018 7:40 AM by Dr. Kirk Kellogg MD.                Medication Review:   Scheduled Medications:    albumin human 25 g Intravenous Once   amLODIPine 10 mg Oral Daily   atorvastatin 80 mg Oral Daily   budesonide-formoterol 2 puff Inhalation BID - RT   carvedilol 6.25 mg Oral BID With Meals   cholecalciferol 2,000 Units Oral Daily   CloNIDine 0.3 mg Oral Q8H   docusate sodium 100 mg Oral BID   ferrous sulfate 325 mg Oral Daily With Breakfast   guaiFENesin 200 mg Oral Q4H   hydrALAZINE 100 mg Oral Q8H   ipratropium-albuterol 3 mL Nebulization Q6H - RT   isosorbide mononitrate 240 mg Oral Daily   lactobacillus acidophilus 1 capsule Oral Daily   levothyroxine 25 mcg Oral Daily   [START ON 1/13/2018] methylPREDNISolone sodium  succinate 1,000 mg Intravenous Daily   pantoprazole 40 mg Oral Daily   piperacillin-tazobactam 4.5 g Intravenous Q8H   polyethylene glycol 17 g Oral Daily   potassium chloride 40 mEq Oral Q4H   sertraline 50 mg Oral Q PM   sodium chloride 10 mL Intracatheter Q12H   sodium chloride 10 mL Intracatheter Q12H   sodium chloride      terazosin 10 mg Oral Daily   vecuronium 5 mg Intravenous Once   vitamin B-12 2,000 mcg Oral Daily     Continuous infusions:    fentaNYL (SUBLIMAZE) PCA 1500 mcg/30 mL syringe     insulin regular infusion 1 unit/mL 1-20 Units/hr    Pharmacy Consult - Pharmacy to dose     Pharmacy to dose vancomycin     propofol 5-50 mcg/kg/min Last Rate: 30 mcg/kg/min (01/12/18 3199)       Assessment/Plan     Neuro: patient intubated and sedated, no sedation vacation today due to seriousness of condition.       Respiratory Failure: likely related to underlying lung disease, COPD and fluid volume overload. Continue scheduled inhalants and nebulizer's. Fi02 decreased to 75%. Will give 1gm of Solu-medrol IV x3 days. No further bleeding out of in-line ET Tube suction. H/H has stabilized.     Intubated: 1/9/18.       Hypertension: /54 HR 54.  Continue continuous ECG and v/s monitoring, maintain MAP >65, nipride gtt infusing will stop today, Hytrin dose increased to 10mg daily.        SHEELA on CKD stage III: Creatinine 2.24, 24 hour urine 1.4 liters, continue strict I&O, electrolytes replaced accordingly.     Hyperglycemia: will increase doses of long acting insulin as well as novolog due to give high dose steroids. Monitor closely.     GI: Continue tube feedings, continue GI prophylaxis. Abdomen is distended and slightly firm, bladder pressure is 19, no BM in 3 days, will start colace and miralax today.       Low H/H: level is 7.5/24.2 , remains in the 7 range, continue to monitor closely.       IV access: Midlines and peripherals.       ID: WBC is 5.54, Neutrophils are 90.2 CRP is 4.6 no fevers, continue to  monitor lab trends and clinical changes. Continue current antibiotics.         Patient Active Problem List   Diagnosis Code   • COPD (chronic obstructive pulmonary disease) J44.9   • Shortness of breath R06.02   • Morbid obesity E66.01   • Hypoxia R09.02   • Edema extremities R60.0   • Sleep apnea G47.30   • Wheezing R06.2   • Allergic rhinitis J30.9   • Essential hypertension I10   • Sore throat J02.9   • Cough R05   • Pneumonia J18.9   • Respiratory failure J96.90   • Acute on chronic respiratory failure with hypoxia and hypercapnia J96.21, J96.22         Bedside rounds were completed with RN and RRT, discussed case and plan in great detail.    YANG Mathews  01/12/18  9:04 AM    Scribed for Dr. Gomez by YANG Cassidy.       I, Jeremi Gomez M.D. attest that the above note accurately reflects the work and decisions made  by me.  Patient was seen and evaluated by Dr. Gomez, including history of present illness, physical exam, assessment, and treatment plan.  The above note was reviewed and edited by Dr. Gomez.  Critical Care time spent in direct patient care: 35 minutes (excluding procedure time, if applicable) including high complexity decision making to assess, manipulate, and support vital organ system failure in this individual who has impairment of one or more vital organ systems such that there is a high probability of imminent or life threatening deterioration in the patient’s condition.

## 2018-01-12 NOTE — NURSING NOTE
"Patient had been sitting up in the bed with Bipap on, labored breathing noted. Patient had reported hemoptosis.  notified and he states, \"go ahead and prepare to intubate.\" Equipment gathered, RT notified.   1134-5ml diprivan, 4mg versed given   1136-5ml diprivan given  1139-10 ml Vecc given   1140-intubated with 8.0 ETT 24 at the lip.   Patient in stable condition. Will continue to monitor pt closely.   "

## 2018-01-13 NOTE — PROGRESS NOTES
Assisted by:Juno PAREDES    History of presenting illness:  She is sedated and on the ventilator, cannot give a history due to his medical condition.  All rating tube feeds at goal rate.    Vital Signs  Temp:  [98.2 °F (36.8 °C)-98.9 °F (37.2 °C)] 98.2 °F (36.8 °C)  Heart Rate:  [47-59] 57  Resp:  [18-27] 22  BP: (136-206)/(49-85) 152/52  FiO2 (%):  [75 %] 75 %  Body mass index is 42.12 kg/(m^2).      Intake/Output Summary (Last 24 hours) at 01/13/18 0850  Last data filed at 01/13/18 0832   Gross per 24 hour   Intake          3390.99 ml   Output             1350 ml   Net          2040.99 ml     Intake & Output (last 3 days)       01/10 0701 - 01/11 0700 01/11 0701 - 01/12 0700 01/12 0701 - 01/13 0700 01/13 0701 - 01/14 0700    P.O.        I.V. (mL/kg) 716.3 (6.1) 679.9 (5.7) 1488 (12.6)     Blood 918.8       Other 560 380 450     NG/ 1005 1003     IV Piggyback 1050 800 350 100    Total Intake(mL/kg) 3540.2 (29.9) 2864.9 (24.2) 3291 (27.8) 100 (0.8)    Urine (mL/kg/hr) 1900 (0.7) 1450 (0.5) 1350 (0.5)     Total Output 1900 1450 1350      Net +1640.2 +1414.9 +1941 +100                  Physical exam:  Physical Exam   Constitutional: Well-developed, well-nourished. On the ventilator and sedated   Normocephalic and atraumatic.  Mucous membranes appear moist  Eyes:  Pupils are equal, round , 2 mm , with this size difficult to tell reactivity, Subconjunctival edema is improved  Cardiovascular Bradycardic regular rhythm without murmur rub or gallop heard today  Chest: Bilateral breath sounds crackles at the bases bilaterally.  Apices are clear some wheezing right lower lobe  Abdomen:Soft, rounded, bowel sounds are active, nondistended, nontender  Musculoskeletal: Cannot test strength right distal pulses 1-2+ edema, SCDs are on  Neurological: Sedated, when his name is called he does begin to move his head.  Worley catheter in place clear medium yellow  urine  Skin warm and dry some ecchymosis on his arms on his right  thenar eminence he does have an area that appears to be of possible deep tissue injury, non-blanchable, only about the size of a dime, wound care has seen.    Tube feeds are infusing at goal rate    Telemetry: sinus bradycardia, 58 currently    Results Review:  Lab Results   Component Value Date    WBC 5.99 01/13/2018    HGB 7.6 (L) 01/13/2018    HCT 25.4 (L) 01/13/2018    MCV 94.8 (H) 01/13/2018     01/13/2018     Lab Results   Component Value Date    GLUCOSE 141 (H) 01/13/2018    BUN 89 (H) 01/13/2018    CREATININE 2.27 (H) 01/13/2018    EGFRIFNONA 29 (L) 01/13/2018    BCR 39.2 (H) 01/13/2018    CO2 32.9 (H) 01/13/2018    CALCIUM 8.3 01/13/2018    PROTENTOTREF 6.2 01/10/2018    ALBUMIN 4.10 01/13/2018    LABIL2 2.2 01/13/2018    AST 14 01/13/2018    ALT 18 01/13/2018       Imaging Results (last 72 hours)     Procedure Component Value Units Date/Time    XR Chest 1 View [[id number]] Collected:  12/28/17 1015     Updated:  12/28/17 1017    Narrative:       EXAMINATION:  XR CHEST 1 VW-      CLINICAL INDICATION:     sob     TECHNIQUE:  XR CHEST 1 VW-       COMPARISON: 12/10/2017      FINDINGS:   The lungs remain aerated.   Heart size is stable.   No pneumothorax.   No pleural effusion.   Bony and soft tissue structures are unremarkable.            Impression:       Stable radiographic appearance of the chest.        This report was finalized on 12/28/2017 10:15 AM by Dr. Miko Coto MD.       CT Chest Pulmonary Embolism With Contrast [[id number]] Collected:  12/28/17 1242     Updated:  12/28/17 1245    Narrative:       EXAMINATION: CT CHEST PULMONARY EMBOLISM W CONTRAST-      Technique: Multiple CT axial images were obtained through the level of  pulmonary arteries, following IV contrast administration per CT PE  protocol.  Volume Rendered 3D or MIP images performed.     Radiation dose reduction techniques were utilized per ALARA protocol.  Automated exposure control was initiated through either or "[employer]"  or  [employer] software packages by  protocol.                  CLINICAL INDICATION:    SOB and Chest Pain     COMPARISON:    Chest x-ray performed on 12/28/2017.     FINDINGS:    Some bibasilar groundglass attenuation possibly  representing alveolar edema or atelectasis. Coronary artery  calcification. Subsegmental pulmonary artery branches are not well  evaluated. There is no pulmonary artery filling defect to suggest  pulmonary embolism. There is no thoracic adenopathy. No pleural or  pericardial effusion. Incidentally imaged upper abdomen is unremarkable.  Bone windows show no acute osseous abnormality.       Impression:          Some bibasilar groundglass attenuation possibly representing alveolar  edema or atelectasis. Coronary artery calcification. Subsegmental  pulmonary artery branches are not well evaluated.     This report was finalized on 12/28/2017 12:43 PM by Dr. Miko Coto MD.       CT Abdomen Pelvis Without Contrast [[id number]] Collected:  12/29/17 0627     Updated:  12/29/17 0631    Narrative:          CT ABDOMEN PELVIS WO CONTRAST-        TECHNIQUE: Multiple axial CT images were obtained from lung bases  through pubic symphysis without administration of IV contrast.  Reformatted images in the coronal and/or sagittal plane(s) were  generated from the axial data set to facilitate diagnostic accuracy  and/or surgical planning.  Oral Contrast:NONE.     Radiation dose reduction techniques were utilized per ALARA protocol.  Automated exposure control was initiated through either or [employer] or  [employer] software packages by  protocol.       [id number] mGy.cm     Clinical information  elevated lactic acid and LLQ abd pain; J96.21-Acute and chronic  respiratory failure with hypoxia; J96.22-Acute and chronic respiratory  failure with hypercapnia; J44.1-Chronic obstructive pulmonary disease  with (acute) exacerbation      Comparison  NONE.     Findings  LOWER THORAX: BIBASILAR  ATELECTASIS AND MINIMAL AIRSPACE DISEASE.     ABDOMEN:        LIVER: Homogeneous. No focal hepatic mass or ductal dilatation.        GALLBLADDER: GALLSTONES IN THE GALLBLADDER IDENTIFIED.        PANCREAS: Unremarkable. No mass or ductal dilatation.        SPLEEN: Homogeneous. No splenomegaly.        ADRENALS: No mass.        KIDNEYS: NONOBSTRUCTIVE BILATERAL RENAL CALCULI.        GI TRACT: Non-dilated. No definite wall thickening.        PERITONEUM: No free air. No free fluid or loculated fluid  collections.        MESENTERY: Unremarkable.        LYMPH NODES: No lymphadenopathy.        VASCULATURE: No evidence of aneurysm.        ABDOMINAL WALL: No focal hernia or mass.           OTHER: None.     PELVIS:        BLADDER: 5 MM POLYP INVOLVING THE SUPERIOR LEFT ASPECT OF THE URINARY  BLADDER THAT SHOULD BE FURTHER EVALUATED WITH CYSTOSCOPY.        REPRODUCTIVE: Unremarkable as visualized.        APPENDIX: Nondistended. No surrounding inflammation.     BONES: No acute bony abnormality.       Impression:       Impression:  1. Bibasilar pneumonia.  2. Cholelithiasis.  3.5 mm polyp involving the superior left aspect of the urinary bladder  that should be further evaluated with cystoscopy.     This report was finalized on 12/29/2017 6:29 AM by Dr. Miko Coto MD.      Chest x-ray from this a.m. reviewed, worsening pulmonary infiltrates bilaterally official reading is the same.    Echo EF normal in December            Assessment/Plan     Active Problems:    Acute hypoxic/Hypercarbic respiratory failure secondary to COPD exacerbation with pneumonia, patient continues to require high ventilator settings.  He did have pulmonary hemorrhage.  Sectioning does not suggest this any further.  Hemoglobin is stable.  He has worsening infiltrates, this may be pulmonary fluid.  Cannot diurese however at this time.  Worsening renal function.  Nephrology is following.  Patient remains on broad-spectrum antibiotics for possible underlying  pneumonia as well CRP is coming down, white count remains normal.  Noted patient is mildly acidotic this a.m., I did not change ventilator settings, I'm planning on rechecking an ABG at noon.  His PCO2 is actually slightly improved.    Hypokalemia, improved    Anemia ,hemoglobin remained stable currently.     Diabetes patient was under poor control therefore insulin drip started yesterday.  He was initially requiring very high insulin dosing however he is down to 13 units per hour now glucoses are controlled.    Accelerated hypertension, she is now off my pride, Normodyne started yesterday, blood pressure is under improved control.  Follow    Hypothyroidism, TSH normal, adequate replacement    Nutrition, tolerating tube feeds at goal rate, no bowel movement as of yet, patient is on stool softeners, will give 1 dose of lactulose today.    Acute kidney injury superimposed on chronic kidney disease, Dr. Card is following.  Creatinine is about stable from yesterday.  Continue monitor.  Holding on diuresis because of elevated creatinine.    DVT prophylaxis, SCDs not using anticoagulants due to risk of bleeding    Electrolytes acceptable today.    High risk due to multiorgan failure    Seun Muller MD  01/13/18  8:50 AM

## 2018-01-13 NOTE — PROGRESS NOTES
THC Physician - Brief Progress Note  PERMANENT  2018 11:36    [facility]  [facility] - U - 10 - [place], [place] ([facility])    WARD COPELAND, name alert    Date of Service 2018 11:36    HPI/Events of Note Called by RN at request of Dr. Mulelr to review ABG and assess need for vent changes.  7.34/53/61 on /  Yesterday's AB.37/58/ on same settings except FIO2 85% yesterday.  Pt with underlying COPD. On rx for pneumonia. Had hemoptysis earlier in course but this has abated and RN reports only blood tinged secretions on suctioning. Had FOB earlier in week.    No change in current vent settings. ETT high on CXR today. Advance 2cm.   d/w RN.      Interventions Major-Respiratory failure - evaluation and management  Intermediate-Communication with other healthcare providers and/or family        Electronically Signed by: Abbi Baird) on 2018 11:39 AM

## 2018-01-13 NOTE — PLAN OF CARE
Problem: COPD, Chronic Bronchitis/Emphysema (Adult)  Goal: Signs and Symptoms of Listed Potential Problems Will be Absent or Manageable (COPD, Chronic Bronchitis/Emphysema)  Outcome: Ongoing (interventions implemented as appropriate)   01/11/18 2032   COPD, Chronic Bronchitis/Emphysema   Problems Assessed (COPD, Chronic Bronchitis/Emphysema) all   Problems Present (COPD, Chronic Bronchitis/Emphysema) situational response       Problem: Skin Integrity Impairment, Risk/Actual (Adult)  Goal: Identify Related Risk Factors and Signs and Symptoms  Outcome: Ongoing (interventions implemented as appropriate)   01/11/18 2031   Skin Integrity Impairment, Risk/Actual   Skin Integrity Impairment, Risk/Actual: Related Risk Factors cognitive impairment;edema   Signs and Symptoms (Skin Integrity Impairment) edema     Goal: Skin Integrity/Wound Healing  Outcome: Ongoing (interventions implemented as appropriate)   01/13/18 1012   Skin Integrity Impairment, Risk/Actual (Adult)   Skin Integrity/Wound Healing making progress toward outcome       Problem: Fall Risk (Adult)  Goal: Identify Related Risk Factors and Signs and Symptoms  Outcome: Ongoing (interventions implemented as appropriate)   01/11/18 2031   Fall Risk   Fall Risk: Related Risk Factors history of falls;confusion/agitation   Fall Risk: Signs and Symptoms presence of risk factors     Goal: Absence of Falls  Outcome: Ongoing (interventions implemented as appropriate)   01/13/18 1012   Fall Risk (Adult)   Absence of Falls making progress toward outcome       Problem: Patient Care Overview (Adult)  Goal: Plan of Care Review  Outcome: Ongoing (interventions implemented as appropriate)   01/10/18 1539 01/13/18 0800   Coping/Psychosocial Response Interventions   Plan Of Care Reviewed With --  other (see comments)  (Pt not alert. Family not present at bedside)   Patient Care Overview   Progress progress toward functional goals is gradual --    Outcome Evaluation   Outcome  Summary/Follow up Plan patient remains sedated on vent  --      Goal: Adult Individualization and Mutuality  Outcome: Ongoing (interventions implemented as appropriate)   12/29/17 0353   Mutuality/Individual Preferences   What Anxieties, Fears or Concerns Do You Have About Your Health or Care? none   What Questions Do You Have About Your Health or Care? none   What Information Would Help Us Give You More Personalized Care? nothing     Goal: Discharge Needs Assessment  Outcome: Ongoing (interventions implemented as appropriate)   12/29/17 1139 01/01/18 1130   Discharge Needs Assessment   Concerns To Be Addressed no discharge needs identified --    Readmission Within The Last 30 Days no previous admission in last 30 days --    Equipment Needed After Discharge none --    Discharge Disposition still a patient --    Current Health   Outpatient/Agency/Support Group Needs (Pt has [facility]. Updated information will need to be sent at discharge. ) --    Living Environment   Transportation Available car;family or friend will provide --    Self-Care   Equipment Currently Used at Home --  bipap/ cpap;cane, quad;nebulizer;hospital bed;power chair, (recliner lift);walker, rolling;oxygen       Problem: Mechanical Ventilation, Invasive (Adult)  Goal: Signs and Symptoms of Listed Potential Problems Will be Absent or Manageable (Mechanical Ventilation, Invasive)  Outcome: Ongoing (interventions implemented as appropriate)   01/13/18 1012   Mechanical Ventilation, Invasive   Problems Assessed (Mechanical Ventilation, Invasive) all   Problems Present (Mechanical Ventilation, Invasive) immobility;inability to wean;situational response       Problem: Pressure Ulcer Risk (Karthikeyan Scale) (Adult,Obstetrics,Pediatric)  Goal: Identify Related Risk Factors and Signs and Symptoms  Outcome: Ongoing (interventions implemented as appropriate)   01/13/18 1012   Pressure Ulcer Risk (Karthikeyan Scale)   Related Risk Factors (Pressure  Ulcer Risk (Karthikeyan Scale)) body weight extremes;cognitive impairment;critical care admission;hospitalization prolonged;infection;medical devices;mental impairment;mobility impaired;nutritional deficiencies;tissue perfusion altered     Goal: Skin Integrity  Outcome: Ongoing (interventions implemented as appropriate)   01/13/18 1012   Pressure Ulcer Risk (Karthikeyan Scale) (Adult,Obstetrics,Pediatric)   Skin Integrity making progress toward outcome

## 2018-01-13 NOTE — PROGRESS NOTES
Nephrology  Note      Subjective     No acute overnight events reports. BP well controlled on nicardipine drip and no longer having HR in 40s after stopping coreg. He is making urine. FiO2 still at 75%    Objective     Vital Signs  Temp:  [98.2 °F (36.8 °C)-98.9 °F (37.2 °C)] 98.8 °F (37.1 °C)  Heart Rate:  [49-61] 54  Resp:  [18-27] 22  BP: (136-167)/(49-63) 155/54  FiO2 (%):  [75 %] 75 %    I/O this shift:  In: 100 [IV Piggyback:100]  Out: 310 [Urine:310]  I/O last 3 completed shifts:  In: 4842 [I.V.:1637; Other:830; NG/GT:1575; IV Piggyback:800]  Out: 2000 [Urine:2000]    Physical Examination:    General Appearance : intubated, sedated  Head : normocephalic, without obvious abnormality and atraumatic  Eyes : + pallor   Throat : ETT in place  Neck:  suppple  Lungs :  clear anteriorly  Heart : regular rhythm , normal S1, S2, systolic murmur all over precordium  Abdomen :  normal bowel sounds,soft non-tender  Extremities :  1+ edema  Neurologic : sedated    Laboratory Data :      WBC WBC   Date Value Ref Range Status   01/13/2018 5.99 4.50 - 12.50 10*3/mm3 Final   01/12/2018 5.54 4.50 - 12.50 10*3/mm3 Final   01/11/2018 4.82 4.50 - 12.50 10*3/mm3 Final      HGB Hemoglobin   Date Value Ref Range Status   01/13/2018 7.6 (L) 14.0 - 18.0 g/dL Final   01/12/2018 7.5 (L) 14.0 - 18.0 g/dL Final   01/11/2018 7.5 (L) 14.0 - 18.0 g/dL Final   01/11/2018 7.2 (L) 14.0 - 18.0 g/dL Final   01/10/2018 7.3 (L) 14.0 - 18.0 g/dL Final      HCT Hematocrit   Date Value Ref Range Status   01/13/2018 25.4 (L) 42.0 - 52.0 % Final   01/12/2018 24.2 (L) 42.0 - 52.0 % Final   01/11/2018 24.3 (L) 42.0 - 52.0 % Final   01/11/2018 23.3 (L) 42.0 - 52.0 % Final   01/10/2018 23.2 (L) 42.0 - 52.0 % Final      Platlets No results found for: LABPLAT   MCV MCV   Date Value Ref Range Status   01/13/2018 94.8 (H) 80.0 - 94.0 fL Final   01/12/2018 93.8 80.0 - 94.0 fL Final   01/11/2018 92.5 80.0 - 94.0 fL Final          Sodium Sodium   Date Value Ref  Range Status   01/13/2018 145 135 - 153 mmol/L Final   01/12/2018 141 135 - 153 mmol/L Final   01/11/2018 145 135 - 153 mmol/L Final      Potassium Potassium   Date Value Ref Range Status   01/13/2018 3.5 3.5 - 5.3 mmol/L Final   01/12/2018 3.4 (L) 3.5 - 5.3 mmol/L Final   01/11/2018 3.6 3.5 - 5.3 mmol/L Final      Chloride Chloride   Date Value Ref Range Status   01/13/2018 105 99 - 112 mmol/L Final   01/12/2018 100 99 - 112 mmol/L Final   01/11/2018 103 99 - 112 mmol/L Final      CO2 CO2   Date Value Ref Range Status   01/13/2018 32.9 (H) 24.3 - 31.9 mmol/L Final   01/12/2018 32.2 (H) 24.3 - 31.9 mmol/L Final   01/11/2018 33.1 (H) 24.3 - 31.9 mmol/L Final      BUN BUN   Date Value Ref Range Status   01/13/2018 89 (H) 7 - 21 mg/dL Final   01/12/2018 72 (H) 7 - 21 mg/dL Final   01/11/2018 49 (H) 7 - 21 mg/dL Final      Creatinine Creatinine   Date Value Ref Range Status   01/13/2018 2.27 (H) 0.43 - 1.29 mg/dL Final   01/12/2018 2.24 (H) 0.43 - 1.29 mg/dL Final   01/11/2018 2.08 (H) 0.43 - 1.29 mg/dL Final      Calcium Calcium   Date Value Ref Range Status   01/13/2018 8.3 7.7 - 10.0 mg/dL Final   01/12/2018 8.2 7.7 - 10.0 mg/dL Final   01/11/2018 8.2 7.7 - 10.0 mg/dL Final      PO4 No results found for: CAPO4   Albumin Albumin   Date Value Ref Range Status   01/13/2018 4.10 3.40 - 4.80 g/dL Final   01/12/2018 3.80 3.40 - 4.80 g/dL Final   01/11/2018 3.70 3.40 - 4.80 g/dL Final      Magnesium Magnesium   Date Value Ref Range Status   01/13/2018 3.0 (H) 1.7 - 2.6 mg/dL Final   01/12/2018 2.9 (H) 1.7 - 2.6 mg/dL Final      Uric Acid No results found for: URICACID     Radiology results :     Imaging Results (last 24 hours)     Procedure Component Value Units Date/Time    XR Chest 1 View [[id number]] Collected:  01/12/18 1237     Updated:  01/12/18 1240    Narrative:       EXAMINATION: XR CHEST 1 VW-      CLINICAL INDICATION:     s/p central line placement; J96.21-Acute and  chronic respiratory failure with hypoxia;  J96.22-Acute and chronic  respiratory failure with hypercapnia; J44.1-Chronic obstructive  pulmonary disease with (acute) exacerbation; R09.02-Hypoxemia     TECHNIQUE:  XR CHEST 1 VW-      COMPARISON: 1/12/2018      FINDINGS:   Interval placement of left IJ deep line. Tip in the SVC. ET tube with  tip at clavicles. NG tube extends into stomach. Patchy bilateral  airspace disease not significantly changed. No pneumothorax.       Impression:       1. Interval left IJ deep line placement with tip in satisfactory  position. No pneumothorax. Otherwise stable chest.     This report was finalized on 1/12/2018 12:38 PM by Dr. Kirk Kellogg MD.       XR Chest AP [[id number]] Collected:  01/13/18 0741     Updated:  01/13/18 0744    Narrative:       EXAMINATION: XR CHEST AP-      CLINICAL INDICATION:     re eval resp failure; J96.21-Acute and chronic  respiratory failure with hypoxia; J96.22-Acute and chronic respiratory  failure with hypercapnia; J44.1-Chronic obstructive pulmonary disease  with (acute) exacerbation; R09.02-Hypoxemia     TECHNIQUE:  XR CHEST AP-      COMPARISON: 01/12/2018      FINDINGS:   ET tube with tip at level of clavicles. NG tube extends into stomach.  Left IJ deep line with tip distal SVC.     Since the prior exam, slight increase in the extent of patchy bilateral  airspace disease. Cardiomegaly stable. No definite effusion or  pneumothorax.       Impression:       1. Support devices as above.  2. Slight increase in bilateral airspace disease.     This report was finalized on 1/13/2018 7:42 AM by Dr. Kirk Kellogg MD.           Medications:        amLODIPine 10 mg Oral Daily   atorvastatin 80 mg Oral Daily   budesonide-formoterol 2 puff Inhalation BID - RT   cholecalciferol 2,000 Units Oral Daily   CloNIDine 0.3 mg Oral Q8H   ferrous sulfate 325 mg Oral Daily With Breakfast   guaiFENesin 200 mg Oral Q4H   hydrALAZINE 100 mg Oral Q8H   ipratropium-albuterol 3 mL Nebulization Q6H - RT   isosorbide  mononitrate 240 mg Oral Daily   labetalol 200 mg Oral Q12H   lactobacillus acidophilus 1 capsule Oral Daily   lactulose 20 g Nasogastric Once   levothyroxine 25 mcg Oral Daily   methylPREDNISolone sodium succinate 1,000 mg Intravenous Daily   pantoprazole 40 mg Oral Daily   piperacillin-tazobactam 4.5 g Intravenous Q8H   polyethylene glycol 17 g Oral Daily   sennosides-docusate sodium 1 tablet Oral BID   sertraline 50 mg Oral Q PM   sodium chloride 10 mL Intracatheter Q12H   sodium chloride 10 mL Intracatheter Q12H   terazosin 10 mg Oral Daily   vecuronium 5 mg Intravenous Once   vitamin B-12 2,000 mcg Oral Daily       fentaNYL (SUBLIMAZE) PCA 1500 mcg/30 mL syringe     insulin regular infusion 1 unit/mL 1-20 Units/hr Last Rate: 13 Units/hr (01/13/18 1039)   niCARdipine 5-15 mg/hr Last Rate: 7.5 mg/hr (01/13/18 0958)   Pharmacy Consult - Pharmacy to dose     propofol 5-50 mcg/kg/min Last Rate: 40 mcg/kg/min (01/13/18 8843)       Assessment/Plan     Active Problems:    Respiratory failure    Acute on chronic respiratory failure with hypoxia and hypercapnia      1. Prerenal SHEELA on CKD 3 :his  baseline creatinine is 1.2-1.3. It is stable at 2.2 today. Increase free water flushes as Na is 145 today. No IVF due to worsening interstitial infiltrates which could be from alveolar hemorrhage but if worsens will attempt will give lasix in AM  He has no hematuria and  Proteinuria is only 224 mg/gm.     2. Alveolar hemorrhage :  antihistone pending. Rest serologies negative. . Getting pulse solumedrol per Dr Gomez    3. Respiratory failure : Dr Gomez managing    4. Anemia : Hb stable.  stool occult pending. avoid venofer due to acute illness.     5. Hypertensive urgency : stable on nicardipine drip . Will increase labetalol and Titrate as tolerated    6. Type 2 Dm :  on insulin drip     7. Systolic murmur : 2 d echo has been ordered after discussion with Dr Muller    He remains critically ill  I discussed the patients  findings and my recommendations with RN, Dr Renzo Card MD  01/13/18  12:06 PM

## 2018-01-13 NOTE — PLAN OF CARE
Problem: Mechanical Ventilation, Invasive (Adult)  Goal: Signs and Symptoms of Listed Potential Problems Will be Absent or Manageable (Mechanical Ventilation, Invasive)  Outcome: Ongoing (interventions implemented as appropriate)   01/13/18 1839   Mechanical Ventilation, Invasive   Problems Assessed (Mechanical Ventilation, Invasive) all

## 2018-01-13 NOTE — PROGRESS NOTES
Dr. Card mentioned that he thought patient had a louder murmur today.  Echocardiogram did not show really significant valvular disease in early December, this will be repeated.

## 2018-01-13 NOTE — NURSING NOTE
Spoke with Dr. Rico.  Ok to titrate insulin drip outside of protocol to achieve target -180 mg/dl.

## 2018-01-13 NOTE — PLAN OF CARE
Problem: Mechanical Ventilation, Invasive (Adult)  Goal: Signs and Symptoms of Listed Potential Problems Will be Absent or Manageable (Mechanical Ventilation, Invasive)  Outcome: Ongoing (interventions implemented as appropriate)   01/12/18 1900   Mechanical Ventilation, Invasive   Problems Assessed (Mechanical Ventilation, Invasive) all

## 2018-01-14 NOTE — PROGRESS NOTES
Nephrology  Note      Subjective     He was on cardene drip for several hrs yesterday and it was resumed today. He did not get labetalol due to HR < 60. I have discussed with RN to give it today and wean cardene drip.     Objective     Vital Signs  Temp:  [98.3 °F (36.8 °C)-100.4 °F (38 °C)] 98.3 °F (36.8 °C)  Heart Rate:  [52-60] 60  Resp:  [22-27] 27  BP: (140-167)/(47-62) 148/47  FiO2 (%):  [75 %] 75 %    I/O this shift:  In: 100 [IV Piggyback:100]  Out: 630 [Urine:630]  I/O last 3 completed shifts:  In: 6021.2 [I.V.:3060.2; Other:690; NG/GT:1771; IV Piggyback:500]  Out: 2770 [Urine:2770]    Physical Examination:    General Appearance : intubated, sedated  Head : normocephalic, without obvious abnormality and atraumatic  Eyes : + pallor   Throat : ETT in place  Neck:  suppple  Lungs :  clear anteriorly  Heart : regular rhythm , normal S1, S2, systolic murmur   Abdomen :  normal bowel sounds,soft non-tender  Extremities :  2+ edema - worse  Neurologic : sedated    Laboratory Data :      WBC WBC   Date Value Ref Range Status   01/14/2018 4.26 (L) 4.50 - 12.50 10*3/mm3 Final   01/13/2018 5.99 4.50 - 12.50 10*3/mm3 Final   01/12/2018 5.54 4.50 - 12.50 10*3/mm3 Final      HGB Hemoglobin   Date Value Ref Range Status   01/14/2018 7.5 (L) 14.0 - 18.0 g/dL Final   01/13/2018 7.6 (L) 14.0 - 18.0 g/dL Final   01/12/2018 7.5 (L) 14.0 - 18.0 g/dL Final      HCT Hematocrit   Date Value Ref Range Status   01/14/2018 24.8 (L) 42.0 - 52.0 % Final   01/13/2018 25.4 (L) 42.0 - 52.0 % Final   01/12/2018 24.2 (L) 42.0 - 52.0 % Final      Platlets No results found for: LABPLAT   MCV MCV   Date Value Ref Range Status   01/14/2018 94.7 (H) 80.0 - 94.0 fL Final   01/13/2018 94.8 (H) 80.0 - 94.0 fL Final   01/12/2018 93.8 80.0 - 94.0 fL Final          Sodium Sodium   Date Value Ref Range Status   01/14/2018 146 135 - 153 mmol/L Final   01/13/2018 145 135 - 153 mmol/L Final   01/12/2018 141 135 - 153 mmol/L Final      Potassium Potassium    Date Value Ref Range Status   01/14/2018 4.0 3.5 - 5.3 mmol/L Final   01/13/2018 3.5 3.5 - 5.3 mmol/L Final   01/12/2018 3.4 (L) 3.5 - 5.3 mmol/L Final      Chloride Chloride   Date Value Ref Range Status   01/14/2018 107 99 - 112 mmol/L Final   01/13/2018 105 99 - 112 mmol/L Final   01/12/2018 100 99 - 112 mmol/L Final      CO2 CO2   Date Value Ref Range Status   01/14/2018 31.6 24.3 - 31.9 mmol/L Final   01/13/2018 32.9 (H) 24.3 - 31.9 mmol/L Final   01/12/2018 32.2 (H) 24.3 - 31.9 mmol/L Final      BUN BUN   Date Value Ref Range Status   01/14/2018 97 (H) 7 - 21 mg/dL Final   01/13/2018 89 (H) 7 - 21 mg/dL Final   01/12/2018 72 (H) 7 - 21 mg/dL Final      Creatinine Creatinine   Date Value Ref Range Status   01/14/2018 2.19 (H) 0.43 - 1.29 mg/dL Final   01/13/2018 2.27 (H) 0.43 - 1.29 mg/dL Final   01/12/2018 2.24 (H) 0.43 - 1.29 mg/dL Final      Calcium Calcium   Date Value Ref Range Status   01/14/2018 8.2 7.7 - 10.0 mg/dL Final   01/13/2018 8.3 7.7 - 10.0 mg/dL Final   01/12/2018 8.2 7.7 - 10.0 mg/dL Final      PO4 No results found for: CAPO4   Albumin Albumin   Date Value Ref Range Status   01/14/2018 3.90 3.40 - 4.80 g/dL Final   01/13/2018 4.10 3.40 - 4.80 g/dL Final   01/12/2018 3.80 3.40 - 4.80 g/dL Final      Magnesium Magnesium   Date Value Ref Range Status   01/14/2018 3.2 (H) 1.7 - 2.6 mg/dL Final   01/13/2018 3.0 (H) 1.7 - 2.6 mg/dL Final   01/12/2018 2.9 (H) 1.7 - 2.6 mg/dL Final      Uric Acid No results found for: URICACID     Radiology results :     Imaging Results (last 24 hours)     Procedure Component Value Units Date/Time    XR Chest AP [[id number]] Collected:  01/14/18 0805     Updated:  01/14/18 0808    Narrative:       EXAMINATION: XR CHEST AP-      CLINICAL INDICATION:     resp failure; J96.21-Acute and chronic  respiratory failure with hypoxia; J96.22-Acute and chronic respiratory  failure with hypercapnia; J44.1-Chronic obstructive pulmonary disease  with (acute) exacerbation;  R09.02-Hypoxemia     TECHNIQUE:  XR CHEST AP-      COMPARISON: 01/13/2018      FINDINGS:   ET tube with tip at level of clavicles. Left IJ deep line with tip at  cavoatrial junction. NG tube extends into stomach.     Significant interval improvement in bilateral airspace disease with  persistent opacities right lung noted.       Impression:       1. Improvement in bilateral airspace disease.  2. Support devices as above.     This report was finalized on 1/14/2018 8:05 AM by Dr. Kirk Kellogg MD.           Medications:        amLODIPine 10 mg Oral Daily   atorvastatin 80 mg Oral Daily   budesonide-formoterol 2 puff Inhalation BID - RT   cholecalciferol 2,000 Units Oral Daily   CloNIDine 0.3 mg Oral Q8H   ferrous sulfate 325 mg Oral Daily With Breakfast   guaiFENesin 200 mg Oral Q4H   hydrALAZINE 100 mg Oral Q8H   ipratropium-albuterol 3 mL Nebulization Q6H - RT   isosorbide mononitrate 240 mg Oral Daily   labetalol 300 mg Oral Q12H   lactobacillus acidophilus 1 capsule Oral Daily   lactulose 20 g Oral BID   levothyroxine 25 mcg Oral Daily   pantoprazole 40 mg Oral Daily   piperacillin-tazobactam 4.5 g Intravenous Q8H   polyethylene glycol 17 g Oral Daily   sennosides-docusate sodium 1 tablet Oral BID   sertraline 50 mg Oral Q PM   sodium chloride 10 mL Intracatheter Q12H   sodium chloride 10 mL Intracatheter Q12H   terazosin 10 mg Oral Daily   vecuronium 5 mg Intravenous Once   vitamin B-12 2,000 mcg Oral Daily       fentaNYL (SUBLIMAZE) PCA 1500 mcg/30 mL syringe     insulin regular infusion 1 unit/mL 1-20 Units/hr Last Rate: 13 Units/hr (01/14/18 1034)   niCARdipine 5-15 mg/hr Last Rate: 5 mg/hr (01/14/18 1000)   Pharmacy Consult - Pharmacy to dose     propofol 5-50 mcg/kg/min Last Rate: 35 mcg/kg/min (01/14/18 1034)       Assessment/Plan     Active Problems:    Respiratory failure    Acute on chronic respiratory failure with hypoxia and hypercapnia      1. Prerenal SHEELA on CKD 3 :his  baseline creatinine is  1.2-1.3. It is little better at 2.1 today. Increase free water flushes as still has mild hypernatremia  renee give lasix 40 mg iv x 1 due to worsening edema   no hematuria and  Proteinuria is only 224 mg/gm.     2. Alveolar hemorrhage :  antihistone pending. Rest serologies negative. . Getting pulse solumedrol per Dr Gomez    3. Respiratory failure : Dr Gomez managing    4. Anemia : Hb stable.  stool occult pending. avoid venofer due to acute illness.     5. Hypertensive urgency :taper nicardipine drip as tolerated    6. Type 2 Dm :  on insulin drip     7. Mild aortic stenosis and mild MVR : 2 d echo noted    I discussed the patients findings and my recommendations with RN, Dr Renzo Card MD  01/14/18  11:12 AM

## 2018-01-14 NOTE — PROGRESS NOTES
Assisted by:Juno PAREDES    History of presenting illness:  She is sedated and on the ventilator, cannot give a history due to his medical condition.  Tolerating tube feeds but no bowel movement.    Vital Signs  Temp:  [98.6 °F (37 °C)-100.4 °F (38 °C)] 100.4 °F (38 °C)  Heart Rate:  [52-61] 57  Resp:  [22-27] 25  BP: (140-167)/(51-62) 161/56  FiO2 (%):  [75 %] 75 %  Body mass index is 42.12 kg/(m^2).      Intake/Output Summary (Last 24 hours) at 01/14/18 0819  Last data filed at 01/14/18 0500   Gross per 24 hour   Intake          4669.27 ml   Output             2220 ml   Net          2449.27 ml     Intake & Output (last 3 days)       01/11 0701 - 01/12 0700 01/12 0701 - 01/13 0700 01/13 0701 - 01/14 0700 01/14 0701 - 01/15 0700    I.V. (mL/kg) 679.9 (5.7) 1488 (12.6) 2455.3 (20.8)     Blood        Other 380 450 540     NG/GT 1005 1003 1174     IV Piggyback 800 350 500     Total Intake(mL/kg) 2864.9 (24.2) 3291 (27.8) 4669.3 (39.5)     Urine (mL/kg/hr) 1450 (0.5) 1350 (0.5) 2220 (0.8)     Total Output 1450 1350 2220      Net +1414.9 +1941 +2449.3                    Physical exam:  Physical Exam   Constitutional: Well-developed, well-nourished. On the ventilator and sedated He will however open his eyes to voice  Normocephalic and atraumatic.  Mucous membranes appear moist  Eyes:  Pupils are equal, round , 2 mm, he does have some subconjunctival edema right eye slightly greater than left  Cardiovascular Bradycardic regular rhythm 2/6 systolic ejection murmur throughout the precordium greatest the apex rate currently 58  Chest: Bilateral breath sounds crackles at the bases bilaterally.  Crackles on the right side heard decreased sounds throughout  Abdomen:Soft, rounded, bowel sounds are active, mildly distended, and appears nontender to palpation.  Musculoskeletal: Cannot test strength right distal pulses 2+ edema, SCDs are on  Neurological: Sedated, but he will follow commands opens eyes on voice, squeezes both hands  equally, moves his toes.  Worley catheter in place clear medium yellow  urine  Skin warm and dry some ecchymosis on his arms on his right thenar eminence he does have an area that appears to be of possible deep tissue injury, non-blanchable, only about the size of a dime, wound care has seen.    Tube feeds are infusing at goal rate    Telemetry: sinus bradycardia, 58 currently    Results Review:  Lab Results   Component Value Date    WBC 4.26 (L) 01/14/2018    HGB 7.5 (L) 01/14/2018    HCT 24.8 (L) 01/14/2018    MCV 94.7 (H) 01/14/2018     01/14/2018     Lab Results   Component Value Date    GLUCOSE 139 (H) 01/14/2018    BUN 97 (H) 01/14/2018    CREATININE 2.19 (H) 01/14/2018    EGFRIFNONA 30 (L) 01/14/2018    BCR 44.3 (H) 01/14/2018    CO2 31.6 01/14/2018    CALCIUM 8.2 01/14/2018    PROTENTOTREF 6.2 01/10/2018    ALBUMIN 3.90 01/14/2018    LABIL2 2.1 01/14/2018    AST 15 01/14/2018    ALT 17 01/14/2018       Imaging Results (last 72 hours)     Procedure Component Value Units Date/Time    XR Chest 1 View [[id number]] Collected:  12/28/17 1015     Updated:  12/28/17 1017    Narrative:       EXAMINATION:  XR CHEST 1 VW-      CLINICAL INDICATION:     sob     TECHNIQUE:  XR CHEST 1 VW-       COMPARISON: 12/10/2017      FINDINGS:   The lungs remain aerated.   Heart size is stable.   No pneumothorax.   No pleural effusion.   Bony and soft tissue structures are unremarkable.            Impression:       Stable radiographic appearance of the chest.        This report was finalized on 12/28/2017 10:15 AM by Dr. Miko Coto MD.       CT Chest Pulmonary Embolism With Contrast [[id number]] Collected:  12/28/17 1242     Updated:  12/28/17 1245    Narrative:       EXAMINATION: CT CHEST PULMONARY EMBOLISM W CONTRAST-      Technique: Multiple CT axial images were obtained through the level of  pulmonary arteries, following IV contrast administration per CT PE  protocol.  Volume Rendered 3D or MIP images performed.      Radiation dose reduction techniques were utilized per ALARA protocol.  Automated exposure control was initiated through either or [employer] or  [employer] software packages by  protocol.                  CLINICAL INDICATION:    SOB and Chest Pain     COMPARISON:    Chest x-ray performed on 12/28/2017.     FINDINGS:    Some bibasilar groundglass attenuation possibly  representing alveolar edema or atelectasis. Coronary artery  calcification. Subsegmental pulmonary artery branches are not well  evaluated. There is no pulmonary artery filling defect to suggest  pulmonary embolism. There is no thoracic adenopathy. No pleural or  pericardial effusion. Incidentally imaged upper abdomen is unremarkable.  Bone windows show no acute osseous abnormality.       Impression:          Some bibasilar groundglass attenuation possibly representing alveolar  edema or atelectasis. Coronary artery calcification. Subsegmental  pulmonary artery branches are not well evaluated.     This report was finalized on 12/28/2017 12:43 PM by Dr. Miko Coto MD.       CT Abdomen Pelvis Without Contrast [[id number]] Collected:  12/29/17 0627     Updated:  12/29/17 0631    Narrative:          CT ABDOMEN PELVIS WO CONTRAST-        TECHNIQUE: Multiple axial CT images were obtained from lung bases  through pubic symphysis without administration of IV contrast.  Reformatted images in the coronal and/or sagittal plane(s) were  generated from the axial data set to facilitate diagnostic accuracy  and/or surgical planning.  Oral Contrast:NONE.     Radiation dose reduction techniques were utilized per ALARA protocol.  Automated exposure control was initiated through either or [employer] or  [employer] software packages by  protocol.       [id number] mGy.cm     Clinical information  elevated lactic acid and LLQ abd pain; J96.21-Acute and chronic  respiratory failure with hypoxia; J96.22-Acute and chronic respiratory  failure with  hypercapnia; J44.1-Chronic obstructive pulmonary disease  with (acute) exacerbation      Comparison  NONE.     Findings  LOWER THORAX: BIBASILAR ATELECTASIS AND MINIMAL AIRSPACE DISEASE.     ABDOMEN:        LIVER: Homogeneous. No focal hepatic mass or ductal dilatation.        GALLBLADDER: GALLSTONES IN THE GALLBLADDER IDENTIFIED.        PANCREAS: Unremarkable. No mass or ductal dilatation.        SPLEEN: Homogeneous. No splenomegaly.        ADRENALS: No mass.        KIDNEYS: NONOBSTRUCTIVE BILATERAL RENAL CALCULI.        GI TRACT: Non-dilated. No definite wall thickening.        PERITONEUM: No free air. No free fluid or loculated fluid  collections.        MESENTERY: Unremarkable.        LYMPH NODES: No lymphadenopathy.        VASCULATURE: No evidence of aneurysm.        ABDOMINAL WALL: No focal hernia or mass.           OTHER: None.     PELVIS:        BLADDER: 5 MM POLYP INVOLVING THE SUPERIOR LEFT ASPECT OF THE URINARY  BLADDER THAT SHOULD BE FURTHER EVALUATED WITH CYSTOSCOPY.        REPRODUCTIVE: Unremarkable as visualized.        APPENDIX: Nondistended. No surrounding inflammation.     BONES: No acute bony abnormality.       Impression:       Impression:  1. Bibasilar pneumonia.  2. Cholelithiasis.  3.5 mm polyp involving the superior left aspect of the urinary bladder  that should be further evaluated with cystoscopy.     This report was finalized on 12/29/2017 6:29 AM by Dr. Miko Coto MD.      Chest x-ray from this a.m. reviewed, He appears to have some improvement in his airspace disease.    Echo repeated yesterday, EF remains normal, he does have some valvular abnormalities but nothing that would be considered severe.    D/W Dr Gomez and Dr Card      Assessment/Plan     Active Problems:    Acute hypoxic/Hypercarbic respiratory failure secondary to COPD exacerbation with pneumonia, patient continues to require high ventilator settings.His peak pressures were running into the 50s at times, I discussed  this over the phone Dr. Gomez, his flow rate was turned down to 70 L/m.  He did have pulmonary hemorrhage.  Patient is not getting any significant bleeding from suctioning now.  Hemoglobin is stable.  His chest x-ray is showing some improvement.  He is on antibiotics, he also has been receiving steroids.  Per nephrology research certainly Goodpasture's can still occur with negative anti-GBM antibody.  Renal function shows slight improvement.  Nephrology is following.  I did discuss with him, patient is another 2 L ahead today, I'm going to give him 40 of IV Lasix today.  Patient remains on broad-spectrum antibiotics for possible underlying pneumonia as well continues to come down.  His PO2 is slightly improved over yesterday and his acidosis has improved as well.    Hypokalemia, resolved    Anemia ,hemoglobin remained stable currently.     Diabetes good control on insulin drip currently.    Accelerated hypertension, patient is on nicardipine, in research of his medication history he has not been receiving the Normodyne because of bradycardia. Current /58    Hypothyroidism, TSH normal, adequate replacement    Nutrition, tolerating tube feeds at goal rate, patient still has not had a bowel movement.  His abdominal exam overall he is mildly distended but bowel sounds are active and appears nontender.  His abdominal compartment pressure apparently was 19 on Friday.  We'll try additional lactulose today.  This is most likely a fentanyl effect.    Acute kidney injury superimposed on chronic kidney disease, as process 0.1, renal function is starting to improve, will give 1 dose of Lasix today.    DVT prophylaxis, SCDs not using anticoagulants due to risk of bleeding    Electrolytes acceptable today.    High risk due to multiorgan failure    35 min cc time    Seun Muller MD  01/14/18  8:19 AM

## 2018-01-14 NOTE — PROGRESS NOTES
THC Physician - Brief Progress Note  PERMANENT  01/14/2018 05:52    [facility]  [facility] - U - 10 - [place], [place] ([facility])    WARD COPELAND, name alert    Date of Service 01/14/2018 05:52    HPI/Events of Note RT reporting critical lab result. ABG 7.36/51.3/89.7/28.3 on AC 24/450/75%/13. no changes made to vent      Interventions Major-Respiratory failure - evaluation and management        Electronically Signed by: Genesis Evans) on 1/14/2018 5:53 AM

## 2018-01-14 NOTE — PLAN OF CARE
Problem: Mechanical Ventilation, Invasive (Adult)  Goal: Signs and Symptoms of Listed Potential Problems Will be Absent or Manageable (Mechanical Ventilation, Invasive)  Outcome: Ongoing (interventions implemented as appropriate)   01/14/18 1850   Mechanical Ventilation, Invasive   Problems Assessed (Mechanical Ventilation, Invasive) all

## 2018-01-14 NOTE — PLAN OF CARE
Problem: COPD, Chronic Bronchitis/Emphysema (Adult)  Goal: Signs and Symptoms of Listed Potential Problems Will be Absent or Manageable (COPD, Chronic Bronchitis/Emphysema)  Outcome: Ongoing (interventions implemented as appropriate)   01/11/18 2032   COPD, Chronic Bronchitis/Emphysema   Problems Assessed (COPD, Chronic Bronchitis/Emphysema) all   Problems Present (COPD, Chronic Bronchitis/Emphysema) situational response       Problem: Skin Integrity Impairment, Risk/Actual (Adult)  Goal: Identify Related Risk Factors and Signs and Symptoms  Outcome: Ongoing (interventions implemented as appropriate)   01/11/18 2031   Skin Integrity Impairment, Risk/Actual   Skin Integrity Impairment, Risk/Actual: Related Risk Factors cognitive impairment;edema   Signs and Symptoms (Skin Integrity Impairment) edema     Goal: Skin Integrity/Wound Healing  Outcome: Ongoing (interventions implemented as appropriate)   01/13/18 1012   Skin Integrity Impairment, Risk/Actual (Adult)   Skin Integrity/Wound Healing making progress toward outcome       Problem: Fall Risk (Adult)  Goal: Identify Related Risk Factors and Signs and Symptoms  Outcome: Ongoing (interventions implemented as appropriate)   01/11/18 2031   Fall Risk   Fall Risk: Related Risk Factors history of falls;confusion/agitation   Fall Risk: Signs and Symptoms presence of risk factors     Goal: Absence of Falls  Outcome: Ongoing (interventions implemented as appropriate)   01/14/18 1048   Fall Risk (Adult)   Absence of Falls making progress toward outcome       Problem: Patient Care Overview (Adult)  Goal: Plan of Care Review  Outcome: Ongoing (interventions implemented as appropriate)   01/10/18 1539 01/14/18 0800   Coping/Psychosocial Response Interventions   Plan Of Care Reviewed With --  (no family present at bedside)   Patient Care Overview   Progress progress toward functional goals is gradual --    Outcome Evaluation   Outcome Summary/Follow up Plan patient remains  sedated on vent  --      Goal: Adult Individualization and Mutuality  Outcome: Ongoing (interventions implemented as appropriate)    Goal: Discharge Needs Assessment  Outcome: Ongoing (interventions implemented as appropriate)   12/29/17 1139 01/01/18 1130   Discharge Needs Assessment   Concerns To Be Addressed no discharge needs identified --    Readmission Within The Last 30 Days no previous admission in last 30 days --    Equipment Needed After Discharge none --    Discharge Disposition still a patient --    Current Health   Outpatient/Agency/Support Group Needs (Pt has [facility]. Updated information will need to be sent at discharge. ) --    Living Environment   Transportation Available car;family or friend will provide --    Self-Care   Equipment Currently Used at Home --  bipap/ cpap;cane, quad;nebulizer;hospital bed;power chair, (recliner lift);walker, rolling;oxygen       Problem: Mechanical Ventilation, Invasive (Adult)  Goal: Signs and Symptoms of Listed Potential Problems Will be Absent or Manageable (Mechanical Ventilation, Invasive)  Outcome: Ongoing (interventions implemented as appropriate)   01/13/18 1012 01/13/18 1839   Mechanical Ventilation, Invasive   Problems Assessed (Mechanical Ventilation, Invasive) --  all   Problems Present (Mechanical Ventilation, Invasive) immobility;inability to wean;situational response --        Problem: Pressure Ulcer Risk (Karthikeyan Scale) (Adult,Obstetrics,Pediatric)  Goal: Identify Related Risk Factors and Signs and Symptoms  Outcome: Ongoing (interventions implemented as appropriate)   01/13/18 1012   Pressure Ulcer Risk (Karthikeyan Scale)   Related Risk Factors (Pressure Ulcer Risk (Karthikeyan Scale)) body weight extremes;cognitive impairment;critical care admission;hospitalization prolonged;infection;medical devices;mental impairment;mobility impaired;nutritional deficiencies;tissue perfusion altered     Goal: Skin Integrity  Outcome: Ongoing  (interventions implemented as appropriate)   01/14/18 7995   Pressure Ulcer Risk (Karthikeyan Scale) (Adult,Obstetrics,Pediatric)   Skin Integrity making progress toward outcome

## 2018-01-15 NOTE — PROGRESS NOTES
Nephrology  Note      Subjective     He is off cardene drip. BP improved. FiO2 better. No acute overnight events reported    Objective     Vital Signs  Temp:  [98.5 °F (36.9 °C)-101 °F (38.3 °C)] 98.5 °F (36.9 °C)  Heart Rate:  [55-66] 57  Resp:  [24-29] 24  BP: (137-166)/(44-57) 157/57  FiO2 (%):  [65 %-75 %] 65 %    I/O this shift:  In: 100 [IV Piggyback:100]  Out: 650 [Urine:650]  I/O last 3 completed shifts:  In: 4753.3 [I.V.:1607.3; Other:1078; NG/GT:1768; IV Piggyback:300]  Out: 4630 [Urine:4630]    Physical Examination:    General Appearance : intubated, sedated  Head : normocephalic, without obvious abnormality and atraumatic  Eyes : + pallor   Throat : ETT in place  Neck:  suppple  Lungs :  clear anteriorly  Heart : regular rhythm , normal S1, S2, systolic murmur   Abdomen :  normal bowel sounds,soft non-tender  Extremities :  3+ edema   Neurologic : sedated    Laboratory Data :      WBC WBC   Date Value Ref Range Status   01/15/2018 5.29 4.50 - 12.50 10*3/mm3 Final   01/14/2018 4.26 (L) 4.50 - 12.50 10*3/mm3 Final   01/13/2018 5.99 4.50 - 12.50 10*3/mm3 Final      HGB Hemoglobin   Date Value Ref Range Status   01/15/2018 8.0 (L) 14.0 - 18.0 g/dL Final   01/14/2018 7.5 (L) 14.0 - 18.0 g/dL Final   01/13/2018 7.6 (L) 14.0 - 18.0 g/dL Final      HCT Hematocrit   Date Value Ref Range Status   01/15/2018 26.4 (L) 42.0 - 52.0 % Final   01/14/2018 24.8 (L) 42.0 - 52.0 % Final   01/13/2018 25.4 (L) 42.0 - 52.0 % Final      Platlets No results found for: LABPLAT   MCV MCV   Date Value Ref Range Status   01/15/2018 93.3 80.0 - 94.0 fL Final   01/14/2018 94.7 (H) 80.0 - 94.0 fL Final   01/13/2018 94.8 (H) 80.0 - 94.0 fL Final          Sodium Sodium   Date Value Ref Range Status   01/15/2018 147 135 - 153 mmol/L Final   01/14/2018 146 135 - 153 mmol/L Final   01/13/2018 145 135 - 153 mmol/L Final      Potassium Potassium   Date Value Ref Range Status   01/15/2018 4.0 3.5 - 5.3 mmol/L Final   01/14/2018 4.0 3.5 - 5.3  mmol/L Final   01/13/2018 3.5 3.5 - 5.3 mmol/L Final      Chloride Chloride   Date Value Ref Range Status   01/15/2018 109 99 - 112 mmol/L Final   01/14/2018 107 99 - 112 mmol/L Final   01/13/2018 105 99 - 112 mmol/L Final      CO2 CO2   Date Value Ref Range Status   01/15/2018 31.8 24.3 - 31.9 mmol/L Final   01/14/2018 31.6 24.3 - 31.9 mmol/L Final   01/13/2018 32.9 (H) 24.3 - 31.9 mmol/L Final      BUN BUN   Date Value Ref Range Status   01/15/2018 103 (H) 7 - 21 mg/dL Final   01/14/2018 97 (H) 7 - 21 mg/dL Final   01/13/2018 89 (H) 7 - 21 mg/dL Final      Creatinine Creatinine   Date Value Ref Range Status   01/15/2018 2.13 (H) 0.43 - 1.29 mg/dL Final   01/14/2018 2.19 (H) 0.43 - 1.29 mg/dL Final   01/13/2018 2.27 (H) 0.43 - 1.29 mg/dL Final      Calcium Calcium   Date Value Ref Range Status   01/15/2018 8.1 7.7 - 10.0 mg/dL Final   01/14/2018 8.2 7.7 - 10.0 mg/dL Final   01/13/2018 8.3 7.7 - 10.0 mg/dL Final      PO4 No results found for: CAPO4   Albumin Albumin   Date Value Ref Range Status   01/15/2018 3.60 3.40 - 4.80 g/dL Final   01/14/2018 3.90 3.40 - 4.80 g/dL Final   01/13/2018 4.10 3.40 - 4.80 g/dL Final      Magnesium Magnesium   Date Value Ref Range Status   01/15/2018 3.4 (H) 1.7 - 2.6 mg/dL Final   01/14/2018 3.2 (H) 1.7 - 2.6 mg/dL Final   01/13/2018 3.0 (H) 1.7 - 2.6 mg/dL Final      Uric Acid No results found for: URICACID     Radiology results :     Imaging Results (last 24 hours)     Procedure Component Value Units Date/Time    XR Chest AP [[id number]] Collected:  01/15/18 0810     Updated:  01/15/18 0813    Narrative:       EXAMINATION: XR CHEST AP-      CLINICAL INDICATION:     eval resp failure; J96.21-Acute and chronic  respiratory failure with hypoxia; J96.22-Acute and chronic respiratory  failure with hypercapnia; J44.1-Chronic obstructive pulmonary disease  with (acute) exacerbation; R09.02-Hypoxemia     TECHNIQUE: Single AP view of chest.      COMPARISON: NONE      FINDINGS:   There is  bibasilar atelectasis.   Bilateral airspace disease.  Support tube and lines are in unchanged position.   Heart size is stable.  No pneumothorax.   Tiny bilateral pleural effusions persist.        Impression:       Stable appearance of the chest.     This report was finalized on 1/15/2018 8:11 AM by Dr. Miko Coto MD.           Medications:        amLODIPine 10 mg Oral Daily   atorvastatin 80 mg Oral Daily   budesonide-formoterol 2 puff Inhalation BID - RT   cholecalciferol 2,000 Units Oral Daily   CloNIDine 0.3 mg Oral Q8H   ferrous sulfate 325 mg Oral Daily With Breakfast   furosemide 40 mg Intravenous Once   guaiFENesin 200 mg Oral Q4H   hydrALAZINE 100 mg Oral Q8H   ipratropium-albuterol 3 mL Nebulization Q6H - RT   isosorbide mononitrate 240 mg Oral Daily   labetalol 200 mg Oral Q12H   lactobacillus acidophilus 1 capsule Oral Daily   lansoprazole 30 mg Oral QAM   levothyroxine 25 mcg Oral Daily   piperacillin-tazobactam 4.5 g Intravenous Q8H   polyethylene glycol 17 g Oral Daily   predniSONE 20 mg Oral Daily With Breakfast   sennosides-docusate sodium 1 tablet Oral BID   sertraline 50 mg Oral Q PM   sodium chloride 10 mL Intracatheter Q12H   sodium chloride 10 mL Intracatheter Q12H   terazosin 10 mg Oral Daily   vitamin B-12 2,000 mcg Oral Daily       fentaNYL (SUBLIMAZE) PCA 1500 mcg/30 mL syringe     insulin regular infusion 1 unit/mL 1-20 Units/hr Last Rate: 7 Units/hr (01/15/18 1118)   Pharmacy Consult - Pharmacy to dose     propofol 5-50 mcg/kg/min Last Rate: 35 mcg/kg/min (01/15/18 1414)       Assessment/Plan     Active Problems:    Respiratory failure    Acute on chronic respiratory failure with hypoxia and hypercapnia      1.  SHEELA on CKD 3 :his  baseline creatinine is 1.2-1.3. It is stable at 2.1 today. Prerenal SHEELA transitioned to ATN and is plateaued now  will give lasix 40 mg iv x 2 due to  Edema and high FiO2 requirements    2. Alveolar hemorrhage :  antihistone pending. Rest serologies negative.  Dr Gomez managing    3. Respiratory failure     4. Anemia : Hb stable    5. HTN : better    6. Hypernatremia : Increase free water flushes    I discussed the patients findings and my recommendations with RN, Dr Phoebe Card MD  01/15/18  2:18 PM

## 2018-01-15 NOTE — PROGRESS NOTES
[facility] CCU Note    Patient Identification:  Name:  Se Anne  Age:  67 y.o.  Sex:  male  :  1950  MRN:  [id number]  Visit number:  [id number]  Primary Care Provider:  No Known Provider    18    Ventilator Day: 6  Ventilator Settings:  Ventilator/Non-Invasive Ventilation Settings     Start     Ordered    18 1107  Ventilator - AC/VC; 20; 100 (85); Other (see comments); 13; 450  Continuous     Question Answer Comment   Vent Mode AC/VC    Breath rate 20    FiO2 100 85   PEEP Other (see comments)    PEEP: 13    Tidal Volume 450        18 1106    01/10/18 0702  Ventilator - AC/VC; 20; 100; Other (see comments); 13; 450  Continuous,   Status:  Canceled     Question Answer Comment   Vent Mode AC/VC    Breath rate 20    FiO2 100    PEEP Other (see comments)    PEEP: 13    Tidal Volume 450        01/10/18 0703    18 0627  . BIPAP  As Needed-RT,   Status:  Canceled     Comments:  respiratory to mangage   Question:  .  Answer:  BIPAP    18 0628    17 1055  . CPAP; Pressure: 12  At Bedtime & As Needed-RT,   Status:  Canceled     Question Answer Comment   . CPAP    Pressure 12        17 1054          Drips: Propofol, fentanyl, insulin  Antibiotics: Zosyn  Lines: Left internal jugular central line, bilateral upper extremity midline    Procedures:   -Emergent Intubation  -Blood transfusion    HPI:  68 yo male admitted with dyspnea    Subjective      The patient remains sedated and on mechanical ventilation. No acute events overnight. Remains on high FiO2. Remains with good urine output.    Patient had isolated temperature elevation last evening of 101 at midnight.     History taken from: chart RN   Patient unable to give history due to: Sedation/Mechanical ventilation    Objective     Vital Signs  Temp:  [98.6 °F (37 °C)-101 °F (38.3 °C)] 99.2 °F (37.3 °C)  Heart Rate:  [57-66] 58  Resp:  [24-29] 24  BP: (147-166)/(46-56) 166/52  FiO2 (%):  [65 %-75 %] 65  %  Body mass index is 42.12 kg/(m^2).    Intake/Output Summary (Last 24 hours) at 01/15/18 0913  Last data filed at 01/15/18 0753   Gross per 24 hour   Intake           3296.8 ml   Output             3160 ml   Net            136.8 ml       Physical Exam:    Physical Exam   Constitutional: He appears well-developed and well-nourished. No distress. He is sedated and intubated.   HENT:   Head: Normocephalic and atraumatic.   Nose: Nose normal.   Mouth/Throat: Oropharynx is clear and moist and mucous membranes are normal.   Eyes: Conjunctivae and EOM are normal. Pupils are equal, round, and reactive to light. No scleral icterus.   Neck: Trachea normal. Neck supple. No JVD present. Carotid bruit is not present. No thyromegaly present.   Cardiovascular: Normal rate, regular rhythm and normal pulses.  Exam reveals no gallop and no friction rub.    Murmur heard.  Mild edema noted bilateral upper and lower extremities   Pulmonary/Chest: Effort normal. He is intubated. No respiratory distress. He has decreased breath sounds. He has no wheezes. He has no rales.   Abdominal: Soft. Bowel sounds are normal. He exhibits distension. There is no tenderness. There is no guarding.   Genitourinary:   Genitourinary Comments: Worley in place and draining yellow urine   Neurological:   Unable to assess; currently sedated   Skin: Skin is warm, dry and intact. No rash noted. No erythema.      Results Review:     Telemetry: Sinus bradycardia, 57 bpm    I reviewed the patient's new imaging results and agree with the interpretation.  I reviewed the patient's other test results and agree with the interpretation.      Results from last 7 days  Lab Units 01/15/18  0114 01/14/18  0047 01/13/18  0058 01/12/18  0058 01/11/18  0911 01/11/18  0108 01/10/18  1733 01/10/18  0034  01/09/18  0337   WBC 10*3/mm3 5.29 4.26* 5.99 5.54  --  4.82  --  14.48*  --  8.95   HEMOGLOBIN g/dL 8.0* 7.5* 7.6* 7.5* 7.5* 7.2* 7.3* 7.0*  < > 8.2*   PLATELETS 10*3/mm3 144  146 166 168  --  148  --  142  --  163   < > = values in this interval not displayed.    Results from last 7 days  Lab Units 01/15/18  0114 01/14/18  0047 01/13/18  0058 01/12/18  0058 01/11/18  0108 01/10/18  0218 01/09/18  0337   SODIUM mmol/L 147 146 145 141 145 145 143   POTASSIUM mmol/L 4.0 4.0 3.5 3.4* 3.6 4.1 3.4*   CHLORIDE mmol/L 109 107 105 100 103 102 100   CO2 mmol/L 31.8 31.6 32.9* 32.2* 33.1* 34.4* 39.2*   BUN mg/dL 103* 97* 89* 72* 49* 41* 35*   CREATININE mg/dL 2.13* 2.19* 2.27* 2.24* 2.08* 1.78* 1.40*   CALCIUM mg/dL 8.1 8.2 8.3 8.2 8.2 8.7 9.0   GLUCOSE mg/dL 139* 139* 141* 307* 271* 202* 138*       Results from last 7 days  Lab Units 01/15/18  0114 01/14/18  0047 01/13/18  0058 01/12/18  0058 01/11/18  0108 01/10/18  0218 01/09/18  0337   BILIRUBIN mg/dL 0.6 0.6 0.8 0.8 1.1 1.7 1.5   ALK PHOS U/L 28* 30* 33* 38* 45 35* 33*   AST (SGOT) U/L 19 15 14 13 15 17 19   ALT (SGPT) U/L 17 17 18 13 19 16 19       Results from last 7 days  Lab Units 01/15/18  0114 01/14/18  0047 01/13/18  0058 01/12/18  0058 01/09/18  0337   MAGNESIUM mg/dL 3.4* 3.2* 3.0* 2.9* 2.5       Results from last 7 days  Lab Units 01/10/18  0725 01/09/18  1236   INR  1.25* 1.27*       Results from last 7 days  Lab Units 01/15/18  0114 01/14/18  0047 01/13/18  0058   CRP mg/dL 1.37* 2.06* 3.11*       Cultures:  Blood Culture   Date Value Ref Range Status   01/09/2018 No growth at 5 days  Final   01/09/2018 No growth at 5 days  Final       Respiratory Culture   Date Value Ref Range Status   01/09/2018 No growth  Final   01/09/2018 No growth  Final   01/09/2018 No growth  Final   01/09/2018 No growth  Final         Portable CXR, 1/15/18:  FINDINGS:   There is bibasilar atelectasis.   Bilateral airspace disease.  Support tube and lines are in unchanged position.   Heart size is stable.  No pneumothorax.   Tiny bilateral pleural effusions persist.       IMPRESSION:  Stable appearance of the chest.    Current Hospital  Medications:    amLODIPine 10 mg Oral Daily   atorvastatin 80 mg Oral Daily   budesonide-formoterol 2 puff Inhalation BID - RT   cholecalciferol 2,000 Units Oral Daily   CloNIDine 0.3 mg Oral Q8H   ferrous sulfate 325 mg Oral Daily With Breakfast   guaiFENesin 200 mg Oral Q4H   hydrALAZINE 100 mg Oral Q8H   ipratropium-albuterol 3 mL Nebulization Q6H - RT   isosorbide mononitrate 240 mg Oral Daily   labetalol 200 mg Oral Q12H   lactobacillus acidophilus 1 capsule Oral Daily   levothyroxine 25 mcg Oral Daily   pantoprazole 40 mg Oral Daily   piperacillin-tazobactam 4.5 g Intravenous Q8H   polyethylene glycol 17 g Oral Daily   predniSONE 20 mg Oral Daily With Breakfast   sennosides-docusate sodium 1 tablet Oral BID   sertraline 50 mg Oral Q PM   sodium chloride 10 mL Intracatheter Q12H   sodium chloride 10 mL Intracatheter Q12H   terazosin 10 mg Oral Daily   vitamin B-12 2,000 mcg Oral Daily       fentaNYL (SUBLIMAZE) PCA 1500 mcg/30 mL syringe     insulin regular infusion 1 unit/mL 1-20 Units/hr Last Rate: 13 Units/hr (01/15/18 0225)   Pharmacy Consult - Pharmacy to dose     propofol 5-50 mcg/kg/min Last Rate: 50 mcg/kg/min (01/15/18 0801)       Assessment/Plan      -Acute on chronic hypoxic/hyper carbonate respiratory failure secondary to a COPD exacerbation with bilateral hospital-acquired pneumonia  -Alveolar hemorrhage and not rule out the possibility of Goodpasture's syndrome at this time  -Acute kidney injury on top of stage III chronic kidney disease  -Mild hypernatremia  -Insulin-dependent diabetes mellitus type 2 currently controlled on insulin drip  -Acute on chronic normocytic anemia  -Hypertensive urgency pregnancy requiring a nicardipine drip which has been discontinued  -Nutrition: Tolerating tube feeding well    Today's ABG has been reviewed.  The patient's FiO2 has been decreased from 75-65. Continue with ventilator until FiO2 acceptable. Patient also with recent intubation in December. His chest  x-ray images and report were also reviewed and overall remain unchanged.  Discussed with Dr. Card who plans for an additional dose of Lasix today and has increased his free water flushes.  Can do need to hold heparin products and manage his DVT prophylaxis with SCDs alone.    Steroids have been weaned down to prednisone. Will hopefully be able to wean off insulin drip soon. Continue to monitor hemoglobin/hematocrit closely. No signs/symptoms of active bleeding noted at this time. Blood glucose levels currently controlled. Anti-histone ab test pending.     Repeat labs in the morning.    Patient is considered high risk due to: acute on chronic respiratory failure, prolonged ventilator time, alveolar hemorrhage, ckd    I discussed the patients findings and my recommendations with nursing staff and consulting provider (REGGIE Villegas and Dr. Card)    Disposition:  Discussed with  Alba and case management Tonia who say that LTAC referral cannot be made until ventilator day #21 or until patient extubated, may inquire about PT/OT, etc.    Teresa Sandhu DO  01/15/18  9:13 AM

## 2018-01-15 NOTE — PLAN OF CARE
Problem: COPD, Chronic Bronchitis/Emphysema (Adult)  Goal: Signs and Symptoms of Listed Potential Problems Will be Absent or Manageable (COPD, Chronic Bronchitis/Emphysema)  Outcome: Ongoing (interventions implemented as appropriate)   01/15/18 1038   COPD, Chronic Bronchitis/Emphysema   Problems Assessed (COPD, Chronic Bronchitis/Emphysema) all   Problems Present (COPD, Chronic Bronchitis/Emphysema) situational response       Problem: Skin Integrity Impairment, Risk/Actual (Adult)  Goal: Identify Related Risk Factors and Signs and Symptoms  Outcome: Ongoing (interventions implemented as appropriate)   01/15/18 1038   Skin Integrity Impairment, Risk/Actual   Skin Integrity Impairment, Risk/Actual: Related Risk Factors cognitive impairment;edema   Signs and Symptoms (Skin Integrity Impairment) edema       Problem: Fall Risk (Adult)  Goal: Identify Related Risk Factors and Signs and Symptoms  Outcome: Ongoing (interventions implemented as appropriate)   01/15/18 1038   Fall Risk   Fall Risk: Related Risk Factors confusion/agitation;history of falls;environment unfamiliar   Fall Risk: Signs and Symptoms presence of risk factors       Problem: Patient Care Overview (Adult)  Goal: Plan of Care Review  Outcome: Ongoing (interventions implemented as appropriate)   01/15/18 1038   Coping/Psychosocial Response Interventions   Plan Of Care Reviewed With patient   Patient Care Overview   Progress progress toward functional goals as expected   Outcome Evaluation   Outcome Summary/Follow up Plan remains sedated on vent       Problem: Mechanical Ventilation, Invasive (Adult)  Goal: Signs and Symptoms of Listed Potential Problems Will be Absent or Manageable (Mechanical Ventilation, Invasive)  Outcome: Ongoing (interventions implemented as appropriate)   01/15/18 1038   Mechanical Ventilation, Invasive   Problems Assessed (Mechanical Ventilation, Invasive) all   Problems Present (Mechanical Ventilation, Invasive) inability to  wean;immobility;situational response       Problem: Pressure Ulcer Risk (Karthikeyan Scale) (Adult,Obstetrics,Pediatric)  Goal: Identify Related Risk Factors and Signs and Symptoms  Outcome: Ongoing (interventions implemented as appropriate)   01/15/18 1038   Pressure Ulcer Risk (Karthikeyan Scale)   Related Risk Factors (Pressure Ulcer Risk (Karthikeyan Scale)) age extremes;body weight extremes;cognitive impairment;critical care admission;hospitalization prolonged;fluid intake inadequate;mechanical forces;medical devices;medication;mental impairment;mobility impaired;nutritional deficiencies;tissue perfusion altered

## 2018-01-15 NOTE — PROGRESS NOTES
Discharge Planning Assessment   Roberto     Patient Name: Se Anne  MRN: [id number]  Today's Date: 1/15/2018    Admit Date: 12/28/2017       Discharge Plan       01/15/18 1148    Case Management/Social Work Plan    Plan Pt is currently on the vent. Pt lives at home with his spouse, Adeline and his daughters. [facility] has [facility] and [facility]. Pt plans to return home at discharge. Pt has been referred to Continue Care before intubation. Pt's insurance requires a 21 day LOS, 3 failed weaning attempts and a stable airway before he can be submitted to the insurance. SS will follow and assist as needed.     Patient/Family In Agreement With Plan yes        Discharge Placement     No information found        Expected Discharge Date and Time     Expected Discharge Date Expected Discharge Time    Jan 8, 2018           Jaylene Gracia

## 2018-01-15 NOTE — PROGRESS NOTES
LOS: 18 days   Patient Care Team:  No Known Provider as PCP - General  No Known Provider as PCP - Family Medicine    Chief Complaint:  Pulmonology is following for respiratory failure    Subjective     Interval History: patient is intubated and sedated.  No acute events reported overnight.      History taken from: chart RN Patient unable to give history due to patient intubated.    Review of Systems:   Review of Systems - History obtained from chart review and unobtainable from patient due to mental status and Intubated      Objective     Vital Signs  Temp:  [98.6 °F (37 °C)-101 °F (38.3 °C)] 99.2 °F (37.3 °C)  Heart Rate:  [56-66] 59  Resp:  [24-29] 24  BP: (137-166)/(46-56) 151/47  FiO2 (%):  [65 %-75 %] 65 %  Body mass index is 42.12 kg/(m^2).    Intake/Output Summary (Last 24 hours) at 01/15/18 1132  Last data filed at 01/15/18 0753   Gross per 24 hour   Intake           3296.8 ml   Output             2650 ml   Net            646.8 ml     I/O this shift:  In: 100 [IV Piggyback:100]  Out: 250 [Urine:250]    Physical Exam:  GENERAL APPEARANCE: Intubated and sedated.  Appears to be resting comfortably in bed.     HEAD: normocephalic.    EYES: PERRLA.    THROAT: ET tube in place. Oral cavity and pharynx normal. No inflammation, swelling, exudate, or lesions.     NECK: Neck supple.     CARDIAC: Normal S1 and S2. No S3, S4 or murmurs. Rhythm is regular. There is no peripheral edema, cyanosis or pallor. Extremities are warm and well perfused. Capillary refill is less than 2 seconds. No carotid bruits.    RESPIRATORY: Clear to auscultation and percussion without rales, rhonchi, wheezing or diminished breath sounds.    GI: Positive bowel sounds. Soft, nondistended, nontender.     MUSCULOSKELETAL: No significant deformity or joint abnormality. No edema. Peripheral pulses intact.    NEUROLOGICAL: Unable to assess due to sedation status.     PSYCHIATRIC: Unable to assess due to sedation status.     Results Review:     I  reviewed the patient's new clinical results.  I reviewed the patient's new imaging results and agree with the interpretation.    Results from last 7 days  Lab Units 01/15/18  0114 01/14/18  0047 01/13/18  0058   WBC 10*3/mm3 5.29 4.26* 5.99   HEMOGLOBIN g/dL 8.0* 7.5* 7.6*   PLATELETS 10*3/mm3 144 146 166       Results from last 7 days  Lab Units 01/15/18  0114 01/14/18  0047 01/13/18  0058   SODIUM mmol/L 147 146 145   POTASSIUM mmol/L 4.0 4.0 3.5   CHLORIDE mmol/L 109 107 105   CO2 mmol/L 31.8 31.6 32.9*   BUN mg/dL 103* 97* 89*   CREATININE mg/dL 2.13* 2.19* 2.27*   CALCIUM mg/dL 8.1 8.2 8.3   GLUCOSE mg/dL 139* 139* 141*   MAGNESIUM mg/dL 3.4* 3.2* 3.0*     Lab Results   Component Value Date    INR 1.25 (H) 01/10/2018    INR 1.27 (H) 01/09/2018    INR 1.18 (H) 12/06/2017    PROTIME 15.8 (H) 01/10/2018    PROTIME 16.1 (H) 01/09/2018    PROTIME 15.2 12/06/2017       Results from last 7 days  Lab Units 01/15/18  0114 01/14/18  0047 01/13/18  0058   ALK PHOS U/L 28* 30* 33*   BILIRUBIN mg/dL 0.6 0.6 0.8   ALT (SGPT) U/L 17 17 18   AST (SGOT) U/L 19 15 14       Results from last 7 days  Lab Units 01/15/18  0455   PH, ARTERIAL pH units 7.388   PO2 ART mm Hg 127.2*   PCO2, ARTERIAL mm Hg 53.9*   HCO3 ART mmol/L 31.7*     Imaging Results (last 24 hours)     Procedure Component Value Units Date/Time    XR Chest AP [[id number]] Collected:  01/15/18 0810     Updated:  01/15/18 0813    Narrative:       EXAMINATION: XR CHEST AP-      CLINICAL INDICATION:     eval resp failure; J96.21-Acute and chronic  respiratory failure with hypoxia; J96.22-Acute and chronic respiratory  failure with hypercapnia; J44.1-Chronic obstructive pulmonary disease  with (acute) exacerbation; R09.02-Hypoxemia     TECHNIQUE: Single AP view of chest.      COMPARISON: NONE      FINDINGS:   There is bibasilar atelectasis.   Bilateral airspace disease.  Support tube and lines are in unchanged position.   Heart size is stable.  No pneumothorax.   Tiny  bilateral pleural effusions persist.        Impression:       Stable appearance of the chest.     This report was finalized on 1/15/2018 8:11 AM by Dr. Miko Coto MD.                Medication Review:   Scheduled Medications:    amLODIPine 10 mg Oral Daily   atorvastatin 80 mg Oral Daily   budesonide-formoterol 2 puff Inhalation BID - RT   cholecalciferol 2,000 Units Oral Daily   CloNIDine 0.3 mg Oral Q8H   ferrous sulfate 325 mg Oral Daily With Breakfast   guaiFENesin 200 mg Oral Q4H   hydrALAZINE 100 mg Oral Q8H   ipratropium-albuterol 3 mL Nebulization Q6H - RT   isosorbide mononitrate 240 mg Oral Daily   labetalol 200 mg Oral Q12H   lactobacillus acidophilus 1 capsule Oral Daily   lansoprazole 30 mg Oral QAM   levothyroxine 25 mcg Oral Daily   piperacillin-tazobactam 4.5 g Intravenous Q8H   polyethylene glycol 17 g Oral Daily   predniSONE 20 mg Oral Daily With Breakfast   sennosides-docusate sodium 1 tablet Oral BID   sertraline 50 mg Oral Q PM   sodium chloride 10 mL Intracatheter Q12H   sodium chloride 10 mL Intracatheter Q12H   terazosin 10 mg Oral Daily   vitamin B-12 2,000 mcg Oral Daily     Continuous infusions:    fentaNYL (SUBLIMAZE) PCA 1500 mcg/30 mL syringe     insulin regular infusion 1 unit/mL 1-20 Units/hr Last Rate: 7 Units/hr (01/15/18 1118)   Pharmacy Consult - Pharmacy to dose     propofol 5-50 mcg/kg/min Last Rate: 45 mcg/kg/min (01/15/18 1056)       Assessment/Plan     Neuro: Patient is intubated and sedated per protocol. Will do sedation vacation this morning to assess mental status.         Respiratory Failure: likely related to underlying lung disease, COPD and fluid volume overload. ABG reviewed.  Decreased Fio2 to 65% and decreased peep to 12.  Will order ABG for in the morning.    Chest xray has improved.  Will advance ET tube 2 cm.    Started prednisone 20 mg PO.    Continue scheduled inhalants and PRN neb treatments.     Intubated: 1/9/18.       Hypertension: contiues to be off  nicardipine drip.  BP within normal range.  NSR.  Continuous ECG, BP and pulse ox monitoring. Maintain MAP > 65.    SHEELA on CKD stage III: Creatinine is 2.13.  continue management per nephrology.     Hyperglycemia: Continue insulin drip.  monitor glucose targeting 140-180.     GI: Continue GI prophylaxis and tube feedings.       Low H/H: remains stable at 8.0.  Continue to monitor.      IV access: Midlines and peripherals.       ID: temp of 100 degrees F.  WBC 5.29, neutrophils are 91.  Continue IV antibiotics.      Patient Active Problem List   Diagnosis Code   • COPD (chronic obstructive pulmonary disease) J44.9   • Shortness of breath R06.02   • Morbid obesity E66.01   • Hypoxia R09.02   • Edema extremities R60.0   • Sleep apnea G47.30   • Wheezing R06.2   • Allergic rhinitis J30.9   • Essential hypertension I10   • Sore throat J02.9   • Cough R05   • Pneumonia J18.9   • Respiratory failure J96.90   • Acute on chronic respiratory failure with hypoxia and hypercapnia J96.21, J96.22         Bedside rounds were completed with RN and RRT, discussed case and plan in great detail.      YANG Doss  01/15/18  11:32 AM        Attestation: Scribed for Dr. Gomez, Dee Gibbs, YANG    I, Jeremi Gomez M.D. attest that the above note accurately reflects the work and decisions made  by me.  Patient was seen and evaluated by Dr. Gomez, including history of present illness, physical exam, assessment, and treatment plan.  The above note was reviewed and edited by Dr. Gomez.Critical Care time spent in direct patient care: 35 minutes (excluding procedure time, if applicable) including high complexity decision making to assess, manipulate, and support vital organ system failure in this individual who has impairment of one or more vital organ systems such that there is a high probability of imminent or life threatening deterioration in the patient’s condition.

## 2018-01-16 NOTE — PLAN OF CARE
Problem: COPD, Chronic Bronchitis/Emphysema (Adult)  Goal: Signs and Symptoms of Listed Potential Problems Will be Absent or Manageable (COPD, Chronic Bronchitis/Emphysema)  Outcome: Ongoing (interventions implemented as appropriate)   01/16/18 0307   COPD, Chronic Bronchitis/Emphysema   Problems Assessed (COPD, Chronic Bronchitis/Emphysema) all   Problems Present (COPD, Chronic Bronchitis/Emphysema) situational response       Problem: Skin Integrity Impairment, Risk/Actual (Adult)  Goal: Identify Related Risk Factors and Signs and Symptoms  Outcome: Ongoing (interventions implemented as appropriate)   01/16/18 0307   Skin Integrity Impairment, Risk/Actual   Skin Integrity Impairment, Risk/Actual: Related Risk Factors edema;immobility;infection/disease process   Signs and Symptoms (Skin Integrity Impairment) edema     Goal: Skin Integrity/Wound Healing  Outcome: Ongoing (interventions implemented as appropriate)   01/16/18 0307   Skin Integrity Impairment, Risk/Actual (Adult)   Skin Integrity/Wound Healing making progress toward outcome       Problem: Fall Risk (Adult)  Goal: Identify Related Risk Factors and Signs and Symptoms  Outcome: Ongoing (interventions implemented as appropriate)   01/16/18 0307   Fall Risk   Fall Risk: Related Risk Factors confusion/agitation;history of falls;environment unfamiliar   Fall Risk: Signs and Symptoms presence of risk factors     Goal: Absence of Falls  Outcome: Ongoing (interventions implemented as appropriate)   01/16/18 0307   Fall Risk (Adult)   Absence of Falls making progress toward outcome       Problem: Patient Care Overview (Adult)  Goal: Plan of Care Review  Outcome: Ongoing (interventions implemented as appropriate)   01/16/18 0307   Coping/Psychosocial Response Interventions   Plan Of Care Reviewed With patient   Patient Care Overview   Progress progress toward functional goals as expected   Outcome Evaluation   Outcome Summary/Follow up Plan Pt remains on vent with  sedation     Goal: Discharge Needs Assessment  Outcome: Ongoing (interventions implemented as appropriate)   01/16/18 0307   Discharge Needs Assessment   Discharge Disposition still a patient       Problem: Mechanical Ventilation, Invasive (Adult)  Goal: Signs and Symptoms of Listed Potential Problems Will be Absent or Manageable (Mechanical Ventilation, Invasive)  Outcome: Ongoing (interventions implemented as appropriate)   01/16/18 0307   Mechanical Ventilation, Invasive   Problems Assessed (Mechanical Ventilation, Invasive) all   Problems Present (Mechanical Ventilation, Invasive) immobility;inability to wean;situational response       Problem: Pressure Ulcer Risk (Karthikeyan Scale) (Adult,Obstetrics,Pediatric)  Goal: Identify Related Risk Factors and Signs and Symptoms  Outcome: Ongoing (interventions implemented as appropriate)   01/15/18 1038   Pressure Ulcer Risk (Karthikeyan Scale)   Related Risk Factors (Pressure Ulcer Risk (Karthikeyan Scale)) age extremes;body weight extremes;cognitive impairment;critical care admission;hospitalization prolonged;fluid intake inadequate;mechanical forces;medical devices;medication;mental impairment;mobility impaired;nutritional deficiencies;tissue perfusion altered     Goal: Skin Integrity  Outcome: Ongoing (interventions implemented as appropriate)   01/16/18 0307   Pressure Ulcer Risk (Karthikeyan Scale) (Adult,Obstetrics,Pediatric)   Skin Integrity making progress toward outcome

## 2018-01-16 NOTE — PROGRESS NOTES
LOS: 19 days   Patient Care Team:  No Known Provider as PCP - General  No Known Provider as PCP - Family Medicine    Chief Complaint:  Pulmonology is following for respiratory failure    Subjective     Interval History: patient is intubated and sedated.  No acute events reported overnight.      History taken from: chart RN Patient unable to give history due to patient intubated.    Review of Systems:   Review of Systems - History obtained from chart review and unobtainable from patient due to mental status and Intubated      Objective     Vital Signs  Temp:  [98.5 °F (36.9 °C)-99.3 °F (37.4 °C)] 98.6 °F (37 °C)  Heart Rate:  [48-64] 59  Resp:  [24] 24  BP: (142-169)/(41-67) 150/54  FiO2 (%):  [45 %-65 %] 45 %  Body mass index is 45 kg/(m^2).    Intake/Output Summary (Last 24 hours) at 01/16/18 1017  Last data filed at 01/16/18 0938   Gross per 24 hour   Intake          4339.18 ml   Output             3500 ml   Net           839.18 ml     I/O this shift:  In: 300 [IV Piggyback:300]  Out: -     Physical Exam:  GENERAL APPEARANCE: Intubated and sedated.  Appears to be resting comfortably in bed.     HEAD: normocephalic.    EYES: PERRLA.    THROAT: ET tube in place. Oral cavity and pharynx normal. No inflammation, swelling, exudate, or lesions.     NECK: Neck supple.     CARDIAC: Normal S1 and S2. No S3, S4 or murmurs. Rhythm is regular. There is no peripheral edema, cyanosis or pallor. Extremities are warm and well perfused. Capillary refill is less than 2 seconds. No carotid bruits.    RESPIRATORY: Clear to auscultation and percussion without rales, rhonchi, wheezing or diminished breath sounds.    GI: Positive bowel sounds. Soft, nondistended, nontender.     MUSCULOSKELETAL: No significant deformity or joint abnormality. No edema. Peripheral pulses intact.    NEUROLOGICAL: Unable to assess due to sedation status.     PSYCHIATRIC: Unable to assess due to sedation status.     Results Review:     I reviewed the  patient's new clinical results.  I reviewed the patient's new imaging results and agree with the interpretation.    Results from last 7 days  Lab Units 01/16/18  0107 01/15/18  0114 01/14/18  0047   WBC 10*3/mm3 7.68 5.29 4.26*   HEMOGLOBIN g/dL 7.5* 8.0* 7.5*   PLATELETS 10*3/mm3 123* 144 146       Results from last 7 days  Lab Units 01/16/18  0107 01/15/18  0114 01/14/18  0047   SODIUM mmol/L 149 147 146   POTASSIUM mmol/L 3.6 4.0 4.0   CHLORIDE mmol/L 110 109 107   CO2 mmol/L 28.9 31.8 31.6   BUN mg/dL 95* 103* 97*   CREATININE mg/dL 1.90* 2.13* 2.19*   CALCIUM mg/dL 7.9 8.1 8.2   GLUCOSE mg/dL 118* 139* 139*   MAGNESIUM mg/dL 3.3* 3.4* 3.2*     Lab Results   Component Value Date    INR 1.25 (H) 01/10/2018    INR 1.27 (H) 01/09/2018    INR 1.18 (H) 12/06/2017    PROTIME 15.8 (H) 01/10/2018    PROTIME 16.1 (H) 01/09/2018    PROTIME 15.2 12/06/2017       Results from last 7 days  Lab Units 01/16/18  0107 01/15/18  0114 01/14/18  0047   ALK PHOS U/L 27* 28* 30*   BILIRUBIN mg/dL 0.6 0.6 0.6   ALT (SGPT) U/L 16 17 17   AST (SGOT) U/L 21 19 15       Results from last 7 days  Lab Units 01/16/18  0504   PH, ARTERIAL pH units 7.401   PO2 ART mm Hg 85.7   PCO2, ARTERIAL mm Hg 54.6*   HCO3 ART mmol/L 33.1*     Imaging Results (last 24 hours)     ** No results found for the last 24 hours. **             Medication Review:   Scheduled Medications:    amLODIPine 10 mg Oral Daily   atorvastatin 80 mg Oral Daily   budesonide-formoterol 2 puff Inhalation BID - RT   cholecalciferol 2,000 Units Oral Daily   CloNIDine 0.3 mg Oral Q8H   ferrous sulfate 325 mg Oral Daily With Breakfast   guaiFENesin 200 mg Oral Q4H   hydrALAZINE 100 mg Oral Q8H   ipratropium-albuterol 3 mL Nebulization Q6H - RT   isosorbide mononitrate 240 mg Oral Daily   labetalol 200 mg Oral Q12H   lactobacillus acidophilus 1 capsule Oral Daily   lactulose 20 g Oral Once   lansoprazole 30 mg Oral QAM   levothyroxine 25 mcg Oral Daily   polyethylene glycol 17 g Oral  BID   potassium chloride 10 mEq Intravenous Q1H   predniSONE 20 mg Oral Daily With Breakfast   sennosides-docusate sodium 1 tablet Oral BID   sertraline 50 mg Oral Q PM   sodium chloride 10 mL Intracatheter Q12H   sodium chloride 10 mL Intracatheter Q12H   terazosin 10 mg Oral Daily   vitamin B-12 2,000 mcg Oral Daily     Continuous infusions:    fentaNYL (SUBLIMAZE) PCA 1500 mcg/30 mL syringe     insulin regular infusion 1 unit/mL 1-20 Units/hr Last Rate: 2 Units/hr (01/16/18 0762)   Pharmacy Consult     propofol 5-50 mcg/kg/min Last Rate: 35 mcg/kg/min (01/16/18 4702)       Assessment/Plan     Neuro: Patient is intubated and sedated per protocol.       Respiratory Failure: likely related to underlying lung disease, COPD and fluid volume overload.     ABG reviewed- decreased PEEP to 10, decreased FIO2 to 50% and changed TV to 430.    Continue scheduled inhalants and PRN neb treatments.    Intubated: 1/9/18.       Hypertension: BP within normal range.  NSR.  Continuous ECG, BP and pulse ox monitoring. Maintain MAP > 65.     SHEELA on CKD stage III: Creatinine is trending down to 1.90.  Continue management per Dr. Card.      Hyperglycemia: continue insulin drip.  monitor glucose targeting 140-180.      GI: Continue GI prophylaxis and tube feedings.      Low H/H: remains stable.  continue to monitor.      IV access: Midlines and peripherals.   Microbiology Results (last 10 days)     Procedure Component Value - Date/Time    Blood Culture - Blood, [[id number]]  (Normal) Collected:  01/15/18 0114    Lab Status:  Preliminary result Specimen:  Blood from Hand, Left Updated:  01/16/18 0201     Blood Culture No growth at 24 hours    Blood Culture - Blood, [[id number]]  (Normal) Collected:  01/15/18 0051    Lab Status:  Preliminary result Specimen:  Blood from Hand, Right Updated:  01/16/18 0201     Blood Culture No growth at 24 hours    KOH Prep - Lavage, Lung, Right Upper Lobe [[id number]]  (Normal) Collected:  01/09/18 1841     Lab Status:  Final result Specimen:  Lavage from Lung, Right Upper Lobe Updated:  01/09/18 1927     KOH Prep No yeast or hyphal elements seen    KOH Prep - Lavage, Lung, Right Middle Lobe [[id number]]  (Normal) Collected:  01/09/18 1841    Lab Status:  Final result Specimen:  Lavage from Lung, Right Middle Lobe Updated:  01/09/18 1926     KOH Prep No yeast or hyphal elements seen    KOH Prep - Lavage, Lung, Right Lower Lobe [[id number]]  (Normal) Collected:  01/09/18 1841    Lab Status:  Final result Specimen:  Lavage from Lung, Right Lower Lobe Updated:  01/09/18 1926     KOH Prep No yeast or hyphal elements seen    KOH Prep - Lavage, Lung, Left Lower Lobe [[id number]]  (Normal) Collected:  01/09/18 1841    Lab Status:  Final result Specimen:  Lavage from Lung, Left Lower Lobe Updated:  01/09/18 1927     KOH Prep No yeast or hyphal elements seen    Respiratory Culture - Lavage, Lung, Right Lower Lobe [[id number]] Collected:  01/09/18 1841    Lab Status:  Final result Specimen:  Lavage from Lung, Right Lower Lobe Updated:  01/12/18 1007     Respiratory Culture No growth     Gram Stain Result Few (2+) WBCs seen      No organisms seen    Respiratory Culture - Lavage, Lung, Right Middle Lobe [[id number]] Collected:  01/09/18 1841    Lab Status:  Final result Specimen:  Lavage from Lung, Right Middle Lobe Updated:  01/12/18 1007     Respiratory Culture No growth     Gram Stain Result Occasional WBCs seen      No organisms seen    Respiratory Culture - Lavage, Lung, Right Upper Lobe [[id number]] Collected:  01/09/18 1841    Lab Status:  Final result Specimen:  Lavage from Lung, Right Upper Lobe Updated:  01/12/18 1005     Respiratory Culture No growth     Gram Stain Result Rare (1+) WBCs seen      No organisms seen    Respiratory Culture - Lavage, Lung, Left Lower Lobe [[id number]] Collected:  01/09/18 1841    Lab Status:  Final result Specimen:  Lavage from Lung, Left Lower Lobe Updated:  01/12/18 1006     Respiratory Culture  No growth     Gram Stain Result Occasional WBCs seen      No organisms seen    Blood Culture - Blood, [[id number]]  (Normal) Collected:  01/09/18 1038    Lab Status:  Final result Specimen:  Blood from Hand, Right Updated:  01/14/18 1101     Blood Culture No growth at 5 days    Blood Culture - Blood, [[id number]]  (Normal) Collected:  01/09/18 0942    Lab Status:  Final result Specimen:  Blood from Hand, Left Updated:  01/14/18 1101     Blood Culture No growth at 5 days            ID: WBC 7.68, neutrophils 84.6.  Continue IV antibiotics.    Hypernatremia:  Level 149.  Management per nephrology.    Patient Active Problem List   Diagnosis Code   • COPD (chronic obstructive pulmonary disease) J44.9   • Shortness of breath R06.02   • Morbid obesity E66.01   • Hypoxia R09.02   • Edema extremities R60.0   • Sleep apnea G47.30   • Wheezing R06.2   • Allergic rhinitis J30.9   • Essential hypertension I10   • Sore throat J02.9   • Cough R05   • Pneumonia J18.9   • Respiratory failure J96.90   • Acute on chronic respiratory failure with hypoxia and hypercapnia J96.21, J96.22         Bedside rounds were completed with RN and RRT, discussed case and plan in great detail.      YANG Doss  01/16/18  10:17 AM        Attestation: Scribed for Dr. Gomez, Dee Gibbs, YANG    I, Jeremi Gomez M.D. attest that the above note accurately reflects the work and decisions made  by me.  Patient was seen and evaluated by Dr. Gomez, including history of present illness, physical exam, assessment, and treatment plan.  The above note was reviewed and edited by Dr. Gomez.  Critical Care time spent in direct patient care: 35 minutes (excluding procedure time, if applicable) including high complexity decision making to assess, manipulate, and support vital organ system failure in this individual who has impairment of one or more vital organ systems such that there is a high probability of imminent or life threatening  deterioration in the patient’s condition.

## 2018-01-16 NOTE — PLAN OF CARE
Problem: COPD, Chronic Bronchitis/Emphysema (Adult)  Goal: Signs and Symptoms of Listed Potential Problems Will be Absent or Manageable (COPD, Chronic Bronchitis/Emphysema)  Outcome: Ongoing (interventions implemented as appropriate)   01/16/18 1232   COPD, Chronic Bronchitis/Emphysema   Problems Assessed (COPD, Chronic Bronchitis/Emphysema) all   Problems Present (COPD, Chronic Bronchitis/Emphysema) situational response       Problem: Skin Integrity Impairment, Risk/Actual (Adult)  Goal: Identify Related Risk Factors and Signs and Symptoms  Outcome: Ongoing (interventions implemented as appropriate)   01/16/18 1232   Skin Integrity Impairment, Risk/Actual   Skin Integrity Impairment, Risk/Actual: Related Risk Factors edema;infection/disease process   Signs and Symptoms (Skin Integrity Impairment) edema       Problem: Fall Risk (Adult)  Goal: Identify Related Risk Factors and Signs and Symptoms  Outcome: Ongoing (interventions implemented as appropriate)   01/16/18 1232   Fall Risk   Fall Risk: Related Risk Factors confusion/agitation;gait/mobility problems   Fall Risk: Signs and Symptoms presence of risk factors       Problem: Mechanical Ventilation, Invasive (Adult)  Goal: Signs and Symptoms of Listed Potential Problems Will be Absent or Manageable (Mechanical Ventilation, Invasive)  Outcome: Ongoing (interventions implemented as appropriate)   01/16/18 1232   Mechanical Ventilation, Invasive   Problems Assessed (Mechanical Ventilation, Invasive) all   Problems Present (Mechanical Ventilation, Invasive) immobility;inability to wean;situational response       Problem: Pressure Ulcer Risk (Karthikeyan Scale) (Adult,Obstetrics,Pediatric)  Goal: Identify Related Risk Factors and Signs and Symptoms  Outcome: Ongoing (interventions implemented as appropriate)   01/16/18 1232   Pressure Ulcer Risk (Karthikeyan Scale)   Related Risk Factors (Pressure Ulcer Risk (Karthikeyan Scale)) age extremes;body weight extremes;cognitive  impairment;critical care admission;excretions/secretions;fluid intake inadequate;hospitalization prolonged;mechanical forces;medical devices;medication;mobility impaired;nutritional deficiencies;tissue perfusion altered

## 2018-01-16 NOTE — PROGRESS NOTES
[facility] CCU Note    Patient Identification:  Name:  Se Anne  Age:  67 y.o.  Sex:  male  :  1950  MRN:  [id number]  Visit number:  [id number]  Primary Care Provider:  No Known Provider    19    Ventilator Day: 6  Ventilator Settings:  Ventilator/Non-Invasive Ventilation Settings     Start     Ordered    18 1107  Ventilator - AC/VC; 20; 100 (85); Other (see comments); 13; 450  Continuous     Question Answer Comment   Vent Mode AC/VC    Breath rate 20    FiO2 100 85   PEEP Other (see comments)    PEEP: 13    Tidal Volume 450        18 1106    01/10/18 0702  Ventilator - AC/VC; 20; 100; Other (see comments); 13; 450  Continuous,   Status:  Canceled     Question Answer Comment   Vent Mode AC/VC    Breath rate 20    FiO2 100    PEEP Other (see comments)    PEEP: 13    Tidal Volume 450        01/10/18 0703    18 0627  . BIPAP  As Needed-RT,   Status:  Canceled     Comments:  respiratory to mangage   Question:  .  Answer:  BIPAP    18 0628    17 1055  . CPAP; Pressure: 12  At Bedtime & As Needed-RT,   Status:  Canceled     Question Answer Comment   . CPAP    Pressure 12        17 1054          Drips: Propofol, fentanyl, insulin  Antibiotics: Zosyn  Lines: Left internal jugular central line, bilateral upper extremity midline    Procedures:   -Emergent Intubation  -Blood transfusion    HPI:  68 yo male admitted with dyspnea    Subjective      No acute events overnight. Nurse reports no known bowel movement in several days. She also reports that his blood glucose levels have been in the one-teens. He continues to have adequate urine output.     History taken from: chart RN   Patient unable to give history due to: Sedation/Mechanical ventilation  Present during visit: REGGIE Villegas    Objective     Vital Signs  Temp:  [98.5 °F (36.9 °C)-99.3 °F (37.4 °C)] 98.6 °F (37 °C)  Heart Rate:  [48-64] 48  Resp:  [24] 24  BP: (137-169)/(41-67) 154/55  FiO2 (%):   [65 %] 65 %  Body mass index is 45 kg/(m^2).    Intake/Output Summary (Last 24 hours) at 01/16/18 0827  Last data filed at 01/16/18 0726   Gross per 24 hour   Intake          4139.18 ml   Output             3500 ml   Net           639.18 ml       Physical Exam:    Physical Exam   Constitutional: He appears well-developed and well-nourished. No distress. He is sedated and intubated.   HENT:   Head: Normocephalic and atraumatic.   Nose: Nose normal.   Mouth/Throat: Oropharynx is clear and moist and mucous membranes are normal.   Eyes: Conjunctivae and EOM are normal. Pupils are equal, round, and reactive to light. No scleral icterus.   Neck: Trachea normal. Neck supple. No JVD present. Carotid bruit is not present. No thyromegaly present.   Cardiovascular: Normal rate, regular rhythm and normal pulses.  Exam reveals no gallop and no friction rub.    Murmur heard.  Edema noted bilateral upper and lower extremities   Pulmonary/Chest: Effort normal. He is intubated. No respiratory distress. He has no decreased breath sounds. He has no wheezes. He has rhonchi. He has no rales.   Abdominal: Soft. He exhibits distension. Bowel sounds are increased. There is no tenderness. There is no guarding.   Genitourinary:   Genitourinary Comments: Worley in place and draining yellow urine   Neurological:   Unable to assess; currently sedated   Skin: Skin is warm, dry and intact. No rash noted. No erythema.      Results Review:     Telemetry: Sinus rhythm 60s    I reviewed the patient's new imaging results and agree with the interpretation.  I reviewed the patient's other test results and agree with the interpretation.      Results from last 7 days  Lab Units 01/16/18  0107 01/15/18  0114 01/14/18  0047 01/13/18  0058 01/12/18  0058 01/11/18  0911 01/11/18  0108  01/10/18  0034   WBC 10*3/mm3 7.68 5.29 4.26* 5.99 5.54  --  4.82  --  14.48*   HEMOGLOBIN g/dL 7.5* 8.0* 7.5* 7.6* 7.5* 7.5* 7.2*  < > 7.0*   PLATELETS 10*3/mm3 123* 144 146  166 168  --  148  --  142   < > = values in this interval not displayed.    Results from last 7 days  Lab Units 01/16/18  0107 01/15/18  0114 01/14/18  0047 01/13/18  0058 01/12/18  0058 01/11/18  0108 01/10/18  0218   SODIUM mmol/L 149 147 146 145 141 145 145   POTASSIUM mmol/L 3.6 4.0 4.0 3.5 3.4* 3.6 4.1   CHLORIDE mmol/L 110 109 107 105 100 103 102   CO2 mmol/L 28.9 31.8 31.6 32.9* 32.2* 33.1* 34.4*   BUN mg/dL 95* 103* 97* 89* 72* 49* 41*   CREATININE mg/dL 1.90* 2.13* 2.19* 2.27* 2.24* 2.08* 1.78*   CALCIUM mg/dL 7.9 8.1 8.2 8.3 8.2 8.2 8.7   GLUCOSE mg/dL 118* 139* 139* 141* 307* 271* 202*       Results from last 7 days  Lab Units 01/16/18  0107 01/15/18  0114 01/14/18  0047 01/13/18  0058 01/12/18  0058 01/11/18  0108 01/10/18  0218   BILIRUBIN mg/dL 0.6 0.6 0.6 0.8 0.8 1.1 1.7   ALK PHOS U/L 27* 28* 30* 33* 38* 45 35*   AST (SGOT) U/L 21 19 15 14 13 15 17   ALT (SGPT) U/L 16 17 17 18 13 19 16       Results from last 7 days  Lab Units 01/16/18  0107 01/15/18  0114 01/14/18  0047 01/13/18  0058 01/12/18  0058   MAGNESIUM mg/dL 3.3* 3.4* 3.2* 3.0* 2.9*       Results from last 7 days  Lab Units 01/10/18  0725 01/09/18  1236   INR  1.25* 1.27*       Results from last 7 days  Lab Units 01/16/18  0107 01/15/18  0114 01/14/18  0047   CRP mg/dL 0.88 1.37* 2.06*       Cultures:  Blood Culture   Date Value Ref Range Status   01/09/2018 No growth at 5 days  Final   01/09/2018 No growth at 5 days  Final       Respiratory Culture   Date Value Ref Range Status   01/09/2018 No growth  Final   01/09/2018 No growth  Final   01/09/2018 No growth  Final   01/09/2018 No growth  Final         CRP: 0.88    Portable CXR, 1/15/18:  FINDINGS:   There is bibasilar atelectasis.   Bilateral airspace disease.  Support tube and lines are in unchanged position.   Heart size is stable.  No pneumothorax.   Tiny bilateral pleural effusions persist.       IMPRESSION:  Stable appearance of the chest.        Current Hospital  Medications:    amLODIPine 10 mg Oral Daily   atorvastatin 80 mg Oral Daily   budesonide-formoterol 2 puff Inhalation BID - RT   cholecalciferol 2,000 Units Oral Daily   CloNIDine 0.3 mg Oral Q8H   ferrous sulfate 325 mg Oral Daily With Breakfast   guaiFENesin 200 mg Oral Q4H   hydrALAZINE 100 mg Oral Q8H   ipratropium-albuterol 3 mL Nebulization Q6H - RT   isosorbide mononitrate 240 mg Oral Daily   labetalol 200 mg Oral Q12H   lactobacillus acidophilus 1 capsule Oral Daily   lansoprazole 30 mg Oral QAM   levothyroxine 25 mcg Oral Daily   IVPB builder  Intravenous Q8H   polyethylene glycol 17 g Oral Daily   potassium chloride 10 mEq Intravenous Q1H   predniSONE 20 mg Oral Daily With Breakfast   sennosides-docusate sodium 1 tablet Oral BID   sertraline 50 mg Oral Q PM   sodium chloride 10 mL Intracatheter Q12H   sodium chloride 10 mL Intracatheter Q12H   terazosin 10 mg Oral Daily   vitamin B-12 2,000 mcg Oral Daily       fentaNYL (SUBLIMAZE) PCA 1500 mcg/30 mL syringe     insulin regular infusion 1 unit/mL 1-20 Units/hr Last Rate: 2 Units/hr (01/16/18 0724)   Pharmacy Consult     propofol 5-50 mcg/kg/min Last Rate: 35 mcg/kg/min (01/16/18 0735)       Assessment/Plan      -Acute on chronic hypoxic/hypercarbic respiratory failure secondary to a COPD exacerbation with bilateral hospital-acquired pneumonia  -Alveolar hemorrhage and not rule out the possibility of Goodpasture's syndrome at this time; probably due to Arixtra   -Acute kidney injury on top of stage III chronic kidney disease  -Mild hypernatremia; adjustments made to free water flushes   -Insulin-dependent diabetes mellitus type 2 currently controlled on insulin drip  -Acute on chronic normocytic anemia  -Hypertensive urgency requiring a nicardipine drip which has been discontinued; blood pressures acceptable with   -Nutrition: Tolerating tube feeding well    Today's ABG has been reviewed and looks much improved.  Dr. Gomez in room during my rounds  and decreased his FiO2 to 0.45.  The patient is currently saturating in the upper 90s. I hope to de-escalate to Augmentin soon given clinical improvement. CRP is <1. Hopefully patient can participate in a SBT soon.     Patient's creatinine is slightly improved today.  We will defer further diuresis to nephrology.  Continue to hold heparin products and manage his DVT prophylaxis with SCDs alone.    Steroids have been weaned down to prednisone.  Continue to wean his prednisone as tolerated.  Continue with his low dose insulin drip which has been weaned down significantly since yesterday. Blood glucose levels currently controlled. Continue to monitor hemoglobin/hematocrit closely. No signs/symptoms of active bleeding noted at this time.  Anti-histone ab test pending.     Repeat labs in the morning.     Patient is considered high risk due to: acute on chronic respiratory failure, prolonged ventilator time, alveolar hemorrhage, ckd    I discussed the patients findings and my recommendations with nursing staff and consulting provider (REGGIE Villegas and Dr. Gomez)    Disposition:  Unclear due to insurance; would benefit from LTAC/SNF after extubation.    Teresa Sandhu DO  01/16/18  8:27 AM

## 2018-01-16 NOTE — PROGRESS NOTES
Nephrology  Note      Subjective     FiO2 improved to 45% today . Diuresed well but sodium worsened. BP much improved    Objective     Vital Signs  Temp:  [98.5 °F (36.9 °C)-99.3 °F (37.4 °C)] 98.6 °F (37 °C)  Heart Rate:  [48-64] 60  Resp:  [24] 24  BP: (119-169)/(41-67) 119/46  FiO2 (%):  [45 %-65 %] 60 %    I/O this shift:  In: 300 [IV Piggyback:300]  Out: -   I/O last 3 completed shifts:  In: 5612.8 [I.V.:1363.8; Other:2060; NG/GT:1689; IV Piggyback:500]  Out: 5350 [Urine:5350]    Physical Examination:    General Appearance : intubated, sedated  Head : normocephalic, without obvious abnormality and atraumatic  Eyes : + pallor   Throat : ETT in place  Neck:  suppple  Lungs :  clear anteriorly  Heart : regular rhythm , normal S1, S2, systolic murmur   Abdomen :  normal bowel sounds,soft non-tender  Extremities :  3+ edema - better  Neurologic : sedated    Laboratory Data :      WBC WBC   Date Value Ref Range Status   01/16/2018 7.68 4.50 - 12.50 10*3/mm3 Final   01/15/2018 5.29 4.50 - 12.50 10*3/mm3 Final   01/14/2018 4.26 (L) 4.50 - 12.50 10*3/mm3 Final      HGB Hemoglobin   Date Value Ref Range Status   01/16/2018 7.5 (L) 14.0 - 18.0 g/dL Final   01/15/2018 8.0 (L) 14.0 - 18.0 g/dL Final   01/14/2018 7.5 (L) 14.0 - 18.0 g/dL Final      HCT Hematocrit   Date Value Ref Range Status   01/16/2018 24.8 (L) 42.0 - 52.0 % Final   01/15/2018 26.4 (L) 42.0 - 52.0 % Final   01/14/2018 24.8 (L) 42.0 - 52.0 % Final      Platlets No results found for: LABPLAT   MCV MCV   Date Value Ref Range Status   01/16/2018 96.1 (H) 80.0 - 94.0 fL Final   01/15/2018 93.3 80.0 - 94.0 fL Final   01/14/2018 94.7 (H) 80.0 - 94.0 fL Final          Sodium Sodium   Date Value Ref Range Status   01/16/2018 149 135 - 153 mmol/L Final   01/15/2018 147 135 - 153 mmol/L Final   01/14/2018 146 135 - 153 mmol/L Final      Potassium Potassium   Date Value Ref Range Status   01/16/2018 3.6 3.5 - 5.3 mmol/L Final   01/15/2018 4.0 3.5 - 5.3 mmol/L Final    01/14/2018 4.0 3.5 - 5.3 mmol/L Final      Chloride Chloride   Date Value Ref Range Status   01/16/2018 110 99 - 112 mmol/L Final   01/15/2018 109 99 - 112 mmol/L Final   01/14/2018 107 99 - 112 mmol/L Final      CO2 CO2   Date Value Ref Range Status   01/16/2018 28.9 24.3 - 31.9 mmol/L Final   01/15/2018 31.8 24.3 - 31.9 mmol/L Final   01/14/2018 31.6 24.3 - 31.9 mmol/L Final      BUN BUN   Date Value Ref Range Status   01/16/2018 95 (H) 7 - 21 mg/dL Final   01/15/2018 103 (H) 7 - 21 mg/dL Final   01/14/2018 97 (H) 7 - 21 mg/dL Final      Creatinine Creatinine   Date Value Ref Range Status   01/16/2018 1.90 (H) 0.43 - 1.29 mg/dL Final   01/15/2018 2.13 (H) 0.43 - 1.29 mg/dL Final   01/14/2018 2.19 (H) 0.43 - 1.29 mg/dL Final      Calcium Calcium   Date Value Ref Range Status   01/16/2018 7.9 7.7 - 10.0 mg/dL Final   01/15/2018 8.1 7.7 - 10.0 mg/dL Final   01/14/2018 8.2 7.7 - 10.0 mg/dL Final      PO4 No results found for: CAPO4   Albumin Albumin   Date Value Ref Range Status   01/16/2018 3.10 (L) 3.40 - 4.80 g/dL Final   01/15/2018 3.60 3.40 - 4.80 g/dL Final   01/14/2018 3.90 3.40 - 4.80 g/dL Final      Magnesium Magnesium   Date Value Ref Range Status   01/16/2018 3.3 (H) 1.7 - 2.6 mg/dL Final   01/15/2018 3.4 (H) 1.7 - 2.6 mg/dL Final   01/14/2018 3.2 (H) 1.7 - 2.6 mg/dL Final      Uric Acid No results found for: URICACID     Radiology results :     Imaging Results (last 24 hours)     ** No results found for the last 24 hours. **        Medications:        amLODIPine 10 mg Oral Daily   atorvastatin 80 mg Oral Daily   budesonide-formoterol 2 puff Inhalation BID - RT   cholecalciferol 2,000 Units Oral Daily   CloNIDine 0.3 mg Oral Q8H   ferrous sulfate 325 mg Oral Daily With Breakfast   guaiFENesin 200 mg Oral Q4H   hydrALAZINE 100 mg Oral Q8H   ipratropium-albuterol 3 mL Nebulization Q6H - RT   isosorbide mononitrate 240 mg Oral Daily   labetalol 200 mg Oral Q12H   lactobacillus acidophilus 1 capsule Oral  Daily   lansoprazole 30 mg Oral QAM   levothyroxine 25 mcg Oral Daily   polyethylene glycol 17 g Oral BID   predniSONE 20 mg Oral Daily With Breakfast   sennosides-docusate sodium 1 tablet Oral BID   sertraline 50 mg Oral Q PM   sodium chloride 10 mL Intracatheter Q12H   sodium chloride 10 mL Intracatheter Q12H   terazosin 10 mg Oral Daily   vitamin B-12 2,000 mcg Oral Daily       fentaNYL (SUBLIMAZE) PCA 1500 mcg/30 mL syringe     insulin regular infusion 1 unit/mL 1-20 Units/hr Last Rate: 2 Units/hr (01/16/18 9362)   Pharmacy Consult     propofol 5-50 mcg/kg/min Last Rate: 35 mcg/kg/min (01/16/18 4902)       Assessment/Plan     Active Problems:    Respiratory failure    Acute on chronic respiratory failure with hypoxia and hypercapnia      1.  SHEELA on CKD 3 :his  baseline creatinine is 1.2-1.3. It is better at 1.9 today. Prerenal SHEELA transitioned to ATN   No diuresis today due to worsening hypernatremia and azotemia    2. Alveolar hemorrhage :  antihistone pending. Rest serologies negative. Dr Gomez managing    3. Respiratory failure on ventilator    4. Anemia : Hb stable    5. HTN : much better    6. Hypernatremia : Increase free water flushes, mix antibiotics in d5w    I discussed the patients findings and my recommendations with RN    Fahad aCrd MD  01/16/18  11:48 AM

## 2018-01-17 NOTE — PROGRESS NOTES
Bronchoscopy Procedure Note    Date of Operation: 1/17/2018    Pre-op Diagnosis: Hemoptysis    Post-op Diagnosis: Hemoptysis    Surgeon: Jeremi Gomez MD    Assistants: None    Anesthesia: Please see anesthesia report for details    Operation: Flexible fiberoptic bronchoscopy, diagnostic     Findings: Old blood was seen in the trachea and left and right main bronchi    Specimen: Bronchial washings    Bronchiolar lavage of right lower lobe    Bronchiolar lavage of left lower lobe    Estimated Blood Loss: Minimal    Complications: None    Indications and History:  The patient is a 67 y.o. male with hemoptysis and bilateral pulmonary infiltrates.  The risks, benefits, complications, treatment options and expected outcomes were discussed with the patients next of kin.  The possibilities of reaction to medication, pulmonary aspiration, perforation of a viscus, bleeding, failure to diagnose a condition and creating a complication requiring transfusion or operation were discussed with the patients next of kin who freely signed the consent.      Description of Procedure:  The procedure was done in the ICU at bedside and patient was identified as Se Anne and the procedure verified as Flexible Fiberoptic Bronchoscopy.  A Time Out was held and the above information confirmed.     Patient is already intubated and is on a ventilator.  Bronchoscope was passed through the endotracheal tube.  The scope was then passed into the trachea.  Old blood was seen in the trachea    2 ml 1 % lidocaine was used topically on the walter.  Careful inspection of the tracheal lumen was accomplished. The scope was sequentially passed into the left main and then left upper and lower bronchi and segmental bronchi.       The scope was then withdrawn and advanced into the right main bronchus and then into the RUL, RML, and RLL bronchi and segmental bronchi.     Blood was seen in the right and left main bronchi bronchial washings were done no  fresh bleeding seen  Samples-  Bronchial washings    Then the bronchoscope was wedged into the right lower lobe for a bronchiolar lavage close to 60 cc of saline was given once and close to 20 cc off fluid was collected back    Then the bronchoscope was wedged into the left lower lobe.  Bronchial alveolar lavage close to 60 cc of saline was given once and close to 18 cc was aspirated back.    Thrombin spray was sprayed in right and left main bronchi          Jeremi Gomez MD

## 2018-01-17 NOTE — CONSULTS
Name:  Se Anne  :  1950    DATE OF ADMISSION  2017    DATE OF CONSULT  2018     REFERRING PHYSICIAN  Teresa Sandhu DO    REASON FOR CONSULT  Thrombocytopenia with recurrent alveolar hemorrhage     CHIEF COMPLAINT:  Chief Complaint   Patient presents with   • Shortness of Breath     pt reports was just here in hospital for pneumonia discharged 1 weeks ago. pt reports started having shortness of breathe x 2 days ago and got worse last night.       HISTORY OF PRESENT ILLNESS:   Se Anne is a 67 y.o. male who is being seen today in consultation at the request of Dr. 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     Patient was hospitalized at our facility at beginning of December with bibasilar pneumonia, severe sepsis and acute respiratory failure requiring mechanical ventilation. He was discharged home on Omnicef and Doxycycline and presented again to the ER a few days later with c/o worsening SOB. He was admitted again with acute respiratory failure secondary to COPD exacerbation and pneumonia. Noted low grade thrombocytopenia when initially admitted. He does have a history of thrombocytopenia with negative HIT antibody (and appeared that he had thrombocytopenia in the past during acute illness that would recover). He was on SQ Heparin this admission which was discontinued 2 days later and he was started on Arixtra for DVT prophylaxis on 17 although no official HIT antibody or JORDON was sent. He had also been on ASA which could have caused some platelet dysfunction. He began complaining of hemoptysis and CP, Arixtra was discontinued and patient was intubated on 18. He is being followed by Pulmonology who performed bronchoscopy and found alveolar hemorrhage. Unable to obtain history or ROS from patient as he is intubated/sedated currently. However, as per primary RN, he continues to have blood suctioned  from ETT as well as bleeding from central line. Patient has no other obvious blood loss or mucosal bleeding. Platelet count has been slowly decreasing to 88,000. Pulmonary planning for repeat bronchoscopy today and considering transfer for possible VATS once stable. He has also received vitamin K and Amicar. Patient has received 3 units of FFP, 6 units of PRBCs and 1 unit of platelets during this admission.       PAST MEDICAL HISTORY  Past Medical History:   Diagnosis Date   • Agent orange exposure    • Arthritis    • CHF (congestive heart failure)    • COPD (chronic obstructive pulmonary disease)    • Coronary artery disease    • Diabetes mellitus    • Hyperlipidemia    • Hypertension        PAST SURGICAL HISTORY  Past Surgical History:   Procedure Laterality Date   • CARDIAC SURGERY      stent placement   • CATARACT EXTRACTION     • JOINT REPLACEMENT      right knee   • KNEE SURGERY         SOCIAL HISTORY  Social History     Social History   • Marital status:      Spouse name: N/A   • Number of children: N/A   • Years of education: N/A     Occupational History   • Not on file.     Social History Main Topics   • Smoking status: Former Smoker     Years: 40.00     Quit date: 3/21/2010   • Smokeless tobacco: Never Used   • Alcohol use No   • Drug use: No   • Sexual activity: Defer     Other Topics Concern   • Not on file     Social History Narrative       FAMILY HISTORY  Family History   Problem Relation Age of Onset   • Heart disease Mother    • Cancer Father    • Heart attack Sister    • Heart attack Brother    • Cancer Paternal Grandfather        ALLERGIES  Allergies   Allergen Reactions   • Iodine    • Lisinopril        INPATIENT MEDICATIONS  Current Facility-Administered Medications   Medication Dose Route Frequency Provider Last Rate Last Dose   • acetaminophen (TYLENOL) tablet 650 mg  650 mg Oral Q6H PRN Quan Rico MD   650 mg at 01/15/18 0019   • aluminum-magnesium hydroxide-simethicone  (MAALOX MAX) 400-400-40 MG/5ML suspension 15 mL  15 mL Oral Q6H PRN Whitney Saunders MD   15 mL at 01/08/18 0416   • amLODIPine (NORVASC) tablet 10 mg  10 mg Oral Daily Seun Muller MD   10 mg at 01/17/18 0816   • atorvastatin (LIPITOR) tablet 80 mg  80 mg Oral Daily Seun Muller MD   80 mg at 01/17/18 0817   • cholecalciferol (VITAMIN D3) tablet 2,000 Units  2,000 Units Oral Daily Seun Muller MD   2,000 Units at 01/17/18 0816   • CloNIDine (CATAPRES) tablet 0.3 mg  0.3 mg Oral Q8H Fahad Card MD   0.3 mg at 01/17/18 1351   • dextrose (D50W) solution 25 g  25 g Intravenous Q15 Min PRN Lee Ann Sharpe PA-C   25 g at 01/07/18 1130   • dextrose (GLUTOSE) oral gel 15 g  15 g Oral Q15 Min PRN Lee Ann Sharpe PA-C       • FENTANYL PCA 1500 MCG/30 ML (BHCOR) PCA   Intravenous Titrated Jeremi Gomez MD       • glucagon (human recombinant) (GLUCAGEN DIAGNOSTIC) injection 1 mg  1 mg Subcutaneous Q15 Min PRN Lee Ann Sharpe PA-C       • guaiFENesin tablet 200 mg  200 mg Oral Q4H Teresa Sandhu, DO   200 mg at 01/17/18 1115   • hydrALAZINE (APRESOLINE) injection 10 mg  10 mg Intravenous Q6H PRN Teresa Sandhu, DO   10 mg at 01/12/18 0955   • hydrALAZINE (APRESOLINE) tablet 100 mg  100 mg Oral Q8H Seun Muller MD   100 mg at 01/17/18 1351   • insulin regular (HumuLIN R,NovoLIN R) 100 Units in sodium chloride 0.9 % 100 mL (1 Units/mL) infusion  1-20 Units/hr Intravenous Titrated Teresa Sandhu, DO 1.5 mL/hr at 01/17/18 1108 1.5 Units/hr at 01/17/18 1108   • ipratropium-albuterol (DUO-NEB) nebulizer solution 3 mL  3 mL Nebulization Q6H - RT Teresa Sandhu,    3 mL at 01/17/18 1309   • isosorbide mononitrate (IMDUR) 24 hr tablet 240 mg  240 mg Oral Daily Jeremi Gomez MD   240 mg at 01/17/18 0816   • labetalol (NORMODYNE) tablet 200 mg  200 mg Oral Q12H Fahad Card MD   Stopped at 01/17/18 0846   • lansoprazole (FIRST) oral suspension 30 mg  30 mg Oral KVNG Muller MD   30  mg at 01/17/18 0548   • levothyroxine (SYNTHROID, LEVOTHROID) tablet 25 mcg  25 mcg Oral Daily Seun Muller MD   25 mcg at 01/17/18 0817   • Magnesium Sulfate 2 gram Bolus, followed by 8 gram infusion (total Mg dose 10 grams)- Mg less than or equal to 1mg/dL  2 g Intravenous PRN Lee Ann Sharpe PA-C        Or   • Magnesium Sulfate 6 gram Infusion (2 gm x 3) -Mg 1.1 -1.5 mg/dL  2 g Intravenous PRN Lee Ann Sharpe PA-C        Or   • magnesium sulfate 4 gram infusion- Mg 1.6-1.9 mg/dL  4 g Intravenous PRN Lee Ann Sharpe PA-C       • methylPREDNISolone sodium succinate (SOLU-Medrol) 1,000 mg in sodium chloride 0.9 % 100 mL IVPB  1,000 mg Intravenous Daily Jeremi Gomez MD   1,000 mg at 01/17/18 0959   • midazolam (VERSED) injection 4 mg  4 mg Intravenous Q2H PRN Jeremi Gomez MD   4 mg at 01/10/18 1004   • nitroglycerin (NITROSTAT) SL tablet 0.4 mg  0.4 mg Sublingual Q5 Min PRN LASHAY Patrick   0.4 mg at 12/28/17 1208   • Pharmacy Consult   Does not apply Continuous PRN Fahad Card MD       • [START ON 1/18/2018] phytonadione (AQUA-MEPHYTON, VITAMIN K) 10 mg in sodium chloride 0.9 % 50 mL IVPB  10 mg Intravenous Once Marbella Gomes MD       • [START ON 1/18/2018] phytonadione (AQUA-MEPHYTON, VITAMIN K) 10 mg in sodium chloride 0.9 % 50 mL IVPB  10 mg Intravenous Once Marbella Gomes MD       • polyethylene glycol (MIRALAX) powder 17 g  17 g Oral BID Teresa Sanduh DO   Stopped at 01/16/18 2122   • potassium chloride (MICRO-K) CR capsule 40 mEq  40 mEq Oral PRN Lee Ann Sharpe PA-C        Or   • potassium chloride (KLOR-CON) packet 40 mEq  40 mEq Oral PRN Lee Ann Sharpe PA-C        Or   • potassium chloride 10 mEq in 100 mL IVPB  10 mEq Intravenous Q1H PRN Lee Ann Sharpe PA-C       • propofol (DIPRIVAN) infusion 10 mg/mL 100 mL  5-50 mcg/kg/min Intravenous Titrated Jeremi Gomez MD 33.8 mL/hr at 01/17/18 1352 45 mcg/kg/min at 01/17/18 1352   • sertraline  "(ZOLOFT) tablet 50 mg  50 mg Oral Q PM Seun Muller MD   50 mg at 01/16/18 1600   • sodium chloride (NS) 0.9 % irrigation solution  - ADS Override Pull            • sodium chloride 0.9 % flush 1-10 mL  1-10 mL Intravenous PRN Lee Ann Sharpe PA-C       • sodium chloride 0.9 % flush 10 mL  10 mL Intravenous PRN LASHAY Patrick       • sodium chloride 0.9 % flush 10 mL  10 mL Intracatheter Q12H Seun Muller MD   10 mL at 01/16/18 2123   • sodium chloride 0.9 % flush 10 mL  10 mL Intracatheter PRN Seun Muller MD       • sodium chloride 0.9 % flush 10 mL  10 mL Intracatheter Q12H Teresa Sandhu DO   10 mL at 01/16/18 2122   • sodium chloride 0.9 % flush 10 mL  10 mL Intracatheter PRN Teresa Sandhu DO       • sodium chloride 0.9 % infusion  - ADS Override Pull            • terazosin (HYTRIN) capsule 10 mg  10 mg Oral Daily Jeremi Gomez MD   Stopped at 01/17/18 0847   • vitamin B-12 (CYANOCOBALAMIN) tablet 2,000 mcg  2,000 mcg Oral Daily Seun Muller MD   2,000 mcg at 01/17/18 0815       Review of Systems  A comprehensive 14 point review of systems cannot be obtained due to sedation and mechanical ventilation. History was taken from chart and staff no family at bedside.      Physical Exam   Vital Signs: /60  Pulse 51  Temp 97.9 °F (36.6 °C) (Oral)   Resp 24  Ht 167.6 cm (65.98\")  Wt 129 kg (285 lb)  SpO2 98%  BMI 46.02 kg/m2  General: Intubated/sedated.   Head: ATNC   Eyes: PERRL   Nose: No nasal discharge.   Mouth: ETT and OGT in place.   Neck: Neck supple. No JVD.   Lungs: CTAB without rales, rhonchi or wheezing.   Heart: S1, S2. Regular rate and rhythm. + murmurs, no rubs, or gallops.   Abdomen: Soft. Bowel sounds are normoactive. Difficult to appreciate Hepatosplenomegaly    Extremities: No cyanosis. +pitting edema BLE.   Integumentary: No rash, sores, +ecchymosis.   Neurologic: Intubated/sedated as above.   : Scrotal edema, villalobos in place with dark " urine      IMAGING:  Ct Abdomen Pelvis Without Contrast    Result Date: 1/9/2018  Narrative:  CT ABDOMEN PELVIS WO CONTRAST-    TECHNIQUE: Multiple axial CT images were obtained from lung bases through pubic symphysis WITHOUT IV contrast. Reformatted images in the coronal and/or sagittal plane(s) were generated from the axial data set to facilitate diagnostic accuracy and/or surgical planning. Oral Contrast:NONE.  Radiation dose reduction techniques were utilized per ALARA protocol. Automated exposure control was initiated through either or [employer] or DoseRight software packages by  protocol.   Radiation dose reduction techniques were utilized per ALARA protocol. Automated exposure control was initiated through either or [employer] or DoseRight software packages by  protocol.   1347.18319 mGy.cm  Clinical information r/o retro peritoneal hemotoma; J96.21-Acute and chronic respiratory failure with hypoxia; J96.22-Acute and chronic respiratory failure with hypercapnia; J44.1-Chronic obstructive pulmonary disease with (acute) exacerbation  Comparison Comparison: 12/28/2017  Findings LOWER THORAX: BILATERAL SMALL PLEURAL EFFUSIONS WITH BILATERAL GROUNDGLASS AND PATCHY AIRSPACE DISEASE.  ABDOMEN:     LIVER: Homogeneous. No focal hepatic mass or ductal dilatation.     GALLBLADDER: CHOLELITHIASIS.     PANCREAS: Unremarkable. No mass or ductal dilatation.     SPLEEN: Homogeneous. No splenomegaly.     ADRENALS: No mass.     KIDNEYS: NONOBSTRUCTING RIGHT KIDNEY STONES. NO HYDRONEPHROSIS.     GI TRACT: MILD DIVERTICULOSIS OF SIGMOID COLON WITHOUT DIVERTICULITIS.     PERITONEUM: No free air. No free fluid or loculated fluid collections.     MESENTERY: Unremarkable.     LYMPH NODES: No lymphadenopathy.     VASCULATURE: ARTERIAL VASCULAR CALCIFICATIONS WITHOUT ANEURYSM.     ABDOMINAL WALL: No focal hernia or mass.      OTHER: None.  PELVIS:     BLADDER: URINARY BLADDER WALL THICKENING LIKELY IN SETTING OF  PROSTATE HYPERTROPHY.     REPRODUCTIVE: Unremarkable as visualized.     APPENDIX: Nondistended. No surrounding inflammation.  BONES: No acute bony abnormality.      Impression: Impression: 1. BILATERAL LOWER LOBE AIRSPACE DISEASE WITH SMALL PLEURAL EFFUSIONS. 2. NO ACUTE FINDINGS IDENTIFIED WITHIN ABDOMEN OR PELVIS. SPECIFICALLY NO RETROPERITONEAL HEMATOMA. 3. OTHER NONACUTE FINDINGS AS ABOVE.  This report was finalized on 1/9/2018 7:02 AM by Dr. Kirk Kellogg MD.      Ct Abdomen Pelvis Without Contrast    Result Date: 12/29/2017  Narrative:  CT ABDOMEN PELVIS WO CONTRAST-    TECHNIQUE: Multiple axial CT images were obtained from lung bases through pubic symphysis without administration of IV contrast. Reformatted images in the coronal and/or sagittal plane(s) were generated from the axial data set to facilitate diagnostic accuracy and/or surgical planning. Oral Contrast:NONE.  Radiation dose reduction techniques were utilized per ALARA protocol. Automated exposure control was initiated through either or [employer] or DoseRight software packages by  protocol.   [phone] mGy.cm  Clinical information elevated lactic acid and LLQ abd pain; J96.21-Acute and chronic respiratory failure with hypoxia; J96.22-Acute and chronic respiratory failure with hypercapnia; J44.1-Chronic obstructive pulmonary disease with (acute) exacerbation  Comparison NONE.  Findings LOWER THORAX: BIBASILAR ATELECTASIS AND MINIMAL AIRSPACE DISEASE.  ABDOMEN:     LIVER: Homogeneous. No focal hepatic mass or ductal dilatation.     GALLBLADDER: GALLSTONES IN THE GALLBLADDER IDENTIFIED.     PANCREAS: Unremarkable. No mass or ductal dilatation.     SPLEEN: Homogeneous. No splenomegaly.     ADRENALS: No mass.     KIDNEYS: NONOBSTRUCTIVE BILATERAL RENAL CALCULI.     GI TRACT: Non-dilated. No definite wall thickening.     PERITONEUM: No free air. No free fluid or loculated fluid collections.     MESENTERY: Unremarkable.     LYMPH NODES: No  lymphadenopathy.     VASCULATURE: No evidence of aneurysm.     ABDOMINAL WALL: No focal hernia or mass.      OTHER: None.  PELVIS:     BLADDER: 5 MM POLYP INVOLVING THE SUPERIOR LEFT ASPECT OF THE URINARY BLADDER THAT SHOULD BE FURTHER EVALUATED WITH CYSTOSCOPY.     REPRODUCTIVE: Unremarkable as visualized.     APPENDIX: Nondistended. No surrounding inflammation.  BONES: No acute bony abnormality.      Impression: Impression: 1. Bibasilar pneumonia. 2. Cholelithiasis. 3.5 mm polyp involving the superior left aspect of the urinary bladder that should be further evaluated with cystoscopy.  This report was finalized on 12/29/2017 6:29 AM by Dr. Miko Coto MD.      Xr Chest 1 View    Result Date: 1/12/2018  Narrative: EXAMINATION: XR CHEST 1 VW-  CLINICAL INDICATION:     s/p central line placement; J96.21-Acute and chronic respiratory failure with hypoxia; J96.22-Acute and chronic respiratory failure with hypercapnia; J44.1-Chronic obstructive pulmonary disease with (acute) exacerbation; R09.02-Hypoxemia  TECHNIQUE:  XR CHEST 1 VW-  COMPARISON: 1/12/2018  FINDINGS: Interval placement of left IJ deep line. Tip in the SVC. ET tube with tip at clavicles. NG tube extends into stomach. Patchy bilateral airspace disease not significantly changed. No pneumothorax.      Impression: 1. Interval left IJ deep line placement with tip in satisfactory position. No pneumothorax. Otherwise stable chest.  This report was finalized on 1/12/2018 12:38 PM by Dr. Kirk Kellogg MD.      Xr Chest 1 View    Result Date: 1/11/2018  Narrative: EXAMINATION: XR CHEST 1 VW-  CLINICAL INDICATION:     et tube placement; J96.21-Acute and chronic respiratory failure with hypoxia; J96.22-Acute and chronic respiratory failure with hypercapnia; J44.1-Chronic obstructive pulmonary disease with (acute) exacerbation; R09.02-Hypoxemia  TECHNIQUE:  XR CHEST 1 VW-  COMPARISON: 1/11/2018  FINDINGS: Improved CHF changes. Improved airspace disease. ETT tip at  clavicles. NG tube in stomach.      Impression: 1. Improved airspace disease and CHF. 2. Support devices as above.  This report was finalized on 1/11/2018 2:07 PM by Dr. Kirk Kellogg MD.      Xr Chest 1 View    Result Date: 1/9/2018  Narrative: EXAMINATION: XR CHEST 1 VW-  CLINICAL INDICATION:     Intubation, OG placement; J96.21-Acute and chronic respiratory failure with hypoxia; J96.22-Acute and chronic respiratory failure with hypercapnia; J44.1-Chronic obstructive pulmonary disease with (acute) exacerbation  TECHNIQUE:  XR CHEST 1 VW-  COMPARISON: 1/9/2018 5:46 AM  FINDINGS: ET tube with tip just below clavicles. NG tube extends into stomach. Increasing left basilar airspace disease. Stable perihilar opacities right greater than left lung. Decreased density of left upper lobe opacities. Small left effusion. No pneumothorax.      Impression: 1. Support devices as detailed above. 2. Increasing left basilar airspace disease. Otherwise slight decrease in right greater than left somewhat perihilar airspace disease.  This report was finalized on 1/9/2018 12:51 PM by Dr. Kirk Kellogg MD.      Xr Chest 1 View    Result Date: 1/7/2018  Narrative: EXAMINATION: XR CHEST 1 VW-  CLINICAL INDICATION:     worsening hypoxia; J96.21-Acute and chronic respiratory failure with hypoxia; J96.22-Acute and chronic respiratory failure with hypercapnia; J44.1-Chronic obstructive pulmonary disease with (acute) exacerbation  TECHNIQUE:  XR CHEST 1 VW-  COMPARISON: 01/06/2018  FINDINGS: Persistent bilateral patchy airspace disease may represent edema or pneumonia. Cardiomegaly is stable. Improved aeration of the lung bases. No effusion or pneumothorax.      Impression: Slight improvement in aeration. Otherwise stable bilateral patchy airspace disease and cardiac enlargement.  This report was finalized on 1/7/2018 10:37 PM by Dr. Kirk Kellogg MD.      Xr Chest 1 View    Result Date: 1/6/2018  Narrative: XR CHEST 1 VW-  CLINICAL  INDICATION: follow up chf /pneumonia; J96.21-Acute and chronic respiratory failure with hypoxia; J96.22-Acute and chronic respiratory failure with hypercapnia; J44.1-Chronic obstructive pulmonary disease with (acute) exacerbation     COMPARISON: 1/3/2018  TECHNIQUE: Single frontal view of the chest.  FINDINGS:  Bibasilar consolidation The cardiac silhouette is normal. The pulmonary vasculature is unremarkable. There is no evidence of an acute osseous abnormality. There are no suspicious-appearing parenchymal soft tissue nodules.         Impression: Bibasilar consolidation     This report was finalized on 1/6/2018 10:47 AM by Dr. Noe Avila MD.      Xr Chest 1 View    Result Date: 1/3/2018  Narrative: XR CHEST 1 VIEW-  CLINICAL INDICATION: Shortness of air; J96.21-Acute and chronic respiratory failure with hypoxia; J96.22-Acute and chronic respiratory failure with hypercapnia; J44.1-Chronic obstructive pulmonary disease with (acute) exacerbation.     COMPARISON: 12/31/2017  TECHNIQUE: Single frontal view of the chest.  FINDINGS:  There is bibasilar consolidation. Appearance compatible with congestive heart failure. There is no evidence of an acute osseous abnormality. There are no suspicious-appearing parenchymal soft tissue nodules.         Impression: Bibasilar consolidation and CHF.     This report was finalized on 1/3/2018 8:07 AM by Dr. Noe Avila MD.      Xr Chest 1 View    Result Date: 12/28/2017  Narrative: EXAMINATION:  XR CHEST 1 VW-  CLINICAL INDICATION:     sob  TECHNIQUE:  XR CHEST 1 VW-   COMPARISON: 12/10/2017  FINDINGS: The lungs remain aerated. Heart size is stable. No pneumothorax. No pleural effusion. Bony and soft tissue structures are unremarkable.         Impression: Stable radiographic appearance of the chest.   This report was finalized on 12/28/2017 10:15 AM by Dr. Miko Coot MD.      Ct Chest Pulmonary Embolism With Contrast    Result Date: 12/28/2017  Narrative: EXAMINATION: CT  CHEST PULMONARY EMBOLISM W CONTRAST-  Technique: Multiple CT axial images were obtained through the level of pulmonary arteries, following IV contrast administration per CT PE protocol. Volume Rendered 3D or MIP images performed.  Radiation dose reduction techniques were utilized per ALARA protocol. Automated exposure control was initiated through either or [employer]" or DoseRight software packages by  protocol.        CLINICAL INDICATION:    SOB and Chest Pain  COMPARISON:    Chest x-ray performed on 12/28/2017.  FINDINGS:    Some bibasilar groundglass attenuation possibly representing alveolar edema or atelectasis. Coronary artery calcification. Subsegmental pulmonary artery branches are not well evaluated. There is no pulmonary artery filling defect to suggest pulmonary embolism. There is no thoracic adenopathy. No pleural or pericardial effusion. Incidentally imaged upper abdomen is unremarkable. Bone windows show no acute osseous abnormality.      Impression:  Some bibasilar groundglass attenuation possibly representing alveolar edema or atelectasis. Coronary artery calcification. Subsegmental pulmonary artery branches are not well evaluated.  This report was finalized on 12/28/2017 12:43 PM by Dr. Miko Coto MD.      Xr Abdomen Kub    Result Date: 1/10/2018  Narrative: EXAMINATION: XR ABDOMEN KUB-  CLINICAL INDICATION:eval NGT; J96.21-Acute and chronic respiratory failure with hypoxia; J96.22-Acute and chronic respiratory failure with hypercapnia; J44.1-Chronic obstructive pulmonary disease with (acute) exacerbation    COMPARISON: None  TECHNIQUE: KUB  FINDINGS: NG tube with tip in the region of distal stomach. Left basilar airspace disease and cardiomegaly. Nonobstructive bowel gas pattern.      Impression: NG tube with tip in the region of distal stomach.  This report was finalized on 1/10/2018 8:56 AM by Dr. Kirk Kellogg MD.      Xr Chest Ap    Result Date: 1/15/2018  Narrative: EXAMINATION:  XR CHEST AP-  CLINICAL INDICATION:     eval resp failure; J96.21-Acute and chronic respiratory failure with hypoxia; J96.22-Acute and chronic respiratory failure with hypercapnia; J44.1-Chronic obstructive pulmonary disease with (acute) exacerbation; R09.02-Hypoxemia  TECHNIQUE: Single AP view of chest.  COMPARISON: NONE  FINDINGS: There is bibasilar atelectasis. Bilateral airspace disease. Support tube and lines are in unchanged position. Heart size is stable. No pneumothorax. Tiny bilateral pleural effusions persist.      Impression: Stable appearance of the chest.  This report was finalized on 1/15/2018 8:11 AM by Dr. Miko Coto MD.      Xr Chest Ap    Result Date: 1/14/2018  Narrative: EXAMINATION: XR CHEST AP-  CLINICAL INDICATION:     resp failure; J96.21-Acute and chronic respiratory failure with hypoxia; J96.22-Acute and chronic respiratory failure with hypercapnia; J44.1-Chronic obstructive pulmonary disease with (acute) exacerbation; R09.02-Hypoxemia  TECHNIQUE:  XR CHEST AP-  COMPARISON: 01/13/2018  FINDINGS: ET tube with tip at level of clavicles. Left IJ deep line with tip at cavoatrial junction. NG tube extends into stomach.  Significant interval improvement in bilateral airspace disease with persistent opacities right lung noted.      Impression: 1. Improvement in bilateral airspace disease. 2. Support devices as above.  This report was finalized on 1/14/2018 8:05 AM by Dr. Kirk Kellogg MD.      Xr Chest Ap    Result Date: 1/13/2018  Narrative: EXAMINATION: XR CHEST AP-  CLINICAL INDICATION:     re eval resp failure; J96.21-Acute and chronic respiratory failure with hypoxia; J96.22-Acute and chronic respiratory failure with hypercapnia; J44.1-Chronic obstructive pulmonary disease with (acute) exacerbation; R09.02-Hypoxemia  TECHNIQUE:  XR CHEST AP-  COMPARISON: 01/12/2018  FINDINGS: ET tube with tip at level of clavicles. NG tube extends into stomach. Left IJ deep line with tip distal SVC.  Since  the prior exam, slight increase in the extent of patchy bilateral airspace disease. Cardiomegaly stable. No definite effusion or pneumothorax.      Impression: 1. Support devices as above. 2. Slight increase in bilateral airspace disease.  This report was finalized on 1/13/2018 7:42 AM by Dr. Kirk Kellogg MD.      Xr Chest Ap    Result Date: 1/12/2018  Narrative: EXAMINATION: XR CHEST AP-  CLINICAL INDICATION:     Respiratory failure; J96.21-Acute and chronic respiratory failure with hypoxia; J96.22-Acute and chronic respiratory failure with hypercapnia; J44.1-Chronic obstructive pulmonary disease with (acute) exacerbation; R09.02-Hypoxemia  TECHNIQUE:  XR CHEST AP-  COMPARISON: 1/11/2018  FINDINGS: Stable appearance and positioning of support devices. Slight improvement in bilateral airspace disease. No effusion or pneumothorax.      Impression: Slight improvement in airspace disease. Otherwise stable chest.  This report was finalized on 1/12/2018 7:40 AM by Dr. Kirk Kellogg MD.      Xr Chest Ap    Result Date: 1/11/2018  Narrative: EXAMINATION: XR CHEST AP-  CLINICAL INDICATION:     eval resp failure; J96.21-Acute and chronic respiratory failure with hypoxia; J96.22-Acute and chronic respiratory failure with hypercapnia; J44.1-Chronic obstructive pulmonary disease with (acute) exacerbation; R09.02-Hypoxemia  TECHNIQUE:  XR CHEST AP-  COMPARISON: 1/10/2018  FINDINGS: Diffuse airspace disease slightly improved. Cardiomegaly stable. Trace effusions left greater than right. No pneumothorax.  ET tube with tip at level of clavicles. NG tube extends into stomach.      Impression: Improvement in bilateral airspace disease. Support devices as above. Otherwise stable chest.  This report was finalized on 1/11/2018 8:08 AM by Dr. Kirk Kellogg MD.      Xr Chest Ap    Result Date: 1/10/2018  Narrative: EXAMINATION: XR CHEST AP-  CLINICAL INDICATION:     Evaluate worsening pneumonia; J96.21-Acute and chronic respiratory  failure with hypoxia; J96.22-Acute and chronic respiratory failure with hypercapnia; J44.1-Chronic obstructive pulmonary disease with (acute) exacerbation  TECHNIQUE:  XR CHEST AP-  COMPARISON: 1/9/2018  FINDINGS: Probably worsening bilateral patchy airspace disease and underlying consolidations. ET tube with tip at level of clavicles. NG tube extends presumably into the stomach. No pneumothorax.      Impression: Mild interval worsening of bilateral airspace disease. Support devices as above.  This report was finalized on 1/10/2018 7:59 AM by Dr. Kirk Kellogg MD.      Xr Chest Ap    Result Date: 1/9/2018  Narrative: EXAMINATION: XR CHEST AP-  CLINICAL INDICATION:     Respiratory failure; J96.21-Acute and chronic respiratory failure with hypoxia; J96.22-Acute and chronic respiratory failure with hypercapnia; J44.1-Chronic obstructive pulmonary disease with (acute) exacerbation  TECHNIQUE:  XR CHEST AP-  COMPARISON: 1/7/2018  FINDINGS: Cardiomegaly and probable bilateral airspace edema increased. Underlying COPD stable. Cardiomegaly stable. No effusion or pneumothorax.      Impression: Increasing airspace disease otherwise stable chest.  This report was finalized on 1/9/2018 7:01 AM by Dr. Kirk Kellogg MD.      Xr Chest Ap    Result Date: 12/31/2017  Narrative: EXAMINATION:  XR CHEST AP-  CLINICAL INDICATION:     resp failure; J96.21-Acute and chronic respiratory failure with hypoxia; J96.22-Acute and chronic respiratory failure with hypercapnia; J44.1-Chronic obstructive pulmonary disease with (acute) exacerbation  TECHNIQUE:  XR CHEST AP-   COMPARISON: 12/28/2017  FINDINGS: Bibasilar atelectasis. Heart size is stable. No pneumothorax. No pleural effusion. Bony and soft tissue structures are unremarkable.         Impression: Stable radiographic appearance of the chest.  This report was finalized on 12/31/2017 9:34 AM by Dr. Miko Coto MD.      RECENT LABS:  Lab Results   Component Value Date    WBC 4.95  01/17/2018    HGB 7.0 (L) 01/17/2018    HCT 23.1 (L) 01/17/2018    MCV 95.9 (H) 01/17/2018    RDW 14.7 (H) 01/17/2018    PLT 88 (L) 01/17/2018    NEUTRORELPCT 84.6 (H) 01/17/2018    LYMPHORELPCT 7.3 (L) 01/17/2018    MONORELPCT 5.3 01/17/2018    EOSRELPCT 2.0 01/17/2018    BASORELPCT 0.0 01/17/2018    NEUTROABS 4.19 01/17/2018    LYMPHSABS 0.36 (L) 01/17/2018       Lab Results   Component Value Date     01/17/2018    K 3.4 (L) 01/17/2018    CO2 34.2 (H) 01/17/2018     01/17/2018    BUN 85 (H) 01/17/2018    CREATININE 1.51 (H) 01/17/2018    EGFRIFNONA 46 (L) 01/17/2018    GLUCOSE 106 01/17/2018    CALCIUM 8.0 01/17/2018    ALKPHOS 27 (L) 01/16/2018    AST 21 01/16/2018    ALT 16 01/16/2018    BILITOT 0.6 01/16/2018    ALBUMIN 3.10 (L) 01/16/2018    PROTEINTOT 5.1 (L) 01/16/2018    MG 3.2 (H) 01/17/2018    PHOS 5.0 (H) 01/17/2018     Lab Results   Component Value Date    FERRITIN 74.00 12/29/2017    IRON 20 (L) 12/29/2017    TIBC 246 12/29/2017    LABIRON 8 12/29/2017    JYEMTYRU36 1263 (H) 12/29/2017    FOLATE 9.46 12/29/2017       ASSESSMENT & PLAN:  Se Anne is a very pleasant 67 y.o. male with    1. Thrombocytopenia   2. Coagulopathy  3. Diffuse alveolar hemorrhage  4. Iron deficiency  5. Borderline folate deficiency    -Noted low grade thrombocytopenia when initially admitted which normalized. He also has a history of thrombocytopenia during acute illnesses that would recover and also with negative HIT antibody during previous admissions.   - He was on SQ Heparin this admission which was discontinued and he was started on Arixtra for DVT prophylaxis on 12/30/17 although no official HIT antibody or JORDON was sent.   - He had also been on ASA which could have caused some platelet dysfunction. Pulmonology performed bronchoscopy and found alveolar hemorrhage, Arixtra now discontinued as well as ASA.  -Patient continues to have blood suctioned from ETT as well as bleeding from central line. Patient has  no other obvious blood loss or mucosal bleeding. Platelet count has dropped again, 88,000. Pulmonary planning for repeat bronchoscopy today and considering transfer for possible VATS once stable. He is also receiving vitamin K and desmopressin. Patient has received 3 units of FFP, 6 units of PRBCs and 1 unit of platelets during this admission.   - Concern would be for DIC however with now near normal PT/PTT and fibrinogen and no other bleeding from other peripheral sites which look clean this is unlikely. PT, PTT  have almost normalized with recent Vitamin K adminstration. Will continue vitamin K daily for total of three days.    Fibrinogen was normal, therefore he does not require cryoprecipitate.    - I do not believe that his DAH is secondary to a hematological issue such as DIC and at present do not see any benefit in platelet transfusion as patient has not been on ASA or any sort of prophylactic anticoagulation for sometime. Will need platelet transfusion if platelet is <50 thousand. Would continue to hold on any sort of anticoagulation or antiplatelet agents with acute bleed.  - Peripheral blood smear pending.   - B12 levels are normal however folate level is borderline therefore will start on folic acid 1mg daily.  - Iron panel is consistent with iron deficiency (from acute blood loss) as well as anemia of chronic inflammation. Will start on oral iron once daily given that there was hesitation with IV iron given recent infection.    Thank you so much for the consultation and allowing us to participate in the care of Mr. Anne. Please do not hesitate to contact Hem/Onc with any questions or concerns.     Addendum:  IMarbella M.D., have personally seen and evaluated the patient with Ms. Dill. I agree with the history of present illness, physical exam, assessment, and plan and have reviewed the note and have personally edited the note as reflected. Case was discussed with   Phoebe.    Electronically Signed by: YANG Decker , January 17, 2018 2:00 PM

## 2018-01-17 NOTE — PLAN OF CARE
Problem: COPD, Chronic Bronchitis/Emphysema (Adult)  Goal: Signs and Symptoms of Listed Potential Problems Will be Absent or Manageable (COPD, Chronic Bronchitis/Emphysema)  Outcome: Ongoing (interventions implemented as appropriate)      Problem: Skin Integrity Impairment, Risk/Actual (Adult)  Goal: Identify Related Risk Factors and Signs and Symptoms  Outcome: Ongoing (interventions implemented as appropriate)    Goal: Skin Integrity/Wound Healing  Outcome: Ongoing (interventions implemented as appropriate)      Problem: Fall Risk (Adult)  Goal: Identify Related Risk Factors and Signs and Symptoms  Outcome: Ongoing (interventions implemented as appropriate)    Goal: Absence of Falls  Outcome: Ongoing (interventions implemented as appropriate)      Problem: Patient Care Overview (Adult)  Goal: Plan of Care Review  Outcome: Ongoing (interventions implemented as appropriate)    Goal: Adult Individualization and Mutuality  Outcome: Outcome(s) achieved Date Met: 01/17/18    Goal: Discharge Needs Assessment  Outcome: Ongoing (interventions implemented as appropriate)      Problem: Mechanical Ventilation, Invasive (Adult)  Goal: Signs and Symptoms of Listed Potential Problems Will be Absent or Manageable (Mechanical Ventilation, Invasive)  Outcome: Ongoing (interventions implemented as appropriate)      Problem: Pressure Ulcer Risk (Karthikeyan Scale) (Adult,Obstetrics,Pediatric)  Goal: Identify Related Risk Factors and Signs and Symptoms  Outcome: Ongoing (interventions implemented as appropriate)    Goal: Skin Integrity  Outcome: Ongoing (interventions implemented as appropriate)

## 2018-01-17 NOTE — PROGRESS NOTES
Discharge Planning Assessment   Roberto     Patient Name: Se Anne  MRN: [id number]  Today's Date: 1/17/2018    Admit Date: 12/28/2017       Discharge Plan       01/17/18 1206    Case Management/Social Work Plan    Plan Pt is currently on the vent. Pt lives at home with spouse and daughters. [facility] and [facility]. Pt plans to return home at discharge. Pt has been referred to CC before intubation. Pt's insurance requires a 21 day LOS, 3 failed weaning attempts and a stable airway before he can be submitted to the insurance. SS will follow and assist as needed.     Patient/Family In Agreement With Plan yes        Discharge Placement     No information found        Expected Discharge Date and Time     Expected Discharge Date Expected Discharge Time    Jan 8, 2018           Jaylene Gracia

## 2018-01-17 NOTE — PROGRESS NOTES
[facility] CCU Note    Patient Identification:  Name:  Se Anne  Age:  67 y.o.  Sex:  male  :  1950  MRN:  [id number]  Visit number:  [id number]  Primary Care Provider:  No Known Provider    20    Ventilator Day: 6  Ventilator Settings:  Ventilator/Non-Invasive Ventilation Settings     Start     Ordered    18 1107  Ventilator - AC/VC; 20; 100 (85); Other (see comments); 13; 450  Continuous     Question Answer Comment   Vent Mode AC/VC    Breath rate 20    FiO2 100 85   PEEP Other (see comments)    PEEP: 13    Tidal Volume 450        18 1106    01/10/18 0702  Ventilator - AC/VC; 20; 100; Other (see comments); 13; 450  Continuous,   Status:  Canceled     Question Answer Comment   Vent Mode AC/VC    Breath rate 20    FiO2 100    PEEP Other (see comments)    PEEP: 13    Tidal Volume 450        01/10/18 0703    18 0627  . BIPAP  As Needed-RT,   Status:  Canceled     Comments:  respiratory to mangage   Question:  .  Answer:  BIPAP    18 0628    17 1055  . CPAP; Pressure: 12  At Bedtime & As Needed-RT,   Status:  Canceled     Question Answer Comment   . CPAP    Pressure 12        17 1054          Drips: Propofol, fentanyl, insulin  Antibiotics: Zosyn  Lines: Left internal jugular central line, bilateral upper extremity midline    Procedures:   -Emergent Intubation  -Blood transfusion    HPI:  68 yo male admitted with dyspnea    Subjective      Patient apparently had recurrent alveolar hemorrhage last night. He has had blood with suctioning. He dropped his hemoglobin to 7.0 yesterday and his platelets dropped to 88k.  He did have a bowel movement with a dose of lactulose yesterday in addition to his Colace and MiraLAX.    History taken from: chart RN   Patient unable to give history due to: Sedation/Mechanical ventilation  Present during visit: N/A    Objective     Vital Signs  Temp:  [97.7 °F (36.5 °C)-98.4 °F (36.9 °C)] 98 °F (36.7 °C)  Heart Rate:   [43-67] 55  Resp:  [24-30] 26  BP: (119-155)/(44-62) 141/55  FiO2 (%):  [45 %-60 %] 60 %  Body mass index is 46.02 kg/(m^2).    Intake/Output Summary (Last 24 hours) at 01/17/18 0854  Last data filed at 01/17/18 0600   Gross per 24 hour   Intake           4170.7 ml   Output             2450 ml   Net           1720.7 ml       Physical Exam:    Physical Exam   Constitutional: He appears well-developed and well-nourished. No distress. He is sedated and intubated.   HENT:   Head: Normocephalic and atraumatic.   Nose: Nose normal.   Mouth/Throat: Oropharynx is clear and moist and mucous membranes are normal.   Eyes: Conjunctivae and EOM are normal. Pupils are equal, round, and reactive to light. No scleral icterus.   Neck: Trachea normal. Neck supple. No JVD present. Carotid bruit is not present. No thyromegaly present.   Cardiovascular: Normal rate, regular rhythm and normal pulses.  Exam reveals no gallop and no friction rub.    Murmur heard.  Edema noted bilateral upper and lower extremities   Pulmonary/Chest: Effort normal. He is intubated. No respiratory distress. He has no decreased breath sounds. He has no wheezes. He has no rhonchi. He has rales.   Abdominal: Soft. He exhibits distension. Bowel sounds are increased. There is no tenderness. There is no guarding.   Genitourinary:   Genitourinary Comments: Worley in place and draining yellow urine   Neurological:   Unable to assess; currently sedated   Skin: Skin is warm, dry and intact. No rash noted. No erythema.      Results Review:     Telemetry: Sinus rhythm     I reviewed the patient's new imaging results and agree with the interpretation.  I reviewed the patient's other test results and agree with the interpretation.      Results from last 7 days  Lab Units 01/17/18  0138 01/16/18  0107 01/15/18  0114 01/14/18  0047 01/13/18  0058 01/12/18  0058 01/11/18  0911 01/11/18  0108   WBC 10*3/mm3 4.95 7.68 5.29 4.26* 5.99 5.54  --  4.82   HEMOGLOBIN g/dL 7.0* 7.5*  8.0* 7.5* 7.6* 7.5* 7.5* 7.2*   PLATELETS 10*3/mm3 88* 123* 144 146 166 168  --  148       Results from last 7 days  Lab Units 01/17/18  0138 01/16/18  1443 01/16/18  0107 01/15/18  0114 01/14/18  0047 01/13/18  0058 01/12/18  0058 01/11/18  0108   SODIUM mmol/L 148  --  149 147 146 145 141 145   POTASSIUM mmol/L 3.4* 3.8 3.6 4.0 4.0 3.5 3.4* 3.6   CHLORIDE mmol/L 109  --  110 109 107 105 100 103   CO2 mmol/L 34.2*  --  28.9 31.8 31.6 32.9* 32.2* 33.1*   BUN mg/dL 85*  --  95* 103* 97* 89* 72* 49*   CREATININE mg/dL 1.51*  --  1.90* 2.13* 2.19* 2.27* 2.24* 2.08*   CALCIUM mg/dL 8.0  --  7.9 8.1 8.2 8.3 8.2 8.2   GLUCOSE mg/dL 106  --  118* 139* 139* 141* 307* 271*       Results from last 7 days  Lab Units 01/16/18  0107 01/15/18  0114 01/14/18  0047 01/13/18  0058 01/12/18  0058 01/11/18  0108   BILIRUBIN mg/dL 0.6 0.6 0.6 0.8 0.8 1.1   ALK PHOS U/L 27* 28* 30* 33* 38* 45   AST (SGOT) U/L 21 19 15 14 13 15   ALT (SGPT) U/L 16 17 17 18 13 19       Results from last 7 days  Lab Units 01/17/18  0138 01/16/18  0107 01/15/18  0114 01/14/18  0047 01/13/18  0058 01/12/18  0058   MAGNESIUM mg/dL 3.2* 3.3* 3.4* 3.2* 3.0* 2.9*           Results from last 7 days  Lab Units 01/16/18  0107 01/15/18  0114 01/14/18  0047   CRP mg/dL 0.88 1.37* 2.06*       Cultures:  Blood Culture   Date Value Ref Range Status   01/09/2018 No growth at 5 days  Final   01/09/2018 No growth at 5 days  Final       Respiratory Culture   Date Value Ref Range Status   01/09/2018 No growth  Final   01/09/2018 No growth  Final   01/09/2018 No growth  Final   01/09/2018 No growth  Final         CRP: 0.88        Portable CXR, 1/15/18:  FINDINGS:   There is bibasilar atelectasis.   Bilateral airspace disease.  Support tube and lines are in unchanged position.   Heart size is stable.  No pneumothorax.   Tiny bilateral pleural effusions persist.       IMPRESSION:  Stable appearance of the chest.          Current Hospital Medications:    amLODIPine 10 mg Oral  Daily   atorvastatin 80 mg Oral Daily   budesonide-formoterol 2 puff Inhalation BID - RT   cholecalciferol 2,000 Units Oral Daily   CloNIDine 0.3 mg Oral Q8H   desmopressin 5 mcg Subcutaneous Once   guaiFENesin 200 mg Oral Q4H   hydrALAZINE 100 mg Oral Q8H   ipratropium-albuterol 3 mL Nebulization Q6H - RT   isosorbide mononitrate 240 mg Oral Daily   labetalol 200 mg Oral Q12H   lactobacillus acidophilus 1 capsule Oral Daily   lansoprazole 30 mg Oral QAM   levothyroxine 25 mcg Oral Daily   methylPREDNISolone sodium succinate 1,000 mg Intravenous Daily   phytonadione (VITAMIN K) IVPB 10 mg Intravenous Once   polyethylene glycol 17 g Oral BID   potassium chloride 40 mEq Oral Q4H   predniSONE 20 mg Oral Daily With Breakfast   sertraline 50 mg Oral Q PM   sodium chloride 10 mL Intracatheter Q12H   sodium chloride 10 mL Intracatheter Q12H   terazosin 10 mg Oral Daily   vitamin B-12 2,000 mcg Oral Daily       fentaNYL (SUBLIMAZE) PCA 1500 mcg/30 mL syringe     insulin regular infusion 1 unit/mL 1-20 Units/hr Last Rate: 3 Units/hr (01/17/18 2044)   Pharmacy Consult     propofol 5-50 mcg/kg/min Last Rate: 35 mcg/kg/min (01/17/18 7707)       Assessment/Plan      -Recurrent alveolar hemorrhage; 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 as patient had re-bleeding off any type of heparin product  -Acute thrombocytopenia  -Acute on chronic hypoxic/hypercarbic respiratory failure secondary to a COPD exacerbation with bilateral hospital-acquired pneumonia  -Mild acute hypokalemia  -Acute kidney injury on top of stage III chronic kidney disease; kidney function improved  -Mild hypernatremia; adjustments made to free water flushes with improvement today   -Insulin-dependent diabetes mellitus type 2 currently controlled on insulin drip  -Acute on chronic normocytic anemia  -Hypertensive urgency requiring a nicardipine drip which has been discontinued; blood pressures acceptable with   -Nutrition: Tolerating tube  feeding well    The patient is to receive 1 unit of packed red blood cells today in addition to a dose of desmopressin and vitamin K to assist with reversal of bleeding.  The patient will also undergo a repeat bronchoscopy.  Hematology consultation has been placed.  She has been restarted on pulse dose steroids today as well.  Today's ABG has been reviewed and is acceptable.   CRP is <1. Hematology consultation placed for recommendations regarding recurrent bleeding and thrombocytopenia.    Patient's renal function has improved today. He is scheduled to receive IV lasix. Continue with SCDs.    Continue to wean his prednisone as tolerated.  Continue with his low dose insulin drip which has been weaned down. Blood glucose levels currently controlled. Continue to monitor hemoglobin/hematocrit closely. No signs/symptoms of active bleeding noted at this time.  Anti-histone ab test was negative.    Repeat labs in the morning and chest xray.    Patient is considered high risk due to: recurrent alveolar hemorrhage, acute on chronic respiratory failure, prolonged ventilator time, ckd, recent hospitalization with intubation    I discussed the patients findings and my recommendations with nursing staff and consulting provider (REGGIE Gomez and Mukesh Gomez/Kyaw)    Disposition:  Unclear due to insurance; would benefit from LTAC/SNF after extubation; if unable to control bleeding, may have to transfer to tertiary care center for interventional radiology evaluation.    Teresa Sandhu DO  01/17/18  8:54 AM

## 2018-01-17 NOTE — PROGRESS NOTES
Nephrology  Note      Subjective     platelets and hemoglobin dropped and to get PRBC today and is again having bleeding from ET tube. BP stable.     Objective     Vital Signs  Temp:  [97.7 °F (36.5 °C)-98.4 °F (36.9 °C)] 97.9 °F (36.6 °C)  Heart Rate:  [43-67] 61  Resp:  [24-30] 27  BP: (124-153)/(44-62) 134/52  FiO2 (%):  [60 %] 60 %    I/O this shift:  In: 100 [IV Piggyback:100]  Out: -   I/O last 3 completed shifts:  In: 6508.3 [I.V.:1184.3; Other:3060; NG/GT:1664; IV Piggyback:600]  Out: 4200 [Urine:4200]    Physical Examination:    General Appearance : intubated, sedated  Head : normocephalic, without obvious abnormality and atraumatic  Eyes : + pallor   Throat : ETT in place  Neck:  suppple  Lungs :  clear anteriorly  Heart : regular rhythm , normal S1, S2, systolic murmur   Abdomen :  normal bowel sounds,soft non-tender  Extremities :  3+ edema   Neurologic : sedated    Laboratory Data :      WBC WBC   Date Value Ref Range Status   01/17/2018 4.95 4.50 - 12.50 10*3/mm3 Final   01/16/2018 7.68 4.50 - 12.50 10*3/mm3 Final   01/15/2018 5.29 4.50 - 12.50 10*3/mm3 Final      HGB Hemoglobin   Date Value Ref Range Status   01/17/2018 7.0 (L) 14.0 - 18.0 g/dL Final   01/16/2018 7.5 (L) 14.0 - 18.0 g/dL Final   01/15/2018 8.0 (L) 14.0 - 18.0 g/dL Final      HCT Hematocrit   Date Value Ref Range Status   01/17/2018 23.1 (L) 42.0 - 52.0 % Final   01/16/2018 24.8 (L) 42.0 - 52.0 % Final   01/15/2018 26.4 (L) 42.0 - 52.0 % Final      Platlets No results found for: LABPLAT   MCV MCV   Date Value Ref Range Status   01/17/2018 95.9 (H) 80.0 - 94.0 fL Final   01/16/2018 96.1 (H) 80.0 - 94.0 fL Final   01/15/2018 93.3 80.0 - 94.0 fL Final          Sodium Sodium   Date Value Ref Range Status   01/17/2018 148 135 - 153 mmol/L Final   01/16/2018 149 135 - 153 mmol/L Final   01/15/2018 147 135 - 153 mmol/L Final      Potassium Potassium   Date Value Ref Range Status   01/17/2018 3.4 (L) 3.5 - 5.3 mmol/L Final   01/16/2018 3.8 3.5  - 5.3 mmol/L Final   01/16/2018 3.6 3.5 - 5.3 mmol/L Final   01/15/2018 4.0 3.5 - 5.3 mmol/L Final      Chloride Chloride   Date Value Ref Range Status   01/17/2018 109 99 - 112 mmol/L Final   01/16/2018 110 99 - 112 mmol/L Final   01/15/2018 109 99 - 112 mmol/L Final      CO2 CO2   Date Value Ref Range Status   01/17/2018 34.2 (H) 24.3 - 31.9 mmol/L Final   01/16/2018 28.9 24.3 - 31.9 mmol/L Final   01/15/2018 31.8 24.3 - 31.9 mmol/L Final      BUN BUN   Date Value Ref Range Status   01/17/2018 85 (H) 7 - 21 mg/dL Final   01/16/2018 95 (H) 7 - 21 mg/dL Final   01/15/2018 103 (H) 7 - 21 mg/dL Final      Creatinine Creatinine   Date Value Ref Range Status   01/17/2018 1.51 (H) 0.43 - 1.29 mg/dL Final   01/16/2018 1.90 (H) 0.43 - 1.29 mg/dL Final   01/15/2018 2.13 (H) 0.43 - 1.29 mg/dL Final      Calcium Calcium   Date Value Ref Range Status   01/17/2018 8.0 7.7 - 10.0 mg/dL Final   01/16/2018 7.9 7.7 - 10.0 mg/dL Final   01/15/2018 8.1 7.7 - 10.0 mg/dL Final      PO4 No results found for: CAPO4   Albumin Albumin   Date Value Ref Range Status   01/16/2018 3.10 (L) 3.40 - 4.80 g/dL Final   01/15/2018 3.60 3.40 - 4.80 g/dL Final      Magnesium Magnesium   Date Value Ref Range Status   01/17/2018 3.2 (H) 1.7 - 2.6 mg/dL Final   01/16/2018 3.3 (H) 1.7 - 2.6 mg/dL Final   01/15/2018 3.4 (H) 1.7 - 2.6 mg/dL Final      Uric Acid No results found for: URICACID     Radiology results :     Imaging Results (last 24 hours)     ** No results found for the last 24 hours. **        Medications:        amLODIPine 10 mg Oral Daily   atorvastatin 80 mg Oral Daily   cholecalciferol 2,000 Units Oral Daily   CloNIDine 0.3 mg Oral Q8H   guaiFENesin 200 mg Oral Q4H   hydrALAZINE 100 mg Oral Q8H   ipratropium-albuterol 3 mL Nebulization Q6H - RT   isosorbide mononitrate 240 mg Oral Daily   labetalol 200 mg Oral Q12H   lansoprazole 30 mg Oral QAM   levothyroxine 25 mcg Oral Daily   methylPREDNISolone sodium succinate 1,000 mg Intravenous  Daily   polyethylene glycol 17 g Oral BID   sertraline 50 mg Oral Q PM   sodium chloride      sodium chloride 10 mL Intracatheter Q12H   sodium chloride 10 mL Intracatheter Q12H   terazosin 10 mg Oral Daily   vitamin B-12 2,000 mcg Oral Daily       fentaNYL (SUBLIMAZE) PCA 1500 mcg/30 mL syringe     insulin regular infusion 1 unit/mL 1-20 Units/hr Last Rate: 1.5 Units/hr (01/17/18 1108)   Pharmacy Consult     propofol 5-50 mcg/kg/min Last Rate: 40 mcg/kg/min (01/17/18 1005)       Assessment/Plan     Active Problems:    Respiratory failure    Acute on chronic respiratory failure with hypoxia and hypercapnia      1.  SHEELA on CKD 3 :his  baseline creatinine is 1.2-1.3. It is now improving and is 1.5 today. Prerenal SHEELA transitioned to ATN     2. Alveolar hemorrhage :  All serologies negative. Will recheck them as he is bleeding again today. Rarely serologies can be negative intially. May need lung biopsy to rule out vasculitis    3. Respiratory failure on ventilator : will give lasix 20 mg IV x 1 today    4. Anemia and thrombocytopenia: hematology to see    5. HTN : good    6. Hypernatremia : Na is 148 . Will Increase free water flushes again today    I discussed the patients findings and my recommendations with RN, Dr Sandhu and Jason Card MD  01/17/18  11:26 AM

## 2018-01-17 NOTE — PROGRESS NOTES
LOS: 20 days   Patient Care Team:  No Known Provider as PCP - General  No Known Provider as PCP - Family Medicine    Chief Complaint:  Pulmonology is following for Respiratory failure    Subjective     Interval History: patient is intubated and sedated.  No acute events reported overnight.      History taken from: chart RN Patient unable to give history due to patient intubated.    Review of Systems:   Review of Systems - History obtained from chart review and unobtainable from patient due to mental status and Intubated      Objective     Vital Signs  Temp:  [97.7 °F (36.5 °C)-98.3 °F (36.8 °C)] 98.1 °F (36.7 °C)  Heart Rate:  [43-65] 52  Resp:  [24-27] 25  BP: (124-153)/(44-62) 145/60  FiO2 (%):  [60 %-100 %] 100 %  Body mass index is 46.02 kg/(m^2).    Intake/Output Summary (Last 24 hours) at 01/17/18 1321  Last data filed at 01/17/18 0956   Gross per 24 hour   Intake           4170.7 ml   Output             2450 ml   Net           1720.7 ml     I/O this shift:  In: 100 [IV Piggyback:100]  Out: -     Physical Exam:  GENERAL APPEARANCE: Intubated and sedated.  Appears to be resting comfortably in bed.     HEAD: normocephalic.    EYES: PERRLA.    THROAT: ET tube in place. Oral cavity and pharynx normal. No inflammation, swelling, exudate, or lesions.     NECK: Neck supple.     CARDIAC: Normal S1 and S2. No S3, S4 or murmurs. Rhythm is regular. There is no peripheral edema, cyanosis or pallor. Extremities are warm and well perfused. Capillary refill is less than 2 seconds. No carotid bruits.    RESPIRATORY: Clear to auscultation and percussion without rales, rhonchi, wheezing or diminished breath sounds.    GI: Positive bowel sounds. Soft, nondistended, nontender.     MUSCULOSKELETAL: No significant deformity or joint abnormality. No edema. Peripheral pulses intact.    NEUROLOGICAL: Unable to assess due to sedation status.     PSYCHIATRIC: Unable to assess due to sedation status.     Results Review:     I reviewed  the patient's new clinical results.  I reviewed the patient's new imaging results and agree with the interpretation.    Results from last 7 days  Lab Units 01/17/18  0138 01/16/18  0107 01/15/18  0114   WBC 10*3/mm3 4.95 7.68 5.29   HEMOGLOBIN g/dL 7.0* 7.5* 8.0*   PLATELETS 10*3/mm3 88* 123* 144       Results from last 7 days  Lab Units 01/17/18  0138 01/16/18  1443 01/16/18  0107 01/15/18  0114   SODIUM mmol/L 148  --  149 147   POTASSIUM mmol/L 3.4* 3.8 3.6 4.0   CHLORIDE mmol/L 109  --  110 109   CO2 mmol/L 34.2*  --  28.9 31.8   BUN mg/dL 85*  --  95* 103*   CREATININE mg/dL 1.51*  --  1.90* 2.13*   CALCIUM mg/dL 8.0  --  7.9 8.1   GLUCOSE mg/dL 106  --  118* 139*   MAGNESIUM mg/dL 3.2*  --  3.3* 3.4*     Lab Results   Component Value Date    INR 1.11 (H) 01/17/2018    INR 1.25 (H) 01/10/2018    INR 1.27 (H) 01/09/2018    PROTIME 14.4 01/17/2018    PROTIME 15.8 (H) 01/10/2018    PROTIME 16.1 (H) 01/09/2018       Results from last 7 days  Lab Units 01/16/18  0107 01/15/18  0114 01/14/18  0047   ALK PHOS U/L 27* 28* 30*   BILIRUBIN mg/dL 0.6 0.6 0.6   ALT (SGPT) U/L 16 17 17   AST (SGOT) U/L 21 19 15       Results from last 7 days  Lab Units 01/17/18  0515   PH, ARTERIAL pH units 7.374   PO2 ART mm Hg 81.5   PCO2, ARTERIAL mm Hg 57.6*   HCO3 ART mmol/L 32.9*     Imaging Results (last 24 hours)     ** No results found for the last 24 hours. **             Medication Review:   Scheduled Medications:    amLODIPine 10 mg Oral Daily   atorvastatin 80 mg Oral Daily   cholecalciferol 2,000 Units Oral Daily   CloNIDine 0.3 mg Oral Q8H   guaiFENesin 200 mg Oral Q4H   hydrALAZINE 100 mg Oral Q8H   ipratropium-albuterol 3 mL Nebulization Q6H - RT   isosorbide mononitrate 240 mg Oral Daily   labetalol 200 mg Oral Q12H   lansoprazole 30 mg Oral QAM   levothyroxine 25 mcg Oral Daily   methylPREDNISolone sodium succinate 1,000 mg Intravenous Daily   [START ON 1/18/2018] phytonadione (VITAMIN K) IVPB 10 mg Intravenous Once    [START ON 1/18/2018] phytonadione (VITAMIN K) IVPB 10 mg Intravenous Once   polyethylene glycol 17 g Oral BID   sertraline 50 mg Oral Q PM   sodium chloride      sodium chloride 10 mL Intracatheter Q12H   sodium chloride 10 mL Intracatheter Q12H   terazosin 10 mg Oral Daily   vecuronium      vecuronium 10 mg Intravenous Once   vitamin B-12 2,000 mcg Oral Daily     Continuous infusions:    fentaNYL (SUBLIMAZE) PCA 1500 mcg/30 mL syringe     insulin regular infusion 1 unit/mL 1-20 Units/hr Last Rate: 1.5 Units/hr (01/17/18 1108)   Pharmacy Consult     propofol 5-50 mcg/kg/min Last Rate: 40 mcg/kg/min (01/17/18 1005)       Assessment/Plan       Neuro: Patient is intubated and sedated per protocol. Will not do sedation vacation as patient will have a bronchoscopy today.      Respiratory Failure: likely related to underlying lung disease, COPD and fluid volume overload.      AB reviewed. FIO2 was increased to 60% overnight.  Patient is having blood suctioned from ET tube.  Platelet count and HGB have also dropped. It is also noted that he is having bleeding from his central line.    Will do bronchoscopy today to evaluate bleeding.  Started patient back on soulmedrol 1000 mg IV daily for 3 days.    Continue scheduled inhalants and PRN neb treatments.    Once stable- will consider transferring the patient to higher level center as will need VATS- ct surgery     Intubated: 1/9/18.       Hypertension: BP within normal range.  NSR.  Continuous ECG, BP and pulse ox monitoring. Maintain MAP > 65.      SHEELA on CKD stage III: Creatinine is 1.15. BUN is 85.  Ordered DDAVP. Continue management per nephrology.      Hyperglycemia: continue insulin drip.  monitor glucose targeting 140-180.      GI: Continue GI prophylaxis and tube feedings.      Low H/H: HGB dropped to 7.0.  Platelet count dropped to 88.  Ordered a pt/INR.  Ordered vitamin K.  Will consult hematology for bleeding and thrombocytopenia.      IV access: Midlines and  peripherals.     Patient Active Problem List   Diagnosis Code   • COPD (chronic obstructive pulmonary disease) J44.9   • Shortness of breath R06.02   • Morbid obesity E66.01   • Hypoxia R09.02   • Edema extremities R60.0   • Sleep apnea G47.30   • Wheezing R06.2   • Allergic rhinitis J30.9   • Essential hypertension I10   • Sore throat J02.9   • Cough R05   • Pneumonia J18.9   • Respiratory failure J96.90   • Acute on chronic respiratory failure with hypoxia and hypercapnia J96.21, J96.22         Bedside rounds were completed with RN and RRT, discussed case and plan in great detail.      YANG Doss  01/17/18  1:21 PM        Attestation: Scribed for Dr. Gomez, Dee Gibbs, YANG    I, Jeremi Gomez M.D. attest that the above note accurately reflects the work and decisions made  by me.  Patient was seen and evaluated by Dr. Gomez, including history of present illness, physical exam, assessment, and treatment plan.  The above note was reviewed and edited by Dr. Gomez.  Critical Care time spent in direct patient care: 35 minutes (excluding procedure time, if applicable) including high complexity decision making to assess, manipulate, and support vital organ system failure in this individual who has impairment of one or more vital organ systems such that there is a high probability of imminent or life threatening deterioration in the patient’s condition.

## 2018-01-18 NOTE — PROGRESS NOTES
Nephrology  Note      Subjective     Respiratory status is worsening and he is on 100% FiO 2 today. He is making urine. BP stable. bronchoscopy noted    Objective     Vital Signs  Temp:  [97.5 °F (36.4 °C)-98.5 °F (36.9 °C)] 98.5 °F (36.9 °C)  Heart Rate:  [48-82] 57  Resp:  [24-27] 25  BP: (120-174)/(44-69) 160/51  FiO2 (%):  [60 %-100 %] 100 %    I/O this shift:  In: 1921 [I.V.:421; Other:1000; NG/GT:500]  Out: 1100 [Urine:1100]  I/O last 3 completed shifts:  In: 7060.1 [I.V.:1184.1; Blood:300; Other:3400; NG/GT:1626; IV Piggyback:550]  Out: 3750 [Urine:3750]    Physical Examination:    General Appearance : intubated, sedated  Head : normocephalic  Eyes : + pallor   Throat : ETT in place  Neck:  suppple  Lungs :  clear anteriorly  Heart : regular rhythm , normal S1, S2, systolic murmur   Abdomen :  normal bowel sounds,soft non-tender  Extremities :  3+ edema   Neurologic : sedated    Laboratory Data :      WBC WBC   Date Value Ref Range Status   01/18/2018 5.49 4.50 - 12.50 10*3/mm3 Final   01/17/2018 4.95 4.50 - 12.50 10*3/mm3 Final   01/16/2018 7.68 4.50 - 12.50 10*3/mm3 Final      HGB Hemoglobin   Date Value Ref Range Status   01/18/2018 8.1 (L) 14.0 - 18.0 g/dL Final   01/17/2018 7.0 (L) 14.0 - 18.0 g/dL Final   01/16/2018 7.5 (L) 14.0 - 18.0 g/dL Final      HCT Hematocrit   Date Value Ref Range Status   01/18/2018 25.7 (L) 42.0 - 52.0 % Final   01/17/2018 23.1 (L) 42.0 - 52.0 % Final   01/16/2018 24.8 (L) 42.0 - 52.0 % Final      Platlets No results found for: LABPLAT   MCV MCV   Date Value Ref Range Status   01/18/2018 93.8 80.0 - 94.0 fL Final   01/17/2018 95.9 (H) 80.0 - 94.0 fL Final   01/16/2018 96.1 (H) 80.0 - 94.0 fL Final          Sodium Sodium   Date Value Ref Range Status   01/18/2018 146 135 - 153 mmol/L Final   01/17/2018 148 135 - 153 mmol/L Final   01/16/2018 149 135 - 153 mmol/L Final      Potassium Potassium   Date Value Ref Range Status   01/18/2018 4.5 3.5 - 5.3 mmol/L Final   01/17/2018 3.4  (L) 3.5 - 5.3 mmol/L Final   01/16/2018 3.8 3.5 - 5.3 mmol/L Final   01/16/2018 3.6 3.5 - 5.3 mmol/L Final      Chloride Chloride   Date Value Ref Range Status   01/18/2018 108 99 - 112 mmol/L Final   01/17/2018 109 99 - 112 mmol/L Final   01/16/2018 110 99 - 112 mmol/L Final      CO2 CO2   Date Value Ref Range Status   01/18/2018 31.5 24.3 - 31.9 mmol/L Final   01/17/2018 34.2 (H) 24.3 - 31.9 mmol/L Final   01/16/2018 28.9 24.3 - 31.9 mmol/L Final      BUN BUN   Date Value Ref Range Status   01/18/2018 76 (H) 7 - 21 mg/dL Final   01/17/2018 85 (H) 7 - 21 mg/dL Final   01/16/2018 95 (H) 7 - 21 mg/dL Final      Creatinine Creatinine   Date Value Ref Range Status   01/18/2018 1.37 (H) 0.43 - 1.29 mg/dL Final   01/17/2018 1.51 (H) 0.43 - 1.29 mg/dL Final   01/16/2018 1.90 (H) 0.43 - 1.29 mg/dL Final      Calcium Calcium   Date Value Ref Range Status   01/18/2018 7.9 7.7 - 10.0 mg/dL Final   01/17/2018 8.0 7.7 - 10.0 mg/dL Final   01/16/2018 7.9 7.7 - 10.0 mg/dL Final      PO4 No results found for: CAPO4   Albumin Albumin   Date Value Ref Range Status   01/18/2018 3.20 (L) 3.40 - 4.80 g/dL Final   01/16/2018 3.10 (L) 3.40 - 4.80 g/dL Final      Magnesium Magnesium   Date Value Ref Range Status   01/17/2018 3.2 (H) 1.7 - 2.6 mg/dL Final   01/16/2018 3.3 (H) 1.7 - 2.6 mg/dL Final      Uric Acid No results found for: URICACID     Radiology results :     Imaging Results (last 24 hours)     Procedure Component Value Units Date/Time    XR Chest 1 View [[id number]] Collected:  01/18/18 0720     Updated:  01/18/18 0723    Narrative:       EXAMINATION: XR CHEST 1 VW-      CLINICAL INDICATION:     f/u pneumonia/copd/line placement; J96.21-Acute  and chronic respiratory failure with hypoxia; J96.22-Acute and chronic  respiratory failure with hypercapnia; J44.1-Chronic obstructive  pulmonary disease with (acute) exacerbation; R09.02-Hypoxemia     TECHNIQUE: Single AP view of chest.      COMPARISON: January 15, 2018      FINDINGS:    There is bibasilar atelectasis.   Extensive bilateral airspace disease.  Support tube and lines are in unchanged position.   Heart size is stable.  No pneumothorax.   Tiny bilateral pleural effusions persist.        Impression:       Extensive bilateral airspace disease. Were seen on previous  study.     This report was finalized on 1/18/2018 7:21 AM by Dr. Miko Coto MD.           Medications:        amLODIPine 10 mg Oral Daily   atorvastatin 80 mg Oral Daily   cholecalciferol 2,000 Units Oral Daily   CloNIDine 0.2 mg Oral Q8H   ferrous sulfate 300 mg Oral Daily   folic acid (FOLVITE) IVPB 1 mg Intravenous Daily   furosemide 20 mg Intravenous Q6H   guaiFENesin 200 mg Oral Q4H   hydrALAZINE 100 mg Oral Q8H   ipratropium-albuterol 3 mL Nebulization Q6H - RT   isosorbide mononitrate 240 mg Oral Daily   labetalol 200 mg Oral Q12H   lansoprazole 30 mg Oral QAM   levothyroxine 25 mcg Oral Daily   methylPREDNISolone sodium succinate 1,000 mg Intravenous Daily   phytonadione (VITAMIN K) IVPB 10 mg Intravenous Once   [START ON 1/19/2018] phytonadione (VITAMIN K) IVPB 10 mg Intravenous Once   polyethylene glycol 17 g Oral BID   sertraline 50 mg Oral Q PM   sodium chloride 10 mL Intracatheter Q12H   sodium chloride 10 mL Intracatheter Q12H   terazosin 10 mg Oral Daily   vitamin B-12 2,000 mcg Oral Daily       fentaNYL (SUBLIMAZE) PCA 1500 mcg/30 mL syringe     insulin regular infusion 1 unit/mL 1-20 Units/hr Last Rate: 13.5 Units/hr (01/18/18 0310)   Pharmacy Consult     propofol 5-50 mcg/kg/min Last Rate: 50 mcg/kg/min (01/18/18 0530)       Assessment/Plan     Active Problems:    Respiratory failure    Acute on chronic respiratory failure with hypoxia and hypercapnia      1.  SHEELA on CKD 3 :his  baseline creatinine is 1.2-1.3. It is better at 1.3  Today and SHEELA has resolved . Prerenal SHEELA transitioned to ATN     2. Alveolar hemorrhage :  All serologies negative initially but repeat is pending as he again developed  alveolar hemorrhage. May need lung biopsy to rule out vasculitis    3. Respiratory failure on ventilator : will give lasix 20 mg IV x 3 today    4. Anemia and thrombocytopenia: hematology following    5. HTN : stable    6. Hypernatremia : Na is better at 146 today .continue free water flushes    He is awaiting transfer to tertiary care facility  I discussed the patients findings and my recommendations with RN, Dr Jason Card MD  01/18/18  6:54 AM

## 2018-01-18 NOTE — PROGRESS NOTES
Se Anne  [id number]  1950  1/18/2018    Referring Provider:   Teresa Sandhu DO      Reason for Followup:   Thrombocytopenia     Patient Care Team:  No Known Provider as PCP - General  No Known Provider as PCP - Family Medicine    Chief Complaint:  On mechanical ventilation    Subjective:    Patient underwent bronchoscopy yesterday and no active bleeding was seen. As per nursing staff patient has likely had 100cc of blood through ET tube. No other bleeding seen no oozing of blood from lines or gums. No further bleeding issues with central line and two peripheral lines placed thereafter appear to be clean. Nurse notes dark stool however has been on iron and I restarted this yesterday.      Allergies:    Iodine and Lisinopril    Outpatient Medications:  Prescriptions Prior to Admission   Medication Sig Dispense Refill Last Dose   • amLODIPine (NORVASC) 10 MG tablet Take 10 mg by mouth Daily.   12/27/2017 at Unknown time   • aspirin 81 MG EC tablet Take 81 mg by mouth Daily.   12/27/2017 at Unknown time   • atorvastatin (LIPITOR) 80 MG tablet Take 80 mg by mouth Daily.   12/27/2017 at Unknown time   • budesonide-formoterol (SYMBICORT) 160-4.5 MCG/ACT inhaler Inhale 2 puffs 2 (Two) Times a Day.   12/27/2017 at Unknown time   • bumetanide (BUMEX) 1 MG tablet Take 1 tablet by mouth Daily. 30 tablet 0 12/27/2017 at Unknown time   • carvedilol (COREG) 25 MG tablet Take 12.5 mg by mouth 2 (Two) Times a Day With Meals.   12/27/2017 at Unknown time   • Cholecalciferol (VITAMIN D3) 2000 UNITS tablet Take 1 tablet by mouth Daily.   12/27/2017 at Unknown time   • gabapentin (NEURONTIN) 100 MG capsule Take 700 mg by mouth Daily.   12/27/2017 at Unknown time   • hydrALAZINE (APRESOLINE) 25 MG tablet Take 3 tablets by mouth 3 (Three) Times a Day. 180 tablet 0 12/27/2017 at Unknown time   • insulin NPH-insulin regular (humuLIN 70/30,novoLIN 70/30) (70-30) 100 UNIT/ML injection Inject 52 Units under the skin Every  Morning.   12/27/2017 at Unknown time   • insulin NPH-insulin regular (humuLIN 70/30,novoLIN 70/30) (70-30) 100 UNIT/ML injection Inject 54 Units under the skin Every Night.   12/27/2017 at Unknown time   • isosorbide mononitrate (IMDUR) 60 MG 24 hr tablet Take 180 mg by mouth Daily.   12/27/2017 at Unknown time   • levothyroxine (SYNTHROID, LEVOTHROID) 25 MCG tablet Take 25 mcg by mouth Daily.   12/27/2017 at Unknown time   • loratadine (CLARITIN) 10 MG tablet Take 10 mg by mouth Daily.   12/27/2017 at Unknown time   • metFORMIN (GLUCOPHAGE) 500 MG tablet Take 500 mg by mouth 2 (Two) Times a Day With Meals.   12/27/2017 at Unknown time   • pantoprazole (PROTONIX) 40 MG EC tablet Take 40 mg by mouth Daily.   12/27/2017 at Unknown time   • sertraline (ZOLOFT) 100 MG tablet Take 50 mg by mouth Every Evening.   12/27/2017 at Unknown time   • tiotropium (SPIRIVA) 18 MCG per inhalation capsule Place 1 capsule into inhaler and inhale Daily.   12/27/2017 at Unknown time   • vitamin B-12 (CYANOCOBALAMIN) 1000 MCG tablet Take 2,000 mcg by mouth Daily.   12/27/2017 at Unknown time   • albuterol (PROVENTIL) (2.5 MG/3ML) 0.083% nebulizer solution Take 2.5 mg by nebulization Every 4 (Four) Hours As Needed for Wheezing or Shortness of Air.   Unknown at Unknown time   • nitroglycerin (NITROSTAT) 0.4 MG SL tablet Place 0.4 mg under the tongue Every 5 (Five) Minutes As Needed for chest pain. Take no more than 3 doses in 15 minutes.   Unknown at Unknown time   • traMADol (ULTRAM) 50 MG tablet Take 50 mg by mouth Every 12 (Twelve) Hours As Needed for Moderate Pain .   Unknown at Unknown time       Inpatient Medications:  Scheduled Meds:    amLODIPine 10 mg Oral Daily   atorvastatin 80 mg Oral Daily   cholecalciferol 2,000 Units Oral Daily   CloNIDine 0.2 mg Oral Q8H   ferrous sulfate 300 mg Oral Daily   folic acid (FOLVITE) IVPB 1 mg Intravenous Daily   furosemide 20 mg Intravenous Q6H   guaiFENesin 200 mg Oral Q4H   hydrALAZINE 100  mg Oral Q8H   ipratropium-albuterol 3 mL Nebulization Q6H - RT   isosorbide mononitrate 240 mg Oral Daily   labetalol 200 mg Oral Q12H   lansoprazole 30 mg Oral QAM   levothyroxine 25 mcg Oral Daily   methylPREDNISolone sodium succinate 1,000 mg Intravenous Daily   phytonadione (VITAMIN K) IVPB 10 mg Intravenous Once   [START ON 1/19/2018] phytonadione (VITAMIN K) IVPB 10 mg Intravenous Once   polyethylene glycol 17 g Oral BID   sertraline 50 mg Oral Q PM   sodium chloride 10 mL Intracatheter Q12H   sodium chloride 10 mL Intracatheter Q12H   terazosin 10 mg Oral Daily   vitamin B-12 2,000 mcg Oral Daily       Continuous Infusions:    fentaNYL (SUBLIMAZE) PCA 1500 mcg/30 mL syringe     insulin regular infusion 1 unit/mL 1-20 Units/hr Last Rate: 7 Units/hr (01/18/18 0851)   Pharmacy Consult     propofol 5-50 mcg/kg/min Last Rate: 50 mcg/kg/min (01/18/18 0817)       PRN Meds:  •  acetaminophen  •  aluminum-magnesium hydroxide-simethicone  •  dextrose  •  dextrose  •  glucagon (human recombinant)  •  hydrALAZINE  •  magnesium sulfate **OR** magnesium sulfate **OR** magnesium sulfate  •  midazolam  •  nitroglycerin  •  Pharmacy Consult  •  potassium chloride **OR** potassium chloride **OR** potassium chloride  •  sodium chloride  •  Insert peripheral IV **AND** sodium chloride  •  sodium chloride  •  sodium chloride      Review of Systems:  A 12 point review of systems cannot be obtained due to sedation and mechanical ventilation. Sunjective was taken from chart and staff no family at bedside.        Physical Exam:  Vital Signs: These were reviewed and listed as per patient’s electronic medical chart  Vitals:    01/18/18 0818   BP:    Pulse: 53   Resp:    Temp:    SpO2:      General: Patient is intubated and sedated  HEENT: Head is atraumatic, normocephalic, ET tube is in place,   Neck: Neck supple. No JVD, no masses  Lungs: non-labored, clear to auscultation bilaterally, no wheezing  Heart: S1, S2. Regular rate and  rhythm. + murmurs, no rubs, or gallops.   Abdomen: Soft. Bowel sounds are normoactive. Difficult to appreciate Hepatosplenomegaly  Extremities: No cyanosis. +pitting edema BLE.   Integumentary: No rash, sores, +ecchymosis.   Neurologic: Intubated/sedated as above.   : Scrotal edema, villalobos in place        Labs / Studies:  Lab Results (last 72 hours)     Procedure Component Value Units Date/Time    POC Glucose Once [[id number]]  (Normal) Collected:  01/15/18 1001    Specimen:  Blood Updated:  01/15/18 1007     Glucose 106 mg/dL     Narrative:       Meter: [id number] : [id number] MORAES JOSÉ MIGUEL    POC Glucose Once [[id number]]  (Normal) Collected:  01/15/18 1052    Specimen:  Blood Updated:  01/15/18 1059     Glucose 118 mg/dL     Narrative:       Meter: [id number] : [id number] MORAES JOSÉ MIGUEL    POC Glucose Once [[id number]]  (Normal) Collected:  01/15/18 1228    Specimen:  Blood Updated:  01/15/18 1245     Glucose 122 mg/dL     Narrative:       Meter: [id number] : [id number] MORAES JOSÉ MIGUEL    POC Glucose Once [[id number]]  (Abnormal) Collected:  01/15/18 1331    Specimen:  Blood Updated:  01/15/18 1348     Glucose 140 (H) mg/dL     Narrative:       Meter: [id number] : [id number] MORAES JOSÉ MIGUEL    POC Glucose Once [[id number]]  (Abnormal) Collected:  01/15/18 1500    Specimen:  Blood Updated:  01/15/18 1518     Glucose 150 (H) mg/dL     Narrative:       Meter: [id number] : [id number] MORAES JOSÉ MIGUEL    POC Glucose Once [[id number]]  (Abnormal) Collected:  01/15/18 1642    Specimen:  Blood Updated:  01/15/18 1653     Glucose 151 (H) mg/dL     Narrative:       Meter: [id number] : [id number] MORAES JOSÉ MIGUEL    POC Glucose Once [[id number]]  (Abnormal) Collected:  01/15/18 1831    Specimen:  Blood Updated:  01/15/18 1841     Glucose 149 (H) mg/dL     Narrative:       Meter: [id number] : [id number] MORAES JOSÉ MIGUEL    POC Glucose Once [[id number]]  (Abnormal) Collected:  01/15/18 2034    Specimen:   Blood Updated:  01/15/18 2053     Glucose 161 (H) mg/dL     Narrative:       Meter: [id number] : [id number] gilbert nicki    POC Glucose Once [[id number]]  (Abnormal) Collected:  01/15/18 2219    Specimen:  Blood Updated:  01/15/18 2234     Glucose 149 (H) mg/dL     Narrative:       Meter: [id number] : [id number] gilbert nicki    POC Glucose Once [[id number]]  (Abnormal) Collected:  01/16/18 0021    Specimen:  Blood Updated:  01/16/18 0038     Glucose 134 (H) mg/dL     Narrative:       Meter: [id number] : [id number] gilangus nicki    C-reactive Protein [[id number]]  (Normal) Collected:  01/16/18 0107    Specimen:  Blood Updated:  01/16/18 0214     C-Reactive Protein 0.88 mg/dL     Comprehensive Metabolic Panel [[id number]]  (Abnormal) Collected:  01/16/18 0107    Specimen:  Blood Updated:  01/16/18 0216     Glucose 118 (H) mg/dL      BUN 95 (H) mg/dL      Creatinine 1.90 (H) mg/dL      Sodium 149 mmol/L      Potassium 3.6 mmol/L      Chloride 110 mmol/L      CO2 28.9 mmol/L      Calcium 7.9 mg/dL      Total Protein 5.1 (L) g/dL      Albumin 3.10 (L) g/dL      ALT (SGPT) 16 U/L      AST (SGOT) 21 U/L      Alkaline Phosphatase 27 (L) U/L       Note New Reference Ranges        Total Bilirubin 0.6 mg/dL      eGFR Non African Amer 35 (L) mL/min/1.73      Globulin 2.0 gm/dL      A/G Ratio 1.6 g/dL      BUN/Creatinine Ratio 50.0 (H)     Anion Gap 10.1 mmol/L     Magnesium [[id number]]  (Abnormal) Collected:  01/16/18 0107    Specimen:  Blood Updated:  01/16/18 0216     Magnesium 3.3 (H) mg/dL     Phosphorus [[id number]]  (Normal) Collected:  01/16/18 0107    Specimen:  Blood Updated:  01/16/18 0216     Phosphorus 4.2 mg/dL     Osmolality, Calculated [[id number]]  (Abnormal) Collected:  01/16/18 0107    Specimen:  Blood Updated:  01/16/18 0216     Osmolality Calc 326.6 (H) mOsm/kg     CBC & Differential [[id number]] Collected:  01/16/18 0107    Specimen:  Blood Updated:  01/16/18 0231    Narrative:       The following  orders were created for panel order CBC & Differential.  Procedure                               Abnormality         Status                     ---------                               -----------         ------                     CBC Auto Differential[[id number]]        Abnormal            Final result                 Please view results for these tests on the individual orders.    CBC Auto Differential [[id number]]  (Abnormal) Collected:  01/16/18 0107    Specimen:  Blood Updated:  01/16/18 0231     WBC 7.68 10*3/mm3      RBC 2.58 (L) 10*6/mm3      Hemoglobin 7.5 (L) g/dL      Hematocrit 24.8 (L) %      MCV 96.1 (H) fL      MCH 29.1 pg      MCHC 30.2 (L) g/dL      RDW 14.9 (H) %      RDW-SD 48.8 fl      MPV 10.6 (H) fL      Platelets 123 (L) 10*3/mm3      Neutrophil % 84.6 (H) %      Lymphocyte % 5.6 (L) %      Monocyte % 8.9 %      Eosinophil % 0.1 %      Basophil % 0.0 %      Immature Grans % 0.8 (H) %      Neutrophils, Absolute 6.50 10*3/mm3      Lymphocytes, Absolute 0.43 (L) 10*3/mm3      Monocytes, Absolute 0.68 10*3/mm3      Eosinophils, Absolute 0.01 10*3/mm3      Basophils, Absolute 0.00 10*3/mm3      Immature Grans, Absolute 0.06 (H) 10*3/mm3     POC Glucose Once [[id number]]  (Normal) Collected:  01/16/18 0227    Specimen:  Blood Updated:  01/16/18 0233     Glucose 121 mg/dL     Narrative:       Meter: [id number] : [id number] [employer]    POC Glucose Once [[id number]]  (Normal) Collected:  01/16/18 0420    Specimen:  Blood Updated:  01/16/18 0427     Glucose 113 mg/dL     Narrative:       Meter: [id number] : [id number] Enikos    Blood Gas, Arterial [[id number]]  (Abnormal) Collected:  01/16/18 0504    Specimen:  Arterial Blood Updated:  01/16/18 0527     Site Arterial: right radial     Travis's Test Positive     pH, Arterial 7.401 pH units      pCO2, Arterial 54.6 (C) mm Hg      pO2, Arterial 85.7 mm Hg      HCO3, Arterial 33.1 (C) mmol/L      Base Excess, Arterial 7.4 mmol/L      O2  Saturation, Arterial 95.9 %      Hemoglobin, Blood Gas 8.2 (L) g/dL      Hematocrit, Blood Gas 24.0 (L) %      Oxyhemoglobin 93.0 %      Methemoglobin 0.80 %      Carboxyhemoglobin 2.2 %      A-a Gradiant >300.0 (H) mmHg      Temperature 98.6 C      Barometric Pressure for Blood Gas 732 mmHg      Modality Ventilator     FIO2 65 %      Ventilator Mode AC     Set Tidal Volume 450     Set Mech Resp Rate 24     Rate 24 Breaths/minute      PEEP 12    POC Glucose Once [[id number]]  (Normal) Collected:  01/16/18 0627    Specimen:  Blood Updated:  01/16/18 0649     Glucose 105 mg/dL     Narrative:       Meter: [id number] : [id number] amalia caceres    POC Glucose Once [[id number]]  (Normal) Collected:  01/16/18 0720    Specimen:  Blood Updated:  01/16/18 0748     Glucose 105 mg/dL     Narrative:       Meter: [id number] : [id number] ALEISHA VALDEZ    POC Glucose Once [[id number]]  (Normal) Collected:  01/16/18 0840    Specimen:  Blood Updated:  01/16/18 0859     Glucose 121 mg/dL     Narrative:       Meter: [id number] : [id number] MORAES JOSÉ MIGUEL    POC Glucose Once [[id number]]  (Normal) Collected:  01/16/18 0942    Specimen:  Blood Updated:  01/16/18 0952     Glucose 124 mg/dL     Narrative:       Meter: [id number] : [id number] MORAES JOSÉ MIGUEL    POC Glucose Once [[id number]]  (Abnormal) Collected:  01/16/18 1052    Specimen:  Blood Updated:  01/16/18 1109     Glucose 131 (H) mg/dL     Narrative:       Meter: [id number] : [id number] MORAES JOSÉ MIGUEL    POC Glucose Once [[id number]]  (Abnormal) Collected:  01/16/18 1202    Specimen:  Blood Updated:  01/16/18 1210     Glucose 169 (H) mg/dL     Narrative:       Meter: [id number] : [id number] ALEISHA VALDEZ    Antihistone Antibodies [[id number]] Collected:  01/11/18 0108    Specimen:  Blood Updated:  01/16/18 1412     Histone Ab 0.2 Units                                Negative                <1.0                           Weak Positive      1.0 - 1.5                            Moderate Positive  1.6 - 2.5                           Strong Positive         >2.5       Narrative:       Performed at:  [facility]  [id number]  : Phi Kunz MD, Phone:  [phone]    POC Glucose Once [[id number]]  (Abnormal) Collected:  01/16/18 1415    Specimen:  Blood Updated:  01/16/18 1432     Glucose 186 (H) mg/dL     Narrative:       Meter: [id number] : [id number] MORAES JOSÉ MIGUEL    Potassium [[id number]]  (Normal) Collected:  01/16/18 1443    Specimen:  Blood Updated:  01/16/18 1526     Potassium 3.8 mmol/L     POC Glucose Once [[id number]]  (Abnormal) Collected:  01/16/18 1549    Specimen:  Blood Updated:  01/16/18 1605     Glucose 179 (H) mg/dL     Narrative:       Meter: [id number] : [id number] MORAES JOSÉ MIGUEL    POC Glucose Once [[id number]]  (Abnormal) Collected:  01/16/18 1651    Specimen:  Blood Updated:  01/16/18 1657     Glucose 178 (H) mg/dL     Narrative:       Meter: [id number] : [id number] MORAES JOSÉ MIGUEL    POC Glucose Once [[id number]]  (Abnormal) Collected:  01/16/18 1751    Specimen:  Blood Updated:  01/16/18 1757     Glucose 180 (H) mg/dL     Narrative:       Meter: [id number] : [id number] MORAES JOSÉ MIGUEL    POC Glucose Once [[id number]]  (Abnormal) Collected:  01/16/18 1902    Specimen:  Blood Updated:  01/16/18 1928     Glucose 184 (H) mg/dL     Narrative:       Meter: [id number] : [id number] Parker Damian    POC Glucose Once [[id number]]  (Abnormal) Collected:  01/16/18 2014    Specimen:  Blood Updated:  01/16/18 2039     Glucose 165 (H) mg/dL     Narrative:       Meter: [id number] : [id number] Parker Damian    POC Glucose Once [[id number]]  (Abnormal) Collected:  01/16/18 2108    Specimen:  Blood Updated:  01/16/18 2148     Glucose 153 (H) mg/dL     Narrative:       Meter: [id number] : [id number] Keith Salgado    POC Glucose Once [[id number]]  (Abnormal) Collected:  01/16/18 2226    Specimen:   Blood Updated:  01/16/18 2251     Glucose 144 (H) mg/dL     Narrative:       Meter: [id number] : [id number] Parker Damian    POC Glucose Once [[id number]]  (Normal) Collected:  01/16/18 2313    Specimen:  Blood Updated:  01/16/18 2338     Glucose 126 mg/dL     Narrative:       Meter: [id number] : [id number] Parker Damian    POC Glucose Once [[id number]]  (Abnormal) Collected:  01/17/18 0022    Specimen:  Blood Updated:  01/17/18 0054     Glucose 137 (H) mg/dL     Narrative:       Meter: [id number] : [id number] Parker Damian    Magnesium [[id number]]  (Abnormal) Collected:  01/17/18 0138    Specimen:  Blood Updated:  01/17/18 0250     Magnesium 3.2 (H) mg/dL     Phosphorus [[id number]]  (Abnormal) Collected:  01/17/18 0138    Specimen:  Blood Updated:  01/17/18 0250     Phosphorus 5.0 (H) mg/dL     Basic Metabolic Panel [[id number]]  (Abnormal) Collected:  01/17/18 0138    Specimen:  Blood Updated:  01/17/18 0250     Glucose 106 mg/dL      BUN 85 (H) mg/dL      Creatinine 1.51 (H) mg/dL      Sodium 148 mmol/L      Potassium 3.4 (L) mmol/L      Chloride 109 mmol/L      CO2 34.2 (H) mmol/L      Calcium 8.0 mg/dL      eGFR Non African Amer 46 (L) mL/min/1.73      BUN/Creatinine Ratio 56.3 (H)     Anion Gap 4.8 mmol/L     Narrative:       GFR Normal >60  Chronic Kidney Disease <60  Kidney Failure <15    Osmolality, Calculated [[id number]]  (Abnormal) Collected:  01/17/18 0138    Specimen:  Blood Updated:  01/17/18 0250     Osmolality Calc 320.5 (H) mOsm/kg     POC Glucose Once [[id number]]  (Abnormal) Collected:  01/17/18 0210    Specimen:  Blood Updated:  01/17/18 0250     Glucose 137 (H) mg/dL     Narrative:       Meter: [id number] : [id number] Benito Nance    CBC & Differential [[id number]] Collected:  01/17/18 0138    Specimen:  Blood Updated:  01/17/18 0351    Narrative:       The following orders were created for panel order CBC & Differential.  Procedure                               Abnormality         Status                      ---------                               -----------         ------                     CBC Auto Differential[[id number]]        Abnormal            Final result                 Please view results for these tests on the individual orders.    CBC Auto Differential [[id number]]  (Abnormal) Collected:  01/17/18 0138    Specimen:  Blood Updated:  01/17/18 0351     WBC 4.95 10*3/mm3      RBC 2.41 (L) 10*6/mm3      Hemoglobin 7.0 (L) g/dL      Hematocrit 23.1 (L) %      MCV 95.9 (H) fL      MCH 29.0 pg      MCHC 30.3 (L) g/dL      RDW 14.7 (H) %      RDW-SD 47.7 fl      MPV 10.9 (H) fL      Platelets 88 (L) 10*3/mm3      Neutrophil % 84.6 (H) %      Lymphocyte % 7.3 (L) %      Monocyte % 5.3 %      Eosinophil % 2.0 %      Basophil % 0.0 %      Immature Grans % 0.8 (H) %      Neutrophils, Absolute 4.19 10*3/mm3      Lymphocytes, Absolute 0.36 (L) 10*3/mm3      Monocytes, Absolute 0.26 10*3/mm3      Eosinophils, Absolute 0.10 10*3/mm3      Basophils, Absolute 0.00 10*3/mm3      Immature Grans, Absolute 0.04 (H) 10*3/mm3     POC Glucose Once [[id number]]  (Normal) Collected:  01/17/18 0418    Specimen:  Blood Updated:  01/17/18 0423     Glucose 108 mg/dL     Narrative:       Meter: [id number] : [id number] Jose LAKE    POC Glucose Once [[id number]]  (Normal) Collected:  01/17/18 0446    Specimen:  Blood Updated:  01/17/18 0510     Glucose 109 mg/dL     Narrative:       Meter: [id number] : [id number] Keith Salgado    Blood Gas, Arterial [[id number]]  (Abnormal) Collected:  01/17/18 0515    Specimen:  Arterial Blood Updated:  01/17/18 0538     Site Arterial: right brachial     Travis's Test Positive     pH, Arterial 7.374 pH units      pCO2, Arterial 57.6 (C) mm Hg      pO2, Arterial 81.5 mm Hg      HCO3, Arterial 32.9 (C) mmol/L      Base Excess, Arterial 6.7 mmol/L      O2 Saturation, Arterial 95.1 %      Hemoglobin, Blood Gas 7.7 (L) g/dL      Hematocrit, Blood Gas 23.0 (L) %      Oxyhemoglobin 92.4  %      Methemoglobin 0.40 %      Carboxyhemoglobin 2.4 %      A-a Gradiant 266.1 mmHg      Temperature 98.6 C      Barometric Pressure for Blood Gas 732 mmHg      Modality Ventilator     FIO2 60 %      Ventilator Mode AC     Set Tidal Volume 430     Set [facility] Resp Rate 24     Rate 24 Breaths/minute      PEEP 10    POC Glucose Once [[id number]]  (Normal) Collected:  01/17/18 0706    Specimen:  Blood Updated:  01/17/18 0733     Glucose 95 mg/dL     Narrative:       Meter: [id number] : [id number] Keith Salgado    POC Glucose Once [[id number]]  (Normal) Collected:  01/17/18 0832    Specimen:  Blood Updated:  01/17/18 0848     Glucose 80 mg/dL     Narrative:       Meter: [id number] : [id number] fermin jamaica    POC Glucose Once [[id number]]  (Normal) Collected:  01/17/18 0951    Specimen:  Blood Updated:  01/17/18 0957     Glucose 113 mg/dL     Narrative:       Meter: [id number] : [id number] fermin jamaica    POC Glucose Once [[id number]]  (Normal) Collected:  01/17/18 1049    Specimen:  Blood Updated:  01/17/18 1056     Glucose 125 mg/dL     Narrative:       Meter: [id number] : [id number] jeny berumen    Protime-INR [[id number]]  (Abnormal) Collected:  01/17/18 1039    Specimen:  Blood Updated:  01/17/18 1123     Protime 14.4 Seconds       Note new Reference Range        INR 1.11 (H)    Narrative:       Suggested INR therapeutic range for stable oral anticoagulant therapy:    Low Intensity therapy:   1.5-2.0  Moderate Intensity therapy:   2.0-3.0  High Intensity therapy:   2.5-4.0    POC Glucose Once [[id number]]  (Abnormal) Collected:  01/17/18 1116    Specimen:  Blood Updated:  01/17/18 1147     Glucose 150 (H) mg/dL     Narrative:       Meter: [id number] : [id number] fermin jamaica    POC Glucose Once [[id number]]  (Abnormal) Collected:  01/17/18 1154    Specimen:  Blood Updated:  01/17/18 1210     Glucose 174 (H) mg/dL     Narrative:       Meter: [id number] : [id number] jeny berumen    aPTT [[id number]]   (Normal) Collected:  01/17/18 1039    Specimen:  Blood Updated:  01/17/18 1301     PTT 31.2 seconds       Note new Reference Range       Narrative:       PTT Heparin Therapeutic Range:  59 - 95 seconds    Fibrinogen [[id number]]  (Normal) Collected:  01/17/18 1039    Specimen:  Blood Updated:  01/17/18 1301     Fibrinogen 364 mg/dL       Note new Reference Range       Anti-DNA Antibody, Double-stranded [[id number]] Collected:  01/17/18 1242    Specimen:  Blood Updated:  01/17/18 1319    POC Glucose Once [[id number]]  (Abnormal) Collected:  01/17/18 1309    Specimen:  Blood Updated:  01/17/18 1324     Glucose 171 (H) mg/dL     Narrative:       Meter: [id number] : [id number] jeny berumen    Iron Profile [[id number]]  (Abnormal) Collected:  01/17/18 1431    Specimen:  Blood Updated:  01/17/18 1448     Iron 28 (L) mcg/dL      TIBC 229 (L) mcg/dL      Iron Saturation 12 %     C-reactive Protein [[id number]]  (Abnormal) Collected:  01/17/18 1431    Specimen:  Blood Updated:  01/17/18 1449     C-Reactive Protein 6.15 (H) mg/dL     POC Glucose Once [[id number]]  (Abnormal) Collected:  01/17/18 1425    Specimen:  Blood Updated:  01/17/18 1451     Glucose 183 (H) mg/dL     Narrative:       Meter: [id number] : [id number] jeny berumen    Peripheral Blood Smear - Blood, [[id number]] Collected:  01/17/18 0138    Specimen:  Blood Updated:  01/17/18 1455    Ferritin [[id number]]  (Normal) Collected:  01/17/18 1431    Specimen:  Blood Updated:  01/17/18 1457     Ferritin 190.00 ng/mL     Folate [[id number]]  (Normal) Collected:  01/17/18 1431    Specimen:  Blood Updated:  01/17/18 1458     Folate 7.02 ng/mL     Vitamin B12 [[id number]]  (Abnormal) Collected:  01/17/18 1431    Specimen:  Blood Updated:  01/17/18 1458     Vitamin B-12 >2000 (H) pg/mL     POC Glucose Once [[id number]]  (Abnormal) Collected:  01/17/18 1524    Specimen:  Blood Updated:  01/17/18 1539     Glucose 171 (H) mg/dL     Narrative:       Meter: [id number] :  [id number] fermin jamaica    POC Glucose Once [[id number]]  (Abnormal) Collected:  01/17/18 1624    Specimen:  Blood Updated:  01/17/18 1639     Glucose 177 (H) mg/dL     Narrative:       Meter: [id number] : [id number] fermin j[place]    POC Glucose Once [[id number]]  (Abnormal) Collected:  01/17/18 1708    Specimen:  Blood Updated:  01/17/18 1723     Glucose 190 (H) mg/dL     Narrative:       Meter: [id number] : [id number] fermin jamaica    POC Glucose Once [[id number]]  (Abnormal) Collected:  01/17/18 1824    Specimen:  Blood Updated:  01/17/18 1849     Glucose 181 (H) mg/dL     Narrative:       Meter: [id number] : [id number] fermin jamaica    POC Glucose Once [[id number]]  (Abnormal) Collected:  01/17/18 1917    Specimen:  Blood Updated:  01/17/18 1942     Glucose 165 (H) mg/dL     Narrative:       Meter: [id number] : [id number] Parker Damian    POC Glucose Once [[id number]]  (Abnormal) Collected:  01/17/18 2033    Specimen:  Blood Updated:  01/17/18 2058     Glucose 180 (H) mg/dL     Narrative:       Meter: [id number] : [id number] Parker Damian    POC Glucose Once [[id number]]  (Abnormal) Collected:  01/17/18 2132    Specimen:  Blood Updated:  01/17/18 2138     Glucose 181 (H) mg/dL     Narrative:       Meter: [id number] : [id number] Parker Damian    POC Glucose Once [[id number]]  (Abnormal) Collected:  01/17/18 2313    Specimen:  Blood Updated:  01/17/18 2329     Glucose 190 (H) mg/dL     Narrative:       Meter: [id number] : [id number] jaden dale    Respiratory Culture - Bronchial Wash, Bronchus [[id number]] Collected:  01/17/18 1353    Specimen:  Bronchial Wash from Bronchus Updated:  01/17/18 2338     Gram Stain Result Rare (1+) WBCs seen      Occasional Gram negative bacilli    Respiratory Culture - Wash, Lung, Right Lower Lobe [[id number]] Collected:  01/17/18 1353    Specimen:  Wash from Lung, Right Lower Lobe Updated:  01/17/18 2936     Gram Stain Result Occasional WBCs seen    POC Glucose Once  [[id number]]  (Abnormal) Collected:  01/18/18 0027    Specimen:  Blood Updated:  01/18/18 0052     Glucose 200 (H) mg/dL     Narrative:       Meter: [id number] : [id number] Keith Salgado    Antinuclear Antibody With Reflex Cascade [[id number]] Collected:  01/18/18 0028    Specimen:  Blood Updated:  01/18/18 0109    ANCA Panel [[id number]] Collected:  01/18/18 0028    Specimen:  Blood Updated:  01/18/18 0115    Glomerular Basement Membrane Antibodies [[id number]] Collected:  01/18/18 0028    Specimen:  Blood Updated:  01/18/18 0115    CBC & Differential [[id number]] Collected:  01/18/18 0028    Specimen:  Blood Updated:  01/18/18 0122    Narrative:       The following orders were created for panel order CBC & Differential.  Procedure                               Abnormality         Status                     ---------                               -----------         ------                     CBC Auto Differential[[id number]]        Abnormal            Final result                 Please view results for these tests on the individual orders.    CBC Auto Differential [[id number]]  (Abnormal) Collected:  01/18/18 0028    Specimen:  Blood Updated:  01/18/18 0122     WBC 5.49 10*3/mm3      RBC 2.74 (L) 10*6/mm3      Hemoglobin 8.1 (L) g/dL      Hematocrit 25.7 (L) %      MCV 93.8 fL      MCH 29.6 pg      MCHC 31.5 (L) g/dL      RDW 14.8 (H) %      RDW-SD 48.2 fl      MPV 12.2 (H) fL      Platelets 72 (L) 10*3/mm3      Neutrophil % 95.7 (H) %      Lymphocyte % 2.7 (L) %      Monocyte % 0.9 %      Eosinophil % 0.2 %      Basophil % 0.0 %      Immature Grans % 0.5 %      Neutrophils, Absolute 5.25 10*3/mm3      Lymphocytes, Absolute 0.15 (L) 10*3/mm3      Monocytes, Absolute 0.05 (L) 10*3/mm3      Eosinophils, Absolute 0.01 10*3/mm3      Basophils, Absolute 0.00 10*3/mm3      Immature Grans, Absolute 0.03 10*3/mm3     C3 Complement [[id number]]  (Normal) Collected:  01/18/18 0028    Specimen:  Blood Updated:  01/18/18 0142      C3 Complement 94.6 mg/dl     C4 Complement [[id number]]  (Normal) Collected:  01/18/18 0028    Specimen:  Blood Updated:  01/18/18 0142     C4 Complement 17.8 mg/dl     Comprehensive Metabolic Panel [[id number]]  (Abnormal) Collected:  01/18/18 0028    Specimen:  Blood Updated:  01/18/18 0143     Glucose 187 (H) mg/dL      BUN 76 (H) mg/dL      Creatinine 1.37 (H) mg/dL      Sodium 146 mmol/L      Potassium 4.5 mmol/L      Chloride 108 mmol/L      CO2 31.5 mmol/L      Calcium 7.9 mg/dL      Total Protein 5.4 (L) g/dL      Albumin 3.20 (L) g/dL      ALT (SGPT) 21 U/L      AST (SGOT) 24 U/L      Alkaline Phosphatase 35 (L) U/L       Note New Reference Ranges        Total Bilirubin 0.8 mg/dL      eGFR Non African Amer 52 (L) mL/min/1.73      Globulin 2.2 gm/dL      A/G Ratio 1.5 g/dL      BUN/Creatinine Ratio 55.5 (H)     Anion Gap 6.5 mmol/L     Osmolality, Calculated [[id number]]  (Abnormal) Collected:  01/18/18 0028    Specimen:  Blood Updated:  01/18/18 0143     Osmolality Calc 318.1 (H) mOsm/kg     Blood Culture - Blood, [[id number]]  (Normal) Collected:  01/15/18 0114    Specimen:  Blood from Hand, Left Updated:  01/18/18 0201     Blood Culture No growth at 3 days    Blood Culture - Blood, [[id number]]  (Normal) Collected:  01/15/18 0051    Specimen:  Blood from Hand, Right Updated:  01/18/18 0201     Blood Culture No growth at 3 days    POC Glucose Once [[id number]]  (Abnormal) Collected:  01/18/18 0205    Specimen:  Blood Updated:  01/18/18 0230     Glucose 199 (H) mg/dL     Narrative:       Meter: [id number] : [id number] Parker Damian    POC Glucose Once [[id number]]  (Abnormal) Collected:  01/18/18 0305    Specimen:  Blood Updated:  01/18/18 0331     Glucose 200 (H) mg/dL     Narrative:       Meter: [id number] : [id number] Parker Damian    POC Glucose Once [[id number]]  (Abnormal) Collected:  01/18/18 0357    Specimen:  Blood Updated:  01/18/18 0411     Glucose 178 (H) mg/dL     Narrative:       Meter: [id number]  : [id number] Jessica Saini    POC Glucose Once [[id number]]  (Abnormal) Collected:  01/18/18 0527    Specimen:  Blood Updated:  01/18/18 0534     Glucose 137 (H) mg/dL     Narrative:       Meter: [id number] : [id number] Parker Damian    POC Glucose Once [[id number]]  (Abnormal) Collected:  01/18/18 0640    Specimen:  Blood Updated:  01/18/18 0705     Glucose 144 (H) mg/dL     Narrative:       Meter: [id number] : [id number] Parker Damian    POC Glucose Once [[id number]]  (Abnormal) Collected:  01/18/18 0715    Specimen:  Blood Updated:  01/18/18 0732     Glucose 152 (H) mg/dL     Narrative:       Meter: [id number] : [id number] jeny berumen    Blood Gas, Arterial [[id number]]  (Abnormal) Collected:  01/18/18 0729    Specimen:  Arterial Blood Updated:  01/18/18 0831     Site Arterial: right brachial     Travis's Test N/A     pH, Arterial 7.398 pH units      pCO2, Arterial 53.0 (C) mm Hg      pO2, Arterial 74.2 (L) mm Hg      HCO3, Arterial 31.9 (C) mmol/L      Base Excess, Arterial 6.3 mmol/L      O2 Saturation, Arterial 94.1 %      Hemoglobin, Blood Gas 8.2 (L) g/dL      Hematocrit, Blood Gas 24.0 (L) %      Oxyhemoglobin 91.8 %      Methemoglobin 0.40 %      Carboxyhemoglobin 2.0 %      A-a Gradiant >300.0 (H) mmHg      Temperature 98.6 C      Barometric Pressure for Blood Gas 732 mmHg      Modality Ventilator     FIO2 100 %      Ventilator Mode VC/AC     Set Tidal Volume 430     Set Mech Resp Rate 24     PEEP 10            Imaging Results (last 72 hours)     Procedure Component Value Units Date/Time    XR Chest 1 View [[id number]] Collected:  01/18/18 0720     Updated:  01/18/18 0723    Narrative:       EXAMINATION: XR CHEST 1 VW-      CLINICAL INDICATION:     f/u pneumonia/copd/line placement; J96.21-Acute  and chronic respiratory failure with hypoxia; J96.22-Acute and chronic  respiratory failure with hypercapnia; J44.1-Chronic obstructive  pulmonary disease with (acute) exacerbation; R09.02-Hypoxemia      TECHNIQUE: Single AP view of chest.      COMPARISON: January 15, 2018      FINDINGS:   There is bibasilar atelectasis.   Extensive bilateral airspace disease.  Support tube and lines are in unchanged position.   Heart size is stable.  No pneumothorax.   Tiny bilateral pleural effusions persist.        Impression:       Extensive bilateral airspace disease. Were seen on previous  study.     This report was finalized on 1/18/2018 7:21 AM by Dr. Miko Coto MD.                   Assessment & Plan:  Se Anne is a very pleasant 67 y.o. male with     1. Thrombocytopenia   2. Coagulopathy  3. Diffuse alveolar hemorrhage  4. Iron deficiency  5. Borderline folate deficiency     -Noted low grade thrombocytopenia when initially admitted however this later normalized until he started having bleeding and since then his platelets have downtrended. In review of his previous CBCs he has a history of thrombocytopenia during acute illnesses that would recover and has also had negative HIT antibodies during previous admissions.   - He was on SQ Heparin this admission which was discontinued and he was started on Arixtra for DVT prophylaxis on 12/30/17 although no official HIT antibody or JORDON was sent. And 4T score was low.  - He had also been on ASA which could have caused some platelet dysfunction, however this was discontinued along with Arixtra when he was found to have diffuse alveolar hemorrhage and he has been off both agents since.  -Patient continues to have blood suctioned from ETT.. Patient has no other obvious blood loss or mucosal bleeding (central line bleeding has stopped after patch placement and no bleeding with other peripheral lines that were placed thereafter). Platelet count has dropped 72,000 however this could be due to ongoing acute illness as well as acute blood loss.   -Dr. Gomez repeated bronchoscopy on 1/18 and found old blood but no acute bleeding.  - He has also received vitamin K and Amicar  during his admission. Patient has received 3 units of FFP, 6 units of PRBCs and 1 unit of platelets during this admission.  - Concern would be for DIC however with now near normal PT/PTT and fibrinogen and no other bleeding from other peripheral sites which look clean this is unlikely. PT, PTT  have almost normalized with recent Vitamin K adminstration. Will continue vitamin K daily for total of three days.    Fibrinogen was normal, therefore he does not require cryoprecipitate.    - I do not believe that his DAH is secondary to a hematological issue such as DIC and at present do not see any benefit in platelet transfusion as patient has not been on ASA or any sort of prophylactic anticoagulation for sometime. Will need platelet transfusion if platelet is <50 thousand. Would continue to hold on any sort of anticoagulation or antiplatelet agents with acute bleed.  - Peripheral blood smear pending.   - B12 levels are normal however folate level is borderline therefore will start on folic acid 1mg daily.  - Iron panel is consistent with iron deficiency (from acute blood loss) as well as anemia of chronic inflammation. Will start on oral iron once daily given that there was hesitation with IV iron given recent infection (this was also previously stopped for dark stools).  - He is currently undergoing workup for DAH.       Thank you for allowing us to participate in the care of Mr. Anne. Please do not hesitate to contact Hem/Onc with any questions or concerns.       Marbella Gomes MD  01/18/18  8:58 AM

## 2018-01-18 NOTE — PLAN OF CARE
Problem: COPD, Chronic Bronchitis/Emphysema (Adult)  Goal: Signs and Symptoms of Listed Potential Problems Will be Absent or Manageable (COPD, Chronic Bronchitis/Emphysema)  Outcome: Ongoing (interventions implemented as appropriate)      Problem: Skin Integrity Impairment, Risk/Actual (Adult)  Goal: Identify Related Risk Factors and Signs and Symptoms  Outcome: Ongoing (interventions implemented as appropriate)    Goal: Skin Integrity/Wound Healing  Outcome: Ongoing (interventions implemented as appropriate)      Problem: Fall Risk (Adult)  Goal: Identify Related Risk Factors and Signs and Symptoms  Outcome: Ongoing (interventions implemented as appropriate)    Goal: Absence of Falls  Outcome: Ongoing (interventions implemented as appropriate)      Problem: Mechanical Ventilation, Invasive (Adult)  Goal: Signs and Symptoms of Listed Potential Problems Will be Absent or Manageable (Mechanical Ventilation, Invasive)  Outcome: Ongoing (interventions implemented as appropriate)      Problem: Pressure Ulcer Risk (Karthikeyan Scale) (Adult,Obstetrics,Pediatric)  Goal: Identify Related Risk Factors and Signs and Symptoms  Outcome: Ongoing (interventions implemented as appropriate)    Goal: Skin Integrity  Outcome: Ongoing (interventions implemented as appropriate)

## 2018-01-18 NOTE — PLAN OF CARE
Problem: COPD, Chronic Bronchitis/Emphysema (Adult)  Goal: Signs and Symptoms of Listed Potential Problems Will be Absent or Manageable (COPD, Chronic Bronchitis/Emphysema)  Outcome: Ongoing (interventions implemented as appropriate)      Problem: Skin Integrity Impairment, Risk/Actual (Adult)  Goal: Identify Related Risk Factors and Signs and Symptoms  Outcome: Ongoing (interventions implemented as appropriate)    Goal: Skin Integrity/Wound Healing  Outcome: Ongoing (interventions implemented as appropriate)      Problem: Fall Risk (Adult)  Goal: Identify Related Risk Factors and Signs and Symptoms  Outcome: Ongoing (interventions implemented as appropriate)    Goal: Absence of Falls  Outcome: Ongoing (interventions implemented as appropriate)      Problem: Patient Care Overview (Adult)  Goal: Plan of Care Review  Outcome: Ongoing (interventions implemented as appropriate)    Goal: Discharge Needs Assessment  Outcome: Ongoing (interventions implemented as appropriate)      Problem: Mechanical Ventilation, Invasive (Adult)  Goal: Signs and Symptoms of Listed Potential Problems Will be Absent or Manageable (Mechanical Ventilation, Invasive)  Outcome: Ongoing (interventions implemented as appropriate)      Problem: Pressure Ulcer Risk (Karthikeyan Scale) (Adult,Obstetrics,Pediatric)  Goal: Identify Related Risk Factors and Signs and Symptoms  Outcome: Ongoing (interventions implemented as appropriate)    Goal: Skin Integrity  Outcome: Ongoing (interventions implemented as appropriate)

## 2018-01-18 NOTE — PROGRESS NOTES
[facility] CCU Note    Patient Identification:  Name:  Se Anne  Age:  67 y.o.  Sex:  male  :  1950  MRN:  [id number]  Visit number:  [id number]  Primary Care Provider:  No Known Provider    21    Ventilator Day: 6  Ventilator Settings:  Ventilator/Non-Invasive Ventilation Settings     Start     Ordered    18 1107  Ventilator - AC/VC; 20; 100 (85); Other (see comments); 13; 450  Continuous     Question Answer Comment   Vent Mode AC/VC    Breath rate 20    FiO2 100 85   PEEP Other (see comments)    PEEP: 13    Tidal Volume 450        18 1106    01/10/18 0702  Ventilator - AC/VC; 20; 100; Other (see comments); 13; 450  Continuous,   Status:  Canceled     Question Answer Comment   Vent Mode AC/VC    Breath rate 20    FiO2 100    PEEP Other (see comments)    PEEP: 13    Tidal Volume 450        01/10/18 0703    18 0627  . BIPAP  As Needed-RT,   Status:  Canceled     Comments:  respiratory to mangage   Question:  .  Answer:  BIPAP    18 0628    17 1055  . CPAP; Pressure: 12  At Bedtime & As Needed-RT,   Status:  Canceled     Question Answer Comment   . CPAP    Pressure 12        17 1054          Drips: Propofol, fentanyl, insulin  Antibiotics: None  Lines: Left internal jugular central line, bilateral upper extremity midline    Procedures:   -Emergent Intubation  -Bronchocsopy x2  -PRBC x6  -FFP x3  -Platelets x1    HPI:  68 yo male admitted with dyspnea    Subjective      Patient Continues to have bleeding through his ET tube.  The patient underwent a bedside bronchoscopy yesterday and was found to have old blood in the trachea in addition to the right and left main bronchi.  Nursing reports that the bleeding is about the same.  Otherwise the patient has had no new issues.    History taken from: chart RN   Patient unable to give history due to: Sedation/Mechanical ventilation  Present during visit: REGGIE Gomez    Objective     Vital Signs  Temp:   [97.5 °F (36.4 °C)-99 °F (37.2 °C)] 99 °F (37.2 °C)  Heart Rate:  [48-82] 53  Resp:  [24-27] 24  BP: (120-174)/(44-69) 160/54  FiO2 (%):  [60 %-100 %] 100 %  Body mass index is 46.02 kg/(m^2).    Intake/Output Summary (Last 24 hours) at 01/18/18 0841  Last data filed at 01/18/18 0823   Gross per 24 hour   Intake          4660.43 ml   Output             2400 ml   Net          2260.43 ml       Physical Exam:    Physical Exam   Constitutional: He appears well-developed and well-nourished. No distress. He is sedated and intubated.   HENT:   Head: Normocephalic and atraumatic.   Nose: Nose normal.   Mouth/Throat: Oropharynx is clear and moist and mucous membranes are normal.   Eyes: Conjunctivae and EOM are normal. Pupils are equal, round, and reactive to light. No scleral icterus.   Neck: Trachea normal. Neck supple. No JVD present. Carotid bruit is not present. No thyromegaly present.   Cardiovascular: Normal rate, regular rhythm and normal pulses.  Exam reveals no gallop and no friction rub.    Murmur heard.  Edema noted bilateral upper and lower extremities; 2-3+ today with worsening edema of his hands   Pulmonary/Chest: Effort normal. He is intubated. No respiratory distress. He has no decreased breath sounds. He has no wheezes. He has no rhonchi. He has rales.   Abdominal: Soft. He exhibits distension. Bowel sounds are increased. There is no tenderness. There is no guarding.   Genitourinary:   Genitourinary Comments: Worley in place and draining yellow urine   Neurological:   Unable to assess; currently sedated   Skin: Skin is warm, dry and intact. No rash noted. No erythema.      Results Review:     Telemetry: Sinus rhythm     I reviewed the patient's new imaging results and agree with the interpretation.  I reviewed the patient's other test results and agree with the interpretation.      Results from last 7 days  Lab Units 01/18/18  0028 01/17/18  0138 01/16/18  0107 01/15/18  0114 01/14/18  0047 01/13/18  0058  01/12/18  0058   WBC 10*3/mm3 5.49 4.95 7.68 5.29 4.26* 5.99 5.54   HEMOGLOBIN g/dL 8.1* 7.0* 7.5* 8.0* 7.5* 7.6* 7.5*   PLATELETS 10*3/mm3 72* 88* 123* 144 146 166 168       Results from last 7 days  Lab Units 01/18/18  0028 01/17/18  0138 01/16/18  1443 01/16/18  0107 01/15/18  0114 01/14/18  0047 01/13/18  0058 01/12/18  0058   SODIUM mmol/L 146 148  --  149 147 146 145 141   POTASSIUM mmol/L 4.5 3.4* 3.8 3.6 4.0 4.0 3.5 3.4*   CHLORIDE mmol/L 108 109  --  110 109 107 105 100   CO2 mmol/L 31.5 34.2*  --  28.9 31.8 31.6 32.9* 32.2*   BUN mg/dL 76* 85*  --  95* 103* 97* 89* 72*   CREATININE mg/dL 1.37* 1.51*  --  1.90* 2.13* 2.19* 2.27* 2.24*   CALCIUM mg/dL 7.9 8.0  --  7.9 8.1 8.2 8.3 8.2   GLUCOSE mg/dL 187* 106  --  118* 139* 139* 141* 307*       Results from last 7 days  Lab Units 01/18/18  0028 01/16/18  0107 01/15/18  0114 01/14/18  0047 01/13/18  0058 01/12/18  0058   BILIRUBIN mg/dL 0.8 0.6 0.6 0.6 0.8 0.8   ALK PHOS U/L 35* 27* 28* 30* 33* 38*   AST (SGOT) U/L 24 21 19 15 14 13   ALT (SGPT) U/L 21 16 17 17 18 13       Results from last 7 days  Lab Units 01/17/18  0138 01/16/18  0107 01/15/18  0114 01/14/18  0047 01/13/18  0058 01/12/18  0058   MAGNESIUM mg/dL 3.2* 3.3* 3.4* 3.2* 3.0* 2.9*       Results from last 7 days  Lab Units 01/17/18  1039   INR  1.11*       Results from last 7 days  Lab Units 01/17/18  1431 01/16/18  0107 01/15/18  0114   CRP mg/dL 6.15* 0.88 1.37*       Cultures:  Blood Culture   Date Value Ref Range Status   01/09/2018 No growth at 5 days  Final   01/09/2018 No growth at 5 days  Final       Respiratory Culture   Date Value Ref Range Status   01/09/2018 No growth  Final   01/09/2018 No growth  Final   01/09/2018 No growth  Final   01/09/2018 No growth  Final         CRP: 0.88        Portable CXR, 1/15/18:  FINDINGS:   There is bibasilar atelectasis.   Bilateral airspace disease.  Support tube and lines are in unchanged position.   Heart size is stable.  No pneumothorax.   Tiny  bilateral pleural effusions persist.       IMPRESSION:  Stable appearance of the chest.          Current Hospital Medications:    amLODIPine 10 mg Oral Daily   atorvastatin 80 mg Oral Daily   cholecalciferol 2,000 Units Oral Daily   CloNIDine 0.2 mg Oral Q8H   ferrous sulfate 300 mg Oral Daily   folic acid (FOLVITE) IVPB 1 mg Intravenous Daily   furosemide 20 mg Intravenous Q6H   guaiFENesin 200 mg Oral Q4H   hydrALAZINE 100 mg Oral Q8H   ipratropium-albuterol 3 mL Nebulization Q6H - RT   isosorbide mononitrate 240 mg Oral Daily   labetalol 200 mg Oral Q12H   lansoprazole 30 mg Oral QAM   levothyroxine 25 mcg Oral Daily   methylPREDNISolone sodium succinate 1,000 mg Intravenous Daily   phytonadione (VITAMIN K) IVPB 10 mg Intravenous Once   [START ON 1/19/2018] phytonadione (VITAMIN K) IVPB 10 mg Intravenous Once   polyethylene glycol 17 g Oral BID   sertraline 50 mg Oral Q PM   sodium chloride 10 mL Intracatheter Q12H   sodium chloride 10 mL Intracatheter Q12H   terazosin 10 mg Oral Daily   vitamin B-12 2,000 mcg Oral Daily       fentaNYL (SUBLIMAZE) PCA 1500 mcg/30 mL syringe     insulin regular infusion 1 unit/mL 1-20 Units/hr Last Rate: 13.5 Units/hr (01/18/18 0310)   Pharmacy Consult     propofol 5-50 mcg/kg/min Last Rate: 50 mcg/kg/min (01/18/18 0817)       Assessment/Plan      -Recurrent alveolar hemorrhage; 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 as patient had re-bleeding off any type of heparin product   -Acute thrombocytopenia  -Acute on chronic hypoxic/hypercarbic respiratory failure secondary to a COPD exacerbation with bilateral hospital-acquired pneumonia  -Iron deficiency  -Mild acute hypokalemia  -(+) fluid balance; consider decreasing free water flushes now that sodium improving  -Acute kidney injury on top of stage III chronic kidney disease; kidney function improved  -Mild hypernatremia; adjustments made to free water flushes; improved  -Insulin-dependent diabetes mellitus type 2  currently controlled on insulin drip  -Acute on chronic normocytic anemia  -Nutrition: Tolerating tube feeding well    Continue to monitor the patient closely.  Fortunately, the patient's FiO2 had to be increased to 100% and he is more hypoxemic.  His repeat chest x-ray is reviewed and appears to be worsened in light of his recurrent diffuse alveolar hemorrhage.  He was evaluated by hematology and there assistance is appreciated.  Continue to hold any heparin products and/or antiplatelet agents.  Will not plan to transfuse platelets unless platelet count is less than 50,000.  Peripheral blood smear has been ordered and is pending as well.  The patient was started on oral iron and was started on folic acid as well by hematology.  The patient has been started on every 6 hours IV Lasix as he is noted to have worsening anasarca today with a positive fluid balance.  Discussed with pulmonary and the patient will likely need transfer to a tertiary care center once his bleeding is more stabilized. Continue with SCDs.  Continue high-dose steroids.     Continue with his low dose insulin drip which has been weaned down. Blood glucose levels currently controlled.     Repeat labs in the morning and chest xray.    Patient is considered high risk due to: recurrent alveolar hemorrhage, acute on chronic respiratory failure, prolonged ventilator time, ckd, recent hospitalization with intubation    I discussed the patients findings and my recommendations with nursing staff and consulting provider (REGGIE Gomez and Dr. Gomez)    Disposition:  Will likely require transfer to tertiary care center for recurrent alveolar hemorrhage of unclear etiology    Teresa Sandhu DO  01/18/18  8:41 AM

## 2018-01-18 NOTE — PROGRESS NOTES
LOS: 21 days   Patient Care Team:  No Known Provider as PCP - General  No Known Provider as PCP - Family Medicine    Chief Complaint:  Pulmonology is following for respiratory failure    Subjective     Interval History: patient is intubated and sedated.  No acute events reported overnight.      History taken from: chart RN Patient unable to give history due to patient intubated.    Review of Systems:   Review of Systems - History obtained from chart review and unobtainable from patient due to mental status and Intubated      Objective     Vital Signs  Temp:  [97.5 °F (36.4 °C)-99 °F (37.2 °C)] 99 °F (37.2 °C)  Heart Rate:  [48-82] 51  Resp:  [24-27] 24  BP: (120-174)/(45-69) 147/54  FiO2 (%):  [100 %] 100 %  Body mass index is 46.02 kg/(m^2).    Intake/Output Summary (Last 24 hours) at 01/18/18 1412  Last data filed at 01/18/18 0850   Gross per 24 hour   Intake          4610.43 ml   Output             2400 ml   Net          2210.43 ml     I/O this shift:  In: 100 [IV Piggyback:100]  Out: -     Physical Exam:  GENERAL APPEARANCE: Intubated and sedated.  Appears to be resting comfortably in bed.     HEAD: normocephalic.    EYES: PERRLA.    THROAT: ET tube in place. Oral cavity and pharynx normal. No inflammation, swelling, exudate, or lesions.     NECK: Neck supple.     CARDIAC: Normal S1 and S2. No S3, S4 or murmurs. Rhythm is regular. There is no peripheral edema, cyanosis or pallor. Extremities are warm and well perfused. Capillary refill is less than 2 seconds. No carotid bruits.    RESPIRATORY: Clear to auscultation and percussion without rales, rhonchi, wheezing or diminished breath sounds.    GI: Positive bowel sounds. Soft, nondistended, nontender.     MUSCULOSKELETAL: No significant deformity or joint abnormality. No edema. Peripheral pulses intact.    NEUROLOGICAL: Unable to assess due to sedation status.     PSYCHIATRIC: Unable to assess due to sedation status.     Results Review:     I reviewed the  patient's new clinical results.  I reviewed the patient's new imaging results and agree with the interpretation.    Results from last 7 days  Lab Units 01/18/18  0028 01/17/18  0138 01/16/18  0107   WBC 10*3/mm3 5.49 4.95 7.68   HEMOGLOBIN g/dL 8.1* 7.0* 7.5*   PLATELETS 10*3/mm3 72* 88* 123*       Results from last 7 days  Lab Units 01/18/18  0028 01/17/18  0138 01/16/18  1443 01/16/18  0107 01/15/18  0114   SODIUM mmol/L 146 148  --  149 147   POTASSIUM mmol/L 4.5 3.4* 3.8 3.6 4.0   CHLORIDE mmol/L 108 109  --  110 109   CO2 mmol/L 31.5 34.2*  --  28.9 31.8   BUN mg/dL 76* 85*  --  95* 103*   CREATININE mg/dL 1.37* 1.51*  --  1.90* 2.13*   CALCIUM mg/dL 7.9 8.0  --  7.9 8.1   GLUCOSE mg/dL 187* 106  --  118* 139*   MAGNESIUM mg/dL  --  3.2*  --  3.3* 3.4*     Lab Results   Component Value Date    INR 1.11 (H) 01/17/2018    INR 1.25 (H) 01/10/2018    INR 1.27 (H) 01/09/2018    PROTIME 14.4 01/17/2018    PROTIME 15.8 (H) 01/10/2018    PROTIME 16.1 (H) 01/09/2018       Results from last 7 days  Lab Units 01/18/18  0028 01/16/18  0107 01/15/18  0114   ALK PHOS U/L 35* 27* 28*   BILIRUBIN mg/dL 0.8 0.6 0.6   ALT (SGPT) U/L 21 16 17   AST (SGOT) U/L 24 21 19       Results from last 7 days  Lab Units 01/18/18  0729   PH, ARTERIAL pH units 7.398   PO2 ART mm Hg 74.2*   PCO2, ARTERIAL mm Hg 53.0*   HCO3 ART mmol/L 31.9*     Imaging Results (last 24 hours)     Procedure Component Value Units Date/Time    XR Chest 1 View [[id number]] Collected:  01/18/18 0720     Updated:  01/18/18 0723    Narrative:       EXAMINATION: XR CHEST 1 VW-      CLINICAL INDICATION:     f/u pneumonia/copd/line placement; J96.21-Acute  and chronic respiratory failure with hypoxia; J96.22-Acute and chronic  respiratory failure with hypercapnia; J44.1-Chronic obstructive  pulmonary disease with (acute) exacerbation; R09.02-Hypoxemia     TECHNIQUE: Single AP view of chest.      COMPARISON: January 15, 2018      FINDINGS:   There is bibasilar  atelectasis.   Extensive bilateral airspace disease.  Support tube and lines are in unchanged position.   Heart size is stable.  No pneumothorax.   Tiny bilateral pleural effusions persist.        Impression:       Extensive bilateral airspace disease. Were seen on previous  study.     This report was finalized on 1/18/2018 7:21 AM by Dr. Miko Coto MD.                Medication Review:   Scheduled Medications:    amLODIPine 10 mg Oral Daily   atorvastatin 80 mg Oral Daily   cholecalciferol 2,000 Units Oral Daily   CloNIDine 0.2 mg Oral Q8H   ferrous sulfate 300 mg Oral Daily   folic acid (FOLVITE) IVPB 1 mg Intravenous Daily   furosemide 20 mg Intravenous Q6H   guaiFENesin 200 mg Oral Q4H   hydrALAZINE 100 mg Oral Q8H   ipratropium-albuterol 3 mL Nebulization Q6H - RT   isosorbide mononitrate 240 mg Oral Daily   labetalol 200 mg Oral Q12H   lansoprazole 30 mg Oral QAM   levothyroxine 25 mcg Oral Daily   methylPREDNISolone sodium succinate 1,000 mg Intravenous Daily   [START ON 1/19/2018] phytonadione (VITAMIN K) IVPB 10 mg Intravenous Once   polyethylene glycol 17 g Oral BID   sertraline 50 mg Oral Q PM   sodium chloride 10 mL Intracatheter Q12H   sodium chloride 10 mL Intracatheter Q12H   terazosin 10 mg Oral Daily   vitamin B-12 2,000 mcg Oral Daily     Continuous infusions:    fentaNYL (SUBLIMAZE) PCA 1500 mcg/30 mL syringe     insulin regular infusion 1 unit/mL 1-20 Units/hr Last Rate: 7 Units/hr (01/18/18 1273)   Pharmacy Consult     propofol 5-50 mcg/kg/min Last Rate: 50 mcg/kg/min (01/18/18 1350)       Assessment/Plan     Neuro: Patient is intubated and sedated per protocol.       Respiratory Failure: likely related to underlying lung disease, COPD and fluid volume overload.       ABG reviewed.  Chest xray reviewed.  Will advance ET tube 1 cm.  Bronchoscopy was done yesterday.  Se procedure note.    Continue scheduled inhalants and PRN neb treatments.    Will order autoimmune work up.     Once stable-  will consider transferring the patient to higher level center as will need VATS- ct surgery      Intubated: 1/9/18.   Microbiology Results (last 10 days)     Procedure Component Value - Date/Time    Respiratory Culture - Wash, Lung, Right Lower Lobe [[id number]] Collected:  01/17/18 1353    Lab Status:  Preliminary result Specimen:  Wash from Lung, Right Lower Lobe Updated:  01/17/18 2345     Gram Stain Result Occasional WBCs seen    Respiratory Culture - Bronchial Wash, Bronchus [[id number]] Collected:  01/17/18 1353    Lab Status:  Preliminary result Specimen:  Bronchial Wash from Bronchus Updated:  01/17/18 2338     Gram Stain Result Rare (1+) WBCs seen      Occasional Gram negative bacilli    Blood Culture - Blood, [[id number]]  (Normal) Collected:  01/15/18 0114    Lab Status:  Preliminary result Specimen:  Blood from Hand, Left Updated:  01/18/18 0201     Blood Culture No growth at 3 days    Blood Culture - Blood, [[id number]]  (Normal) Collected:  01/15/18 0051    Lab Status:  Preliminary result Specimen:  Blood from Hand, Right Updated:  01/18/18 0201     Blood Culture No growth at 3 days    KOH Prep - Lavage, Lung, Right Upper Lobe [[id number]]  (Normal) Collected:  01/09/18 1841    Lab Status:  Final result Specimen:  Lavage from Lung, Right Upper Lobe Updated:  01/09/18 1927     KOH Prep No yeast or hyphal elements seen    KOH Prep - Lavage, Lung, Right Middle Lobe [[id number]]  (Normal) Collected:  01/09/18 1841    Lab Status:  Final result Specimen:  Lavage from Lung, Right Middle Lobe Updated:  01/09/18 1926     KOH Prep No yeast or hyphal elements seen    KOH Prep - Lavage, Lung, Right Lower Lobe [[id number]]  (Normal) Collected:  01/09/18 1841    Lab Status:  Final result Specimen:  Lavage from Lung, Right Lower Lobe Updated:  01/09/18 1926     KOH Prep No yeast or hyphal elements seen    KOH Prep - Lavage, Lung, Left Lower Lobe [[id number]]  (Normal) Collected:  01/09/18 1841    Lab Status:  Final result  Specimen:  Lavage from Lung, Left Lower Lobe Updated:  01/09/18 1927     KOH Prep No yeast or hyphal elements seen    Respiratory Culture - Lavage, Lung, Right Lower Lobe [[id number]] Collected:  01/09/18 1841    Lab Status:  Final result Specimen:  Lavage from Lung, Right Lower Lobe Updated:  01/12/18 1007     Respiratory Culture No growth     Gram Stain Result Few (2+) WBCs seen      No organisms seen    Respiratory Culture - Lavage, Lung, Right Middle Lobe [[id number]] Collected:  01/09/18 1841    Lab Status:  Final result Specimen:  Lavage from Lung, Right Middle Lobe Updated:  01/12/18 1007     Respiratory Culture No growth     Gram Stain Result Occasional WBCs seen      No organisms seen    Respiratory Culture - Lavage, Lung, Right Upper Lobe [[id number]] Collected:  01/09/18 1841    Lab Status:  Final result Specimen:  Lavage from Lung, Right Upper Lobe Updated:  01/12/18 1005     Respiratory Culture No growth     Gram Stain Result Rare (1+) WBCs seen      No organisms seen    Respiratory Culture - Lavage, Lung, Left Lower Lobe [[id number]] Collected:  01/09/18 1841    Lab Status:  Final result Specimen:  Lavage from Lung, Left Lower Lobe Updated:  01/12/18 1006     Respiratory Culture No growth     Gram Stain Result Occasional WBCs seen      No organisms seen    Blood Culture - Blood, [[id number]]  (Normal) Collected:  01/09/18 1038    Lab Status:  Final result Specimen:  Blood from Hand, Right Updated:  01/14/18 1101     Blood Culture No growth at 5 days    Blood Culture - Blood, [[id number]]  (Normal) Collected:  01/09/18 0942    Lab Status:  Final result Specimen:  Blood from Hand, Left Updated:  01/14/18 1101     Blood Culture No growth at 5 days            Hypertension: BP within normal range.  NSR.  Continuous ECG, BP and pulse ox monitoring. Maintain MAP > 65.      SHEELA on CKD stage III: Creatinine 1.37.  Continue management per nephrology.      Hyperglycemia: monitor glucose targeting 140-180.      GI:  Continue GI prophylaxis and tube feedings.      Low H/H: HGB 8.1. Platelets are 72. Hematology is following.      IV access: Midlines and peripherals.       Patient Active Problem List   Diagnosis Code   • COPD (chronic obstructive pulmonary disease) J44.9   • Shortness of breath R06.02   • Morbid obesity E66.01   • Hypoxia R09.02   • Edema extremities R60.0   • Sleep apnea G47.30   • Wheezing R06.2   • Allergic rhinitis J30.9   • Essential hypertension I10   • Sore throat J02.9   • Cough R05   • Pneumonia J18.9   • Respiratory failure J96.90   • Acute on chronic respiratory failure with hypoxia and hypercapnia J96.21, J96.22         Bedside rounds were completed with RN and RRT, discussed case and plan in great detail.      YANG Doss  01/18/18  2:12 PM        Attestation: Scribed for Dr. Gomez, Dee Gibbs, YANG    I, Jeremi Gomez M.D. attest that the above note accurately reflects the work and decisions made  by me.  Patient was seen and evaluated by Dr. Gomez, including history of present illness, physical exam, assessment, and treatment plan.  The above note was reviewed and edited by Dr. Gomez.  Critical Care time spent in direct patient care: 33 minutes (excluding procedure time, if applicable) including high complexity decision making to assess, manipulate, and support vital organ system failure in this individual who has impairment of one or more vital organ systems such that there is a high probability of imminent or life threatening deterioration in the patient’s condition.

## 2018-01-19 NOTE — PROGRESS NOTES
Nephrology  Note      Subjective     He is improved a but from respiratory standpoint as FiO2 is down to 85% today. No acute overnight events reported    Objective     Vital Signs  Temp:  [98 °F (36.7 °C)-99 °F (37.2 °C)] 98.3 °F (36.8 °C)  Heart Rate:  [51-71] 54  Resp:  [13-30] 25  BP: (139-180)/() 148/52  FiO2 (%):  [80 %-100 %] 80 %    I/O this shift:  In: 150 [Other:50; IV Piggyback:100]  Out: -   I/O last 3 completed shifts:  In: 3960 [I.V.:1020; Other:1800; NG/GT:1040; IV Piggyback:100]  Out: 3450 [Urine:3450]    Physical Examination:    General Appearance : intubated, sedated  Head : normocephalic  Eyes : + pallor   Throat : ETT in place  Neck:  suppple  Lungs :  clear anteriorly  Heart : regular rhythm , normal S1, S2, systolic murmur   Abdomen :  normal bowel sounds,soft non-tender  Extremities :  3+ edema   Neurologic : sedated    Laboratory Data :      WBC WBC   Date Value Ref Range Status   01/19/2018 8.34 4.50 - 12.50 10*3/mm3 Final   01/19/2018 8.30 4.50 - 12.50 10*3/mm3 Final   01/18/2018 5.49 4.50 - 12.50 10*3/mm3 Final   01/17/2018 4.95 4.50 - 12.50 10*3/mm3 Final      HGB Hemoglobin   Date Value Ref Range Status   01/19/2018 7.3 (L) 14.0 - 18.0 g/dL Final   01/19/2018 7.5 (L) 14.0 - 18.0 g/dL Final   01/18/2018 8.1 (L) 14.0 - 18.0 g/dL Final   01/17/2018 7.0 (L) 14.0 - 18.0 g/dL Final      HCT Hematocrit   Date Value Ref Range Status   01/19/2018 23.9 (L) 42.0 - 52.0 % Final   01/19/2018 24.1 (L) 42.0 - 52.0 % Final   01/18/2018 25.7 (L) 42.0 - 52.0 % Final   01/17/2018 23.1 (L) 42.0 - 52.0 % Final      Platlets No results found for: LABPLAT   MCV MCV   Date Value Ref Range Status   01/19/2018 94.1 (H) 80.0 - 94.0 fL Final   01/19/2018 94.1 (H) 80.0 - 94.0 fL Final   01/18/2018 93.8 80.0 - 94.0 fL Final   01/17/2018 95.9 (H) 80.0 - 94.0 fL Final          Sodium Sodium   Date Value Ref Range Status   01/19/2018 145 135 - 153 mmol/L Final   01/18/2018 146 135 - 153 mmol/L Final   01/17/2018  148 135 - 153 mmol/L Final      Potassium Potassium   Date Value Ref Range Status   01/19/2018 3.9 3.5 - 5.3 mmol/L Final   01/18/2018 4.5 3.5 - 5.3 mmol/L Final   01/17/2018 3.4 (L) 3.5 - 5.3 mmol/L Final   01/16/2018 3.8 3.5 - 5.3 mmol/L Final      Chloride Chloride   Date Value Ref Range Status   01/19/2018 107 99 - 112 mmol/L Final   01/18/2018 108 99 - 112 mmol/L Final   01/17/2018 109 99 - 112 mmol/L Final      CO2 CO2   Date Value Ref Range Status   01/19/2018 31.9 24.3 - 31.9 mmol/L Final   01/18/2018 31.5 24.3 - 31.9 mmol/L Final   01/17/2018 34.2 (H) 24.3 - 31.9 mmol/L Final      BUN BUN   Date Value Ref Range Status   01/19/2018 78 (H) 7 - 21 mg/dL Final   01/18/2018 76 (H) 7 - 21 mg/dL Final   01/17/2018 85 (H) 7 - 21 mg/dL Final      Creatinine Creatinine   Date Value Ref Range Status   01/19/2018 1.38 (H) 0.43 - 1.29 mg/dL Final   01/18/2018 1.37 (H) 0.43 - 1.29 mg/dL Final   01/17/2018 1.51 (H) 0.43 - 1.29 mg/dL Final      Calcium Calcium   Date Value Ref Range Status   01/19/2018 7.9 7.7 - 10.0 mg/dL Final   01/18/2018 7.9 7.7 - 10.0 mg/dL Final   01/17/2018 8.0 7.7 - 10.0 mg/dL Final      PO4 No results found for: CAPO4   Albumin Albumin   Date Value Ref Range Status   01/19/2018 3.10 (L) 3.40 - 4.80 g/dL Final   01/18/2018 3.20 (L) 3.40 - 4.80 g/dL Final      Magnesium Magnesium   Date Value Ref Range Status   01/19/2018 3.1 (H) 1.7 - 2.6 mg/dL Final   01/17/2018 3.2 (H) 1.7 - 2.6 mg/dL Final      Uric Acid No results found for: URICACID     Radiology results :     Imaging Results (last 24 hours)     Procedure Component Value Units Date/Time    XR Chest 1 View [[id number]] Updated:  01/19/18 0655          Medications:        amLODIPine 10 mg Oral Daily   atorvastatin 80 mg Oral Daily   cholecalciferol 2,000 Units Oral Daily   CloNIDine 0.2 mg Oral Q8H   ferrous sulfate 300 mg Oral Daily   folic acid (FOLVITE) IVPB 1 mg Intravenous Daily   furosemide 40 mg Intravenous Q12H   guaiFENesin 200 mg Oral  Q4H   hydrALAZINE 100 mg Oral Q8H   ipratropium-albuterol 3 mL Nebulization Q6H - RT   isosorbide mononitrate 240 mg Oral Daily   labetalol 200 mg Oral Q12H   lansoprazole 30 mg Oral BID   levothyroxine 25 mcg Oral Daily   methylPREDNISolone sodium succinate 1,000 mg Intravenous Daily   phytonadione (VITAMIN K) IVPB 10 mg Intravenous Once   polyethylene glycol 17 g Oral BID   sertraline 50 mg Oral Q PM   sodium chloride 10 mL Intracatheter Q12H   sodium chloride 10 mL Intracatheter Q12H   terazosin 10 mg Oral Daily   vitamin B-12 2,000 mcg Oral Daily       fentaNYL (SUBLIMAZE) PCA 1500 mcg/30 mL syringe     insulin regular infusion 1 unit/mL 1-20 Units/hr Last Rate: 7 Units/hr (01/19/18 0007)   Pharmacy Consult     propofol 5-50 mcg/kg/min Last Rate: 50 mcg/kg/min (01/19/18 7423)       Assessment/Plan     Active Problems:    Respiratory failure    Acute on chronic respiratory failure with hypoxia and hypercapnia      1.  SHEELA on CKD 3 :his  baseline creatinine is 1.2-1.3. It is stable at 1.3 today    2. Alveolar hemorrhage :  All serologies negative initially but repeat is pending as he again developed alveolar hemorrhage. May need lung biopsy to rule out vasculitis    3. Respiratory failure on ventilator : will give lasix 40 mg IV x 3 due to third space edema    4. Anemia and thrombocytopenia: hematology following    5. HTN : stable    6. Hypernatremia : Na is better at 145 today .I will reduce free water flushes    Possible transfer to tertiary care centre pending improvement in pulmonary status  I discussed the patients findings and my recommendations with RN, Dr Gomez and Dr Phoebe Card MD  01/19/18  10:20 AM

## 2018-01-19 NOTE — PLAN OF CARE
Problem: COPD, Chronic Bronchitis/Emphysema (Adult)  Goal: Signs and Symptoms of Listed Potential Problems Will be Absent or Manageable (COPD, Chronic Bronchitis/Emphysema)  Outcome: Ongoing (interventions implemented as appropriate)   01/19/18 0813   COPD, Chronic Bronchitis/Emphysema   Problems Assessed (COPD, Chronic Bronchitis/Emphysema) all   Problems Present (COPD, Chronic Bronchitis/Emphysema) functional decline/self-care deficit;skin breakdown       Problem: Skin Integrity Impairment, Risk/Actual (Adult)  Goal: Identify Related Risk Factors and Signs and Symptoms  Outcome: Ongoing (interventions implemented as appropriate)   01/19/18 0813   Skin Integrity Impairment, Risk/Actual   Skin Integrity Impairment, Risk/Actual: Related Risk Factors edema   Signs and Symptoms (Skin Integrity Impairment) edema     Goal: Skin Integrity/Wound Healing  Outcome: Ongoing (interventions implemented as appropriate)   01/19/18 0813   Skin Integrity Impairment, Risk/Actual (Adult)   Skin Integrity/Wound Healing making progress toward outcome       Problem: Fall Risk (Adult)  Goal: Identify Related Risk Factors and Signs and Symptoms  Outcome: Ongoing (interventions implemented as appropriate)   01/19/18 0813   Fall Risk   Fall Risk: Related Risk Factors sensory deficits;environment unfamiliar   Fall Risk: Signs and Symptoms presence of risk factors     Goal: Absence of Falls  Outcome: Ongoing (interventions implemented as appropriate)   01/19/18 0813   Fall Risk (Adult)   Absence of Falls making progress toward outcome       Problem: Patient Care Overview (Adult)  Goal: Discharge Needs Assessment  Outcome: Ongoing (interventions implemented as appropriate)   01/19/18 0813   Discharge Needs Assessment   Discharge Disposition still a patient       Problem: Mechanical Ventilation, Invasive (Adult)  Goal: Signs and Symptoms of Listed Potential Problems Will be Absent or Manageable (Mechanical Ventilation, Invasive)  Outcome:  Ongoing (interventions implemented as appropriate)      Problem: Pressure Ulcer Risk (Karthikeyan Scale) (Adult,Obstetrics,Pediatric)  Goal: Identify Related Risk Factors and Signs and Symptoms  Outcome: Ongoing (interventions implemented as appropriate)   01/19/18 0813   Pressure Ulcer Risk (Karthikeyan Scale)   Related Risk Factors (Pressure Ulcer Risk (Karthikeyan Scale)) critical care admission;medical devices;medication;mobility impaired     Goal: Skin Integrity  Outcome: Ongoing (interventions implemented as appropriate)   01/19/18 0813   Pressure Ulcer Risk (Karthikeyan Scale) (Adult,Obstetrics,Pediatric)   Skin Integrity making progress toward outcome       Problem: Pressure Ulcer (Adult)  Goal: Signs and Symptoms of Listed Potential Problems Will be Absent or Manageable (Pressure Ulcer)  Outcome: Ongoing (interventions implemented as appropriate)   01/19/18 0813   Pressure Ulcer   Problems Assessed (Pressure Ulcer) all   Problems Present (Pressure Ulcer) none

## 2018-01-19 NOTE — PROGRESS NOTES
Se Anne  [id number]  1950  1/19/2018    Referring Provider:   Teresa Sandhu DO        Reason for Followup:   Thrombocytopenia     Patient Care Team:  No Known Provider as PCP - General  No Known Provider as PCP - Family Medicine    Chief Complaint:  On mechanical ventilation        Subjective:    As per nursing staff no active bleeding overnight however with suction patient was found to have blood clots that were seen but no bright red blood. No bleeding from peripheral or other sites.        Allergies:    Iodine and Lisinopril    Outpatient Medications:  Prescriptions Prior to Admission   Medication Sig Dispense Refill Last Dose   • amLODIPine (NORVASC) 10 MG tablet Take 10 mg by mouth Daily.   12/27/2017 at Unknown time   • aspirin 81 MG EC tablet Take 81 mg by mouth Daily.   12/27/2017 at Unknown time   • atorvastatin (LIPITOR) 80 MG tablet Take 80 mg by mouth Daily.   12/27/2017 at Unknown time   • budesonide-formoterol (SYMBICORT) 160-4.5 MCG/ACT inhaler Inhale 2 puffs 2 (Two) Times a Day.   12/27/2017 at Unknown time   • bumetanide (BUMEX) 1 MG tablet Take 1 tablet by mouth Daily. 30 tablet 0 12/27/2017 at Unknown time   • carvedilol (COREG) 25 MG tablet Take 12.5 mg by mouth 2 (Two) Times a Day With Meals.   12/27/2017 at Unknown time   • Cholecalciferol (VITAMIN D3) 2000 UNITS tablet Take 1 tablet by mouth Daily.   12/27/2017 at Unknown time   • gabapentin (NEURONTIN) 100 MG capsule Take 700 mg by mouth Daily.   12/27/2017 at Unknown time   • hydrALAZINE (APRESOLINE) 25 MG tablet Take 3 tablets by mouth 3 (Three) Times a Day. 180 tablet 0 12/27/2017 at Unknown time   • insulin NPH-insulin regular (humuLIN 70/30,novoLIN 70/30) (70-30) 100 UNIT/ML injection Inject 52 Units under the skin Every Morning.   12/27/2017 at Unknown time   • insulin NPH-insulin regular (humuLIN 70/30,novoLIN 70/30) (70-30) 100 UNIT/ML injection Inject 54 Units under the skin Every Night.   12/27/2017 at Unknown  time   • isosorbide mononitrate (IMDUR) 60 MG 24 hr tablet Take 180 mg by mouth Daily.   12/27/2017 at Unknown time   • levothyroxine (SYNTHROID, LEVOTHROID) 25 MCG tablet Take 25 mcg by mouth Daily.   12/27/2017 at Unknown time   • loratadine (CLARITIN) 10 MG tablet Take 10 mg by mouth Daily.   12/27/2017 at Unknown time   • metFORMIN (GLUCOPHAGE) 500 MG tablet Take 500 mg by mouth 2 (Two) Times a Day With Meals.   12/27/2017 at Unknown time   • pantoprazole (PROTONIX) 40 MG EC tablet Take 40 mg by mouth Daily.   12/27/2017 at Unknown time   • sertraline (ZOLOFT) 100 MG tablet Take 50 mg by mouth Every Evening.   12/27/2017 at Unknown time   • tiotropium (SPIRIVA) 18 MCG per inhalation capsule Place 1 capsule into inhaler and inhale Daily.   12/27/2017 at Unknown time   • vitamin B-12 (CYANOCOBALAMIN) 1000 MCG tablet Take 2,000 mcg by mouth Daily.   12/27/2017 at Unknown time   • albuterol (PROVENTIL) (2.5 MG/3ML) 0.083% nebulizer solution Take 2.5 mg by nebulization Every 4 (Four) Hours As Needed for Wheezing or Shortness of Air.   Unknown at Unknown time   • nitroglycerin (NITROSTAT) 0.4 MG SL tablet Place 0.4 mg under the tongue Every 5 (Five) Minutes As Needed for chest pain. Take no more than 3 doses in 15 minutes.   Unknown at Unknown time   • traMADol (ULTRAM) 50 MG tablet Take 50 mg by mouth Every 12 (Twelve) Hours As Needed for Moderate Pain .   Unknown at Unknown time       Inpatient Medications:  Scheduled Meds:    amLODIPine 10 mg Oral Daily   atorvastatin 80 mg Oral Daily   cholecalciferol 2,000 Units Oral Daily   CloNIDine 0.2 mg Oral Q8H   ferrous sulfate 300 mg Oral Daily   folic acid (FOLVITE) IVPB 1 mg Intravenous Daily   furosemide 40 mg Intravenous Q12H   guaiFENesin 200 mg Oral Q4H   hydrALAZINE 100 mg Oral Q8H   ipratropium-albuterol 3 mL Nebulization Q6H - RT   isosorbide mononitrate 240 mg Oral Daily   labetalol 200 mg Oral Q12H   lansoprazole 30 mg Oral BID   levothyroxine 25 mcg Oral Daily    methylPREDNISolone sodium succinate 1,000 mg Intravenous Daily   phytonadione (VITAMIN K) IVPB 10 mg Intravenous Once   phytonadione (VITAMIN K) IVPB 10 mg Intravenous Once   polyethylene glycol 17 g Oral BID   sertraline 50 mg Oral Q PM   sodium chloride 10 mL Intracatheter Q12H   sodium chloride 10 mL Intracatheter Q12H   terazosin 10 mg Oral Daily   vitamin B-12 2,000 mcg Oral Daily       Continuous Infusions:    fentaNYL (SUBLIMAZE) PCA 1500 mcg/30 mL syringe     insulin regular infusion 1 unit/mL 1-20 Units/hr Last Rate: 7 Units/hr (01/19/18 0007)   Pharmacy Consult     propofol 5-50 mcg/kg/min Last Rate: 50 mcg/kg/min (01/19/18 0610)       PRN Meds:  •  acetaminophen  •  aluminum-magnesium hydroxide-simethicone  •  dextrose  •  dextrose  •  glucagon (human recombinant)  •  hydrALAZINE  •  magnesium sulfate **OR** magnesium sulfate **OR** magnesium sulfate  •  midazolam  •  nitroglycerin  •  Pharmacy Consult  •  potassium chloride **OR** potassium chloride **OR** potassium chloride  •  sodium chloride  •  Insert peripheral IV **AND** sodium chloride  •  sodium chloride  •  sodium chloride      Review of Systems:  A 12 point review of systems cannot be obtained due to sedation and mechanical ventilation. Sunjective was taken from chart and staff no family at bedside.         Physical Exam:  Vital Signs: These were reviewed and listed as per patient’s electronic medical chart  Vitals:    01/19/18 0706   BP:    Pulse: 56   Resp:    Temp:    SpO2: 91%     General: Patient is intubated and sedated  HEENT: Head is atraumatic, normocephalic, ET tube is in place,   Neck: Neck supple. No JVD, no masses  Lungs: non-labored, clear to auscultation bilaterally, no wheezing  Heart: S1, S2. Regular rate and rhythm. + murmurs, no rubs, or gallops.   Abdomen: Soft. Bowel sounds are normoactive. Difficult to appreciate Hepatosplenomegaly  Extremities: No cyanosis. +pitting edema BLE.   Integumentary: No rash, sores,  +ecchymosis.   Neurologic: Intubated/sedated as above.   : Scrotal edema, villalobos in place, clear urine      Labs / Studies:  Lab Results (last 72 hours)     Procedure Component Value Units Date/Time    POC Glucose Once [[id number]]  (Normal) Collected:  01/16/18 0840    Specimen:  Blood Updated:  01/16/18 0859     Glucose 121 mg/dL     Narrative:       Meter: [id number] : [id number] ALEISHA VALDEZ    POC Glucose Once [[id number]]  (Normal) Collected:  01/16/18 0942    Specimen:  Blood Updated:  01/16/18 0952     Glucose 124 mg/dL     Narrative:       Meter: [id number] : [id number] MORAES JOSÉ MIGUEL    POC Glucose Once [[id number]]  (Abnormal) Collected:  01/16/18 1052    Specimen:  Blood Updated:  01/16/18 1109     Glucose 131 (H) mg/dL     Narrative:       Meter: [id number] : [id number] ALEISHA VALDEZ    POC Glucose Once [[id number]]  (Abnormal) Collected:  01/16/18 1202    Specimen:  Blood Updated:  01/16/18 1210     Glucose 169 (H) mg/dL     Narrative:       Meter: [id number] : [id number] ALEISHA VALDEZ    Antihistone Antibodies [[id number]] Collected:  01/11/18 0108    Specimen:  Blood Updated:  01/16/18 1412     Histone Ab 0.2 Units                                Negative                <1.0                           Weak Positive      1.0 - 1.5                           Moderate Positive  1.6 - 2.5                           Strong Positive         >2.5       Narrative:       Performed at:  [address] [place] [address]  [id number]  : Phi Kunz MD, Phone:  [phone]    POC Glucose Once [[id number]]  (Abnormal) Collected:  01/16/18 1415    Specimen:  Blood Updated:  01/16/18 1432     Glucose 186 (H) mg/dL     Narrative:       Meter: [id number] : [id number] ALEISHA VALDEZ    Potassium [[id number]]  (Normal) Collected:  01/16/18 1443    Specimen:  Blood Updated:  01/16/18 1526     Potassium 3.8 mmol/L     POC Glucose Once [[id number]]  (Abnormal)  Collected:  01/16/18 1549    Specimen:  Blood Updated:  01/16/18 1605     Glucose 179 (H) mg/dL     Narrative:       Meter: [id number] : [id number] MORAES JOSÉ MIGUEL    POC Glucose Once [[id number]]  (Abnormal) Collected:  01/16/18 1651    Specimen:  Blood Updated:  01/16/18 1657     Glucose 178 (H) mg/dL     Narrative:       Meter: [id number] : [id number] MORAES JOSÉ MIGUEL    POC Glucose Once [[id number]]  (Abnormal) Collected:  01/16/18 1751    Specimen:  Blood Updated:  01/16/18 1757     Glucose 180 (H) mg/dL     Narrative:       Meter: [id number] : [id number] MORAES JOSÉ MIGUEL    POC Glucose Once [[id number]]  (Abnormal) Collected:  01/16/18 1902    Specimen:  Blood Updated:  01/16/18 1928     Glucose 184 (H) mg/dL     Narrative:       Meter: [id number] : [id number] Parker Damian    POC Glucose Once [[id number]]  (Abnormal) Collected:  01/16/18 2014    Specimen:  Blood Updated:  01/16/18 2039     Glucose 165 (H) mg/dL     Narrative:       Meter: [id number] : [id number] Parker Damian    POC Glucose Once [[id number]]  (Abnormal) Collected:  01/16/18 2108    Specimen:  Blood Updated:  01/16/18 2148     Glucose 153 (H) mg/dL     Narrative:       Meter: [id number] : [id number] Parker Damian    POC Glucose Once [[id number]]  (Abnormal) Collected:  01/16/18 2226    Specimen:  Blood Updated:  01/16/18 2251     Glucose 144 (H) mg/dL     Narrative:       Meter: [id number] : [id number] Parker Damian    POC Glucose Once [[id number]]  (Normal) Collected:  01/16/18 2313    Specimen:  Blood Updated:  01/16/18 2338     Glucose 126 mg/dL     Narrative:       Meter: [id number] : [id number] Parker Damian    POC Glucose Once [[id number]]  (Abnormal) Collected:  01/17/18 0022    Specimen:  Blood Updated:  01/17/18 0054     Glucose 137 (H) mg/dL     Narrative:       Meter: [id number] : [id number] Keith Salgado    Magnesium [[id number]]  (Abnormal) Collected:  01/17/18 0138    Specimen:  Blood Updated:  01/17/18 0250      Magnesium 3.2 (H) mg/dL     Phosphorus [[id number]]  (Abnormal) Collected:  01/17/18 0138    Specimen:  Blood Updated:  01/17/18 0250     Phosphorus 5.0 (H) mg/dL     Basic Metabolic Panel [[id number]]  (Abnormal) Collected:  01/17/18 0138    Specimen:  Blood Updated:  01/17/18 0250     Glucose 106 mg/dL      BUN 85 (H) mg/dL      Creatinine 1.51 (H) mg/dL      Sodium 148 mmol/L      Potassium 3.4 (L) mmol/L      Chloride 109 mmol/L      CO2 34.2 (H) mmol/L      Calcium 8.0 mg/dL      eGFR Non African Amer 46 (L) mL/min/1.73      BUN/Creatinine Ratio 56.3 (H)     Anion Gap 4.8 mmol/L     Narrative:       GFR Normal >60  Chronic Kidney Disease <60  Kidney Failure <15    Osmolality, Calculated [[id number]]  (Abnormal) Collected:  01/17/18 0138    Specimen:  Blood Updated:  01/17/18 0250     Osmolality Calc 320.5 (H) mOsm/kg     POC Glucose Once [[id number]]  (Abnormal) Collected:  01/17/18 0210    Specimen:  Blood Updated:  01/17/18 0250     Glucose 137 (H) mg/dL     Narrative:       Meter: [id number] : [id number] Benito Nance    CBC & Differential [[id number]] Collected:  01/17/18 0138    Specimen:  Blood Updated:  01/17/18 0351    Narrative:       The following orders were created for panel order CBC & Differential.  Procedure                               Abnormality         Status                     ---------                               -----------         ------                     CBC Auto Differential[[id number]]        Abnormal            Final result                 Please view results for these tests on the individual orders.    CBC Auto Differential [[id number]]  (Abnormal) Collected:  01/17/18 0138    Specimen:  Blood Updated:  01/17/18 0351     WBC 4.95 10*3/mm3      RBC 2.41 (L) 10*6/mm3      Hemoglobin 7.0 (L) g/dL      Hematocrit 23.1 (L) %      MCV 95.9 (H) fL      MCH 29.0 pg      MCHC 30.3 (L) g/dL      RDW 14.7 (H) %      RDW-SD 47.7 fl      MPV 10.9 (H) fL      Platelets 88 (L) 10*3/mm3       Neutrophil % 84.6 (H) %      Lymphocyte % 7.3 (L) %      Monocyte % 5.3 %      Eosinophil % 2.0 %      Basophil % 0.0 %      Immature Grans % 0.8 (H) %      Neutrophils, Absolute 4.19 10*3/mm3      Lymphocytes, Absolute 0.36 (L) 10*3/mm3      Monocytes, Absolute 0.26 10*3/mm3      Eosinophils, Absolute 0.10 10*3/mm3      Basophils, Absolute 0.00 10*3/mm3      Immature Grans, Absolute 0.04 (H) 10*3/mm3     POC Glucose Once [[id number]]  (Normal) Collected:  01/17/18 0418    Specimen:  Blood Updated:  01/17/18 0423     Glucose 108 mg/dL     Narrative:       Meter: [id number] : [id number] Jose LAKE    POC Glucose Once [[id number]]  (Normal) Collected:  01/17/18 0446    Specimen:  Blood Updated:  01/17/18 0510     Glucose 109 mg/dL     Narrative:       Meter: [id number] : [id number] Keith Salgado    Blood Gas, Arterial [[id number]]  (Abnormal) Collected:  01/17/18 0515    Specimen:  Arterial Blood Updated:  01/17/18 0538     Site Arterial: right brachial     Travis's Test Positive     pH, Arterial 7.374 pH units      pCO2, Arterial 57.6 (C) mm Hg      pO2, Arterial 81.5 mm Hg      HCO3, Arterial 32.9 (C) mmol/L      Base Excess, Arterial 6.7 mmol/L      O2 Saturation, Arterial 95.1 %      Hemoglobin, Blood Gas 7.7 (L) g/dL      Hematocrit, Blood Gas 23.0 (L) %      Oxyhemoglobin 92.4 %      Methemoglobin 0.40 %      Carboxyhemoglobin 2.4 %      A-a Gradiant 266.1 mmHg      Temperature 98.6 C      Barometric Pressure for Blood Gas 732 mmHg      Modality Ventilator     FIO2 60 %      Ventilator Mode AC     Set Tidal Volume 430     Set Mech Resp Rate 24     Rate 24 Breaths/minute      PEEP 10    POC Glucose Once [[id number]]  (Normal) Collected:  01/17/18 0706    Specimen:  Blood Updated:  01/17/18 0733     Glucose 95 mg/dL     Narrative:       Meter: [id number] : [id number] Parker Damian    POC Glucose Once [[id number]]  (Normal) Collected:  01/17/18 0832    Specimen:  Blood Updated:  01/17/18 0848     Glucose 80  mg/dL     Narrative:       Meter: [id number] : [id number] fermin jamaica    POC Glucose Once [[id number]]  (Normal) Collected:  01/17/18 0951    Specimen:  Blood Updated:  01/17/18 0957     Glucose 113 mg/dL     Narrative:       Meter: [id number] : [id number] fermin jamaica    POC Glucose Once [[id number]]  (Normal) Collected:  01/17/18 1049    Specimen:  Blood Updated:  01/17/18 1056     Glucose 125 mg/dL     Narrative:       Meter: [id number] : [id number] fermin jamaica    Protime-INR [[id number]]  (Abnormal) Collected:  01/17/18 1039    Specimen:  Blood Updated:  01/17/18 1123     Protime 14.4 Seconds       Note new Reference Range        INR 1.11 (H)    Narrative:       Suggested INR therapeutic range for stable oral anticoagulant therapy:    Low Intensity therapy:   1.5-2.0  Moderate Intensity therapy:   2.0-3.0  High Intensity therapy:   2.5-4.0    POC Glucose Once [[id number]]  (Abnormal) Collected:  01/17/18 1116    Specimen:  Blood Updated:  01/17/18 1147     Glucose 150 (H) mg/dL     Narrative:       Meter: [id number] : [id number] fermin jamaica    POC Glucose Once [[id number]]  (Abnormal) Collected:  01/17/18 1154    Specimen:  Blood Updated:  01/17/18 1210     Glucose 174 (H) mg/dL     Narrative:       Meter: [id number] : [id number] fermin jamaica    aPTT [[id number]]  (Normal) Collected:  01/17/18 1039    Specimen:  Blood Updated:  01/17/18 1301     PTT 31.2 seconds       Note new Reference Range       Narrative:       PTT Heparin Therapeutic Range:  59 - 95 seconds    Fibrinogen [[id number]]  (Normal) Collected:  01/17/18 1039    Specimen:  Blood Updated:  01/17/18 1301     Fibrinogen 364 mg/dL       Note new Reference Range       POC Glucose Once [[id number]]  (Abnormal) Collected:  01/17/18 1309    Specimen:  Blood Updated:  01/17/18 1324     Glucose 171 (H) mg/dL     Narrative:       Meter: [id number] : [id number] fermin jamaica    Iron Profile [[id number]]  (Abnormal) Collected:  01/17/18 5499     Specimen:  Blood Updated:  01/17/18 1448     Iron 28 (L) mcg/dL      TIBC 229 (L) mcg/dL      Iron Saturation 12 %     C-reactive Protein [[id number]]  (Abnormal) Collected:  01/17/18 1431    Specimen:  Blood Updated:  01/17/18 1449     C-Reactive Protein 6.15 (H) mg/dL     POC Glucose Once [[id number]]  (Abnormal) Collected:  01/17/18 1425    Specimen:  Blood Updated:  01/17/18 1451     Glucose 183 (H) mg/dL     Narrative:       Meter: [id number] : [id number] fermin jamaica    Ferritin [[id number]]  (Normal) Collected:  01/17/18 1431    Specimen:  Blood Updated:  01/17/18 1457     Ferritin 190.00 ng/mL     Folate [[id number]]  (Normal) Collected:  01/17/18 1431    Specimen:  Blood Updated:  01/17/18 1458     Folate 7.02 ng/mL     Vitamin B12 [[id number]]  (Abnormal) Collected:  01/17/18 1431    Specimen:  Blood Updated:  01/17/18 1458     Vitamin B-12 >2000 (H) pg/mL     POC Glucose Once [[id number]]  (Abnormal) Collected:  01/17/18 1524    Specimen:  Blood Updated:  01/17/18 1539     Glucose 171 (H) mg/dL     Narrative:       Meter: [id number] : [id number] fermin jamaica    POC Glucose Once [[id number]]  (Abnormal) Collected:  01/17/18 1624    Specimen:  Blood Updated:  01/17/18 1639     Glucose 177 (H) mg/dL     Narrative:       Meter: [id number] : [id number] fermin jamaica    POC Glucose Once [[id number]]  (Abnormal) Collected:  01/17/18 1708    Specimen:  Blood Updated:  01/17/18 1723     Glucose 190 (H) mg/dL     Narrative:       Meter: [id number] : [id number] fermin jamaica    POC Glucose Once [[id number]]  (Abnormal) Collected:  01/17/18 1824    Specimen:  Blood Updated:  01/17/18 1849     Glucose 181 (H) mg/dL     Narrative:       Meter: [id number] : [id number] fermin jamaica    POC Glucose Once [[id number]]  (Abnormal) Collected:  01/17/18 1917    Specimen:  Blood Updated:  01/17/18 1942     Glucose 165 (H) mg/dL     Narrative:       Meter: [id number] : [id number] Keith Salgado    POC Glucose Once  [[id number]]  (Abnormal) Collected:  01/17/18 2033    Specimen:  Blood Updated:  01/17/18 2058     Glucose 180 (H) mg/dL     Narrative:       Meter: [id number] : [id number] Keith Salgado    POC Glucose Once [[id number]]  (Abnormal) Collected:  01/17/18 2132    Specimen:  Blood Updated:  01/17/18 2138     Glucose 181 (H) mg/dL     Narrative:       Meter: [id number] : [id number] Parker Damian    POC Glucose Once [[id number]]  (Abnormal) Collected:  01/17/18 2313    Specimen:  Blood Updated:  01/17/18 2329     Glucose 190 (H) mg/dL     Narrative:       Meter: [id number] : [id number] jaden dale    Respiratory Culture - Bronchial Wash, Bronchus [[id number]] Collected:  01/17/18 1353    Specimen:  Bronchial Wash from Bronchus Updated:  01/17/18 2338     Gram Stain Result Rare (1+) WBCs seen      Occasional Gram negative bacilli    Respiratory Culture - Wash, Lung, Right Lower Lobe [[id number]] Collected:  01/17/18 1353    Specimen:  Wash from Lung, Right Lower Lobe Updated:  01/17/18 2345     Gram Stain Result Occasional WBCs seen    POC Glucose Once [[id number]]  (Abnormal) Collected:  01/18/18 0027    Specimen:  Blood Updated:  01/18/18 0052     Glucose 200 (H) mg/dL     Narrative:       Meter: [id number] : [id number] Keith Salgado    Antinuclear Antibody With Reflex Cascade [[id number]] Collected:  01/18/18 0028    Specimen:  Blood Updated:  01/18/18 0109    ANCA Panel [[id number]] Collected:  01/18/18 0028    Specimen:  Blood Updated:  01/18/18 0115    Glomerular Basement Membrane Antibodies [[id number]] Collected:  01/18/18 0028    Specimen:  Blood Updated:  01/18/18 0115    CBC & Differential [[id number]] Collected:  01/18/18 0028    Specimen:  Blood Updated:  01/18/18 0122    Narrative:       The following orders were created for panel order CBC & Differential.  Procedure                               Abnormality         Status                     ---------                               -----------          ------                     CBC Auto Differential[[id number]]        Abnormal            Final result                 Please view results for these tests on the individual orders.    CBC Auto Differential [[id number]]  (Abnormal) Collected:  01/18/18 0028    Specimen:  Blood Updated:  01/18/18 0122     WBC 5.49 10*3/mm3      RBC 2.74 (L) 10*6/mm3      Hemoglobin 8.1 (L) g/dL      Hematocrit 25.7 (L) %      MCV 93.8 fL      MCH 29.6 pg      MCHC 31.5 (L) g/dL      RDW 14.8 (H) %      RDW-SD 48.2 fl      MPV 12.2 (H) fL      Platelets 72 (L) 10*3/mm3      Neutrophil % 95.7 (H) %      Lymphocyte % 2.7 (L) %      Monocyte % 0.9 %      Eosinophil % 0.2 %      Basophil % 0.0 %      Immature Grans % 0.5 %      Neutrophils, Absolute 5.25 10*3/mm3      Lymphocytes, Absolute 0.15 (L) 10*3/mm3      Monocytes, Absolute 0.05 (L) 10*3/mm3      Eosinophils, Absolute 0.01 10*3/mm3      Basophils, Absolute 0.00 10*3/mm3      Immature Grans, Absolute 0.03 10*3/mm3     C3 Complement [[id number]]  (Normal) Collected:  01/18/18 0028    Specimen:  Blood Updated:  01/18/18 0142     C3 Complement 94.6 mg/dl     C4 Complement [[id number]]  (Normal) Collected:  01/18/18 0028    Specimen:  Blood Updated:  01/18/18 0142     C4 Complement 17.8 mg/dl     Comprehensive Metabolic Panel [[id number]]  (Abnormal) Collected:  01/18/18 0028    Specimen:  Blood Updated:  01/18/18 0143     Glucose 187 (H) mg/dL      BUN 76 (H) mg/dL      Creatinine 1.37 (H) mg/dL      Sodium 146 mmol/L      Potassium 4.5 mmol/L      Chloride 108 mmol/L      CO2 31.5 mmol/L      Calcium 7.9 mg/dL      Total Protein 5.4 (L) g/dL      Albumin 3.20 (L) g/dL      ALT (SGPT) 21 U/L      AST (SGOT) 24 U/L      Alkaline Phosphatase 35 (L) U/L       Note New Reference Ranges        Total Bilirubin 0.8 mg/dL      eGFR Non African Amer 52 (L) mL/min/1.73      Globulin 2.2 gm/dL      A/G Ratio 1.5 g/dL      BUN/Creatinine Ratio 55.5 (H)     Anion Gap 6.5 mmol/L     Osmolality, Calculated  [[id number]]  (Abnormal) Collected:  01/18/18 0028    Specimen:  Blood Updated:  01/18/18 0143     Osmolality Calc 318.1 (H) mOsm/kg     POC Glucose Once [[id number]]  (Abnormal) Collected:  01/18/18 0205    Specimen:  Blood Updated:  01/18/18 0230     Glucose 199 (H) mg/dL     Narrative:       Meter: [id number] : [id number] Parker Damian    POC Glucose Once [[id number]]  (Abnormal) Collected:  01/18/18 0305    Specimen:  Blood Updated:  01/18/18 0331     Glucose 200 (H) mg/dL     Narrative:       Meter: [id number] : [id number] Parker Damian    POC Glucose Once [[id number]]  (Abnormal) Collected:  01/18/18 0357    Specimen:  Blood Updated:  01/18/18 0411     Glucose 178 (H) mg/dL     Narrative:       Meter: [id number] : [id number] Jessica Saini    POC Glucose Once [[id number]]  (Abnormal) Collected:  01/18/18 0527    Specimen:  Blood Updated:  01/18/18 0534     Glucose 137 (H) mg/dL     Narrative:       Meter: [id number] : [id number] Parker Damian    POC Glucose Once [[id number]]  (Abnormal) Collected:  01/18/18 0640    Specimen:  Blood Updated:  01/18/18 0705     Glucose 144 (H) mg/dL     Narrative:       Meter: [id number] : [id number] Parker Damian    POC Glucose Once [[id number]]  (Abnormal) Collected:  01/18/18 0715    Specimen:  Blood Updated:  01/18/18 0732     Glucose 152 (H) mg/dL     Narrative:       Meter: [id number] : [id number] jeny berumen    Blood Gas, Arterial [[id number]]  (Abnormal) Collected:  01/18/18 0729    Specimen:  Arterial Blood Updated:  01/18/18 0831     Site Arterial: right brachial     Travis's Test N/A     pH, Arterial 7.398 pH units      pCO2, Arterial 53.0 (C) mm Hg      pO2, Arterial 74.2 (L) mm Hg      HCO3, Arterial 31.9 (C) mmol/L      Base Excess, Arterial 6.3 mmol/L      O2 Saturation, Arterial 94.1 %      Hemoglobin, Blood Gas 8.2 (L) g/dL      Hematocrit, Blood Gas 24.0 (L) %      Oxyhemoglobin 91.8 %      Methemoglobin 0.40 %      Carboxyhemoglobin 2.0 %      A-a  Gradiant >300.0 (H) mmHg      Temperature 98.6 C      Barometric Pressure for Blood Gas 732 mmHg      Modality Ventilator     FIO2 100 %      Ventilator Mode VC/AC     Set Tidal Volume 430     Set Mech Resp Rate 24     PEEP 10    POC Glucose Once [[id number]]  (Normal) Collected:  01/18/18 0841    Specimen:  Blood Updated:  01/18/18 0906     Glucose 110 mg/dL     Narrative:       Meter: [id number] : [id number] [place]    POC Glucose Once [[id number]]  (Normal) Collected:  01/18/18 0922    Specimen:  Blood Updated:  01/18/18 0938     Glucose 125 mg/dL     Narrative:       Meter: [id number] : [id number] fermin [place]    POC Glucose Once [[id number]]  (Normal) Collected:  01/18/18 1027    Specimen:  Blood Updated:  01/18/18 1036     Glucose 127 mg/dL     Narrative:       Meter: [id number] : [id number] fermin [place]    POC Glucose Once [[id number]]  (Normal) Collected:  01/18/18 1116    Specimen:  Blood Updated:  01/18/18 1138     Glucose 119 mg/dL     Narrative:       Meter: [id number] : [id number] [place]    POC Glucose Once [[id number]]  (Normal) Collected:  01/18/18 1232    Specimen:  Blood Updated:  01/18/18 1308     Glucose 120 mg/dL     Narrative:       Meter: [id number] : [id number] fermin [place]    Anti-DNA Antibody, Double-stranded [[id number]] Collected:  01/17/18 1242    Specimen:  Blood Updated:  01/18/18 1412     Anti-DNA (DS) Ab Qn <1 IU/mL                                          Negative      <5                                     Equivocal  5 - 9                                     Positive      >9       Narrative:       Performed at:  [address] [place] [address]  [id number]  : Landon Fried PhD, Phone:  [phone]    POC Glucose Once [[id number]]  (Normal) Collected:  01/18/18 1349    Specimen:  Blood Updated:  01/18/18 1414     Glucose 126 mg/dL     Narrative:       Meter: [id number] : [id number] fermin [place]    POC Glucose Once [[id number]]   (Normal) Collected:  01/18/18 1442    Specimen:  Blood Updated:  01/18/18 1448     Glucose 126 mg/dL     Narrative:       Meter: [id number] : [id number] fermin [place]    Peripheral Blood Smear - Blood, [[id number]] Collected:  01/17/18 0138    Specimen:  Blood Updated:  01/18/18 1510     Performed by: Lupe Morris     Pathologist Interpretation See scanned report    Systemic Lupus Profile A [[id number]] Collected:  01/18/18 1515    Specimen:  Blood Updated:  01/18/18 1515    MEGHANA With / DsDNA, RNP, Sjogrens A / B, Oliver [[id number]] Collected:  01/18/18 1515    Specimen:  Blood Updated:  01/18/18 1515    Anti-scleroderma Antibody [[id number]] Collected:  01/18/18 1515    Specimen:  Blood Updated:  01/18/18 1515    Antimyeloperoxidase (MPO) Antibodies [[id number]] Collected:  01/18/18 1516    Specimen:  Blood Updated:  01/18/18 1516    Antiproteinase 3 (MT-3) Antibodies [[id number]] Collected:  01/18/18 1516    Specimen:  Blood Updated:  01/18/18 1516    Systemic Lupus Erythematosus (SLE) Profile B [[id number]] Collected:  01/18/18 1516    Specimen:  Blood Updated:  01/18/18 1516    Rheumatoid Factor, Quant [[id number]]  (Normal) Collected:  01/18/18 1513    Specimen:  Blood Updated:  01/18/18 1602     Rheumatoid Factor Quantitative 9.0 IU/mL     POC Glucose Once [[id number]]  (Abnormal) Collected:  01/18/18 1641    Specimen:  Blood Updated:  01/18/18 1657     Glucose 162 (H) mg/dL     Narrative:       Meter: [id number] : [id number] fermin [place]    POC Glucose Once [[id number]]  (Abnormal) Collected:  01/18/18 1830    Specimen:  Blood Updated:  01/18/18 1837     Glucose 145 (H) mg/dL     Narrative:       Meter: [id number] : [id number] fermin [place]    POC Glucose Once [[id number]]  (Abnormal) Collected:  01/18/18 2031    Specimen:  Blood Updated:  01/18/18 2037     Glucose 178 (H) mg/dL     Narrative:       Meter: [id number] : [id number] [place]    POC Glucose Once [[id number]]  (Abnormal) Collected:   01/18/18 2119    Specimen:  Blood Updated:  01/18/18 2126     Glucose 170 (H) mg/dL     Narrative:       Meter: [id number] : [id number] Sears Madelyn N    POC Glucose Once [[id number]]  (Abnormal) Collected:  01/18/18 2219    Specimen:  Blood Updated:  01/18/18 2225     Glucose 202 (H) mg/dL     Narrative:       Meter: [id number] : [id number] Sears Madelyn N    POC Glucose Once [[id number]]  (Abnormal) Collected:  01/19/18 0016    Specimen:  Blood Updated:  01/19/18 0024     Glucose 170 (H) mg/dL     Narrative:       Meter: [id number] : [id number] Sears Madelyn N    CBC & Differential [[id number]] Collected:  01/19/18 0047    Specimen:  Blood Updated:  01/19/18 0132    Narrative:       The following orders were created for panel order CBC & Differential.  Procedure                               Abnormality         Status                     ---------                               -----------         ------                     CBC Auto Differential[[id number]]        Abnormal            Final result                 Please view results for these tests on the individual orders.    CBC Auto Differential [[id number]]  (Abnormal) Collected:  01/19/18 0047    Specimen:  Blood Updated:  01/19/18 0132     WBC 8.30 10*3/mm3      RBC 2.56 (L) 10*6/mm3      Hemoglobin 7.5 (L) g/dL      Hematocrit 24.1 (L) %      MCV 94.1 (H) fL      MCH 29.3 pg      MCHC 31.1 (L) g/dL      RDW 14.8 (H) %      RDW-SD 47.5 fl      MPV 11.7 (H) fL      Platelets 84 (L) 10*3/mm3      Neutrophil % 93.5 (H) %      Lymphocyte % 2.9 (L) %      Monocyte % 3.4 %      Eosinophil % 0.0 %      Basophil % 0.0 %      Immature Grans % 0.2 %      Neutrophils, Absolute 7.76 (H) 10*3/mm3      Lymphocytes, Absolute 0.24 (L) 10*3/mm3      Monocytes, Absolute 0.28 10*3/mm3      Eosinophils, Absolute 0.00 10*3/mm3      Basophils, Absolute 0.00 10*3/mm3      Immature Grans, Absolute 0.02 10*3/mm3     Comprehensive Metabolic Panel [[id number]]  (Abnormal)  Collected:  01/19/18 0047    Specimen:  Blood Updated:  01/19/18 0142     Glucose 160 (H) mg/dL      BUN 78 (H) mg/dL      Creatinine 1.38 (H) mg/dL      Sodium 145 mmol/L      Potassium 3.9 mmol/L      Chloride 107 mmol/L      CO2 31.9 mmol/L      Calcium 7.9 mg/dL      Total Protein 5.2 (L) g/dL      Albumin 3.10 (L) g/dL      ALT (SGPT) 19 U/L      AST (SGOT) 28 U/L      Alkaline Phosphatase 32 (L) U/L       Note New Reference Ranges        Total Bilirubin 0.8 mg/dL      eGFR Non African Amer 51 (L) mL/min/1.73      Globulin 2.1 gm/dL      A/G Ratio 1.5 g/dL      BUN/Creatinine Ratio 56.5 (H)     Anion Gap 6.1 mmol/L     Magnesium [[id number]]  (Abnormal) Collected:  01/19/18 0047    Specimen:  Blood Updated:  01/19/18 0142     Magnesium 3.1 (H) mg/dL     Phosphorus [[id number]]  (Normal) Collected:  01/19/18 0047    Specimen:  Blood Updated:  01/19/18 0142     Phosphorus 4.3 mg/dL     Osmolality, Calculated [[id number]]  (Abnormal) Collected:  01/19/18 0047    Specimen:  Blood Updated:  01/19/18 0142     Osmolality Calc 315.4 (H) mOsm/kg     C-reactive Protein [[id number]]  (Abnormal) Collected:  01/19/18 0047    Specimen:  Blood Updated:  01/19/18 0147     C-Reactive Protein 7.13 (H) mg/dL     Blood Culture - Blood, [[id number]]  (Normal) Collected:  01/15/18 0114    Specimen:  Blood from Hand, Left Updated:  01/19/18 0201     Blood Culture No growth at 4 days    Blood Culture - Blood, [[id number]]  (Normal) Collected:  01/15/18 0051    Specimen:  Blood from Hand, Right Updated:  01/19/18 0201     Blood Culture No growth at 4 days    POC Glucose Once [[id number]]  (Abnormal) Collected:  01/19/18 0228    Specimen:  Blood Updated:  01/19/18 0234     Glucose 172 (H) mg/dL     Narrative:       Meter: [id number] : [id number] [place]    CBC & Differential [[id number]] Collected:  01/19/18 0228    Specimen:  Blood Updated:  01/19/18 0239    Narrative:       The following orders were created for panel order CBC &  Differential.  Procedure                               Abnormality         Status                     ---------                               -----------         ------                     CBC Auto Differential[[id number]]        Abnormal            Final result                 Please view results for these tests on the individual orders.    CBC Auto Differential [[id number]]  (Abnormal) Collected:  01/19/18 0228    Specimen:  Blood Updated:  01/19/18 0239     WBC 8.34 10*3/mm3      RBC 2.54 (L) 10*6/mm3      Hemoglobin 7.3 (L) g/dL      Hematocrit 23.9 (L) %      MCV 94.1 (H) fL      MCH 28.7 pg      MCHC 30.5 (L) g/dL      RDW 14.9 (H) %      RDW-SD 48.2 fl      MPV 11.1 (H) fL      Platelets 87 (L) 10*3/mm3      Neutrophil % 93.5 (H) %      Lymphocyte % 3.1 (L) %      Monocyte % 3.2 %      Eosinophil % 0.0 %      Basophil % 0.0 %      Immature Grans % 0.2 %      Neutrophils, Absolute 7.79 (H) 10*3/mm3      Lymphocytes, Absolute 0.26 (L) 10*3/mm3      Monocytes, Absolute 0.27 10*3/mm3      Eosinophils, Absolute 0.00 10*3/mm3      Basophils, Absolute 0.00 10*3/mm3      Immature Grans, Absolute 0.02 10*3/mm3     Blood Gas, Arterial [[id number]]  (Abnormal) Collected:  01/19/18 0451    Specimen:  Arterial Blood Updated:  01/19/18 0519     Site Arterial: left radial     Travis's Test N/A     pH, Arterial 7.389 pH units      pCO2, Arterial 53.2 (C) mm Hg      pO2, Arterial 64.4 (L) mm Hg      HCO3, Arterial 31.4 (C) mmol/L      Base Excess, Arterial 5.7 mmol/L      O2 Saturation, Arterial 90.5 %      Hemoglobin, Blood Gas 7.7 (L) g/dL      Hematocrit, Blood Gas 23.0 (L) %      Oxyhemoglobin 87.8 %      Methemoglobin 0.50 %      Carboxyhemoglobin 2.5 %      A-a Gradiant >300.0 (H) mmHg      Temperature 98.6 C      Barometric Pressure for Blood Gas 732 mmHg      Modality Ventilator     FIO2 80 %      Ventilator Mode AC     Set Tidal Volume 430     Set Mech Resp Rate 24     PEEP 10    POC Glucose Once [[id number]]  (Abnormal)  Collected:  01/19/18 0526    Specimen:  Blood Updated:  01/19/18 0533     Glucose 159 (H) mg/dL     Narrative:       Meter: [id number] : [id number] [place]    POC Glucose Once [[id number]]  (Abnormal) Collected:  01/19/18 0651    Specimen:  Blood Updated:  01/19/18 0657     Glucose 164 (H) mg/dL     Narrative:       Meter: [id number] : [id number] rachael ceballos            Imaging Results (last 72 hours)     Procedure Component Value Units Date/Time    XR Chest 1 View [[id number]] Collected:  01/18/18 0720     Updated:  01/18/18 0723    Narrative:       EXAMINATION: XR CHEST 1 VW-      CLINICAL INDICATION:     f/u pneumonia/copd/line placement; J96.21-Acute  and chronic respiratory failure with hypoxia; J96.22-Acute and chronic  respiratory failure with hypercapnia; J44.1-Chronic obstructive  pulmonary disease with (acute) exacerbation; R09.02-Hypoxemia     TECHNIQUE: Single AP view of chest.      COMPARISON: January 15, 2018      FINDINGS:   There is bibasilar atelectasis.   Extensive bilateral airspace disease.  Support tube and lines are in unchanged position.   Heart size is stable.  No pneumothorax.   Tiny bilateral pleural effusions persist.        Impression:       Extensive bilateral airspace disease. Were seen on previous  study.     This report was finalized on 1/18/2018 7:21 AM by Dr. Miko Coto MD.       XR Chest 1 View [[id number]] Updated:  01/19/18 0655                Assessment & Plan:  Se Anne is a very pleasant 67 y.o. male with      1. Thrombocytopenia   2. Coagulopathy  3. Diffuse alveolar hemorrhage  4. Iron deficiency  5. Borderline folate deficiency      -Noted low grade thrombocytopenia when initially admitted however this later normalized until he started having bleeding and since then his platelets have downtrended. In review of his previous CBCs he has a history of thrombocytopenia during acute illnesses that would recover and has also had negative HIT antibodies during  previous admissions.   - He was on SQ Heparin this admission which was discontinued and he was started on Arixtra for DVT prophylaxis on 12/30/17 although no official HIT antibody or JORDON was sent. And 4T score was low.  - He had also been on ASA which could have caused some platelet dysfunction, however this was discontinued along with Arixtra when he was found to have diffuse alveolar hemorrhage and he has been off both agents since.  -Patient continues to have blood suctioned from ETT.. Patient has no other obvious blood loss or mucosal bleeding (central line bleeding has stopped after patch placement and no bleeding with other peripheral lines that were placed thereafter). Platelet count stable and thrombocytopenia likely due to ongoing acute illness as well as acute blood loss.   -Dr. Gomez repeated bronchoscopy on 1/18 and found old blood but no acute bleeding.  - He has also received vitamin K and Amicar during his admission. Patient has received 3 units of FFP, 7 units of PRBCs and 1 unit of platelets during this admission.  - Concern would be for DIC however with now near normal PT/PTT and fibrinogen and no other bleeding from other peripheral sites which look clean this is unlikely. PT, PTT  have almost normalized with recent Vitamin K adminstration. Will continue vitamin K daily for total of three days.    Fibrinogen was normal, therefore he does not require cryoprecipitate. If rebleeds would recheck coags.  - I do not believe that his DAH is secondary to a hematological issue such as DIC and at present do not see any benefit in platelet transfusion as patient has not been on ASA or any sort of prophylactic anticoagulation for sometime. Will need platelet transfusion if platelet is <50 thousand. Would continue to hold on any sort of anticoagulation or antiplatelet agents with acute bleed.  - Peripheral blood smear pending.   - B12 levels are normal however folate level is borderline therefore will start  on folic acid 1mg daily.  - Iron panel is consistent with iron deficiency (from acute blood loss) as well as anemia of chronic inflammation. Will start on oral iron once daily given that there was hesitation with IV iron given recent infection (this was also previously stopped for dark stools).  - He is currently undergoing workup for DAH.         Thank you for allowing us to participate in the care of Mr. Anne. Please do not hesitate to contact Hem/Onc with any questions or concerns.              Marbella Gomes MD  01/19/18  7:52 AM

## 2018-01-19 NOTE — PROGRESS NOTES
LOS: 22 days   Patient Care Team:  No Known Provider as PCP - General  No Known Provider as PCP - Family Medicine    Chief Complaint:  Pulmonology is following for respiratory failure  Subjective     Interval History: patient is intubated and sedated.  No acute events reported overnight.      History taken from: chart RN Patient unable to give history due to patient intubated.    Review of Systems:   Review of Systems - History obtained from chart review and unobtainable from patient due to mental status and Intubated      Objective     Vital Signs  Temp:  [98 °F (36.7 °C)-99 °F (37.2 °C)] 98.3 °F (36.8 °C)  Heart Rate:  [51-71] 54  Resp:  [13-30] 25  BP: (139-180)/() 148/52  FiO2 (%):  [80 %-100 %] 80 %  Body mass index is 47.7 kg/(m^2).    Intake/Output Summary (Last 24 hours) at 01/19/18 1025  Last data filed at 01/19/18 0929   Gross per 24 hour   Intake          2089.02 ml   Output             2350 ml   Net          -260.98 ml     I/O this shift:  In: 150 [Other:50; IV Piggyback:100]  Out: -     Physical Exam:  GENERAL APPEARANCE: Intubated and sedated.  Appears to be resting comfortably in bed.     HEAD: normocephalic.    EYES: PERRLA.    THROAT: ET tube in place. Oral cavity and pharynx normal. No inflammation, swelling, exudate, or lesions.     NECK: Neck supple.     CARDIAC: Normal S1 and S2. No S3, S4 or murmurs. Rhythm is regular. There is no peripheral edema, cyanosis or pallor. Extremities are warm and well perfused. Capillary refill is less than 2 seconds. No carotid bruits.    RESPIRATORY: Clear to auscultation and percussion without rales, rhonchi, wheezing or diminished breath sounds.    GI: Positive bowel sounds. Soft, nondistended, nontender.     MUSCULOSKELETAL: No significant deformity or joint abnormality. No edema. Peripheral pulses intact.    NEUROLOGICAL: Unable to assess due to sedation status.     PSYCHIATRIC: Unable to assess due to sedation status.     Results Review:     I  reviewed the patient's new clinical results.  I reviewed the patient's new imaging results and agree with the interpretation.    Results from last 7 days  Lab Units 01/19/18  0228 01/19/18  0047 01/18/18  0028   WBC 10*3/mm3 8.34 8.30 5.49   HEMOGLOBIN g/dL 7.3* 7.5* 8.1*   PLATELETS 10*3/mm3 87* 84* 72*       Results from last 7 days  Lab Units 01/19/18  0047 01/18/18  0028 01/17/18  0138  01/16/18  0107   SODIUM mmol/L 145 146 148  --  149   POTASSIUM mmol/L 3.9 4.5 3.4*  < > 3.6   CHLORIDE mmol/L 107 108 109  --  110   CO2 mmol/L 31.9 31.5 34.2*  --  28.9   BUN mg/dL 78* 76* 85*  --  95*   CREATININE mg/dL 1.38* 1.37* 1.51*  --  1.90*   CALCIUM mg/dL 7.9 7.9 8.0  --  7.9   GLUCOSE mg/dL 160* 187* 106  --  118*   MAGNESIUM mg/dL 3.1*  --  3.2*  --  3.3*   < > = values in this interval not displayed.  Lab Results   Component Value Date    INR 1.11 (H) 01/17/2018    INR 1.25 (H) 01/10/2018    INR 1.27 (H) 01/09/2018    PROTIME 14.4 01/17/2018    PROTIME 15.8 (H) 01/10/2018    PROTIME 16.1 (H) 01/09/2018       Results from last 7 days  Lab Units 01/19/18  0047 01/18/18  0028 01/16/18  0107   ALK PHOS U/L 32* 35* 27*   BILIRUBIN mg/dL 0.8 0.8 0.6   ALT (SGPT) U/L 19 21 16   AST (SGOT) U/L 28 24 21       Results from last 7 days  Lab Units 01/19/18  0451   PH, ARTERIAL pH units 7.389   PO2 ART mm Hg 64.4*   PCO2, ARTERIAL mm Hg 53.2*   HCO3 ART mmol/L 31.4*     Imaging Results (last 24 hours)     Procedure Component Value Units Date/Time    XR Chest 1 View [[id number]] Updated:  01/19/18 0655             Medication Review:   Scheduled Medications:    amLODIPine 10 mg Oral Daily   atorvastatin 80 mg Oral Daily   cholecalciferol 2,000 Units Oral Daily   CloNIDine 0.2 mg Oral Q8H   ferrous sulfate 300 mg Oral Daily   folic acid (FOLVITE) IVPB 1 mg Intravenous Daily   furosemide 40 mg Intravenous Q12H   guaiFENesin 200 mg Oral Q4H   hydrALAZINE 100 mg Oral Q8H   ipratropium-albuterol 3 mL Nebulization Q6H - RT   isosorbide  mononitrate 240 mg Oral Daily   labetalol 200 mg Oral Q12H   lansoprazole 30 mg Oral BID   levothyroxine 25 mcg Oral Daily   methylPREDNISolone sodium succinate 1,000 mg Intravenous Daily   phytonadione (VITAMIN K) IVPB 10 mg Intravenous Once   polyethylene glycol 17 g Oral BID   sertraline 50 mg Oral Q PM   sodium chloride 10 mL Intracatheter Q12H   sodium chloride 10 mL Intracatheter Q12H   terazosin 10 mg Oral Daily   vitamin B-12 2,000 mcg Oral Daily     Continuous infusions:    fentaNYL (SUBLIMAZE) PCA 1500 mcg/30 mL syringe     insulin regular infusion 1 unit/mL 1-20 Units/hr Last Rate: 7 Units/hr (01/19/18 0007)   Pharmacy Consult     propofol 5-50 mcg/kg/min Last Rate: 50 mcg/kg/min (01/19/18 0904)       Assessment/Plan     Neuro: Patient is intubated and sedated per protocol.       Respiratory Failure: likely related to underlying lung disease, COPD and fluid volume overload.     Chest xray reviewed.  Advanced ET tube 2 cm.  ABG remains stable.  FIO2 remains at 80%.  Continue solumedrol 1,000 mg IV for 3 days.    No bleeding noted when suctioning from ET tube.    Continue scheduled inhalants and PRN neb treatments.    Intubated: 1/9/18.       Hypertension: BP within normal range.  NSR.  Continuous ECG, BP and pulse ox monitoring. Maintain MAP > 65.      SHEELA on CKD stage III: creatinine 1.38.  Continue management per nephrology.      Hyperglycemia: monitor glucose targeting 140-180.      GI: Continue GI prophylaxis and tube feedings.      Low H/H: HGB remains stable at 7.3.  Platelets are 93.  Will continue to monitor.      IV access: Midlines and peripherals.         Patient Active Problem List   Diagnosis Code   • COPD (chronic obstructive pulmonary disease) J44.9   • Shortness of breath R06.02   • Morbid obesity E66.01   • Hypoxia R09.02   • Edema extremities R60.0   • Sleep apnea G47.30   • Wheezing R06.2   • Allergic rhinitis J30.9   • Essential hypertension I10   • Sore throat J02.9   • Cough R05   •  Pneumonia J18.9   • Respiratory failure J96.90   • Acute on chronic respiratory failure with hypoxia and hypercapnia J96.21, J96.22         Bedside rounds were completed with RN and RRT, discussed case and plan in great detail.      YANG Doss  01/19/18  10:25 AM        Attestation: Scribed for Dr. Gomez, YANG Martin    I, Jeremi Gomez M.D. attest that the above note accurately reflects the work and decisions made  by me.  Patient was seen and evaluated by Dr. Gomez, including history of present illness, physical exam, assessment, and treatment plan.  The above note was reviewed and edited by Dr. Gomez.  Critical Care time spent in direct patient care: 35 minutes (excluding procedure time, if applicable) including high complexity decision making to assess, manipulate, and support vital organ system failure in this individual who has impairment of one or more vital organ systems such that there is a high probability of imminent or life threatening deterioration in the patient’s condition.

## 2018-01-19 NOTE — NURSING NOTE
Patient has an area of purple discoloration to the left buttock.  Area is intact and blanchable at this time.

## 2018-01-19 NOTE — PROGRESS NOTES
[facility] CCU Note    Patient Identification:  Name:  Se Anne  Age:  67 y.o.  Sex:  male  :  1950  MRN:  [id number]  Visit number:  [id number]  Primary Care Provider:  No Known Provider    22    Ventilator Day: 6  Ventilator Settings:  Ventilator/Non-Invasive Ventilation Settings     Start     Ordered    18 1107  Ventilator - AC/VC; 20; 100 (85); Other (see comments); 13; 450  Continuous     Question Answer Comment   Vent Mode AC/VC    Breath rate 20    FiO2 100 85   PEEP Other (see comments)    PEEP: 13    Tidal Volume 450        18 1106    01/10/18 0702  Ventilator - AC/VC; 20; 100; Other (see comments); 13; 450  Continuous,   Status:  Canceled     Question Answer Comment   Vent Mode AC/VC    Breath rate 20    FiO2 100    PEEP Other (see comments)    PEEP: 13    Tidal Volume 450        01/10/18 0703    18 0627  . BIPAP  As Needed-RT,   Status:  Canceled     Comments:  respiratory to mangage   Question:  .  Answer:  BIPAP    18 0628    17 1055  . CPAP; Pressure: 12  At Bedtime & As Needed-RT,   Status:  Canceled     Question Answer Comment   . CPAP    Pressure 12        17 1054          Drips: Propofol, fentanyl, insulin  Antibiotics: None  Lines: Left internal jugular central line, bilateral upper extremity midline    Procedures:   -Emergent Intubation  -Bronchocsopy x2  -PRBC x6  -FFP x3  -Platelets x1    HPI:  66 yo male admitted with dyspnea    Subjective      Patient Continues to have bleeding through his ET tube this morning. No other acute events per nursing staff. Blood glucose levels stable on insulin drip.      History taken from: chart RN   Patient unable to give history due to: Sedation/Mechanical ventilation  Present during visit: REGGIE Dent    Objective     Vital Signs  Temp:  [98 °F (36.7 °C)-99 °F (37.2 °C)] 98.3 °F (36.8 °C)  Heart Rate:  [51-71] 55  Resp:  [13-30] 25  BP: (139-180)/() 148/52  FiO2 (%):  [80 %-100 %]  80 %  Body mass index is 47.7 kg/(m^2).    Intake/Output Summary (Last 24 hours) at 01/19/18 0858  Last data filed at 01/19/18 0745   Gross per 24 hour   Intake          1989.02 ml   Output             2350 ml   Net          -360.98 ml       Physical Exam:    Physical Exam   Constitutional: He appears well-developed and well-nourished. No distress. He is sedated and intubated.   HENT:   Head: Normocephalic and atraumatic.   Nose: Nose normal.   Mouth/Throat: Oropharynx is clear and moist and mucous membranes are normal.   Eyes: Conjunctivae and EOM are normal. Pupils are equal, round, and reactive to light. No scleral icterus.   Neck: Trachea normal. Neck supple. No JVD present. Carotid bruit is not present. No thyromegaly present.   Cardiovascular: Normal rate, regular rhythm and normal pulses.  Exam reveals no gallop and no friction rub.    Murmur heard.  3+ edema noted bilateral upper and lower extremities    Pulmonary/Chest: Effort normal. He is intubated. No respiratory distress. He has no decreased breath sounds. He has no wheezes. He has rhonchi. He has rales.   Abdominal: Soft. He exhibits distension. Bowel sounds are increased. There is no tenderness. There is no guarding.   Genitourinary:   Genitourinary Comments: Worley in place and draining yellow urine   Neurological:   Unable to assess; currently sedated   Skin: Skin is warm, dry and intact. No rash noted. No erythema.      Results Review:     Telemetry: Sinus bradycardia, 56 bpm    I reviewed the patient's new imaging results and agree with the interpretation.  I reviewed the patient's other test results and agree with the interpretation.      Results from last 7 days  Lab Units 01/19/18  0228 01/19/18  0047 01/18/18  0028 01/17/18  0138 01/16/18  0107 01/15/18  0114 01/14/18  0047   WBC 10*3/mm3 8.34 8.30 5.49 4.95 7.68 5.29 4.26*   HEMOGLOBIN g/dL 7.3* 7.5* 8.1* 7.0* 7.5* 8.0* 7.5*   PLATELETS 10*3/mm3 87* 84* 72* 88* 123* 144 146       Results from  last 7 days  Lab Units 01/19/18  0047 01/18/18  0028 01/17/18  0138 01/16/18  1443 01/16/18  0107 01/15/18  0114 01/14/18  0047 01/13/18  0058   SODIUM mmol/L 145 146 148  --  149 147 146 145   POTASSIUM mmol/L 3.9 4.5 3.4* 3.8 3.6 4.0 4.0 3.5   CHLORIDE mmol/L 107 108 109  --  110 109 107 105   CO2 mmol/L 31.9 31.5 34.2*  --  28.9 31.8 31.6 32.9*   BUN mg/dL 78* 76* 85*  --  95* 103* 97* 89*   CREATININE mg/dL 1.38* 1.37* 1.51*  --  1.90* 2.13* 2.19* 2.27*   CALCIUM mg/dL 7.9 7.9 8.0  --  7.9 8.1 8.2 8.3   GLUCOSE mg/dL 160* 187* 106  --  118* 139* 139* 141*       Results from last 7 days  Lab Units 01/19/18  0047 01/18/18  0028 01/16/18  0107 01/15/18  0114 01/14/18  0047 01/13/18  0058   BILIRUBIN mg/dL 0.8 0.8 0.6 0.6 0.6 0.8   ALK PHOS U/L 32* 35* 27* 28* 30* 33*   AST (SGOT) U/L 28 24 21 19 15 14   ALT (SGPT) U/L 19 21 16 17 17 18       Results from last 7 days  Lab Units 01/19/18  0047 01/17/18  0138 01/16/18  0107 01/15/18  0114 01/14/18  0047 01/13/18  0058   MAGNESIUM mg/dL 3.1* 3.2* 3.3* 3.4* 3.2* 3.0*       Results from last 7 days  Lab Units 01/17/18  1039   INR  1.11*       Results from last 7 days  Lab Units 01/19/18  0047 01/17/18  1431 01/16/18  0107   CRP mg/dL 7.13* 6.15* 0.88       Cultures:  Blood Culture   Date Value Ref Range Status   01/09/2018 No growth at 5 days  Final   01/09/2018 No growth at 5 days  Final       Respiratory Culture   Date Value Ref Range Status   01/09/2018 No growth  Final   01/09/2018 No growth  Final   01/09/2018 No growth  Final   01/09/2018 No growth  Final         CRP: 0.88        Portable CXR, 1/15/18:  FINDINGS:   There is bibasilar atelectasis.   Bilateral airspace disease.  Support tube and lines are in unchanged position.   Heart size is stable.  No pneumothorax.   Tiny bilateral pleural effusions persist.       IMPRESSION:  Stable appearance of the chest.          Current Hospital Medications:    amLODIPine 10 mg Oral Daily   atorvastatin 80 mg Oral Daily    cholecalciferol 2,000 Units Oral Daily   CloNIDine 0.2 mg Oral Q8H   ferrous sulfate 300 mg Oral Daily   folic acid (FOLVITE) IVPB 1 mg Intravenous Daily   furosemide 40 mg Intravenous Q12H   guaiFENesin 200 mg Oral Q4H   hydrALAZINE 100 mg Oral Q8H   ipratropium-albuterol 3 mL Nebulization Q6H - RT   isosorbide mononitrate 240 mg Oral Daily   labetalol 200 mg Oral Q12H   lansoprazole 30 mg Oral BID   levothyroxine 25 mcg Oral Daily   methylPREDNISolone sodium succinate 1,000 mg Intravenous Daily   phytonadione (VITAMIN K) IVPB 10 mg Intravenous Once   phytonadione (VITAMIN K) IVPB 10 mg Intravenous Once   polyethylene glycol 17 g Oral BID   sertraline 50 mg Oral Q PM   sodium chloride 10 mL Intracatheter Q12H   sodium chloride 10 mL Intracatheter Q12H   terazosin 10 mg Oral Daily   vitamin B-12 2,000 mcg Oral Daily       fentaNYL (SUBLIMAZE) PCA 1500 mcg/30 mL syringe     insulin regular infusion 1 unit/mL 1-20 Units/hr Last Rate: 7 Units/hr (01/19/18 0007)   Pharmacy Consult     propofol 5-50 mcg/kg/min Last Rate: 50 mcg/kg/min (01/19/18 0610)       Assessment/Plan      -Recurrent alveolar hemorrhage; 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 as patient had re-bleeding off any type of heparin product(s)  -Acute thrombocytopenia  -Acute on chronic hypoxic/hypercarbic respiratory failure secondary to a COPD exacerbation with bilateral hospital-acquired pneumonia status post course of IV antibiotics  -Iron deficiency  -Mild acute hypokalemia, resolved  -(+) fluid balance; consider decreasing free water flushes now that sodium improving  -Acute kidney injury on top of stage III chronic kidney disease; kidney function improved  -Mild hypernatremia; adjustments made to free water flushes; improved  -Insulin-dependent diabetes mellitus type 2 currently controlled on insulin drip  -Acute on chronic normocytic anemia  -Nutrition: Tolerating tube feeding well    Continue to monitor the patient closely.   Unortunately, the patient's FiO2 had to be increased to 100% and he is more hypoxemic; today his FiO2 was decreased to 0.8.  His repeat chest x-ray is reviewed and appears to be worsened in light of his recurrent diffuse alveolar hemorrhage and underwent repeat bronchoscopy which revealed old blood without acute bleding.  He was evaluated by hematology and there assistance is appreciated.  Continue to hold any heparin products and/or antiplatelet agents.  Will not plan to transfuse platelets unless platelet count is less than 50,000.  Peripheral blood smear has been ordered and is pending as well.  The patient was started on oral iron and was started on folic acid as well by hematology. Continue high-dose steroids.     CRP was more elevated today.  Patient has completed the course of IV antibiotics while being hospitalized so I may consider obtaining a UA, following his CBC and his temperature curve as the CRP elevation may be more reflective of his recurrent hemorrhage rather than due to an infectious process.  Will consider restarting IV antibiotics.    Continue IV Lasix.  Discussed with pulmonary and the patient will likely need transfer to a tertiary care center once his bleeding is more stabilized. Continue with SCDs.     Continue with his low dose insulin drip which has been weaned down. Blood glucose levels currently controlled.     Repeat labs in the morning.    Patient is considered high risk due to: recurrent alveolar hemorrhage, acute on chronic respiratory failure, prolonged ventilator time, ckd, recent hospitalization with intubation    I discussed the patients findings and my recommendations with nursing staff and consulting provider (REGGIE Dent and Dr. Gomez)    Disposition:  Will likely require transfer to tertiary care center for recurrent alveolar hemorrhage of unclear etiology; discussed with Dr. Gomez today who wants patient more stable prior to transfer.    Teresa Sandhu,  DO  01/19/18  8:58 AM

## 2018-01-20 PROBLEM — J96.21 ACUTE ON CHRONIC RESPIRATORY FAILURE WITH HYPOXIA AND HYPERCAPNIA (HCC): Status: RESOLVED | Noted: 2017-01-01 | Resolved: 2018-01-01

## 2018-01-20 PROBLEM — J96.22 ACUTE ON CHRONIC RESPIRATORY FAILURE WITH HYPOXIA AND HYPERCAPNIA (HCC): Status: RESOLVED | Noted: 2017-01-01 | Resolved: 2018-01-01

## 2018-01-20 PROBLEM — J96.90 RESPIRATORY FAILURE (HCC): Status: RESOLVED | Noted: 2017-01-01 | Resolved: 2018-01-01

## 2018-01-20 NOTE — PLAN OF CARE
Problem: COPD, Chronic Bronchitis/Emphysema (Adult)  Goal: Signs and Symptoms of Listed Potential Problems Will be Absent or Manageable (COPD, Chronic Bronchitis/Emphysema)  Outcome: Ongoing (interventions implemented as appropriate)   01/20/18 0759   COPD, Chronic Bronchitis/Emphysema   Problems Assessed (COPD, Chronic Bronchitis/Emphysema) all   Problems Present (COPD, Chronic Bronchitis/Emphysema) situational response;skin breakdown       Problem: Skin Integrity Impairment, Risk/Actual (Adult)  Goal: Identify Related Risk Factors and Signs and Symptoms  Outcome: Ongoing (interventions implemented as appropriate)   01/20/18 0759   Skin Integrity Impairment, Risk/Actual   Skin Integrity Impairment, Risk/Actual: Related Risk Factors edema   Signs and Symptoms (Skin Integrity Impairment) edema     Goal: Skin Integrity/Wound Healing  Outcome: Ongoing (interventions implemented as appropriate)   01/20/18 0759   Skin Integrity Impairment, Risk/Actual (Adult)   Skin Integrity/Wound Healing making progress toward outcome       Problem: Fall Risk (Adult)  Goal: Identify Related Risk Factors and Signs and Symptoms  Outcome: Ongoing (interventions implemented as appropriate)   01/20/18 0759   Fall Risk   Fall Risk: Related Risk Factors environment unfamiliar   Fall Risk: Signs and Symptoms presence of risk factors     Goal: Absence of Falls  Outcome: Ongoing (interventions implemented as appropriate)   01/20/18 0759   Fall Risk (Adult)   Absence of Falls making progress toward outcome       Problem: Patient Care Overview (Adult)  Goal: Discharge Needs Assessment  Outcome: Ongoing (interventions implemented as appropriate)   01/20/18 0759   Discharge Needs Assessment   Discharge Disposition still a patient       Problem: Mechanical Ventilation, Invasive (Adult)  Goal: Signs and Symptoms of Listed Potential Problems Will be Absent or Manageable (Mechanical Ventilation, Invasive)  Outcome: Ongoing (interventions implemented as  appropriate)   01/20/18 0759   Mechanical Ventilation, Invasive   Problems Assessed (Mechanical Ventilation, Invasive) all   Problems Present (Mechanical Ventilation, Invasive) inability to wean;immobility;situational response       Problem: Pressure Ulcer Risk (Karthikeyan Scale) (Adult,Obstetrics,Pediatric)  Goal: Identify Related Risk Factors and Signs and Symptoms  Outcome: Ongoing (interventions implemented as appropriate)   01/20/18 0759   Pressure Ulcer Risk (Karthikeyan Scale)   Related Risk Factors (Pressure Ulcer Risk (Karthikeyan Scale)) age extremes;body weight extremes;critical care admission;mechanical forces;medical devices;medication;mobility impaired     Goal: Skin Integrity  Outcome: Ongoing (interventions implemented as appropriate)   01/20/18 0759   Pressure Ulcer Risk (Karthikeyan Scale) (Adult,Obstetrics,Pediatric)   Skin Integrity making progress toward outcome

## 2018-01-20 NOTE — PROGRESS NOTES
Pharmacokinetics Service Note:    Mr. Anne has been evaluated for re-initiation of vancomycin to treat his pneumonia.  Based on his dosing history on our service earlier in this admission, his currently estimated ClCr of around 56 mL/min, and demographics, will restart dosing at 1 gm q12hrs to target troughs = 15-20 mg/L.  Will obtain a trough level when steady state is achieved to determine if any adjustments are needed.    Thank you.  Katharine Moya, Pharm.D.  1/20/2018  11:21 AM

## 2018-01-20 NOTE — DISCHARGE SUMMARY
"Discharge Summary:    Date of Admission: 12/28/2017  Date of Discharge:  1/20/2018    PCP: No Known Provider      DISCHARGE DIAGNOSIS  -Recurrent diuffuse alveolar hemorrhage; 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 as patient had re-bleeding off any type of heparin product(s)  -Acute thrombocytopenia  -Acute on chronic hypoxic/hypercarbic respiratory failure secondary to a COPD exacerbation with bilateral hospital-acquired pneumonia status post course of IV antibiotics  -Iron deficiency  -Mild acute hypokalemia, resolved  -(+) fluid balance; consider decreasing free water flushes now that sodium improving  -Acute kidney injury on top of stage III chronic kidney disease; kidney function improved  -Mild hypernatremia; adjustments made to free water flushes; improved  -Insulin-dependent diabetes mellitus type 2 currently controlled on insulin drip  -Acute on chronic normocytic anemia  -Nutrition: Tolerating tube feeding well    Admission Diagnosis:  -Acute on chronic hypoxic and hypercapnic respiratory failure secondary to a COPD exacerbation  -Septic shock criteria was met upon admission neck 5-atypical chest pain  -Hypokalemia    SECONDARY DIAGNOSES  Past Medical History:   Diagnosis Date   • Agent orange exposure    • Arthritis    • CHF (congestive heart failure)    • COPD (chronic obstructive pulmonary disease)    • Coronary artery disease    • Diabetes mellitus    • Hyperlipidemia    • Hypertension        CONSULTS   Dr Gomez with pulmonology  Dr. Gomes with hematology/oncology  Dr. Card/Joelle with nephrology  Dr. Foss with infectious disease  Dr. Vera with urology    PROCEDURES PERFORMED  -Emergent Intubation 1/9/18  -Bronchocsopy x3 for DAH  -PRBC x7  -FFP x3  -Platelets x1    HOSPITAL COURSE  Patient is a 67 y.o. male presented to [facility] complaining that he \"couldn't breathe.\"  Please see the admitting history and physical for further details.      The patient was admitted " to the hospitalist service for respiratory failure secondary to a COPD exacerbation.  The patient was admitted on December 28 simply from this is been a very prolonged hospitalization.  The patient did receive IV fluid hydration aggressively upon admission as he initially met septic shock criteria.  He was started on IV Solu-Medrol, IV Rocephin and IV doxycycline in addition to nebulized scheduled inhalants.    The patient was BiPAP dependent throughout the majority of his hospital stay until he was emergently intubated on January 9 for altered mental status, worsening hypoxia and significant hemoptysis.  He underwent his first bronchoscopy on that date.  The patient had initially been on aspirin for cardiac history and Arixtra for DVT prophylaxis as he had borderline thrombocytopenia early on during his hospitalization and the patient had a previous hospitalization here in early December 2017 where he was intubated and treated for pneumonia, CHF exacerbation and COPD exacerbation.    The patient had been critical but stable up until January 16 where he had another episode of acute alveolar hemorrhage.  The patient has had a significant amount of blood noted with ET suctioning.  The patient's hemoglobin did drop at that time as well as his platelets.  The patient was never restarted on aspirin and/or any heparin products.  The patient was noted to have old blood in the trachea and the left and right main bronchi at that time.  He was given thrombin spray and epinephrine.  For the past 4 days the patient has been more hypoxemic and has required an increase in his FiO2.  On the day of discharge the patient again had anemia and hypoxemia overnight.  He had another bedside bronchoscopy performed today by Dr. Gomez which again revealed diffuse alveolar hemorrhage.    He has received 3 days of IV vitamin K, DDAVP, Amicar and stress/pulse dose glucocorticoids in addition to the above noted blood products.    We have  "considered the possibility that the patient may have some type of vasculitis or Goodpasture's disease although his serologies have been negative.  Hematology evaluated the patient and did not feel that this was a primary hematologic issue as patient did not have DIC and was no longer receiving any type of heparin product.  The patient has been started on iron as he is iron deficient and has also been started on IV folic acid.    Nephrology has been assisting with the patient's diuretics.  Infectious disease has been assisting with antibiotic management. The patient has been placed on insulin drip with except a little blood glucose levels.    Unfortunately, despite the above mentioned therapies the patient has continued to have diffuse alveolar hemorrhage of unclear etiology.  It is felt that the patient will be better served at a tertiary care center and has been graciously accepted at [facility] for further evaluation and management.    CONDITION ON DISCHARGE  Stable.      VITAL SIGNS  /72 (BP Location: Left arm, Patient Position: Lying)  Pulse 104  Temp 97.9 °F (36.6 °C) (Oral)   Resp 24  Ht 167.6 cm (65.98\")  Wt 134 kg (295 lb 6.4 oz)  SpO2 99%  BMI 47.7 kg/m2  Objective  Constitutional: He appears well-developed and well-nourished. No distress. He is sedated and intubated.   HENT:   Head: Normocephalic and atraumatic.   Nose: Nose normal.   Mouth/Throat: Oropharynx is clear and moist and mucous membranes are normal.   Eyes: Conjunctivae - normal. Pupils are equal, round, and reactive to light. No scleral icterus.   Neck: Trachea normal. Neck supple. No JVD present. Carotid bruit is not present. No thyromegaly present.   Cardiovascular: Normal rate, regular rhythm and normal pulses.  Exam reveals no gallop and no friction rub.    Murmur heard. Diffuse/Significant edema noted bilateral upper and lower extremities    Pulmonary/Chest: Effort normal. He is intubated. No respiratory " distress. He has decreased breath sounds. He has no wheezes. He has no rhonchi. He has no rales.   Abdominal: Soft. He exhibits distension. Bowel sounds are increased. There is no tenderness. There is no guarding.   Genitourinary: Worley in place and draining a dark yellow urine   Neurological: Unable to assess; currently sedated   Skin: Skin is warm, dry and intact. No rash noted. No erythema.     DISCHARGE DISPOSITION   [facility]    DISCHARGE MEDICATIONS   Se Anne   Home Medication Instructions SANDHYA:[id number]    Printed on:01/20/18 2623   Medication Information                      amLODIPine (NORVASC) 10 MG tablet  Take 10 mg by mouth Daily.             aspirin 81 MG EC tablet  Take 81 mg by mouth Daily.             atorvastatin (LIPITOR) 80 MG tablet  Take 80 mg by mouth Daily.             Cholecalciferol (VITAMIN D3) 2000 UNITS tablet  Take 1 tablet by mouth Daily.             CloNIDine (CATAPRES) 0.2 MG tablet  Take 1 tablet by mouth Every 8 (Eight) Hours.             dextrose (GLUTOSE) 40 % gel  Take 15 g by mouth Every 15 (Fifteen) Minutes As Needed for Low Blood Sugar (Blood Sugar Less Than 70, Patient Alert, Is Not NPO).             FentaNYL Citrate (FENTANYL PCA 1500 MCG/30 ML, [facility],)  Nurse Loading Dose: 0 mcg  Patient Bolus Dose: 0 mcg  Lockout Interval: 10 Minutes  Basal Rate: 50 mcg/hr  One Hour Dose Limit: 300 mcg             ferrous sulfate 300 (60 Fe) MG/5ML syrup  Take 5 mL by mouth Daily.             fluconazole (DIFLUCAN) 100-0.9 MG/100ML-% IVPB  Infuse 50 mL into a venous catheter Daily for 4 doses. Indications: Pneumonia             folic acid 1 mg in sodium chloride 0.9 % 50 mL IVPB  Infuse 1 mg into a venous catheter Daily.             hydrALAZINE (APRESOLINE) 25 MG tablet  Take 3 tablets by mouth 3 (Three) Times a Day.             insulin NPH-insulin regular (humuLIN 70/30,novoLIN 70/30) (70-30) 100 UNIT/ML injection  Inject 52 Units under the skin Every  Morning.             insulin NPH-insulin regular (humuLIN 70/30,novoLIN 70/30) (70-30) 100 UNIT/ML injection  Inject 54 Units under the skin Every Night.             insulin regular 100 Units in sodium chloride 0.9 % 99 mL  Infuse 1-20 Units/hr into a venous catheter Dose Adjusted By Provider As Needed.             ipratropium-albuterol (DUO-NEB) 0.5-2.5 mg/mL nebulizer  Take 3 mL by nebulization Every 6 (Six) Hours.             isosorbide mononitrate (IMDUR) 60 MG 24 hr tablet  Take 4 tablets by mouth Daily.             labetalol (NORMODYNE) 200 MG tablet  Take 1 tablet by mouth Every 12 (Twelve) Hours.             lactobacillus acidophilus (RISAQUAD) capsule capsule  1 capsule by Per G Tube route Daily.             levothyroxine (SYNTHROID, LEVOTHROID) 25 MCG tablet  Take 25 mcg by mouth Daily.             methylPREDNISolone sodium succinate 1,000 mg in sodium chloride 0.9 % 100 mL IVPB  Infuse 1,000 mg into a venous catheter Daily for 1 dose.             nitroglycerin (NITROSTAT) 0.4 MG SL tablet  Place 0.4 mg under the tongue Every 5 (Five) Minutes As Needed for chest pain. Take no more than 3 doses in 15 minutes.             pantoprazole (PROTONIX) 40 MG EC tablet  Take 40 mg by mouth Daily.             piperacillin-tazobactam (ZOSYN) 3.375 g/100 mL 0.9% NS IVPB (mbp)  Infuse 100 mL into a venous catheter Every 8 (Eight) Hours for 30 doses. Indications: Pneumonia             polyethylene glycol (MIRALAX) packet  Take 17 g by mouth 2 (Two) Times a Day.             propofol (DIPRIVAN) 1000 mg/mL emulsion infusion  Infuse 625-6,250 mcg/min into a venous catheter Dose Adjusted By Provider As Needed.             sertraline (ZOLOFT) 100 MG tablet  Take 50 mg by mouth Every Evening.             sodium chloride 0.9 % solution 100 mL with furosemide 10 MG/ML solution 80 mg  Infuse 20 mg into a venous catheter 1 (One) Time for 1 dose.             terazosin (HYTRIN) 10 MG capsule  Take 1 capsule by mouth Daily.              vancomycin 1,000 mg in sodium chloride 0.9 % 250 mL IVPB  Infuse 1,000 mg into a venous catheter Every 12 (Twelve) Hours for 19 doses. Indications: Pneumonia             vecuronium (NORCURON) 10 MG injection  Infuse 5 mg into a venous catheter 3 (Three) Times a Day.             vitamin B-12 (VITAMIN B-12) 2000 MCG tablet  Take 1 tablet by mouth Daily.                 DISCHARGE DIET         Dietary Orders            Start     Ordered    01/18/18 1620  Osmolite 1.2; Tube Feeding Type: Continuous; Continuous Tube Feeding Start Rate (mL/hr): Other; Other: 45; Then Advance Rate By (mL/hr): Do Not Advance; Every __ Hours: Patient at Goal Rate; To Goal Rate of (mL/hr): Other; Other: 45; Total Daily V...  Diet Effective Now     Comments:  Just clarifying order. Continue osmolite 1.2 at 45 ml/hr.   Question Answer Comment   Tube Feeding Formula: Osmolite 1.2    Tube Feeding Type Continuous    Continuous Tube Feeding Start Rate (mL/hr) Other    Other 45    Then Advance Rate By (mL/hr) Do Not Advance    Every __ Hours Patient at Goal Rate    To Goal Rate of (mL/hr) Other    Other 45    Total Daily Volume to Deliver (mL/day) 1080    Water Flush Frequency Per Provider Order        01/18/18 1621    01/18/18 1619  NPO Diet  Diet Effective Now      01/18/18 1618          Future Appointments  Date Time Provider Department Center   3/5/2018 9:30 AM [facility] None   3/14/2018 1:20 PM Jeremi Gomez MD [facility] None       TEST  RESULTS PENDING AT DISCHARGE   Order Current Status    Gram Stain Collected (01/20/18 0940)    Respiratory Culture - Lavage, Lung, Lingula Collected (01/20/18 0940)    Antimyeloperoxidase (MPO) Antibodies In process    Antiproteinase 3 (AK-3) Antibodies In process    Glomerular Basement Membrane Antibodies In process    Respiratory Culture - Bronchial Wash, Bronchus Preliminary result    Respiratory Culture - Wash, Lung, Right Lower Lobe Preliminary result           Teresa Michelle  DO [place]  01/20/18  4:15 PM      Time: greater than 30 minutes.

## 2018-01-20 NOTE — OP NOTE
Bronchoscopy Procedure Note    Date of Operation: 1/20/2018    Pre-op Diagnosis: Hemoptysis-bleeding through the endotracheal tube    Post-op Diagnosis: Hemoptysis-likely alveolar hemorrhage    Surgeon: Jeremi Gomez MD    Assistants: None    Anesthesia: Patient is on propofol and fentanyl drips    Operation: Flexible fiberoptic bronchoscopy, diagnostic     Findings: Old blood seen in the trachea and left and right main bronchi    Specimen: Bronchial washings    Bronchioloalveolar lavage off right lower lobe    Estimated Blood Loss: Minimal    Complications: None    Indications and History:  The patient is a 67 y.o. male with hypoxic and hypercarbic respiratory failure-ARDS.  The risks, benefits, complications, treatment options and expected outcomes were discussed with the patients next of kin.  The possibilities of reaction to medication, pulmonary aspiration, perforation of a viscus, bleeding, failure to diagnose a condition and creating a complication requiring transfusion or operation were discussed with the patients next of kin who freely signed the consent.      Description of Procedure:  The procedure was done in the ICU at bedside.  Se Anne and the procedure verified as Flexible Fiberoptic Bronchoscopy.  A Time Out was held and the above information confirmed.     The bronchoscope was passed through the endotracheal tube.  Old blood was seen in and around the walter and left and right main bronchi. The cords were normal. The scope was then passed into the trachea.      2 ml 1 % lidocaine was used topically on the walter.  Careful inspection of the tracheal lumen was accomplished. The scope was sequentially passed into the left main and then left upper and lower bronchi and segmental bronchi.       The scope was then withdrawn and advanced into the right main bronchus and then into the RUL, RML, and RLL bronchi and segmental bronchi.     Bronchial washings washings were done and blood was  cleaned.  Samples-    Then the bronchoscope was wedged into the right lower lobe for a bronchioloalveolar lavage and close to 60 cc of saline was given once and close to 20 cc was aspirated back.    And another bronchoalveolar lavage was done.  Bronchioloalveolar lavage return was more pinkish this time.    Epinephrine and thrombin sprays were used.  Samples were sent for microbial biology.    Case was discussed with -UK HUTSON and patient will be transferred to the .      Jeremi Gomez MD

## 2018-01-20 NOTE — SIGNIFICANT NOTE
Contacted air evac declined d/t weather, air methods contacted and accepted however their  has timed out, PHI declined. Have contacted  air transport team and they have declined d.t weather notified charge [employer] Gale and . To see if we will do ground or wait until weather is clear.     Ralph PAREDES

## 2018-01-20 NOTE — PLAN OF CARE
Problem: COPD, Chronic Bronchitis/Emphysema (Adult)  Goal: Signs and Symptoms of Listed Potential Problems Will be Absent or Manageable (COPD, Chronic Bronchitis/Emphysema)  Outcome: Ongoing (interventions implemented as appropriate)      Problem: Skin Integrity Impairment, Risk/Actual (Adult)  Goal: Identify Related Risk Factors and Signs and Symptoms  Outcome: Ongoing (interventions implemented as appropriate)    Goal: Skin Integrity/Wound Healing  Outcome: Ongoing (interventions implemented as appropriate)      Problem: Fall Risk (Adult)  Goal: Identify Related Risk Factors and Signs and Symptoms  Outcome: Ongoing (interventions implemented as appropriate)    Goal: Absence of Falls  Outcome: Ongoing (interventions implemented as appropriate)      Problem: Patient Care Overview (Adult)  Goal: Plan of Care Review  Outcome: Ongoing (interventions implemented as appropriate)      Problem: Mechanical Ventilation, Invasive (Adult)  Goal: Signs and Symptoms of Listed Potential Problems Will be Absent or Manageable (Mechanical Ventilation, Invasive)  Outcome: Ongoing (interventions implemented as appropriate)      Problem: Pressure Ulcer Risk (Karthikeyan Scale) (Adult,Obstetrics,Pediatric)  Goal: Identify Related Risk Factors and Signs and Symptoms  Outcome: Ongoing (interventions implemented as appropriate)    Goal: Skin Integrity  Outcome: Ongoing (interventions implemented as appropriate)

## 2018-01-20 NOTE — PROGRESS NOTES
Interval History:     Patient Complaints: Patient is on the vent.  Hemodynamically stable.  Adequate urine output.  Creatinine largely unchanged last 72 hours.  FiO2 100%.  Tolerating feeds.  Sodium has improved very slowly over the last 72 hours and noted decrease in free water flushes        Vital Signs  Temp:  [98.2 °F (36.8 °C)-98.8 °F (37.1 °C)] 98.4 °F (36.9 °C)  Heart Rate:  [48-80] 53  Resp:  [22-31] 22  BP: (130-192)/(44-80) 154/62  FiO2 (%):  [90 %-100 %] 100 %    Physical Exam:    General:             Ventilated with anasarca      HEENT:  No facial asymmetry                Neck:   No thyromegaly        Lungs:     Scattered crackles bilaterally     Heart:   RRR,  no rub       Abdomen:     Normal bowel sounds, soft non-tender, non-distended, no guarding       Extremities:   3+ edema        Skin:   No petechiae, no rash       Neurologic:  Sedated .        Results Review:     I reviewed the patient's new clinical results.    Lab Results (last 24 hours)     Procedure Component Value Units Date/Time    aPTT [[id number]]  (Normal) Collected:  01/19/18 1047    Specimen:  Blood Updated:  01/19/18 1210     PTT 28.8 seconds       Note new Reference Range       Narrative:       PTT Heparin Therapeutic Range:  59 - 95 seconds    Protime-INR [[id number]]  (Abnormal) Collected:  01/19/18 1047    Specimen:  Blood Updated:  01/19/18 1210     Protime 15.3 Seconds       Note new Reference Range        INR 1.20 (H)    Narrative:       Suggested INR therapeutic range for stable oral anticoagulant therapy:    Low Intensity therapy:   1.5-2.0  Moderate Intensity therapy:   2.0-3.0  High Intensity therapy:   2.5-4.0    Fibrinogen [[id number]]  (Normal) Collected:  01/19/18 1047    Specimen:  Blood Updated:  01/19/18 1210     Fibrinogen 303 mg/dL       Note new Reference Range       Antinuclear Antibody With Reflex Cascade [[id number]] Collected:  01/18/18 0028    Specimen:  Blood Updated:  01/19/18 1214     See below: Comment       Autoantibody                       Disease Association  ____________________________________________________________                          Condition                  Frequency  _____________________   ________________________   _________  Antinuclear Antibody,    SLE, mixed connective  Direct (MEGHANA-D)           tissue diseases  _____________________   ________________________   _________  dsDNA                    SLE                        40 - 60%  _____________________   ________________________   _________  Chromatin                Drug induced SLE                90%                           SLE                        48 - 97%  _____________________   ________________________   _________  SSA (Ro)                 SLE                        25 - 35%                           Sjogren's Syndrome         40 - 70%                            Lupus                 100%  _____________________   ________________________   _________  SSB (La)                 SLE                             10%                           Sjogren's Syndrome              30%  _____________________   _______________________    _________  Sm (anti-Smith)          SLE                        15 - 30%  _____________________   _______________________    _________  RNP                      Mixed Connective Tissue                           Disease                         95%  (U1 nRNP,                SLE                        30 - 50%  anti-ribonucleoprotein)  Polymyositis and/or                           Dermatomyositis                 20%  _____________________   ________________________   _________  Scl-70 (antiDNA          Scleroderma (diffuse)      20 - 35%  topoisomerase)           Crest                           13%  _____________________   ________________________   _________  Karen-1                     Polymyositis and/or                           Dermatomyositis            20 - 40%  _____________________   ________________________    _________  Centromere B             Scleroderma - Crest                           variant                         80%  _____________________   ________________________   _________  Ribosomal P              SLE                        10 - 20%        MEGHANA Direct Negative    Narrative:       Performed at:  [address] [place] [address]  [id number]  : Landon Fried PhD, Phone:  [phone]    POC Glucose Once [[id number]]  (Normal) Collected:  01/19/18 1233    Specimen:  Blood Updated:  01/19/18 1249     Glucose 110 mg/dL     Narrative:       Meter: [id number] : [id number] Dent    MEGHANA With / DsDNA, RNP, Sjogrens A / B, Oliver [[id number]] Collected:  01/18/18 1515    Specimen:  Blood Updated:  01/19/18 1311     MEGHANA Direct Negative    Narrative:       Performed at:  [address] [place] [address]  [id number]  : Landon Fried PhD, Phone:  [phone]    Systemic Lupus Erythematosus (SLE) Profile B [[id number]] Collected:  01/18/18 1516    Specimen:  Blood Updated:  01/19/18 1311     C4 Complement 14 mg/dL      C3 Complement 86 mg/dL      Anti-DNA (DS) Ab Qn <1 IU/mL                                          Negative      <5                                     Equivocal  5 - 9                                     Positive      >9        Antichromatin Antibodies <0.2 AI      MEGHANA Direct Negative    Narrative:       Performed at:  [address] [place] [address]  [id number]  : Landon Fried PhD, Phone:  [phone]    Systemic Lupus Profile A [[id number]] Collected:  01/18/18 1515    Specimen:  Blood Updated:  01/19/18 1311     RNP Antibodies 0.2 AI      Oliver Antibodies <0.2 AI      RA Latex Turbid 11.5 IU/mL      Antichromatin Antibodies <0.2 AI      RENITA SSA (RO) Ab <0.2 AI      RENITA SSB (LA) Ab <0.2 AI      Anti-DNA (DS) Ab Qn <1 IU/mL                                          Negative      <5                                      Equivocal  5 - 9                                     Positive      >9       Narrative:       Performed at:  [address] [place] [address]  [id number]  : Landon Fried PhD, Phone:  [phone]    Anti-scleroderma Antibody [[id number]] Collected:  01/18/18 1515    Specimen:  Blood Updated:  01/19/18 1311     Antiscleroderma-70 Antibodies <0.2 AI     Narrative:       Performed at:  [address] [place] [address]  [id number]  : Landon Fried PhD, Phone:  [phone]    POC Glucose Once [[id number]]  (Normal) Collected:  01/19/18 1343    Specimen:  Blood Updated:  01/19/18 1400     Glucose 108 mg/dL     Narrative:       Meter: [id number] : [id number] [employer]    Specimen Status Report [[id number]] Collected:  01/18/18 1516    Specimen:  Blood Updated:  01/19/18 1413     Specimen Status Comment      Written Authorization  Written Authorization  Written Authorization Received.  Authorization received from INTERFACE ORDER 01-  Logged by Jaylene Ovalle       Narrative:       Performed at:  [address] [place] [address]  [id number]  : Landon Fried PhD, Phone:  [phone]    POC Glucose Once [[id number]]  (Abnormal) Collected:  01/19/18 1444    Specimen:  Blood Updated:  01/19/18 1500     Glucose 165 (H) mg/dL     Narrative:       Meter: [id number] : [id number] Charanjit Jailene    POC Glucose Once [[id number]]  (Abnormal) Collected:  01/19/18 1614    Specimen:  Blood Updated:  01/19/18 1631     Glucose 208 (H) mg/dL     Narrative:       Meter: [id number] : [id number] Charanjit Jailene    POC Glucose Once [[id number]]  (Abnormal) Collected:  01/19/18 1702    Specimen:  Blood Updated:  01/19/18 1709     Glucose 218 (H) mg/dL     Narrative:       Meter: [id number] : [id number] Charanjit Jailene    POC Glucose Once [[id number]]  (Abnormal) Collected:  01/19/18 1831    Specimen:  Blood Updated:  01/19/18  1847     Glucose 194 (H) mg/dL     Narrative:       Meter: [id number] : [id number] Sunivaory    POC Glucose Once [[id number]]  (Abnormal) Collected:  01/19/18 1858    Specimen:  Blood Updated:  01/19/18 1915     Glucose 189 (H) mg/dL     Narrative:       Meter: [id number] : [id number] Sunivaory    POC Glucose Once [[id number]]  (Abnormal) Collected:  01/19/18 2018    Specimen:  Blood Updated:  01/19/18 2024     Glucose 210 (H) mg/dL     Narrative:       Meter: [id number] : [id number] Sears Madelyn N    POC Glucose Once [[id number]]  (Abnormal) Collected:  01/19/18 2134    Specimen:  Blood Updated:  01/19/18 2140     Glucose 245 (H) mg/dL     Narrative:       Meter: [id number] : [id number] Sears Madelyn N    POC Glucose Once [[id number]]  (Abnormal) Collected:  01/19/18 2219    Specimen:  Blood Updated:  01/19/18 2251     Glucose 241 (H) mg/dL     Narrative:       Meter: [id number] : [id number] GREEN KAYODE    POC Glucose Once [[id number]]  (Abnormal) Collected:  01/20/18 0011    Specimen:  Blood Updated:  01/20/18 0017     Glucose 226 (H) mg/dL     Narrative:       Meter: [id number] : [id number] Sears Madelyn N    POC Glucose Once [[id number]]  (Abnormal) Collected:  01/20/18 0109    Specimen:  Blood Updated:  01/20/18 0115     Glucose 221 (H) mg/dL     Narrative:       Meter: [id number] : [id number] Sears Madelyn N    Blood Culture - Blood, [[id number]]  (Normal) Collected:  01/15/18 0114    Specimen:  Blood from Hand, Left Updated:  01/20/18 0201     Blood Culture No growth at 5 days    Blood Culture - Blood, [[id number]]  (Normal) Collected:  01/15/18 0051    Specimen:  Blood from Hand, Right Updated:  01/20/18 0201     Blood Culture No growth at 5 days    Protime-INR [[id number]]  (Abnormal) Collected:  01/20/18 0109    Specimen:  Blood Updated:  01/20/18 0207     Protime 15.2 Seconds       Note new Reference Range        INR 1.18 (H)    Narrative:       Suggested INR therapeutic  range for stable oral anticoagulant therapy:    Low Intensity therapy:   1.5-2.0  Moderate Intensity therapy:   2.0-3.0  High Intensity therapy:   2.5-4.0    Magnesium [[id number]]  (Abnormal) Collected:  01/20/18 0109    Specimen:  Blood Updated:  01/20/18 0228     Magnesium 3.0 (H) mg/dL     Comprehensive Metabolic Panel [[id number]]  (Abnormal) Collected:  01/20/18 0109    Specimen:  Blood Updated:  01/20/18 0228     Glucose 184 (H) mg/dL      BUN 88 (H) mg/dL      Creatinine 1.46 (H) mg/dL      Sodium 144 mmol/L      Potassium 4.1 mmol/L      Chloride 107 mmol/L      CO2 31.1 mmol/L      Calcium 7.9 mg/dL      Total Protein 5.1 (L) g/dL      Albumin 3.10 (L) g/dL      ALT (SGPT) 23 U/L      AST (SGOT) 31 U/L      Alkaline Phosphatase 29 (L) U/L       Note New Reference Ranges        Total Bilirubin 0.7 mg/dL      eGFR Non African Amer 48 (L) mL/min/1.73      Globulin 2.0 gm/dL      A/G Ratio 1.6 g/dL      BUN/Creatinine Ratio 60.3 (H)     Anion Gap 5.9 mmol/L     Osmolality, Calculated [[id number]]  (Abnormal) Collected:  01/20/18 0109    Specimen:  Blood Updated:  01/20/18 0228     Osmolality Calc 318.5 (H) mOsm/kg     C-reactive Protein [[id number]]  (Abnormal) Collected:  01/20/18 0109    Specimen:  Blood Updated:  01/20/18 0229     C-Reactive Protein 4.03 (H) mg/dL     Phosphorus [[id number]]  (Abnormal) Collected:  01/20/18 0109    Specimen:  Blood Updated:  01/20/18 0230     Phosphorus 5.5 (H) mg/dL     POC Glucose Once [[id number]]  (Abnormal) Collected:  01/20/18 0227    Specimen:  Blood Updated:  01/20/18 0233     Glucose 193 (H) mg/dL     Narrative:       Meter: [id number] : [id number] Jose LAKE    POC Glucose Once [[id number]]  (Abnormal) Collected:  01/20/18 0337    Specimen:  Blood Updated:  01/20/18 0342     Glucose 161 (H) mg/dL     Narrative:       Meter: [id number] : [id number] Jose [place]    POC Glucose Once [[id number]]  (Abnormal) Collected:  01/20/18 0425    Specimen:  Blood  Updated:  01/20/18 0430     Glucose 150 (H) mg/dL     Narrative:       Meter: [id number] : [id number] Jose [place]    CBC & Differential [[id number]] Collected:  01/20/18 0326    Specimen:  Blood Updated:  01/20/18 0446    Narrative:       The following orders were created for panel order CBC & Differential.  Procedure                               Abnormality         Status                     ---------                               -----------         ------                     CBC Auto Differential[[id number]]        Abnormal            Final result                 Please view results for these tests on the individual orders.    CBC Auto Differential [[id number]]  (Abnormal) Collected:  01/20/18 0326    Specimen:  Blood Updated:  01/20/18 0446     WBC 7.76 10*3/mm3      RBC 2.32 (L) 10*6/mm3      Hemoglobin 6.7 (C) g/dL      Hematocrit 22.2 (L) %      MCV 95.7 (H) fL      MCH 28.9 pg      MCHC 30.2 (L) g/dL      RDW 15.1 (H) %      RDW-SD 48.4 fl      MPV 11.5 (H) fL      Platelets 85 (L) 10*3/mm3      Neutrophil % 94.0 (H) %      Lymphocyte % 3.0 (L) %      Monocyte % 2.7 %      Eosinophil % 0.0 %      Basophil % 0.0 %      Immature Grans % 0.3 %      Neutrophils, Absolute 7.30 (H) 10*3/mm3      Lymphocytes, Absolute 0.23 (L) 10*3/mm3      Monocytes, Absolute 0.21 10*3/mm3      Eosinophils, Absolute 0.00 10*3/mm3      Basophils, Absolute 0.00 10*3/mm3      Immature Grans, Absolute 0.02 10*3/mm3     POC Glucose Once [[id number]]  (Abnormal) Collected:  01/20/18 0525    Specimen:  Blood Updated:  01/20/18 0531     Glucose 142 (H) mg/dL     Narrative:       Meter: [id number] : [id number] Jose LAKE    Blood Gas, Arterial [[id number]]  (Abnormal) Collected:  01/20/18 0532    Specimen:  Arterial Blood Updated:  01/20/18 0541     Site Arterial: left radial     Travis's Test N/A     pH, Arterial 7.326 (L) pH units      pCO2, Arterial 59.5 (C) mm Hg      pO2, Arterial 60.7 (L) mm Hg      HCO3, Arterial 30.4  (C) mmol/L      Base Excess, Arterial 3.7 mmol/L      O2 Saturation, Arterial 87.1 (L) %      Hemoglobin, Blood Gas 7.8 (L) g/dL      Hematocrit, Blood Gas 23.0 (L) %      Oxyhemoglobin 84.9 (L) %      Methemoglobin 0.70 %      Carboxyhemoglobin 1.8 %      A-a Gradiant >300.0 (H) mmHg      Temperature 98.6 C      Barometric Pressure for Blood Gas 728 mmHg      Modality Ventilator     FIO2 90 %      Ventilator Mode ACVC     Set Tidal Volume 450     Set Mech Resp Rate 22     PEEP 10    POC Glucose Once [[id number]]  (Normal) Collected:  01/20/18 0634    Specimen:  Blood Updated:  01/20/18 0649     Glucose 129 mg/dL     Narrative:       Meter: [id number] : [id number] Tung Gale    POC Glucose Once [[id number]]  (Normal) Collected:  01/20/18 0807    Specimen:  Blood Updated:  01/20/18 0823     Glucose 120 mg/dL     Narrative:       Meter: [id number] : [id number] [employer]    POC Glucose Once [[id number]]  (Normal) Collected:  01/20/18 0857    Specimen:  Blood Updated:  01/20/18 0918     Glucose 106 mg/dL     Narrative:       Meter: [id number] : [id number] [employer]    POC Glucose Once [[id number]]  (Normal) Collected:  01/20/18 0953    Specimen:  Blood Updated:  01/20/18 1002     Glucose 107 mg/dL     Narrative:       Meter: [id number] : [id number] [employer]    ANCA Panel [[id number]] Collected:  01/18/18 0028    Specimen:  Blood Updated:  01/20/18 1011     Myeloperoxidase Ab <9.0 U/mL      Antiproteinase 3 (TN-3) <3.5 U/mL      C-ANCA <1:20 titer      P-ANCA <1:20 titer       The presence of positive fluorescence exhibiting P-ANCA or C-ANCA  patterns alone is not specific for the diagnosis of Wegener's  Granulomatosis (WG) or microscopic polyangiitis. Decisions about  treatment should not be based solely on ANCA IFA results.  The  International ANCA Group Consensus recommends follow up testing of  positive sera with both TN-3 and MPO-ANCA enzyme immunoassays. As  many as 5% serum  samples are positive only by EIA.  Ref. AM J Clin Pathol 1999;111:507-513.        Atypical pANCA <1:20 titer       The atypical pANCA pattern has been observed in a significant  percentage of patients with ulcerative colitis, primary sclerosing  cholangitis and autoimmune hepatitis.       Narrative:       Performed at:  01 - [facility] [place] [address] [place], [place]  [id number]  : Phi Kunz MD, Phone:  [phone]  Performed at:  02 - [facility] [address]  [id number]  : Landon Fried PhD, Phone:  [phone]    POC Glucose Once [[id number]]  (Normal) Collected:  01/20/18 1057    Specimen:  Blood Updated:  01/20/18 1113     Glucose 99 mg/dL     Narrative:       Meter: [id number] : [id number] Charanjit Dent          Imaging Results (last 24 hours)     Procedure Component Value Units Date/Time    XR Chest 1 View [[id number]] Collected:  01/20/18 1012     Updated:  01/20/18 1015    Narrative:       EXAMINATION: XR CHEST 1 VW-      CLINICAL INDICATION:     ett; J96.21-Acute and chronic respiratory  failure with hypoxia; J96.22-Acute and chronic respiratory failure with  hypercapnia; J44.1-Chronic obstructive pulmonary disease with (acute)  exacerbation; R09.02-Hypoxemia     TECHNIQUE: Single AP view of chest.      COMPARISON: January 18, 2018      FINDINGS:   There is bibasilar atelectasis.   Stable bilateral airspace disease.  Support tube and lines are in unchanged position.   Heart size is stable.  No pneumothorax.   Tiny bilateral pleural effusions persist.        Impression:       Stable appearance of the chest.     This report was finalized on 1/20/2018 10:13 AM by Dr. Miko Coto MD.             Assessment and Plan:    1.  SHEELA on CKD 3 :his  baseline creatinine is 1.2-1.3. It is stable     2. Alveolar hemorrhage :  All serologies negative initially and repeat MEGHANA ANCA negative with low normal complement.     3. Respiratory failure on ventilator  : WIll give lasix diuril drip as renal function stable and unable to tell how much of the Xray iis edema vs hemorrhage and pt on 100% FiO2     4. Anemia and thrombocytopenia: hematology following     5. HTN : stable     6. Hypernatremia : Na is better today.    Coleman Lai MD  01/20/18  11:33 AM

## 2018-01-20 NOTE — PROGRESS NOTES
LOS: 23 days   Patient Care Team:  No Known Provider as PCP - General  No Known Provider as PCP - Family Medicine    Chief Complaint:  Pulmonology is following for respiratory failure     Subjective     Interval History: patient is intubated and sedated.     History taken from: chart RN Patient unable to give history due to patient intubated.    Review of Systems:   Review of Systems - History obtained from chart review and unobtainable from patient due to mental status and Intubated      Objective     Vital Signs  Temp:  [98.2 °F (36.8 °C)-98.8 °F (37.1 °C)] 98.4 °F (36.9 °C)  Heart Rate:  [48-80] 54  Resp:  [22-31] 22  BP: (118-184)/(42-66) 157/59  FiO2 (%):  [80 %-100 %] 90 %  Body mass index is 47.7 kg/(m^2).    Intake/Output Summary (Last 24 hours) at 01/20/18 0938  Last data filed at 01/20/18 0839   Gross per 24 hour   Intake           3371.1 ml   Output             2225 ml   Net           1146.1 ml     I/O this shift:  In: 400 [I.V.:100; IV Piggyback:300]  Out: -     Vent Settings  Vent Mode: VC/AC    Vt (Set, L): 0.45 L  Resp Rate (Set): 22     FiO2 (%): 90 %  PEEP/CPAP (cm H2O): 10 cm H20    Minute Ventilation (L/min) (Obs): 10.1 L/min  Resp Rate (Observed) Vent: 22     I:E Ratio (Obs): 1:3.70    PIP Observed (cm H2O): 47 cm H2O     Physical Exam:  GENERAL APPEARANCE: Intubated and sedated.  Appears to be resting comfortably in bed.     HEAD: normocephalic.    EYES: PERRLA.    THROAT: ET tube in place. Oral cavity and pharynx normal. No inflammation, swelling, exudate, or lesions.     NECK: Neck supple.     CARDIAC: Normal S1 and S2. No S3, S4 or murmurs. Rhythm is regular. There is no peripheral edema, cyanosis or pallor. Extremities are warm and well perfused. Capillary refill is less than 2 seconds. No carotid bruits.    Respiratory: Clear to auscultation and percussion without rales, rhonchi, wheezing or diminished breath sounds.    GI: Positive bowel sounds. Soft, nondistended, nontender.      Musculoskeletal: No significant deformity or joint abnormality. No edema. Peripheral pulses intact.    NEUROLOGICAL: Unable to assess due to sedation status.     PSYCHIATRIC: Unable to assess due to sedation status.     Results Review:     I reviewed the patient's new clinical results.  I reviewed the patient's new imaging results and agree with the interpretation.    Results from last 7 days  Lab Units 01/20/18  0326 01/19/18  0228 01/19/18  0047   WBC 10*3/mm3 7.76 8.34 8.30   HEMOGLOBIN g/dL 6.7* 7.3* 7.5*   PLATELETS 10*3/mm3 85* 87* 84*       Results from last 7 days  Lab Units 01/20/18  0109 01/19/18  0047 01/18/18  0028 01/17/18  0138   SODIUM mmol/L 144 145 146 148   POTASSIUM mmol/L 4.1 3.9 4.5 3.4*   CHLORIDE mmol/L 107 107 108 109   CO2 mmol/L 31.1 31.9 31.5 34.2*   BUN mg/dL 88* 78* 76* 85*   CREATININE mg/dL 1.46* 1.38* 1.37* 1.51*   CALCIUM mg/dL 7.9 7.9 7.9 8.0   GLUCOSE mg/dL 184* 160* 187* 106   MAGNESIUM mg/dL 3.0* 3.1*  --  3.2*     Lab Results   Component Value Date    INR 1.18 (H) 01/20/2018    INR 1.20 (H) 01/19/2018    INR 1.11 (H) 01/17/2018    PROTIME 15.2 01/20/2018    PROTIME 15.3 01/19/2018    PROTIME 14.4 01/17/2018       Results from last 7 days  Lab Units 01/20/18  0109 01/19/18  0047 01/18/18  0028   ALK PHOS U/L 29* 32* 35*   BILIRUBIN mg/dL 0.7 0.8 0.8   ALT (SGPT) U/L 23 19 21   AST (SGOT) U/L 31 28 24       Results from last 7 days  Lab Units 01/20/18  0532   PH, ARTERIAL pH units 7.326*   PO2 ART mm Hg 60.7*   PCO2, ARTERIAL mm Hg 59.5*   HCO3 ART mmol/L 30.4*     Imaging Results (last 24 hours)     Procedure Component Value Units Date/Time    XR Chest 1 View [[id number]] Updated:  01/20/18 0829             Medication Review:   Scheduled Medications:    aminocaproic acid (AMICAR) bolus 5 g 5 g Intravenous Once   amLODIPine 10 mg Oral Daily   atorvastatin 80 mg Oral Daily   cholecalciferol 2,000 Units Oral Daily   CloNIDine 0.2 mg Oral Q8H   ferrous sulfate 300 mg Oral Daily    folic acid (FOLVITE) IVPB 1 mg Intravenous Daily   guaiFENesin 200 mg Oral Q4H   hydrALAZINE 100 mg Oral Q8H   ipratropium-albuterol 3 mL Nebulization Q6H - RT   isosorbide mononitrate 240 mg Oral Daily   labetalol 200 mg Oral Q12H   lansoprazole 30 mg Oral BID   levothyroxine 25 mcg Oral Daily   methylPREDNISolone sodium succinate 1,000 mg Intravenous Daily   polyethylene glycol 17 g Oral BID   sertraline 50 mg Oral Q PM   sodium chloride 10 mL Intracatheter Q12H   sodium chloride 10 mL Intracatheter Q12H   terazosin 10 mg Oral Daily   vitamin B-12 2,000 mcg Oral Daily     Continuous infusions:    fentaNYL (SUBLIMAZE) PCA 1500 mcg/30 mL syringe     insulin regular infusion 1 unit/mL 1-20 Units/hr Last Rate: 4 Units/hr (01/20/18 6155)   Pharmacy Consult     propofol 5-50 mcg/kg/min Last Rate: 50 mcg/kg/min (01/20/18 5977)       Assessment/Plan     Neuro: Patient is intubated and sedated per protocol.       Respiratory Failure: likely related to underlying lung disease, COPD and fluid volume overload now along with alveolar hemorrhage. Will repeat bronch today. Will give DDVAP again today also. 1 unit of PLTs ordered to transfuse. Will repeat amicar also for alveolar hemorrhage.     Will call [facility] today to request a transfer to [facility] facility.     Intubated: 1/9/18.       Hypertension: BP within normal range.  NSR.  Continuous ECG, BP and pulse ox monitoring. Maintain MAP > 65.      SHEELA on CKD stage III: Creatinine 1.46 , 24 hour urine 2.2 liters, continue strict I&O, electrolytes WNL.     Hypernatremia: level 144, will change free water flushes to 100 ml every 3 hours.     Endo: monitor blood glucose, target range 120-140.     Low H/H: HGB/HCT is 6.7/22.2 today. Will receive transfusion today.       GI: Continue GI prophylaxis and tube feedings.      Thrombocytopenia: 1 units of platelets ordered, level is 85,000 and chest xray is worsening.       IV access: central line [facility].     ID: WBC is 7.76, neutrophils  are 94, continue current antibiotics, continue to monitor lab trends and clinical changes.   Microbiology Results (last 10 days)     Procedure Component Value - Date/Time    Respiratory Culture - Wash, Lung, Right Lower Lobe [[id number]]  (Abnormal) Collected:  01/17/18 1353    Lab Status:  Preliminary result Specimen:  Wash from Lung, Right Lower Lobe Updated:  01/19/18 0900     Respiratory Culture --      Heavy growth (4+) Gram Negative Bacilli (A)     Gram Stain Result Occasional WBCs seen    Narrative:       Refer to specimen [id number] for susceptibilities.    Respiratory Culture - Bronchial Wash, Bronchus [[id number]]  (Abnormal) Collected:  01/17/18 1353    Lab Status:  Preliminary result Specimen:  Bronchial Wash from Bronchus Updated:  01/19/18 0858     Respiratory Culture --      Heavy growth (4+) Gram Negative Bacilli (A)     Gram Stain Result Rare (1+) WBCs seen      Occasional Gram negative bacilli    Blood Culture - Blood, [[id number]]  (Normal) Collected:  01/15/18 0114    Lab Status:  Final result Specimen:  Blood from Hand, Left Updated:  01/20/18 0201     Blood Culture No growth at 5 days    Blood Culture - Blood, [[id number]]  (Normal) Collected:  01/15/18 0051    Lab Status:  Final result Specimen:  Blood from Hand, Right Updated:  01/20/18 0201     Blood Culture No growth at 5 days          Patient Active Problem List   Diagnosis Code   • COPD (chronic obstructive pulmonary disease) J44.9   • Shortness of breath R06.02   • Morbid obesity E66.01   • Hypoxia R09.02   • Edema extremities R60.0   • Sleep apnea G47.30   • Wheezing R06.2   • Allergic rhinitis J30.9   • Essential hypertension I10   • Sore throat J02.9   • Cough R05   • Pneumonia J18.9   • Respiratory failure J96.90   • Acute on chronic respiratory failure with hypoxia and hypercapnia J96.21, J96.22         Bedside rounds were completed with RN and RRT, discussed case and plan in great detail.    Leyla Terrazas, YANG  01/20/18  9:38  AM    Scribed for Dr. Gomez by YANG Cassidy.       I, Jeremi Gomez M.D. attest that the above note accurately reflects the work and decisions made  by me.  Patient was seen and evaluated by Dr. Gomez, including history of present illness, physical exam, assessment, and treatment plan.  The above note was reviewed and edited by Dr. Gomez.Critical Care time spent in direct patient care: 39 minutes (excluding procedure time, if applicable) including high complexity decision making to assess, manipulate, and support vital organ system failure in this individual who has impairment of one or more vital organ systems such that there is a high probability of imminent or life threatening deterioration in the patient’s condition.

## 2018-01-21 NOTE — NURSING NOTE
[facility] transporting PT to [facility].  PT Stable at this time, [facility] made aware that PT is in route, PT family member Genesis made aware PT is now in route.

## 2018-01-21 NOTE — NURSING NOTE
Spoke with [facility] .  No Bed available at .  Informed that [facility] has accepted.  PT will be removed form list at

## 2018-01-21 NOTE — PROGRESS NOTES
LOS: 24 days   Patient Care Team:  No Known Provider as PCP - General  No Known Provider as PCP - Family Medicine    Chief Complaint:  Pulmonology is following for respiratory failure and ventilator management    Subjective     Interval History: patient is intubated and sedated.     Bedside rounds were done with patient's nurse and respiratory therapist.  Patient was seen around 6 AM for he was picked up by the transport crew.  Made good urine overall 5.4 L negative.  Bleeding is better    Review of Systems:   Review of Systems - History obtained from chart review and unobtainable from patient due to mental status and Intubated      Objective     Vital Signs  Temp:  [97.9 °F (36.6 °C)-99.8 °F (37.7 °C)] 99.8 °F (37.7 °C)  Heart Rate:  [] 106  Resp:  [22-24] 24  BP: (150-186)/(72-90) 183/85  FiO2 (%):  [100 %] 100 %  Body mass index is 47.7 kg/(m^2).    Intake/Output Summary (Last 24 hours) at 01/21/18 1146  Last data filed at 01/21/18 0634   Gross per 24 hour   Intake             1504 ml   Output             6925 ml   Net            -5421 ml          Vent Settings  Vent Mode: VC/AC    Vt (Set, L): 0.45 L  Resp Rate (Set): 24     FiO2 (%): 100 %  PEEP/CPAP (cm H2O): 16 cm H20    Minute Ventilation (L/min) (Obs): 11 L/min  Resp Rate (Observed) Vent: 24     I:E Ratio (Obs): 1:1.70    PIP Observed (cm H2O): 47 cm H2O     Physical Exam:  GENERAL APPEARANCE: Intubated and sedated.  Appears to be resting comfortably in bed.     HEAD: normocephalic.    EYES: PERRLA.    THROAT: ET tube in place. Oral cavity and pharynx normal. No inflammation, swelling, exudate, or lesions.     NECK: Neck supple.     CARDIAC: Normal S1 and S2. No S3, S4 or murmurs. Rhythm is regular.     LUNGS: Clear to auscultation and percussion without rales, rhonchi, wheezing or diminished breath sounds.    ABDOMEN: Positive bowel sounds. Soft, nondistended, nontender.     EXTREMITIES: No significant deformity or joint abnormality. No edema.  Peripheral pulses intact.    NEUROLOGICAL: Unable to assess due to sedation status.     PSYCHIATRIC: Unable to assess due to sedation status   Results Review:     I reviewed the patient's new clinical results.  I reviewed the patient's new imaging results and agree with the interpretation.    Results from last 7 days  Lab Units 01/21/18  0024 01/20/18  1928 01/20/18  0326 01/19/18  0228   WBC 10*3/mm3 7.59  --  7.76 8.34   HEMOGLOBIN g/dL 7.5* 7.3* 6.7* 7.3*   PLATELETS 10*3/mm3 105*  --  85* 87*       Results from last 7 days  Lab Units 01/21/18  0024 01/20/18  0109 01/19/18  0047  01/17/18  0138   SODIUM mmol/L 146 144 145  < > 148   POTASSIUM mmol/L 3.4* 4.1 3.9  < > 3.4*   CHLORIDE mmol/L 103 107 107  < > 109   CO2 mmol/L 34.3* 31.1 31.9  < > 34.2*   BUN mg/dL 79* 88* 78*  < > 85*   CREATININE mg/dL 1.46* 1.46* 1.38*  < > 1.51*   CALCIUM mg/dL 8.3 7.9 7.9  < > 8.0   GLUCOSE mg/dL 146* 184* 160*  < > 106   MAGNESIUM mg/dL  --  3.0* 3.1*  --  3.2*   < > = values in this interval not displayed.  Lab Results   Component Value Date    INR 1.11 (H) 01/21/2018    INR 1.18 (H) 01/20/2018    INR 1.20 (H) 01/19/2018    PROTIME 14.4 01/21/2018    PROTIME 15.2 01/20/2018    PROTIME 15.3 01/19/2018       Results from last 7 days  Lab Units 01/21/18  0024 01/20/18  0109 01/19/18  0047   ALK PHOS U/L 33* 29* 32*   BILIRUBIN mg/dL 1.2 0.7 0.8   ALT (SGPT) U/L 23 23 19   AST (SGOT) U/L 33 31 28       Results from last 7 days  Lab Units 01/20/18  0532   PH, ARTERIAL pH units 7.326*   PO2 ART mm Hg 60.7*   PCO2, ARTERIAL mm Hg 59.5*   HCO3 ART mmol/L 30.4*     Imaging Results (last 24 hours)     Procedure Component Value Units Date/Time    XR Chest 1 View [[id number]] Collected:  01/20/18 1012     Updated:  01/20/18 1015    Narrative:       EXAMINATION: XR CHEST 1 VW-      CLINICAL INDICATION:     ett; J96.21-Acute and chronic respiratory  failure with hypoxia; J96.22-Acute and chronic respiratory failure with  hypercapnia;  J44.1-Chronic obstructive pulmonary disease with (acute)  exacerbation; R09.02-Hypoxemia     TECHNIQUE: Single AP view of chest.      COMPARISON: January 18, 2018      FINDINGS:   There is bibasilar atelectasis.   Stable bilateral airspace disease.  Support tube and lines are in unchanged position.   Heart size is stable.  No pneumothorax.   Tiny bilateral pleural effusions persist.        Impression:       Stable appearance of the chest.     This report was finalized on 1/20/2018 10:13 AM by Dr. Miko Coto MD.                Medication Review:   Scheduled Medications:    Continuous infusions:    No current facility-administered medications for this encounter.     Assessment/Plan     Neuro: Intubated sedated and paralyzed.  Sedation vacation was done and patient does follow commands        Respiratory Failure:   .  Likely related to patient's underlying lung disease COPD and valvular hemorrhage-continue current ventilator settings.  We'll obtain an ABG if patient remains here for a longer time and adjust ventilator settings accordingly.  Overall.  Good urine.  Continue diuresis.  Patient will be going to be muscular Gray.  Case was discussed with the transport crew in great detail and they were told to call me with any questions or  concerns.    Continue current antibiotics.  Microbiology reviewed.    Microbiology Results (last 10 days)     Procedure Component Value - Date/Time    Respiratory Culture - Lavage, Lung, [place] [[id number]] Collected:  01/20/18 0940    Lab Status:  Preliminary result Specimen:  Lavage from Lung, [place] Updated:  01/21/18 0003     Gram Stain Result Few (2+) WBCs seen      Moderate (3+) Gram negative bacilli      Rare (1+) Gram positive cocci in pairs and clusters    Respiratory Culture - Wash, Lung, Right Lower Lobe [[id number]]  (Abnormal) Collected:  01/17/18 1353    Lab Status:  Preliminary result Specimen:  Wash from Lung, Right Lower Lobe Updated:  01/19/18 0900      Respiratory Culture --      Heavy growth (4+) Gram Negative Bacilli (A)     Gram Stain Result Occasional WBCs seen    Narrative:       Refer to specimen [id number] for susceptibilities.    Respiratory Culture - Bronchial Wash, Bronchus [[id number]]  (Abnormal) Collected:  01/17/18 1353    Lab Status:  Preliminary result Specimen:  Bronchial Wash from Bronchus Updated:  01/19/18 0858     Respiratory Culture --      Heavy growth (4+) Gram Negative Bacilli (A)     Gram Stain Result Rare (1+) WBCs seen      Occasional Gram negative bacilli    Blood Culture - Blood, [[id number]]  (Normal) Collected:  01/15/18 0114    Lab Status:  Final result Specimen:  Blood from Hand, Left Updated:  01/20/18 0201     Blood Culture No growth at 5 days    Blood Culture - Blood, [[id number]]  (Normal) Collected:  01/15/18 0051    Lab Status:  Final result Specimen:  Blood from Hand, Right Updated:  01/20/18 0201     Blood Culture No growth at 5 days          Intubated: 1/9/18.       Hypertension: Continue to maintain map around 65.  We will give him antihypertensives as blood pressure is on the higher side.    SHEELA on CKD stage III: Creatinine 1.46 , made good urine.  Creatinine is stable.  Continue diuretics.    Hypernatremia: Sodium level remains good.  Continue to monitor.  Continue free water flushes.  Freewater flushes were decreased yesterday    Endo: monitor blood glucose, target range 120-140.     Low H/H: Hemoglobin stable.  No likely due to hemorrhage.  Transfuse below 7.    GI: Continue GI prophylaxis and tube feedings.      Thrombocytopenia: Stable continue to monitor      IV access: central line [facility].     ID: Growing gram-negative bacilli.  Continue current antibiotics.  Microbiology reviewed.    Patient Active Problem List   Diagnosis Code   • COPD (chronic obstructive pulmonary disease) J44.9   • Shortness of breath R06.02   • Morbid obesity E66.01   • Hypoxia R09.02   • Edema extremities R60.0   • Sleep apnea G47.30   • Wheezing  R06.2   • Allergic rhinitis J30.9   • Essential hypertension I10   • Sore throat J02.9   • Cough R05   • Pneumonia J18.9       All the labs medications ventilator settings and Lasix were reviewed.  Bedside rounds were completed with RN and RRT, discussed case and plan in great detail.    Jeremi Gomez MD  01/21/18  11:46 AM    Critical Care time spent in direct patient care: 39 minutes (excluding procedure time, if applicable) including high complexity decision making to assess, manipulate, and support vital organ system failure in this individual who has impairment of one or more vital organ systems such that there is a high probability of imminent or life threatening deterioration in the patient’s condition.

## 2018-01-22 NOTE — PAYOR COMM NOTE
"[facility]  NPI: [id number]    Utilization Review   Contact:Eboni Vivas RN, BSN  Phone: [phone]  Fax: [phone]    Ryne harden /aattn: radha  Discharge Notification  REF# [id number]  D/c to another hosp  1/21/18          Ward Copeland (67 y.o. Male)     Date of Birth Social Security Number Address Home Phone MRN    1950  [address] [place] [id number] [phone] [id number]    [employer] Marital Status          Tenriism        Admission Date Admission Type Admitting Provider Attending Provider Department, Room/Bed    12/28/17 Emergency Seun Muller MD  [facility] CRITICAL CARE, CC06/1C    Discharge Date Discharge Disposition Discharge Destination        1/21/2018 [facility] (DC - External)             Attending Provider: (none)    Allergies:  Iodine, Lisinopril    Isolation:  None   Infection:  MDRO (01/21/18)   Code Status:  Prior    Ht:  167.6 cm (65.98\")   Wt:  134 kg (295 lb 6.4 oz)    Admission Cmt:  None   Principal Problem:  None                Active Insurance as of 12/28/2017     Primary Coverage     Payor Plan Insurance Group Employer/Plan Group    [employer] MEDICARE REPLACEMENT RYNE MEDICARE ADVANTAGE [id number]     Payor Plan Address Payor Plan Phone Number Effective From Effective To    [address] [phone] 1/1/2016 12/31/2017    [place] [address]       Subscriber Name Subscriber Birth Date Member ID       WARD COPELAND 1950 [id number]                 Emergency Contacts      (Rel.) Home Phone Work Phone Mobile Phone    Adeline Copeland (Spouse) [phone] -- --    AmeXiomaraMay (Daughter) [phone] -- --    Genesis Mcknight (Daughter) [phone] -- --               Discharge Summary      Teresa Sandhu DO at 1/20/2018  4:15 PM          Discharge Summary:    Date of Admission: 12/28/2017  Date of Discharge:  1/20/2018    PCP: No Known Provider      DISCHARGE DIAGNOSIS  -Recurrent diuffuse alveolar " "hemorrhage; 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 as patient had re-bleeding off any type of heparin product(s)  -Acute thrombocytopenia  -Acute on chronic hypoxic/hypercarbic respiratory failure secondary to a COPD exacerbation with bilateral hospital-acquired pneumonia status post course of IV antibiotics  -Iron deficiency  -Mild acute hypokalemia, resolved  -(+) fluid balance; consider decreasing free water flushes now that sodium improving  -Acute kidney injury on top of stage III chronic kidney disease; kidney function improved  -Mild hypernatremia; adjustments made to free water flushes; improved  -Insulin-dependent diabetes mellitus type 2 currently controlled on insulin drip  -Acute on chronic normocytic anemia  -Nutrition: Tolerating tube feeding well    Admission Diagnosis:  -Acute on chronic hypoxic and hypercapnic respiratory failure secondary to a COPD exacerbation  -Septic shock criteria was met upon admission neck 5-atypical chest pain  -Hypokalemia    SECONDARY DIAGNOSES  Past Medical History:   Diagnosis Date   • Agent orange exposure    • Arthritis    • CHF (congestive heart failure)    • COPD (chronic obstructive pulmonary disease)    • Coronary artery disease    • Diabetes mellitus    • Hyperlipidemia    • Hypertension        CONSULTS   Dr Gomez with pulmonology  Dr. Gomes with hematology/oncology  Dr. Card/Joelle with nephrology  Dr. Foss with infectious disease  Dr. Vera with urology    PROCEDURES PERFORMED  -Emergent Intubation 1/9/18  -Bronchocsopy x3 for DAH  -PRBC x7  -FFP x3  -Platelets x1    HOSPITAL COURSE  Patient is a 67 y.o. male presented to [facility] complaining that he \"couldn't breathe.\"  Please see the admitting history and physical for further details.      The patient was admitted to the hospitalist service for respiratory failure secondary to a COPD exacerbation.  The patient was admitted on December 28 simply from this is been a very " prolonged hospitalization.  The patient did receive IV fluid hydration aggressively upon admission as he initially met septic shock criteria.  He was started on IV Solu-Medrol, IV Rocephin and IV doxycycline in addition to nebulized scheduled inhalants.    The patient was BiPAP dependent throughout the majority of his hospital stay until he was emergently intubated on January 9 for altered mental status, worsening hypoxia and significant hemoptysis.  He underwent his first bronchoscopy on that date.  The patient had initially been on aspirin for cardiac history and Arixtra for DVT prophylaxis as he had borderline thrombocytopenia early on during his hospitalization and the patient had a previous hospitalization here in early December 2017 where he was intubated and treated for pneumonia, CHF exacerbation and COPD exacerbation.    The patient had been critical but stable up until January 16 where he had another episode of acute alveolar hemorrhage.  The patient has had a significant amount of blood noted with ET suctioning.  The patient's hemoglobin did drop at that time as well as his platelets.  The patient was never restarted on aspirin and/or any heparin products.  The patient was noted to have old blood in the trachea and the left and right main bronchi at that time.  He was given thrombin spray and epinephrine.  For the past 4 days the patient has been more hypoxemic and has required an increase in his FiO2.  On the day of discharge the patient again had anemia and hypoxemia overnight.  He had another bedside bronchoscopy performed today by Dr. Gomez which again revealed diffuse alveolar hemorrhage.    He has received 3 days of IV vitamin K, DDAVP, Amicar and stress/pulse dose glucocorticoids in addition to the above noted blood products.    We have considered the possibility that the patient may have some type of vasculitis or Goodpasture's disease although his serologies have been negative.  Hematology  "evaluated the patient and did not feel that this was a primary hematologic issue as patient did not have DIC and was no longer receiving any type of heparin product.  The patient has been started on iron as he is iron deficient and has also been started on IV folic acid.    Nephrology has been assisting with the patient's diuretics.  Infectious disease has been assisting with antibiotic management. The patient has been placed on insulin drip with except a little blood glucose levels.    Unfortunately, despite the above mentioned therapies the patient has continued to have diffuse alveolar hemorrhage of unclear etiology.  It is felt that the patient will be better served at a tertiary care center and has been graciously accepted at [facility] for further evaluation and management.    CONDITION ON DISCHARGE  Stable.      VITAL SIGNS  /72 (BP Location: Left arm, Patient Position: Lying)  Pulse 104  Temp 97.9 °F (36.6 °C) (Oral)   Resp 24  Ht 167.6 cm (65.98\")  Wt 134 kg (295 lb 6.4 oz)  SpO2 99%  BMI 47.7 kg/m2  Objective  Constitutional: He appears well-developed and well-nourished. No distress. He is sedated and intubated.   HENT:   Head: Normocephalic and atraumatic.   Nose: Nose normal.   Mouth/Throat: Oropharynx is clear and moist and mucous membranes are normal.   Eyes: Conjunctivae  - normal. Pupils are equal, round, and reactive to light. No scleral icterus.   Neck: Trachea normal. Neck supple. No JVD present. Carotid bruit is not present. No thyromegaly present.   Cardiovascular: Normal rate, regular rhythm and normal pulses.  Exam reveals no gallop and no friction rub.    Murmur heard. Diffuse/Significant edema noted bilateral upper and lower extremities    Pulmonary/Chest: Effort normal. He is intubated. No respiratory distress. He has decreased breath sounds. He has no wheezes. He has no rhonchi. He has no rales.   Abdominal: Soft. He exhibits distension. Bowel sounds are " increased. There is no tenderness. There is no guarding.   Genitourinary: Worley in place and draining a dark yellow urine   Neurological: Unable to assess; currently sedated   Skin: Skin is warm, dry and intact. No rash noted. No erythema.     DISCHARGE DISPOSITION   [facility] (DC - External)    DISCHARGE MEDICATIONS   Se Anne   Home Medication Instructions SANDHYA:[id number]    Printed on:01/20/18 0555   Medication Information                      amLODIPine (NORVASC) 10 MG tablet  Take 10 mg by mouth Daily.             aspirin 81 MG EC tablet  Take 81 mg by mouth Daily.             atorvastatin (LIPITOR) 80 MG tablet  Take 80 mg by mouth Daily.             Cholecalciferol (VITAMIN D3) 2000 UNITS tablet  Take 1 tablet by mouth Daily.             CloNIDine (CATAPRES) 0.2 MG tablet  Take 1 tablet by mouth Every 8 (Eight) Hours.             dextrose (GLUTOSE) 40 % gel  Take 15 g by mouth Every 15 (Fifteen) Minutes As Needed for Low Blood Sugar (Blood Sugar Less Than 70, Patient Alert, Is Not NPO).             FentaNYL Citrate (FENTANYL PCA 1500 MCG/30 ML, [facility],)  Nurse Loading Dose: 0 mcg  Patient Bolus Dose: 0 mcg  Lockout Interval: 10 Minutes  Basal Rate: 50 mcg/hr  One Hour Dose Limit: 300 mcg             ferrous sulfate 300 (60 Fe) MG/5ML syrup  Take 5 mL by mouth Daily.             fluconazole (DIFLUCAN) 100-0.9 MG/100ML-% IVPB  Infuse 50 mL into a venous catheter Daily for 4 doses. Indications: Pneumonia             folic acid 1 mg in sodium chloride 0.9 % 50 mL IVPB  Infuse 1 mg into a venous catheter Daily.             hydrALAZINE (APRESOLINE) 25 MG tablet  Take 3 tablets by mouth 3 (Three) Times a Day.             insulin NPH-insulin regular (humuLIN 70/30,novoLIN 70/30) (70-30) 100 UNIT/ML injection  Inject 52 Units under the skin Every Morning.             insulin NPH-insulin regular (humuLIN 70/30,novoLIN 70/30) (70-30) 100 UNIT/ML injection  Inject 54 Units under the skin Every Night.              insulin regular 100 Units in sodium chloride 0.9 % 99 mL  Infuse 1-20 Units/hr into a venous catheter Dose Adjusted By Provider As Needed.             ipratropium-albuterol (DUO-NEB) 0.5-2.5 mg/mL nebulizer  Take 3 mL by nebulization Every 6 (Six) Hours.             isosorbide mononitrate (IMDUR) 60 MG 24 hr tablet  Take 4 tablets by mouth Daily.             labetalol (NORMODYNE) 200 MG tablet  Take 1 tablet by mouth Every 12 (Twelve) Hours.             lactobacillus acidophilus (RISAQUAD) capsule capsule  1 capsule by Per G Tube route Daily.             levothyroxine (SYNTHROID, LEVOTHROID) 25 MCG tablet  Take 25 mcg by mouth Daily.             methylPREDNISolone sodium succinate 1,000 mg in sodium chloride 0.9 % 100 mL IVPB  Infuse 1,000 mg into a venous catheter Daily for 1 dose.             nitroglycerin (NITROSTAT) 0.4 MG SL tablet  Place 0.4 mg under the tongue Every 5 (Five) Minutes As Needed for chest pain. Take no more than 3 doses in 15 minutes.             pantoprazole (PROTONIX) 40 MG EC tablet  Take 40 mg by mouth Daily.             piperacillin-tazobactam (ZOSYN) 3.375 g/100 mL 0.9% NS IVPB (mbp)  Infuse 100 mL into a venous catheter Every 8 (Eight) Hours for 30 doses. Indications: Pneumonia             polyethylene glycol (MIRALAX) packet  Take 17 g by mouth 2 (Two) Times a Day.             propofol (DIPRIVAN) 1000 mg/mL emulsion infusion  Infuse 625-6,250 mcg/min into a venous catheter Dose Adjusted By Provider As Needed.             sertraline (ZOLOFT) 100 MG tablet  Take 50 mg by mouth Every Evening.             sodium chloride 0.9 % solution 100 mL with furosemide 10 MG/ML solution 80 mg  Infuse 20 mg into a venous catheter 1 (One) Time for 1 dose.             terazosin (HYTRIN) 10 MG capsule  Take 1 capsule by mouth Daily.             vancomycin 1,000 mg in sodium chloride 0.9 % 250 mL IVPB  Infuse 1,000 mg into a venous catheter Every 12 (Twelve) Hours for 19 doses. Indications:  Pneumonia             vecuronium (NORCURON) 10 MG injection  Infuse 5 mg into a venous catheter 3 (Three) Times a Day.             vitamin B-12 (VITAMIN B-12) 2000 MCG tablet  Take 1 tablet by mouth Daily.                 DISCHARGE DIET         Dietary Orders            Start     Ordered    01/18/18 1620  Osmolite 1.2; Tube Feeding Type: Continuous; Continuous Tube Feeding Start Rate (mL/hr): Other; Other: 45; Then Advance Rate By (mL/hr): Do Not Advance; Every __ Hours: Patient at Goal Rate; To Goal Rate of (mL/hr): Other; Other: 45; Total Daily V...  Diet Effective Now     Comments:  Just clarifying order. Continue osmolite 1.2 at 45 ml/hr.   Question Answer Comment   Tube Feeding Formula: Osmolite 1.2    Tube Feeding Type Continuous    Continuous Tube Feeding Start Rate (mL/hr) Other    Other 45    Then Advance Rate By (mL/hr) Do Not Advance    Every __ Hours Patient at Goal Rate    To Goal Rate of (mL/hr) Other    Other 45    Total Daily Volume to Deliver (mL/day) 1080    Water Flush Frequency Per Provider Order        01/18/18 1621    01/18/18 1619  NPO Diet  Diet Effective Now      01/18/18 1618          Future Appointments  Date Time Provider Department Center   3/5/2018 9:30 AM [facility]O [facility] UROLOGY [facility] None   3/14/2018 1:20 PM Jeremi Gomez MD [facility] None       TEST  RESULTS PENDING AT DISCHARGE   Order Current Status    Gram Stain Collected (01/20/18 0940)    Respiratory Culture - Lavage, Lung, Lingula Collected (01/20/18 0940)    Antimyeloperoxidase (MPO) Antibodies In process    Antiproteinase 3 (MO-3) Antibodies In process    Glomerular Basement Membrane Antibodies In process    Respiratory Culture - Bronchial Wash, Bronchus Preliminary result    Respiratory Culture - Wash, Lung, Right Lower Lobe Preliminary result           Teresa Sandhu DO  01/20/18  4:15 PM      Time: greater than 30 minutes.       Electronically signed by Teresa Sandhu DO at 1/20/2018  4:32 PM

## 2018-01-26 LAB
BACTERIA SPEC RESP CULT: ABNORMAL
BACTERIA SPEC RESP CULT: ABNORMAL
GRAM STN SPEC: ABNORMAL
GRAM STN SPEC: ABNORMAL

## 2020-11-18 NOTE — ED NOTES
Pts daughter called at this time and informed pt is ready for discharge. States that she will come pick him up     Stacey Burns RN  12/02/17 0594     Thank you for choosing [facility] and [facility] for your  
 
care. You may receive a survey about your visit. We appreciate you taking time  
 
to complete this survey as we use your feedback to improve our services. We  
 
realize we are not perfect, but we strive to provide excellent care.

## 2021-11-09 NOTE — PROGRESS NOTES
Discharge Planning Assessment  [facility]     Patient Name: Se Anne  MRN: [id number]  Today's Date: 1/2/2018    Admit Date: 12/28/2017    Discharge Plan       01/02/18 1328    Case Management/Social Work Plan    Plan Pt lives at home with his wife, Adeline, grandsonHerrera and daughter, Malka and plans to return home at discharge. Pt has [facility]. Updated information will need to be sent at discharge. Pt also [facility] a [facility].  Pt utilzes home oxygen, nebulizer, bipap, cane, walker, motorized sccoter and [facility].  Pt will be transported home via private auto.     Patient/Family In Agreement With Plan yes        Daniella Wilburn     1-2 cups/cans per day

## 2022-02-28 NOTE — PLAN OF CARE
This encounter was created in error - please disregard.   Problem: Respiratory Insufficiency (Adult)  Goal: Identify Related Risk Factors and Signs and Symptoms  Outcome: Ongoing (interventions implemented as appropriate)  Goal: Acid/Base Balance  Outcome: Ongoing (interventions implemented as appropriate)  Goal: Effective Ventilation  Outcome: Ongoing (interventions implemented as appropriate)    Problem: Skin Integrity Impairment, Risk/Actual (Adult)  Goal: Identify Related Risk Factors and Signs and Symptoms  Outcome: Ongoing (interventions implemented as appropriate)  Goal: Skin Integrity/Wound Healing  Outcome: Ongoing (interventions implemented as appropriate)    Problem: Fall Risk (Adult)  Goal: Identify Related Risk Factors and Signs and Symptoms  Outcome: Ongoing (interventions implemented as appropriate)  Goal: Absence of Falls  Outcome: Ongoing (interventions implemented as appropriate)    Problem: Patient Care Overview (Adult)  Goal: Plan of Care Review  Outcome: Ongoing (interventions implemented as appropriate)  Goal: Adult Individualization and Mutuality  Outcome: Ongoing (interventions implemented as appropriate)  Goal: Discharge Needs Assessment  Outcome: Ongoing (interventions implemented as appropriate)    Problem: Pressure Ulcer Risk (Karthikeyan Scale) (Adult,Obstetrics,Pediatric)  Goal: Skin Integrity  Outcome: Ongoing (interventions implemented as appropriate)    Problem: Diabetes, Type 2 (Adult)  Goal: Signs and Symptoms of Listed Potential Problems Will be Absent or Manageable (Diabetes, Type 2)  Outcome: Ongoing (interventions implemented as appropriate)    Problem: NPPV/CPAP (Adult)  Goal: Signs and Symptoms of Listed Potential Problems Will be Absent or Manageable (NPPV/CPAP)  Outcome: Ongoing (interventions implemented as appropriate)

## 2023-01-10 ENCOUNTER — TRANSCRIBE ORDER (OUTPATIENT)
Dept: SCHEDULING | Age: 73
End: 2023-01-10

## 2023-01-10 DIAGNOSIS — Z12.89 ENCOUNTER FOR PELVIC SCREENING FOR MALIGNANT NEOPLASM: ICD-10-CM

## 2023-01-10 DIAGNOSIS — Z15.01 GENETIC SUSCEPTIBILITY TO MALIGNANT NEOPLASM OF BREAST: Primary | ICD-10-CM

## 2023-02-15 NOTE — PROGRESS NOTES
Discharge Planning Assessment   [facility]to     Patient Name: Se Anne  MRN: [id number]  Today's Date: 1/19/2018    Admit Date: 12/28/2017       Discharge Plan       01/19/18 1328    Case Management/Social Work Plan    Plan Pt is currently on the vent. Pt lives at home with family. Pt's family plans for pt to return home at discharge. [facility]. Pt plans to return home at discharge. Pt has been referred to CC before intubation. Pt's insurance requires a 21 day LOS, 3 failed waning attempts and a stable airway before he can be submitted to the insurance. SS will follow and assist as needed.     Patient/Family In Agreement With Plan yes        Discharge Placement     No information found        Expected Discharge Date and Time     Expected Discharge Date Expected Discharge Time    Jan 20, 2018             Jaylene Gracia     Unable to assess

## 2023-04-24 ENCOUNTER — TRANSCRIBE ORDERS (OUTPATIENT)
Facility: HOSPITAL | Age: 73
End: 2023-04-24

## 2023-04-24 DIAGNOSIS — Z12.89 ENCOUNTER FOR PELVIC SCREENING FOR MALIGNANT NEOPLASM: ICD-10-CM

## 2023-04-24 DIAGNOSIS — Z15.01 GENETIC SUSCEPTIBILITY TO MALIGNANT NEOPLASM OF BREAST: Primary | ICD-10-CM

## 2024-01-02 ENCOUNTER — HOSPITAL ENCOUNTER (OUTPATIENT)
Facility: HOSPITAL | Age: 74
Discharge: HOME | End: 2024-01-05
Payer: MEDICARE

## 2024-01-02 DIAGNOSIS — M23.301 DEGENERATIVE TEAR OF LATERAL MENISCUS OF LEFT KNEE: ICD-10-CM

## 2024-01-02 DIAGNOSIS — Z15.01 GENETIC SUSCEPTIBILITY TO MALIGNANT NEOPLASM OF BREAST: ICD-10-CM

## 2024-01-02 DIAGNOSIS — Z12.89 ENCOUNTER FOR PELVIC SCREENING FOR MALIGNANT NEOPLASM: ICD-10-CM

## 2024-01-02 PROCEDURE — 6360000004 HC RX CONTRAST MEDICATION: Performed by: RADIOLOGY

## 2024-01-02 PROCEDURE — 74183 MRI ABD W/O CNTR FLWD CNTR: CPT

## 2024-01-02 PROCEDURE — 73721 MRI JNT OF LWR EXTRE W/O DYE: CPT

## 2024-01-02 PROCEDURE — A9575 INJ GADOTERATE MEGLUMI 0.1ML: HCPCS | Performed by: RADIOLOGY

## 2024-01-02 RX ORDER — GADOTERATE MEGLUMINE 376.9 MG/ML
19 INJECTION INTRAVENOUS
Status: COMPLETED | OUTPATIENT
Start: 2024-01-02 | End: 2024-01-02

## 2024-01-02 RX ADMIN — GADOTERATE MEGLUMINE 19 ML: 376.9 INJECTION INTRAVENOUS at 08:00

## 2024-02-27 PROBLEM — M17.12 OSTEOARTHRITIS OF LEFT KNEE: Status: ACTIVE | Noted: 2024-02-27

## 2024-04-15 ENCOUNTER — HOSPITAL ENCOUNTER (OUTPATIENT)
Facility: HOSPITAL | Age: 74
Discharge: HOME | End: 2024-04-18
Payer: MEDICARE

## 2024-04-15 VITALS
WEIGHT: 220.46 LBS | TEMPERATURE: 97.6 F | BODY MASS INDEX: 35.43 KG/M2 | DIASTOLIC BLOOD PRESSURE: 65 MMHG | SYSTOLIC BLOOD PRESSURE: 105 MMHG | HEART RATE: 70 BPM | HEIGHT: 66 IN

## 2024-04-15 LAB
ABO + RH BLD: NORMAL
ANION GAP SERPL CALC-SCNC: 8 MMOL/L (ref 5–15)
APPEARANCE UR: CLEAR
BACTERIA URNS QL MICRO: NEGATIVE /HPF
BILIRUB UR QL: NEGATIVE
BLOOD GROUP ANTIBODIES SERPL: NORMAL
BUN SERPL-MCNC: 18 MG/DL (ref 6–20)
BUN/CREAT SERPL: 16 (ref 12–20)
CALCIUM SERPL-MCNC: 9.2 MG/DL (ref 8.5–10.1)
CHLORIDE SERPL-SCNC: 107 MMOL/L (ref 97–108)
CO2 SERPL-SCNC: 24 MMOL/L (ref 21–32)
COLOR UR: NORMAL
CREAT SERPL-MCNC: 1.15 MG/DL (ref 0.7–1.3)
EPITH CASTS URNS QL MICRO: NORMAL /LPF
ERYTHROCYTE [DISTWIDTH] IN BLOOD BY AUTOMATED COUNT: 12.7 % (ref 11.5–14.5)
GLUCOSE SERPL-MCNC: 124 MG/DL (ref 65–100)
GLUCOSE UR STRIP.AUTO-MCNC: NEGATIVE MG/DL
HCT VFR BLD AUTO: 44.5 % (ref 36.6–50.3)
HGB BLD-MCNC: 14.2 G/DL (ref 12.1–17)
HGB UR QL STRIP: NEGATIVE
HYALINE CASTS URNS QL MICRO: NORMAL /LPF (ref 0–5)
INR PPP: 1 (ref 0.9–1.1)
KETONES UR QL STRIP.AUTO: NEGATIVE MG/DL
LEUKOCYTE ESTERASE UR QL STRIP.AUTO: NEGATIVE
MCH RBC QN AUTO: 29.1 PG (ref 26–34)
MCHC RBC AUTO-ENTMCNC: 31.9 G/DL (ref 30–36.5)
MCV RBC AUTO: 91.2 FL (ref 80–99)
NITRITE UR QL STRIP.AUTO: NEGATIVE
NRBC # BLD: 0 K/UL (ref 0–0.01)
NRBC BLD-RTO: 0 PER 100 WBC
PH UR STRIP: 5.5 (ref 5–8)
PLATELET # BLD AUTO: 185 K/UL (ref 150–400)
PMV BLD AUTO: 11.6 FL (ref 8.9–12.9)
POTASSIUM SERPL-SCNC: 3.9 MMOL/L (ref 3.5–5.1)
PROT UR STRIP-MCNC: NEGATIVE MG/DL
PROTHROMBIN TIME: 10.9 SEC (ref 9–11.1)
RBC # BLD AUTO: 4.88 M/UL (ref 4.1–5.7)
RBC #/AREA URNS HPF: NORMAL /HPF (ref 0–5)
SODIUM SERPL-SCNC: 139 MMOL/L (ref 136–145)
SP GR UR REFRACTOMETRY: 1.01 (ref 1–1.03)
SPECIMEN EXP DATE BLD: NORMAL
URINE CULTURE IF INDICATED: NORMAL
UROBILINOGEN UR QL STRIP.AUTO: 0.2 EU/DL (ref 0.2–1)
WBC # BLD AUTO: 6.1 K/UL (ref 4.1–11.1)
WBC URNS QL MICRO: NORMAL /HPF (ref 0–4)

## 2024-04-15 PROCEDURE — 81001 URINALYSIS AUTO W/SCOPE: CPT

## 2024-04-15 PROCEDURE — 86900 BLOOD TYPING SEROLOGIC ABO: CPT

## 2024-04-15 PROCEDURE — 85610 PROTHROMBIN TIME: CPT

## 2024-04-15 PROCEDURE — APPNB30 APP NON BILLABLE TIME 0-30 MINS: Performed by: NURSE PRACTITIONER

## 2024-04-15 PROCEDURE — 80048 BASIC METABOLIC PNL TOTAL CA: CPT

## 2024-04-15 PROCEDURE — 85027 COMPLETE CBC AUTOMATED: CPT

## 2024-04-15 PROCEDURE — 86850 RBC ANTIBODY SCREEN: CPT

## 2024-04-15 PROCEDURE — 86901 BLOOD TYPING SEROLOGIC RH(D): CPT

## 2024-04-15 PROCEDURE — 36415 COLL VENOUS BLD VENIPUNCTURE: CPT

## 2024-04-15 ASSESSMENT — PROMIS GLOBAL HEALTH SCALE
IN GENERAL, WOULD YOU SAY YOUR HEALTH IS...[ON A SCALE OF 1 (POOR) TO 5 (EXCELLENT)]: GOOD
SUM OF RESPONSES TO QUESTIONS 3, 6, 7, & 8: 15
IN GENERAL, HOW WOULD YOU RATE YOUR PHYSICAL HEALTH [ON A SCALE OF 1 (POOR) TO 5 (EXCELLENT)]?: GOOD
IN GENERAL, HOW WOULD YOU RATE YOUR SATISFACTION WITH YOUR SOCIAL ACTIVITIES AND RELATIONSHIPS [ON A SCALE OF 1 (POOR) TO 5 (EXCELLENT)]?: GOOD
WHO IS THE PERSON COMPLETING THE PROMIS V1.1 SURVEY?: SELF
IN GENERAL, HOW WOULD YOU RATE YOUR MENTAL HEALTH, INCLUDING YOUR MOOD AND YOUR ABILITY TO THINK [ON A SCALE OF 1 (POOR) TO 5 (EXCELLENT)]?: EXCELLENT
TO WHAT EXTENT ARE YOU ABLE TO CARRY OUT YOUR EVERYDAY PHYSICAL ACTIVITIES SUCH AS WALKING, CLIMBING STAIRS, CARRYING GROCERIES, OR MOVING A CHAIR [ON A SCALE OF 1 (NOT AT ALL) TO 5 (COMPLETELY)]?: MOSTLY
HOW IS THE PROMIS V1.1 BEING ADMINISTERED?: PAPER
SUM OF RESPONSES TO QUESTIONS 2, 4, 5, & 10: 18
IN THE PAST 7 DAYS, HOW WOULD YOU RATE YOUR PAIN ON AVERAGE [ON A SCALE FROM 0 (NO PAIN) TO 10 (WORST IMAGINABLE PAIN)]?: 3
IN GENERAL, WOULD YOU SAY YOUR QUALITY OF LIFE IS...[ON A SCALE OF 1 (POOR) TO 5 (EXCELLENT)]: EXCELLENT
IN THE PAST 7 DAYS, HOW OFTEN HAVE YOU BEEN BOTHERED BY EMOTIONAL PROBLEMS, SUCH AS FEELING ANXIOUS, DEPRESSED, OR IRRITABLE [ON A SCALE FROM 1 (NEVER) TO 5 (ALWAYS)]?: NEVER
IN GENERAL, PLEASE RATE HOW WELL YOU CARRY OUT YOUR USUAL SOCIAL ACTIVITIES (INCLUDES ACTIVITIES AT HOME, AT WORK, AND IN YOUR COMMUNITY, AND RESPONSIBILITIES AS A PARENT, CHILD, SPOUSE, EMPLOYEE, FRIEND, ETC) [ON A SCALE OF 1 (POOR) TO 5 (EXCELLENT)]?: GOOD

## 2024-04-15 ASSESSMENT — KOOS JR
STRAIGHTENING KNEE FULLY: MILD
HOW SEVERE IS YOUR KNEE STIFFNESS AFTER FIRST WAKING IN MORNING: MODERATE
STANDING UPRIGHT: MILD
RISING FROM SITTING: MODERATE

## 2024-04-15 ASSESSMENT — PAIN SCALES - GENERAL: PAINLEVEL_OUTOF10: 0

## 2024-04-15 NOTE — PERIOP NOTE
[facility]'S PREOPERATIVE INSTRUCTIONS  ORTHOPAEDIC    Surgery Date:   04/30/2024    Your surgeon's office or [facility] staff will call you between 4 PM- 8 PM the day before surgery with your arrival time.  If your surgery is on a Monday, you will receive a call the preceding Friday. If your surgeon's office has given you, your arrival time then go by that time.    Please report to [facility] Patient Access/Admitting on the 1st floor.  Bring your insurance card, photo identification, and any copayment (if applicable).   If you are going home the same day of your surgery, you must have a responsible adult to drive you home. You need to have a responsible adult to stay with you the first 24 hours after surgery and you should not drive a car for 24 hours following your surgery.  If you are being admitted to the hospital,please leave personal belongings/luggage in your car until you have an assigned hospital room number.  Please wear comfortable clothes. Wear your glasses instead of contacts. We ask that all money, jewelry and valuables be left at home. Wear no make up, particularly mascara, the day of surgery.  Do NOT drink alcohol or smoke 24 hours before surgery.    All body piercings, rings, and jewelry need to be removed and left at home. Do not wear any fingernail polish except for clear. Please wear your hair loose or down. Please no pony-tails, buns, or any metal hair accessories. You may wear deodorant. Do not shave any body area within 24 hours of your surgery.  Please follow all instructions to avoid any potential surgical cancellation.  Should your physical condition change, (i.e. fever, cold, flu, etc.) please notify your surgeon as soon as possible.  It is important to be on time. If a situation occurs where you may be delayed, please call:  [phone] / [phone] on the day of surgery.  The Preadmission Testing staff can be reached at [phone].  Special instructions: BRING COPY OF

## 2024-04-16 PROBLEM — G47.52 REM SLEEP BEHAVIOR DISORDER: Status: ACTIVE | Noted: 2023-12-12

## 2024-04-16 PROBLEM — E78.00 HIGH CHOLESTEROL: Status: ACTIVE | Noted: 2024-04-16

## 2024-04-16 PROBLEM — G31.84 MILD COGNITIVE IMPAIRMENT: Status: ACTIVE | Noted: 2023-12-12

## 2024-04-16 PROBLEM — G20.A1 PARKINSON'S DISEASE (HCC): Status: ACTIVE | Noted: 2024-03-05

## 2024-04-16 LAB
BACTERIA SPEC CULT: NORMAL
BACTERIA SPEC CULT: NORMAL
SERVICE CMNT-IMP: NORMAL

## 2024-04-16 NOTE — PERIOP NOTE
PAT Nurse Practitioner   Pre-Operative Chart Review/Assessment:-ORTHOPEDIC                Patient Name:  Kumar Elena                                                           Age:   73 y.o.    :  1950     Today's Date:  2024     Date of PAT:   4/15/24      Date of Surgery:    24      Procedure(s):  Left Total Knee Arthroplasty     Anesthesia:   Spinal     Surgeon:   Dr. Mejia                       PLAN:      1)  PCP:  Kumar Horner MD      2)  Cardiac Clearance:  Pt followed by Dr. Nix(S). LOV 24. Condition stable, no changes to current regimen or pending tests. OV note, EKG and ROEL's on chart and scanned in Epic.       3)  Diabetic Treatment Consult:  Not indicated. A1c-5.8      4)  Sleep Apnea evaluation:   Pt had recent SS that was neg.       5) Treatment for MRSA/Staph Aureus:  Neg      6) Discharge Plan:  Home w/ spouse. Pt resides at [facility].      7) Additional Concerns:  Parkinson's, mild non-obstructive CAD      8) Special Med Recs:  Hold aspirin DOS.      Additional Orders for Day of Surgery:  T&S(pre-op)                Vital Signs:        Vitals:    04/15/24 0844   BP: 105/65   Pulse: 70   Temp: 97.6 °F (36.4 °C)             Body mass index is 35.58 kg/m².       KNEE DISABILITY OSTEOARTHRITIS AND OUTCOME SCORE    Stiffness - The following question concerns the amount of joing stiffness you have experienced during the last week in your knee. Stiffness is a sensation of restriction or slowness in the ease with which you move your knee joint.  How severe is your knee stiffness after first wakening in the morning?: Moderate    Pain - What amount of knee pain have you experienced the last week during the following activities?  Twisting/pivoting on your knee: None  Straightening knee fully: Mild  Going up or down stairs:  (N/A)  Standing upright: Mild    Function - Please indicate the degree of difficulty you have experienced in the last week due to your knee.  Rising from

## 2024-04-17 PROBLEM — Z01.818 ENCOUNTER FOR PREADMISSION TESTING: Status: ACTIVE | Noted: 2024-04-17

## 2024-04-30 ENCOUNTER — ANESTHESIA EVENT (OUTPATIENT)
Facility: HOSPITAL | Age: 74
End: 2024-04-30
Payer: MEDICARE

## 2024-04-30 ENCOUNTER — ANESTHESIA (OUTPATIENT)
Facility: HOSPITAL | Age: 74
End: 2024-04-30
Payer: MEDICARE

## 2024-04-30 ENCOUNTER — HOSPITAL ENCOUNTER (OUTPATIENT)
Facility: HOSPITAL | Age: 74
Setting detail: OBSERVATION
Discharge: HOME | End: 2024-05-01
Attending: ORTHOPAEDIC SURGERY | Admitting: ORTHOPAEDIC SURGERY
Payer: MEDICARE

## 2024-04-30 DIAGNOSIS — Z96.652 STATUS POST TOTAL LEFT KNEE REPLACEMENT: Primary | ICD-10-CM

## 2024-04-30 PROBLEM — M17.12 PRIMARY OSTEOARTHRITIS OF LEFT KNEE: Status: ACTIVE | Noted: 2024-04-30

## 2024-04-30 LAB
ABO + RH BLD: NORMAL
BLOOD GROUP ANTIBODIES SERPL: NORMAL
GLUCOSE BLD STRIP.AUTO-MCNC: 97 MG/DL (ref 65–117)
SERVICE CMNT-IMP: NORMAL
SPECIMEN EXP DATE BLD: NORMAL

## 2024-04-30 PROCEDURE — 86900 BLOOD TYPING SEROLOGIC ABO: CPT

## 2024-04-30 PROCEDURE — 97161 PT EVAL LOW COMPLEX 20 MIN: CPT

## 2024-04-30 PROCEDURE — 2580000003 HC RX 258

## 2024-04-30 PROCEDURE — C1776 JOINT DEVICE (IMPLANTABLE): HCPCS | Performed by: ORTHOPAEDIC SURGERY

## 2024-04-30 PROCEDURE — 3600000005 HC SURGERY LEVEL 5 BASE: Performed by: ORTHOPAEDIC SURGERY

## 2024-04-30 PROCEDURE — 86850 RBC ANTIBODY SCREEN: CPT

## 2024-04-30 PROCEDURE — 64447 NJX AA&/STRD FEMORAL NRV IMG: CPT | Performed by: ANESTHESIOLOGY

## 2024-04-30 PROCEDURE — 3700000001 HC ADD 15 MINUTES (ANESTHESIA): Performed by: ORTHOPAEDIC SURGERY

## 2024-04-30 PROCEDURE — 6370000000 HC RX 637 (ALT 250 FOR IP): Performed by: PHYSICIAN ASSISTANT

## 2024-04-30 PROCEDURE — 7100000001 HC PACU RECOVERY - ADDTL 15 MIN: Performed by: ORTHOPAEDIC SURGERY

## 2024-04-30 PROCEDURE — 6360000002 HC RX W HCPCS: Performed by: PHYSICIAN ASSISTANT

## 2024-04-30 PROCEDURE — G0378 HOSPITAL OBSERVATION PER HR: HCPCS

## 2024-04-30 PROCEDURE — 2580000003 HC RX 258: Performed by: PHYSICIAN ASSISTANT

## 2024-04-30 PROCEDURE — 3600000015 HC SURGERY LEVEL 5 ADDTL 15MIN: Performed by: ORTHOPAEDIC SURGERY

## 2024-04-30 PROCEDURE — 36415 COLL VENOUS BLD VENIPUNCTURE: CPT

## 2024-04-30 PROCEDURE — 6360000002 HC RX W HCPCS: Performed by: ANESTHESIOLOGY

## 2024-04-30 PROCEDURE — 86901 BLOOD TYPING SEROLOGIC RH(D): CPT

## 2024-04-30 PROCEDURE — 2709999900 HC NON-CHARGEABLE SUPPLY: Performed by: ORTHOPAEDIC SURGERY

## 2024-04-30 PROCEDURE — 82962 GLUCOSE BLOOD TEST: CPT

## 2024-04-30 PROCEDURE — 2580000003 HC RX 258: Performed by: ANESTHESIOLOGY

## 2024-04-30 PROCEDURE — C1713 ANCHOR/SCREW BN/BN,TIS/BN: HCPCS | Performed by: ORTHOPAEDIC SURGERY

## 2024-04-30 PROCEDURE — 97530 THERAPEUTIC ACTIVITIES: CPT

## 2024-04-30 PROCEDURE — 6370000000 HC RX 637 (ALT 250 FOR IP): Performed by: ORTHOPAEDIC SURGERY

## 2024-04-30 PROCEDURE — 7100000000 HC PACU RECOVERY - FIRST 15 MIN: Performed by: ORTHOPAEDIC SURGERY

## 2024-04-30 PROCEDURE — 6360000002 HC RX W HCPCS

## 2024-04-30 PROCEDURE — 97116 GAIT TRAINING THERAPY: CPT

## 2024-04-30 PROCEDURE — 3700000000 HC ANESTHESIA ATTENDED CARE: Performed by: ORTHOPAEDIC SURGERY

## 2024-04-30 PROCEDURE — 2500000003 HC RX 250 WO HCPCS

## 2024-04-30 PROCEDURE — 2720000010 HC SURG SUPPLY STERILE: Performed by: ORTHOPAEDIC SURGERY

## 2024-04-30 DEVICE — DJO EMPOWR KNEETM, FIN BP, NP, 8L
Type: IMPLANTABLE DEVICE | Site: KNEE | Status: FUNCTIONAL
Brand: DJO SURGICAL

## 2024-04-30 DEVICE — CEMENT BNE MED VISC 80 GM GENTAMICIN PALACOS MV+G PRO: Type: IMPLANTABLE DEVICE | Site: KNEE | Status: FUNCTIONAL

## 2024-04-30 DEVICE — DOMED TRI-PEG PATELLA, 35X9MM, E-PLUS
Type: IMPLANTABLE DEVICE | Site: KNEE | Status: FUNCTIONAL
Brand: DJO SURGICAL

## 2024-04-30 DEVICE — EMPOWR 3D KNEETM INS, 8L 12MM, VE
Type: IMPLANTABLE DEVICE | Site: KNEE | Status: FUNCTIONAL
Brand: DJO SURGICAL

## 2024-04-30 DEVICE — IMPL CAPPED KNEE K1 TOTAL/HEMI STD CEMENTED DJO: Type: IMPLANTABLE DEVICE | Site: KNEE | Status: FUNCTIONAL

## 2024-04-30 DEVICE — EMPOWR 3D KNEETM FEMUR, NP, 7L
Type: IMPLANTABLE DEVICE | Site: KNEE | Status: FUNCTIONAL
Brand: DJO SURGICAL

## 2024-04-30 RX ORDER — KETOROLAC TROMETHAMINE 30 MG/ML
15 INJECTION, SOLUTION INTRAMUSCULAR; INTRAVENOUS EVERY 6 HOURS
Status: COMPLETED | OUTPATIENT
Start: 2024-04-30 | End: 2024-05-01

## 2024-04-30 RX ORDER — OXYCODONE HYDROCHLORIDE 5 MG/1
2.5 TABLET ORAL
Status: DISCONTINUED | OUTPATIENT
Start: 2024-04-30 | End: 2024-05-01 | Stop reason: HOSPADM

## 2024-04-30 RX ORDER — SODIUM CHLORIDE 0.9 % (FLUSH) 0.9 %
5-40 SYRINGE (ML) INJECTION EVERY 12 HOURS SCHEDULED
Status: DISCONTINUED | OUTPATIENT
Start: 2024-04-30 | End: 2024-05-01 | Stop reason: HOSPADM

## 2024-04-30 RX ORDER — ASPIRIN 81 MG/1
81 TABLET ORAL 2 TIMES DAILY
Qty: 60 TABLET | Refills: 0 | Status: SHIPPED
Start: 2024-04-30 | End: 2024-05-30

## 2024-04-30 RX ORDER — FENTANYL CITRATE 50 UG/ML
100 INJECTION, SOLUTION INTRAMUSCULAR; INTRAVENOUS
Status: COMPLETED | OUTPATIENT
Start: 2024-04-30 | End: 2024-04-30

## 2024-04-30 RX ORDER — SODIUM CHLORIDE 0.9 % (FLUSH) 0.9 %
5-40 SYRINGE (ML) INJECTION PRN
Status: DISCONTINUED | OUTPATIENT
Start: 2024-04-30 | End: 2024-04-30 | Stop reason: HOSPADM

## 2024-04-30 RX ORDER — ACETAMINOPHEN 500 MG
1000 TABLET ORAL ONCE
Status: DISCONTINUED | OUTPATIENT
Start: 2024-04-30 | End: 2024-04-30 | Stop reason: SDUPTHER

## 2024-04-30 RX ORDER — OXYCODONE HYDROCHLORIDE 5 MG/1
2.5-5 TABLET ORAL EVERY 4 HOURS PRN
Qty: 40 TABLET | Refills: 0 | Status: SHIPPED | OUTPATIENT
Start: 2024-04-30 | End: 2024-05-01

## 2024-04-30 RX ORDER — ACETAMINOPHEN 500 MG
1000 TABLET ORAL ONCE
Status: COMPLETED | OUTPATIENT
Start: 2024-04-30 | End: 2024-04-30

## 2024-04-30 RX ORDER — SODIUM CHLORIDE 9 MG/ML
INJECTION, SOLUTION INTRAVENOUS PRN
Status: DISCONTINUED | OUTPATIENT
Start: 2024-04-30 | End: 2024-04-30 | Stop reason: HOSPADM

## 2024-04-30 RX ORDER — SODIUM CHLORIDE 0.9 % (FLUSH) 0.9 %
5-40 SYRINGE (ML) INJECTION EVERY 12 HOURS SCHEDULED
Status: DISCONTINUED | OUTPATIENT
Start: 2024-04-30 | End: 2024-04-30 | Stop reason: HOSPADM

## 2024-04-30 RX ORDER — HYDROXYZINE HYDROCHLORIDE 10 MG/1
10 TABLET, FILM COATED ORAL EVERY 8 HOURS PRN
Status: DISCONTINUED | OUTPATIENT
Start: 2024-04-30 | End: 2024-05-01 | Stop reason: HOSPADM

## 2024-04-30 RX ORDER — POLYETHYLENE GLYCOL 3350 17 G/17G
17 POWDER, FOR SOLUTION ORAL DAILY
Qty: 7 EACH | Refills: 0 | Status: SHIPPED | OUTPATIENT
Start: 2024-04-30 | End: 2024-05-07

## 2024-04-30 RX ORDER — HYDRALAZINE HYDROCHLORIDE 20 MG/ML
10 INJECTION INTRAMUSCULAR; INTRAVENOUS
Status: DISCONTINUED | OUTPATIENT
Start: 2024-04-30 | End: 2024-04-30 | Stop reason: HOSPADM

## 2024-04-30 RX ORDER — ONDANSETRON 4 MG/1
4 TABLET, ORALLY DISINTEGRATING ORAL EVERY 8 HOURS PRN
Status: DISCONTINUED | OUTPATIENT
Start: 2024-04-30 | End: 2024-05-01 | Stop reason: HOSPADM

## 2024-04-30 RX ORDER — PROPOFOL 10 MG/ML
INJECTION, EMULSION INTRAVENOUS PRN
Status: DISCONTINUED | OUTPATIENT
Start: 2024-04-30 | End: 2024-04-30 | Stop reason: SDUPTHER

## 2024-04-30 RX ORDER — OXYCODONE HYDROCHLORIDE 5 MG/1
5 TABLET ORAL
Status: DISCONTINUED | OUTPATIENT
Start: 2024-04-30 | End: 2024-05-01 | Stop reason: HOSPADM

## 2024-04-30 RX ORDER — SENNOSIDES A AND B 8.6 MG/1
1 TABLET, FILM COATED ORAL 2 TIMES DAILY
Qty: 60 TABLET | Refills: 0 | Status: SHIPPED | OUTPATIENT
Start: 2024-04-30 | End: 2024-05-30

## 2024-04-30 RX ORDER — CLONAZEPAM 0.5 MG/1
1 TABLET ORAL NIGHTLY
Status: DISCONTINUED | OUTPATIENT
Start: 2024-04-30 | End: 2024-05-01 | Stop reason: HOSPADM

## 2024-04-30 RX ORDER — ASPIRIN 81 MG/1
81 TABLET ORAL 2 TIMES DAILY
Status: DISCONTINUED | OUTPATIENT
Start: 2024-04-30 | End: 2024-05-01 | Stop reason: HOSPADM

## 2024-04-30 RX ORDER — SODIUM CHLORIDE 0.9 % (FLUSH) 0.9 %
5-40 SYRINGE (ML) INJECTION PRN
Status: DISCONTINUED | OUTPATIENT
Start: 2024-04-30 | End: 2024-05-01 | Stop reason: HOSPADM

## 2024-04-30 RX ORDER — ONDANSETRON 2 MG/ML
4 INJECTION INTRAMUSCULAR; INTRAVENOUS
Status: DISCONTINUED | OUTPATIENT
Start: 2024-04-30 | End: 2024-04-30 | Stop reason: HOSPADM

## 2024-04-30 RX ORDER — GLYCOPYRROLATE 0.2 MG/ML
INJECTION INTRAMUSCULAR; INTRAVENOUS PRN
Status: DISCONTINUED | OUTPATIENT
Start: 2024-04-30 | End: 2024-04-30 | Stop reason: SDUPTHER

## 2024-04-30 RX ORDER — PROCHLORPERAZINE EDISYLATE 5 MG/ML
5 INJECTION INTRAMUSCULAR; INTRAVENOUS
Status: DISCONTINUED | OUTPATIENT
Start: 2024-04-30 | End: 2024-04-30 | Stop reason: HOSPADM

## 2024-04-30 RX ORDER — ACETAMINOPHEN 500 MG
500 TABLET ORAL
Qty: 35 TABLET | Refills: 0 | Status: SHIPPED | OUTPATIENT
Start: 2024-04-30 | End: 2024-05-07

## 2024-04-30 RX ORDER — ONDANSETRON 2 MG/ML
4 INJECTION INTRAMUSCULAR; INTRAVENOUS EVERY 6 HOURS PRN
Status: DISCONTINUED | OUTPATIENT
Start: 2024-04-30 | End: 2024-05-01 | Stop reason: HOSPADM

## 2024-04-30 RX ORDER — ONDANSETRON 2 MG/ML
INJECTION INTRAMUSCULAR; INTRAVENOUS PRN
Status: DISCONTINUED | OUTPATIENT
Start: 2024-04-30 | End: 2024-04-30 | Stop reason: SDUPTHER

## 2024-04-30 RX ORDER — LIDOCAINE HYDROCHLORIDE 20 MG/ML
INJECTION, SOLUTION EPIDURAL; INFILTRATION; INTRACAUDAL; PERINEURAL PRN
Status: DISCONTINUED | OUTPATIENT
Start: 2024-04-30 | End: 2024-04-30 | Stop reason: SDUPTHER

## 2024-04-30 RX ORDER — LIDOCAINE HYDROCHLORIDE 10 MG/ML
1 INJECTION, SOLUTION EPIDURAL; INFILTRATION; INTRACAUDAL; PERINEURAL
Status: DISCONTINUED | OUTPATIENT
Start: 2024-04-30 | End: 2024-04-30 | Stop reason: HOSPADM

## 2024-04-30 RX ORDER — ACETAMINOPHEN 500 MG
500 TABLET ORAL
Status: DISCONTINUED | OUTPATIENT
Start: 2024-04-30 | End: 2024-05-01 | Stop reason: HOSPADM

## 2024-04-30 RX ORDER — CELECOXIB 200 MG/1
200 CAPSULE ORAL ONCE
Status: COMPLETED | OUTPATIENT
Start: 2024-04-30 | End: 2024-04-30

## 2024-04-30 RX ORDER — NALOXONE HYDROCHLORIDE 0.4 MG/ML
INJECTION, SOLUTION INTRAMUSCULAR; INTRAVENOUS; SUBCUTANEOUS PRN
Status: DISCONTINUED | OUTPATIENT
Start: 2024-04-30 | End: 2024-04-30 | Stop reason: HOSPADM

## 2024-04-30 RX ORDER — SODIUM CHLORIDE 9 MG/ML
INJECTION, SOLUTION INTRAVENOUS CONTINUOUS
Status: DISCONTINUED | OUTPATIENT
Start: 2024-04-30 | End: 2024-05-01 | Stop reason: HOSPADM

## 2024-04-30 RX ORDER — ATORVASTATIN CALCIUM 20 MG/1
20 TABLET, FILM COATED ORAL DAILY
Status: DISCONTINUED | OUTPATIENT
Start: 2024-04-30 | End: 2024-05-01 | Stop reason: HOSPADM

## 2024-04-30 RX ORDER — HYDROMORPHONE HYDROCHLORIDE 1 MG/ML
0.5 INJECTION, SOLUTION INTRAMUSCULAR; INTRAVENOUS; SUBCUTANEOUS EVERY 4 HOURS PRN
Status: DISCONTINUED | OUTPATIENT
Start: 2024-04-30 | End: 2024-05-01 | Stop reason: HOSPADM

## 2024-04-30 RX ORDER — 0.9 % SODIUM CHLORIDE 0.9 %
500 INTRAVENOUS SOLUTION INTRAVENOUS PRN
Status: DISCONTINUED | OUTPATIENT
Start: 2024-04-30 | End: 2024-05-01 | Stop reason: HOSPADM

## 2024-04-30 RX ORDER — OXYCODONE HYDROCHLORIDE 5 MG/1
5 TABLET ORAL
Status: DISCONTINUED | OUTPATIENT
Start: 2024-04-30 | End: 2024-04-30 | Stop reason: HOSPADM

## 2024-04-30 RX ORDER — ROPIVACAINE HYDROCHLORIDE 5 MG/ML
INJECTION, SOLUTION EPIDURAL; INFILTRATION; PERINEURAL
Status: COMPLETED | OUTPATIENT
Start: 2024-04-30 | End: 2024-04-30

## 2024-04-30 RX ORDER — EPHEDRINE SULFATE 50 MG/ML
INJECTION INTRAVENOUS PRN
Status: DISCONTINUED | OUTPATIENT
Start: 2024-04-30 | End: 2024-04-30 | Stop reason: SDUPTHER

## 2024-04-30 RX ORDER — HYDROMORPHONE HYDROCHLORIDE 1 MG/ML
0.5 INJECTION, SOLUTION INTRAMUSCULAR; INTRAVENOUS; SUBCUTANEOUS EVERY 5 MIN PRN
Status: DISCONTINUED | OUTPATIENT
Start: 2024-04-30 | End: 2024-04-30 | Stop reason: HOSPADM

## 2024-04-30 RX ORDER — SODIUM CHLORIDE 9 MG/ML
INJECTION, SOLUTION INTRAVENOUS PRN
Status: DISCONTINUED | OUTPATIENT
Start: 2024-04-30 | End: 2024-05-01 | Stop reason: HOSPADM

## 2024-04-30 RX ORDER — SODIUM CHLORIDE, SODIUM LACTATE, POTASSIUM CHLORIDE, CALCIUM CHLORIDE 600; 310; 30; 20 MG/100ML; MG/100ML; MG/100ML; MG/100ML
INJECTION, SOLUTION INTRAVENOUS CONTINUOUS
Status: DISCONTINUED | OUTPATIENT
Start: 2024-04-30 | End: 2024-04-30 | Stop reason: HOSPADM

## 2024-04-30 RX ORDER — SENNA AND DOCUSATE SODIUM 50; 8.6 MG/1; MG/1
1 TABLET, FILM COATED ORAL 2 TIMES DAILY
Status: DISCONTINUED | OUTPATIENT
Start: 2024-04-30 | End: 2024-05-01 | Stop reason: HOSPADM

## 2024-04-30 RX ORDER — BISACODYL 10 MG
10 SUPPOSITORY, RECTAL RECTAL DAILY PRN
Status: DISCONTINUED | OUTPATIENT
Start: 2024-04-30 | End: 2024-05-01 | Stop reason: HOSPADM

## 2024-04-30 RX ORDER — MIDAZOLAM HYDROCHLORIDE 2 MG/2ML
2 INJECTION, SOLUTION INTRAMUSCULAR; INTRAVENOUS
Status: COMPLETED | OUTPATIENT
Start: 2024-04-30 | End: 2024-04-30

## 2024-04-30 RX ORDER — FENTANYL CITRATE 50 UG/ML
25 INJECTION, SOLUTION INTRAMUSCULAR; INTRAVENOUS EVERY 5 MIN PRN
Status: DISCONTINUED | OUTPATIENT
Start: 2024-04-30 | End: 2024-04-30 | Stop reason: HOSPADM

## 2024-04-30 RX ADMIN — EPHEDRINE SULFATE 10 MG: 50 INJECTION INTRAVENOUS at 09:15

## 2024-04-30 RX ADMIN — PROPOFOL 25 MG: 10 INJECTION, EMULSION INTRAVENOUS at 07:56

## 2024-04-30 RX ADMIN — SODIUM CHLORIDE: 9 INJECTION, SOLUTION INTRAVENOUS at 22:01

## 2024-04-30 RX ADMIN — WATER 2000 MG: 1 INJECTION INTRAMUSCULAR; INTRAVENOUS; SUBCUTANEOUS at 16:50

## 2024-04-30 RX ADMIN — SODIUM CHLORIDE, POTASSIUM CHLORIDE, SODIUM LACTATE AND CALCIUM CHLORIDE: 600; 310; 30; 20 INJECTION, SOLUTION INTRAVENOUS at 07:15

## 2024-04-30 RX ADMIN — KETOROLAC TROMETHAMINE 15 MG: 30 INJECTION, SOLUTION INTRAMUSCULAR; INTRAVENOUS at 16:54

## 2024-04-30 RX ADMIN — ACETAMINOPHEN 500 MG: 500 TABLET ORAL at 16:55

## 2024-04-30 RX ADMIN — CLONAZEPAM 1 MG: 0.5 TABLET ORAL at 21:58

## 2024-04-30 RX ADMIN — ASPIRIN 81 MG: 81 TABLET, COATED ORAL at 21:58

## 2024-04-30 RX ADMIN — PROPOFOL 25 MG: 10 INJECTION, EMULSION INTRAVENOUS at 07:20

## 2024-04-30 RX ADMIN — PROPOFOL 50 MCG/KG/MIN: 10 INJECTION, EMULSION INTRAVENOUS at 07:21

## 2024-04-30 RX ADMIN — LIDOCAINE HYDROCHLORIDE 50 MG: 20 INJECTION, SOLUTION EPIDURAL; INFILTRATION; INTRACAUDAL; PERINEURAL at 07:20

## 2024-04-30 RX ADMIN — SODIUM CHLORIDE: 9 INJECTION, SOLUTION INTRAVENOUS at 10:08

## 2024-04-30 RX ADMIN — OXYCODONE 2.5 MG: 5 TABLET ORAL at 21:58

## 2024-04-30 RX ADMIN — MEPIVACAINE HYDROCHLORIDE 52 MG: 15 INJECTION, SOLUTION EPIDURAL; INFILTRATION at 07:32

## 2024-04-30 RX ADMIN — MIDAZOLAM 2 MG: 1 INJECTION INTRAMUSCULAR; INTRAVENOUS at 07:18

## 2024-04-30 RX ADMIN — ACETAMINOPHEN 1000 MG: 500 TABLET ORAL at 06:47

## 2024-04-30 RX ADMIN — SENNOSIDES AND DOCUSATE SODIUM 1 TABLET: 8.6; 5 TABLET ORAL at 21:58

## 2024-04-30 RX ADMIN — ONDANSETRON 4 MG: 2 INJECTION INTRAMUSCULAR; INTRAVENOUS at 08:37

## 2024-04-30 RX ADMIN — PHENYLEPHRINE HYDROCHLORIDE 40 MCG/MIN: 10 INJECTION INTRAVENOUS at 07:21

## 2024-04-30 RX ADMIN — PROPOFOL 25 MG: 10 INJECTION, EMULSION INTRAVENOUS at 07:57

## 2024-04-30 RX ADMIN — CELECOXIB 200 MG: 200 CAPSULE ORAL at 06:48

## 2024-04-30 RX ADMIN — ACETAMINOPHEN 500 MG: 500 TABLET ORAL at 21:58

## 2024-04-30 RX ADMIN — EPHEDRINE SULFATE 10 MG: 50 INJECTION INTRAVENOUS at 08:38

## 2024-04-30 RX ADMIN — GLYCOPYRROLATE 0.2 MG: 0.2 INJECTION INTRAMUSCULAR; INTRAVENOUS at 07:36

## 2024-04-30 RX ADMIN — ASPIRIN 81 MG: 81 TABLET, COATED ORAL at 12:13

## 2024-04-30 RX ADMIN — ACETAMINOPHEN 500 MG: 500 TABLET ORAL at 12:13

## 2024-04-30 RX ADMIN — SENNOSIDES AND DOCUSATE SODIUM 1 TABLET: 8.6; 5 TABLET ORAL at 12:13

## 2024-04-30 RX ADMIN — FENTANYL CITRATE 100 MCG: 50 INJECTION INTRAMUSCULAR; INTRAVENOUS at 07:15

## 2024-04-30 RX ADMIN — ROPIVACAINE HYDROCHLORIDE 30 ML: 5 INJECTION, SOLUTION EPIDURAL; INFILTRATION; PERINEURAL at 07:11

## 2024-04-30 RX ADMIN — KETOROLAC TROMETHAMINE 15 MG: 30 INJECTION, SOLUTION INTRAMUSCULAR; INTRAVENOUS at 12:13

## 2024-04-30 RX ADMIN — WATER 2000 MG: 1 INJECTION INTRAMUSCULAR; INTRAVENOUS; SUBCUTANEOUS at 07:43

## 2024-04-30 RX ADMIN — SODIUM CHLORIDE, PRESERVATIVE FREE 10 ML: 5 INJECTION INTRAVENOUS at 12:15

## 2024-04-30 ASSESSMENT — PAIN - FUNCTIONAL ASSESSMENT
PAIN_FUNCTIONAL_ASSESSMENT: NONE - DENIES PAIN
PAIN_FUNCTIONAL_ASSESSMENT: 0-10

## 2024-04-30 ASSESSMENT — PAIN SCALES - GENERAL
PAINLEVEL_OUTOF10: 1
PAINLEVEL_OUTOF10: 4

## 2024-04-30 ASSESSMENT — PAIN DESCRIPTION - PAIN TYPE: TYPE: SURGICAL PAIN

## 2024-04-30 NOTE — PROGRESS NOTES
Ortho NP Note    POD# 0  s/p LEFT TOTAL KNEE ARTHROPLASTY   Pt seen with wife at bedside.     Pt recently arrived to unit. Awake and alert, in NAD.   Reports BLE numbness mostly resolved, still some numbness in drew feet s/p spinal block   Denies postop knee pain, made aware of prn oxycodone   Denies nausea. Tolerating clears.   No other complaints at this time.     Discussed REM sleep disorder, he can be restless at night, and roll out of bed  Nurse manager made aware, safety plan in place for tonight     VSS Afebrile.    Visit Vitals  /74   Pulse (!) 48   Temp 97.5 °F (36.4 °C) (Axillary)   Resp 15   SpO2 96%       Voiding status: due to void          Labs    Lab Results   Component Value Date/Time    HGB 14.2 04/15/2024 08:41 AM      Lab Results   Component Value Date/Time    INR 1.0 04/15/2024 08:41 AM      Lab Results   Component Value Date/Time     04/15/2024 08:41 AM    K 3.9 04/15/2024 08:41 AM     04/15/2024 08:41 AM    CO2 24 04/15/2024 08:41 AM    BUN 18 04/15/2024 08:41 AM     Recent Glucose Results:   Glucose   Date Value Ref Range Status   04/15/2024 124 (H) 65 - 100 mg/dL Final           There is no height or weight on file to calculate BMI. : A BMI > 30 is classified as obesity and > 40 is classified as morbid obesity.       Ace wrap dressing c.d.i  Cryotherapy in place over incision  Calves soft and supple; No pain with passive stretch  Sensation and motor intact. +PF/DF/EHL intact 5/5  SCDs for mechanical DVT proph while in bed     PLAN:  1) PT BID - WBAT  2) Aspirin 81 mg PO BID for DVT Prophylaxis. Encouraged early mobilization, bed exercises, and SCD use.  3) Pain control - scheduled tylenol  and toradol, and prn  oxycodone   4) Sleep disorder - pad side rails with blankets (no pads available in hospital currently), tele sitter at night, scheduled clonazepam at bedtime.   5) Post op care - Continue bowel regimen, encouraged IS.  Aquacel to remain in place x 7 days unless

## 2024-04-30 NOTE — BRIEF OP NOTE
Brief Postoperative Note      Patient: Kumar Elena  YOB: 1950  MRN: [id number]    Date of Procedure: 4/30/2024    Pre-Op Diagnosis Codes:     * Osteoarthritis of left knee [M17.12]    Post-Op Diagnosis: Same       Procedure(s):  LEFT TOTAL KNEE ARTHROPLASTY    Surgeon(s):  Ramone Mejia MD    Assistant:  Surgical Assistant: Raul Xiong Francisco  Physician Assistant: Dary Dawson PA-C    Anesthesia: Spinal    Estimated Blood Loss (mL): 200     Complications: None    Specimens:   * No specimens in log *    Implants:  Implant Name Type Inv. Item Serial No.  Lot No. LRB No. Used Action   CEMENT BNE MED VISC 80 GM GENTAMICIN PALACOS MV+G PRO - SN/A  CEMENT BNE MED VISC 80 GM GENTAMICIN PALACOS MV+G PRO N/A [facility] [id number] Left 1 Implanted   COMPONENT PATELLAR 3 PEG 35X9 MM KNEE DOMED POLYETH E-PLUS - SN/A  COMPONENT PATELLAR 3 PEG 35X9 MM KNEE DOMED POLYETH E-PLUS N/A [employer] [facility] [id number] Left 1 Implanted   INSERT TIB SZ 8 [id number] LT POLYETH ASYM EMPOWR 3D E + - SN/A  INSERT TIB SZ 8 [id number] LT POLYETH ASYM EMPOWR 3D E + N/A [employer] [facility] [id number] Left 1 Implanted   COMPONENT FEM SZ 7 LT KNEE NP EMPOWR 3D - SN/A  COMPONENT FEM SZ 7 LT KNEE NP EMPOWR 3D N/A [facility] [id number] Left 1 Implanted   BASEPLATE TIB SZ 8 LT NP STEMMABLE EMPOWR - SN/A  BASEPLATE TIB SZ 8 LT NP STEMMABLE EMPOWR N/A [facility] [id number] Left 1 Implanted         Drains: * No LDAs found *    Findings:  Infection Present At Time Of Surgery (PATOS) (choose all levels that have infection present):  No infection present  Other Findings: oa left knee    Electronically signed by Dary Dawson PA-C on 4/30/2024 at 9:25 AM

## 2024-04-30 NOTE — ANESTHESIA PROCEDURE NOTES
Peripheral Block    Patient location during procedure: holding area  Reason for block: post-op pain management and at surgeon's request  Start time: 4/30/2024 7:11 AM  End time: 4/30/2024 7:16 AM  Staffing  Performed: anesthesiologist   Anesthesiologist: Kumar Carrasco MD  Performed by: Kumar Carrasco MD  Authorized by: Kumar Carrasco MD    Preanesthetic Checklist  Completed: patient identified, IV checked, site marked, risks and benefits discussed, surgical/procedural consents, equipment checked, pre-op evaluation, timeout performed, anesthesia consent given, oxygen available, monitors applied/VS acknowledged and fire risk safety assessment completed and verbalized  Peripheral Block   Patient position: supine  Prep: ChloraPrep  Provider prep: mask and sterile gloves  Patient monitoring: cardiac monitor, continuous pulse ox, frequent blood pressure checks, IV access and oxygen  Block type: Saphenous  Laterality: left  Injection technique: single-shot  Guidance: ultrasound guided    Needle   Needle type: insulated echogenic nerve stimulator needle   Needle gauge: 21 G  Needle localization: anatomical landmarks and ultrasound guidance  Needle length: 10 cm  Assessment   Injection assessment: negative aspiration for heme, no paresthesia on injection, local visualized surrounding nerve on ultrasound and no intravascular symptoms  Paresthesia pain: none  Slow fractionated injection: yes  Hemodynamics: stable  Outcomes: uncomplicated and patient tolerated procedure well    Medications Administered  ropivacaine (NAROPIN) injection 0.5% - Perineural   30 mL - 4/30/2024 7:11:00 AM

## 2024-04-30 NOTE — ANESTHESIA PRE PROCEDURE
Department of Anesthesiology  Preprocedure Note       Name:  Kumar Elena   Age:  73 y.o.  :  1950                                          MRN:  [id number]         Date:  2024      Surgeon: Surgeon(s):  Ramone Mejia MD    Procedure: Procedure(s):  LEFT TOTAL KNEE ARTHROPLASTY (SPINAL W BLOCK)    Medications prior to admission:   Prior to Admission medications    Medication Sig Start Date End Date Taking? Authorizing Provider   aspirin 81 MG EC tablet Take 1 tablet by mouth nightly 20   Priyank Espinosa MD   ZOCOR 40 MG tablet Take 1 tablet by mouth nightly 20   Priyank Espinosa MD   clonazePAM (KLONOPIN) 1 MG tablet Take 1 tablet by mouth nightly.    ProviderPriyank MD       Current medications:    Current Facility-Administered Medications   Medication Dose Route Frequency Provider Last Rate Last Admin   • lidocaine PF 1 % injection 1 mL  1 mL IntraDERmal Once PRN Kumar Carrasco MD       • fentaNYL (SUBLIMAZE) injection 100 mcg  100 mcg IntraVENous Once PRN Kumar Carrasco MD       • lactated ringers IV soln infusion   IntraVENous Continuous Kumar Carrasco MD       • sodium chloride flush 0.9 % injection 5-40 mL  5-40 mL IntraVENous 2 times per day Kumar Carrasco MD       • sodium chloride flush 0.9 % injection 5-40 mL  5-40 mL IntraVENous PRN Kumar Carrasco MD       • 0.9 % sodium chloride infusion   IntraVENous PRN Kumar Carrasco MD       • midazolam PF (VERSED) injection 2 mg  2 mg IntraVENous Once PRN Kumar Carrasco MD       • acetaminophen (TYLENOL) tablet 1,000 mg  1,000 mg Oral Once Dary Dawson PA-C       • celecoxib (CELEBREX) capsule 200 mg  200 mg Oral Once Dary Dawson PA-C       • tranexamic acid (CYKLOKAPRON) 1,000 mg in sodium chloride 0.9 % 110 mL IVPB (mini-bag)  1,000 mg IntraVENous On Call to OR Dary Dawson PA-C       • sodium chloride flush 0.9 % injection 5-40 mL  5-40 mL IntraVENous 2 times per day 
disease: Parkinson's disease            GI/Hepatic/Renal: Neg GI/Hepatic/Renal ROS            Endo/Other: Negative Endo/Other ROS                    Abdominal: normal exam            Vascular: negative vascular ROS.         Other Findings:       Anesthesia Plan        Kumar Carrasco MD   4/30/2024

## 2024-04-30 NOTE — PROGRESS NOTES
Phone call to Dr. Carrasco regarding pateint's HR in the 40s and 50s. Per Dr. Carrasco patient runs low and is ok. No new orders.

## 2024-04-30 NOTE — FLOWSHEET NOTE
04/30/24 0807   Family Communication    Relationship to Patient Spouse    Phone Number Fatoumata Elena [phone]   Family/Significant Other Update Called   Delivery Origin Nurse   Family Communication   Family Update Message Procedure started

## 2024-04-30 NOTE — ANESTHESIA PROCEDURE NOTES
Spinal Block    End time: 4/30/2024 7:30 AM  Reason for block: primary anesthetic  Staffing  Performed: anesthesiologist   Performed by: Kumar Carrasco MD  Authorized by: Kumar Carrasco MD    Spinal Block  Patient position: sitting  Prep: Betadine  Patient monitoring: continuous pulse ox, continuous capnometry and oxygen  Approach: midline  Location: L3/L4  Provider prep: mask and sterile gloves  Local infiltration: lidocaine  Needle  Needle type: Deb   Needle gauge: 25 G  Needle length: 3.5 in  Assessment  Swirl obtained: Yes  CSF: clear  Attempts: 2  Hemodynamics: stable  Additional Notes  1st attempt by CRNA

## 2024-04-30 NOTE — PROGRESS NOTES
TRANSFER - OUT REPORT:    Verbal report given to DEXTER Villagran on Kumar Elena  being transferred to [address] [place] [address] for routine post-op       Report consisted of patient's Situation, Background, Assessment and   Recommendations(SBAR).     Information from the following report(s) Adult Overview, Surgery Report, Intake/Output, MAR, and Recent Results was reviewed with the receiving nurse.           Lines:   Peripheral IV 04/30/24 Left Hand (Active)   Site Assessment Clean, dry & intact 04/30/24 0943   Line Status Flushed;Infusing 04/30/24 0943   Line Care Cap changed;Connections checked and tightened 04/30/24 0943   Phlebitis Assessment No symptoms 04/30/24 0943   Infiltration Assessment 0 04/30/24 0943   Alcohol Cap Used Yes 04/30/24 0943   Dressing Status Clean, dry & intact 04/30/24 0943   Dressing Type Transparent 04/30/24 0943        Opportunity for questions and clarification was provided.      Patient transported with:  Tech

## 2024-04-30 NOTE — ANESTHESIA POSTPROCEDURE EVALUATION
Department of Anesthesiology  Postprocedure Note    Patient: Kumar Elena  MRN: [id number]  YOB: 1950  Date of evaluation: 4/30/2024    Procedure Summary       Date: 04/30/24 Room / Location: [facility] MAIN OR [address] [place] [address] MAIN OR    Anesthesia Start: 0718 Anesthesia Stop: 0944    Procedure: LEFT TOTAL KNEE ARTHROPLASTY (Left: Knee) Diagnosis:       Osteoarthritis of left knee      (Osteoarthritis of left knee [M17.12])    Providers: Ramone Mejia MD Responsible Provider: Kumar Carrasco MD    Anesthesia Type: Regional, Spinal, MAC ASA Status: 2            Anesthesia Type: Regional, Spinal, MAC    Teresa Phase I:      Teresa Phase II:      Anesthesia Post Evaluation    Patient location during evaluation: PACU  Patient participation: complete - patient participated  Level of consciousness: awake  Airway patency: patent  Nausea & Vomiting: no nausea  Cardiovascular status: blood pressure returned to baseline and hemodynamically stable  Respiratory status: acceptable  Hydration status: stable  Multimodal analgesia pain management approach    No notable events documented.

## 2024-04-30 NOTE — DISCHARGE INSTRUCTIONS
Post-op Discharge Instructions Following Total Joint Replacement  Ramone Mejia MD  [facility]  [phone]  Follow-up Office Visit  See Dr. Mejia approximately 3-4 weeks from date of surgery. Call [phone] to make an appointment.  If you have any postop questions for Dr. Mejia's clinical team, please call [phone].  Activity  Use your walker for ambulation.  Weight bearing as tolerated unless instructed otherwise by the physical therapist. Get up every hour you are awake and take a brief walk. Lengthen walking distance daily as your strength improves.  Continue using your walker until seen in the office for your first follow up visit.  Practice your exercises 3 times daily as instructed by the physical therapist. Ice for 20 minutes after exercising.  No driving until seen in the office for your first follow up visit.  Incision Care  The light brown Aquacel surgical dressing is waterproof and is to remain on your incision for 7 days. On the 7th day, carefully lift the edge of the dressing to break the adhesive seal and gently peel it off.  If your Aquacel dressings comes loose or falls off before the 7th day, replace it with a dry sterile gauze dressing and change this dressing daily. Once there is no drainage on the bandage, you mean leave the incision open to air.  You may take a shower with the Aquacel dressing in place. After you remove the Aquacel dressing on day 7, you may continue to shower and get your incision wet in the shower. Do not submerge your incision under water in a bathtub, hot tub, swimming pool, etc. until after you have been evaluated at your first office visit.  Medications  Blood Clot Prevention: Take medication as prescribed by your physician for 4 weeks postop.  Pain Management: Take pain medication as prescribed; wean yourself off of pain medication as your pain lessens. Take with food.  You make also take Tylenol every 4-6 hours as needed for pain.  Do not exceed 3

## 2024-05-01 VITALS
DIASTOLIC BLOOD PRESSURE: 70 MMHG | RESPIRATION RATE: 16 BRPM | SYSTOLIC BLOOD PRESSURE: 90 MMHG | OXYGEN SATURATION: 94 % | TEMPERATURE: 99.1 F | HEART RATE: 59 BPM

## 2024-05-01 LAB
ANION GAP SERPL CALC-SCNC: 5 MMOL/L (ref 5–15)
BUN SERPL-MCNC: 18 MG/DL (ref 6–20)
BUN/CREAT SERPL: 17 (ref 12–20)
CALCIUM SERPL-MCNC: 8.3 MG/DL (ref 8.5–10.1)
CHLORIDE SERPL-SCNC: 110 MMOL/L (ref 97–108)
CO2 SERPL-SCNC: 23 MMOL/L (ref 21–32)
CREAT SERPL-MCNC: 1.09 MG/DL (ref 0.7–1.3)
GLUCOSE SERPL-MCNC: 104 MG/DL (ref 65–100)
HCT VFR BLD AUTO: 36.1 % (ref 36.6–50.3)
HGB BLD-MCNC: 11.6 G/DL (ref 12.1–17)
POTASSIUM SERPL-SCNC: 4.7 MMOL/L (ref 3.5–5.1)
SODIUM SERPL-SCNC: 138 MMOL/L (ref 136–145)

## 2024-05-01 PROCEDURE — APPNB60 APP NON BILLABLE TIME 46-60 MINS: Performed by: NURSE PRACTITIONER

## 2024-05-01 PROCEDURE — 97530 THERAPEUTIC ACTIVITIES: CPT

## 2024-05-01 PROCEDURE — 97116 GAIT TRAINING THERAPY: CPT

## 2024-05-01 PROCEDURE — G0378 HOSPITAL OBSERVATION PER HR: HCPCS

## 2024-05-01 PROCEDURE — 6370000000 HC RX 637 (ALT 250 FOR IP): Performed by: PHYSICIAN ASSISTANT

## 2024-05-01 PROCEDURE — 6360000002 HC RX W HCPCS: Performed by: PHYSICIAN ASSISTANT

## 2024-05-01 PROCEDURE — 36415 COLL VENOUS BLD VENIPUNCTURE: CPT

## 2024-05-01 PROCEDURE — 96376 TX/PRO/DX INJ SAME DRUG ADON: CPT

## 2024-05-01 PROCEDURE — 85014 HEMATOCRIT: CPT

## 2024-05-01 PROCEDURE — 80048 BASIC METABOLIC PNL TOTAL CA: CPT

## 2024-05-01 PROCEDURE — 2580000003 HC RX 258: Performed by: PHYSICIAN ASSISTANT

## 2024-05-01 PROCEDURE — 96374 THER/PROPH/DIAG INJ IV PUSH: CPT

## 2024-05-01 PROCEDURE — 85018 HEMOGLOBIN: CPT

## 2024-05-01 RX ORDER — OXYCODONE HYDROCHLORIDE 5 MG/1
2.5-5 TABLET ORAL EVERY 4 HOURS PRN
Qty: 40 TABLET | Refills: 0 | Status: SHIPPED | OUTPATIENT
Start: 2024-05-01 | End: 2024-05-08

## 2024-05-01 RX ADMIN — ACETAMINOPHEN 500 MG: 500 TABLET ORAL at 12:57

## 2024-05-01 RX ADMIN — KETOROLAC TROMETHAMINE 15 MG: 30 INJECTION, SOLUTION INTRAMUSCULAR; INTRAVENOUS at 00:31

## 2024-05-01 RX ADMIN — OXYCODONE 5 MG: 5 TABLET ORAL at 10:34

## 2024-05-01 RX ADMIN — ASPIRIN 81 MG: 81 TABLET, COATED ORAL at 08:44

## 2024-05-01 RX ADMIN — KETOROLAC TROMETHAMINE 15 MG: 30 INJECTION, SOLUTION INTRAMUSCULAR; INTRAVENOUS at 05:21

## 2024-05-01 RX ADMIN — OXYCODONE 5 MG: 5 TABLET ORAL at 05:21

## 2024-05-01 RX ADMIN — ACETAMINOPHEN 500 MG: 500 TABLET ORAL at 08:45

## 2024-05-01 RX ADMIN — SENNOSIDES AND DOCUSATE SODIUM 1 TABLET: 8.6; 5 TABLET ORAL at 08:44

## 2024-05-01 RX ADMIN — WATER 2000 MG: 1 INJECTION INTRAMUSCULAR; INTRAVENOUS; SUBCUTANEOUS at 00:31

## 2024-05-01 RX ADMIN — ACETAMINOPHEN 500 MG: 500 TABLET ORAL at 05:22

## 2024-05-01 ASSESSMENT — PAIN DESCRIPTION - DESCRIPTORS: DESCRIPTORS: ACHING

## 2024-05-01 ASSESSMENT — PAIN SCALES - GENERAL
PAINLEVEL_OUTOF10: 8
PAINLEVEL_OUTOF10: 10
PAINLEVEL_OUTOF10: 0

## 2024-05-01 ASSESSMENT — PAIN DESCRIPTION - LOCATION: LOCATION: KNEE

## 2024-05-01 ASSESSMENT — PAIN DESCRIPTION - ORIENTATION: ORIENTATION: LEFT

## 2024-05-01 NOTE — CARE COORDINATION
MELISA:  Home with Home Health for PT and SN - [facility] - accepted  Family to transport  Lives with wife in house at [place]  Pt owns multiple canes, RW, BSC    CM confirmed face sheet, HH choice, and placed authorization form to disclose health information on chart as requested by Pt and family as it was not previously collected apparently.        05/01/24 1141   Service Assessment   Patient Orientation Alert and Oriented   Cognition Alert   History Provided By Patient   Primary Caregiver Self   Accompanied By/Relationship son and wife   Support Systems Spouse/Significant Other;Children   Patient's Healthcare Decision Maker is: Legal Next of Kin   PCP Verified by CM Yes   Last Visit to PCP Within last 3 months   Prior Functional Level Independent in ADLs/IADLs   Current Functional Level Assistance with the following:;Mobility   Can patient return to prior living arrangement Yes   Ability to make needs known: Good   Family able to assist with home care needs: Yes   Discharge Planning   Patient expects to be discharged to: House   Condition of Participation: Discharge Planning   The Plan for Transition of Care is related to the following treatment goals: Home Health   The Patient and/or Patient Representative was provided with a Choice of Provider? Patient   The Patient and/Or Patient Representative agree with the Discharge Plan? Yes   Freedom of Choice list was provided with basic dialogue that supports the patient's individualized plan of care/goals, treatment preferences, and shares the quality data associated with the providers?  Yes

## 2024-05-01 NOTE — DISCHARGE SUMMARY
patient tolerated the procedure well. Was taken to the PACU in stable condition and then transferred to the ortho floor.      Perioperative Antibiotics:  Ancef     Postoperative Pain Management:  Oxycodone & Tylenol     DVT Prophylaxis:   Aspirin 81mg BID     Postoperative transfusions:    Number of units banked PRBCs =   none     Post Op complications: none    Hemoglobin at discharge:    Lab Results   Component Value Date/Time    HGB 11.6 (L) 05/01/2024 05:28 AM    INR 1.0 04/15/2024 08:41 AM       Dressing remained clean, dry and intact. No significant erythema or swelling. Wound appears to be healing without any evidence of infection. Neurovascular exam found to be within normal limits.     Physical Therapy started following surgery and participated in bed mobility, transfers and ambulation.        Discharged to: Home with .    Condition on Discharge:   Stable    Discharge instructions:  - Anticoagulate with Aspirin   - Take pain medications as prescribed  - Resume pre hospital diet      - Discharge activity: activity as tolerated  - Ambulate with assistive device as needed.  - Weight bearing status - WBAT  - Wound Care Keep wound clean and dry.  See discharge instruction sheet.            -DISCHARGE MEDICATION LIST        Medication List        START taking these medications      acetaminophen 500 MG tablet  Commonly known as: TYLENOL  Take 1 tablet by mouth every 4 hours (while awake) for 7 days     oxyCODONE 5 MG immediate release tablet  Commonly known as: ROXICODONE  Take 0.5-1 tablets by mouth every 4 hours as needed for Pain for up to 7 days. Take half tab for mild to moderate pain level 1-6, or 1 tab for severe pain level 7-10 Max Daily Amount: 30 mg     polyethylene glycol 17 GM/SCOOP powder  Commonly known as: GLYCOLAX  Take 17 g by mouth daily for 7 days     senna 8.6 MG tablet  Commonly known as: Senokot  Take 1 tablet by mouth 2 times daily            CHANGE how you take these medications

## 2024-05-01 NOTE — PROGRESS NOTES
PHYSICAL THERAPY    Patient has cleared PT. Patient demonstrated SOB after 150 ft walk and spO2 was 89%. Performed Pursed Lip Breathing and inspirometer, spO2 increased to 93%. He states that he feels a little \"congested\". Reported these findings to Sarita Renee/Sabino Horner/PT

## 2024-05-01 NOTE — PROGRESS NOTES
2345H patient refused to change room and expressed that it wont be necessary since they already continue the clonazepam.

## 2024-05-01 NOTE — PROGRESS NOTES
Ortho NP Note    POD# 1  s/p LEFT TOTAL KNEE ARTHROPLASTY   Wife and son at bedside.     Cleared by therapy for discharge. Noted to have some TORRES during session, O2 sats 89%, recovered with deep breathing to 93%   Pt denies feeling SOB but reports similar occurrence PTA with occasional audible wheezing and labored breathing on exertion. They have not discussed these symptoms with PCP. He has been quite limited due to knee pain, likely with some limited endurance. Denies cough.     Pt seen resting in recliner chair, in no distress. Non labored breathing. O2 sats 95% on RA.   Lungs clear bilaterally. Tolerating IS > 2000 (encouraged continued use at discharge).   Recently medicated with oxycodone and reports good pain relief.   Reviewed pain control regimen for discharge and again discussed bowel regimen   Ace wrap removed. Dressing c.d.I. Ice pack in place.   No other complaints or concerns at this time.     Ready for discharge home today with HH and family support.       BALWINDER Logan - NP

## 2024-05-01 NOTE — PLAN OF CARE
Problem: Physical Therapy - Adult  Goal: By Discharge: Performs mobility at highest level of function for planned discharge setting.  See evaluation for individualized goals.  Description: FUNCTIONAL STATUS PRIOR TO ADMISSION: Patient was independent and active without use of DME.    HOME SUPPORT PRIOR TO ADMISSION: The patient lived with family but did not require assistance.    Physical Therapy Goals  Initiated 4/30/2024  1.  Patient will move from supine to sit and sit to supine, scoot up and down, and roll side to side in bed with modified independence within 4 day(s).    2.  Patient will perform sit to stand with modified independence within 4 day(s).  3.  Patient will transfer from bed to chair and chair to bed with modified independence using the least restrictive device within 4 day(s).  4.  Patient will ambulate with modified independence for 150 feet with the least restrictive device within 4 day(s).   5.  Patient will ascend/descend 4 stairs with cane and one handrail(s) with supervision/set-up within 4 day(s).  6. Patient will perform post-TKA home exercise program per protocol with independence within 4 days.  7. Patient will demonstrate AROM 0-90 degrees in operative joint within 4 days.     Outcome: Completed   PHYSICAL THERAPY TREATMENT/DISCHARGE    Patient: Kumar Elena (73 y.o. male)  Date: 5/1/2024  Diagnosis: Osteoarthritis of left knee [M17.12]  Primary osteoarthritis of left knee [M17.12] Osteoarthritis of left knee  Procedure(s) (LRB):  LEFT TOTAL KNEE ARTHROPLASTY (Left) 1 Day Post-Op  Precautions: Fall Risk                      ASSESSMENT:  Patient has been followed by skilled PT services and has met acute care PT goals. Patient is cleared for discharge from PT standpoint. Patient is independent with post TKA exercise protocol and has same in written, illustrated form. Educated patient on Discharge Instructions relating to PT program progression post-discharge. She demonstrated/verbalized 
[]                                        []                                           Knee Extension Stretch     []                                          []                                          []                                          []                                           Heel Slides  10 [x]                                        []                                        []                                        []                                           Long Arc Quads   []                                        []                                        []                                        []                                           Knee Flexion Stretch   []                                        []                                        []                                        []                                           Straight Leg Raises   []                                        []                                        []                                        []                                                                                                                                                                                                                                                                                                                                                                                                                                                                                               Barthel Index:    Barthel Index Scale  Feeding: Independent, Able to apply any necessary device. Feeds in reasonable time  Bathing: Cannot perform activity  Grooming: Washes face, pendleton hair, brushes teeth, shaves (manages plug if electric razor)  Dressing: Cannot perform activity  Bowel Control: No accidents. Able to use enema or suppository if needed  Bladder Control: No accidents. Able to care for collecting device, if used  Toilet Transfers: Needs

## 2024-05-03 NOTE — OP NOTE
[address]  [address]                            OPERATIVE REPORT      PATIENT NAME: KEITH ARIAS                : 1950  MED REC NO: [id number]                       ROOM: 554  ACCOUNT NO: [id number]                       ADMIT DATE: 2024  PROVIDER: Ramone Mejia MD    DATE OF SERVICE:  2024    PREOPERATIVE DIAGNOSES:  Osteoarthritis, left knee.    POSTOPERATIVE DIAGNOSES:  Osteoarthritis, left knee.    PROCEDURES PERFORMED:  Left total knee arthroplasty.    SURGEON:  Ramone Mejia MD    ASSISTANT:  First assistant:  Dary Dawson PA-C.    ANESTHESIA:  Spinal sedation as well as adductor canal block.    ESTIMATED BLOOD LOSS:  200 mL.    SPECIMENS REMOVED:  None.       COMPLICATIONS:  None.    IMPLANTS:  [employer] Empowr 3D size 7 femur, size 8 tibial tray with 12 mm polyethylene insert and 35 mm patella.    INDICATIONS:  The patient is a 73-year-old gentleman with progressive left knee pain due to severe osteoarthritis.  Symptoms have progressed despite comprehensive conservative treatment and presents for left total knee replacement.  Risks, benefits, alternatives of procedure were reviewed with him in detail and he desires to proceed.    DESCRIPTION OF PROCEDURE:  Anesthesia team performed an adductor canal block in the left thigh before taking the patient to the operating room.  We also placed a spinal.  Preoperative intravenous antibiotics were administered.  A padded pneumatic tourniquet was placed around the left upper thigh.  The left lower extremity was prepped and draped in the usual sterile fashion.  Tourniquet was inflated to 275 through midline anterior knee incision for a medial parapatellar arthrotomy.  Progressive medial release was performed to facilitate exposure and soft tissue balance throughout the procedure.  As soon as the knee was flexed, the tourniquet was released.  10 mm distal femoral resection

## 2025-01-16 ENCOUNTER — HOSPITAL ENCOUNTER (OUTPATIENT)
Facility: HOSPITAL | Age: 75
Discharge: HOME | End: 2025-01-19
Attending: INTERNAL MEDICINE
Payer: MEDICARE

## 2025-01-16 DIAGNOSIS — Z15.01 GENETIC SUSCEPTIBILITY TO MALIGNANT NEOPLASM OF BREAST: ICD-10-CM

## 2025-01-16 DIAGNOSIS — Z12.89 ENCOUNTER FOR SCREENING FOR MALIGNANT NEOPLASM OF OTHER SITES: ICD-10-CM

## 2025-01-16 PROCEDURE — 6360000004 HC RX CONTRAST MEDICATION: Performed by: INTERNAL MEDICINE

## 2025-01-16 PROCEDURE — 74183 MRI ABD W/O CNTR FLWD CNTR: CPT

## 2025-01-16 PROCEDURE — A9575 INJ GADOTERATE MEGLUMI 0.1ML: HCPCS | Performed by: INTERNAL MEDICINE

## 2025-01-16 RX ORDER — GADOTERATE MEGLUMINE 376.9 MG/ML
19 INJECTION INTRAVENOUS
Status: COMPLETED | OUTPATIENT
Start: 2025-01-16 | End: 2025-01-16

## 2025-01-16 RX ADMIN — GADOTERATE MEGLUMINE 19 ML: 376.9 INJECTION INTRAVENOUS at 08:45

## 2025-06-18 ENCOUNTER — HOSPITAL ENCOUNTER (OUTPATIENT)
Facility: HOSPITAL | Age: 75
Setting detail: OUTPATIENT SURGERY
Discharge: HOME | End: 2025-06-18
Attending: COLON & RECTAL SURGERY | Admitting: COLON & RECTAL SURGERY
Payer: MEDICARE

## 2025-06-18 VITALS
BODY MASS INDEX: 32.95 KG/M2 | OXYGEN SATURATION: 97 % | WEIGHT: 205 LBS | HEART RATE: 53 BPM | RESPIRATION RATE: 18 BRPM | SYSTOLIC BLOOD PRESSURE: 132 MMHG | DIASTOLIC BLOOD PRESSURE: 71 MMHG | HEIGHT: 66 IN | TEMPERATURE: 97.8 F

## 2025-06-18 PROCEDURE — 7100000011 HC PHASE II RECOVERY - ADDTL 15 MIN: Performed by: COLON & RECTAL SURGERY

## 2025-06-18 PROCEDURE — 7100000010 HC PHASE II RECOVERY - FIRST 15 MIN: Performed by: COLON & RECTAL SURGERY

## 2025-06-18 PROCEDURE — 88305 TISSUE EXAM BY PATHOLOGIST: CPT

## 2025-06-18 PROCEDURE — 3600007512: Performed by: COLON & RECTAL SURGERY

## 2025-06-18 PROCEDURE — 3600007502: Performed by: COLON & RECTAL SURGERY

## 2025-06-18 PROCEDURE — 2709999900 HC NON-CHARGEABLE SUPPLY: Performed by: COLON & RECTAL SURGERY

## 2025-06-18 RX ORDER — SODIUM CHLORIDE 0.9 % (FLUSH) 0.9 %
5-40 SYRINGE (ML) INJECTION EVERY 12 HOURS SCHEDULED
Status: DISCONTINUED | OUTPATIENT
Start: 2025-06-18 | End: 2025-06-18 | Stop reason: HOSPADM

## 2025-06-18 RX ORDER — SODIUM CHLORIDE 9 MG/ML
INJECTION, SOLUTION INTRAVENOUS PRN
Status: DISCONTINUED | OUTPATIENT
Start: 2025-06-18 | End: 2025-06-18 | Stop reason: HOSPADM

## 2025-06-18 RX ORDER — SODIUM CHLORIDE 0.9 % (FLUSH) 0.9 %
5-40 SYRINGE (ML) INJECTION PRN
Status: DISCONTINUED | OUTPATIENT
Start: 2025-06-18 | End: 2025-06-18 | Stop reason: HOSPADM

## 2025-06-18 RX ORDER — SODIUM CHLORIDE 9 MG/ML
INJECTION, SOLUTION INTRAVENOUS CONTINUOUS
Status: DISCONTINUED | OUTPATIENT
Start: 2025-06-18 | End: 2025-06-18 | Stop reason: HOSPADM

## 2025-06-18 ASSESSMENT — PAIN - FUNCTIONAL ASSESSMENT: PAIN_FUNCTIONAL_ASSESSMENT: NONE - DENIES PAIN

## 2025-06-18 NOTE — OP NOTE
Kumar Elena  [id number]  1950    Colonoscopy Operative Report    Procedure Type:   Colonoscopy with hot forceps polypectomies     Pre-operative Diagnosis:  Need for colorectal cancer screening.    Post-operative Diagnosis:  Colonic polyps.  Diverticulosis.    Surgeon:  Johan Power MD    Assistant:  Talita Martinez RN    Referring Provider: Kumar Horner MD    Sedation and Analgesia:  None    Specimens Removed:  Polyps (3)    EBL:  0 mL    Complications: None apparent    Implants:  None    Indication For Procedure:  The patient is an asymptomatic 74-year-old male with a family history of colon cancer and a personal history of at least one colonic polyp whose last colonoscopy was reportedly performed 5 years ago by Dr. Bright. Another colonoscopy is now indicated for colorectal cancer screening.     Findings:  There were three diminutive sessile colonic polyps.  Two were in the ascending colon and one was in the proximal transverse colon, but all were sent in the same specimen container.  There was extensive sigmoid diverticulosis.     Procedure Details:  After informed consent was obtained, the patient was taken to the endoscopy suite where standard monitoring devices were attached and intravenous access was established.  Sedation was administered by the anesthetist as needed throughout the procedure.  The patient was placed in the left lateral decubitus position, and inspection of the perineum revealed no significant external lesions.  Digital rectal examination revealed no masses.    The Olympus videocolonoscope was lubricated and inserted transanally into the rectum. It was advanced into the colon and without difficulty to the cecum, which was identified by the presence of the ileocecal valve and the appendiceal orifice.  The quality of the bowel preparation was adequate, as was the overall visualization.  Careful inspection was performed during withdrawal of the colonoscope.  The polyps

## 2025-06-18 NOTE — DISCHARGE INSTRUCTIONS
JAMES Taylor.  [address]  [place] [address] [phone]      Post-Colonoscopy Instructions    Do not drive, operate dangerous machinery, sign legal documents, or conduct any important business for the rest of the day.    Avoid strenuous exercise for the next 10 days.    Avoid straining when you move your bowels.    Do not take any aspirin, aspirin-containing products, ibuprofen (Advil, Motrin, etc.), or Aleve for the next two weeks.  You may take Tylenol as directed on the bottle if needed.    You may begin with a light diet today then advance to your usual diet as tolerated.  Do not drink alcohol for the rest of the day.    If you notice blood in your stool for more than one or two bowel movements following the procedure, if you develop abdominal pain (aside from gas cramps on the day of the procedure), or if you develop a fever with a temperature of 101.0 or higher, call my office.    If you do not have a bowel movement within the next two days, call my office.    If you have any other problems or questions, please call my office.    Findings and Follow-up:  As you know, there were three small, benign-appearing polyps that we removed and you have diverticulosis (not diverticulitis).  You should consume a high fiber diet.  I will discuss the pathology results with you when they are available, and I will make a follow-up recommendation at that time.  Please call my office if you have not heard from me by Monday 6/23/2025.

## 2025-06-18 NOTE — PROGRESS NOTES
Initial RN admission and assessment performed and documented in Endoscopy navigator.     Patient evaluated by anesthesia in pre-procedure holding.     All procedural vital signs, airway assessment, and level of consciousness information monitored and recorded by anesthesia staff on the anesthesia record.     Report received from CRNA post procedure.  Patient transported to recovery area by RN.    Endoscopy post procedure time out was performed and specimen jars x1  were verified with physician.    Endoscope was pre-cleaned at bedside immediately following procedure by Bautista LEMON

## 2025-06-18 NOTE — H&P
History and Physical (outpatient)    Patient: Kumar Elena 74 y.o. male     Chief Complaint: Need for colorectal cancer screening    History of Present Illness:  The patient is an asymptomatic 74-year-old male with a family history of colon cancer and a personal history of at least one colonic polyp whose last colonoscopy was reportedly performed 5 years ago by Dr. Bright.      History:  Past Medical History:   Diagnosis Date    Arthritis     CAD (coronary artery disease)     Carotid stenosis, bilateral     History of colonic polyps     Hyperlipidemia     Parkinson's disease (HCC)     Sleep apnea-like behavior     NEGATIVE SLEEP STUDY       Past Surgical History:   Procedure Laterality Date    COLONOSCOPY      TONSILLECTOMY      TOTAL KNEE ARTHROPLASTY Left 4/30/2024    LEFT TOTAL KNEE ARTHROPLASTY performed by Ramone Mejia MD at [facility] MAIN OR       Family History   Problem Relation Age of Onset    Heart Attack Mother     Cancer Father         Pancreatic    Cancer Sister         Pancreatic    Cancer Sister         Colon    Stroke Sister     Cancer Brother         Unknown    Anesth Problems Neg Hx      Social History     Socioeconomic History    Marital status:      Spouse name: Not on file    Number of children: Not on file    Years of education: Not on file    Highest education level: Not on file   Occupational History    Not on file   Tobacco Use    Smoking status: Never     Passive exposure: Never    Smokeless tobacco: Never   Vaping Use    Vaping status: Never Used   Substance and Sexual Activity    Alcohol use: Yes     Alcohol/week: 1.0 standard drink of alcohol     Types: 1 Drinks containing 0.5 oz of alcohol per week    Drug use: Never    Sexual activity: Yes     Partners: Female   Other Topics Concern    Not on file   Social History Narrative    Not on file     Social Drivers of Health     Financial Resource Strain: Not on file   Food Insecurity: Not on file   Transportation Needs: Not on file

## (undated) DEVICE — LIQUIBAND RAPID ADHESIVE 36/CS 0.8ML: Brand: MEDLINE

## (undated) DEVICE — TOTAL JOINT - SMH: Brand: MEDLINE INDUSTRIES, INC.

## (undated) DEVICE — SPONGE GZ W4XL4IN COT RADPQ HIGHLY ABSRB

## (undated) DEVICE — SUPPLEMENT DIGESTIVE H2O SOL GI-EASE

## (undated) DEVICE — PADDING CAST W6INXL4YD NONSTERILE COT RAYON MICROPLEATED

## (undated) DEVICE — CUSTOM CAST PD STR

## (undated) DEVICE — DRAPE,EXTREMITY,89X128,STERILE: Brand: MEDLINE

## (undated) DEVICE — STERILE POLYISOPRENE POWDER-FREE SURGICAL GLOVES: Brand: PROTEXIS

## (undated) DEVICE — ZIMMER® STERILE DISPOSABLE TOURNIQUET CUFF WITH PLC, DUAL PORT, SINGLE BLADDER, 34 IN. (86 CM)

## (undated) DEVICE — HANDPIECE SET WITH BONE CLEANING TIP AND SUCTION TUBE: Brand: INTERPULSE

## (undated) DEVICE — ZIPPERED TOGA, X-LARGE: Brand: FLYTE, SURGICOOL

## (undated) DEVICE — 4-PORT MANIFOLD: Brand: NEPTUNE 2

## (undated) DEVICE — TUBING, SUCTION, 9/32" X 12', STRAIGHT: Brand: MEDLINE INDUSTRIES, INC.

## (undated) DEVICE — SUTURE MONOCRYL SZ 3-0 L27IN ABSRB UD L24MM PS-1 3/8 CIR PRIM Y936H

## (undated) DEVICE — SHEET,DRAPE,53X77,STERILE: Brand: MEDLINE

## (undated) DEVICE — MARKER,SKIN,WI/RULER AND LABELS: Brand: MEDLINE

## (undated) DEVICE — DRESSING HYDROFIBER AQUACEL AG ADVANTAGE 3.5X12 IN

## (undated) DEVICE — NEEDLE HYPO 21GA L1IN GRN S STL HUB POLYPR SHLD REG BVL

## (undated) DEVICE — ELECTRODE PT RET AD L9FT HI MOIST COND ADH HYDRGEL CORDED

## (undated) DEVICE — TUBING, SUCTION, 1/4" X 12', STRAIGHT: Brand: MEDLINE

## (undated) DEVICE — FORCEPS BX L240CM JAW DIA2.2MM RAD JAW 4 HOT DISP

## (undated) DEVICE — SPONGE GZ W4XL4IN COT 12 PLY TYP VII WVN C FLD DSGN STERILE

## (undated) DEVICE — DRESSING HYDROFIBER AQUACEL AG ADVANTAGE 3.5X14 IN

## (undated) DEVICE — STERILE POLYISOPRENE POWDER-FREE SURGICAL GLOVES WITH EMOLLIENT COATING: Brand: PROTEXIS

## (undated) DEVICE — SCRUBIN SCRUB BRUSH DRY STER: Brand: MEDLINE INDUSTRIES, INC.

## (undated) DEVICE — APPLICATOR MEDICATED 26 CC SOLUTION HI LT ORNG CHLORAPREP

## (undated) DEVICE — TIP SUCT CRV REG REDI

## (undated) DEVICE — CONTAINER,SPECIMEN,4OZ,OR STRL: Brand: MEDLINE

## (undated) DEVICE — YANKAUER,FLEXIBLE HANDLE,REGLR CAPACITY: Brand: MEDLINE INDUSTRIES, INC.

## (undated) DEVICE — SOLUTION IRRIG 3000ML 0.9% SOD CHL USP UROMATIC PLAS CONT

## (undated) DEVICE — SNARE ENDOSCP L240CM LOOP W27MM SHTH DIA2.4MM WRK CHN 2.8MM

## (undated) DEVICE — GLOVE SURG SZ 65 L12IN FNGR THK79MIL GRN LTX FREE

## (undated) DEVICE — Z DISCONTINUED USE 2860885 SUTURE STRATAFIX SPRL SZ 1 L14IN ABSRB VLT L48CM CTX 1/2 SXPD2B405

## (undated) DEVICE — SOLUTION SURG PREP 26 CC PURPREP

## (undated) DEVICE — ZIPPERED TOGA, 2X LARGE: Brand: FLYTE, SURGICOOL

## (undated) DEVICE — SYRINGE MED 10ML LUERLOCK TIP W/O SFTY DISP

## (undated) DEVICE — TRAP SURG QUAD PARABOLA SLOT DSGN SFTY SCRN TRAPEASE

## (undated) DEVICE — SYSTEM NAVIGATION PALM SZ PRECIS ALIGN TECHNOLOGY DISP FOR

## (undated) DEVICE — SYRINGE 20ML LL S/C 50

## (undated) DEVICE — STRYKER PERFORMANCE SERIES SAGITTAL BLADE: Brand: STRYKER PERFORMANCE SERIES

## (undated) DEVICE — INTENT OT USE PROVIDES A STERILE INTERFACE BETWEEN THE OPERATING ROOM SURGICAL LAMPS (NON-STERILE) AND THE SURGEON OR STAFF WORKING IN THE STERILE FIELD.: Brand: ASPEN® ALC PLUS LIGHT HANDLE COVER

## (undated) DEVICE — SUTURE VICRYL ABSORBABLE BRAIDED 2-0 CT 36 IN DA UD  VCP957H

## (undated) DEVICE — SUTURE VICRYL SZ 0 L27IN ABSRB UD L36MM CT-1 1/2 CIR J260H

## (undated) DEVICE — BANDAGE COMPR M W6INXL10YD WHT BGE VELC E MTRX HK AND LOOP

## (undated) DEVICE — SUTURE VICRYL 1 L27IN ABSRB CT BRAID COAT UD J281H

## (undated) DEVICE — GLOVE SURG SZ 65 L12IN FNGR THK94MIL STD WHT LTX FREE